# Patient Record
Sex: MALE | Race: WHITE | Employment: PART TIME | URBAN - METROPOLITAN AREA
[De-identification: names, ages, dates, MRNs, and addresses within clinical notes are randomized per-mention and may not be internally consistent; named-entity substitution may affect disease eponyms.]

---

## 2018-09-07 ENCOUNTER — OFFICE VISIT (OUTPATIENT)
Dept: FAMILY MEDICINE | Facility: CLINIC | Age: 48
End: 2018-09-07
Payer: COMMERCIAL

## 2018-09-07 VITALS
WEIGHT: 290 LBS | BODY MASS INDEX: 33.51 KG/M2 | HEART RATE: 85 BPM | SYSTOLIC BLOOD PRESSURE: 115 MMHG | DIASTOLIC BLOOD PRESSURE: 82 MMHG

## 2018-09-07 DIAGNOSIS — R45.851 PASSIVE SUICIDAL IDEATIONS: ICD-10-CM

## 2018-09-07 DIAGNOSIS — F33.1 MODERATE EPISODE OF RECURRENT MAJOR DEPRESSIVE DISORDER (H): Primary | ICD-10-CM

## 2018-09-07 PROCEDURE — 99204 OFFICE O/P NEW MOD 45 MIN: CPT | Performed by: FAMILY MEDICINE

## 2018-09-07 RX ORDER — VENLAFAXINE HYDROCHLORIDE 150 MG/1
300 CAPSULE, EXTENDED RELEASE ORAL DAILY
Qty: 60 CAPSULE | Refills: 0 | Status: SHIPPED | OUTPATIENT
Start: 2018-09-07 | End: 2018-09-18

## 2018-09-07 RX ORDER — MULTIPLE VITAMINS W/ MINERALS TAB 9MG-400MCG
1 TAB ORAL DAILY
COMMUNITY
End: 2023-04-13

## 2018-09-07 NOTE — PROGRESS NOTES
SUBJECTIVE:   Gaurang Rivera is a 47 year old male who presents to clinic today for the following health issues:  Depression Followup    Status since last visit: Stable     See PHQ-9 for current symptoms.  Other associated symptoms: None    Complicating factors:   Significant life event:  No   Current substance abuse:  None  Anxiety or Panic symptoms:  No    PHQ-9  English  PHQ-9   Any Language  Suicide Assessment Five-step Evaluation and Treatment (SAFE-T)    Amount of exercise or physical activity: None    Problems taking medications regularly: No    Medication side effects: none    Diet: regular (no restrictions)      MDD - Last year pt was in [place] and had six months refills. He then got medication refilled from a clinic. He just got on [place] care and needs prescriptions. He is experiencing listlessness and little interest. He has chronic passive SI. He has thoughts that things would be better if he was dead. No active thoughts. He was hospitalized in 2002 for worsening depression. No SI from medication.     Problem list and histories reviewed & adjusted, as indicated.  Additional history: as documented    Reviewed and updated as needed this visit by clinical staff  Tobacco  Allergies  Meds  Med Hx  Surg Hx  Fam Hx  Soc Hx      Reviewed and updated as needed this visit by Provider         ROS:  + increase urination, Constitutional, HEENT, cardiovascular, pulmonary, gi and gu systems are negative, except as otherwise noted.    OBJECTIVE:     /82 (BP Location: Left arm, Patient Position: Sitting, Cuff Size: Adult Large)  Pulse 85  Wt 290 lb (131.5 kg)  BMI 33.51 kg/m2  Body mass index is 33.51 kg/(m^2).  GENERAL: healthy, alert and no distress  EYES: Eyes grossly normal to inspection  HENT: nose and mouth without ulcers or lesions  RESP: non labored   MS: no gross musculoskeletal defects noted  SKIN: no suspicious lesions or rashes  NEURO: Normal strength and tone, mentation intact and speech  normal  PSYCH: mentation appears normal, affect normal    Diagnostic Test Results:  none     ASSESSMENT/PLAN:         1. Moderate episode of recurrent major depressive disorder (H)  PHQ-9 SCORE 9/7/2018   Total Score 7     - venlafaxine (EFFEXOR-XR) 150 MG 24 hr capsule; Take 2 capsules (300 mg) by mouth daily  Dispense: 60 capsule; Refill: 0  - Pt is on Effexor  mg daily. I reviewed both micromedex and up to date. Both sites recommend 225 mg/day. Per up to date - guidelines support doses of up to 375 mg/day based on limited experience.   - I discuss this with pt that I'll do a one month refill and I'll discuss dosing with [facility] pharmacist.   - Follow up in one month for refills and physical.       2. Passive suicidal ideations  - Chronic passive SI, no active thoughts.   - venlafaxine (EFFEXOR-XR) 150 MG 24 hr capsule; Take 2 capsules (300 mg) by mouth daily  Dispense: 60 capsule; Refill: 0      Elvis Guzman MD  [facility]

## 2018-09-07 NOTE — MR AVS SNAPSHOT
"              After Visit Summary   9/7/2018    Gaurang Rivera    MRN: [id number]           Patient Information     Date Of Birth          1970        Visit Information        Provider Department      9/7/2018 9:20 AM Elvis Guzman MD [facility]        Today's Diagnoses     Moderate episode of recurrent major depressive disorder (H)    -  1    Passive suicidal ideations           Follow-ups after your visit        Your next 10 appointments already scheduled     Sep 11, 2018  8:00 AM CDT   [facility]    [address] [place] [id number] [phone]              Who to contact     If you have questions or need follow up information about today's clinic visit or your schedule please contact [facility] directly at [phone].  Normal or non-critical lab and imaging results will be communicated to you by MyChart, letter or phone within 4 business days after the clinic has received the results. If you do not hear from us within 7 days, please contact the clinic through MyChart or phone. If you have a critical or abnormal lab result, we will notify you by phone as soon as possible.  Submit refill requests through [employer] or call your pharmacy and they will forward the refill request to us. Please allow 3 business days for your refill to be completed.          Additional Information About Your Visit        MyChart Information     [employer] lets you send messages to your doctor, view your test results, renew your prescriptions, schedule appointments and more. To sign up, go to [email or web address] . Click on \"Log in\" on the left side of the screen, which will take you to the Welcome page. Then click on \"Sign up Now\" on the right side of the page.     You will be asked to enter the access code listed below, as well as some personal information. Please follow the directions to " create your username and password.     Your access code is: [email or web address]  Expires: 2018 10:11 AM     Your access code will  in 90 days. If you need help or a new code, please call your [facility] or [phone].        Care EveryWhere ID     This is your Care EveryWhere ID. This could be used by other organizations to access your [facility] medical records  [id number]        Your Vitals Were     Pulse BMI (Body Mass Index)                85 33.51 kg/m2           Blood Pressure from Last 3 Encounters:   18 115/82   04/16/10 117/80    Weight from Last 3 Encounters:   18 290 lb (131.5 kg)   02/10/15 270 lb (122.5 kg)              Today, you had the following     No orders found for display         Today's Medication Changes          These changes are accurate as of 18 10:11 AM.  If you have any questions, ask your nurse or doctor.               These medicines have changed or have updated prescriptions.        Dose/Directions    * EFFEXOR PO   This may have changed:  Another medication with the same name was added. Make sure you understand how and when to take each.   Changed by:  Elvis Guzman MD        Take by mouth 3 times daily   Refills:  0       * venlafaxine 150 MG 24 hr capsule   Commonly known as:  EFFEXOR-XR   This may have changed:  You were already taking a medication with the same name, and this prescription was added. Make sure you understand how and when to take each.   Used for:  Moderate episode of recurrent major depressive disorder (H), Passive suicidal ideations   Changed by:  Elvis Guzman MD        Dose:  300 mg   Take 2 capsules (300 mg) by mouth daily   Quantity:  60 capsule   Refills:  0       * Notice:  This list has 2 medication(s) that are the same as other medications prescribed for you. Read the directions carefully, and ask your doctor or other care provider to review them with you.         Where to get your medicines      These medications  were sent to [facility] [address] [address] [facility] AT [address] [address]     Phone:  [phone]     venlafaxine 150 MG 24 hr capsule                Primary Care Provider Fax #    Physician No Ref-Primary [phone]       No address on file        Equal Access to Services     [facility]: Hadii renetta ku hadasho Soomaali, waaxda luqadaha, qaybta kaalmada adeegyada, waxay idiin hayjacksalvador hunterfreddyjerrod urias . So [facility] [phone].    ATENCIÓN: Si habla español, tiene a mckenzie disposición servicios gratuitos de asistencia lingüística. Heraclioshabbir al [phone].    We comply with applicable federal civil rights laws and [place] laws. We do not discriminate on the basis of race, color, national origin, age, disability, sex, sexual orientation, or gender identity.            Thank you!     Thank you for choosing [facility]  for your care. Our goal is always to provide you with excellent care. Hearing back from our patients is one way we can continue to improve our services. Please take a few minutes to complete the written survey that you may receive in the mail after your visit with us. Thank you!             Your Updated Medication List - Protect others around you: Learn how to safely use, store and throw away your medicines at www.disposemymeds.org.          This list is accurate as of 9/7/18 10:11 AM.  Always use your most recent med list.                   Brand Name Dispense Instructions for use Diagnosis    * EFFEXOR PO      Take by mouth 3 times daily        * venlafaxine 150 MG 24 hr capsule    EFFEXOR-XR    60 capsule    Take 2 capsules (300 mg) by mouth daily    Moderate episode of recurrent major depressive disorder (H), Passive suicidal ideations       IBUPROFEN PO           predniSONE 10 MG tablet    DELTASONE    20 tablet    Take pills starting today as follows: 5/5/4/3/2/1.    Lumbago       WELLBUTRIN PO      None Entered        *  Notice:  This list has 2 medication(s) that are the same as other medications prescribed for you. Read the directions carefully, and ask your doctor or other care provider to review them with you.

## 2018-09-08 ASSESSMENT — PATIENT HEALTH QUESTIONNAIRE - PHQ9: SUM OF ALL RESPONSES TO PHQ QUESTIONS 1-9: 7

## 2018-09-11 ENCOUNTER — OFFICE VISIT (OUTPATIENT)
Dept: FAMILY MEDICINE | Facility: CLINIC | Age: 48
End: 2018-09-11
Payer: COMMERCIAL

## 2018-09-11 VITALS
SYSTOLIC BLOOD PRESSURE: 113 MMHG | HEART RATE: 81 BPM | WEIGHT: 286 LBS | DIASTOLIC BLOOD PRESSURE: 78 MMHG | TEMPERATURE: 98.2 F | OXYGEN SATURATION: 95 % | BODY MASS INDEX: 33.09 KG/M2 | HEIGHT: 78 IN

## 2018-09-11 DIAGNOSIS — Z12.5 SCREENING FOR PROSTATE CANCER: ICD-10-CM

## 2018-09-11 DIAGNOSIS — R35.0 URINARY FREQUENCY: ICD-10-CM

## 2018-09-11 DIAGNOSIS — Z00.00 ROUTINE GENERAL MEDICAL EXAMINATION AT A HEALTH CARE FACILITY: ICD-10-CM

## 2018-09-11 DIAGNOSIS — N40.1 BENIGN PROSTATIC HYPERPLASIA WITH URINARY FREQUENCY: ICD-10-CM

## 2018-09-11 DIAGNOSIS — R35.0 BENIGN PROSTATIC HYPERPLASIA WITH URINARY FREQUENCY: ICD-10-CM

## 2018-09-11 DIAGNOSIS — Z13.1 SCREENING FOR DIABETES MELLITUS: ICD-10-CM

## 2018-09-11 DIAGNOSIS — Z23 NEED FOR PROPHYLACTIC VACCINATION AND INOCULATION AGAINST INFLUENZA: ICD-10-CM

## 2018-09-11 DIAGNOSIS — F33.1 MODERATE EPISODE OF RECURRENT MAJOR DEPRESSIVE DISORDER (H): ICD-10-CM

## 2018-09-11 DIAGNOSIS — Z13.220 SCREENING FOR LIPID DISORDERS: ICD-10-CM

## 2018-09-11 DIAGNOSIS — R45.851 PASSIVE SUICIDAL IDEATIONS: ICD-10-CM

## 2018-09-11 LAB
ANION GAP SERPL CALCULATED.3IONS-SCNC: 7 MMOL/L (ref 3–14)
BUN SERPL-MCNC: 15 MG/DL (ref 7–30)
CALCIUM SERPL-MCNC: 8.7 MG/DL (ref 8.5–10.1)
CHLORIDE SERPL-SCNC: 105 MMOL/L (ref 94–109)
CHOLEST SERPL-MCNC: 216 MG/DL
CO2 SERPL-SCNC: 27 MMOL/L (ref 20–32)
CREAT SERPL-MCNC: 1.04 MG/DL (ref 0.66–1.25)
GFR SERPL CREATININE-BSD FRML MDRD: 76 ML/MIN/1.7M2
GLUCOSE SERPL-MCNC: 171 MG/DL (ref 70–99)
HDLC SERPL-MCNC: 25 MG/DL
LDLC SERPL CALC-MCNC: ABNORMAL MG/DL
NONHDLC SERPL-MCNC: 191 MG/DL
POTASSIUM SERPL-SCNC: 4.6 MMOL/L (ref 3.4–5.3)
PSA SERPL-ACNC: 0.46 UG/L (ref 0–4)
SODIUM SERPL-SCNC: 139 MMOL/L (ref 133–144)
TRIGL SERPL-MCNC: 1015 MG/DL

## 2018-09-11 PROCEDURE — 36415 COLL VENOUS BLD VENIPUNCTURE: CPT | Performed by: FAMILY MEDICINE

## 2018-09-11 PROCEDURE — 99396 PREV VISIT EST AGE 40-64: CPT | Mod: 25 | Performed by: FAMILY MEDICINE

## 2018-09-11 PROCEDURE — 90686 IIV4 VACC NO PRSV 0.5 ML IM: CPT | Performed by: FAMILY MEDICINE

## 2018-09-11 PROCEDURE — 80061 LIPID PANEL: CPT | Performed by: FAMILY MEDICINE

## 2018-09-11 PROCEDURE — 80048 BASIC METABOLIC PNL TOTAL CA: CPT | Performed by: FAMILY MEDICINE

## 2018-09-11 PROCEDURE — 90471 IMMUNIZATION ADMIN: CPT | Performed by: FAMILY MEDICINE

## 2018-09-11 PROCEDURE — 99213 OFFICE O/P EST LOW 20 MIN: CPT | Mod: 25 | Performed by: FAMILY MEDICINE

## 2018-09-11 PROCEDURE — G0103 PSA SCREENING: HCPCS | Performed by: FAMILY MEDICINE

## 2018-09-11 RX ORDER — TAMSULOSIN HYDROCHLORIDE 0.4 MG/1
0.4 CAPSULE ORAL DAILY
Qty: 30 CAPSULE | Refills: 1 | Status: SHIPPED | OUTPATIENT
Start: 2018-09-11 | End: 2018-10-24

## 2018-09-11 NOTE — PROGRESS NOTES
SUBJECTIVE:   CC: Gaurang Rivera is an 47 year old male who presents for preventative health visit.     Physical   Annual:     Getting at least 3 servings of Calcium per day:  Yes    Bi-annual eye exam:  NO    Dental care twice a year:  NO    Sleep apnea or symptoms of sleep apnea:  None    Diet:  Regular (no restrictions)    Taking medications regularly:  Yes    Medication side effects:  None    Additional concerns today:  No    Most of his life he has had frequent urination. Over the last five years he has had to urinate more. He wakes up around two times/night to urinate. He drinks a lot of diet coke. No increased thirst.   Few years ago he had a prostate exam which showed boggy prostate.  Today AUASS score 9 - moderate BPH     Today's PHQ-2 Score:   PHQ-2 ( 1999 Pfizer) 9/11/2018   Q1: Little interest or pleasure in doing things 1   Q2: Feeling down, depressed or hopeless 1   PHQ-2 Score 2   Q1: Little interest or pleasure in doing things Several days   Q2: Feeling down, depressed or hopeless Several days   PHQ-2 Score 2       Abuse: Current or Past(Physical, Sexual or Emotional)- No  Do you feel safe in your environment - Yes    Social History   Substance Use Topics     Smoking status: Never Smoker     Smokeless tobacco: Never Used     Alcohol use No     Alcohol Use 9/11/2018   If you drink alcohol do you typically have greater than 3 drinks per day OR greater than 7 drinks per week? Not Applicable     Last PSA: No results found for: PSA    Reviewed orders with patient. Reviewed health maintenance and updated orders accordingly - Yes      Reviewed and updated as needed this visit by clinical staff         Reviewed and updated as needed this visit by Provider            Review of Systems  CONSTITUTIONAL: NEGATIVE for fever, chills, change in weight  INTEGUMENTARY/SKIN: NEGATIVE for worrisome rashes, moles or lesions  EYES: NEGATIVE for vision changes or irritation  ENT: NEGATIVE for ear, mouth and throat  "problems  RESP: NEGATIVE for significant cough or SOB  CV: NEGATIVE for chest pain, palpitations or peripheral edema  GI: NEGATIVE for nausea, abdominal pain, heartburn, or change in bowel habits   male: see above   MUSCULOSKELETAL: NEGATIVE for significant arthralgias or myalgia  NEURO: NEGATIVE for weakness, dizziness or paresthesias  PSYCHIATRIC: + depression     OBJECTIVE:   /78 (Patient Position: Sitting, Cuff Size: Adult Large)  Pulse 81  Temp 98.2  F (36.8  C) (Oral)  Ht 6' 6\" (1.981 m)  Wt 286 lb (129.7 kg)  SpO2 95%  BMI 33.05 kg/m2  Physical Exam  GENERAL: healthy, alert and no distress  EYES: Eyes grossly normal to inspection  HENT: ear canals and TM's normal, nose and mouth without ulcers or lesions  NECK: no adenopathy, no asymmetry, masses, or scars and thyroid normal to palpation  RESP: lungs clear to auscultation - no rales, rhonchi or wheezes  CV: regular rate and rhythm, normal S1 S2  ABDOMEN: soft, nontender, no hepatosplenomegaly, no masses and bowel sounds normal  Prostate - no enlargement   MS: no gross musculoskeletal defects noted, no edema  SKIN: no suspicious lesions or rashes  NEURO: Normal strength and tone, mentation intact and speech normal  PSYCH: mentation appears normal, affect normal/bright    Diagnostic Test Results:  none     ASSESSMENT/PLAN:     1. Routine general medical examination at a health care facility  - see below     2. Urinary frequency  - PSA, screen  - Basic metabolic panel    3. Passive suicidal ideations  - addressed during last weeks visit, no change     4. Moderate episode of recurrent major depressive disorder (H)  - addressed during last weeks visit    5. Benign prostatic hyperplasia with urinary frequency  AUASS score 9 - moderate BPH   - UROLOGY ADULT REFERRAL  - tamsulosin (FLOMAX) 0.4 MG capsule; Take 1 capsule (0.4 mg) by mouth daily  Dispense: 30 capsule; Refill: 1  Flomax 0.4 mg daily. If symptoms are not improving after two weeks, you can " "increase dose to 0.8 mg daily.   If symptoms continue to persist then please follow up with urology clinic.     6. Screening for diabetes mellitus  - glucose     7. Screening for lipid disorders  - Lipid Profile (Chol, Trig, HDL, LDL calc)    8. Screening for prostate cancer  - PSA, screen    9. Need for prophylactic vaccination and inoculation against influenza  - FLU VACCINE, SPLIT VIRUS, IM (QUADRIVALENT) [10375]- >3 YRS  - Vaccine Administration, Initial [82559]        COUNSELING:   Reviewed preventive health counseling, as reflected in patient instructions    BP Readings from Last 1 Encounters:   09/07/18 115/82     Estimated body mass index is 33.51 kg/(m^2) as calculated from the following:    Height as of 2/10/15: 6' 6\" (1.981 m).    Weight as of 9/7/18: 290 lb (131.5 kg).      Weight management plan: Discussed healthy diet and exercise guidelines and patient will follow up in 12 months in clinic to re-evaluate.     reports that he has never smoked. He has never used smokeless tobacco.      Counseling Resources:  ATP IV Guidelines  Pooled Cohorts Equation Calculator  FRAX Risk Assessment  ICSI Preventive Guidelines  Dietary Guidelines for Americans, 2010  USDA's MyPlate  ASA Prophylaxis  Lung CA Screening    Elvis Guzman MD  [facility]  Answers for HPI/ROS submitted by the patient on 9/11/2018   PHQ-2 Score: 2      "

## 2018-09-11 NOTE — PROGRESS NOTES

## 2018-09-11 NOTE — MR AVS SNAPSHOT
After Visit Summary   9/11/2018    Gaurang Rivera    MRN: [id number]           Patient Information     Date Of Birth          1970        Visit Information        Provider Department      9/11/2018 8:00 AM Elvis Guzman MD [facility]        Today's Diagnoses     Screening for diabetes mellitus    -  1    Screening for lipid disorders        Urinary frequency        Screening for prostate cancer        Benign prostatic hyperplasia with urinary frequency          Care Instructions    Flomax 0.4 mg daily. If symptoms are not improving after two weeks, you can increase dose to 0.8 mg daily.   If symptoms continue to persist then please follow up with urology clinic.           Preventive Health Recommendations  Male Ages 40 to 49    Yearly exam:             See your health care provider every year in order to  o   Review health changes.   o   Discuss preventive care.    o   Review your medicines if your doctor has prescribed any.    You should be tested each year for STDs (sexually transmitted diseases) if you re at risk.     Have a cholesterol test every 5 years.     Have a colonoscopy (test for colon cancer) if someone in your family has had colon cancer or polyps before age 50.     After age 45, have a diabetes test (fasting glucose). If you are at risk for diabetes, you should have this test every 3 years.      Talk with your health care provider about whether or not a prostate cancer screening test (PSA) is right for you.    Shots: Get a flu shot each year. Get a tetanus shot every 10 years.     Nutrition:    Eat at least 5 servings of fruits and vegetables daily.     Eat whole-grain bread, whole-wheat pasta and brown rice instead of white grains and rice.     Get adequate Calcium and Vitamin D.     Lifestyle    Exercise for at least 150 minutes a week (30 minutes a day, 5 days a week). This will help you control your weight and prevent disease.     Limit alcohol to one drink  "per day.     No smoking.     Wear sunscreen to prevent skin cancer.     See your dentist every six months for an exam and cleaning.              Follow-ups after your visit        Additional Services     UROLOGY ADULT REFERRAL       Your provider has referred you to: [facility]: [place] [facility] [place] [phone]   [email or web address]    Please be aware that coverage of these services is subject to the terms and limitations of your health insurance plan.  Call member services at your health plan with any benefit or coverage questions.      Please bring the following with you to your appointment:    (1) Any X-Rays, CTs or MRIs which have been performed.  Contact the facility where they were done to arrange for  prior to your scheduled appointment.    (2) List of current medications  (3) This referral request   (4) Any documents/labs given to you for this referral                  Who to contact     If you have questions or need follow up information about today's clinic visit or your schedule please contact [facility] directly at [phone].  Normal or non-critical lab and imaging results will be communicated to you by MyChart, letter or phone within 4 business days after the clinic has received the results. If you do not hear from us within 7 days, please contact the clinic through MyChart or phone. If you have a critical or abnormal lab result, we will notify you by phone as soon as possible.  Submit refill requests through [employer] or call your pharmacy and they will forward the refill request to us. Please allow 3 business days for your refill to be completed.          Additional Information About Your Visit        [employer] Information     [employer] lets you send messages to your doctor, view your test results, renew your prescriptions, schedule appointments and more. To sign up, go to [email or web address] . Click on \"Log in\" on the left side of the " "screen, which will take you to the Welcome page. Then click on \"Sign up Now\" on the right side of the page.     You will be asked to enter the access code listed below, as well as some personal information. Please follow the directions to create your username and password.     Your access code is: [email or web address]  Expires: 2018 10:11 AM     Your access code will  in 90 days. If you need help or a new code, please call your [facility] or [phone].        Care EveryWhere ID     This is your Care EveryWhere ID. This could be used by other organizations to access your [facility] medical records  [id number]        Your Vitals Were     Pulse Temperature Height Pulse Oximetry BMI (Body Mass Index)       81 98.2  F (36.8  C) (Oral) 6' 6\" (1.981 m) 95% 33.05 kg/m2        Blood Pressure from Last 3 Encounters:   18 113/78   18 115/82   04/16/10 117/80    Weight from Last 3 Encounters:   18 286 lb (129.7 kg)   18 290 lb (131.5 kg)   02/10/15 270 lb (122.5 kg)              We Performed the Following     Basic metabolic panel     Lipid Profile (Chol, Trig, HDL, LDL calc)     PSA, screen     UROLOGY ADULT REFERRAL          Today's Medication Changes          These changes are accurate as of 18  8:38 AM.  If you have any questions, ask your nurse or doctor.               Start taking these medicines.        Dose/Directions    tamsulosin 0.4 MG capsule   Commonly known as:  FLOMAX   Used for:  Benign prostatic hyperplasia with urinary frequency   Started by:  Elvis Guzman MD        Dose:  0.4 mg   Take 1 capsule (0.4 mg) by mouth daily   Quantity:  30 capsule   Refills:  1            Where to get your medicines      These medications were sent to [facility] [address] [address] [address]     Phone:  [phone]     tamsulosin 0.4 MG capsule                Primary Care Provider Office Phone # Fax # "    Elvis Guzman [phone]       [address]  [facility] [address]        Equal Access to Services     HOLLI MARTÍNEZ : Hadii aad ku hadmainandrew Serna, waivonneda richiemariha, qamichaelta kashannanda gladys, huy svitlanain hayaan alejandranorman chairez laRayjoaquin alex. So [facility] [phone].    ATENCIÓN: Si habla español, tiene a mckenzie disposición servicios gratuitos de asistencia lingüística. Llame al [phone].    We comply with applicable federal civil rights laws and [place] laws. We do not discriminate on the basis of race, color, national origin, age, disability, sex, sexual orientation, or gender identity.            Thank you!     Thank you for choosing [facility]  for your care. Our goal is always to provide you with excellent care. Hearing back from our patients is one way we can continue to improve our services. Please take a few minutes to complete the written survey that you may receive in the mail after your visit with us. Thank you!             Your Updated Medication List - Protect others around you: Learn how to safely use, store and throw away your medicines at www.disposemymeds.org.          This list is accurate as of 9/11/18  8:38 AM.  Always use your most recent med list.                   Brand Name Dispense Instructions for use Diagnosis    IBUPROFEN PO           Multi-vitamin Tabs tablet      Take 1 tablet by mouth daily        tamsulosin 0.4 MG capsule    FLOMAX    30 capsule    Take 1 capsule (0.4 mg) by mouth daily    Benign prostatic hyperplasia with urinary frequency       venlafaxine 150 MG 24 hr capsule    EFFEXOR-XR    60 capsule    Take 2 capsules (300 mg) by mouth daily    Moderate episode of recurrent major depressive disorder (H), Passive suicidal ideations

## 2018-09-11 NOTE — PATIENT INSTRUCTIONS
Flomax 0.4 mg daily. If symptoms are not improving after two weeks, you can increase dose to 0.8 mg daily.   If symptoms continue to persist then please follow up with urology clinic.           Preventive Health Recommendations  Male Ages 40 to 49    Yearly exam:             See your health care provider every year in order to  o   Review health changes.   o   Discuss preventive care.    o   Review your medicines if your doctor has prescribed any.    You should be tested each year for STDs (sexually transmitted diseases) if you re at risk.     Have a cholesterol test every 5 years.     Have a colonoscopy (test for colon cancer) if someone in your family has had colon cancer or polyps before age 50.     After age 45, have a diabetes test (fasting glucose). If you are at risk for diabetes, you should have this test every 3 years.      Talk with your health care provider about whether or not a prostate cancer screening test (PSA) is right for you.    Shots: Get a flu shot each year. Get a tetanus shot every 10 years.     Nutrition:    Eat at least 5 servings of fruits and vegetables daily.     Eat whole-grain bread, whole-wheat pasta and brown rice instead of white grains and rice.     Get adequate Calcium and Vitamin D.     Lifestyle    Exercise for at least 150 minutes a week (30 minutes a day, 5 days a week). This will help you control your weight and prevent disease.     Limit alcohol to one drink per day.     No smoking.     Wear sunscreen to prevent skin cancer.     See your dentist every six months for an exam and cleaning.

## 2018-09-12 ASSESSMENT — PATIENT HEALTH QUESTIONNAIRE - PHQ9: SUM OF ALL RESPONSES TO PHQ QUESTIONS 1-9: 10

## 2018-09-18 ENCOUNTER — TELEPHONE (OUTPATIENT)
Dept: FAMILY MEDICINE | Facility: CLINIC | Age: 48
End: 2018-09-18

## 2018-09-18 RX ORDER — VENLAFAXINE HYDROCHLORIDE 150 MG/1
300 CAPSULE, EXTENDED RELEASE ORAL DAILY
Qty: 180 CAPSULE | Refills: 0 | Status: SHIPPED | OUTPATIENT
Start: 2018-09-18 | End: 2018-10-24

## 2018-09-18 NOTE — TELEPHONE ENCOUNTER
RN, can you please inform pt of below labs.    1. Elevated fasting blood sugar. Along with his symptoms, concerning for diabetes mellitus.   2. Elevated total cholesterol, triglycerides and LDL.   3. Rest of labs were normal.     Can you please schedule a follow up visit to obtain hgba1c and discuss lipid management.     DM

## 2018-09-18 NOTE — PROGRESS NOTES
Please send the letter to the patient with the following.         Below is a copy of your results. Please call or message me if you have questions or concerns.

## 2018-09-18 NOTE — TELEPHONE ENCOUNTER
Left message on machine to call back.   Ask to speak to an RN, let them know it's a return call.    Leave a number and time that you can be reached.   Jessica Contreras RN

## 2018-09-20 ENCOUNTER — TELEPHONE (OUTPATIENT)
Dept: FAMILY MEDICINE | Facility: CLINIC | Age: 48
End: 2018-09-20

## 2018-09-20 NOTE — TELEPHONE ENCOUNTER
PA Initiation    Medication: Venlafaxine 150mg capsule  Insurance Company: [employer] - Phone [phone] Fax [phone]  Pharmacy Filling the Rx: [facility] [address] [place] [address] AT [facility] [address]  Filling Pharmacy Phone: [phone]  Filling Pharmacy Fax:    Start Date: 9/20/2018    THIS HAS BEEN SUBMITTED BY THE PRIOR-AUTHORIZATION TEAM. ANY QUESTIONS PLEASE CALL [phone]. THANK YOU

## 2018-09-20 NOTE — TELEPHONE ENCOUNTER
Prior Authorization Approval    Authorization Effective Date: 9/19/2018  Authorization Expiration Date: 9/20/2019  Medication: Venlafaxine 150mg capsule approved   Approved Dose/Quantity:   Reference #:     Insurance Company: [employer] - Phone [phone] Fax [phone]  Expected CoPay:       CoPay Card Available:      Foundation Assistance Needed:    Which Pharmacy is filling the prescription (Not needed for infusion/clinic administered): [facility] [address] [place] [address] AT [facility] [address] [facility]  Pharmacy Notified: Yes  Patient Notified: Yes

## 2018-09-20 NOTE — TELEPHONE ENCOUNTER
Prior Authorization Retail Medication Request    Medication/Dose: Venlafaxine 150mg capsule  ICD code (if different than what is on RX):  Previously Tried and Failed:  Rationale:    Insurance Name:unknown  Insurance ID: [id number]    Pharmacy Information (if different than what is on RX)  Name: [facility]   Phone: [phone]    Please include previous medications tried and failed.  Please ask insurance for medications on formulary.

## 2018-09-24 NOTE — TELEPHONE ENCOUNTER
Writer called patient and reviewed message per Dr. Guzman.    Patient verbalized understanding and in agreement with plan.    Appt scheduled on 9/26/18.  Appt date, time and location confirmed with patient.    UMA CarverN, RN

## 2018-09-26 ENCOUNTER — OFFICE VISIT (OUTPATIENT)
Dept: FAMILY MEDICINE | Facility: CLINIC | Age: 48
End: 2018-09-26
Payer: COMMERCIAL

## 2018-09-26 VITALS
TEMPERATURE: 98.3 F | HEART RATE: 91 BPM | SYSTOLIC BLOOD PRESSURE: 120 MMHG | OXYGEN SATURATION: 91 % | DIASTOLIC BLOOD PRESSURE: 81 MMHG | BODY MASS INDEX: 33.51 KG/M2 | WEIGHT: 290 LBS

## 2018-09-26 DIAGNOSIS — E11.9 TYPE 2 DIABETES MELLITUS WITHOUT COMPLICATION, WITHOUT LONG-TERM CURRENT USE OF INSULIN (H): ICD-10-CM

## 2018-09-26 DIAGNOSIS — E78.5 HYPERLIPIDEMIA, UNSPECIFIED HYPERLIPIDEMIA TYPE: ICD-10-CM

## 2018-09-26 DIAGNOSIS — R80.9 MICROALBUMINURIA DUE TO TYPE 2 DIABETES MELLITUS (H): ICD-10-CM

## 2018-09-26 DIAGNOSIS — R73.01 ELEVATED FASTING BLOOD SUGAR: Primary | ICD-10-CM

## 2018-09-26 DIAGNOSIS — M79.661 PAIN OF RIGHT LOWER LEG: ICD-10-CM

## 2018-09-26 DIAGNOSIS — E11.29 MICROALBUMINURIA DUE TO TYPE 2 DIABETES MELLITUS (H): ICD-10-CM

## 2018-09-26 LAB
CREAT UR-MCNC: 27 MG/DL
HBA1C MFR BLD: 6.5 % (ref 0–5.6)
MICROALBUMIN UR-MCNC: 6 MG/L
MICROALBUMIN/CREAT UR: 20.69 MG/G CR (ref 0–17)
TSH SERPL DL<=0.005 MIU/L-ACNC: 1.3 MU/L (ref 0.4–4)

## 2018-09-26 PROCEDURE — 99214 OFFICE O/P EST MOD 30 MIN: CPT | Performed by: FAMILY MEDICINE

## 2018-09-26 PROCEDURE — 82043 UR ALBUMIN QUANTITATIVE: CPT | Performed by: FAMILY MEDICINE

## 2018-09-26 PROCEDURE — 83036 HEMOGLOBIN GLYCOSYLATED A1C: CPT | Performed by: FAMILY MEDICINE

## 2018-09-26 PROCEDURE — 84443 ASSAY THYROID STIM HORMONE: CPT | Performed by: FAMILY MEDICINE

## 2018-09-26 PROCEDURE — 36415 COLL VENOUS BLD VENIPUNCTURE: CPT | Performed by: FAMILY MEDICINE

## 2018-09-26 RX ORDER — SIMVASTATIN 40 MG
40 TABLET ORAL AT BEDTIME
Qty: 90 TABLET | Refills: 3 | Status: SHIPPED | OUTPATIENT
Start: 2018-09-26 | End: 2018-10-24

## 2018-09-26 NOTE — MR AVS SNAPSHOT
After Visit Summary   9/26/2018    Gaurang Rivera    MRN: [id number]           Patient Information     Date Of Birth          1970        Visit Information        Provider Department      9/26/2018 11:00 AM Elvis Guzman MD [facility]        Today's Diagnoses     Elevated fasting blood sugar    -  1    Type 2 diabetes mellitus without complication, without long-term current use of insulin (H)        Hyperlipidemia, unspecified hyperlipidemia type        Pain of right lower leg          Care Instructions    Cholesterol   - Simvastatin 40 mg once at night     Diabetes   Metformin 500 mg with breakfast for 7 days, if tolerating then increase to 500 mg with breakfast and dinner for 7 days, if tolerating then increase to 1000 mg with breakfast and 500 mg with dinner for 7 days, if tolerating then increase to 1000 mg with breakfast and dinner and stay on that dose             Follow-ups after your visit        Additional Services     DIABETES EDUCATOR REFERRAL       DIABETES SELF MANAGEMENT TRAINING (DSMT)      Your provider has referred you to Diabetes Education: [facility]: Diabetes Education - [facility] [phone]   [email or web address]     If an urgent visit is needed or A1C is above 12, Care Team to call the Diabetes  Education Team at [phone] or send an In Basket message to the Diabetes Education Pool (P DIAB ED-PATIENT CARE).    A  will call you to make your appointment. If it has been more than 3 business days since your referral was placed, please call the above phone number to schedule.    Type of training and number of hours: New Diagnosis: Initial group DSMT - 10 hours.      Diabetes Type: Type 2 - Diet Control   Medicare covers: 10 hours of initial DSMT in 12 month period from the time of first visit, plus 2 hours of follow-up DSMT annually, and additional hours as requested for insulin training.          Diabetes Co-Morbidities: dyslipidemia and obesity               A1C Goal:  <8.0       A1C is: Lab Results       Component                Value               Date                       A1C                      6.5                 09/26/2018              MEDICAL NUTRITION THERAPY (MNT) for Diabetes    Medical Nutrition Therapy with a Registered Dietitian can be provided in coordination with Diabetes Self-Management Training to assist in achieving optimal diabetes management.    MNT Type and Hours: Do not initiate MNT at this time.                       Medicare will cover: 3 hours initial MNT in 12 month period after first visit, plus 2 hours of follow-up MNT annually        Diabetes Education Topics: Comprehensive Knowledge Assessment and Instruction    Special Educational Needs Requiring Individual DSMT: None      Please be aware that coverage of these services is subject to the terms and limitations of your health insurance plan.  Call member services at your health plan to determine Diabetes Self-Management Training (Codes  and ) and Medical Nutrition Therapy (Codes 69605 and 19619) benefits and ask which blood glucose monitor brands are covered by your plan.  Please bring the following with you to your appointment:    (1)  List of current medications   (2)  List of Blood Glucose Monitor brands that are covered by your insurance plan  (3)  Blood Glucose Monitor and log book  (4)   Food records for the 3 days prior to your visit    The Certified Diabetes Educator may make diabetes medication adjustments per the CDE Protocol and Collaborative Practice Agreement.            OPHTHALMOLOGY ADULT REFERRAL       Your provider has referred you to: [facility]: Eye [facility] [phone]   [email or web address]    Please be aware that coverage of these services is subject to the terms and limitations of your health insurance plan.  Call member services at your health plan with  any benefit or coverage questions.      Please bring the following with you to your appointment:    (1) Any X-Rays, CTs or MRIs which have been performed.  Contact the facility where they were done to arrange for  prior to your scheduled appointment.    (2) List of current medications  (3) This referral request   (4) Any documents/labs given to you for this referral            ORTHO  REFERRAL       [facility] is referring you to the Orthopedic  Services at [facility].       The  Representative will assist you in the coordination of your Orthopedic and Musculoskeletal Care as prescribed by your physician.    The  Representative will call you within 1 business day to help schedule your appointment, or you may contact the  Representative at:    All areas ~ [phone]     Type of Referral : Non Surgical / Sport Medicine       Timeframe requested: Routine    Coverage of these services is subject to the terms and limitations of your health insurance plan.  Please call member services at your health plan with any benefit or coverage questions.      If X-rays, CT or MRI's have been performed, please contact the facility where they were done to arrange for , prior to your scheduled appointment.  Please bring this referral request to your appointment and present it to your specialist.                  Who to contact     If you have questions or need follow up information about today's clinic visit or your schedule please contact [facility] directly at [phone].  Normal or non-critical lab and imaging results will be communicated to you by MyChart, letter or phone within 4 business days after the clinic has received the results. If you do not hear from us within 7 days, please contact the clinic through MyChart or phone. If you have a critical or abnormal lab result, we will notify you by phone as soon as  "possible.  Submit refill requests through [employer] or call your pharmacy and they will forward the refill request to us. Please allow 3 business days for your refill to be completed.          Additional Information About Your Visit        [employer] Information     [employer] lets you send messages to your doctor, view your test results, renew your prescriptions, schedule appointments and more. To sign up, go to [email or web address] . Click on \"Log in\" on the left side of the screen, which will take you to the Welcome page. Then click on \"Sign up Now\" on the right side of the page.     You will be asked to enter the access code listed below, as well as some personal information. Please follow the directions to create your username and password.     Your access code is: [email or web address]  Expires: 2018 10:11 AM     Your access code will  in 90 days. If you need help or a new code, please call your [facility] clinic or [phone].        Care EveryWhere ID     This is your Care EveryWhere ID. This could be used by other organizations to access your [facility] medical records  [id number]        Your Vitals Were     Pulse Temperature Pulse Oximetry BMI (Body Mass Index)          91 98.3  F (36.8  C) (Oral) 91% 33.51 kg/m2         Blood Pressure from Last 3 Encounters:   18 120/81   18 113/78   18 115/82    Weight from Last 3 Encounters:   18 290 lb (131.5 kg)   18 286 lb (129.7 kg)   18 290 lb (131.5 kg)              We Performed the Following     Albumin Random Urine Quantitative with Creat Ratio     DIABETES EDUCATOR REFERRAL     Hemoglobin A1c     JUST IN CASE     OPHTHALMOLOGY ADULT REFERRAL     ORTHO  REFERRAL     TSH with free T4 reflex          Today's Medication Changes          These changes are accurate as of 18 11:41 AM.  If you have any questions, ask your nurse or doctor.               Start taking these medicines.        Dose/Directions    blood glucose " lancets standard   Commonly known as:  no brand specified   Used for:  Type 2 diabetes mellitus without complication, without long-term current use of insulin (H)        Use to test blood sugar one times daily or as directed.   Quantity:  100 each   Refills:  11       blood glucose monitoring meter device kit   Commonly known as:  no brand specified   Used for:  Type 2 diabetes mellitus without complication, without long-term current use of insulin (H)        Use to test blood sugar one times daily or as directed.   Quantity:  1 kit   Refills:  3       blood glucose monitoring test strip   Commonly known as:  no brand specified   Used for:  Type 2 diabetes mellitus without complication, without long-term current use of insulin (H)        Use to test blood sugars one times daily or as directed   Quantity:  100 strip   Refills:  1       metFORMIN 500 MG tablet   Commonly known as:  GLUCOPHAGE   Used for:  Type 2 diabetes mellitus without complication, without long-term current use of insulin (H)        Dose:  1000 mg   Take 2 tablets (1,000 mg) by mouth 2 times daily (with meals)   Quantity:  120 tablet   Refills:  2       simvastatin 40 MG tablet   Commonly known as:  ZOCOR   Used for:  Hyperlipidemia, unspecified hyperlipidemia type        Dose:  40 mg   Take 1 tablet (40 mg) by mouth At Bedtime   Quantity:  90 tablet   Refills:  3            Where to get your medicines      These medications were sent to [facility] [address] [place] [address] AT [address] [address]     Phone:  [phone]     blood glucose lancets standard    blood glucose monitoring meter device kit    blood glucose monitoring test strip    metFORMIN 500 MG tablet    simvastatin 40 MG tablet                Primary Care Provider Office Phone # Fax #    Deqa Vidya Guzman [phone] [address] [place] [address]        Equal Access to Services     HOLLI  GAAR : Hadii renetta ott angela Serna, waaxda luqadaha, qaybta kashannanda alejandralinhneel, huy hunterfreddyjerrod urias . So [facility] [phone].    ATENCIÓN: Si habla español, tiene a mckenzie disposición servicios gratuitos de asistencia lingüística. Llame al [phone].    We comply with applicable federal civil rights laws and [place] laws. We do not discriminate on the basis of race, color, national origin, age, disability, sex, sexual orientation, or gender identity.            Thank you!     Thank you for choosing [facility]  for your care. Our goal is always to provide you with excellent care. Hearing back from our patients is one way we can continue to improve our services. Please take a few minutes to complete the written survey that you may receive in the mail after your visit with us. Thank you!             Your Updated Medication List - Protect others around you: Learn how to safely use, store and throw away your medicines at www.disposemymeds.org.          This list is accurate as of 9/26/18 11:41 AM.  Always use your most recent med list.                   Brand Name Dispense Instructions for use Diagnosis    blood glucose lancets standard    no brand specified    100 each    Use to test blood sugar one times daily or as directed.    Type 2 diabetes mellitus without complication, without long-term current use of insulin (H)       blood glucose monitoring meter device kit    no brand specified    1 kit    Use to test blood sugar one times daily or as directed.    Type 2 diabetes mellitus without complication, without long-term current use of insulin (H)       blood glucose monitoring test strip    no brand specified    100 strip    Use to test blood sugars one times daily or as directed    Type 2 diabetes mellitus without complication, without long-term current use of insulin (H)       IBUPROFEN PO           metFORMIN 500 MG tablet    GLUCOPHAGE    120 tablet    Take 2 tablets (1,000  mg) by mouth 2 times daily (with meals)    Type 2 diabetes mellitus without complication, without long-term current use of insulin (H)       Multi-vitamin Tabs tablet      Take 1 tablet by mouth daily        simvastatin 40 MG tablet    ZOCOR    90 tablet    Take 1 tablet (40 mg) by mouth At Bedtime    Hyperlipidemia, unspecified hyperlipidemia type       tamsulosin 0.4 MG capsule    FLOMAX    30 capsule    Take 1 capsule (0.4 mg) by mouth daily    Benign prostatic hyperplasia with urinary frequency       venlafaxine 150 MG 24 hr capsule    EFFEXOR-XR    180 capsule    Take 2 capsules (300 mg) by mouth daily    Moderate episode of recurrent major depressive disorder (H), Passive suicidal ideations

## 2018-09-26 NOTE — PATIENT INSTRUCTIONS
Cholesterol   - Simvastatin 40 mg once at night     Diabetes   Metformin 500 mg with breakfast for 7 days, if tolerating then increase to 500 mg with breakfast and dinner for 7 days, if tolerating then increase to 1000 mg with breakfast and 500 mg with dinner for 7 days, if tolerating then increase to 1000 mg with breakfast and dinner and stay on that dose

## 2018-09-26 NOTE — PROGRESS NOTES
SUBJECTIVE:   Gaurang Rivera is a 48 year old male who presents to clinic today for the following health issues:  Diabetes mellitus   HLD  The 10-year ASCVD risk score (Mariposajonel FITZPATRICK Jr, et al., 2013) is: 12.4%    Values used to calculate the score:      Age: 48 years      Sex: Male      Is Non- : No      Diabetic: Yes      Tobacco smoker: No      Systolic Blood Pressure: 120 mmHg      Is BP treated: No      HDL Cholesterol: 25 mg/dL      Total Cholesterol: 216 mg/dL    He has been experiencing right leg pain for around 1.5 years. Previously had negative DVT US.  He denies neuropathy.   Needs eye exam..     Problem list and histories reviewed & adjusted, as indicated.  Additional history: as documented    Labs reviewed in EPIC    Reviewed and updated as needed this visit by clinical staff       Reviewed and updated as needed this visit by Provider         ROS:  Constitutional, HEENT, cardiovascular, pulmonary, gi and gu systems are negative, except as otherwise noted.    OBJECTIVE:     /81 (BP Location: Left arm, Patient Position: Sitting, Cuff Size: Adult Large)  Pulse 91  Temp 98.3  F (36.8  C) (Oral)  Wt 290 lb (131.5 kg)  SpO2 91%  BMI 33.51 kg/m2  Body mass index is 33.51 kg/(m^2).  GENERAL: healthy, alert and no distress  EYES: Eyes grossly normal to inspection  HENT:  nose and mouth without ulcers or lesions  PSYCH: mentation appears normal    Diagnostic Test Results:  Results for orders placed or performed in visit on 09/26/18 (from the past 24 hour(s))   Hemoglobin A1c   Result Value Ref Range    Hemoglobin A1C 6.5 (H) 0 - 5.6 %       ASSESSMENT/PLAN:     1. Elevated fasting blood sugar  - Hemoglobin A1c  - JUST IN CASE    2. Type 2 diabetes mellitus without complication, without long-term current use of insulin (H)  - new diagnosis, we discussed etiology and tx   - advised low carbohydrate diet and increased activity   - Albumin Random Urine Quantitative with Creat Ratio  - TSH  with free T4 reflex  - OPHTHALMOLOGY ADULT REFERRAL  - DIABETES EDUCATOR REFERRAL  - blood glucose monitoring (NO BRAND SPECIFIED) test strip; Use to test blood sugars one times daily or as directed  Dispense: 100 strip; Refill: 1  - blood glucose (NO BRAND SPECIFIED) lancets standard; Use to test blood sugar one times daily or as directed.  Dispense: 100 each; Refill: 11  - blood glucose monitoring (NO BRAND SPECIFIED) meter device kit; Use to test blood sugar one times daily or as directed.  Dispense: 1 kit; Refill: 3  - metFORMIN (GLUCOPHAGE) 500 MG tablet; Take 2 tablets (1,000 mg) by mouth 2 times daily (with meals)  Dispense: 120 tablet; Refill: 2  Metformin 500 mg with breakfast for 7 days, if tolerating then increase to 500 mg with breakfast and dinner for 7 days, if tolerating then increase to 1000 mg with breakfast and 500 mg with dinner for 7 days, if tolerating then increase to 1000 mg with breakfast and dinner and stay on that dose     3. Hyperlipidemia, unspecified hyperlipidemia type  - simvastatin (ZOCOR) 40 MG tablet; Take 1 tablet (40 mg) by mouth At Bedtime  Dispense: 90 tablet; Refill: 3    4. Pain of right lower leg  - ORTHO  REFERRAL    Follow up in one month.     Addendum - labs showed micralbuminuria, started lisinopril 5 mg Qday. Recheck BMP during next visit.     Elvis Guzman MD  [facility] [place]

## 2018-10-01 ENCOUNTER — TELEPHONE (OUTPATIENT)
Dept: FAMILY MEDICINE | Facility: CLINIC | Age: 48
End: 2018-10-01

## 2018-10-01 DIAGNOSIS — E11.9 TYPE 2 DIABETES MELLITUS WITHOUT COMPLICATION, WITHOUT LONG-TERM CURRENT USE OF INSULIN (H): ICD-10-CM

## 2018-10-01 DIAGNOSIS — E11.29 MICROALBUMINURIA DUE TO TYPE 2 DIABETES MELLITUS (H): Primary | ICD-10-CM

## 2018-10-01 DIAGNOSIS — R80.9 MICROALBUMINURIA DUE TO TYPE 2 DIABETES MELLITUS (H): Primary | ICD-10-CM

## 2018-10-01 RX ORDER — LISINOPRIL 5 MG/1
5 TABLET ORAL DAILY
Qty: 90 TABLET | Refills: 0 | Status: SHIPPED | OUTPATIENT
Start: 2018-10-01 | End: 2018-10-24

## 2018-10-01 NOTE — TELEPHONE ENCOUNTER
RN, can you please inform pt of below labs -     Your labs show that you have a protein called albumin in your urine. What this means is that when your kidneys are working normally, they prevent protein from leaking into the urine. I have started you on Lisinopril (blood pressure medication). This medication helps decrease the amount of protein in the urine and can prevent or slow the progression of diabetes-related kidney disease. As I have started you on the medication, I will repeat your kidney functions in one month.     Thyroid function normal.    Thanks,  DM

## 2018-10-02 NOTE — TELEPHONE ENCOUNTER
Dr. Guzman-Please review and sign if agree.    Patient called back and writer reviewed message per Dr. Guzman, along with to which pharmacy Lisinopril was sent.    Patient verbalized understanding and in agreement with plan.    Patient scheduled lab follow up appt on 11/6/18.  Appt date, time and location confirmed with patient.    Labs pended.    Thank you!  UMA CarverN, RN

## 2018-10-03 ENCOUNTER — RADIANT APPOINTMENT (OUTPATIENT)
Dept: GENERAL RADIOLOGY | Facility: CLINIC | Age: 48
End: 2018-10-03
Attending: FAMILY MEDICINE
Payer: COMMERCIAL

## 2018-10-03 ENCOUNTER — OFFICE VISIT (OUTPATIENT)
Dept: ORTHOPEDICS | Facility: CLINIC | Age: 48
End: 2018-10-03
Payer: COMMERCIAL

## 2018-10-03 VITALS
SYSTOLIC BLOOD PRESSURE: 116 MMHG | DIASTOLIC BLOOD PRESSURE: 84 MMHG | BODY MASS INDEX: 33.55 KG/M2 | WEIGHT: 290 LBS | HEIGHT: 78 IN

## 2018-10-03 DIAGNOSIS — M25.561 ARTHRALGIA OF RIGHT LOWER LEG: Primary | ICD-10-CM

## 2018-10-03 DIAGNOSIS — M25.561 ARTHRALGIA OF RIGHT LOWER LEG: ICD-10-CM

## 2018-10-03 PROCEDURE — 99203 OFFICE O/P NEW LOW 30 MIN: CPT | Performed by: FAMILY MEDICINE

## 2018-10-03 PROCEDURE — 73502 X-RAY EXAM HIP UNI 2-3 VIEWS: CPT

## 2018-10-03 ASSESSMENT — ENCOUNTER SYMPTOMS: MYALGIAS: 1

## 2018-10-03 NOTE — LETTER
10/3/2018         RE: Gaurang Rivera  [address]  [facility] [address]        Dear Colleague,    Thank you for referring your patient, Gaurang Rivera, to the  SP[facility]TS [facility]INE. Please see a copy of my visit note below.    [facility]   Clinic Visit s Oct 3, 2018    PCP: Elvis Guzman      Gaurang is a 48 year old male who is seen in consultation at the request of Dr. Guzman for   Chief Complaint   Patient presents with     Right Leg - Pain       Injury: Reports insidious onset without acute precipitating event.     Location of Pain: right leg, deep, nonradiating   Duration of Pain: 1.5 year(s) off and on  Rating of Pain at worst: 7/10  Rating of Pain Currently: 2/10  Pain is better with: lying down   Pain is worse with: standing   Treatment so far consists of: no treatment tried to date  Associated symptoms: no distal numbness or tingling; denies swelling or warmth  Recent imaging completed: No recent imaging completed.  Prior History of related problems: back surgery 1997, L5S1 fusion    Pain always present, never goes away completely.     Social History: is employed as a/an  rowing      Past Medical History:   Diagnosis Date     Depressive disorder 2001     Hypercholesteremia 2012       Patient Active Problem List    Diagnosis Date Noted     Microalbuminuria due to type 2 diabetes mellitus (H) 10/01/2018     Priority: Medium     Diabetes mellitus, type 2 (H) 09/26/2018     Priority: Medium     Hyperlipidemia, unspecified hyperlipidemia type 09/26/2018     Priority: Medium     Moderate episode of recurrent major depressive disorder (H) 09/07/2018     Priority: Medium     Passive suicidal ideations 09/07/2018     Priority: Medium     Low back pain 02/11/2015     Priority: Medium     Diagnosis updated by automated process. Provider to review and confirm.         Family History   Problem Relation Age of Onset     Diabetes Mother      Depression Father       "Alcoholism Sister      Depression Sister      Lupus Sister        Social History     Social History     Marital status: Single     Spouse name: N/A     Number of children: N/A     Years of education: N/A     Social History Main Topics     Smoking status: Never Smoker     Smokeless tobacco: Never Used     Alcohol use No     Drug use: No     Past Surgical History:   Procedure Laterality Date     BACK SURGERY  1997    spinal fusion     CHOLECYSTECTOMY  2012       Review of Systems   Musculoskeletal: Positive for myalgias.         Physical Exam   Musculoskeletal:        Right knee: Medial joint line tenderness noted.     /84  Ht 6' 6\" (1.981 m)  Wt 290 lb (131.5 kg)  BMI 33.51 kg/m2  Constitutional:well-developed, well-nourished, and in no distress.   Cardiovascular: Intact distal pulses.    Neurological: alert. Gait Abnormal:   Antalgic gait  Skin: Skin is warm and dry.   Psychiatric: Mood and affect normal.   Respiratory: unlabored, speaks in full sentences  Lymph: no LAD, no lymphangitis          Right Knee Exam   Swelling: None  Effusion: No    Tenderness   The patient is experiencing tenderness in the medial joint line.    Range of Motion   Extension: Normal  Flexion:     Normal    Tests   McMurrays:  Medial - Negative      Lateral - Negative  Lachman:  Anterior - Negative    Posterior - Negative  Drawer:       Anterior - Negative    Posterior - Negative  Varus:  Negative  Valgus: Negative  Pivot Shift: Negative  Patellar Apprehension: No    Left Hip Exam   Gait: Normal.    Right Hip Exam   Gait: Normal.    Tenderness   None    Range of Motion   Extension:            Normal  Flexion:                 Normal  Internal Rotation:  Normal  External Rotation: Normal  Abduction:            Normal  Adduction:            Normal    Muscle Strength   Abduction:  5/5  Adduction:  5/5  Flexion:      5/5    Tests   Mariela: Negative  Vladimir:  Negative    Back Exam   Sensation: Normal.  Gait: Abnormal.    Tenderness "   None    Range of Motion   Flexion:                    70  Extension:                20  Lateral Bend Left:    Normal  Lateral Bend Right:  Normal  Rotation Right:         Normal  Rotation Left:           Normal  SLR    Right: Negative  Left:    Negative    Muscle Strength   Normal back strength    Tests   Toe Walk: Normal  Heel Walk: Normal    Reflexes   Patellar:  Normal  Achilles:  Normal             Recent Results (from the past 744 hour(s))   XR Pelvis and Hip Right 1 View    Narrative    PELVIS AND HIP RIGHT ONE VIEW   10/3/2018 2:21 PM     HISTORY: Whole leg pain, hip decreased range of motion. Arthralgia of  right lower leg.    COMPARISON: None.       Impression    IMPRESSION: No acute fracture or dislocation. No evidence of arthritis  in the hips. Postoperative change at the lumbosacral junction.    VAIBHAV ALCALA MD       Xr lumbar spine 2/10/15    Exam: 2 views lumbar spine     History: Lumbago     Findings:      AP and lateral views of lumbar spine were obtained. 5 lumbar type  vertebral bodies are assumed for purpose this dictation with  transitional lumbosacral anatomy at L5. Interbody fusion device is  noted at L5-S1.  T12-L1 intervertebral disc space is collimated off  the image on the frontal projection. There is no evidence of acute  osseous abnormality. Sacroiliac joints and visualized portion of the  hips are congruent.     There is mild degenerative changes of the visualized lower thoracic  spine and lumbar spine with most pronounced disk space loss at T11-T12  and T12-L1. Vertebral alignment is maintained.     IMPRESSION  Impression:  1. No acute osseous abnormality.  2. Postoperative change of presumed L5-S1 interbody fusion.  3. Lumbosacral transitional anatomy at the L5.  CAN MONROE    ASSESSMENT/PLAN    ICD-10-CM    1. Arthralgia of right lower leg M25.561 XR Pelvis and Hip Right 1 View     US EDWAR Doppler with Exercise Bilateral     Entire right leg aching pain which is difficult  to precisely identify etiology.  Hip x-rays are normal.  Findings of joint line tenderness on knee exam failed to duplicate his concerning symptoms.  Differential includes radiculopathy or vascular etiology.  Discussed options, patient opts to proceed with vascular investigation as he was told by a physical therapy friend that this is the possible source.  Dopplers with exercise ordered, return after study to review results and determine next steps.      Again, thank you for allowing me to participate in the care of your patient.        Sincerely,        Bishnu Durant MD

## 2018-10-03 NOTE — PROGRESS NOTES
[facility]   Clinic Visit s Oct 3, 2018    PCP: Elvis Guzman      Gaurang is a 48 year old male who is seen in consultation at the request of Dr. Guzman for   Chief Complaint   Patient presents with     Right Leg - Pain       Injury: Reports insidious onset without acute precipitating event.     Location of Pain: right leg, deep, nonradiating   Duration of Pain: 1.5 year(s) off and on  Rating of Pain at worst: 7/10  Rating of Pain Currently: 2/10  Pain is better with: lying down   Pain is worse with: standing   Treatment so far consists of: no treatment tried to date  Associated symptoms: no distal numbness or tingling; denies swelling or warmth  Recent imaging completed: No recent imaging completed.  Prior History of related problems: back surgery 1997, L5S1 fusion    Pain always present, never goes away completely.     Social History: is employed as a/an  rowing      Past Medical History:   Diagnosis Date     Depressive disorder 2001     Hypercholesteremia 2012       Patient Active Problem List    Diagnosis Date Noted     Microalbuminuria due to type 2 diabetes mellitus (H) 10/01/2018     Priority: Medium     Diabetes mellitus, type 2 (H) 09/26/2018     Priority: Medium     Hyperlipidemia, unspecified hyperlipidemia type 09/26/2018     Priority: Medium     Moderate episode of recurrent major depressive disorder (H) 09/07/2018     Priority: Medium     Passive suicidal ideations 09/07/2018     Priority: Medium     Low back pain 02/11/2015     Priority: Medium     Diagnosis updated by automated process. Provider to review and confirm.         Family History   Problem Relation Age of Onset     Diabetes Mother      Depression Father      Alcoholism Sister      Depression Sister      Lupus Sister        Social History     Social History     Marital status: Single     Spouse name: N/A     Number of children: N/A     Years of education: N/A     Social History Main Topics      "Smoking status: Never Smoker     Smokeless tobacco: Never Used     Alcohol use No     Drug use: No     Past Surgical History:   Procedure Laterality Date     BACK SURGERY  1997    spinal fusion     CHOLECYSTECTOMY  2012       Review of Systems   Musculoskeletal: Positive for myalgias.         Physical Exam   Musculoskeletal:        Right knee: Medial joint line tenderness noted.     /84  Ht 6' 6\" (1.981 m)  Wt 290 lb (131.5 kg)  BMI 33.51 kg/m2  Constitutional:well-developed, well-nourished, and in no distress.   Cardiovascular: Intact distal pulses.    Neurological: alert. Gait Abnormal:   Antalgic gait  Skin: Skin is warm and dry.   Psychiatric: Mood and affect normal.   Respiratory: unlabored, speaks in full sentences  Lymph: no LAD, no lymphangitis          Right Knee Exam   Swelling: None  Effusion: No    Tenderness   The patient is experiencing tenderness in the medial joint line.    Range of Motion   Extension: Normal  Flexion:     Normal    Tests   McMurrays:  Medial - Negative      Lateral - Negative  Lachman:  Anterior - Negative    Posterior - Negative  Drawer:       Anterior - Negative    Posterior - Negative  Varus:  Negative  Valgus: Negative  Pivot Shift: Negative  Patellar Apprehension: No    Left Hip Exam   Gait: Normal.    Right Hip Exam   Gait: Normal.    Tenderness   None    Range of Motion   Extension:            Normal  Flexion:                 Normal  Internal Rotation:  Normal  External Rotation: Normal  Abduction:            Normal  Adduction:            Normal    Muscle Strength   Abduction:  5/5  Adduction:  5/5  Flexion:      5/5    Tests   Mariela: Negative  Vladimir:  Negative    Back Exam   Sensation: Normal.  Gait: Abnormal.    Tenderness   None    Range of Motion   Flexion:                    70  Extension:                20  Lateral Bend Left:    Normal  Lateral Bend Right:  Normal  Rotation Right:         Normal  Rotation Left:           Normal  SLR    Right: Negative  Left:    " Negative    Muscle Strength   Normal back strength    Tests   Toe Walk: Normal  Heel Walk: Normal    Reflexes   Patellar:  Normal  Achilles:  Normal             Recent Results (from the past 744 hour(s))   XR Pelvis and Hip Right 1 View    Narrative    PELVIS AND HIP RIGHT ONE VIEW   10/3/2018 2:21 PM     HISTORY: Whole leg pain, hip decreased range of motion. Arthralgia of  right lower leg.    COMPARISON: None.       Impression    IMPRESSION: No acute fracture or dislocation. No evidence of arthritis  in the hips. Postoperative change at the lumbosacral junction.    VAIBHAV ALCALA MD       Xr lumbar spine 2/10/15    Exam: 2 views lumbar spine     History: Lumbago     Findings:      AP and lateral views of lumbar spine were obtained. 5 lumbar type  vertebral bodies are assumed for purpose this dictation with  transitional lumbosacral anatomy at L5. Interbody fusion device is  noted at L5-S1.  T12-L1 intervertebral disc space is collimated off  the image on the frontal projection. There is no evidence of acute  osseous abnormality. Sacroiliac joints and visualized portion of the  hips are congruent.     There is mild degenerative changes of the visualized lower thoracic  spine and lumbar spine with most pronounced disk space loss at T11-T12  and T12-L1. Vertebral alignment is maintained.     IMPRESSION  Impression:  1. No acute osseous abnormality.  2. Postoperative change of presumed L5-S1 interbody fusion.  3. Lumbosacral transitional anatomy at the L5.  CAN FER    ASSESSMENT/PLAN    ICD-10-CM    1. Arthralgia of right lower leg M25.561 XR Pelvis and Hip Right 1 View     US EDWAR Doppler with Exercise Bilateral     Entire right leg aching pain which is difficult to precisely identify etiology.  Hip x-rays are normal.  Findings of joint line tenderness on knee exam failed to duplicate his concerning symptoms.  Differential includes radiculopathy or vascular etiology.  Discussed options, patient opts to  proceed with vascular investigation as he was told by a physical therapy friend that this is the possible source.  Dopplers with exercise ordered, return after study to review results and determine next steps.

## 2018-10-03 NOTE — MR AVS SNAPSHOT
After Visit Summary   10/3/2018    Gaurang Rivera    MRN: [id number]           Patient Information     Date Of Birth          1970        Visit Information        Provider Department      10/3/2018 1:40 PM Bishnu Durant MD  Sports Medicine        Today's Diagnoses     Arthralgia of right lower leg    -  1       Follow-ups after your visit        Your next 10 appointments already scheduled     Oct 24, 2018  7:20 AM CDT   Office Visit with Elvis Guzman MD   [facility] ([facility])    [address] [place] [id number] [phone]           Bring a current list of meds and any records pertaining to this visit. For Physicals, please bring immunization records and any forms needing to be filled out. Please arrive 10 minutes early to complete paperwork.            Nov 06, 2018 11:30 AM CST   LAB with [facility]   [facility] ([facility])    [address] [place] [id number] [phone]           Please do not eat 10-12 hours before your appointment if you are coming in fasting for labs on lipids, cholesterol, or glucose (sugar). This does not apply to pregnant women. Water, hot tea and black coffee (with nothing added) are okay. Do not drink other fluids, diet soda or chew gum.              Who to contact     If you have questions or need follow up information about today's clinic visit or your schedule please contact  SPORTS MEDICINE directly at [phone].  Normal or non-critical lab and imaging results will be communicated to you by MyChart, letter or phone within 4 business days after the clinic has received the results. If you do not hear from us within 7 days, please contact the clinic through [employer] or phone. If you have a critical or abnormal lab result, we will notify you by phone as soon as possible.  Submit refill requests through [employer] or call your pharmacy and they will  "forward the refill request to us. Please allow 3 business days for your refill to be completed.          Additional Information About Your Visit        [employer] Information     [employer] lets you send messages to your doctor, view your test results, renew your prescriptions, schedule appointments and more. To sign up, go to [email or web address] . Click on \"Log in\" on the left side of the screen, which will take you to the Welcome page. Then click on \"Sign up Now\" on the right side of the page.     You will be asked to enter the access code listed below, as well as some personal information. Please follow the directions to create your username and password.     Your access code is: [email or web address]  Expires: 2018 10:11 AM     Your access code will  in 90 days. If you need help or a new code, please call your [facility] clinic or [phone].        Care EveryWhere ID     This is your Care EveryWhere ID. This could be used by other organizations to access your [facility] medical records  [id number]        Your Vitals Were     Height BMI (Body Mass Index)                6' 6\" (1.981 m) 33.51 kg/m2           Blood Pressure from Last 3 Encounters:   10/03/18 116/84   18 120/81   18 113/78    Weight from Last 3 Encounters:   10/03/18 290 lb (131.5 kg)   18 290 lb (131.5 kg)   18 286 lb (129.7 kg)               Primary Care Provider Office Phone # Fax #    Deqa Vidya Guzman [phone] [address]  [facility] [address]        Equal Access to Services     [facility] : Hadii renetta Serna, vickey molina, huy gipson. So [facility] [phone].    ATENCIÓN: Si habla español, tiene a mckenzie disposición servicios gratuitos de asistencia lingüística. Llame al [phone].    We comply with applicable federal civil rights laws and [place] laws. We do not discriminate on the basis of race, color, national " origin, age, disability, sex, sexual orientation, or gender identity.            Thank you!     Thank you for choosing  [facility]TS [facility]ICINE  for your care. Our goal is always to provide you with excellent care. Hearing back from our patients is one way we can continue to improve our services. Please take a few minutes to complete the written survey that you may receive in the mail after your visit with us. Thank you!             Your Updated Medication List - Protect others around you: Learn how to safely use, store and throw away your medicines at www.disposemymeds.org.          This list is accurate as of 10/3/18 11:59 PM.  Always use your most recent med list.                   Brand Name Dispense Instructions for use Diagnosis    blood glucose lancets standard    no brand specified    100 each    Use to test blood sugar one times daily or as directed.    Type 2 diabetes mellitus without complication, without long-term current use of insulin (H)       blood glucose monitoring meter device kit    no brand specified    1 kit    Use to test blood sugar one times daily or as directed.    Type 2 diabetes mellitus without complication, without long-term current use of insulin (H)       blood glucose monitoring test strip    no brand specified    100 strip    Use to test blood sugars one times daily or as directed    Type 2 diabetes mellitus without complication, without long-term current use of insulin (H)       IBUPROFEN PO           lisinopril 5 MG tablet    PRINIVIL/ZESTRIL    90 tablet    Take 1 tablet (5 mg) by mouth daily    Microalbuminuria due to type 2 diabetes mellitus (H)       metFORMIN 500 MG tablet    GLUCOPHAGE    120 tablet    Take 2 tablets (1,000 mg) by mouth 2 times daily (with meals)    Type 2 diabetes mellitus without complication, without long-term current use of insulin (H)       Multi-vitamin Tabs tablet      Take 1 tablet by mouth daily        simvastatin 40 MG tablet    ZOCOR    90 tablet     Take 1 tablet (40 mg) by mouth At Bedtime    Hyperlipidemia, unspecified hyperlipidemia type       tamsulosin 0.4 MG capsule    FLOMAX    30 capsule    Take 1 capsule (0.4 mg) by mouth daily    Benign prostatic hyperplasia with urinary frequency       venlafaxine 150 MG 24 hr capsule    EFFEXOR-XR    180 capsule    Take 2 capsules (300 mg) by mouth daily    Moderate episode of recurrent major depressive disorder (H), Passive suicidal ideations

## 2018-10-16 ENCOUNTER — TELEPHONE (OUTPATIENT)
Dept: LAB | Facility: CLINIC | Age: 48
End: 2018-10-16

## 2018-10-16 DIAGNOSIS — R80.9 MICROALBUMINURIA: Primary | ICD-10-CM

## 2018-10-16 NOTE — TELEPHONE ENCOUNTER
Patient has lab only appt 11/6/18, no orders in chart.  Please place FUTURE orders in Epic if appropriate.    Thanks,   lab

## 2018-10-24 ENCOUNTER — OFFICE VISIT (OUTPATIENT)
Dept: FAMILY MEDICINE | Facility: CLINIC | Age: 48
End: 2018-10-24
Payer: COMMERCIAL

## 2018-10-24 VITALS
DIASTOLIC BLOOD PRESSURE: 76 MMHG | OXYGEN SATURATION: 94 % | WEIGHT: 286 LBS | HEART RATE: 78 BPM | BODY MASS INDEX: 33.05 KG/M2 | SYSTOLIC BLOOD PRESSURE: 108 MMHG | TEMPERATURE: 98.3 F

## 2018-10-24 DIAGNOSIS — R45.851 PASSIVE SUICIDAL IDEATIONS: ICD-10-CM

## 2018-10-24 DIAGNOSIS — Z23 NEED FOR PNEUMOCOCCAL VACCINE: ICD-10-CM

## 2018-10-24 DIAGNOSIS — R80.9 MICROALBUMINURIA DUE TO TYPE 2 DIABETES MELLITUS (H): ICD-10-CM

## 2018-10-24 DIAGNOSIS — E11.29 MICROALBUMINURIA DUE TO TYPE 2 DIABETES MELLITUS (H): ICD-10-CM

## 2018-10-24 DIAGNOSIS — E78.5 HYPERLIPIDEMIA, UNSPECIFIED HYPERLIPIDEMIA TYPE: ICD-10-CM

## 2018-10-24 DIAGNOSIS — R35.0 BENIGN PROSTATIC HYPERPLASIA WITH URINARY FREQUENCY: ICD-10-CM

## 2018-10-24 DIAGNOSIS — N40.1 BENIGN PROSTATIC HYPERPLASIA WITH URINARY FREQUENCY: ICD-10-CM

## 2018-10-24 DIAGNOSIS — F33.1 MODERATE EPISODE OF RECURRENT MAJOR DEPRESSIVE DISORDER (H): ICD-10-CM

## 2018-10-24 PROCEDURE — 90471 IMMUNIZATION ADMIN: CPT | Performed by: FAMILY MEDICINE

## 2018-10-24 PROCEDURE — 99214 OFFICE O/P EST MOD 30 MIN: CPT | Mod: 25 | Performed by: FAMILY MEDICINE

## 2018-10-24 PROCEDURE — 90732 PPSV23 VACC 2 YRS+ SUBQ/IM: CPT | Performed by: FAMILY MEDICINE

## 2018-10-24 RX ORDER — SIMVASTATIN 40 MG
40 TABLET ORAL AT BEDTIME
Qty: 90 TABLET | Refills: 3 | Status: SHIPPED | OUTPATIENT
Start: 2018-10-24 | End: 2019-06-28

## 2018-10-24 RX ORDER — LISINOPRIL 5 MG/1
5 TABLET ORAL DAILY
Qty: 90 TABLET | Refills: 1 | Status: SHIPPED | OUTPATIENT
Start: 2018-10-24 | End: 2019-06-28

## 2018-10-24 RX ORDER — VENLAFAXINE HYDROCHLORIDE 150 MG/1
300 CAPSULE, EXTENDED RELEASE ORAL DAILY
Qty: 180 CAPSULE | Refills: 1 | Status: SHIPPED | OUTPATIENT
Start: 2018-10-24 | End: 2019-03-19

## 2018-10-24 RX ORDER — TAMSULOSIN HYDROCHLORIDE 0.4 MG/1
0.8 CAPSULE ORAL DAILY
Qty: 60 CAPSULE | Refills: 2 | Status: SHIPPED | OUTPATIENT
Start: 2018-10-24 | End: 2019-02-19

## 2018-10-24 NOTE — MR AVS SNAPSHOT
After Visit Summary   10/24/2018    Gaurang Rivera    MRN: [id number]           Patient Information     Date Of Birth          1970        Visit Information        Provider Department      10/24/2018 7:20 AM Elvis Guzman MD [facility]        Today's Diagnoses     Microalbuminuria due to type 2 diabetes mellitus (H)        Moderate episode of recurrent major depressive disorder (H)        Passive suicidal ideations        Hyperlipidemia, unspecified hyperlipidemia type        Benign prostatic hyperplasia with urinary frequency           Follow-ups after your visit        Additional Services     MENTAL HEALTH REFERRAL  - Adult; Psychiatry and Medication Management; Psychiatry; [facility]: [facility] [phone].  Medication management & future refills will be returned to [facility] PCP upon completion of evaluation; We presley...       All scheduling is subject to the client's specific insurance plan & benefits, provider/location availability, and provider clinical specialities.  Please arrive 15 minutes early for your first appointment and bring your completed paperwork.    Please be aware that coverage of these services is subject to the terms and limitations of your health insurance plan.  Call member services at your health plan with any benefit or coverage questions.                      MENTAL HEALTH REFERRAL  - Adult; Psychiatry and Medication Management; Psychiatry; [facility]: [facility] [phone].  Medication management & future refills will be returned to [facility] PCP upon completion of evaluation; Ole sherwood...       Or community psychiatrist   All scheduling is subject to the client's specific insurance plan & benefits, provider/location availability, and provider clinical specialities.  Please arrive 15 minutes early for your first appointment and bring your completed paperwork.    Please be aware that coverage of these services is  "subject to the terms and limitations of your health insurance plan.  Call member services at your health plan with any benefit or coverage questions.                  Follow-up notes from your care team     Return in about 2 months (around 12/24/2018) for Routine Visit.      Your next 10 appointments already scheduled     Nov 06, 2018 11:30 AM CST   LAB with HW LAB   [facility] ([facility])    [address] [place] [id number] [phone]           Please do not eat 10-12 hours before your appointment if you are coming in fasting for labs on lipids, cholesterol, or glucose (sugar). This does not apply to pregnant women. Water, hot tea and black coffee (with nothing added) are okay. Do not drink other fluids, diet soda or chew gum.              Who to contact     If you have questions or need follow up information about today's clinic visit or your schedule please contact [facility] directly at [phone].  Normal or non-critical lab and imaging results will be communicated to you by [employer], letter or phone within 4 business days after the clinic has received the results. If you do not hear from us within 7 days, please contact the clinic through [employer] or phone. If you have a critical or abnormal lab result, we will notify you by phone as soon as possible.  Submit refill requests through [employer] or call your pharmacy and they will forward the refill request to us. Please allow 3 business days for your refill to be completed.          Additional Information About Your Visit        Fanvibe Information     [employer] lets you send messages to your doctor, view your test results, renew your prescriptions, schedule appointments and more. To sign up, go to [email or web address] . Click on \"Log in\" on the left side of the screen, which will take you to the Welcome page. Then click on \"Sign up Now\" on the right side of the page.     You will be asked to " enter the access code listed below, as well as some personal information. Please follow the directions to create your username and password.     Your access code is: [email or web address]  Expires: 2019  7:12 AM     Your access code will  in 90 days. If you need help or a new code, please call your [facility] clinic or [phone].        Care EveryWhere ID     This is your Care EveryWhere ID. This could be used by other organizations to access your [facility] medical records  [id number]        Your Vitals Were     Pulse Temperature Pulse Oximetry BMI (Body Mass Index)          78 98.3  F (36.8  C) (Oral) 94% 33.05 kg/m2         Blood Pressure from Last 3 Encounters:   10/24/18 108/76   10/03/18 116/84   18 120/81    Weight from Last 3 Encounters:   10/24/18 286 lb (129.7 kg)   10/03/18 290 lb (131.5 kg)   18 290 lb (131.5 kg)              We Performed the Following     MENTAL HEALTH REFERRAL  - Adult; Psychiatry and Medication Management; Psychiatry; G: [facility] Psychiatry Service [phone].  Medication management & future refills will be returned to [facility] PCP upon completion of evaluation; We presley...     MENTAL HEALTH REFERRAL  - Adult; Psychiatry and Medication Management; Psychiatry; [facility]: [facility] Psychiatry Service [phone].  Medication management & future refills will be returned to [facility] PCP upon completion of evaluation; We presley...          Today's Medication Changes          These changes are accurate as of 10/24/18  7:45 AM.  If you have any questions, ask your nurse or doctor.               These medicines have changed or have updated prescriptions.        Dose/Directions    tamsulosin 0.4 MG capsule   Commonly known as:  FLOMAX   This may have changed:  how much to take   Used for:  Benign prostatic hyperplasia with urinary frequency   Changed by:  Elvis Guzman MD        Dose:  0.8 mg   Take 2 capsules (0.8 mg) by mouth daily   Quantity:  60 capsule    Refills:  2            Where to get your medicines      These medications were sent to [id number] - [place] [id number] [facility] [address]  [address] [address]     Phone:  [phone]     lisinopril 5 MG tablet    simvastatin 40 MG tablet    tamsulosin 0.4 MG capsule    venlafaxine 150 MG 24 hr capsule                Primary Care Provider Office Phone # Fax #    Deqa Vidya Guzman [phone] [address]  [facility] [address]        Equal Access to Services     [facility]: Hadii aad ku hadasho Soomaali, waaxda luqadaha, qaybta kaalmada adeegyada, huy moran hayjackn pricilla urias . [facility] [phone].    ATENCIÓN: Si habla español, tiene a mckenzie disposición servicios gratuitos de asistencia lingüística. [facility] [phone].    We comply with applicable federal civil rights laws and [place] laws. We do not discriminate on the basis of race, color, national origin, age, disability, sex, sexual orientation, or gender identity.            Thank you!     Thank you for choosing [facility]  for your care. Our goal is always to provide you with excellent care. Hearing back from our patients is one way we can continue to improve our services. Please take a few minutes to complete the written survey that you may receive in the mail after your visit with us. Thank you!             Your Updated Medication List - Protect others around you: Learn how to safely use, store and throw away your medicines at www.disposemymeds.org.          This list is accurate as of 10/24/18  7:45 AM.  Always use your most recent med list.                   Brand Name Dispense Instructions for use Diagnosis    blood glucose lancets standard    no brand specified    100 each    Use to test blood sugar one times daily or as directed.    Type 2 diabetes mellitus without complication, without long-term current use of insulin (H)       blood  glucose monitoring meter device kit    no brand specified    1 kit    Use to test blood sugar one times daily or as directed.    Type 2 diabetes mellitus without complication, without long-term current use of insulin (H)       blood glucose monitoring test strip    no brand specified    100 strip    Use to test blood sugars one times daily or as directed    Type 2 diabetes mellitus without complication, without long-term current use of insulin (H)       IBUPROFEN PO           lisinopril 5 MG tablet    PRINIVIL/ZESTRIL    90 tablet    Take 1 tablet (5 mg) by mouth daily    Microalbuminuria due to type 2 diabetes mellitus (H)       metFORMIN 500 MG tablet    GLUCOPHAGE    120 tablet    Take 2 tablets (1,000 mg) by mouth 2 times daily (with meals)    Type 2 diabetes mellitus without complication, without long-term current use of insulin (H)       Multi-vitamin Tabs tablet      Take 1 tablet by mouth daily        simvastatin 40 MG tablet    ZOCOR    90 tablet    Take 1 tablet (40 mg) by mouth At Bedtime    Hyperlipidemia, unspecified hyperlipidemia type       tamsulosin 0.4 MG capsule    FLOMAX    60 capsule    Take 2 capsules (0.8 mg) by mouth daily    Benign prostatic hyperplasia with urinary frequency       venlafaxine 150 MG 24 hr capsule    EFFEXOR-XR    180 capsule    Take 2 capsules (300 mg) by mouth daily    Moderate episode of recurrent major depressive disorder (H), Passive suicidal ideations

## 2018-10-24 NOTE — PROGRESS NOTES
SUBJECTIVE:   Gaurang Rivera is a 48 year old male who presents to clinic today for the following health issues:      Diabetes Follow-up      Patient is checking blood sugars: not at all    Diabetic concerns: frequent infections     Symptoms of hypoglycemia (low blood sugar): none     Paresthesias (numbness or burning in feet) or sores: No     Date of last diabetic eye exam: no     BP Readings from Last 2 Encounters:   10/03/18 116/84   09/26/18 120/81     Hemoglobin A1C (%)   Date Value   09/26/2018 6.5 (H)     LDL Cholesterol Calculated (mg/dL)   Date Value   09/11/2018     Cannot estimate LDL when triglyceride exceeds 400 mg/dL       Diabetes Management Resources    Amount of exercise or physical activity: 1 day/week for an average of 15-30 minutes    Problems taking medications regularly: No    Medication side effects: none    Diet: regular (no restrictions)    He is still experiencing urinary frequency and urgency.   MDD  - He has previously been on wellbutrin and ritalin. He is still having passive SI. He is interested in seeing a psychologist.     Problem list and histories reviewed & adjusted, as indicated.  Additional history: as documented    Labs reviewed in EPIC    Reviewed and updated as needed this visit by clinical staff       Reviewed and updated as needed this visit by Provider         ROS:  Constitutional, HEENT, cardiovascular, pulmonary, gi and gu systems are negative, except as otherwise noted.    OBJECTIVE:     /76  Pulse 78  Temp 98.3  F (36.8  C) (Oral)  Wt 286 lb (129.7 kg)  SpO2 94%  BMI 33.05 kg/m2  Body mass index is 33.05 kg/(m^2).  GENERAL: healthy, alert and no distress  EYES: Eyes grossly normal to inspection  HENT:nose and mouth without ulcers or lesions  PSYCH: mentation appears normal, affect normal    Diagnostic Test Results:  none     ASSESSMENT/PLAN:     ## Moderate episode of recurrent major depressive disorder (H)  - continued passive SI, no active thoughts  - pt  interested in establishing care with psychiatry and psychology   - venlafaxine (EFFEXOR-XR) 150 MG 24 hr capsule; Take 2 capsules (300 mg) by mouth daily  Dispense: 180 capsule; Refill: 1  - MENTAL HEALTH REFERRAL  - Adult; Psychiatry and Medication Management; Psychiatry; [facility] [phone].  Medication management & future refills will be returned to [facility] PCP upon completion of evaluation; We presley...  - MENTAL HEALTH REFERRAL  - Adult; Psychiatry and Medication Management; Psychiatry; [facility]: [facility] [phone].  Medication management & future refills will be returned to [facility] PCP upon completion of evaluation; We presley...    ## Microalbuminuria due to type 2 diabetes mellitus (H)  - lisinopril (PRINIVIL/ZESTRIL) 5 MG tablet; Take 1 tablet (5 mg) by mouth daily  Dispense: 90 tablet; Refill: 1  - f/u in 2-3 months     ## Passive suicidal ideations  - venlafaxine (EFFEXOR-XR) 150 MG 24 hr capsule; Take 2 capsules (300 mg) by mouth daily  Dispense: 180 capsule; Refill: 1  - MENTAL HEALTH REFERRAL  - Adult; Psychiatry and Medication Management; Psychiatry; [facility] [phone].  Medication management & future refills will be returned to [facility] PCP upon completion of evaluation; We presley...  - MENTAL HEALTH REFERRAL  - Adult; Psychiatry and Medication Management; Psychiatry; [facility]: [facility] [phone].  Medication management & future refills will be returned to [facility] PCP upon completion of evaluation; We presley...    ## Hyperlipidemia, unspecified hyperlipidemia type  - simvastatin (ZOCOR) 40 MG tablet; Take 1 tablet (40 mg) by mouth At Bedtime  Dispense: 90 tablet; Refill: 3    ## Benign prostatic hyperplasia with urinary frequency  - due to continued symptoms, increased flomax from 0.4 to 0.8   - tamsulosin (FLOMAX) 0.4 MG capsule; Take 2 capsules (0.8 mg) by mouth daily  Dispense: 60 capsule;  Refill: 2  - pt will f/u with urology for further evaluation     ## Need for pneumococcal vaccine  - PPSV23, IM/SUBQ (2+ YRS) - [id number]          Elvis Guzman MD  [facility]

## 2018-10-24 NOTE — NURSING NOTE
Screening Questionnaire for Adult Immunization    Are you sick today?   No   Do you have allergies to medications, food, a vaccine component or latex?   No   Have you ever had a serious reaction after receiving a vaccination?   No   Do you have a long-term health problem with heart disease, lung disease, asthma, kidney disease, metabolic disease (e.g. diabetes), anemia, or other blood disorder?   No   Do you have cancer, leukemia, HIV/AIDS, or any other immune system problem?   No   In the past 3 months, have you taken medications that affect  your immune system, such as prednisone, other steroids, or anticancer drugs; drugs for the treatment of rheumatoid arthritis, Crohn s disease, or psoriasis; or have you had radiation treatments?   No   Have you had a seizure, or a brain or other nervous system problem?   No   During the past year, have you received a transfusion of blood or blood     products, or been given immune (gamma) globulin or antiviral drug?   No   For women: Are you pregnant or is there a chance you could become        pregnant during the next month?   No   Have you received any vaccinations in the past 4 weeks?   No     Immunization questionnaire answers were all negative.        Per orders of Dr. Guzman, injection of pneumovax 23 given by Drea Marie. Patient instructed to remain in clinic for 15 minutes afterwards, and to report any adverse reaction to me immediately.       Screening performed by Drea Marie on 10/24/2018 at 8:13 AM.

## 2019-02-19 ENCOUNTER — OFFICE VISIT (OUTPATIENT)
Dept: PSYCHIATRY | Facility: CLINIC | Age: 49
End: 2019-02-19
Payer: COMMERCIAL

## 2019-02-19 VITALS
TEMPERATURE: 98.3 F | DIASTOLIC BLOOD PRESSURE: 88 MMHG | HEIGHT: 78 IN | BODY MASS INDEX: 33.21 KG/M2 | RESPIRATION RATE: 16 BRPM | WEIGHT: 287 LBS | SYSTOLIC BLOOD PRESSURE: 120 MMHG | HEART RATE: 104 BPM | OXYGEN SATURATION: 95 %

## 2019-02-19 DIAGNOSIS — F33.1 MODERATE EPISODE OF RECURRENT MAJOR DEPRESSIVE DISORDER (H): Primary | ICD-10-CM

## 2019-02-19 PROCEDURE — 90792 PSYCH DIAG EVAL W/MED SRVCS: CPT | Performed by: NURSE PRACTITIONER

## 2019-02-19 RX ORDER — METHYLPHENIDATE HYDROCHLORIDE 5 MG/1
5 TABLET ORAL 2 TIMES DAILY
Qty: 60 TABLET | Refills: 0 | Status: SHIPPED | OUTPATIENT
Start: 2019-02-19 | End: 2019-03-19 | Stop reason: DRUGHIGH

## 2019-02-19 ASSESSMENT — ANXIETY QUESTIONNAIRES
1. FEELING NERVOUS, ANXIOUS, OR ON EDGE: SEVERAL DAYS
5. BEING SO RESTLESS THAT IT IS HARD TO SIT STILL: NOT AT ALL
2. NOT BEING ABLE TO STOP OR CONTROL WORRYING: MORE THAN HALF THE DAYS
6. BECOMING EASILY ANNOYED OR IRRITABLE: NOT AT ALL
3. WORRYING TOO MUCH ABOUT DIFFERENT THINGS: SEVERAL DAYS
7. FEELING AFRAID AS IF SOMETHING AWFUL MIGHT HAPPEN: SEVERAL DAYS
GAD7 TOTAL SCORE: 6
IF YOU CHECKED OFF ANY PROBLEMS ON THIS QUESTIONNAIRE, HOW DIFFICULT HAVE THESE PROBLEMS MADE IT FOR YOU TO DO YOUR WORK, TAKE CARE OF THINGS AT HOME, OR GET ALONG WITH OTHER PEOPLE: SOMEWHAT DIFFICULT

## 2019-02-19 ASSESSMENT — MIFFLIN-ST. JEOR: SCORE: 2300.07

## 2019-02-19 ASSESSMENT — PATIENT HEALTH QUESTIONNAIRE - PHQ9
SUM OF ALL RESPONSES TO PHQ QUESTIONS 1-9: 18
5. POOR APPETITE OR OVEREATING: SEVERAL DAYS

## 2019-02-19 NOTE — PROGRESS NOTES
"                                                         Outpatient Psychiatric Evaluation- Standard  Adult    Name:  Gaurang Rivera  : 1970    Source of Referral:  Primary Care Provider: Elvis Guzman MD   Last visit: 10/24/2018  Current Psychotherapist: none   Last visit: interested     Identifying Data:  Patient is a 48 year old, single  Choose not to answer Choose not to answer male  who presents for initial visit with me.  Patient is currently employed part time. Patient attended the session alone. Consent to communicate signed for no-one. Consent for treatment signed and included in electronic medical record. Discussed limits of confidentiality today. My Practice Policy was reviewed and signed.     Patient prefers to be called: \"Gaurang\"  Do you know why your primary doctor referred you to see Fermin Rebollarmatty? Depression for years. Getting worse. Effexor been taking forever so hard to know if it still works.       Chief Complaint:    Patient reports: \"Depression getting worse. Have been taking Effexor for so long it's hard to know if it still works.\"      HPI:    States he's had depression most of life. Had inpatient hospitalization at [facility] in  for depression/SI; \"darkest I've ever been\"; states he had \"terrible experience\" in general unit. Subsequently engaged with day treatment program. In 15 years afterwards saw a local community therapist; states it was helpful, despite depression being recurrent; this therapist has since retired. He moved to [place] in  for a job; was [place] town so didn't think he had therapy options; states he was \"venkatesh doing OK\" but in winter 2017 had a major depressive episode; had to take 1 month leave from job; however did find a counselor in his town and saw 5 times; helped him \"get out of ligia rut\". He moved back to [place] in ; didn't have health insurance for a while, but now has insurance. He's had no therapist since returning to [place].     Patient " "started Effexor and Wellbutrin in 2000. Has taken both consistently over the years; tapering Wellbutrin to 300 mg daily and Effexor to 300 mg daily; states it seemed to help at first with mood, but this therapeuitic effect he last felt \"ages ago\". He stopped Wellbutrin in 2015 when lapsed on prescriptions; didn't notice a big change. He stats he tolerates Effexor OK; can have withdrawal if missing a day's dosage (\"wild dreams\"). Lastly, recalls having been prescribed Ritalin 10-15 years ago as an antidepressant; thinks it helped.    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 States he has no other occupations; \"I'm pretty good at doing nothing\"; states family can help with finances and his lives modestly. Single, no relationship, no close friends; \"extreme introvert\"; has a young dog and cat. States \"highlight of my day\" is eating at fast food restaurant daily and reading newspaper. Will otherwise \"spend a lot of time in bed\" watching TV. He is close with his parents and one of his sisters; states \"they worry about me a lot\". States \"I think about killing myself every day, though it's a fleeting thought\"; has fanaticized about a \"tidy suicide\" via car exhaust; has never been close to attempting; states he knows he would not act on thoughts.    States he sleeps a lot, though not quality sleep. Doesn't feel that anxiety is elevated. Tends to overeat or eat junk food when depressed; which is counter-odds with is ambition to get his diabetes under control with weight management.    States an interest in attending either a day program or IOP; \"I've certainly got the time!\". Lives in [facility]; has a car.        Psychiatric Review of Symptoms:     PHQ-9 scores:   PHQ-9 SCORE 9/7/2018 9/11/2018 2/19/2019   PHQ-9 Total Score 7 10 18        SOL-7 scores:    SOL-7 SCORE 2/19/2019   Total Score 6       Psychiatric " History:   Hospitalization at [facility] in 1999; subsequent day treatment program      Substance Use History:  No EtOH or substance use      Past Medical History:  Past Medical History:   Diagnosis Date     Depressive disorder 2001     Hypercholesteremia 2012      Surgery:   Past Surgical History:   Procedure Laterality Date     BACK SURGERY  1997    spinal fusion     CHOLECYSTECTOMY  2012     Allergies:   No Known Allergies  Primary Care Provider: Elvis Guzman MD      Current Medications:    Current Outpatient Medications:      blood glucose (NO BRAND SPECIFIED) lancets standard, Use to test blood sugar one times daily or as directed., Disp: 100 each, Rfl: 11     blood glucose monitoring (NO BRAND SPECIFIED) meter device kit, Use to test blood sugar one times daily or as directed. (Patient not taking: Reported on 10/24/2018), Disp: 1 kit, Rfl: 3     blood glucose monitoring (NO BRAND SPECIFIED) test strip, Use to test blood sugars one times daily or as directed (Patient not taking: Reported on 10/24/2018), Disp: 100 strip, Rfl: 1     IBUPROFEN PO, , Disp: , Rfl:      lisinopril (PRINIVIL/ZESTRIL) 5 MG tablet, Take 1 tablet (5 mg) by mouth daily, Disp: 90 tablet, Rfl: 1     metFORMIN (GLUCOPHAGE) 500 MG tablet, Take 2 tablets (1,000 mg) by mouth 2 times daily (with meals), Disp: 120 tablet, Rfl: 2     multivitamin, therapeutic with minerals (MULTI-VITAMIN) TABS tablet, Take 1 tablet by mouth daily, Disp: , Rfl:      simvastatin (ZOCOR) 40 MG tablet, Take 1 tablet (40 mg) by mouth At Bedtime, Disp: 90 tablet, Rfl: 3     tamsulosin (FLOMAX) 0.4 MG capsule, Take 2 capsules (0.8 mg) by mouth daily, Disp: 60 capsule, Rfl: 2     venlafaxine (EFFEXOR-XR) 150 MG 24 hr capsule, Take 2 capsules (300 mg) by mouth daily, Disp: 180 capsule, Rfl: 1    The [place] Prescription Monitoring Program has been reviewed and there are no concerns about diversionary activity for controlled substances at this time.     Vital  "Signs:  Vitals: /88 (BP Location: Right arm, Patient Position: Chair, Cuff Size: Adult Large)   Pulse 104   Temp 98.3  F (36.8  C) (Oral)   Resp 16   Ht 1.981 m (6' 6\")   Wt 130.2 kg (287 lb)   SpO2 95%   BMI 33.17 kg/m      Labs:  Most recent labs reviewed and no new labs.       Review of Systems:  10 systems (general, cardiovascular, respiratory, eyes, ENT, endocrine, GI, , M/S, neurological) were reviewed. Most pertinent finding(s) is/are: diabetes, back pain. The remaining systems are all unremarkable.    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 No amnesia.  Fund of Knowledge: appropriate  Insight:  good  Judgment:  intact  Impulse Control:  intact    Suicide Risk Assessment:  Today Gaurang Rivera reports routine passive SI, fleeting in nature.  In addition, there are notable risk factors for self-harm, including age, single status, " comorbid medical condition of diabetes, suicidal ideation and purposelessness/no reason for living. However, risk is mitigated by commitment to family, sobriety, absence of past attempts, ability to volunteer a safety plan, history of seeking help when needed, future oriented, no access to firearms or weapons, identifies reasons to live including pets and denies suicidal intent or plan. Therefore, based on all available evidence including the factors cited above, Gaurang Rivera does not appear to be at imminent risk for self-harm, does not meet criteria for a 72-hr hold, and therefore remains appropriate for ongoing outpatient level of care.  A thorough assessment of risk factors related to suicide and self-harm have been reviewed and are noted above. The patient convincingly denies acute suicidality on several occasions. Local community safety resources reviewed and printed for patient to use if needed. There was no deceit detected, and the patient presented in a manner that was believable.     DSM5  Diagnosis:  296.32 (F33.1) Major Depressive Disorder, Recurrent Episode, Moderate _    Medical Comorbidities Include:   Patient Active Problem List    Diagnosis Date Noted     Microalbuminuria due to type 2 diabetes mellitus (H) 10/01/2018     Priority: Medium     Diabetes mellitus, type 2 (H) 09/26/2018     Priority: Medium     Hyperlipidemia, unspecified hyperlipidemia type 09/26/2018     Priority: Medium     Moderate episode of recurrent major depressive disorder (H) 09/07/2018     Priority: Medium     Passive suicidal ideations 09/07/2018     Priority: Medium     Low back pain 02/11/2015     Priority: Medium     Diagnosis updated by automated process. Provider to review and confirm.         A 12-item WHODAS 2.0 assessment was completed by the patient today and recorded in [employer].  No flowsheet data found.    The Patient Activation Measure (LEONEL) score was completed and recorded in [employer]. This assesses patient knowledge,  skill, and confidence for self-management. No flowsheet data found.             Impression:  Gaurang Rivera is a 48 year old male with a history of long-stand depression. Fortunately, he's rather psychologically minded and motivated for treatment. He has been taking Effexor for so long at this point, that a switch to a untrialed antidepressant could precipitate further decompensation. I propose instead, that we try to treat his depression and appetite with Ritalin for the time being, for a more prompt and predictable effect. We discuss the merits of engaging with day treatment or an IOP and he is quite agreeable to pursue -- I have placed a referral to [facility].     Medication side effects and alternatives reviewed. Health promotion activities recommended and reviewed today. All questions addressed. Education and counseling completed regarding risks and benefits of medications and psychotherapy options. Collaborative Care Psychiatry Service model reviewed today. Recommend therapy for additional support.     Treatment Plan:    Continue Effexor  mg daily    Start Ritalin 5 mg BID    Referred to [facility] day treatment/IOP    Continue all other medical directions with per primary care provider.     Continue all other medications as reviewed per electronic medical record today.     Safety plan reviewed. To the Emergency Department as needed or call after hours crisis line at [phone] or [phone]. [place] Crisis Text Line: Text [place] to 436718  or  Suicide LifeLine Chat: suicidepreventionlifeline.org/chat/    Schedule an appointment with me in 4 weeks or sooner as needed.  Call [facility] at [phone] to schedule.    Follow up with primary care provider as planned or for acute medical concerns.    Call the psychiatric nurse line with medication questions or concerns at [phone].    [employer]ide[employer] may be used to communicate with your provider, but this is not intended to be used for  emergencies.      Community Resources:    National Suicide Prevention Lifeline: 574.790.9607 (TTY: 309.142.2210). Call anytime for help.  (www.suicidepreventionlifeline.org)  [place] on Mental Illness (www.dianne.org): 237.337.2589 or 586-686-5030.   Mental Health Association (www.mentalhealth.org): 393.117.5002 or 409-005-9478.  [place] Crisis Text Line: Text [place] to 174848  Suicide LifeLine Chat: suicideFontself.org/chat    Administrative Billing:   Time spent with patient was 60 minutes and greater than 50% of time or 40 minutes was spent in counseling and coordination of care regarding above diagnoses and treatment plan.    Patient Status:  Patient will continue to be seen for ongoing consultation and stabilization.    Signed:   Fermin Bishop CNP  Psychiatry

## 2019-02-20 ASSESSMENT — ANXIETY QUESTIONNAIRES: GAD7 TOTAL SCORE: 6

## 2019-02-22 ENCOUNTER — HOSPITAL ENCOUNTER (OUTPATIENT)
Dept: BEHAVIORAL HEALTH | Facility: CLINIC | Age: 49
Discharge: HOME | End: 2019-02-22
Attending: PSYCHIATRY & NEUROLOGY | Admitting: PSYCHIATRY & NEUROLOGY
Payer: COMMERCIAL

## 2019-02-22 PROCEDURE — 90791 PSYCH DIAGNOSTIC EVALUATION: CPT

## 2019-02-22 ASSESSMENT — PAIN SCALES - GENERAL: PAINLEVEL: SEVERE PAIN (6)

## 2019-02-22 NOTE — PROGRESS NOTES
" Standard Diagnostic Assessment     CLIENT'S NAME: Gaurang Rivera  MRN:   [id number]  :   1970 AGE:48 year old SEX: male  ACCT. NUMBER: [id number]  DATE OF SERVICE: 19 Start Time:  0900 End Time:  1030    Home Phone [phone]   Work Phone Not on file.   Mobile [phone]     Preferred Phone:   May we leave a program related message? yes    Yes, the patient has been informed that any other mental health professional providing mental health services to me will need access to this Diagnostic Assessment in order to develop a treatment plan and receive payment.     Identifying Information:  Gauarng Rivera is a 48 year old, Choose not to answer, single male. Gaurang attended the DA  alone.     Reason for Referral: Gaurang was referred to Day Treatment (DT)  by Dr. Martinez. Gaurang reports the reason for referral at this time is depression.    Gaurang verbalizes the following treatment/discharge goals: \"how to expand my social engagement given that I am an introvert, find a way to increase physical activity, find a way to increase activities/productivity in my life     \".    Current Stressors/Losses/Disappointments: unemployment, physical pain, finances, not wanting to worry parents, loneliness/social isolation    Per Client, Review of Symptoms:  Mood (Depression/Anxiety/Lulu/Anger): depressed mood, feeling of hopelessness, feeling of worthlessness, fatigue, low interest in usual activities, thinking it would be better to not be alive, overeating, poor sleep  Psychosis: none reported    Trauma: none reported  Other:     Mental Health History:  Gaurang reports first onset of mental health symptoms around time of back injury in early .  Gaurang was first diagnosed , after back surgery made things even owrse.   Gaurang received the following mental health services in the past: counseling, day treatment and medication(s) from physician / PCP.   Psychiatric Hospitalizations: one, , after back surgery made " things worse. Just for one night.  Gaurang denies a history of civil commitment.      Onset/Duration/Pattern of Symptoms noted above:     Gaurang reports the following understanding of his diagnosis: depression.      Personal Safety:    [place]-Suicide Severity Rating Scale   Suicide Ideation   1.) Have you ever wished you were dead or that you could go to sleep and not wake up?     Lifetime: Yes, (if yes, please discribe) :  Past Month:  Yes, (if yes, please discribe) :    2.) Have you actually had any thoughts of killing yourself?   Lifetime:  No Past Month:  No   3.) Have you been thinking about how you might do this?     Lifetime:  No Past Month:  No   4.) Have you had these thoughts and had some intention of acting on them?     Lifetime:  No Past Month:  No   5.) Have you started to work out the details of how to kill yourself?   Lifetime:  No Past Month:  No   6.) Do you intend to carry out this plan?      Lifetime:  No Past Month:  No   Intensity of Ideation   Intensity of ideation (1 being least severe, 5 being most severe):     Lifetime:  3, description of Ideation: think that it would be easier to not be alive anymore. I don't want to die, I just don't want to live. At least not like this.                                                                                                 Past Month:  3, description of Ideation: 3, description of Ideation: think that it would be easier to not be alive anymore. I don't want to die, I just don't want to live. At least not like this.      How often do you have these thoughts? Daily or almost daily    When you have the thoughts how long do they last?  Fleeting - few seconds or minutes   Can you stop thinking about killing yourself or wanting to die if you want to?  Can control thoughts with some difficulty   Are there things - anyone or anything (i.e. family, Yazidism, pain of death) that stopped you from wanting to die or acting on thoughts of suicide?  Protective  factors definately stopped you from attempting suicide      What sort of reasons did you have for thinking about wanting to die or killing yourself (ie end pain, stop how you were feeling, get attention or reaction, revenge)?  Completely to end or stop the pain (youcouldn't go on living with the pain or how you were feeling)   Suicidal Behavior   (Suicide Attempt) - Have you made a suicide attempt?     Lifetime:  No Past Month: No   Have you engaged in self-harm (non-suicidal self-injury)?  No   (Interrupted Attempt) - Has there been a time when you started to do something to end your life but someone or something stopped you before you actually did anything?  No   (Aborted or Self-Interrupted Attempt) - Has there been a time when you started to do something to try to end your life but you stopped yourself before you actually did anything?  No   (Preparatory Acts of Behavior) - Have you taken any steps towards making suicide attempt or preparing to kill yourself (such as collecting pills, getting a gun, giving valuables away or writing a suicide note)? No   Actual Lethality/Medical Damage:   0. No physical damage or very minor physical damage (e.g., surface scratches).   1. Minor physical damage (e.g., lethargic speech; first-degree burns; mild bleeding; sprains).  2. Moderate physical damage; medical attention needed (e.g., conscious but sleepy, somewhat responsive; second-degree burns; bleeding of major vessel).  3. Moderately severe physical damage; medical hospitalization and likely intensive care required (e.g., comatose with reflexes intact; third-degree burns less than 20% of body; extensive blood loss but can recover; major fractures).  4. Sever physical damage; medical hospitalization with intensive care required (e.g., comatose without reflexes; third-degree burs over 20% of body; extensive blood loss with unstable vital sign; major damage to a vital area).  5. Death    No attempts       2008  The Research  [facility]  Used with permission by Chanda Root, PhD.               Guide to C-SSRS Risk Ratings   NO IDEATION:  with no active thoughts IDEATION: with a wish to die. IDEATION: with active thoughts. Risk Ratings   If Yes No No 0 - Very Low Risk   If NA Yes No 1 - Low Risk   If NA Yes Yes 2 - Low/moderate risk   IDEATION: associated thoughts of methods without intent or plan INTENT: Intent to follow through on suicide PLAN: Plan to follow through on suicide Risk Ratings cont...   If Yes No No 3 - Moderate Risk   If Yes Yes No 4 - High Risk   If Yes Yes Yes 5 - High Risk   The patient's ADDITIONAL RISK FACTORS and lack of PROTECTIVE FACTORS may increase their overall suicide risk ratings.      Additional Risk Factors:    Significant history of having untreated or poorly treated mental health symptoms     Significant history of untreated or poorly treated chronic pain issues     Tendency to be socially isolated and/or cut off from the support of others   Protective Factors: Sense of responsibility to family and Positive social support       Risk Status   Risk Rating: low risk, though this would increase signficiantly if anything happened to his pets or his parents-   Staff:  EM[facility] to Tx team.    Additional information to support suicide risk rating:  OR There was no additional information to provide at this time.  Please see the above suicide risk rating information.       Additional Safety Questions:    Do you have a gun, weapons or other means (including medications) to harm yourself available to you? No   Do you take chances with your safety?   no   Have you ever thought about killing someone else? No   Have you ever heard voices telling you to harm yourself or others? No       Supports:   From whom do you receive support and how often? (family/friends/agency) Parents, sister     Do your support people want/need education/resources? yes        Is there anything in your life (current or  history) that is satisfying to you (include leisure interests/hobbies)?   yes pets, connection with parents, routine of going to restaurant and reading newspaper daily      Hope/Belief System:  Do you believe things can get better? Not sure. I'm almost 49, I probably will never  or have children, I don't know that if things stay the way they are that my life has much worth       Personal Safety Summary:          Gaurang denies current fears or concerns for personal safety.    Completed safety coping plan? yes        Substance Use History:   Did drink alcohol when in college, says he drank about as much as the other guys he knew. Says he stopped drinking except for 1-2 beers a year in the last ten years. No drug use ever.     Substance Use Disorder Treatment: Gaurang is currently receiving the following services: No indications of CD issues.       CAGE-AID:  Have you ever felt you ought to cut down on your drinking or drug use?   No    Have people annoyed you by criticizing your drinking or drug use?   No    Have you ever felt bad or guilty about your drinking or drug use?   No    Have you ever had a drink or used drugs first thing in the morning to steady your nerves or to get rid of a hangover?  No    Do you feel these issues have been adequately addressed? YesChemical Dependency Assessment Recommended?  No        Gaurang has a negative Cage-Aid score.       Legal History:    Gaurang reports that he has not been involved with the legal system.   ________________________________________________________________________    Life Situation (Employment/School/Finances/Basic Needs):  Gaurang  is currently living alone in a apartment.   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 Likes it, at least likes getting out routinely and interact with other people.   Gaurang reports finances  are obtained through Employment and help from parents  Gaurang does identify his finances as a current stressor.  Gaurang denies a history of gambling and denies a history of gambling treatment.     Gaurang reports his highest level of education is graduate school M.F.A. Gaurang did not identify any learning problems   Gaurang describes academic performance as: very good   Gaurang describes school social experience as: good. Had good group of friends until he injured his back rowing.     Gaurang denies concerns regarding his current ability to meet basic needs.     Social/Family History:  Gaurang  reports he grew up in [place], [place].   Gaurang was the first born of three children.   Gaurang reports his biological parents are     Gaurang describes his childhood as really great. Had supportive parents, enjoyed his family, had friends.  Gaurang describes his current relationships with his family of origin as close to one of his sisters and to his parents     Gaurang identifies his relationship status as: single.    Gaurang identifies his sexual orientation as: opposite sex   Gaurang denies sexual health concerns.     Gaurang reports having no children.     Gaurang describes the quantity/quality of his social relationships as no friends        Significant Losses / Trauma / Abuse / Neglect Issues / Developmental Incidents:  Gaurang denies significant loss/trauma/abuse/neglect issues/developmental incidents   Gaurang reports major medical problems hurt back in ealry 20's, stil have chronic pain every day.   Gaurang has not addressed the above concerns in previous therapy/treatment     Gaurang denies personal  experience.     Oriental orthodox Preference/Spiritual Beliefs/Cultural Considerations:     A. Ethnic Self-Identification:  Gaurang self-identifies his race/ethnicities as:  and his preferred language to be English.   Gaurang reports he does not need the assistance of an . Gaurang  reports he does not need other support or modifications  involved in therapy.      B. Do you experience cultural bias (the practice of interpreting judging behavior by standards inherent to one's own culture) by other people as a stressor? If yes, describe how this relates to overall mental health symptoms.  No    C. Are there any cultural influences that may need to be considered for your treatment?  (This includes historical, geographical and familial factors that affect assessment and intervention processes). No, Denies any cultural influences or concerns that need to be considered for treatment    Strengths/Vulnerabilities:   Gaurang identifies his personal strengths as: educated, insightful, intelligent, motivated and open to learning .   Things that may interfere with the clients success in treatment include: few friends.   Other identified areas of vulnerability include: Suicidal Ideation  Depressive symptoms  Physical/medical.     Medical History / Physical Health Screen:     Primary Care Physician: Gaurang has a [facility] Provider, who is named Elvis Guzman..   Last Physical Exam: within the past year. Client was encouraged to follow up with PCP if symptoms were to develop.    Mental Health Medication Management Provider / Psychiatrist: Gaurang reports not having a psychiatrist. Just started seeing a psychiatric certified nurse paractitioner      Current medications including prescription, non-prescription, herbals, dietary aids and vitamins:  Per client report:   Outpatient Medications Marked as Taking for the 2/22/19 encounter (Hospital Encounter) with Miguelina Lewis Sig     blood glucose (NO BRAND SPECIFIED) lancets standard Use to test blood sugar one times daily or as directed.     blood glucose monitoring (NO BRAND SPECIFIED) meter device kit Use to test blood sugar one times daily or as directed.     blood glucose monitoring (NO BRAND SPECIFIED) test strip Use to test blood sugars one times daily or as directed     IBUPROFEN PO       lisinopril (PRINIVIL/ZESTRIL) 5 MG tablet Take 1 tablet (5 mg) by mouth daily     metFORMIN (GLUCOPHAGE) 500 MG tablet Take 2 tablets (1,000 mg) by mouth 2 times daily (with meals)     methylphenidate (RITALIN) 5 MG tablet Take 1 tablet (5 mg) by mouth 2 times daily     multivitamin, therapeutic with minerals (MULTI-VITAMIN) TABS tablet Take 1 tablet by mouth daily     simvastatin (ZOCOR) 40 MG tablet Take 1 tablet (40 mg) by mouth At Bedtime     venlafaxine (EFFEXOR-XR) 150 MG 24 hr capsule Take 2 capsules (300 mg) by mouth daily       Gaurang reports current medications are: not sure.   Gaurang describes taking his medications as: Independent.  Gaurang reports taking prescribed medications as prescribed.     Gaurang provides the following current assessment of pain:  ;  ;  .     Gaurang provides the following information regarding past significant medical conditions/diagnoses:      Medical:  Past Medical History:   Diagnosis Date     Depressive disorder 2001     Hypercholesteremia 2012       Surgical:  Past Surgical History:   Procedure Laterality Date     BACK SURGERY  1997    spinal fusion     CHOLECYSTECTOMY  2012     Allergy:   Gaurang reports No Known Allergies     Family History of Medical, Mental Health and/or Substance Use problems:  Per client report:   Family History   Problem Relation Age of Onset     Diabetes Mother      Depression Father      Alcoholism Sister      Depression Sister      Lupus Sister        Gaurang reports the following current medical concerns: chronic back pain, diabetes.      General Health:   Have you had any exposure to any communicable disease in the past 2-3 weeks? no     Are you aware of safe sex practices? yes     Is there a possibility of pregnancy?  no       Nutrition:    Are you on a special diet? If yes, please explain:  no   Do you have any concerns regarding your nutritional status? If yes, please explain:  yes poor diet generally   Have you had any appetite changes in the last 3  months?  No     Have you had any weight loss or weight gain in the last 3 months?  Stable     Do you have a history of an eating disorder? no   Do you have a history of being in an eating disorder program? no   Do you have any dental concerns? no   NOTE: BMI to be calculated following program admission.    Fall Risk:   Have you had any falls in the past 3 months? yes about 3 weeks ago, slipped and fell on ice     Do you currently use any assistive devices for mobility?   no     NOTE: If client reports 3 or more falls in the past 3 months, the client will not be accepted into the program until further assessment is completed by the program nurse. Check if a nurse is available to assess at time of DA.    NOTE: If client reports 2 falls in the past 3 months and/or the client currently uses assistive devices for mobility, the  will send an in-basket to the program nurse to meet with the client within the first week of programming.    Head Injury/Trauma:   Do you have a history of head injury / trauma? no     Do you have any cognitive impairment? no       Per completion of the Medical History / Physical Health Screen, is there a recommendation to see / follow up with a primary care physician/clinic or dentist?    No.      Clinical Findings     Mental Status Assessment/Clinical Observation:  Appearance:   awake, alert  Eye Contact:   good  Psychomotor Behavior: Normal  no evidence of tardive dyskinesia, dystonia, or tics  Attitude:   Cooperative    Oriented to:   All    Speech   Rate / Production: Normal    Volume:  Normal   Mood:    Depressed  Sad     Affect:    Constricted      Thought Content:  Clear  no evidence of suicidal ideation or homicidal ideation  Thought Form:  logical, linear and goal oriented no loose associations  Insight:    good    Judgment:     intact  Attention Span/Concentration: intact  Recent and Remote Memory:  intact      Psychiatric Diagnosis:    296.33 (F33.2) Major Depressive Disorder,  Recurrent Episode, Severe _    Provisional Diagnostic Hypothesis (Explain R/O, other Provisional Diagnosis, and why alternative Diagnosis that were considered were ruled out):       Medical Concerns that may Impact Treatment:     Per client report: diabetes, chronic back pain    Psychosocial and Contextual Factors (V-Codes):  V62.29 Other problem related to employment chronically underemployed, V60.2 Low income limited income, relies on help from parents and V62.9 Unspecified problem related to social environment loneliness, socially isolated    WHODAS 2.0 SCORE: 37/95 %        Client and family participation in assessment:   Gaurang was alone during this assessment.   This assessment does include collateral information.      Summary & Recommendations  Provide a brief summary of how diagnostic criteria is met (symptoms, duration & functional impairment), cause, prognosis, and likely consequences of symptoms. Include overview of pertinent client strengths, cultural influences, life situations, relationships, health concerns and how diagnosis interacts/impacts with client's life. Recommendations include: client preferences, prioritization of needed mental health, ancillary or other services and any referrals to services required by statute or rule.     Gaurang is a 48-year-old, , single, college educated man who has been severely impacted by depression. He was referred for a higher level of care after an initial assessment with a psychiatric practitioner, due to passive suicidal ideation and poor daily functioning. 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 He had severe pain from this, had to stop rowing, and lost his social [place] who were all other rowers. Since then, he has had pain that has been significantly limiting in his life activities and has been socially isolated much of the time. He had back surgery in 1997 and this actually made his pain worse  "and it was in the aftermath of that he had his first and only mental health hospitalization. The physical pain and the depression have been comorbid throughout his adult life. He has had jobs coaching rowing, and when he is doing this, he says he feels somewhat better as he is at least getting out of the house on a routine basis and interacting with other people. He did this at the [facility] for a time, as well as at [facility] in [place] and for a competitive rowing club here in [place]. The experience at [facility] was rather short-lived and he did struggle with his mental health issues to a degree that was impairing of his performance there. The coaching role is typically part-time and seasonal. He does not  between November and April and this is when his depression gets worse. He says he would like to get some kind of job now, but cannot find the energy to take the steps to do so. In [place], he at least has his parents and a sister that he can spend time with, as he has no connections otherwise. He says he is very introverted and has a difficult time making connections, especially when feeling so low all the time. He presents his passive suicidal ideation as arising from seeing his life as not having much to it, and he says that he believes he will never be  and never have children as he is getting too old now and he cannot see the use of living as he has been without some change. While he denies any plan or intention of acting on such thoughts, he does say in passing that he is not sure what he would do if anything ever happened to his parents or his pets as these are the only things that he really considers meaningful currently.The emotional pain and loneliness of his experience is quite apparent in the interview today. He would like to participate in the [facility] to \"learn how to expand my social engagement given that I am an introvert, find a way to increase my physical " "activity, and find a way to increase activities/productivity (like with a job) in my life\". These are all reasonable goals and show some insight into his keen awareness of what his limitations are in his life. To move toward these goals, he will likely benefit from working on changing some of his self-limiting beliefs about himself, becoming more self-compassionate, working on managing anxiety that stops him form doing the things he wants to do, practicing routine goal setting, increasing the structure and healthy lifestyle balance in his days, and taking steps toward doing more socially. He was also encouraged to continue working with his medication provider on finding an effective treatment regimen for his depression. He could very much benefit from assistance in finding a good individual therapist as well.       Prognosis is Fair. Without the recommended intervention, the client is likely to experience the following consequences of their symptoms: increased risk of suicide, potential for hospitalization.    Referrals to services required by statute or rule:   Report to child/adult protection services was NA.   Referral to another professional/service is not indicated at this time..    Program Recommendation: Day Treatment (DT) .      Assessment Completed by: Miguelina Lewis  "

## 2019-02-26 ENCOUNTER — HOSPITAL ENCOUNTER (OUTPATIENT)
Dept: BEHAVIORAL HEALTH | Facility: CLINIC | Age: 49
End: 2019-02-26
Attending: PSYCHIATRY & NEUROLOGY
Payer: COMMERCIAL

## 2019-02-26 PROBLEM — F33.2 MDD (MAJOR DEPRESSIVE DISORDER), RECURRENT EPISODE, SEVERE (H): Status: ACTIVE | Noted: 2019-02-26

## 2019-02-26 PROCEDURE — H2012 BEHAV HLTH DAY TREAT, PER HR: HCPCS

## 2019-02-26 ASSESSMENT — ANXIETY QUESTIONNAIRES
7. FEELING AFRAID AS IF SOMETHING AWFUL MIGHT HAPPEN: NOT AT ALL
1. FEELING NERVOUS, ANXIOUS, OR ON EDGE: NOT AT ALL
3. WORRYING TOO MUCH ABOUT DIFFERENT THINGS: SEVERAL DAYS
5. BEING SO RESTLESS THAT IT IS HARD TO SIT STILL: NOT AT ALL
GAD7 TOTAL SCORE: 3
IF YOU CHECKED OFF ANY PROBLEMS ON THIS QUESTIONNAIRE, HOW DIFFICULT HAVE THESE PROBLEMS MADE IT FOR YOU TO DO YOUR WORK, TAKE CARE OF THINGS AT HOME, OR GET ALONG WITH OTHER PEOPLE: SOMEWHAT DIFFICULT
6. BECOMING EASILY ANNOYED OR IRRITABLE: NOT AT ALL
2. NOT BEING ABLE TO STOP OR CONTROL WORRYING: SEVERAL DAYS

## 2019-02-26 ASSESSMENT — PATIENT HEALTH QUESTIONNAIRE - PHQ9
5. POOR APPETITE OR OVEREATING: SEVERAL DAYS
SUM OF ALL RESPONSES TO PHQ QUESTIONS 1-9: 15

## 2019-02-26 NOTE — PROGRESS NOTES
"  Adult Mental Health Outpatient Group Therapy Progress Note       Treatment Goals:     Gaurang verbalizes the following treatment/discharge goals: \"how to expand my social engagement given that I am an introvert, find a way to increase physical activity, find a way to increase activities/productivity in my life         Area of Treatment:   Symptom Management    Therapeutic Interventions/Treatment Strategies:  Support, Feedback, Structured Activity, Problem Solving, Clarification and Education    Response to Treatment Strategies:  Accepted Feedback, Gave Feedback, Listened, Focused on Goals, Attentive, Accepted Support and Alert    Name of Group:  Psychotherapy  Session time: 1440-3228  5 attending     Description and Outcome:  Client is welcomed and oriented on this his first day in the program. He reports a struggle with depression since a back injury 20 years ago. Client has chronic pain. He isolates and lacks a support system. He is hopeful that the program will help to provide structure and allow him to make changes in his life. Client participated in group discussion focused on \"Who I am as a person. What are my passions and strengths\". Gaurang describes himself as a rowing , reader, an introvert, someone who enjoys the outdoors. No safety concerns.  Client demonstrates an understanding of session content by fully participating, sharing and giving feedback.    Is this a Weekly Review of the Progress on the Treatment Plan?  No    "

## 2019-02-27 ASSESSMENT — ANXIETY QUESTIONNAIRES: GAD7 TOTAL SCORE: 3

## 2019-02-27 NOTE — PROGRESS NOTES
"Adult Mental Health Outpatient Group Therapy Progress Note     Client Initial Individualized Goals for Treatment: \"how to expand my social engagement given that I am an introvert, find a way to increase physical activity, find a way to increase activities/productivity in my life.\"    See Initial Treatment suggestions for the client during the time between Diagnostic Assessment and completion of the Master Individualized Treatment Plan.    Treatment Goals:  Follow Safety Plan   Ask for more information, support and/or assistance as needed.  Follow up with providers/community supports as needed: nurse practitioner, therapist  Report increases or changes in symptoms to staff.  Report any personal safety concerns to staff.   Take medications as prescribed.  Report medication changes and/or side effects to staff.  Attend and participate in groups as scheduled or notify staff if unable to do so.  Report any use of substances to staff as this may impact your symptoms and/or personal safety.  Notify staff if you have any other issues that need to be addressed. This may include any current abuse / neglect / exploitation or other vulnerability.  Follow recommendations of your treatment team and discuss concerns if not in agreement.                 Area of Treatment Focus:  Symptom Management    Therapeutic Interventions/Treatment Strategies:  Support, Feedback, Safety Assessments, Structured Activity and Education    Response to Treatment Strategies:  Accepted Feedback, Listened, Attentive, Accepted Support and Alert     Name of Group: Life Skills(11:00-12:00)    Number of Group Participants: 5    Description and Outcome:  Client presented with calm,even mood during first session in this group therapy program.Good focus and concentration during a discussion related to ABCs of stress management which includes altering,avoiding and accepting. Client reorted that his mental health symptoms started with a series of age related " physical health problems one of which related to dental problems.  Client would benefit from additional opportunities to practice and implement content from this session  in every day life,relationships and mental health recovery.    Is this a Weekly Review of the Progress on the Treatment Plan?  Yes.      Are Treatment Plan Goals being addressed?  Yes, continue treatment goals      Are Treatment Plan Strategies to Address Goals Effective?  Yes, continue treatment strategies      Are there any current contracts in place?  No

## 2019-02-28 ENCOUNTER — HOSPITAL ENCOUNTER (OUTPATIENT)
Dept: BEHAVIORAL HEALTH | Facility: CLINIC | Age: 49
End: 2019-02-28
Attending: PSYCHIATRY & NEUROLOGY
Payer: COMMERCIAL

## 2019-02-28 PROCEDURE — H2012 BEHAV HLTH DAY TREAT, PER HR: HCPCS

## 2019-02-28 NOTE — PROGRESS NOTES
"  Adult Mental Health Outpatient Group Therapy Progress Note       Treatment Goals:     Gaurang verbalizes the following treatment/discharge goals: \"how to expand my social engagement given that I am an introvert, find a way to increase physical activity, find a way to increase activities/productivity in my life         Area of Treatment Focus:  Symptom Management, Community Resources/Discharge Planning and Develop Socialization / Interpersonal Relationship Skills    Therapeutic Interventions/Treatment Strategies:  Support, Feedback, Safety Assessments, Structured Activity, Problem Solving, Clarification and Education    Response to Treatment Strategies:  Accepted Feedback, Gave Feedback, Listened, Focused on Goals, Attentive, Accepted Support and Alert    Name of Group:  Psychotherapy  Session time: [id number]  5 attending     Description and Outcome:  Client reports anxiety about returning to program today, but feels good about following through. He states \"Coming here is a big step and I need structure\". Client has a lot of time on his hands and often isolates. He feels good about having a dog, because this gets him out of the house daily. He said that has no plans for the weekend, but later identifies going to the dog park, visiting his parents and watching sports. Group members offered ideas of other activities to do. No safety concerns.  Client demonstrates an understanding of session content by fully participating, sharing and giving feedback.    Is this a Weekly Review of the Progress on the Treatment Plan?  Yes.      Are Treatment Plan Goals being addressed?  Yes, continue treatment goals      Are Treatment Plan Strategies to Address Goals Effective?  Yes, continue treatment strategies      Are there any current contracts in place?  No    "

## 2019-03-05 ENCOUNTER — HOSPITAL ENCOUNTER (OUTPATIENT)
Dept: BEHAVIORAL HEALTH | Facility: CLINIC | Age: 49
End: 2019-03-05
Attending: PSYCHIATRY & NEUROLOGY
Payer: COMMERCIAL

## 2019-03-05 PROCEDURE — H2012 BEHAV HLTH DAY TREAT, PER HR: HCPCS

## 2019-03-06 NOTE — PROGRESS NOTES
"Adult Mental Health Outpatient Group Therapy Progress Note     Client Initial Individualized Goals for Treatment: \"how to expand my social engagement given that I am an introvert, find a way to increase physical activity, find a way to increase activities/productivity in my life.\"    See Initial Treatment suggestions for the client during the time between Diagnostic Assessment and completion of the Master Individualized Treatment Plan.    Treatment Goals:  Follow Safety Plan   Ask for more information, support and/or assistance as needed.  Follow up with providers/community supports as needed: nurse practitioner, therapist  Report increases or changes in symptoms to staff.  Report any personal safety concerns to staff.   Take medications as prescribed.  Report medication changes and/or side effects to staff.  Attend and participate in groups as scheduled or notify staff if unable to do so.  Report any use of substances to staff as this may impact your symptoms and/or personal safety.  Notify staff if you have any other issues that need to be addressed. This may include any current abuse / neglect / exploitation or other vulnerability.  Follow recommendations of your treatment team and discuss concerns if not in agreement.                 Area of Treatment Focus:  Symptom Management    Therapeutic Interventions/Treatment Strategies:  Support, Feedback, Safety Assessments, Structured Activity and Education    Response to Treatment Strategies:  Accepted Feedback, Listened, Attentive, Accepted Support and Alert     Name of Group: Life Skills(11:00-12:00)    Number of Group Participants: 6    Description and Outcome:  Client presented with calm,even mood during first session in this group therapy program.Good focus and concentration during a discussion related to self esteem/self compassion strategies.  Client would benefit from additional opportunities to practice and implement content from this session  in every day " life,relationships and mental health recovery.    Is this a Weekly Review of the Progress on the Treatment Plan?  Yes.      Are Treatment Plan Goals being addressed?  Yes, continue treatment goals      Are Treatment Plan Strategies to Address Goals Effective?  Yes, continue treatment strategies      Are there any current contracts in place?  No

## 2019-03-11 ENCOUNTER — HOSPITAL ENCOUNTER (OUTPATIENT)
Dept: BEHAVIORAL HEALTH | Facility: CLINIC | Age: 49
End: 2019-03-11
Attending: PSYCHIATRY & NEUROLOGY
Payer: COMMERCIAL

## 2019-03-11 PROCEDURE — H2012 BEHAV HLTH DAY TREAT, PER HR: HCPCS

## 2019-03-12 ENCOUNTER — HOSPITAL ENCOUNTER (OUTPATIENT)
Dept: BEHAVIORAL HEALTH | Facility: CLINIC | Age: 49
End: 2019-03-12
Attending: PSYCHIATRY & NEUROLOGY
Payer: COMMERCIAL

## 2019-03-12 PROCEDURE — H2012 BEHAV HLTH DAY TREAT, PER HR: HCPCS

## 2019-03-12 NOTE — PROGRESS NOTES
"Adult Mental Health Outpatient Group Therapy Progress Note     Client Initial Individualized Goals for Treatment: \"how to expand my social engagement given that I am an introvert, find a way to increase physical activity, find a way to increase activities/productivity in my life.\"    See Initial Treatment suggestions for the client during the time between Diagnostic Assessment and completion of the Master Individualized Treatment Plan.    Treatment Goals:  1.Client will notify staff when needing assistance to develop or implement a coping plan to manage suicidal or self injurious urges.  Client will use coping plan for safety, as needed.  2. When in life skills group Gaurang will practice opposite to emotion to help improve motivation to take better care of himself  providing an update of progress weekly  3. When in Group Therapy, Gaurang will report mood, symptoms, and coping skills to manage symptoms and mood.   4. Gaurang will establish care with an individual therapist.                 Area of Treatment Focus:  Symptom Management    Therapeutic Interventions/Treatment Strategies:  Support, Feedback, Safety Assessments, Structured Activity and Education    Response to Treatment Strategies:  Accepted Feedback, Listened, Attentive, Accepted Support and Alert     Name of Group: Life Skills(11:00-12:00)    Number of Group Participants: 7    Description and Outcome:  Client presented with calm,even mood with good focus and concentration. Psychosocial skills addressed included stress avoidance strategies,leisure education,self esteem and practice of communication strategies. Minimal social interaction with peers.Goal #1 (no personal safety concerns reported or observed). Gaol #2 Good progress.  Client would benefit from additional opportunities to practice and implement content from this session  in every day life,relationships and mental health recovery.    Is this a Weekly Review of the Progress on the Treatment " Plan?  Yes.      Are Treatment Plan Goals being addressed?  Yes, continue treatment goals      Are Treatment Plan Strategies to Address Goals Effective?  Yes, continue treatment strategies      Are there any current contracts in place?  No

## 2019-03-13 NOTE — PROGRESS NOTES
"Adult Mental Health Outpatient Group Therapy Progress Note     Client Initial Individualized Goals for Treatment: \"how to expand my social engagement given that I am an introvert, find a way to increase physical activity, find a way to increase activities/productivity in my life.\"    See Initial Treatment suggestions for the client during the time between Diagnostic Assessment and completion of the Master Individualized Treatment Plan.    Treatment Goals:  1.Client will notify staff when needing assistance to develop or implement a coping plan to manage suicidal or self injurious urges.  Client will use coping plan for safety, as needed.  2. When in life skills group Gaurang will practice opposite to emotion to help improve motivation to take better care of himself  providing an update of progress weekly  3. When in Group Therapy, Gaurang will report mood, symptoms, and coping skills to manage symptoms and mood.   4. Gaurang will establish care with an individual therapist.                 Area of Treatment Focus:  Symptom Management    Therapeutic Interventions/Treatment Strategies:  Support, Feedback, Safety Assessments, Structured Activity and Education    Response to Treatment Strategies:  Accepted Feedback, Listened, Attentive, Accepted Support and Alert     Name of Group: Life Skills(11:00-12:00)    Number of Group Participants: 5    Description and Outcome:  Client presented with calm,even mood with good focus and concentration. Psychosocial skills addressed included a discussion related to self image/self compassion.Goal #1 (no personal safety concerns reported or observed).  Client would benefit from additional opportunities to practice and implement content from this session  in every day life,relationships and mental health recovery.    Is this a Weekly Review of the Progress on the Treatment Plan?  Yes.      Are Treatment Plan Goals being addressed?  Yes, continue treatment goals      Are Treatment Plan " Strategies to Address Goals Effective?  Yes, continue treatment strategies      Are there any current contracts in place?  No

## 2019-03-18 ENCOUNTER — HOSPITAL ENCOUNTER (OUTPATIENT)
Dept: BEHAVIORAL HEALTH | Facility: CLINIC | Age: 49
End: 2019-03-18
Attending: PSYCHIATRY & NEUROLOGY
Payer: COMMERCIAL

## 2019-03-18 PROCEDURE — H2012 BEHAV HLTH DAY TREAT, PER HR: HCPCS

## 2019-03-19 ENCOUNTER — HOSPITAL ENCOUNTER (OUTPATIENT)
Dept: BEHAVIORAL HEALTH | Facility: CLINIC | Age: 49
End: 2019-03-19
Attending: PSYCHIATRY & NEUROLOGY
Payer: COMMERCIAL

## 2019-03-19 ENCOUNTER — OFFICE VISIT (OUTPATIENT)
Dept: PSYCHIATRY | Facility: CLINIC | Age: 49
End: 2019-03-19
Payer: COMMERCIAL

## 2019-03-19 VITALS
HEIGHT: 78 IN | WEIGHT: 287 LBS | SYSTOLIC BLOOD PRESSURE: 118 MMHG | TEMPERATURE: 97.6 F | OXYGEN SATURATION: 94 % | HEART RATE: 91 BPM | RESPIRATION RATE: 16 BRPM | DIASTOLIC BLOOD PRESSURE: 84 MMHG | BODY MASS INDEX: 33.21 KG/M2

## 2019-03-19 DIAGNOSIS — F33.1 MODERATE EPISODE OF RECURRENT MAJOR DEPRESSIVE DISORDER (H): ICD-10-CM

## 2019-03-19 DIAGNOSIS — F33.2 SEVERE EPISODE OF RECURRENT MAJOR DEPRESSIVE DISORDER, WITHOUT PSYCHOTIC FEATURES (H): Primary | ICD-10-CM

## 2019-03-19 DIAGNOSIS — R45.851 PASSIVE SUICIDAL IDEATIONS: ICD-10-CM

## 2019-03-19 PROCEDURE — 99214 OFFICE O/P EST MOD 30 MIN: CPT | Performed by: NURSE PRACTITIONER

## 2019-03-19 RX ORDER — METHYLPHENIDATE HYDROCHLORIDE 10 MG/1
10 TABLET ORAL 2 TIMES DAILY
Qty: 60 TABLET | Refills: 0 | Status: SHIPPED | OUTPATIENT
Start: 2019-03-19 | End: 2019-04-17

## 2019-03-19 RX ORDER — VENLAFAXINE HYDROCHLORIDE 150 MG/1
300 CAPSULE, EXTENDED RELEASE ORAL DAILY
Qty: 180 CAPSULE | Refills: 0 | Status: SHIPPED | OUTPATIENT
Start: 2019-03-19 | End: 2019-06-24

## 2019-03-19 ASSESSMENT — ANXIETY QUESTIONNAIRES
GAD7 TOTAL SCORE: 4
4. TROUBLE RELAXING: NOT AT ALL
1. FEELING NERVOUS, ANXIOUS, OR ON EDGE: NOT AT ALL
2. NOT BEING ABLE TO STOP OR CONTROL WORRYING: SEVERAL DAYS
7. FEELING AFRAID AS IF SOMETHING AWFUL MIGHT HAPPEN: SEVERAL DAYS
7. FEELING AFRAID AS IF SOMETHING AWFUL MIGHT HAPPEN: SEVERAL DAYS
5. BEING SO RESTLESS THAT IT IS HARD TO SIT STILL: NOT AT ALL
GAD7 TOTAL SCORE: 4
3. WORRYING TOO MUCH ABOUT DIFFERENT THINGS: SEVERAL DAYS
6. BECOMING EASILY ANNOYED OR IRRITABLE: SEVERAL DAYS
GAD7 TOTAL SCORE: 4

## 2019-03-19 ASSESSMENT — PATIENT HEALTH QUESTIONNAIRE - PHQ9
SUM OF ALL RESPONSES TO PHQ QUESTIONS 1-9: 18
10. IF YOU CHECKED OFF ANY PROBLEMS, HOW DIFFICULT HAVE THESE PROBLEMS MADE IT FOR YOU TO DO YOUR WORK, TAKE CARE OF THINGS AT HOME, OR GET ALONG WITH OTHER PEOPLE: VERY DIFFICULT
SUM OF ALL RESPONSES TO PHQ QUESTIONS 1-9: 18

## 2019-03-19 ASSESSMENT — MIFFLIN-ST. JEOR: SCORE: 2300.07

## 2019-03-19 NOTE — PROGRESS NOTES
"    Outpatient Psychiatric Progress Note    Name: Gaurang Rivera   : 1970                    Primary Care Provider: Elvis Guzman MD   Therapist: Group through [facility] - last visit 3/19/2019    PHQ-9 scores:  PHQ-9 SCORE 2019 2019 3/19/2019   PHQ-9 Total Score MyChart - - 18 (Moderately severe depression)   PHQ-9 Total Score 18 15 18       SOL-7 scores:  SOL-7 SCORE 2019 2019 3/19/2019   Total Score - - 4 (minimal anxiety)   Total Score 6 3 4     Answers for HPI/ROS submitted by the patient on 3/19/2019   If you checked off any problems, how difficult have these problems made it for you to do your work, take care of things at home, or get along with other people?: Very difficult  PHQ9 TOTAL SCORE: 18  SOL 7 TOTAL SCORE: 4        Patient Identification:  Patient is a 48 year old year old, single  Choose not to answer Choose not to answer male  who presents for return visit with me.  Patient is currently employed part time. Patient attended the session alone. Patient prefers to be called: \"Gaurang\".    Interim History:  I last saw Gaurang Rivera for outpatient psychiatry Consultation on 19.     During that appointment, we reviewed his psychiatric history. We agreed to augment his long-standing Effexor with Ritalin (to treat depression), and to have him engage with [facility]'s day treatment program.    Current medications include:   Current Outpatient Medications   Medication Sig     blood glucose (NO BRAND SPECIFIED) lancets standard Use to test blood sugar one times daily or as directed.     blood glucose monitoring (NO BRAND SPECIFIED) meter device kit Use to test blood sugar one times daily or as directed.     blood glucose monitoring (NO BRAND SPECIFIED) test strip Use to test blood sugars one times daily or as directed     IBUPROFEN PO      lisinopril (PRINIVIL/ZESTRIL) 5 MG tablet Take 1 tablet (5 mg) by mouth daily     metFORMIN (GLUCOPHAGE) 500 MG tablet Take 2 tablets (1,000 mg) " "by mouth 2 times daily (with meals)     methylphenidate (RITALIN) 5 MG tablet Take 1 tablet (5 mg) by mouth 2 times daily     multivitamin, therapeutic with minerals (MULTI-VITAMIN) TABS tablet Take 1 tablet by mouth daily     simvastatin (ZOCOR) 40 MG tablet Take 1 tablet (40 mg) by mouth At Bedtime     venlafaxine (EFFEXOR-XR) 150 MG 24 hr capsule Take 2 capsules (300 mg) by mouth daily     No current facility-administered medications for this visit.        The [place] Prescription Monitoring Program has been reviewed and there are no concerns about diversionary activity for controlled substances at this time.      I was able to review most recent Primary Care Provider, specialty provider, and therapy visit notes that I have access to.     Today, patient reports his mood is largely status quo. Started the day treatment program at [facility]; doesn't feel it is helping; \"feels perfunctory\"; though thinks having the structure has been useful. Will last 8-12 weeks. Thinks he needs individual therapist; would like referral.    Patient started Ritalin 1 month ago; 5 mg BID; states he thinks it's helping mood a little bit; no side effects. Had one day he did laundry and felt like a \"huge accomplishment\" and helped mood. States his sleep has been off, but agrees it's entirely behavioral; has habit of staying up late with Netflix. Made purposeful effort not to watch TV late and read book instead, and lo and behold he slept much better. His overeating habits haven't quite shifted with Ritalin yet.    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        Past Medical History:   Diagnosis Date     Depressive disorder 2001     Diabetes (H)      Hypercholesteremia 2012      has a past medical history of Depressive disorder (2001), Diabetes (H), and Hypercholesteremia (2012).    Social history updates:  See above    Substance use updates:  No EtOH or substance use  Tobacco use: No      Vital Signs:   BP " "118/84 (BP Location: Left arm, Patient Position: Chair, Cuff Size: Adult Large)   Pulse 91   Temp 97.6  F (36.4  C) (Oral)   Resp 16   Ht 1.981 m (6' 6\")   Wt 130.2 kg (287 lb)   SpO2 94%   BMI 33.17 kg/m      Labs:  Most recent laboratory results reviewed and no new labs.    Review of Systems:  10 systems (general, cardiovascular, respiratory, eyes, ENT, endocrine, GI, , M/S, neurological) were reviewed. Most pertinent finding(s) is/are: diabetes, back pain. The remaining systems are all unremarkable.    Mental Status Examination:     Appearance:  awake, alert and adequately groomed  Attitude:  cooperative   Eye Contact:  adequate  Gait and Station: Normal  Psychomotor Behavior:  intact station, gait and muscle tone  Oriented to:  time, person, and place  Attention Span and Concentration:  Normal  Speech:  clear, coherent  Mood:  anxious and depressed  Affect:  appropriate and in normal range  Associations:  no loose associations  Thought Process:  logical, linear and goal oriented  Thought Content:  Appropriate to Interview  Recent and Remote Memory:  intact Not formally assessed. No amnesia.  Fund of Knowledge: appropriate  Insight:  good  Judgment:  intact  Impulse Control:  intact     Suicide Risk Assessment:  Today Gaurang Rivera reports routine passive SI, fleeting in nature.  In addition, there are notable risk factors for self-harm, including age, single status, comorbid medical condition of diabetes, suicidal ideation and purposelessness/no reason for living. However, risk is mitigated by commitment to family, sobriety, absence of past attempts, ability to volunteer a safety plan, history of seeking help when needed, future oriented, no access to firearms or weapons, identifies reasons to live including pets and denies suicidal intent or plan. Therefore, based on all available evidence including the factors cited above, Gaurang Rivera does not appear to be at imminent risk for self-harm, does not meet " criteria for a 72-hr hold, and therefore remains appropriate for ongoing outpatient level of care.  A thorough assessment of risk factors related to suicide and self-harm have been reviewed and are noted above. The patient convincingly denies acute suicidality on several occasions. Local community safety resources reviewed and printed for patient to use if needed. There was no deceit detected, and the patient presented in a manner that was believable.      DSM5  Diagnosis:  296.32 (F33.1) Major Depressive Disorder, Recurrent Episode, Moderate _  Medical comorbidities include:   Patient Active Problem List    Diagnosis Date Noted     MDD (major depressive disorder), recurrent episode, severe (H) 02/26/2019     Priority: Medium     Microalbuminuria due to type 2 diabetes mellitus (H) 10/01/2018     Priority: Medium     Diabetes mellitus, type 2 (H) 09/26/2018     Priority: Medium     Hyperlipidemia, unspecified hyperlipidemia type 09/26/2018     Priority: Medium     Moderate episode of recurrent major depressive disorder (H) 09/07/2018     Priority: Medium     Passive suicidal ideations 09/07/2018     Priority: Medium     Low back pain 02/11/2015     Priority: Medium     Diagnosis updated by automated process. Provider to review and confirm.           Assessment:  Gaurang Rivera reports no significant improvement with recent introduction of Ritalin, but he appears to be tolerating current dosage well. We will more fully vet Ritalin at 10 mg BID. Again, I believe if the patient could behaviorally activate himself more often, his mood will improve. We'll additionally monitor to see if Ritalin can help him control his eating impulses, towards goal of him managing diabetes better. He has been taking Effexor for so long at this point, that a switch to a untrialed antidepressant could precipitate further decompensation, so we'll defer on this presently. I am encouraged he's been participating with the day treatment program,  and agree that individual therapy would be a good addition -- I have placed a referral within [facility].    Medication side effects and alternatives were reviewed. Health promotion activities recommended and reviewed today. All questions addressed. Education and counseling completed regarding risks and benefits of medications and psychotherapy options.    Treatment Plan:    Continue Effexor  mg daily    Increase Ritalin to 10 mg BID    Referred to [facility] for individual therapy. Continue day treatment program.    Continue all other medical directions with per primary care provider.     Continue all other medications as reviewed per electronic medical record today.     Safety plan reviewed. To the Emergency Department as needed or call after hours crisis line at [phone] or [phone]. [place] Crisis Text Line: Text [place] to 828158  or  Suicide LifeLine Chat: suicidepreventionMobileSuitesline.org/chat/    Schedule an appointment with me in 4 weeks or sooner as needed.  Call [facility] at [phone] to schedule.    Follow up with primary care provider as planned or for acute medical concerns.    Call the psychiatric nurse line with medication questions or concerns at [phone].    [employer] may be used to communicate with your provider, but this is not intended to be used for emergencies.    Administrative Billing:   Time spent with patient was 30 minutes and greater than 50% of time or 20 minutes was spent in counseling and coordination of care regarding above diagnoses and treatment plan.    Patient Status:  Patient will continue to be seen for ongoing consultation and stabilization.    Signed:   Fermin Bishop [facility]   Psychiatry

## 2019-03-19 NOTE — PROGRESS NOTES
"Adult Mental Health Outpatient Group Therapy Progress Note     Client Initial Individualized Goals for Treatment: \"how to expand my social engagement given that I am an introvert, find a way to increase physical activity, find a way to increase activities/productivity in my life.\"    See Initial Treatment suggestions for the client during the time between Diagnostic Assessment and completion of the Master Individualized Treatment Plan.    Treatment Goals:  1.Client will notify staff when needing assistance to develop or implement a coping plan to manage suicidal or self injurious urges.  Client will use coping plan for safety, as needed.  2. When in life skills group Gaurang will practice opposite to emotion to help improve motivation to take better care of himself  providing an update of progress weekly  3. When in Group Therapy, Gaurang will report mood, symptoms, and coping skills to manage symptoms and mood.   4. Gaurang will establish care with an individual therapist.                 Area of Treatment Focus:  Symptom Management    Therapeutic Interventions/Treatment Strategies:  Support, Feedback, Safety Assessments, Structured Activity and Education    Response to Treatment Strategies:  Accepted Feedback, Listened, Attentive, Accepted Support and Alert     Name of Group: Life Skills(11:00-12:00)    Number of Group Participants: 7    Description and Outcome:  Client presented with calm,even mood with good focus and concentration. Psychosocial skills addressed included a discussion related to self image/self compassion. No social interaction with peers observed.Goal #1 (no personal safety concerns reported or observed).  Client would benefit from additional opportunities to practice and implement content from this session  in every day life,relationships and mental health recovery.    Is this a Weekly Review of the Progress on the Treatment Plan?  Yes.      Are Treatment Plan Goals being addressed?  Yes, continue " treatment goals      Are Treatment Plan Strategies to Address Goals Effective?  Yes, continue treatment strategies      Are there any current contracts in place?  No

## 2019-03-20 ASSESSMENT — ANXIETY QUESTIONNAIRES: GAD7 TOTAL SCORE: 4

## 2019-03-20 ASSESSMENT — PATIENT HEALTH QUESTIONNAIRE - PHQ9: SUM OF ALL RESPONSES TO PHQ QUESTIONS 1-9: 18

## 2019-03-26 ENCOUNTER — HOSPITAL ENCOUNTER (OUTPATIENT)
Dept: BEHAVIORAL HEALTH | Facility: CLINIC | Age: 49
End: 2019-03-26
Attending: PSYCHIATRY & NEUROLOGY
Payer: COMMERCIAL

## 2019-03-26 PROCEDURE — H2012 BEHAV HLTH DAY TREAT, PER HR: HCPCS

## 2019-03-26 NOTE — PROGRESS NOTES
"Adult Mental Health Outpatient Group Therapy Progress Note     Client Initial Individualized Goals for Treatment: \"how to expand my social engagement given that I am an introvert, find a way to increase physical activity, find a way to increase activities/productivity in my life.\"     See Initial Treatment suggestions for the client during the time between Diagnostic Assessment and completion of the Master Individualized Treatment Plan.     Treatment Goals:  1.Client will notify staff when needing assistance to develop or implement a coping plan to manage suicidal or self injurious urges.  Client will use coping plan for safety, as needed.  2. When in life skills group Gaurang will practice opposite to emotion to help improve motivation to take better care of himself  providing an update of progress weekly  3. When in Group Therapy, Gaurang will report mood, symptoms, and coping skills to manage symptoms and mood.   4. Gaurang will establish care with an individual therapist.     Area of Treatment Focus:  Symptom Management, Personal Safety and Community Resources/Discharge Planning    Therapeutic Interventions/Treatment Strategies:  Support, Feedback, Safety Assessments, Education and Cognitive Behavioral Therapy    Response to Treatment Strategies:  Accepted Feedback, Gave Feedback, Listened and Focused on Goals     Name of Group: Group Psychotherapy I and Group Psychotherapy II Date/Time: 3/26/19 / 0900 to 0950 and 1100 to 1150    Number of Group Participants: 6     Description and Outcome:  Group Psychotherapy I:  Writer introduced myself to Gaurang due top change in therapists.  Gaurang reported having a hard time over the past few days.  He stated that he sleep was poor which made it difficult to get up in the morning.  He stated that he woke up at 3:30 am and did not go back to sleep last night.  He identified stressors as the news reporting on the suicides of the survivors of the mass shootings.  He stated that this " new increased his suicidal ideation.  He stated that he the suicidal thoughts are daily but that he will not act on them due to his parents.  He stated that he was tired but was able to take his dog to the dog park yesterday.  Writer reviewed people he feels able to talk to (friend and maybe his sister), reviewed use of crisis phone and text lines, and reviewed use of emergency rooms.    Group Therapy II:  Writer facilitated discussion on anxiety symptoms.  Discussed physical symptoms, behavioral patterns, and common cognitive patterns.  Reviewed both acute and ongoing anxiety symptoms.  Discussed ways to manage the physical responses, behavioral responses, and cognitive responses.      Client demonstrated understanding of session content by identifying coping strategies for managing suicidal thoughts including engaging in positive distractions with parents, talking with his friend and sister, and spending time with his dog.      Client verbalized understanding of session content by identifying coping plan for safety.  Client also listened to how peers manage news about completed suicides and how they cope with any increased suicidal ideation.      Is this a Weekly Review of the Progress on the Treatment Plan?  No

## 2019-03-27 NOTE — PROGRESS NOTES
"Adult Mental Health Outpatient Group Therapy Progress Note     Client Initial Individualized Goals for Treatment: \"how to expand my social engagement given that I am an introvert, find a way to increase physical activity, find a way to increase activities/productivity in my life.\"    See Initial Treatment suggestions for the client during the time between Diagnostic Assessment and completion of the Master Individualized Treatment Plan.    Treatment Goals:  1.Client will notify staff when needing assistance to develop or implement a coping plan to manage suicidal or self injurious urges.  Client will use coping plan for safety, as needed.  2. When in life skills group Gaurang will practice opposite to emotion to help improve motivation to take better care of himself  providing an update of progress weekly  3. When in Group Therapy, Gaurang will report mood, symptoms, and coping skills to manage symptoms and mood.   4. Gaurang will establish care with an individual therapist.                 Area of Treatment Focus:  Symptom Management    Therapeutic Interventions/Treatment Strategies:  Support, Feedback, Safety Assessments, Structured Activity and Education    Response to Treatment Strategies:  Accepted Feedback, Listened, Attentive, Accepted Support and Alert     Name of Group: Life Skills(10:00-11:00)    Number of Group Participants: 6    Description and Outcome:  Client presented with calm,even mood with good focus and concentration. Psychosocial skills addressed included stress avoidance strategies,leisure education,self esteem and practice of communication strategies. Social during group conversation. He talked about his experiences of living in [place].Goal #1 (no personal safety concerns reported or observed).  Client would benefit from additional opportunities to practice and implement content from this session  in every day life,relationships and mental health recovery.    Is this a Weekly Review of the " Progress on the Treatment Plan?  Yes.      Are Treatment Plan Goals being addressed?  Yes, continue treatment goals      Are Treatment Plan Strategies to Address Goals Effective?  Yes, continue treatment strategies      Are there any current contracts in place?  No

## 2019-03-28 ENCOUNTER — HOSPITAL ENCOUNTER (OUTPATIENT)
Dept: BEHAVIORAL HEALTH | Facility: CLINIC | Age: 49
End: 2019-03-28
Attending: PSYCHIATRY & NEUROLOGY
Payer: COMMERCIAL

## 2019-03-28 PROCEDURE — H2012 BEHAV HLTH DAY TREAT, PER HR: HCPCS

## 2019-03-28 NOTE — PROGRESS NOTES
"Adult Mental Health Outpatient Group Therapy Progress Note     Client Initial Individualized Goals for Treatment: \"how to expand my social engagement given that I am an introvert, find a way to increase physical activity, find a way to increase activities/productivity in my life.\"     See Initial Treatment suggestions for the client during the time between Diagnostic Assessment and completion of the Master Individualized Treatment Plan.     Treatment Goals:  1.Client will notify staff when needing assistance to develop or implement a coping plan to manage suicidal or self injurious urges.  Client will use coping plan for safety, as needed.  2. When in life skills group Gaurang will practice opposite to emotion to help improve motivation to take better care of himself  providing an update of progress weekly  3. When in Group Therapy, Gaurang will report mood, symptoms, and coping skills to manage symptoms and mood.   4. Gaurang will establish care with an individual therapist.     Area of Treatment Focus:  Symptom Management, Personal Safety and Community Resources/Discharge Planning    Therapeutic Interventions/Treatment Strategies:  Support, Feedback, Safety Assessments, Education and Cognitive Behavioral Therapy    Response to Treatment Strategies:  Accepted Feedback, Gave Feedback, Listened and Focused on Goals     Name of Group: Group Psychotherapy I and Group Psychotherapy II Date/Time: 3/26/19 / 0900 to 0950 and 1000 to 1050    Number of Group Participants: 7     Description and Outcome:  Group Psychotherapy I: Gaurang reported that he got \"no sleep\" last night.  He stated that his preferred bedtime is between midnight and 1 am.   He stated that he knew that watching TV then listening to music was \"a mistake\", but he hoped to sleep from 4 am to 8 am.  He stated that he gave up on sleeping around 5:30 am.  He stated that he will stay awake today during the day as he is looking forward to seeing his childhood friend for " "lunch.  He plans to disclose more of his recent mental health symptoms.  Writer asked client to identify how she can be helpful to him so that he can directly ask for support.  He stated that he would like her to initiate contact with him if she has not heard from him in a while.  He reported that he had a low level of suicidal thoughts and stated that they were\" in the background\".      Group Psychotherapy II:  Client participated in an educational discussion on sleep hygiene skills.  Client shared sleep problems and behaviors that contribute to those problems.  Writer provided information on sleep skills to promote more effective sleep.      Client verbalized understanding of session content by identifying needs for social support and how to effectively ask for that support.  He also identified behaviors that interfere with sleep.  He identified initial goal of moving his bedtime to between midnight and 1 am and stopping the TV about an hour before bed.      Is this a Weekly Review of the Progress on the Treatment Plan?  Yes.      Are Treatment Plan Goals being addressed?  Yes, continue treatment goals      Are Treatment Plan Strategies to Address Goals Effective?  Yes, continue treatment strategies      Are there any current contracts in place?  No      "

## 2019-04-01 ENCOUNTER — HOSPITAL ENCOUNTER (OUTPATIENT)
Dept: BEHAVIORAL HEALTH | Facility: CLINIC | Age: 49
End: 2019-04-01
Attending: PSYCHIATRY & NEUROLOGY
Payer: COMMERCIAL

## 2019-04-01 PROCEDURE — H2012 BEHAV HLTH DAY TREAT, PER HR: HCPCS

## 2019-04-02 ENCOUNTER — HOSPITAL ENCOUNTER (OUTPATIENT)
Dept: BEHAVIORAL HEALTH | Facility: CLINIC | Age: 49
End: 2019-04-02
Attending: PSYCHIATRY & NEUROLOGY
Payer: COMMERCIAL

## 2019-04-02 PROCEDURE — H2012 BEHAV HLTH DAY TREAT, PER HR: HCPCS

## 2019-04-02 NOTE — PROGRESS NOTES
"Adult Mental Health Outpatient Group Therapy Progress Note     Client Initial Individualized Goals for Treatment: \"how to expand my social engagement given that I am an introvert, find a way to increase physical activity, find a way to increase activities/productivity in my life.\"     See Initial Treatment suggestions for the client during the time between Diagnostic Assessment and completion of the Master Individualized Treatment Plan.     Treatment Goals:  1.Client will notify staff when needing assistance to develop or implement a coping plan to manage suicidal or self injurious urges.  Client will use coping plan for safety, as needed.  2. When in life skills group Gaurang will practice opposite to emotion to help improve motivation to take better care of himself  providing an update of progress weekly  3. When in Group Therapy, Gaurang will report mood, symptoms, and coping skills to manage symptoms and mood.   4. Gaurang will establish care with an individual therapist.     Area of Treatment Focus:  Symptom Management, Personal Safety and Community Resources/Discharge Planning    Therapeutic Interventions/Treatment Strategies:  Support, Feedback, Safety Assessments, Education and Cognitive Behavioral Therapy    Response to Treatment Strategies:  Accepted Feedback, Gave Feedback, Listened and Focused on Goals     Name of Group: Group Psychotherapy I and Group Psychotherapy II Date/Time: 4/2/19 / 0900 to 0950 and 1100 to 1150    Number of Group Participants: 7    Description and Outcome:  Group Psychotherapy I:  Gaurang reported having a good day on Monday.  He stated that he had lunch with his parents, rested but did not sleep, then took his dog to the dog park.  He stated that he went to sleep earlier than usual but was able to sleep all night.  He stated that he was trying to listen to upbeat music to assist with his mood.  Client denied suicidal ideation, intent and plan.     Group Therapy II:  Client participated " in an educational activity about coping skills.   Writer reviewed need to expanding coping skills when current skills are ineffective or insufficient.   Clients completed an exercise on identifying coping skills then shared their results with the group.  Peers gave feedback about the skills identified to each other.    Client demonstrated understanding of session content by identifying coping strategies for managing sleep disturbance.    Is this a Weekly Review of the Progress on the Treatment Plan?  No

## 2019-04-02 NOTE — PROGRESS NOTES
"Adult Mental Health Outpatient Group Therapy Progress Note     Client Initial Individualized Goals for Treatment: \"how to expand my social engagement given that I am an introvert, find a way to increase physical activity, find a way to increase activities/productivity in my life.\"     See Initial Treatment suggestions for the client during the time between Diagnostic Assessment and completion of the Master Individualized Treatment Plan.     Treatment Goals:  1.Client will notify staff when needing assistance to develop or implement a coping plan to manage suicidal or self injurious urges.  Client will use coping plan for safety, as needed.  2. When in life skills group Gaurang will practice opposite to emotion to help improve motivation to take better care of himself  providing an update of progress weekly  3. When in Group Therapy, Gaurang will report mood, symptoms, and coping skills to manage symptoms and mood.   4. Gaurang will establish care with an individual therapist.     Area of Treatment Focus:  Symptom Management, Personal Safety and Community Resources/Discharge Planning    Therapeutic Interventions/Treatment Strategies:  Support, Feedback, Safety Assessments, Education and Cognitive Behavioral Therapy    Response to Treatment Strategies:  Accepted Feedback, Gave Feedback, Listened and Focused on Goals     Name of Group: Group Psychotherapy I and Group Psychotherapy II Date/Time: 4/1/19 / 0900 to 0950 and 1100 to 1150    Number of Group Participants: 6     Description and Outcome:  Group Psychotherapy I:  Gaurang reported having a positive interaction with his friend last Thursday over lunch.  Reportedly they plan to resume regular get togethers.  He stated that he was able to follow through with goal of going to the dog park and having dinner with his parents.  He stated that he also watched the basketball tournament game with his family.  He stated that he slept most of the day Friday, stayed awaked watching a " "TV services Friday night, slept Saturday during the day, then was awake all Saturday night.  HE stated that he reported depressed mood, sense of \"only existing\", and not being productive.  Writer reviewed steps to assist with being able to sleep tonight.    Group Psychotherapy II:  Group completed second part of a session on sleep.  Client completed a worksheet then participated in a discussion on sleep issues.  Group looked in depth at behaviors that interfere with sleep.   Group identified skills to manage thoughts and emotions that interfere with falling asleep or staying asleep.  Client identified routines, thoughts, and behaviors that promote sleep.        Client demonstrated understanding of session content by identifying coping strategies for managing sleep disturbance.    Is this a Weekly Review of the Progress on the Treatment Plan?  No  "

## 2019-04-03 NOTE — PROGRESS NOTES
"Adult Mental Health Outpatient Group Therapy Progress Note     Client Initial Individualized Goals for Treatment: \"how to expand my social engagement given that I am an introvert, find a way to increase physical activity, find a way to increase activities/productivity in my life.\"    See Initial Treatment suggestions for the client during the time between Diagnostic Assessment and completion of the Master Individualized Treatment Plan.    Treatment Goals:  1.Client will notify staff when needing assistance to develop or implement a coping plan to manage suicidal or self injurious urges.  Client will use coping plan for safety, as needed.  2. When in life skills group Gaurang will practice opposite to emotion to help improve motivation to take better care of himself  providing an update of progress weekly  3. When in Group Therapy, Gaurang will report mood, symptoms, and coping skills to manage symptoms and mood.   4. Gaurang will establish care with an individual therapist.                 Area of Treatment Focus:  Symptom Management    Therapeutic Interventions/Treatment Strategies:  Support, Feedback, Safety Assessments, Structured Activity and Education    Response to Treatment Strategies:  Accepted Feedback, Listened, Attentive, Accepted Support and Alert     Name of Group: Life Skills(10:00-11:00)    Number of Group Participants: 7    Description and Outcome:  Client presented with calm,even mood with good focus and concentration. Psychosocial skills addressed included stress avoidance strategies,leisure education,self esteem and practice of communication strategies. Client reports that he he is attempting to readjust his work schedule in preparation for return to work in a few weeks.Goal #1 (no personal safety concerns reported or observed).  Client would benefit from additional opportunities to practice and implement content from this session  in every day life,relationships and mental health recovery.    Is this a " Weekly Review of the Progress on the Treatment Plan?  Yes.      Are Treatment Plan Goals being addressed?  Yes, continue treatment goals      Are Treatment Plan Strategies to Address Goals Effective?  Yes, continue treatment strategies      Are there any current contracts in place?  No

## 2019-04-04 ENCOUNTER — HOSPITAL ENCOUNTER (OUTPATIENT)
Dept: BEHAVIORAL HEALTH | Facility: CLINIC | Age: 49
End: 2019-04-04
Attending: PSYCHIATRY & NEUROLOGY
Payer: COMMERCIAL

## 2019-04-04 PROCEDURE — H2012 BEHAV HLTH DAY TREAT, PER HR: HCPCS

## 2019-04-04 NOTE — PROGRESS NOTES
"Adult Mental Health Outpatient Group Therapy Progress Note     Client Initial Individualized Goals for Treatment: \"how to expand my social engagement given that I am an introvert, find a way to increase physical activity, find a way to increase activities/productivity in my life.\"     See Initial Treatment suggestions for the client during the time between Diagnostic Assessment and completion of the Master Individualized Treatment Plan.     Treatment Goals:  1.Client will notify staff when needing assistance to develop or implement a coping plan to manage suicidal or self injurious urges.  Client will use coping plan for safety, as needed.  2. When in life skills group Gaurang will practice opposite to emotion to help improve motivation to take better care of himself  providing an update of progress weekly  3. When in Group Therapy, Gaurang will report mood, symptoms, and coping skills to manage symptoms and mood.   4. Gaurang will establish care with an individual therapist.     Area of Treatment Focus:  Symptom Management, Personal Safety and Community Resources/Discharge Planning    Therapeutic Interventions/Treatment Strategies:  Support, Feedback, Safety Assessments, Education and Cognitive Behavioral Therapy    Response to Treatment Strategies:  Accepted Feedback, Gave Feedback, Listened and Focused on Goals     Name of Group: Group Psychotherapy I and Group Psychotherapy II Date/Time: 4/4/19 / 0900 to 0950 and 1000 to 1050    Number of Group Participants: 6    Description and Outcome:  Group Psychotherapy I: Gaurang reported that he had contact with his sister and went to the [employer] park on Tuesday.  He stated that he tried to go to sleep, got up after about one hour, watched \"some Netflix\" then went back to sleep.  He stated that he felt tired in the morning and went back to sleep after awakening at 8 am.  HE stated that he was lying around all day and did not follow through with goal of attending a pre-season work " meeting.  He identified a high level of negative self-talk about not following through with the work meeting and his sleep goals.  He stated that he had difficulty interrupting the negative self-talk and had difficulty being kind to himself.Client denied suicidal ideation, intent and plan.     Group Psychotherapy II:  Client participated in a discussion of ways to challenge negative self-talk.  The group focused on negative self-talk about  not being productive  when on medical leave or when not able to complete tasks due to high symptoms.  The group brain stormed skills to use to challenge these thoughts.  Writer introduced the concept of practicing self-compassion.      Client verbalized understanding of session content by identifying current symptoms and stressors.  He actively engaged in discussion about feeling unworthy and having negative self-talk when unproductive.    Is this a Weekly Review of the Progress on the Treatment Plan?  Yes.      Are Treatment Plan Goals being addressed?  Yes, continue treatment goals      Are Treatment Plan Strategies to Address Goals Effective?  Yes, continue treatment strategies      Are there any current contracts in place?  No

## 2019-04-09 ENCOUNTER — TELEPHONE (OUTPATIENT)
Dept: BEHAVIORAL HEALTH | Facility: CLINIC | Age: 49
End: 2019-04-09

## 2019-04-15 ENCOUNTER — TELEPHONE (OUTPATIENT)
Dept: BEHAVIORAL HEALTH | Facility: CLINIC | Age: 49
End: 2019-04-15

## 2019-04-17 ENCOUNTER — OFFICE VISIT (OUTPATIENT)
Dept: PSYCHIATRY | Facility: CLINIC | Age: 49
End: 2019-04-17
Payer: COMMERCIAL

## 2019-04-17 VITALS
DIASTOLIC BLOOD PRESSURE: 78 MMHG | BODY MASS INDEX: 33.98 KG/M2 | SYSTOLIC BLOOD PRESSURE: 130 MMHG | HEART RATE: 84 BPM | OXYGEN SATURATION: 95 % | RESPIRATION RATE: 14 BRPM | WEIGHT: 294 LBS

## 2019-04-17 DIAGNOSIS — F33.2 SEVERE EPISODE OF RECURRENT MAJOR DEPRESSIVE DISORDER, WITHOUT PSYCHOTIC FEATURES (H): ICD-10-CM

## 2019-04-17 DIAGNOSIS — F33.1 MODERATE EPISODE OF RECURRENT MAJOR DEPRESSIVE DISORDER (H): Primary | ICD-10-CM

## 2019-04-17 PROCEDURE — 99214 OFFICE O/P EST MOD 30 MIN: CPT | Performed by: NURSE PRACTITIONER

## 2019-04-17 RX ORDER — METHYLPHENIDATE HYDROCHLORIDE 10 MG/1
10 TABLET ORAL 2 TIMES DAILY
Qty: 60 TABLET | Refills: 0 | Status: SHIPPED | OUTPATIENT
Start: 2019-04-17 | End: 2019-10-09

## 2019-04-17 ASSESSMENT — PATIENT HEALTH QUESTIONNAIRE - PHQ9: SUM OF ALL RESPONSES TO PHQ QUESTIONS 1-9: 18

## 2019-04-17 NOTE — PROGRESS NOTES
"    Outpatient Psychiatric Progress Note    Name: Gaurang Rivera   : 1970                    Primary Care Provider: Elvis Guzman MD  Therapist: Soon - last visit 2019    PHQ-9 scores:  PHQ-9 SCORE 2019 2019 3/19/2019   PHQ-9 Total Score MyChart - - 18 (Moderately severe depression)   PHQ-9 Total Score 18 15 18       SOL-7 scores:  SOL-7 SCORE 2019 2019 3/19/2019   Total Score - - 4 (minimal anxiety)   Total Score 6 3 4         Patient Identification:  Patient is a 48 year old year old, single  Choose not to answer Choose not to answer male  who presents for return visit with me.  Patient is currently employed part time. Patient attended the session alone. Patient prefers to be called: \"Gaurang\".    Interim History:  I last saw Gaurang Rivera for outpatient psychiatry Return Visit on 3/19/19.     During that appointment, we agreed to increase recently initiated Ritalin to 10 mg BID. He was to continue Effexor at 300 mg daily. He was to continue [facility] day treatment program, and he was referred within [facility] to individual therapy. He was to continue to work on sleep hygiene.     Current medications include:   Current Outpatient Medications   Medication Sig     blood glucose (NO BRAND SPECIFIED) lancets standard Use to test blood sugar one times daily or as directed.     blood glucose monitoring (NO BRAND SPECIFIED) meter device kit Use to test blood sugar one times daily or as directed.     blood glucose monitoring (NO BRAND SPECIFIED) test strip Use to test blood sugars one times daily or as directed     IBUPROFEN PO      lisinopril (PRINIVIL/ZESTRIL) 5 MG tablet Take 1 tablet (5 mg) by mouth daily     metFORMIN (GLUCOPHAGE) 500 MG tablet Take 2 tablets (1,000 mg) by mouth 2 times daily (with meals)     methylphenidate (RITALIN) 10 MG tablet Take 1 tablet (10 mg) by mouth 2 times daily     multivitamin, therapeutic with minerals (MULTI-VITAMIN) TABS tablet Take 1 tablet by mouth daily     " "simvastatin (ZOCOR) 40 MG tablet Take 1 tablet (40 mg) by mouth At Bedtime     venlafaxine (EFFEXOR-XR) 150 MG 24 hr capsule Take 2 capsules (300 mg) by mouth daily     No current facility-administered medications for this visit.        The [place] Prescription Monitoring Program has been reviewed and there are no concerns about diversionary activity for controlled substances at this time.      I was able to review most recent Primary Care Provider, specialty provider, and therapy visit notes that I have access to.     Today, patient reports since last visit he was doing pretty well for a couple of weeks before sleep cycle got reversed again. Prior to this was taking Ritalin 10 mg in AM, and 10 mg in early afternoon. No side effects. Found this quite helpful; \"firing on all cylinders.\" Was more behaviorally activated; awake from 8 am to 12 am, taking dog for walk in evenings.    Then got into Netflix again at night, and ended up on familiar reversed sleep cycle. Mood and functioning suffered, with return of passive SI. He was not using Ritalin during this time of reversed sleep cycle. He continued to attend [facility] day program in mornings, however. He has remained on Effexor  mg daily.    States \"I know what I need to do\" to get sleep back on diurnal schedule. Will need to stay awake today, and go to bed at reasonable hour. He'll be starting individual therapy with Santos Lovell on 4/23/19 and is looking forward to this; feels it will be helpful. He'll continue day program into early 05/2019. 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        Past Medical History:   Diagnosis Date     Depressive disorder 2001     Diabetes (H)      Hypercholesteremia 2012      has a past medical history of Depressive disorder (2001), Diabetes (H), and Hypercholesteremia (2012).    Social history updates:  See above.    Substance use updates:  No EtOH or substance use  Tobacco use: No      Vital Signs:   /78 (BP " Location: Right arm, Patient Position: Chair, Cuff Size: Adult Large)   Pulse 84   Resp 14   Wt 133.4 kg (294 lb)   SpO2 95%   BMI 33.98 kg/m      Labs:  Most recent laboratory results reviewed and no new labs.    Review of Systems:  10 systems (general, cardiovascular, respiratory, eyes, ENT, endocrine, GI, , M/S, neurological) were reviewed. Most pertinent finding(s) is/are: shoulder pain, diabetes, back pain. The remaining systems are all unremarkable.    Mental Status Examination:     Appearance:  awake, alert and adequately groomed  Attitude:  cooperative   Eye Contact:  adequate  Gait and Station: Normal  Psychomotor Behavior:  intact station, gait and muscle tone  Oriented to:  time, person, and place  Attention Span and Concentration:  Normal  Speech:  clear, coherent  Mood:  depressed  Affect:  appropriate and in normal range  Associations:  no loose associations  Thought Process:  logical, linear and goal oriented  Thought Content:  Appropriate to Interview  Recent and Remote Memory:  intact Not formally assessed. No amnesia.  Fund of Knowledge: appropriate  Insight:  good  Judgment:  intact  Impulse Control:  intact     Suicide Risk Assessment:  Today Gaurang Rivera reports routine passive SI, fleeting in nature.  In addition, there are notable risk factors for self-harm, including age, single status, comorbid medical condition of diabetes, suicidal ideation and purposelessness/no reason for living. However, risk is mitigated by commitment to family, sobriety, absence of past attempts, ability to volunteer a safety plan, history of seeking help when needed, future oriented, no access to firearms or weapons, identifies reasons to live including pets and denies suicidal intent or plan. Therefore, based on all available evidence including the factors cited above, Gaurang Rivera does not appear to be at imminent risk for self-harm, does not meet criteria for a 72-hr hold, and therefore remains appropriate  for ongoing outpatient level of care.  A thorough assessment of risk factors related to suicide and self-harm have been reviewed and are noted above. The patient convincingly denies acute suicidality on several occasions. Local community safety resources reviewed and printed for patient to use if needed. There was no deceit detected, and the patient presented in a manner that was believable.      DSM5  Diagnosis:  296.32 (F33.1) Major Depressive Disorder, Recurrent Episode, Moderate _      Medical comorbidities include:   Patient Active Problem List    Diagnosis Date Noted     MDD (major depressive disorder), recurrent episode, severe (H) 02/26/2019     Priority: Medium     Microalbuminuria due to type 2 diabetes mellitus (H) 10/01/2018     Priority: Medium     Diabetes mellitus, type 2 (H) 09/26/2018     Priority: Medium     Hyperlipidemia, unspecified hyperlipidemia type 09/26/2018     Priority: Medium     Moderate episode of recurrent major depressive disorder (H) 09/07/2018     Priority: Medium     Passive suicidal ideations 09/07/2018     Priority: Medium     Low back pain 02/11/2015     Priority: Medium     Diagnosis updated by automated process. Provider to review and confirm.           Assessment:  Gaurang Rivera reports a promising response to increased dosage of Ritalin. We do not suspect it caused his sleep cycle to reverse, as this is a familiar pattern that pre-dates Ritalin and would appear exclusively behaviorally-based. I provided much encouragement for him to correct this, which he feels capable to do absent any medication. Once a diurnal schedule is established, he can reinitiate Ritalin 10 mg daily alongside long-standing Effexor  mg.    He is encouraged to continue day treatment program to its conclusion, as this does provide him helpful structure and accountability. Further, he'll be starting individual psychotherapy at [facility], and we are both quite hopeful about this.    Medication side  effects and alternatives were reviewed. Health promotion activities recommended and reviewed today. All questions addressed. Education and counseling completed regarding risks and benefits of medications and psychotherapy options.    Treatment Plan:    Continue Effexor  mg daily    Re-start Ritalin to 10 mg BID once sleep wake cycle is corrected    Continue day treatment program.    Start individual psychotherapy    Continue all other medical directions with per primary care provider.     Continue all other medications as reviewed per electronic medical record today.     Safety plan reviewed. To the Emergency Department as needed or call after hours crisis line at [phone] or [phone]. [place] Crisis Text Line: Text [place] to 615827  or  Suicide LifeLine Chat: suicidepreventionClearPoint Metricsline.org/chat/    Schedule an appointment with me in 4 weeks or sooner as needed.  Call [facility] at [phone] to schedule.    Follow up with primary care provider as planned or for acute medical concerns.    Call the psychiatric nurse line with medication questions or concerns at [phone].    [employer] may be used to communicate with your provider, but this is not intended to be used for emergencies.        Administrative Billing:   Time spent with patient was 30 minutes and greater than 50% of time or 20 minutes was spent in counseling and coordination of care regarding above diagnoses and treatment plan.    Patient Status:  Patient will continue to be seen for ongoing consultation and stabilization.    Signed:   Fermin Bishop [facility]   Psychiatry

## 2019-04-18 ENCOUNTER — HOSPITAL ENCOUNTER (OUTPATIENT)
Dept: BEHAVIORAL HEALTH | Facility: CLINIC | Age: 49
End: 2019-04-18
Attending: PSYCHIATRY & NEUROLOGY
Payer: COMMERCIAL

## 2019-04-18 PROCEDURE — H2012 BEHAV HLTH DAY TREAT, PER HR: HCPCS

## 2019-04-18 NOTE — PROGRESS NOTES
"  Adult Mental Health Outpatient Group Therapy Progress Note     Client Initial Individualized Goals for Treatment: \"how to expand my social engagement given that I am an introvert, find a way to increase physical activity, find a way to increase activities/productivity in my life.\"     See Initial Treatment suggestions for the client during the time between Diagnostic Assessment and completion of the Master Individualized Treatment Plan.     Treatment Goals:  1.Client will notify staff when needing assistance to develop or implement a coping plan to manage suicidal or self injurious urges.  Client will use coping plan for safety, as needed.  2. When in life skills group Gaurang will practice opposite to emotion to help improve motivation to take better care of himself  providing an update of progress weekly  3. When in Group Therapy, Gaurang will report mood, symptoms, and coping skills to manage symptoms and mood.   4. Gaurang will establish care with an individual therapist.        Area of Treatment Focus:  Symptom Management    Therapeutic Interventions/Treatment Strategies:  Support, Feedback, Structured Activity, Clarification and Education    Response to Treatment Strategies:  Accepted Feedback, Gave Feedback, Listened, Focused on Goals, Attentive, Accepted Support and Alert    Name of Group:  Mental health management [id number], 8 participants     Description and Outcome:  Information was presented regarding relationship between body, mind and emotion.  Discussion centered around the importance of \"noticing\" the body in being present and in the body's ability to \"inform\".  The group was then given a structure in which to begin to notice breathe in a nonjudgemental way and then expanded the movement into space.  Group themes and discussion developed into breathe in transitions, the breathe and self care and the experience of being in one's space.  Client demonstrated understanding of session by engaging in " experiential and sharing observations with the group.    Is this a Weekly Review of the Progress on the Treatment Plan?  No

## 2019-04-18 NOTE — PROGRESS NOTES
"Adult Mental Health Outpatient Group Therapy Progress Note     Client Initial Individualized Goals for Treatment: \"how to expand my social engagement given that I am an introvert, find a way to increase physical activity, find a way to increase activities/productivity in my life.\"     See Initial Treatment suggestions for the client during the time between Diagnostic Assessment and completion of the Master Individualized Treatment Plan.     Treatment Goals:  1.Client will notify staff when needing assistance to develop or implement a coping plan to manage suicidal or self injurious urges.  Client will use coping plan for safety, as needed.  2. When in life skills group Gaurang will practice opposite to emotion to help improve motivation to take better care of himself  providing an update of progress weekly  3. When in Group Therapy, Gaurang will report mood, symptoms, and coping skills to manage symptoms and mood.   4. Gaurang will establish care with an individual therapist.     Area of Treatment Focus:  Symptom Management, Personal Safety and Community Resources/Discharge Planning    Therapeutic Interventions/Treatment Strategies:  Support, Feedback, Safety Assessments, Education and Cognitive Behavioral Therapy    Response to Treatment Strategies:  Accepted Feedback, Gave Feedback, Listened and Focused on Goals     Name of Group: Group Psychotherapy I and Group Psychotherapy II Date/Time: 4/18/19 / 0900 to 0950 and 1000 to 1050    Number of Group Participants: 8     Description and Outcome:  Group Psychotherapy I: Gaurang reported that he was sick last week.  He stated prior to being ill that he forgot his antidepressant for one night and then had a withdrawal side effect.  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  He stated that he saw his psychiatric nurse and will start with a therapist " form [place] next week.  He stated that he had intermittent suicidal thoughts but he was practicing thought stopping.  He denied plan or intent to act on the thoughts.    Group Therapy II: Client participated in a discussion about mental health relapse prevention.  Group discussed skills to practice.   Group also discussed ways to maintain hope during high symptom times.       Client verbalized understanding of session content by sharing current symptoms and stressors.    Is this a Weekly Review of the Progress on the Treatment Plan?  Yes.      Are Treatment Plan Goals being addressed?  Yes, continue treatment goals      Are Treatment Plan Strategies to Address Goals Effective?  Yes, continue treatment strategies      Are there any current contracts in place?  No

## 2019-04-22 ENCOUNTER — HOSPITAL ENCOUNTER (OUTPATIENT)
Dept: BEHAVIORAL HEALTH | Facility: CLINIC | Age: 49
End: 2019-04-22
Attending: PSYCHIATRY & NEUROLOGY
Payer: COMMERCIAL

## 2019-04-22 PROCEDURE — H2012 BEHAV HLTH DAY TREAT, PER HR: HCPCS

## 2019-04-22 NOTE — PROGRESS NOTES
"Adult Mental Health Outpatient Group Therapy Progress Note     Client Initial Individualized Goals for Treatment: \"how to expand my social engagement given that I am an introvert, find a way to increase physical activity, find a way to increase activities/productivity in my life.\"     See Initial Treatment suggestions for the client during the time between Diagnostic Assessment and completion of the Master Individualized Treatment Plan.     Treatment Goals:  1.Client will notify staff when needing assistance to develop or implement a coping plan to manage suicidal or self injurious urges.  Client will use coping plan for safety, as needed.  2. When in life skills group Gaurang will practice opposite to emotion to help improve motivation to take better care of himself  providing an update of progress weekly  3. When in Group Therapy, Gaurang will report mood, symptoms, and coping skills to manage symptoms and mood.   4. Gaurang will establish care with an individual therapist.     Area of Treatment Focus:  Symptom Management, Personal Safety and Community Resources/Discharge Planning    Therapeutic Interventions/Treatment Strategies:  Support, Feedback, Safety Assessments, Education and Cognitive Behavioral Therapy    Response to Treatment Strategies:  Accepted Feedback, Gave Feedback, Listened and Focused on Goals     Name of Group: Group Psychotherapy I and Group Psychotherapy II Date/Time: 4/22/19 / 0900 to 0950 and 1100 to 1150    Number of Group Participants: 5    Description and Outcome:  Group Psychotherapy I: Gaurang reported that he hurt his arm last Thursday and the pain intensified during the day.  He stated that he had a high level of intense suicidal ideation with out specific plan or intent .  He stated that he thoughts about calling his sister to ask her to bring him some beer as the pain was too bad to walk to the liquor store near his home, but he decided to not make the request.  He stated that he had a " high level of anger about his chronic pain in his low back, the pain from this winter from a fall, and the new pain.  He stated that the suicidal thoughts decreased in intensity over the weekend and were minimal today.  He stated that he spent Easter with his family and enjoyed watching three [employer] baseball games over the weekend.  He is looking forward to starting individual therapy at [facility] with Santos this Friday.   He denied plan or intent to act on the thoughts.    Group Therapy II:  Client participated in discussion on skills to manage avoidance behaviors associated with anxiety.  Client learned about skills to decrease perfectionism and procrastination.  Writer discussed ways to increase motivation.      Client verbalized understanding of session content by sharing current symptoms and stressors.    Is this a Weekly Review of the Progress on the Treatment Plan?  No

## 2019-04-23 ENCOUNTER — OFFICE VISIT (OUTPATIENT)
Dept: BEHAVIORAL HEALTH | Facility: CLINIC | Age: 49
End: 2019-04-23
Payer: COMMERCIAL

## 2019-04-23 ENCOUNTER — HOSPITAL ENCOUNTER (OUTPATIENT)
Dept: BEHAVIORAL HEALTH | Facility: CLINIC | Age: 49
End: 2019-04-23
Attending: PSYCHIATRY & NEUROLOGY
Payer: COMMERCIAL

## 2019-04-23 DIAGNOSIS — F33.2 MDD (MAJOR DEPRESSIVE DISORDER), RECURRENT EPISODE, SEVERE (H): Primary | ICD-10-CM

## 2019-04-23 PROCEDURE — 90791 PSYCH DIAGNOSTIC EVALUATION: CPT | Performed by: SOCIAL WORKER

## 2019-04-23 PROCEDURE — H2012 BEHAV HLTH DAY TREAT, PER HR: HCPCS

## 2019-04-23 NOTE — PROGRESS NOTES
"Adult Mental Health Outpatient Group Therapy Progress Note     Client Initial Individualized Goals for Treatment: \"how to expand my social engagement given that I am an introvert, find a way to increase physical activity, find a way to increase activities/productivity in my life.\"     See Initial Treatment suggestions for the client during the time between Diagnostic Assessment and completion of the Master Individualized Treatment Plan.     Treatment Goals:  1.Client will notify staff when needing assistance to develop or implement a coping plan to manage suicidal or self injurious urges.  Client will use coping plan for safety, as needed.  2. When in life skills group Gaurang will practice opposite to emotion to help improve motivation to take better care of himself  providing an update of progress weekly  3. When in Group Therapy, Gaurang will report mood, symptoms, and coping skills to manage symptoms and mood.   4. Gaurang will establish care with an individual therapist.     Area of Treatment Focus:  Symptom Management, Personal Safety and Community Resources/Discharge Planning    Therapeutic Interventions/Treatment Strategies:  Support, Feedback, Safety Assessments, Education and Cognitive Behavioral Therapy    Response to Treatment Strategies:  Accepted Feedback, Gave Feedback, Listened and Focused on Goals     Name of Group: Group Psychotherapy I and Group Psychotherapy II Date/Time: 4/23/19 / 0900 to 0950 and 1100 to 1150    Number of Group Participants: 5 then 6 for second hour    Description and Outcome:  Group Psychotherapy I: Gaurang reported that he had a good day yesterday.  He stated that he rested but did not nap in the afternoon.  He stated that he actively thought about going to the gym to use the steam room, but then decided not to attend due to the available time.  He stated that he attending his  rowing, then avoiding purchasing fast food for dinner.  He stated that he was able to complete " two loads of laundry and watch a [employer] game.  He stated that he is looking forward to the intake interview with the new therapist today and looking forward to seeing his primary care provider tomorrow.    Group Therapy II: Client participated in a discussion introducing cognitive therapy concepts.  Writer reviewed examples of how thoughts about activating events influence emotions and behaviors.  Client shared examples from their experiences.  Client completed a worksheet on challenging automatic negative thoughts.     Client verbalized understanding of session content by sharing current symptoms and stressors.    Is this a Weekly Review of the Progress on the Treatment Plan?  No

## 2019-04-23 NOTE — PROGRESS NOTES
"                                                                                                                                                                        Adult Intake Structured Interview  Standard Diagnostic Assessment      CLIENT'S NAME: Gaurang Rivera  MRN:   [id number]  :   1970  ACCT. NUMBER: [id number]  DATE OF SERVICE: 19  VIDEO VISIT: No    Identifying Information:  Client is a 48 year old, , single male. Client was referred for counseling by self. Client is currently employed part time. Client attended the session alone.       Client's Statement of Presenting Concern:  Client reports the reason for seeking therapy at this time as \"depression.\"  Client stated that his symptoms have resulted in the following functional impairments: relationship(s)      History of Presenting Concern:  Dealing with depression most of his adult life, does better some times and worse others.  Sounds like thinks got worse this winter, had been struggling so went in to see psych who suggested day treatment at [facility].  Client has been doing this for almost two months, not sure how much it is helping.  Has done individual therapy in the past and this has been helpful.  Has done therapy at the [facility], [id number].       Taken from Psychiatric evaluation from 19 :    States he's had depression most of life. Had inpatient hospitalization at [facility] in  for depression/SI; \"darkest I've ever been\"; states he had \"terrible experience\" in general unit. Subsequently engaged with day treatment program. In 15 years afterwards saw a local community therapist; states it was helpful, despite depression being recurrent; this therapist has since retired. He moved to [place] in  for a job; was a [place] town so didn't think he had therapy options; states he was \"venkatesh doing OK\" but in winter 2017 had a major depressive episode; had to take 1 month leave from job; however did find a counselor in " "his town and saw 5 times; helped him \"get out of ligia rut\". He moved back to [place] in 2018; didn't have health insurance for a while, but now has insurance. He's had no therapist since returning to [place].      Patient started Effexor and Wellbutrin in 2000. Has taken both consistently over the years; tapering Wellbutrin to 300 mg daily and Effexor to 300 mg daily; states it seemed to help at first with mood, but this therapeuitic effect he last felt \"ages ago\". He stopped Wellbutrin in 2015 when lapsed on prescriptions; didn't notice a big change. He stats he tolerates Effexor OK; can have withdrawal if missing a day's dosage (\"wild dreams\"). Lastly, recalls having been prescribed Ritalin 10-15 years ago as an antidepressant; thinks it helped.     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 States he has no other occupations; \"I'm pretty good at doing nothing\"; states family can help with finances and his lives modestly. Single, no relationship, no close friends; \"extreme introvert\"; has a young dog and cat. States \"highlight of my day\" is eating at fast food restaurant daily and reading newspaper. Will otherwise \"spend a lot of time in bed\" watching TV. He is close with his parents and one of his sisters; states \"they worry about me a lot\". States \"I think about killing myself every day, though it's a fleeting thought\"; has fanaticized about a \"tidy suicide\" via car exhaust; has never been close to attempting; states he knows he would not act on thoughts.     States he sleeps a lot, though not quality sleep. Doesn't feel that anxiety is elevated. Tends to overeat or eat junk food when depressed; which is counter-odds with is ambition to get his diabetes under control with weight management.     States an interest in attending either a day program or IOP; \"I've certainly got the time!\". Lives in [facility]; has a " "car.      Social History:  Client reported he grew up in [place], [place]. They were the first born of 3 children.  Client reported that his childhood was \"great, had a happy childhood.\"  Graduated HS and went to college, studied international relations at [employer].  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  Works for [employer], likes his work there for the most part.          Never .  No kids.  No current partner.    Lives alone with dog and a cat.    Client identified some stable and meaningful social connections.  Client reported that he has not been involved with the legal system.  Client's highest education level was college graduate. Client did not identify any learning problems. There are no ethnic, cultural or Druze factors that may be relevant for therapy. Client identified his preferred language to be English. Client reported he does not need the assistance of an  or other support involved in therapy. Modifications will not be used to assist communication in therapy. Client did not serve in the .     Client reports family history includes Alcoholism in his sister; Depression in his father and sister; Diabetes in his mother; Lupus in his sister.    Mental Health History:  Client reported the following biological family members or relatives with mental health issues: depression on fathers side of the family; sister with depression.  Client previously received the following mental health diagnosis: Depression.  Client has received the following mental health services in the past: counseling, day treatment and psychiatry.  Hospitalizations: spent the night in the hospital for depression in 1999 at [facility]..  Client is currently receiving the following services: day treatment and psychiatry.      Chemical Health History:  Client reported the following biological family members or relatives with chemical health " issues: pat uncle with alcohol concerns, one sister in recovery. Client has not received chemical dependency treatment in the past. Client is not currently receiving any chemical dependency treatment. Client reports no problems as a result of their drinking / drug use.      Client Reports:  Client reports using alcohol 4 times per year and has 1 beers at a time. Client first started drinking at age unknown.  Client denies using tobacco.  Client denies using marijuana.  Client reports using caffeine 3 times per day and drinks 1 at a time. Client started using caffeine at age unknown.  Client denies using street drugs.  Client denies the non-medical use of prescription or over the counter drugs.    CAGE: None of the patient's responses to the CAGE screening were positive / Negative CAGE score   Based on the negative Cage-Aid score and clinical interview there  are not indications of drug or alcohol abuse.    Discussed the general effects of drugs and alcohol on health and well-being. Therapist gave client printed information about the effects of chemical use on his health and well being.      Significant Losses / Trauma / Abuse / Neglect Issues:  There are indications or report of significant loss, trauma, abuse or neglect issues related to: back injury in 1991 and had to give up rowing.    Issues of possible neglect are not present.      Medical Issues:  Client has had a physical exam to rule out medical causes for current symptoms. Date of last physical exam was within the past year. Client was encouraged to follow up with PCP if symptoms were to develop. The client has a Home Primary Care Provider, who is named Elvis Guzman. The client has a psychiatrist whose name and location are: in the EMR. Client reports the following current medical concerns: per EMR. The client reports the presence of chronic or episodic pain in the form of back pain. The pain level is severe and has a frequency of daily . There  are not significant nutritional concerns.     Client reports current meds as:   Current Outpatient Medications   Medication Sig     blood glucose (NO BRAND SPECIFIED) lancets standard Use to test blood sugar one times daily or as directed.     blood glucose monitoring (NO BRAND SPECIFIED) meter device kit Use to test blood sugar one times daily or as directed.     blood glucose monitoring (NO BRAND SPECIFIED) test strip Use to test blood sugars one times daily or as directed     IBUPROFEN PO      lisinopril (PRINIVIL/ZESTRIL) 5 MG tablet Take 1 tablet (5 mg) by mouth daily     metFORMIN (GLUCOPHAGE) 500 MG tablet Take 2 tablets (1,000 mg) by mouth 2 times daily (with meals)     methylphenidate (RITALIN) 10 MG tablet Take 1 tablet (10 mg) by mouth 2 times daily     multivitamin, therapeutic with minerals (MULTI-VITAMIN) TABS tablet Take 1 tablet by mouth daily     simvastatin (ZOCOR) 40 MG tablet Take 1 tablet (40 mg) by mouth At Bedtime     venlafaxine (EFFEXOR-XR) 150 MG 24 hr capsule Take 2 capsules (300 mg) by mouth daily     No current facility-administered medications for this visit.        Client Allergies:  No Known Allergies  no allergies to medications    Medical History:  Past Medical History:   Diagnosis Date     Depressive disorder 2001     Diabetes (H)      Hypercholesteremia 2012         Medication Adherence:  Client reports taking prescribed medications as prescribed.    Client was provided recommendation to follow-up with prescribing physician.    Mental Status Assessment:  Appearance:   Appropriate   Eye Contact:   Good   Psychomotor Behavior: Retarded (Slowed)   Attitude:   Cooperative   Orientation:   All  Speech   Rate / Production: Monotone    Volume:  Normal   Mood:    Depressed   Affect:    Appropriate   Thought Content:  Clear   Thought Form:  Coherent  Logical   Insight:    Good       Review of Symptoms:  Depression: Guilt Energy Concentration Appetite Psychomotor slowing or agitation  Hopeless Helpless Worthless Ruminations  Lulu:  No symptoms  Psychosis: No symptoms  Anxiety: No symptoms  Panic:  No symptoms  Post Traumatic Stress Disorder: No symptoms  Obsessive Compulsive Disorder: No symptoms  Eating Disorder: No symptoms  Oppositional Defiant Disorder: No symptoms  ADD / ADHD: No symptoms  Conduct Disorder: No symptoms      Safety Assessment:    History of Safety Concerns:   Client reported a history of suicidal ideation.  Onset: after the injury and frequency: off an on.  Client identified the following triggers to suicidal ideation: stress  Client denied a history of suicide attempts.    Client denied a history of homicidal ideation.    Client denied a history of self-injurious ideation and behaviors.    Client denied a history of personal safety concerns.    Client denied a history of assaultive behaviors.        Current Safety Concerns:  Client denies current suicidal ideation.    Client denies current homicidal ideation and behaviors.  Client denies current self-injurious ideation and behaviors.    Client denies current concerns for personal safety.    Client reports the following protective factors: positive relationships positive family connections, forward/future oriented thinking, dedication to family/friends, safe and stable environment, regular sleep, effectively controls impulses, abstinence from substances, adherence with prescribed medication, structured day, committment to well-being, sense of meaning, positive social skills and sense of personal control or determination    Client reports there are no firearms in the house.     Plan for Safety and Risk Management:  A safety and risk management plan has not been developed at this time, however client was given the after-hours number / 911 should there be a change in any of these risk factors.    Client's Strengths and Limitations:  Client identified the following strengths or resources that will help him succeed in counseling:  family support. Client identified the following supports: family. Things that may interfere with the client's success in counseling include: few friends.      Diagnostic Criteria:  CRITERIA (A-C) REPRESENT A MAJOR DEPRESSIVE EPISODE - SELECT THESE CRITERIA  A) Recurrent episode(s) - symptoms have been present during the same 2-week period and represent a change from previous functioning 5 or more symptoms (required for diagnosis)   - Depressed mood. Note: In children and adolescents, can be irritable mood.     - Diminished interest or pleasure in all, or almost all, activities.    - Significant weight gainincrease in appetite.    - Decreased sleep.    - Psychomotor activity agitation.    - Fatigue or loss of energy.    - Feelings of worthlessness or inappropriate guilt.    - Diminished ability to think or concentrate, or indecisiveness.    - Recurrent thoughts of death (not just fear of dying), recurrent suicidal ideation without a specific plan, or a suicide attempt or a specific plan for committing suicide.   B) The symptoms cause clinically significant distress or impairment in social, occupational, or other important areas of functioning  C) The episode is not attributable to the physiological effects of a substance or to another medical condition  D) The occurence of major depressive episode is not better explained by other thought / psychotic disorders  E) There has never been a manic episode or hypomanic episode      Functional Status:  Client's symptoms have caused and are causing reduced functional status in the following areas: Social / Relational - -      DSM5 Diagnoses: (Sustained by DSM5 Criteria Listed Above)  Diagnoses: 296.33 (F33.2) Major Depressive Disorder, Recurrent Episode, Severe _  Psychosocial & Contextual Factors: chronic pain, limited social support  WHODAS 2.0 (12 item)            This questionnaire asks about difficulties due to health conditions. Health conditions  include  disease or  illnesses, other health problems that may be short or long lasting,  injuries, mental health or emotional problems, and problems with alcohol or drugs.                     Think back over the past 30 days and answer these questions, thinking about how much  difficulty you had doing the following activities. For each question, please [place] only  one response.    S1 Standing for long periods such as 30 minutes? Moderate =   3   S2 Taking care of household responsibilities? Mild =           2   S3 Learning a new task, for example, learning how to get to a new place? None =         1   S4 How much of a problem do you have joining community activities (for example, festivals, [place] or other activities) in the same way as anyone else can? Severe =       4   S5 How much have you been emotionally affected by your health problems? Severe =       4     In the past 30 days, how much difficulty did you have in:   S6 Concentrating on doing something for ten minutes? None =         1   S7 Walking a long distance such as a kilometer (or equivalent)? Mild =           2   S8 Washing your whole body? Mild =           2   S9 Getting dressed? None =         1   S10 Dealing with people you do not know? None =         1   S11 Maintaining a friendship? None =         1   S12 Your day to day work? Moderate =   3     H1 Overall, in the past 30 days, how many days were these difficulties present? Record number of days 20   H2 In the past 30 days, for how many days were you totally unable to carry out your usual activities or work because of any health condition? Record number of days  3   H3 In the past 30 days, not counting the days that you were totally unable, for how many days did you cut back or reduce your usual activities or work because of any health condition? Record number of days 10     Attendance Agreement:  Client has signed Attendance Agreement:Yes      Collaboration:  The client is receiving treatment / structured support  from the following professional(s) / service and treatment. Collaboration will be initiated with: psychiatry.      Preliminary Treatment Plan:  The client reports no currently identified Christianity, ethnic or cultural issues relevant to therapy.     services are not indicated.    Modifications to assist communication are not indicated.    The concerns identified by the client will be addressed in therapy.    Initial Treatment will focus on: Depressed Mood - -.    As a preliminary treatment goal, client will experience a reduction in depressed mood.    The focus of initial interventions will be to teach CBT skills.    Referral to another professional/service is not indicated at this time..    A Release of Information is not needed at this time.    Report to child / adult protection services was NA.    Client will have access to their [facility]' medical record.    Emory Hernandez, UMMSW  April 23, 2019

## 2019-04-23 NOTE — PROGRESS NOTES
SUBJECTIVE:   Gaurang Rivera is a 48 year old male who presents to clinic today for the following   health issues:      Musculoskeletal problem/pain      Duration: 2 months    Description  Location: Right upper arm in the deltoid area     Intensity:  moderate -pain scale at a 5    Accompanying signs and symptoms: Limited ROM and pain is constant, but not going away.    History  Previous similar problem: no   Previous evaluation:  none    Precipitating or alleviating factors:  Trauma or overuse: YES- Fall in the parking lot at home  Aggravating factors include: overuse    Therapies tried and outcome: rest/inactivity, ice and Ibuprofen for pain.       Slipped in February in parking lot on ice.  He fell on right side and arm.  Felt like the entire muscle of the shoulder got pulled over the bone, then went back into place.  Shoulder pain was unbelievable, didn't think he could make it 20ft back into his apt.  He crawled back into apt.    Today has ongoing arm pain.  continues to have pain to right upper/lateral arm.  Pain is constant, worse with a throawing motion, external ROM is very painful.  Unable to perform overhead activity.  No right arm paresthesias or weakness.      Following fall also developed right chest wall pain which has resolves.    Did not hit head, no LOC, no neck pain.      Pilonidal cyst 6-7 years ago, required drainage.  2 years ago had a reoccurrence and it was drained.  1mo ago developed another one, at night it bleed and drained on its own, this happened again a few days ago.  He can feel the cyst, it is no longer painful.      Lateral aspect of right great toe is red and painful.    Patient Active Problem List   Diagnosis     Low back pain     Moderate episode of recurrent major depressive disorder (H)     Passive suicidal ideations     Diabetes mellitus, type 2 (H)     Hyperlipidemia, unspecified hyperlipidemia type     Microalbuminuria due to type 2 diabetes mellitus (H)     MDD (major  depressive disorder), recurrent episode, severe (H)     Past Surgical History:   Procedure Laterality Date     BACK SURGERY  1997    spinal fusion     CHOLECYSTECTOMY  2012       Social History     Tobacco Use     Smoking status: Never Smoker     Smokeless tobacco: Never Used   Substance Use Topics     Alcohol use: No     Comment: None     Family History   Problem Relation Age of Onset     Diabetes Mother      Depression Father      Alcoholism Sister      Depression Sister      Lupus Sister          Current Outpatient Medications   Medication Sig Dispense Refill     blood glucose (NO BRAND SPECIFIED) lancets standard Use to test blood sugar one times daily or as directed. 100 each 11     blood glucose monitoring (NO BRAND SPECIFIED) meter device kit Use to test blood sugar one times daily or as directed. 1 kit 3     blood glucose monitoring (NO BRAND SPECIFIED) test strip Use to test blood sugars one times daily or as directed 100 strip 1     cephALEXin (KEFLEX) 500 MG capsule Take 1 capsule (500 mg) by mouth 4 times daily 40 capsule 0     IBUPROFEN PO        lisinopril (PRINIVIL/ZESTRIL) 5 MG tablet Take 1 tablet (5 mg) by mouth daily 90 tablet 1     metFORMIN (GLUCOPHAGE) 500 MG tablet Take 2 tablets (1,000 mg) by mouth 2 times daily (with meals) 120 tablet 2     methylphenidate (RITALIN) 10 MG tablet Take 1 tablet (10 mg) by mouth 2 times daily 60 tablet 0     multivitamin, therapeutic with minerals (MULTI-VITAMIN) TABS tablet Take 1 tablet by mouth daily       simvastatin (ZOCOR) 40 MG tablet Take 1 tablet (40 mg) by mouth At Bedtime 90 tablet 3     venlafaxine (EFFEXOR-XR) 150 MG 24 hr capsule Take 2 capsules (300 mg) by mouth daily 180 capsule 0       ROS:  MSK, Neuro, Integ as above, otherwise negative       OBJECTIVE:                                                    /89 (BP Location: Right arm, Patient Position: Sitting, Cuff Size: Adult Large)   Pulse 82   Temp 97.8  F (36.6  C) (Oral)   Resp 16    Wt 132.9 kg (293 lb)   SpO2 95%   BMI 33.86 kg/m     GENERAL APPEARANCE: healthy, alert and no distress  EYES: Eyes grossly normal to inspection and conjunctivae and sclerae normal  RESP: respirations nonlabored   ORTHO:   Inspection:  Shoulders appear symmetrical, no asymmetry, discoloration, swelling, bruising, or obvious deformity.  Biceps appear symmetrical bilaterally   Palpation:  No pain with palpation of the SC joint, clavicle, AC joint, or proximal bicep tendon.  No pain with palpation humerus.    Active ROM: right abduction and forward flexion limited to 90 degrees.  External ROM also limited.  Strength:   Right external rotation: 5/5, R internal rotation: 5/5  Left push off: 5/5, Left external rotation: 5/5, L internal rotation, 5/5  Empty Can's test: 5/5 bilaterally without pain.  GI:  Rectal exam performed with visible sinus tract to right superior fan cleft, purulent discharge present at sinus opening.  No palpable cyst, there is a small area of induration just lateral to sinus tract, no erythema  SKIN: right great toe with lateral aspect of nail grown into nail fold.  Lateral milagro fold is slightly swollen and erythematous, tender to touch, no drainage  PSYCH: mentation appears normal and affect normal/bright        ASSESSMENT/PLAN:                                                    (M79.621) Pain of right upper arm  (primary encounter diagnosis)  Comment: focus of pain is to lateral/proximal humerus, limited ROM of shoulder.  No asymmetry of biceps to suggest biceps tendon rupture.  Consider rotator cuff tear given limited shoulder ROM but no real weakness on exam.    Plan: XR Shoulder Right 2 Views, XR Humerus Right G/E        2 Views, ORTHO  REFERRAL        Will start with xrays to r/o fracture given hx of trauma, unfortunately our xray technician is out today and he will come back tomorrow to have these done.  Anticipate ortho follow up pending xray results.    (L05.91) Pilonidal  cyst  Comment: hx of recurrent infections with recent spontaneous drainage  Plan: GENERAL SURG ADULT REFERRAL        He is interested in excision, recommend follow up with general surgery    (L60.0) Ingrown nail  (L03.031) Paronychia of toe, right  Comment:   Plan: cephALEXin (KEFLEX) 500 MG capsule,         PODIATRY/FOOT & ANKLE SURGERY REFERRAL        Discussed warm soaks, if worsening pain/redness or not improving start keflex course.  See podiatry for partial nail avulsion        See Patient Instructions    Smitha Perez, [facility]    Patient Instructions   1.  Come back tomorrow for xray of arm and shoulder, I will call with results  2.  Anticipate ortho follow up for arm  3.  For ingrown nail try warm soaks twice a day and try inserting dental floss between nail and skin.  If no improvement in redness/pain or worsening start keflex antibiotic 4 times a day for 2 weeks.  See podiatry for partial nail removal  4.  See surgery for removal of pilonidal cyst, try warm soaks and apply gentle pressure to continue to encourage drainage

## 2019-04-24 ENCOUNTER — OFFICE VISIT (OUTPATIENT)
Dept: FAMILY MEDICINE | Facility: CLINIC | Age: 49
End: 2019-04-24
Payer: COMMERCIAL

## 2019-04-24 VITALS
DIASTOLIC BLOOD PRESSURE: 89 MMHG | HEART RATE: 82 BPM | SYSTOLIC BLOOD PRESSURE: 116 MMHG | WEIGHT: 293 LBS | TEMPERATURE: 97.8 F | BODY MASS INDEX: 33.86 KG/M2 | OXYGEN SATURATION: 95 % | RESPIRATION RATE: 16 BRPM

## 2019-04-24 DIAGNOSIS — L03.031 PARONYCHIA OF TOE, RIGHT: ICD-10-CM

## 2019-04-24 DIAGNOSIS — L60.0 INGROWN NAIL: ICD-10-CM

## 2019-04-24 DIAGNOSIS — M79.621 PAIN OF RIGHT UPPER ARM: Primary | ICD-10-CM

## 2019-04-24 DIAGNOSIS — S42.151A CLOSED FRACTURE OF GLENOID CAVITY AND NECK OF RIGHT SCAPULA, INITIAL ENCOUNTER: ICD-10-CM

## 2019-04-24 DIAGNOSIS — L05.91 PILONIDAL CYST: ICD-10-CM

## 2019-04-24 DIAGNOSIS — S42.141A CLOSED FRACTURE OF GLENOID CAVITY AND NECK OF RIGHT SCAPULA, INITIAL ENCOUNTER: ICD-10-CM

## 2019-04-24 PROCEDURE — 99214 OFFICE O/P EST MOD 30 MIN: CPT | Performed by: NURSE PRACTITIONER

## 2019-04-24 RX ORDER — CEPHALEXIN 500 MG/1
500 CAPSULE ORAL 4 TIMES DAILY
Qty: 40 CAPSULE | Refills: 0 | Status: SHIPPED | OUTPATIENT
Start: 2019-04-24 | End: 2019-06-28

## 2019-04-24 NOTE — LETTER
My Depression Action Plan  Name: Gaurang Rivera   Date of Birth 1970  Date: 4/24/2019    My doctor: Elvis Guzman   My clinic: [address] [address] [phone]          GREEN    ZONE   Good Control    What it looks like:     Things are going generally well. You have normal up s and down s. You may even feel depressed from time to time, but bad moods usually last less than a day.   What you need to do:  1. Continue to care for yourself (see self care plan)  2. Check your depression survival kit and update it as needed  3. Follow your physician s recommendations including any medication.  4. Do not stop taking medication unless you consult with your physician first.           YELLOW         ZONE Getting Worse    What it looks like:     Depression is starting to interfere with your life.     It may be hard to get out of bed; you may be starting to isolate yourself from others.    Symptoms of depression are starting to last most all day and this has happened for several days.     You may have suicidal thoughts but they are not constant.   What you need to do:     1. Call your care team, your response to treatment will improve if you keep your care team informed of your progress. Yellow periods are signs an adjustment may need to be made.     2. Continue your self-care, even if you have to fake it!    3. Talk to someone in your support network    4. Open up your depression survival kit           RED    ZONE Medical Alert - Get Help    What it looks like:     Depression is seriously interfering with your life.     You may experience these or other symptoms: You can t get out of bed most days, can t work or engage in other necessary activities, you have trouble taking care of basic hygiene, or basic responsibilities, thoughts of suicide or death that will not go away, self-injurious behavior.     What you need to do:  1. Call your care team  and request a same-day appointment. If they are not available (weekends or after hours) call your local crisis line, emergency room or 911.            Depression Self Care Plan / Survival Kit    Self-Care for Depression  Here s the deal. Your body and mind are really not as separate as most people think.  What you do and think affects how you feel and how you feel influences what you do and think. This means if you do things that people who feel good do, it will help you feel better.  Sometimes this is all it takes.  There is also a place for medication and therapy depending on how severe your depression is, so be sure to consult with your medical provider and/ or Behavioral Health Consultant if your symptoms are worsening or not improving.     In order to better manage my stress, I will:    Exercise  Get some form of exercise, every day. This will help reduce pain and release endorphins, the  feel good  chemicals in your brain. This is almost as good as taking antidepressants!  This is not the same as joining a gym and then never going! (they count on that by the way ) It can be as simple as just going for a walk or doing some gardening, anything that will get you moving.      Hygiene   Maintain good hygiene (Get out of bed in the morning, Make your bed, Brush your teeth, Take a shower, and Get dressed like you were going to work, even if you are unemployed).  If your clothes don't fit try to get ones that do.    Diet  I will strive to eat foods that are good for me, drink plenty of water, and avoid excessive sugar, caffeine, alcohol, and other mood-altering substances.  Some foods that are helpful in depression are: complex carbohydrates, B vitamins, flaxseed, fish or fish oil, fresh fruits and vegetables.    Psychotherapy  I agree to participate in Individual Therapy (if recommended).    Medication  If prescribed medications, I agree to take them.  Missing doses can result in serious side effects.  I understand  that drinking alcohol, or other illicit drug use, may cause potential side effects.  I will not stop my medication abruptly without first discussing it with my provider.    Staying Connected With Others  I will stay in touch with my friends, family members, and my primary care provider/team.    Use your imagination  Be creative.  We all have a creative side; it doesn t matter if it s oil painting, sand castles, or mud pies! This will also kick up the endorphins.    Witness Beauty  (AKA stop and smell the roses) Take a look outside, even in mid-winter. Notice colors, textures. Watch the squirrels and birds.     Service to others  Be of service to others.  There is always someone else in need.  By helping others we can  get out of ourselves  and remember the really important things.  This also provides opportunities for practicing all the other parts of the program.    Humor  Laugh and be silly!  Adjust your TV habits for less news and crime-drama and more comedy.    Control your stress  Try breathing deep, massage therapy, biofeedback, and meditation. Find time to relax each day.     My support system    Clinic Contact:  Phone number:    Contact 1:  Phone number:    Contact 2:  Phone number:    [facility]ental orthodox/:  Phone number:    Therapist:  Phone number:    Local crisis center:    Phone number:    Other community support:  Phone number:

## 2019-04-24 NOTE — PROGRESS NOTES
"Adult Mental Health Outpatient Group Therapy Progress Note     Client Initial Individualized Goals for Treatment: \"how to expand my social engagement given that I am an introvert, find a way to increase physical activity, find a way to increase activities/productivity in my life.\"    See Initial Treatment suggestions for the client during the time between Diagnostic Assessment and completion of the Master Individualized Treatment Plan.    Treatment Goals:  1.Client will notify staff when needing assistance to develop or implement a coping plan to manage suicidal or self injurious urges.  Client will use coping plan for safety, as needed.  2. When in life skills group Gaurang will practice opposite to emotion to help improve motivation to take better care of himself  providing an update of progress weekly  3. When in Group Therapy, Gaurang will report mood, symptoms, and coping skills to manage symptoms and mood.   4. Gaurang will establish care with an individual therapist.                 Area of Treatment Focus:  Symptom Management    Therapeutic Interventions/Treatment Strategies:  Support, Feedback, Safety Assessments, Structured Activity and Education    Response to Treatment Strategies:  Accepted Feedback, Listened, Attentive, Accepted Support and Alert     Name of Group: Life Skills(10:00-11:00)    Number of Group Participants: 6    Description and Outcome:  Client presented as calm and alert with good focus and concentration. Psychosocial skills addressed included stress avoidance strategies,leisure education,self esteem and practice of communication strategies. Goal #1 (no personal safety concerns reported or observed).  Client would benefit from additional opportunities to practice and implement content from this session  in every day life,relationships and mental health recovery.    Is this a Weekly Review of the Progress on the Treatment Plan?  Yes.      Are Treatment Plan Goals being addressed?  Yes, continue " treatment goals      Are Treatment Plan Strategies to Address Goals Effective?  Yes, continue treatment strategies      Are there any current contracts in place?  No

## 2019-04-25 ENCOUNTER — HOSPITAL ENCOUNTER (OUTPATIENT)
Dept: BEHAVIORAL HEALTH | Facility: CLINIC | Age: 49
End: 2019-04-25
Attending: PSYCHIATRY & NEUROLOGY
Payer: COMMERCIAL

## 2019-04-25 ENCOUNTER — ANCILLARY PROCEDURE (OUTPATIENT)
Dept: GENERAL RADIOLOGY | Facility: CLINIC | Age: 49
End: 2019-04-25
Attending: NURSE PRACTITIONER
Payer: COMMERCIAL

## 2019-04-25 ENCOUNTER — TELEPHONE (OUTPATIENT)
Dept: BEHAVIORAL HEALTH | Facility: CLINIC | Age: 49
End: 2019-04-25

## 2019-04-25 ENCOUNTER — TELEPHONE (OUTPATIENT)
Dept: FAMILY MEDICINE | Facility: CLINIC | Age: 49
End: 2019-04-25

## 2019-04-25 PROCEDURE — 73060 X-RAY EXAM OF HUMERUS: CPT | Mod: RT | Performed by: FAMILY MEDICINE

## 2019-04-25 PROCEDURE — 73030 X-RAY EXAM OF SHOULDER: CPT | Mod: RT | Performed by: FAMILY MEDICINE

## 2019-04-25 PROCEDURE — H2012 BEHAV HLTH DAY TREAT, PER HR: HCPCS

## 2019-04-25 NOTE — PROGRESS NOTES
"Adult Mental Health Outpatient Group Therapy Progress Note     Client Initial Individualized Goals for Treatment: \"how to expand my social engagement given that I am an introvert, find a way to increase physical activity, find a way to increase activities/productivity in my life.\"     See Initial Treatment suggestions for the client during the time between Diagnostic Assessment and completion of the Master Individualized Treatment Plan.     Treatment Goals:  1.Client will notify staff when needing assistance to develop or implement a coping plan to manage suicidal or self injurious urges.  Client will use coping plan for safety, as needed.  2. When in life skills group Gaurang will practice opposite to emotion to help improve motivation to take better care of himself  providing an update of progress weekly  3. When in Group Therapy, Gaurang will report mood, symptoms, and coping skills to manage symptoms and mood.   4. Gaurang will establish care with an individual therapist.     Area of Treatment Focus:  Symptom Management, Personal Safety and Community Resources/Discharge Planning    Therapeutic Interventions/Treatment Strategies:  Support, Feedback, Safety Assessments, Education and Cognitive Behavioral Therapy    Response to Treatment Strategies:  Accepted Feedback, Gave Feedback, Listened and Focused on Goals     Name of Group: Group Psychotherapy I and Group Psychotherapy II Date/Time: 4/25/19 / 0900 to 0950 and 1100 to 1150    Number of Group Participants: 6    Description and Outcome:  Group Psychotherapy I: Gaurang reported that he attended the first session of individual therapy with Santos at [facility].  He stated that he is looking forward to having weekly appointments.  He stated that he also saw his primary care provider who evaluated his arm/shoulder for the fall related injury from this winter.  He will complete an x-ray then be referred to an orthopedics provider.  He stated that the pain from " the movement session at this program was almost gone now.  Client denied difficulty with suicidal ideation today.  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  He stated that there was one session he contemplated skipping but he followed through and felt better afterwards.      Group Therapy II: Writer provided information on cognitive distortions and skills to practice to challenge the negative thoughts.  The group discussed particular cognitive distortions that were bothersome in their personal lives and shared the skills they were practicing.     Client verbalized understanding of session content by sharing current symptoms and stressors.    Is this a Weekly Review of the Progress on the Treatment Plan?  Yes.      Are Treatment Plan Goals being addressed?  Yes, continue treatment goals      Are Treatment Plan Strategies to Address Goals Effective?  Yes, continue treatment strategies      Are there any current contracts in place?  No

## 2019-04-25 NOTE — TELEPHONE ENCOUNTER
Placed call to Gaurang to check in with him related to his shoulder bothering him.  Call went straight to voicemail.  Did not leave message.  Will check in with him on his next treatment day.

## 2019-04-25 NOTE — TELEPHONE ENCOUNTER
Called patient to review xray results suggestive of fracture, referral for ortho surgery ordered, he is in agreement.    Smitha Perez, CNP

## 2019-04-25 NOTE — PROGRESS NOTES
"  Adult Mental Health Outpatient Group Therapy Progress Note     Client Initial Individualized Goals for Treatment: \"how to expand my social engagement given that I am an introvert, find a way to increase physical activity, find a way to increase activities/productivity in my life.\"     See Initial Treatment suggestions for the client during the time between Diagnostic Assessment and completion of the Master Individualized Treatment Plan.     Treatment Goals:  1.Client will notify staff when needing assistance to develop or implement a coping plan to manage suicidal or self injurious urges.  Client will use coping plan for safety, as needed.  2. When in life skills group Gaurang will practice opposite to emotion to help improve motivation to take better care of himself  providing an update of progress weekly  3. When in Group Therapy, Gaurang will report mood, symptoms, and coping skills to manage symptoms and mood.   4. Gaurang will establish care with an individual therapist.        Area of Treatment Focus:  Symptom Management and Develop Socialization / Interpersonal Relationship Skills    Therapeutic Interventions/Treatment Strategies:  Support, Feedback, Structured Activity, Clarification and Education    Response to Treatment Strategies:  Accepted Feedback, Gave Feedback, Listened, Focused on Goals, Attentive, Accepted Support and Alert    Name of Group:  Mental health management [id number], 6 participants     Description and Outcome:  The group was given a structured journaling worksheet with the focus on feeling identification, expression and containment.  Information on the purpose and benefits of structured journaling was discussed.  Group members were offered the opportunity to share part, all, or none of their journaling and were encouraged to comment on process.Gaurang chose to work with the feeling of contentment.  He verbalized understanding of topic and shared that it was helpful.  Although he is struggling " to find contentment he was able to identify moments which include the river and his pets.    Is this a Weekly Review of the Progress on the Treatment Plan?  No

## 2019-04-26 ENCOUNTER — TELEPHONE (OUTPATIENT)
Dept: ORTHOPEDICS | Facility: CLINIC | Age: 49
End: 2019-04-26

## 2019-04-26 DIAGNOSIS — S42.151A CLOSED FRACTURE OF GLENOID CAVITY AND NECK OF RIGHT SCAPULA, INITIAL ENCOUNTER: Primary | ICD-10-CM

## 2019-04-26 DIAGNOSIS — S42.141A CLOSED FRACTURE OF GLENOID CAVITY AND NECK OF RIGHT SCAPULA, INITIAL ENCOUNTER: Primary | ICD-10-CM

## 2019-04-26 NOTE — TELEPHONE ENCOUNTER
Right shoulder x-rays were reviewed by Dr Burdick.  Recommendation is a CT scan to evaluate the fracture before the consultation.  Patient is agreeable with this.  He will call to schedule the CT at the [facility].  Appointment scheduled with Dr Burdick 05/03/19 at the [facility] in [place].

## 2019-04-29 ENCOUNTER — HOSPITAL ENCOUNTER (OUTPATIENT)
Dept: BEHAVIORAL HEALTH | Facility: CLINIC | Age: 49
End: 2019-04-29
Attending: PSYCHIATRY & NEUROLOGY
Payer: COMMERCIAL

## 2019-04-29 ENCOUNTER — ANCILLARY PROCEDURE (OUTPATIENT)
Dept: CT IMAGING | Facility: CLINIC | Age: 49
End: 2019-04-29
Attending: ORTHOPAEDIC SURGERY
Payer: COMMERCIAL

## 2019-04-29 DIAGNOSIS — S42.151A CLOSED FRACTURE OF GLENOID CAVITY AND NECK OF RIGHT SCAPULA, INITIAL ENCOUNTER: ICD-10-CM

## 2019-04-29 DIAGNOSIS — S42.141A CLOSED FRACTURE OF GLENOID CAVITY AND NECK OF RIGHT SCAPULA, INITIAL ENCOUNTER: ICD-10-CM

## 2019-04-29 LAB — RADIOLOGIST FLAGS: NORMAL

## 2019-04-29 PROCEDURE — H2012 BEHAV HLTH DAY TREAT, PER HR: HCPCS

## 2019-04-30 ENCOUNTER — HOSPITAL ENCOUNTER (OUTPATIENT)
Dept: BEHAVIORAL HEALTH | Facility: CLINIC | Age: 49
End: 2019-04-30
Attending: PSYCHIATRY & NEUROLOGY
Payer: COMMERCIAL

## 2019-04-30 PROCEDURE — H2012 BEHAV HLTH DAY TREAT, PER HR: HCPCS

## 2019-04-30 NOTE — PROGRESS NOTES
"Adult Mental Health Outpatient Group Therapy Progress Note     Client Initial Individualized Goals for Treatment: \"how to expand my social engagement given that I am an introvert, find a way to increase physical activity, find a way to increase activities/productivity in my life.\"     See Initial Treatment suggestions for the client during the time between Diagnostic Assessment and completion of the Master Individualized Treatment Plan.     Treatment Goals:  1.Client will notify staff when needing assistance to develop or implement a coping plan to manage suicidal or self injurious urges.  Client will use coping plan for safety, as needed.  2. When in life skills group Gaurang will practice opposite to emotion to help improve motivation to take better care of himself  providing an update of progress weekly  3. When in Group Therapy, Gaurang will report mood, symptoms, and coping skills to manage symptoms and mood.   4. Gaurang will establish care with an individual therapist.     Area of Treatment Focus:  Symptom Management, Personal Safety and Community Resources/Discharge Planning    Therapeutic Interventions/Treatment Strategies:  Support, Feedback, Safety Assessments, Education and Cognitive Behavioral Therapy    Response to Treatment Strategies:  Accepted Feedback, Gave Feedback, Listened and Focused on Goals     Name of Group: Group Psychotherapy I and Group Psychotherapy II Date/Time: 4/30/19 / 0900 to 0950 and 1100 to 1150    Number of Group Participants: 7    Description and Outcome:  Group Psychotherapy I: Gaurang reported that he had an impromptu lunch with his parents yesterday.  As it went into early afternoon, he decided to not attend the afternoon shift of his job.  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  He stated that he noticed that he felt better the last two mornings after eating breakfast.  Writer reviewed the benefit of maintaining a regular " daytime routine.      Group Psychotherapy II: Client participated in a discussion about ways to avoid going back to bed during the day.  Client identified impact of not keeping a regular routine on level of depressive symptoms.  Peers gave feedback on strategies they use to maintain behavioral activation during daytime hours.    Client verbalized understanding of session content by sharing current symptoms and stressors.    Is this a Weekly Review of the Progress on the Treatment Plan?  No

## 2019-04-30 NOTE — PROGRESS NOTES
Past Medical History:   Diagnosis Date     Depressive disorder 2001     Diabetes (H)      Hypercholesteremia 2012       Current Outpatient Medications:      blood glucose (NO BRAND SPECIFIED) lancets standard, Use to test blood sugar one times daily or as directed., Disp: 100 each, Rfl: 11     blood glucose monitoring (NO BRAND SPECIFIED) meter device kit, Use to test blood sugar one times daily or as directed., Disp: 1 kit, Rfl: 3     blood glucose monitoring (NO BRAND SPECIFIED) test strip, Use to test blood sugars one times daily or as directed, Disp: 100 strip, Rfl: 1     cephALEXin (KEFLEX) 500 MG capsule, Take 1 capsule (500 mg) by mouth 4 times daily, Disp: 40 capsule, Rfl: 0     IBUPROFEN PO, , Disp: , Rfl:      lisinopril (PRINIVIL/ZESTRIL) 5 MG tablet, Take 1 tablet (5 mg) by mouth daily, Disp: 90 tablet, Rfl: 1     metFORMIN (GLUCOPHAGE) 500 MG tablet, Take 2 tablets (1,000 mg) by mouth 2 times daily (with meals), Disp: 120 tablet, Rfl: 2     methylphenidate (RITALIN) 10 MG tablet, Take 1 tablet (10 mg) by mouth 2 times daily, Disp: 60 tablet, Rfl: 0     multivitamin, therapeutic with minerals (MULTI-VITAMIN) TABS tablet, Take 1 tablet by mouth daily, Disp: , Rfl:      simvastatin (ZOCOR) 40 MG tablet, Take 1 tablet (40 mg) by mouth At Bedtime, Disp: 90 tablet, Rfl: 3     venlafaxine (EFFEXOR-XR) 150 MG 24 hr capsule, Take 2 capsules (300 mg) by mouth daily, Disp: 180 capsule, Rfl: 0  Psychiatry staffing: case discussed  Diagnosis:    MDD, seasonal employed as rowing , improved

## 2019-04-30 NOTE — PROGRESS NOTES
"Adult Mental Health Outpatient Group Therapy Progress Note     Client Initial Individualized Goals for Treatment: \"how to expand my social engagement given that I am an introvert, find a way to increase physical activity, find a way to increase activities/productivity in my life.\"     See Initial Treatment suggestions for the client during the time between Diagnostic Assessment and completion of the Master Individualized Treatment Plan.     Treatment Goals:  1.Client will notify staff when needing assistance to develop or implement a coping plan to manage suicidal or self injurious urges.  Client will use coping plan for safety, as needed.  2. When in life skills group Gaurang will practice opposite to emotion to help improve motivation to take better care of himself  providing an update of progress weekly  3. When in Group Therapy, Gaurang will report mood, symptoms, and coping skills to manage symptoms and mood.   4. Gaurang will establish care with an individual therapist.     Area of Treatment Focus:  Symptom Management, Personal Safety and Community Resources/Discharge Planning    Therapeutic Interventions/Treatment Strategies:  Support, Feedback, Safety Assessments, Education and Cognitive Behavioral Therapy    Response to Treatment Strategies:  Accepted Feedback, Gave Feedback, Listened and Focused on Goals     Name of Group: Group Psychotherapy I and Group Psychotherapy II Date/Time: 4/29/19 / 0900 to 0950 and 1100 to 1150    Number of Group Participants: 5    Description and Outcome:  Group Psychotherapy I: Gaurang reported that he went to the x-ray appointment as scheduled and found out the his shoulder was fractured.  He stated he completed a CT appointment this morning and would have a orthopedics consultation on Thursday.  He stated that he felt badly about himself after deciding to not attend his Mom's community choir concert.  He stated that he did not attend due to not wanting to attend the reception " afterwards.  Peers suggested coping strategies for anxiety and for introverted style for managing social situations.  He stated that he did take a dog walk and did watch sports.      Group Psychotherapy II: Client participated in a discussion on boundaries.  Writer provided education on what are personal boundaries, common traits of rigid, porous, and healthy boundaries, and types of boundaries people set.  Writer reviewed tips for setting healthy boundaries.      Client verbalized understanding of session content by sharing current symptoms and stressors.    Is this a Weekly Review of the Progress on the Treatment Plan?  No

## 2019-05-01 ENCOUNTER — OFFICE VISIT (OUTPATIENT)
Dept: BEHAVIORAL HEALTH | Facility: CLINIC | Age: 49
End: 2019-05-01
Payer: COMMERCIAL

## 2019-05-01 DIAGNOSIS — F33.2 MDD (MAJOR DEPRESSIVE DISORDER), RECURRENT EPISODE, SEVERE (H): Primary | ICD-10-CM

## 2019-05-01 PROCEDURE — 90834 PSYTX W PT 45 MINUTES: CPT | Performed by: SOCIAL WORKER

## 2019-05-01 NOTE — PROGRESS NOTES
"Adult Mental Health Outpatient Group Therapy Progress Note     Client Initial Individualized Goals for Treatment: \"how to expand my social engagement given that I am an introvert, find a way to increase physical activity, find a way to increase activities/productivity in my life.\"    See Initial Treatment suggestions for the client during the time between Diagnostic Assessment and completion of the Master Individualized Treatment Plan.    Treatment Goals:  1.Client will notify staff when needing assistance to develop or implement a coping plan to manage suicidal or self injurious urges.  Client will use coping plan for safety, as needed.  2. When in life skills group Gaurang will practice opposite to emotion to help improve motivation to take better care of himself  providing an update of progress weekly  3. When in Group Therapy, Gaurang will report mood, symptoms, and coping skills to manage symptoms and mood.   4. Gaurang will establish care with an individual therapist.                 Area of Treatment Focus:  Symptom Management    Therapeutic Interventions/Treatment Strategies:  Support, Feedback, Safety Assessments, Structured Activity and Education    Response to Treatment Strategies:  Accepted Feedback, Listened, Attentive, Accepted Support and Alert     Name of Group: Life Skills(10:00-11:00)    Number of Group Participants: 7    Description and Outcome:  Client presented as calm and alert with good focus and concentration. Psychosocial skills addressed included stress avoidance strategies,leisure education,self esteem and practice of communication strategies.No social interaction with other group members. Goal #1 (no personal safety concerns reported or observed).  Client would benefit from additional opportunities to practice and implement content from this session  in every day life,relationships and mental health recovery.    Is this a Weekly Review of the Progress on the Treatment Plan?  Yes.      Are " Treatment Plan Goals being addressed?  Yes, continue treatment goals      Are Treatment Plan Strategies to Address Goals Effective?  Yes, continue treatment strategies      Are there any current contracts in place?  No

## 2019-05-01 NOTE — PROGRESS NOTES
"                                             Progress Note    Client Name: Gaurang Rivera  Date: 5/1/2019          Service Type: Individual  Video Visit: No     Session Start Time: 830  Session End Time: 915     Session Length: 38-52    Session #: 2    Attendees: Client attended alone     Treatment Plan Last Reviewed: 5/1/2019   PHQ-9 / SOL-7 :     DATA  Interactive Complexity: No  Crisis: No       Progress Since Last Session (Related to Symptoms / Goals / Homework):   Symptoms: No change -    Homework: Partially completed      Episode of Care Goals: Satisfactory progress - PREPARATION (Decided to change - considering how); Intervened by negotiating a change plan and determining options / strategies for behavior change, identifying triggers, exploring social supports, and working towards setting a date to begin behavior change     Current / Ongoing Stressors and Concerns:     Client notes that things have been \"ok, mental health-wise things have been ok.\"  Went to see his doctor last week and found out that he broke his shoulder in February.  Has a consult set up with an orthopedic doctor.  Next week is his last week in day treatment.           Treatment Objective(s) Addressed in This Session:   Goal-Depression: Client will decrease depressed mood    I will know I've met my goal when I am less depressed.      Objective #A (Client Action)    Client will use identified behavioral and cognitive skills to challenge negative self talk 90% of the time.      Objective #B  Client will complete at least 10 minutes of ACE activities (e.g.Achievement, Closeness, and Enjoyment) or other Behavioral Activation goals per day.        Objective #C  Client will exercise 30 minutes 36 times in the next 12 weeks.       Intervention:   CBT: - Discussed efforts client has made to resolve the situation/symptoms in the past.  Explored various response styles including active, passive, proactive, reactive, and inactive.  Discussed benefits " of a proactive response style.  Reviewed strategies to cultivate a more proactive response style and identified barriers to this including fear, indifference, hopelessness, and low motivation.          ASSESSMENT: Current Emotional / Mental Status (status of significant symptoms):   Risk status (Self / Other harm or suicidal ideation)   Client denies current fears or concerns for personal safety.   Client denies current or recent suicidal ideation or behaviors.   Client denies current or recent homicidal ideation or behaviors.   Client denies current or recent self injurious behavior or ideation.   Client denies other safety concerns.   Client Client reports there has been no change in risk factors since their last session.     Client Client reports there has been no change in protective factors since their last session.     A safety and risk management plan has not been developed at this time, however client was given the after-hours number / 911 should there be a change in any of these risk factors.     Appearance:   Appropriate    Eye Contact:   Good    Psychomotor Behavior: Normal    Attitude:   Cooperative    Orientation:   All   Speech    Rate / Production: Normal     Volume:  Normal    Mood:    Depressed    Affect:    Appropriate    Thought Content:  Clear    Thought Form:  Coherent  Logical    Insight:    Good      Medication Review:   No changes to current psychiatric medication(s)     Medication Compliance:   Yes     Changes in Health Issues:   None reported     Chemical Use Review:   Substance Use: Chemical use reviewed, no active concerns identified      Tobacco Use: No current tobacco use.      Diagnosis:  1. MDD (major depressive disorder), recurrent episode, severe (H)        Collateral Reports Completed:   Not Applicable    PLAN: (Client Tasks / Therapist Tasks / Other)  1.  Client will log activities of Achievement, Closeness, and Enjoyment (ACEs) using ACE rating scale and bring to next session.            2.  Meet in two weeks        Emory Hernandez, LICSW                                                         ______________________________________________________________________    Treatment Plan    Client's Name: Gaurang Rivera  YOB: 1970    Date: 5/1/2019                                                                          DSM5 Diagnoses: (Sustained by DSM5 Criteria Listed Above)  Diagnoses: 296.33 (F33.2) Major Depressive Disorder, Recurrent Episode, Severe _  Psychosocial & Contextual Factors: chronic pain, limited social support  WHODAS 2.0 (12 item)            This questionnaire asks about difficulties due to health conditions. Health conditions  include  disease or illnesses, other health problems that may be short or long lasting,  injuries, mental health or emotional problems, and problems with alcohol or drugs.                     Think back over the past 30 days and answer these questions, thinking about how much  difficulty you had doing the following activities. For each question, please [place] only  one response.    S1 Standing for long periods such as 30 minutes? Moderate =   3   S2 Taking care of household responsibilities? Mild =           2   S3 Learning a new task, for example, learning how to get to a new place? None =         1   S4 How much of a problem do you have joining community activities (for example, festivals, Judaism or other activities) in the same way as anyone else can? Severe =       4   S5 How much have you been emotionally affected by your health problems? Severe =       4     In the past 30 days, how much difficulty did you have in:   S6 Concentrating on doing something for ten minutes? None =         1   S7 Walking a long distance such as a kilometer (or equivalent)? Mild =           2   S8 Washing your whole body? Mild =           2   S9 Getting dressed? None =         1   S10 Dealing with people you do not know? None =         1   S11 Maintaining  a friendship? None =         1   S12 Your day to day work? Moderate =   3     H1 Overall, in the past 30 days, how many days were these difficulties present? Record number of days 20   H2 In the past 30 days, for how many days were you totally unable to carry out your usual activities or work because of any health condition? Record number of days  3   H3 In the past 30 days, not counting the days that you were totally unable, for how many days did you cut back or reduce your usual activities or work because of any health condition? Record number of days 10         Referral / Collaboration:  Referral to another professional/service is not indicated at this time..    Anticipated number of session or this episode of care: 18-24      MeasurableTreatment Goal(s) related to diagnosis / functional impairment(s)  Goal-Depression: Client will decrease depressed mood    I will know I've met my goal when I am less depressed.      Objective #A (Client Action)    Status: New - Date: 5/1/2019    Client will use identified behavioral and cognitive skills to challenge negative self talk 90% of the time.    Intervention(s)  Therapist will provide psychoeducation, behavioral activation, and cognitive restructuring.      Objective #B  Client will complete at least 10 minutes of ACE activities (e.g.Achievement, Closeness, and Enjoyment) or other Behavioral Activation goals per day.    Status: New - Date: 5/1/2019    Intervention(s)  Therapist will provide psychoeducation, behavioral activation, and cognitive restructuring.      Objective #C  Client will exercise 30 minutes 36 times in the next 12 weeks.  Status: New - Date: 5/1/2019    Intervention(s)  Therapist will provide psychoeducation, behavioral activation, and cognitive restructuring.                Client has reviewed and agreed to the above plan.      Emory Hernandez, [facility] CenterSW  May 1, 2019

## 2019-05-02 ENCOUNTER — HOSPITAL ENCOUNTER (OUTPATIENT)
Dept: BEHAVIORAL HEALTH | Facility: CLINIC | Age: 49
End: 2019-05-02
Attending: PSYCHIATRY & NEUROLOGY
Payer: COMMERCIAL

## 2019-05-02 PROCEDURE — H2012 BEHAV HLTH DAY TREAT, PER HR: HCPCS

## 2019-05-02 NOTE — PROGRESS NOTES
Writer spoke with Gaurang related to pain in his right shoulder.  He reports he is still experiencing pain.  He had a CT scan and has an appointment with a Orthopedic surgeon tomorrow.   Reports he will let me know the results of his appointment.

## 2019-05-02 NOTE — PROGRESS NOTES
"Adult Mental Health Outpatient Group Therapy Progress Note     Client Initial Individualized Goals for Treatment: \"how to expand my social engagement given that I am an introvert, find a way to increase physical activity, find a way to increase activities/productivity in my life.\"     See Initial Treatment suggestions for the client during the time between Diagnostic Assessment and completion of the Master Individualized Treatment Plan.     Treatment Goals:  1.Client will notify staff when needing assistance to develop or implement a coping plan to manage suicidal or self injurious urges.  Client will use coping plan for safety, as needed.  2. When in life skills group Gaurang will practice opposite to emotion to help improve motivation to take better care of himself  providing an update of progress weekly  3. When in Group Therapy, Gaurang will report mood, symptoms, and coping skills to manage symptoms and mood.   4. Gaurang will establish care with an individual therapist.     Area of Treatment Focus:  Symptom Management, Personal Safety     Therapeutic Interventions/Treatment Strategies:  Support, Feedback, Safety Assessments, Education and Cognitive Behavioral Therapy    Response to Treatment Strategies:  Accepted Feedback, Gave Feedback, Listened and Focused on Goals     Name of Group: Group Psychotherapy 5/2/2019 [id number]    Number of Group Participants: 6    Description and Outcome:    Gaurang endorsed absence of suicidal ideation and self-injurious behavior urges. He shared that he did not want to come to group today due to not sleeping very much last night and feeling fatigued. He shared that he decided to come in to group and feels that this was a skillful choice. Writer and group members reinforced this decision. Gaurang shared that he is feeling some anxiety related to potentially needing shoulder surgery but is focusing on using mindfulness skills to stay in the moment and not worry about something that has not " yet happened. Writer and group members offered validation and reinforcement for use of skills. Gaurang participated adequately and willingly in group.He offered feedback to peers and was receptive to feedback.       Is this a Weekly Review of the Progress on the Treatment Plan?  No     Janneth Hamlin, [facility], [facility]  5/2/2019

## 2019-05-02 NOTE — PROGRESS NOTES
"  Adult Mental Health Outpatient Group Therapy Progress Note     Client Initial Individualized Goals for Treatment: \"how to expand my social engagement given that I am an introvert, find a way to increase physical activity, find a way to increase activities/productivity in my life.\"     See Initial Treatment suggestions for the client during the time between Diagnostic Assessment and completion of the Master Individualized Treatment Plan.     Treatment Goals:  1.Client will notify staff when needing assistance to develop or implement a coping plan to manage suicidal or self injurious urges.  Client will use coping plan for safety, as needed.  2. When in life skills group Gaurang will practice opposite to emotion to help improve motivation to take better care of himself  providing an update of progress weekly  3. When in Group Therapy, Gaurang will report mood, symptoms, and coping skills to manage symptoms and mood.   4. Gaurang will establish care with an individual therapist.      Area of Treatment Focus:  Symptom Management, Personal Safety and Develop Socialization / Interpersonal Relationship Skills    Therapeutic Interventions/Treatment Strategies:  Support, Feedback, Structured Activity and Clarification    Response to Treatment Strategies:  Accepted Feedback, Gave Feedback, Listened, Focused on Goals, Attentive, Accepted Support and Alert    Name of Group:  Mental health management [id number], 6 participants     Description and Outcome:  Group members were guided through a body scan focusing on use of breath to notice sensation, feeling and thought.  Imagery was introduced encouraging clients to consider change in seasons, coming of spring and where they are in their own growth process.  Clients were prompted to draw (or write), using nature as a prompt where they feel they are in their growth process.  Client demonstrated understanding of session by engaging in experiential.  Gaurang draw himself as a tree which had " "been struck by lightening and had a broken branch.  Discussed the reality that no \"tree\" is perfect and has been broken in some way.  Ironically Gaurang shared with the group that when pine cones are lit, they open and seeds are released.  Gaurang michael a pine tree with no cones.  He accepted encouragement to consider what his pine cones (hope) are.  The group suggested he view them as his hope.    Is this a Weekly Review of the Progress on the Treatment Plan?  No        "

## 2019-05-03 ENCOUNTER — OFFICE VISIT (OUTPATIENT)
Dept: ORTHOPEDICS | Facility: CLINIC | Age: 49
End: 2019-05-03
Payer: COMMERCIAL

## 2019-05-03 VITALS
WEIGHT: 300.4 LBS | DIASTOLIC BLOOD PRESSURE: 82 MMHG | HEIGHT: 78 IN | BODY MASS INDEX: 34.76 KG/M2 | SYSTOLIC BLOOD PRESSURE: 114 MMHG

## 2019-05-03 DIAGNOSIS — S42.141A CLOSED FRACTURE OF GLENOID CAVITY AND NECK OF RIGHT SCAPULA, INITIAL ENCOUNTER: Primary | ICD-10-CM

## 2019-05-03 DIAGNOSIS — S42.151A CLOSED FRACTURE OF GLENOID CAVITY AND NECK OF RIGHT SCAPULA, INITIAL ENCOUNTER: Primary | ICD-10-CM

## 2019-05-03 PROCEDURE — 99203 OFFICE O/P NEW LOW 30 MIN: CPT | Performed by: ORTHOPAEDIC SURGERY

## 2019-05-03 ASSESSMENT — MIFFLIN-ST. JEOR: SCORE: 2360.86

## 2019-05-03 NOTE — PROGRESS NOTES
"CHIEF CONCERN:   Right shoulder pain     HISTORY OF PRESENT ILLNESS:  Mr. Rivera is a 48 year old RHD male who presents after a fall 2 /28/19.  Patient reports he fell in the parking lot on a bent elbow jarring the arm upwards into the shoulder.  Patient noted he had extreme pain in the arm initially but he thought it was a bad sprain and would get better. Now the problem is that it hurts and says \"I can't move my arm much.\" He has had no formal treatment.  Patient reports superior and lateral shoulder pain that radiates into the the lateral upper arm. Patient notes minimal pain at rest, with extending the arm and with general use he reports an aching pain. The pain will slowly dissipate.  Patient reports increased pain with showering, and reaching above chest height in all directions. He notes difficulties dressing.  Limited range of motion in all planes. Patient denies weakness of the shoulder, but is limited due to pain.  Patient denies catching, grinding, and no numbness and tingling.  He is able to sleep OK. Current pain level: 3/10, highest pain level: 9/10 with use of the arm.    Treatments tried: OTC ibuprofen 400mg TID, icing as needed.    Last documented A1C of 6.5 on 9/26/18.      Past Medical History:   Diagnosis Date     Depressive disorder 2001     Diabetes (H)      Hypercholesteremia 2012       Past Surgical History:   Procedure Laterality Date     BACK SURGERY  1997    spinal fusion     CHOLECYSTECTOMY  2012       Current Outpatient Medications   Medication Sig Dispense Refill     blood glucose (NO BRAND SPECIFIED) lancets standard Use to test blood sugar one times daily or as directed. 100 each 11     blood glucose monitoring (NO BRAND SPECIFIED) meter device kit Use to test blood sugar one times daily or as directed. 1 kit 3     blood glucose monitoring (NO BRAND SPECIFIED) test strip Use to test blood sugars one times daily or as directed 100 strip 1     cephALEXin (KEFLEX) 500 MG capsule Take 1 " capsule (500 mg) by mouth 4 times daily 40 capsule 0     IBUPROFEN PO        lisinopril (PRINIVIL/ZESTRIL) 5 MG tablet Take 1 tablet (5 mg) by mouth daily 90 tablet 1     metFORMIN (GLUCOPHAGE) 500 MG tablet Take 2 tablets (1,000 mg) by mouth 2 times daily (with meals) 120 tablet 2     methylphenidate (RITALIN) 10 MG tablet Take 1 tablet (10 mg) by mouth 2 times daily 60 tablet 0     multivitamin, therapeutic with minerals (MULTI-VITAMIN) TABS tablet Take 1 tablet by mouth daily       simvastatin (ZOCOR) 40 MG tablet Take 1 tablet (40 mg) by mouth At Bedtime 90 tablet 3     venlafaxine (EFFEXOR-XR) 150 MG 24 hr capsule Take 2 capsules (300 mg) by mouth daily 180 capsule 0        No Known Allergies    SOCIAL HISTORY:    Social History     Socioeconomic History     Marital status: Single     Spouse name: Not on file     Number of children: Not on file     Years of education: Not on file     Highest education level: Not on file   Occupational History     Not on file   Social Needs     Financial resource strain: Not on file     Food insecurity:     Worry: Not on file     Inability: Not on file     Transportation needs:     Medical: Not on file     Non-medical: Not on file   Tobacco Use     Smoking status: Never Smoker     Smokeless tobacco: Never Used   Substance and Sexual Activity     Alcohol use: No     Comment: None     Drug use: No     Sexual activity: Not Currently     Partners: Female   Lifestyle     Physical activity:     Days per week: Not on file     Minutes per session: Not on file     Stress: Not on file   Relationships     Social connections:     Talks on phone: Not on file     Gets together: Not on file     Attends Congregation service: Not on file     Active member of club or organization: Not on file     Attends meetings of clubs or organizations: Not on file     Relationship status: Not on file     Intimate partner violence:     Fear of current or ex partner: Not on file     Emotionally abused: Not on file      Physically abused: Not on file     Forced sexual activity: Not on file   Other Topics Concern     Parent/sibling w/ CABG, MI or angioplasty before 65F 55M? Not Asked   Social History Narrative     Not on file       FAMILY HISTORY: Reviewed in EMR      REVIEW OF SYSTEMS: Positive for that noted in past medical history and history of present illness and otherwise reviewed in EMR     PHYSICAL EXAM:    Adult male in no acute distress. Articulates and communicates with normal affect.  Respirations even and unlabored  Focused upper extremity exam: Skin intact. No erythema. Sensation intact all dermatomes into the hand to light touch. EPL, FPL, and Intrinsics intact. Right shoulder active motion is FE to 90, ER at side to 15, and IR to buttock. Left shoulder active motion is FE to 180, ER to 80, and IR to T12.  On the right limited ability to do Neer and George. No pain on palpation over the AC joint. Mild pain on palpation over the long head of the biceps. FE and ER strength 4+/5 and limited by pain    IMAGING:  Right shoulder initial xrays demonstrate loss of normal cortical contour of the inferior glenoid rim, suggestive of bony bankart/glenoid fracture     Right shoulder CT demonstrates:  Impression:  1.  Fracture of the anterior/inferior glenoid with bony union. The fracture results in deformity of the bony glenoid as noted above.  2.  Bony Hill-Sachs lesion.  3.  Incidental small bony lesion in the right third rib. Follow up is recommended in 3-6 months with CT to assess stability.       ASSESSMENT:    1. Right shoulder trauma with subsequent bony bankart, now united  2. Right shoulder stiffness  3. Right rib incidental bony lesion    PLAN:  I discussed the imaging and exam with the patient and I outlined that his bony Bankart fragment, although displaced, has healed.  The malunion is such that I would not recommend aggressive surgical intervention at this time.  He has not yet had any trial of any formal  treatment.  I would initiate physical therapy to address his significant shoulder stiffness at present.  If his motion does not improve or he has lasting symptoms we can consider surgical options.  He is in agreement with the physical therapy at this time and a referral has been placed to [facility] at the [facility].  He will follow-up with me in 6 weeks at the [facility].    Dulce Maria Burdick MD

## 2019-05-06 ENCOUNTER — HOSPITAL ENCOUNTER (OUTPATIENT)
Dept: BEHAVIORAL HEALTH | Facility: CLINIC | Age: 49
End: 2019-05-06
Attending: PSYCHIATRY & NEUROLOGY
Payer: COMMERCIAL

## 2019-05-06 PROCEDURE — H2012 BEHAV HLTH DAY TREAT, PER HR: HCPCS

## 2019-05-08 NOTE — PROGRESS NOTES
"Adult Mental Health Outpatient Group Therapy Progress Note     Client Initial Individualized Goals for Treatment: \"how to expand my social engagement given that I am an introvert, find a way to increase physical activity, find a way to increase activities/productivity in my life.\"     See Initial Treatment suggestions for the client during the time between Diagnostic Assessment and completion of the Master Individualized Treatment Plan.     Treatment Goals:  1.Client will notify staff when needing assistance to develop or implement a coping plan to manage suicidal or self injurious urges.  Client will use coping plan for safety, as needed.  2. When in life skills group Gaurang will practice opposite to emotion to help improve motivation to take better care of himself  providing an update of progress weekly  3. When in Group Therapy, Gaurang will report mood, symptoms, and coping skills to manage symptoms and mood.   4. Gaurang will establish care with an individual therapist.     Area of Treatment Focus:  Symptom Management, Personal Safety and Community Resources/Discharge Planning    Therapeutic Interventions/Treatment Strategies:  Support, Feedback, Safety Assessments, Education and Cognitive Behavioral Therapy    Response to Treatment Strategies:  Accepted Feedback, Gave Feedback, Listened and Focused on Goals     Name of Group: Group Psychotherapy I and Group Psychotherapy II Date/Time: 5/619 / 0900 to 0950 and 1100 to 1150    Number of Group Participants: 8    Description and Outcome:  Group Psychotherapy I: Gaurang shared an update about the proposed treatment plan for the shoulder from the orthopedic provider.  He stated that he was feeling positively about the plan to try physical therapy first before surgery.  He stated that he was surprised to find out about weight gain that he has had when he was weighed at the clinic.  He stated that this was motivating him to start increasing his activity level.  He followed " through on personal goal of attending the [employer].  Client had set this goal a while ago but had been having difficulty following through.  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  Client denied suicidal ideation, intent and plan. Gaurang reported calm mood today.  He reported sleeping at nighttime.      Group Psychotherapy II: Client participated in a discussion of self-care skills to use during periods of high symptoms.  Group brainstormed skills to try including relaxation, distraction, positive self-talk, and asking for support using assertive communication skills.     Client verbalized understanding of session content by sharing current symptoms and stressors.    Is this a Weekly Review of the Progress on the Treatment Plan?  No

## 2019-05-09 ENCOUNTER — OFFICE VISIT (OUTPATIENT)
Dept: PODIATRY | Facility: CLINIC | Age: 49
End: 2019-05-09
Payer: COMMERCIAL

## 2019-05-09 VITALS
DIASTOLIC BLOOD PRESSURE: 68 MMHG | HEIGHT: 78 IN | WEIGHT: 300.4 LBS | SYSTOLIC BLOOD PRESSURE: 118 MMHG | BODY MASS INDEX: 34.76 KG/M2

## 2019-05-09 DIAGNOSIS — L08.9 TOE INFECTION: ICD-10-CM

## 2019-05-09 DIAGNOSIS — L60.0 INGROWN TOENAIL: ICD-10-CM

## 2019-05-09 DIAGNOSIS — M79.674 TOE PAIN, RIGHT: Primary | ICD-10-CM

## 2019-05-09 PROCEDURE — 11730 AVULSION NAIL PLATE SIMPLE 1: CPT | Mod: T5 | Performed by: PODIATRIST

## 2019-05-09 PROCEDURE — 99243 OFF/OP CNSLTJ NEW/EST LOW 30: CPT | Mod: 25 | Performed by: PODIATRIST

## 2019-05-09 ASSESSMENT — MIFFLIN-ST. JEOR: SCORE: 2360.86

## 2019-05-09 NOTE — PATIENT INSTRUCTIONS
Thank you for choosing [facility]!    DR. KAPOOR'S CLINIC LOCATIONS     MONDAY - [place] WEDNESDAY - [place]   [address] [address] [place] [address]   [phone] / [phone] / [phone]       THURSDAY - [place] SCHEDULE SURGERY: [phone]   [address] APPOINTMENTS: [phone]   [place] [address] BILLING QUESTIONS: [phone] / [phone]       [facility]  1. Keep bandage on until that evening or the day after your procedure. If the bandage falls off, start the soaking process.    2. Some bleeding is normal. If bleeding seems excessive to you, place ice on top of your foot for 15-20 minutes and elevate your foot above heart level.    3. Over the counter pain medication, elevating your foot and ice application is all you will need for pain control.    4. If the bandage feels too tight and your toe is throbbing it is ok to remove the bandage and start soaking.     5. For two weeks, soak your foot twice a day in mild skin friendly soap (dish or hand soap) and warm water for 15 minutes. It is ok to soak your foot for a few minutes to loosen the dressing applied in the clinic. After soaking, blot dry and apply a regular band aid.    6. It is normal to experience some discomfort and redness around the nail for several days following the procedure. Drainage will likely appear a red- yellow. This is normal. If your toe is still draining a red-yellow fluid after 2 weeks continue to soak foot.    7. Initial discomfort might last for 2-3 days. You may resume with regular activity as soon as you are comfortable, as long as you keep the wound clean and dry and follow the soaking instruction. It is recommended that you do not enter public swimming pools/hot tubs while your toe is draining.    8. If you are experiencing worsening pain and redness or notice pus after 2-3  days please contact the clinic. If it is after hours call the clinic number and follow the voice prompter. This will direct you to an on call doctor.                      BODY WEIGHT AND YOUR FEET  The following information is included in the after visit summary for all patients. Body weight can be a sensitive issue to discuss in clinic, but we think the following information is very important. Although we focus on the feet and ankles, we do support the overall health of our patients.     Many things can cause foot and ankle problems. Foot structure, activity level, foot mechanics and injuries are common causes of pain. One very important issue that often goes unmentioned, is body weight. Extra weight can cause increased stress on muscles, ligaments, bones and tendons. Sometimes just a few extra pounds is all it takes to put one over her/his threshold. Without reducing that stress, it can be difficult to alleviate pain. As Foot & Ankle specialists, our job is addressing the lower extremity problem and possible causes. Regarding extra body weight, we encourage patients to discuss diet and weight management plans with their primary care doctors. It is this team approach that gives you the best opportunity for pain relief and getting you back on your feet.      [facility] has a Comprehensive Weight Management Program. This program includes counseling, education, non-surgical and surgical approaches to weight loss. If you are interested in learning more either talk to you primary care provider or call [phone].

## 2019-05-09 NOTE — PROGRESS NOTES
"  ASSESSMENT/PLAN:    Encounter Diagnoses   Name Primary?     Toe pain, right Yes     Ingrown toenail, medial edge, right hallux      Toe infection    The potential causes and nature of an ingrown toenail were discussed with the patient.  We reviewed the natural history/prognosis of the condition and potential risks if no treatment is provided.      Treatment options discussed included conservative management (oral antibiotics when infected, soaking of foot, adequate width shoes)  as well as surgical management (partial or total nail removal).  The pros and cons of both forms of treatment were reviewed.      After thorough discussion and answering all questions, the patient elected to a partial avulsion . I discussed the option of permanent removal of the nail edge, but best done when there is not co-existing infection.     Nail Avulsion Procedure  (non permanent removal)    The procedure was discussed with the patient, including risk of infection, abnormal nail regrowth, and possible need for a future nail procedure.  Post procedure home cares were explained. These cares are important for preventing infection and aiding in timely healing.   Verbal and written consent was obtained.   The site was marked and the \"Time Out\" called.     The base of the right hallux  was injected with 2 cc of  2% Lidocaine plain.  The toe was then prepped with betadine solution.  A tourniquet was applied around the base of the toe for hemostasis.   Next the toe was checked for adequate anesthesia.  With the Pt comfortable, the medial nail was freed from the nail bed and marginal soft tissue attachments with a blunt instrument.  ( A nail splitter was then used to make a longitudinal cut 2mm from the medial nail fold.  It was completed, atraumatically, under the eponychium with a [place] blade. ) Next, it was firmly grasped with a hemostat and removed in total.      Silvadene ointment was applied to the nail bed, followed by a compressive " dressing.  The tourniquet was removed.  The distal toe turned immediately pink.  The foot was kept elevated for several minutes.  The patient tolerated the procedure well.      Patient is instructed to watch for, and call if,  increasing redness, drainage, and pain after 2-3 days.  Post procedure instructions provided - handout given.        Body mass index is 34.71 kg/m .    Weight management plan: Patient was referred to their PCP to discuss a diet and exercise plan.      Terry Amos DPM, FACFAS, MS    [facility]le [facility]      ____________________________________________________________________    HPI:       I was asked by Smitha Perez NP, to evaluate this patient, in consultation, regarding an ingrown  toenail.     Chief Complaint: ingrown toenail  Onset of problem: 2 months  Pain/ discomfort is described as:  burning  Rating:  3/10   Frequency:  daily    The pain is made worse with pressure on the affected area  Previous treatment: warm water soaking; oral antibiotic  *  Patient Active Problem List   Diagnosis     Low back pain     Moderate episode of recurrent major depressive disorder (H)     Passive suicidal ideations     Diabetes mellitus, type 2 (H)     Hyperlipidemia, unspecified hyperlipidemia type     Microalbuminuria due to type 2 diabetes mellitus (H)     MDD (major depressive disorder), recurrent episode, severe (H)   *  *  Past Surgical History:   Procedure Laterality Date     BACK SURGERY  1997    spinal fusion     CHOLECYSTECTOMY  2012   *  *  Current Outpatient Medications   Medication Sig Dispense Refill     blood glucose (NO BRAND SPECIFIED) lancets standard Use to test blood sugar one times daily or as directed. 100 each 11     blood glucose monitoring (NO BRAND SPECIFIED) meter device kit Use to test blood sugar one times daily or as directed. 1 kit 3     blood glucose monitoring (NO BRAND SPECIFIED) test strip Use to test blood sugars one times daily or as  directed 100 strip 1     cephALEXin (KEFLEX) 500 MG capsule Take 1 capsule (500 mg) by mouth 4 times daily 40 capsule 0     IBUPROFEN PO        lisinopril (PRINIVIL/ZESTRIL) 5 MG tablet Take 1 tablet (5 mg) by mouth daily 90 tablet 1     metFORMIN (GLUCOPHAGE) 500 MG tablet Take 2 tablets (1,000 mg) by mouth 2 times daily (with meals) 120 tablet 2     methylphenidate (RITALIN) 10 MG tablet Take 1 tablet (10 mg) by mouth 2 times daily 60 tablet 0     multivitamin, therapeutic with minerals (MULTI-VITAMIN) TABS tablet Take 1 tablet by mouth daily       simvastatin (ZOCOR) 40 MG tablet Take 1 tablet (40 mg) by mouth At Bedtime 90 tablet 3     venlafaxine (EFFEXOR-XR) 150 MG 24 hr capsule Take 2 capsules (300 mg) by mouth daily 180 capsule 0       ROS:     A 10-point review of systems was performed and is positive for that noted above in the HPI and as seen below.  All other areas are negative.     Numbness in feet?  no   Calf pain with walking? no  Recent foot/ankle injury? no  Weight change over past 12 months? 10# gain  Self perception as overweight? yes  Recent flu-like symptoms? no  Joint pain other than feet ? Right knee    Social History: Employment:   - rowing;  Exercise/Physical activity:  Walking twice a week;  Tobacco use:  no  Social History     Socioeconomic History     Marital status: Single     Spouse name: Not on file     Number of children: Not on file     Years of education: Not on file     Highest education level: Not on file   Occupational History     Not on file   Social Needs     Financial resource strain: Not on file     Food insecurity:     Worry: Not on file     Inability: Not on file     Transportation needs:     Medical: Not on file     Non-medical: Not on file   Tobacco Use     Smoking status: Never Smoker     Smokeless tobacco: Never Used   Substance and Sexual Activity     Alcohol use: No     Comment: None     Drug use: No     Sexual activity: Not Currently     Partners: Female  "  Lifestyle     Physical activity:     Days per week: Not on file     Minutes per session: Not on file     Stress: Not on file   Relationships     Social connections:     Talks on phone: Not on file     Gets together: Not on file     Attends Yazidism service: Not on file     Active member of club or organization: Not on file     Attends meetings of clubs or organizations: Not on file     Relationship status: Not on file     Intimate partner violence:     Fear of current or ex partner: Not on file     Emotionally abused: Not on file     Physically abused: Not on file     Forced sexual activity: Not on file   Other Topics Concern     Parent/sibling w/ CABG, MI or angioplasty before 65F 55M? Not Asked   Social History Narrative     Not on file       Family history:  Family History   Problem Relation Age of Onset     Diabetes Mother      Depression Father      Alcoholism Sister      Depression Sister      Lupus Sister        Rheumatoid arthritis:  no  Foot Problems: no  Diabetes: parent      EXAM:    Vitals: /68   Ht 1.981 m (6' 6\")   Wt 136.3 kg (300 lb 6.4 oz)   BMI 34.71 kg/m    BMI: Body mass index is 34.71 kg/m .  Height: 6' 6\"    Constitutional/ general:  Pt is in no apparent distress, appears well-nourished.  Cooperative with history and physical exam.     Vascular:  Pedal pulses are palpable bilaterally for both the DP and PT arteries.  CFT < 3 sec.  No edema.  Pedal hair growth noted.     Neuro:  Alert and oriented x 3. Coordinated gait.  Light touch sensation is intact to the L4, L5, S1 distributions. No obvious deficits.  No evidence of neurological-based weakness, spasticity, or contracture in the lower extremities.     Derm: localized tenderness, erythema, edema, pain along the medial skin fold, right hallux nail unit    Musculoskeletal:    Lower extremity muscle strength is normal.  Patient is ambulatory without an assistive device or brace .  No gross deformities.          Terry Amos, DPELÍAS, " MS [facility] Department of Podiatry/Foot & Ankle Surgery

## 2019-05-09 NOTE — LETTER
"    5/9/2019         RE: Gaurang Rivera  [address]  [facility] [address]        Dear Colleague,    Thank you for referring your patient, Gaurang Rivera, to the [facility]. Please see a copy of my visit note below.      ASSESSMENT/PLAN:    Encounter Diagnoses   Name Primary?     Toe pain, right Yes     Ingrown toenail, medial edge, right hallux      Toe infection    The potential causes and nature of an ingrown toenail were discussed with the patient.  We reviewed the natural history/prognosis of the condition and potential risks if no treatment is provided.      Treatment options discussed included conservative management (oral antibiotics when infected, soaking of foot, adequate width shoes)  as well as surgical management (partial or total nail removal).  The pros and cons of both forms of treatment were reviewed.      After thorough discussion and answering all questions, the patient elected to a partial avulsion . I discussed the option of permanent removal of the nail edge, but best done when there is not co-existing infection.     Nail Avulsion Procedure  (non permanent removal)    The procedure was discussed with the patient, including risk of infection, abnormal nail regrowth, and possible need for a future nail procedure.  Post procedure home cares were explained. These cares are important for preventing infection and aiding in timely healing.   Verbal and written consent was obtained.   The site was marked and the \"Time Out\" called.     The base of the right hallux  was injected with 2 cc of  2% Lidocaine plain.  The toe was then prepped with betadine solution.  A tourniquet was applied around the base of the toe for hemostasis.   Next the toe was checked for adequate anesthesia.  With the Pt comfortable, the medial nail was freed from the nail bed and marginal soft tissue attachments with a blunt instrument.  ( A nail splitter was then used to make a longitudinal cut 2mm " from the medial nail fold.  It was completed, atraumatically, under the eponychium with a [place] blade. ) Next, it was firmly grasped with a hemostat and removed in total.      Silvadene ointment was applied to the nail bed, followed by a compressive dressing.  The tourniquet was removed.  The distal toe turned immediately pink.  The foot was kept elevated for several minutes.  The patient tolerated the procedure well.      Patient is instructed to watch for, and call if,  increasing redness, drainage, and pain after 2-3 days.  Post procedure instructions provided - handout given.        Body mass index is 34.71 kg/m .    Weight management plan: Patient was referred to their PCP to discuss a diet and exercise plan.      Terry Amos DPM, FACFAS, MS    [facility] of Podiatry/Foot & Ankle Surgery      ____________________________________________________________________    HPI:       I was asked by Smitha Perez NP, to evaluate this patient, in consultation, regarding an ingrown  toenail.     Chief Complaint: ingrown toenail  Onset of problem: 2 months  Pain/ discomfort is described as:  burning  Rating:  3/10   Frequency:  daily    The pain is made worse with pressure on the affected area  Previous treatment: warm water soaking; oral antibiotic  *  Patient Active Problem List   Diagnosis     Low back pain     Moderate episode of recurrent major depressive disorder (H)     Passive suicidal ideations     Diabetes mellitus, type 2 (H)     Hyperlipidemia, unspecified hyperlipidemia type     Microalbuminuria due to type 2 diabetes mellitus (H)     MDD (major depressive disorder), recurrent episode, severe (H)   *  *  Past Surgical History:   Procedure Laterality Date     BACK SURGERY  1997    spinal fusion     CHOLECYSTECTOMY  2012   *  *  Current Outpatient Medications   Medication Sig Dispense Refill     blood glucose (NO BRAND SPECIFIED) lancets standard Use to test blood sugar one times daily or as directed.  100 each 11     blood glucose monitoring (NO BRAND SPECIFIED) meter device kit Use to test blood sugar one times daily or as directed. 1 kit 3     blood glucose monitoring (NO BRAND SPECIFIED) test strip Use to test blood sugars one times daily or as directed 100 strip 1     cephALEXin (KEFLEX) 500 MG capsule Take 1 capsule (500 mg) by mouth 4 times daily 40 capsule 0     IBUPROFEN PO        lisinopril (PRINIVIL/ZESTRIL) 5 MG tablet Take 1 tablet (5 mg) by mouth daily 90 tablet 1     metFORMIN (GLUCOPHAGE) 500 MG tablet Take 2 tablets (1,000 mg) by mouth 2 times daily (with meals) 120 tablet 2     methylphenidate (RITALIN) 10 MG tablet Take 1 tablet (10 mg) by mouth 2 times daily 60 tablet 0     multivitamin, therapeutic with minerals (MULTI-VITAMIN) TABS tablet Take 1 tablet by mouth daily       simvastatin (ZOCOR) 40 MG tablet Take 1 tablet (40 mg) by mouth At Bedtime 90 tablet 3     venlafaxine (EFFEXOR-XR) 150 MG 24 hr capsule Take 2 capsules (300 mg) by mouth daily 180 capsule 0       ROS:     A 10-point review of systems was performed and is positive for that noted above in the HPI and as seen below.  All other areas are negative.     Numbness in feet?  no   Calf pain with walking? no  Recent foot/ankle injury? no  Weight change over past 12 months? 10# gain  Self perception as overweight? yes  Recent flu-like symptoms? no  Joint pain other than feet ? Right knee    Social History: Employment:   - rowing;  Exercise/Physical activity:  Walking twice a week;  Tobacco use:  no  Social History     Socioeconomic History     Marital status: Single     Spouse name: Not on file     Number of children: Not on file     Years of education: Not on file     Highest education level: Not on file   Occupational History     Not on file   Social Needs     Financial resource strain: Not on file     Food insecurity:     Worry: Not on file     Inability: Not on file     Transportation needs:     Medical: Not on file      "Non-medical: Not on file   Tobacco Use     Smoking status: Never Smoker     Smokeless tobacco: Never Used   Substance and Sexual Activity     Alcohol use: No     Comment: None     Drug use: No     Sexual activity: Not Currently     Partners: Female   Lifestyle     Physical activity:     Days per week: Not on file     Minutes per session: Not on file     Stress: Not on file   Relationships     Social connections:     Talks on phone: Not on file     Gets together: Not on file     Attends Yazidi service: Not on file     Active member of club or organization: Not on file     Attends meetings of clubs or organizations: Not on file     Relationship status: Not on file     Intimate partner violence:     Fear of current or ex partner: Not on file     Emotionally abused: Not on file     Physically abused: Not on file     Forced sexual activity: Not on file   Other Topics Concern     Parent/sibling w/ CABG, MI or angioplasty before 65F 55M? Not Asked   Social History Narrative     Not on file       Family history:  Family History   Problem Relation Age of Onset     Diabetes Mother      Depression Father      Alcoholism Sister      Depression Sister      Lupus Sister        Rheumatoid arthritis:  no  Foot Problems: no  Diabetes: parent      EXAM:    Vitals: /68   Ht 1.981 m (6' 6\")   Wt 136.3 kg (300 lb 6.4 oz)   BMI 34.71 kg/m     BMI: Body mass index is 34.71 kg/m .  Height: 6' 6\"    Constitutional/ general:  Pt is in no apparent distress, appears well-nourished.  Cooperative with history and physical exam.     Vascular:  Pedal pulses are palpable bilaterally for both the DP and PT arteries.  CFT < 3 sec.  No edema.  Pedal hair growth noted.     Neuro:  Alert and oriented x 3. Coordinated gait.  Light touch sensation is intact to the L4, L5, S1 distributions. No obvious deficits.  No evidence of neurological-based weakness, spasticity, or contracture in the lower extremities.     Derm: localized tenderness, " erythema, edema, pain along the medial skin fold, right hallux nail unit    Musculoskeletal:    Lower extremity muscle strength is normal.  Patient is ambulatory without an assistive device or brace .  No gross deformities.          Terry Amos DPM, FACFAS, MS    [facility] of Podiatry/Foot & Ankle Surgery                Again, thank you for allowing me to participate in the care of your patient.        Sincerely,        Terry Amos DPM

## 2019-05-13 ENCOUNTER — HOSPITAL ENCOUNTER (OUTPATIENT)
Dept: BEHAVIORAL HEALTH | Facility: CLINIC | Age: 49
End: 2019-05-13
Attending: PSYCHIATRY & NEUROLOGY
Payer: COMMERCIAL

## 2019-05-13 ENCOUNTER — THERAPY VISIT (OUTPATIENT)
Dept: PHYSICAL THERAPY | Facility: CLINIC | Age: 49
End: 2019-05-13
Payer: COMMERCIAL

## 2019-05-13 DIAGNOSIS — S42.141A CLOSED FRACTURE OF GLENOID CAVITY AND NECK OF RIGHT SCAPULA, INITIAL ENCOUNTER: ICD-10-CM

## 2019-05-13 DIAGNOSIS — S42.151A CLOSED FRACTURE OF GLENOID CAVITY AND NECK OF RIGHT SCAPULA, INITIAL ENCOUNTER: ICD-10-CM

## 2019-05-13 PROCEDURE — 97161 PT EVAL LOW COMPLEX 20 MIN: CPT | Mod: GP | Performed by: PHYSICAL THERAPIST

## 2019-05-13 PROCEDURE — H2012 BEHAV HLTH DAY TREAT, PER HR: HCPCS

## 2019-05-13 PROCEDURE — 97110 THERAPEUTIC EXERCISES: CPT | Mod: GP | Performed by: PHYSICAL THERAPIST

## 2019-05-13 NOTE — PROGRESS NOTES
"Adult Mental Health Outpatient Group Therapy Progress Note     Client Initial Individualized Goals for Treatment: \"how to expand my social engagement given that I am an introvert, find a way to increase physical activity, find a way to increase activities/productivity in my life.\"     See Initial Treatment suggestions for the client during the time between Diagnostic Assessment and completion of the Master Individualized Treatment Plan.     Treatment Goals:  1.Client will notify staff when needing assistance to develop or implement a coping plan to manage suicidal or self injurious urges.  Client will use coping plan for safety, as needed.  2. When in life skills group Gaurang will practice opposite to emotion to help improve motivation to take better care of himself  providing an update of progress weekly  3. When in Group Therapy, Gaurang will report mood, symptoms, and coping skills to manage symptoms and mood.   4. Gaurang will establish care with an individual therapist.     Area of Treatment Focus:  Symptom Management, Personal Safety and Community Resources/Discharge Planning    Therapeutic Interventions/Treatment Strategies:  Support, Feedback, Safety Assessments, Education and Cognitive Behavioral Therapy    Response to Treatment Strategies:  Accepted Feedback, Gave Feedback, Listened and Focused on Goals     Name of Group: Group Psychotherapy I and Group Psychotherapy II Date/Time: 5/1319 / 0900 to 0950 and 1100 to 1150    Number of Group Participants: 8    Description and Outcome:  Group Psychotherapy I: Gaurang reported feeling sick at the end of last week.  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  He stated that he usually does not like to attend group events due to being an introvert, but as dogs were welcome he took his dog which gave him a second focus.  He stated that he had dinner with his parents on Saturday and attended a Mother's Day event on Sunday.  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  He stated that he had negative self-talk but was able to challenge it by saying \"Anyone could have an injury\" instead of \"I'm defective.\"  Client denied suicidal ideation, intent and plan. Gaurang reported calm mood today.  He reported sleeping at nighttime.  Client had asked last week to extend discharge from 5/9 to 5/16/19 due to absences.    Group Psychotherapy II: Client participated in a discussion on identifying and clarifying values.  Client identified their top ten values. They identified three ways to increase the presence of one of the values in their lives and chose actions to support follow through on that goal.    Client verbalized understanding of session content by sharing current symptoms and stressors.    Is this a Weekly Review of the Progress on the Treatment Plan?  No  "

## 2019-05-13 NOTE — PROGRESS NOTES
[facility] Initial Evaluation  Subjective:  [facility]                  [facility] - [facility]  Physical Therapy Initial Examination/Evaluation  May 13, 2019    Gaurang Rivera is a 48 year old  male referred to physical therapy by Dr. Burdick for treatment of bony bankart with Precautions/Restrictions/MD instructions none    KEY PT FINDINGS:  1.  GH stiffness  2.  Excellent strength      Subjective:  Referring MD visit date: 5/3/19  DOI/onset: March 2019  Mechanism of injury: Fell onto elbow  DOS none  Previous treatment: none  Imaging: xray  Chief Complaint:   Pain and stiffness when using arm.   Pain: best 3 /10, worst 9/10 lateral shoulder, upper trap Described as: deep ache Alleviated by: heat, salonpas patch Frequency: constant Progression of symptoms since initial onset: staying the same Time of day when pain is worse: not related  Sleeping: not affected    Occupation: Rowing   Job duties:  driving  Current HEP/exercise regimen: walking  Patient's goals are none    Pertinent PMH: depression, diabetes, htn  General Health Reported by Patient: poor  Return to MD:  6 weeks       Objective:  System                   Shoulder Evaluation:  ROM:  AROM:    Flexion:  Left:  150    Right:  90    Abduction:  Left: 150   Right:  80    Internal Rotation:  Left:  T8    Right:  To side  External Rotation:  Left:  60    Right:  25                      Strength:    Flexion: Left:5/5    Pain: -    Right: 5/5      Pain:  -    Abduction:  Left: 5/5   Pain:-    Right: 5/5      Pain:-    Internal Rotation:  Left:5/5      Pain:-    Right: 5/5      Pain:-  External Rotation:   Left:5/5      Pain:-   Right:5/5      Pain:-        Elbow Flexion:  Left:5/5      Pain:-    Right:5/5      Pain:-  Elbow Extension:  Left:5/5      Pain:-    Right:5/5      Pain:-  Stability Testing:  not assessed      Special Tests:  not assessed      Palpation:  normal      Mobility Tests:       Glenohumeral posterior right:  Hypomobile  Glenohumeral inferior right:  Hypomobile                                             General     ROS    Assessment/Plan:    Patient is a 48 year old male with right side knee complaints.    Patient has the following significant findings with corresponding treatment plan.                Diagnosis 1:  Shoulder pain, stiffness  Pain -  hot/cold therapy, US, electric stimulation, manual therapy, splint/taping/bracing/orthotics, self management, education and home program  Decreased ROM/flexibility - manual therapy and therapeutic exercise  Decreased joint mobility - manual therapy and therapeutic exercise  Decreased strength - therapeutic exercise and therapeutic activities  Impaired balance - neuro re-education and therapeutic activities  Decreased proprioception - neuro re-education and therapeutic activities  Inflammation - cold therapy  Edema - vasopneumatics  Impaired gait - gait training  Impaired muscle performance - neuro re-education  Decreased function - therapeutic activities    Therapy Evaluation Codes:   1) History comprised of:   Personal factors that impact the plan of care:      None.    Comorbidity factors that impact the plan of care are:      None.     Medications impacting care: None.  2) Examination of Body Systems comprised of:   Body structures and functions that impact the plan of care:      Knee.   Activity limitations that impact the plan of care are:      Squatting/kneeling, Stairs, Standing, Walking and Sleeping.  3) Clinical presentation characteristics are:   Stable/Uncomplicated.  4) Decision-Making    Low complexity using standardized patient assessment instrument and/or measureable assessment of functional outcome.  Cumulative Therapy Evaluation is: Low complexity.    Previous and current functional limitations:  (See Goal Flow Sheet for this information)    Short term and Long term goals: (See Goal Flow Sheet for this information)      Communication ability:  Patient appears to be able to clearly communicate and understand verbal and written communication and follow directions correctly.  Treatment Explanation - The following has been discussed with the patient:   RX ordered/plan of care  Anticipated outcomes  Possible risks and side effects  This patient would benefit from PT intervention to resume normal activities.   Rehab potential is good.    Frequency:  1 X week, once daily  Duration:  for 8 weeks  Discharge Plan:  Achieve all LTG.  Independent in home treatment program.  Reach maximal therapeutic benefit.    Please refer to the daily flowsheet for treatment today, total treatment time and time spent performing 1:1 timed codes.

## 2019-05-14 ENCOUNTER — HOSPITAL ENCOUNTER (OUTPATIENT)
Dept: BEHAVIORAL HEALTH | Facility: CLINIC | Age: 49
End: 2019-05-14
Attending: PSYCHIATRY & NEUROLOGY
Payer: COMMERCIAL

## 2019-05-14 PROCEDURE — H2012 BEHAV HLTH DAY TREAT, PER HR: HCPCS

## 2019-05-15 ENCOUNTER — OFFICE VISIT (OUTPATIENT)
Dept: BEHAVIORAL HEALTH | Facility: CLINIC | Age: 49
End: 2019-05-15
Payer: COMMERCIAL

## 2019-05-15 DIAGNOSIS — F33.2 MDD (MAJOR DEPRESSIVE DISORDER), RECURRENT EPISODE, SEVERE (H): Primary | ICD-10-CM

## 2019-05-15 PROCEDURE — 90834 PSYTX W PT 45 MINUTES: CPT | Performed by: SOCIAL WORKER

## 2019-05-15 NOTE — PROGRESS NOTES
"Adult Mental Health Outpatient Group Therapy Progress Note     Client Initial Individualized Goals for Treatment: \"how to expand my social engagement given that I am an introvert, find a way to increase physical activity, find a way to increase activities/productivity in my life.\"     See Initial Treatment suggestions for the client during the time between Diagnostic Assessment and completion of the Master Individualized Treatment Plan.     Treatment Goals:  1.Client will notify staff when needing assistance to develop or implement a coping plan to manage suicidal or self injurious urges.  Client will use coping plan for safety, as needed.  2. When in life skills group Gaurang will practice opposite to emotion to help improve motivation to take better care of himself  providing an update of progress weekly  3. When in Group Therapy, Gaurang will report mood, symptoms, and coping skills to manage symptoms and mood.   4. Gaurang will establish care with an individual therapist.     Area of Treatment Focus:  Symptom Management, Personal Safety and Community Resources/Discharge Planning    Therapeutic Interventions/Treatment Strategies:  Support, Feedback, Safety Assessments, Education and Cognitive Behavioral Therapy    Response to Treatment Strategies:  Accepted Feedback, Gave Feedback, Listened and Focused on Goals     Name of Group: Group Psychotherapy I and Group Psychotherapy II Date/Time: 5/1419 / 0900 to 0950 and 1100 to 1150    Number of Group Participants: 8    Description and Outcome:  Group Psychotherapy I: Gaurang reported that abril did not sleep last night due to watching TV too late.  He stated that he also had heartburn which further impaired falling asleep.  He shared a plan to take a nap this afternoon then go to his work shift.  He stated that when he has the sleep disturbance he has an \"existential crisis\" about the meaning of his life.  Peers validated that kind of thinking when exhausted during nighttime and " shared strategies they use to combat those thoughts.  Writer reviewed thought stopping skills. Client denied suicidal ideation, intent and plan.     Group Psychotherapy II:  Client participated in a discussion on identifying personal strengths and how they relate to the management of mental health symptoms.  Group members completed an exercise on assessment and recognition of strengths with applications to changing negative thoughts and core beliefs.     Client verbalized understanding of session content by sharing current symptoms and stressors.    Is this a Weekly Review of the Progress on the Treatment Plan?  No

## 2019-05-15 NOTE — PROGRESS NOTES
"Adult Mental Health Outpatient Group Therapy Progress Note     Client Initial Individualized Goals for Treatment: \"how to expand my social engagement given that I am an introvert, find a way to increase physical activity, find a way to increase activities/productivity in my life.\"    See Initial Treatment suggestions for the client during the time between Diagnostic Assessment and completion of the Master Individualized Treatment Plan.    Treatment Goals:  1.Client will notify staff when needing assistance to develop or implement a coping plan to manage suicidal or self injurious urges.  Client will use coping plan for safety, as needed.  2. When in life skills group Gaurang will practice opposite to emotion to help improve motivation to take better care of himself  providing an update of progress weekly  3. When in Group Therapy, Gaurang will report mood, symptoms, and coping skills to manage symptoms and mood.   4. Gaurang will establish care with an individual therapist.                 Area of Treatment Focus:  Symptom Management    Therapeutic Interventions/Treatment Strategies:  Support, Feedback, Safety Assessments, Structured Activity and Education    Response to Treatment Strategies:  Accepted Feedback, Listened, Attentive, Accepted Support and Alert     Name of Group: Life Skills(10:00-11:00)    Number of Group Participants: 8    Description and Outcome:  Client presented as calm and alert with good focus and concentration. Psychosocial skills addressed included a discussion related to leisure education and mental health. Goal #1 (no personal safety concerns reported or observed).   Client would benefit from additional opportunities to practice and implement content from this session  in every day life,relationships and mental health recovery.    Is this a Weekly Review of the Progress on the Treatment Plan?  Yes.      Are Treatment Plan Goals being addressed?  Yes, continue treatment goals      Are Treatment " Plan Strategies to Address Goals Effective?  Yes, continue treatment strategies      Are there any current contracts in place?  No

## 2019-05-15 NOTE — PROGRESS NOTES
"                                             Progress Note    Client Name: Gaurang Rivera  Date: 5/15/2019          Service Type: Individual  Video Visit: No     Session Start Time: 1230  Session End Time: 115     Session Length: 38-52    Session #: 3    Attendees: Client attended alone     Treatment Plan Last Reviewed: 5/1/2019   PHQ-9 / SOL-7 :     DATA  Interactive Complexity: No  Crisis: No       Progress Since Last Session (Related to Symptoms / Goals / Homework):   Symptoms: No change -    Homework: Partially completed      Episode of Care Goals: Satisfactory progress - PREPARATION (Decided to change - considering how); Intervened by negotiating a change plan and determining options / strategies for behavior change, identifying triggers, exploring social supports, and working towards setting a date to begin behavior change     Current / Ongoing Stressors and Concerns:     Client notes that things have been \"ok.\"  Saw surgeon about his shoulder and she said that surgery would be a challenging option.  So the idea at this point is to try pt for now.  Client would like to avoid surgery if possible.  Started pt yesterday.  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           Treatment Objective(s) Addressed in This Session:   Goal-Depression: Client will decrease depressed mood    I will know I've met my goal when I am less depressed.      Objective #A (Client Action)    Client will use identified behavioral and cognitive skills to challenge negative self talk 90% of the time.      Objective #B  Client will complete at least 10 minutes of ACE activities (e.g.Achievement, Closeness, and Enjoyment) or other Behavioral Activation goals per day.        Objective #C  Client will exercise 30 minutes 36 times in the next 12 weeks.       Intervention:   CBT: - Discussed efforts " client has made to resolve the situation/symptoms in the past.  Explored various response styles including active, passive, proactive, reactive, and inactive.  Discussed benefits of a proactive response style.  Reviewed strategies to cultivate a more proactive response style and identified barriers to this including fear, indifference, hopelessness, and low motivation.          ASSESSMENT: Current Emotional / Mental Status (status of significant symptoms):   Risk status (Self / Other harm or suicidal ideation)   Client denies current fears or concerns for personal safety.   Client denies current or recent suicidal ideation or behaviors.   Client denies current or recent homicidal ideation or behaviors.   Client denies current or recent self injurious behavior or ideation.   Client denies other safety concerns.   Client Client reports there has been no change in risk factors since their last session.     Client Client reports there has been no change in protective factors since their last session.     A safety and risk management plan has not been developed at this time, however client was given the after-hours number / 911 should there be a change in any of these risk factors.     Appearance:   Appropriate    Eye Contact:   Good    Psychomotor Behavior: Normal    Attitude:   Cooperative    Orientation:   All   Speech    Rate / Production: Normal     Volume:  Normal    Mood:    Depressed    Affect:    Appropriate    Thought Content:  Clear    Thought Form:  Coherent  Logical    Insight:    Good      Medication Review:   No changes to current psychiatric medication(s)     Medication Compliance:   Yes     Changes in Health Issues:   None reported     Chemical Use Review:   Substance Use: Chemical use reviewed, no active concerns identified      Tobacco Use: No current tobacco use.      Diagnosis:  1. MDD (major depressive disorder), recurrent episode, severe (H)        Collateral Reports Completed:   Not  Applicable    PLAN: (Client Tasks / Therapist Tasks / Other)  1.  Client will log activities of Achievement, Closeness, and Enjoyment (ACEs) using ACE rating scale and bring to next session.           2.  Meet in two weeks        QUIQUE Mcadams                                                         ______________________________________________________________________    Treatment Plan    Client's Name: Gaurang Rivera  YOB: 1970    Date: 5/1/2019                                                                          DSM5 Diagnoses: (Sustained by DSM5 Criteria Listed Above)  Diagnoses: 296.33 (F33.2) Major Depressive Disorder, Recurrent Episode, Severe _  Psychosocial & Contextual Factors: chronic pain, limited social support  WHODAS 2.0 (12 item)            This questionnaire asks about difficulties due to health conditions. Health conditions  include  disease or illnesses, other health problems that may be short or long lasting,  injuries, mental health or emotional problems, and problems with alcohol or drugs.                     Think back over the past 30 days and answer these questions, thinking about how much  difficulty you had doing the following activities. For each question, please [place] only  one response.    S1 Standing for long periods such as 30 minutes? Moderate =   3   S2 Taking care of household responsibilities? Mild =           2   S3 Learning a new task, for example, learning how to get to a new place? None =         1   S4 How much of a problem do you have joining community activities (for example, festivals, [employer] or other activities) in the same way as anyone else can? Severe =       4   S5 How much have you been emotionally affected by your health problems? Severe =       4     In the past 30 days, how much difficulty did you have in:   S6 Concentrating on doing something for ten minutes? None =         1   S7 Walking a long distance such as a kilometer (or  equivalent)? Mild =           2   S8 Washing your whole body? Mild =           2   S9 Getting dressed? None =         1   S10 Dealing with people you do not know? None =         1   S11 Maintaining a friendship? None =         1   S12 Your day to day work? Moderate =   3     H1 Overall, in the past 30 days, how many days were these difficulties present? Record number of days 20   H2 In the past 30 days, for how many days were you totally unable to carry out your usual activities or work because of any health condition? Record number of days  3   H3 In the past 30 days, not counting the days that you were totally unable, for how many days did you cut back or reduce your usual activities or work because of any health condition? Record number of days 10         Referral / Collaboration:  Referral to another professional/service is not indicated at this time..    Anticipated number of session or this episode of care: 18-24      MeasurableTreatment Goal(s) related to diagnosis / functional impairment(s)  Goal-Depression: Client will decrease depressed mood    I will know I've met my goal when I am less depressed.      Objective #A (Client Action)    Status: New - Date: 5/1/2019    Client will use identified behavioral and cognitive skills to challenge negative self talk 90% of the time.    Intervention(s)  Therapist will provide psychoeducation, behavioral activation, and cognitive restructuring.      Objective #B  Client will complete at least 10 minutes of ACE activities (e.g.Achievement, Closeness, and Enjoyment) or other Behavioral Activation goals per day.    Status: New - Date: 5/1/2019    Intervention(s)  Therapist will provide psychoeducation, behavioral activation, and cognitive restructuring.      Objective #C  Client will exercise 30 minutes 36 times in the next 12 weeks.  Status: New - Date: 5/1/2019    Intervention(s)  Therapist will provide psychoeducation, behavioral activation, and cognitive  restructuring.                Client has reviewed and agreed to the above plan.      Emory Hernandez, Northern Maine Medical CenterSW  May 1, 2019

## 2019-05-16 ENCOUNTER — HOSPITAL ENCOUNTER (OUTPATIENT)
Dept: BEHAVIORAL HEALTH | Facility: CLINIC | Age: 49
End: 2019-05-16
Attending: PSYCHIATRY & NEUROLOGY
Payer: COMMERCIAL

## 2019-05-16 PROCEDURE — H2012 BEHAV HLTH DAY TREAT, PER HR: HCPCS

## 2019-05-16 NOTE — PROGRESS NOTES
"Adult Mental Health Outpatient Group Therapy Progress Note         Client Initial Individualized Goals for Treatment: \"how to expand my social engagement given that I am an introvert, find a way to increase physical activity, find a way to increase activities/productivity in my life.\"     See Initial Treatment suggestions for the client during the time between Diagnostic Assessment and completion of the Master Individualized Treatment Plan.     Treatment Goals:  1.Client will notify staff when needing assistance to develop or implement a coping plan to manage suicidal or self injurious urges.  Client will use coping plan for safety, as needed.  2. When in life skills group Gaurang will practice opposite to emotion to help improve motivation to take better care of himself  providing an update of progress weekly  3. When in Group Therapy, Gaurang will report mood, symptoms, and coping skills to manage symptoms and mood.   4. Gaurang will establish care with an individual therapist.      Area of Treatment Focus:  Symptom Management     Therapeutic Interventions/Treatment Strategies:  Support, Feedback, Safety Assessments, Structured Activity and Education     Response to Treatment Strategies:  Accepted Feedback, Listened, Attentive, Accepted Support and Alert     Name of Group: mental health management   Number of Group Participants: 6     Description and Outcome:  Client presented as calm and alert with good focus and concentration.  The group participated in a structured activity that involved reading a worksheet writing and discussing answers to questions about self-compassion. The group participated in a mindfulness exercise of deep breathing and listening to different sounds.       Client would benefit from additional opportunities to practice and implement content from this session  in every day life,relationships and mental health recovery.     Is this a Weekly Review of the Progress on the Treatment Plan?  Yes.  "      Are Treatment Plan Goals being addressed?  Yes, continue treatment goals        Are Treatment Plan Strategies to Address Goals Effective?  Yes, continue treatment strategies        Are there any current contracts in place?  No

## 2019-05-16 NOTE — PROGRESS NOTES
"Adult Mental Health Outpatient Group Therapy Progress Note     Client Initial Individualized Goals for Treatment: \"how to expand my social engagement given that I am an introvert, find a way to increase physical activity, find a way to increase activities/productivity in my life.\"     See Initial Treatment suggestions for the client during the time between Diagnostic Assessment and completion of the Master Individualized Treatment Plan.     Treatment Goals:  1.Client will notify staff when needing assistance to develop or implement a coping plan to manage suicidal or self injurious urges.  Client will use coping plan for safety, as needed.  2. When in life skills group Gaurang will practice opposite to emotion to help improve motivation to take better care of himself  providing an update of progress weekly  3. When in Group Therapy, Gaurang will report mood, symptoms, and coping skills to manage symptoms and mood.   4. Gaurang will establish care with an individual therapist.     Area of Treatment Focus:  Symptom Management, Personal Safety and Community Resources/Discharge Planning    Therapeutic Interventions/Treatment Strategies:  Support, Feedback, Safety Assessments, Education and Cognitive Behavioral Therapy    Response to Treatment Strategies:  Accepted Feedback, Gave Feedback, Listened and Focused on Goals     Name of Group: Group Psychotherapy I and Group Psychotherapy II Date/Time: 5/1619 / 0900 to 0950 and 1100 to 1150    Number of Group Participants: 6    Description and Outcome:  Group Psychotherapy I: Gaurang reported increased negative self-talk and decreased level of hope over the past two days.  He stted that he does not care about himself and shared current health concerns over weight gain and nutritional choices.  He stated that that his commitment to his parents keeps him from acting on any passive suicidal thoughts that he experiences.  Client reviewed plan to continue working on the negative " self-judgements in individual therapy.  Client denied suicidal ideation, intent and plan. Client shared discharge plan with the group.  Client was receptive to feedback from peers on their strengths and progress in the program.    Group Psychotherapy II:  Client participated in a discussion to learn components of self-compassion.  Client identified ways to practice self-compassion in daily life.  Client identified barriers to self-compassion practice.      Client verbalized understanding of session content by sharing current symptoms and stressors.    Is this a Weekly Review of the Progress on the Treatment Plan?  Yes.      Are Treatment Plan Goals being addressed?  Client discharged      Are Treatment Plan Strategies to Address Goals Effective?  Client discharged      Are there any current contracts in place?  No

## 2019-05-21 ENCOUNTER — OFFICE VISIT (OUTPATIENT)
Dept: BEHAVIORAL HEALTH | Facility: CLINIC | Age: 49
End: 2019-05-21
Payer: COMMERCIAL

## 2019-05-21 DIAGNOSIS — F33.2 MDD (MAJOR DEPRESSIVE DISORDER), RECURRENT EPISODE, SEVERE (H): Primary | ICD-10-CM

## 2019-05-21 PROCEDURE — 90834 PSYTX W PT 45 MINUTES: CPT | Performed by: SOCIAL WORKER

## 2019-05-21 NOTE — PROGRESS NOTES
"                                             Progress Note    Client Name: Gaurang Rivera  Date: 5/21/2019          Service Type: Individual  Video Visit: No     Session Start Time: 430  Session End Time: 515     Session Length: 38-52    Session #: 4    Attendees: Client attended alone     Treatment Plan Last Reviewed: 5/1/2019   PHQ-9 / SOL-7 :     DATA  Interactive Complexity: No  Crisis: No       Progress Since Last Session (Related to Symptoms / Goals / Homework):   Symptoms: No change -    Homework: Partially completed      Episode of Care Goals: Satisfactory progress - PREPARATION (Decided to change - considering how); Intervened by negotiating a change plan and determining options / strategies for behavior change, identifying triggers, exploring social supports, and working towards setting a date to begin behavior change     Current / Ongoing Stressors and Concerns:     Client notes that things have been \"shoulder has still been causing me difficulty.\"  Finished day treatment last week and is feeling good about this.  Some fatigue lately, feels like he is sleeping ok but is still tired during the day.  Some lethargy and lack of motivation to do much.  Made plan for client to attend [facility] group this Friday.               Treatment Objective(s) Addressed in This Session:   Goal-Depression: Client will decrease depressed mood    I will know I've met my goal when I am less depressed.      Objective #A (Client Action)    Client will use identified behavioral and cognitive skills to challenge negative self talk 90% of the time.      Objective #B  Client will complete at least 10 minutes of ACE activities (e.g.Achievement, Closeness, and Enjoyment) or other Behavioral Activation goals per day.        Objective #C  Client will exercise 30 minutes 36 times in the next 12 weeks.       Intervention:   CBT: - Discussed efforts client has made to resolve the situation/symptoms in the past.  Explored various response styles " including active, passive, proactive, reactive, and inactive.  Discussed benefits of a proactive response style.  Reviewed strategies to cultivate a more proactive response style and identified barriers to this including fear, indifference, hopelessness, and low motivation.          ASSESSMENT: Current Emotional / Mental Status (status of significant symptoms):   Risk status (Self / Other harm or suicidal ideation)   Client denies current fears or concerns for personal safety.   Client denies current or recent suicidal ideation or behaviors.   Client denies current or recent homicidal ideation or behaviors.   Client denies current or recent self injurious behavior or ideation.   Client denies other safety concerns.   Client Client reports there has been no change in risk factors since their last session.     Client Client reports there has been no change in protective factors since their last session.     A safety and risk management plan has not been developed at this time, however client was given the after-hours number / 911 should there be a change in any of these risk factors.     Appearance:   Appropriate    Eye Contact:   Good    Psychomotor Behavior: Normal    Attitude:   Cooperative    Orientation:   All   Speech    Rate / Production: Normal     Volume:  Normal    Mood:    Depressed    Affect:    Appropriate    Thought Content:  Clear    Thought Form:  Coherent  Logical    Insight:    Good      Medication Review:   No changes to current psychiatric medication(s)     Medication Compliance:   Yes     Changes in Health Issues:   None reported     Chemical Use Review:   Substance Use: Chemical use reviewed, no active concerns identified      Tobacco Use: No current tobacco use.      Diagnosis:  1. MDD (major depressive disorder), recurrent episode, severe (H)        Collateral Reports Completed:   Not Applicable    PLAN: (Client Tasks / Therapist Tasks / Other)  1.  Client will log activities of Achievement,  Closeness, and Enjoyment (ACEs) using ACE rating scale and bring to next session.           2.  client to attend [facility] group this Friday.          3.  Meet in two weeks        QUIQUE Mcadams                                                         ______________________________________________________________________    Treatment Plan    Client's Name: Gaurang Rivera  YOB: 1970    Date: 5/1/2019                                                                          DSM5 Diagnoses: (Sustained by DSM5 Criteria Listed Above)  Diagnoses: 296.33 (F33.2) Major Depressive Disorder, Recurrent Episode, Severe _  Psychosocial & Contextual Factors: chronic pain, limited social support  WHODAS 2.0 (12 item)            This questionnaire asks about difficulties due to health conditions. Health conditions  include  disease or illnesses, other health problems that may be short or long lasting,  injuries, mental health or emotional problems, and problems with alcohol or drugs.                     Think back over the past 30 days and answer these questions, thinking about how much  difficulty you had doing the following activities. For each question, please [place] only  one response.    S1 Standing for long periods such as 30 minutes? Moderate =   3   S2 Taking care of household responsibilities? Mild =           2   S3 Learning a new task, for example, learning how to get to a new place? None =         1   S4 How much of a problem do you have joining community activities (for example, festivals, Islam or other activities) in the same way as anyone else can? Severe =       4   S5 How much have you been emotionally affected by your health problems? Severe =       4     In the past 30 days, how much difficulty did you have in:   S6 Concentrating on doing something for ten minutes? None =         1   S7 Walking a long distance such as a kilometer (or equivalent)? Mild =           2   S8 Washing your whole  body? Mild =           2   S9 Getting dressed? None =         1   S10 Dealing with people you do not know? None =         1   S11 Maintaining a friendship? None =         1   S12 Your day to day work? Moderate =   3     H1 Overall, in the past 30 days, how many days were these difficulties present? Record number of days 20   H2 In the past 30 days, for how many days were you totally unable to carry out your usual activities or work because of any health condition? Record number of days  3   H3 In the past 30 days, not counting the days that you were totally unable, for how many days did you cut back or reduce your usual activities or work because of any health condition? Record number of days 10         Referral / Collaboration:  Referral to another professional/service is not indicated at this time..    Anticipated number of session or this episode of care: 18-24      MeasurableTreatment Goal(s) related to diagnosis / functional impairment(s)  Goal-Depression: Client will decrease depressed mood    I will know I've met my goal when I am less depressed.      Objective #A (Client Action)    Status: New - Date: 5/1/2019    Client will use identified behavioral and cognitive skills to challenge negative self talk 90% of the time.    Intervention(s)  Therapist will provide psychoeducation, behavioral activation, and cognitive restructuring.      Objective #B  Client will complete at least 10 minutes of ACE activities (e.g.Achievement, Closeness, and Enjoyment) or other Behavioral Activation goals per day.    Status: New - Date: 5/1/2019    Intervention(s)  Therapist will provide psychoeducation, behavioral activation, and cognitive restructuring.      Objective #C  Client will exercise 30 minutes 36 times in the next 12 weeks.  Status: New - Date: 5/1/2019    Intervention(s)  Therapist will provide psychoeducation, behavioral activation, and cognitive restructuring.                Client has reviewed and agreed to the  above plan.      Emory Hernandez, LICSW  May 1, 2019

## 2019-05-22 ENCOUNTER — THERAPY VISIT (OUTPATIENT)
Dept: PHYSICAL THERAPY | Facility: CLINIC | Age: 49
End: 2019-05-22
Payer: COMMERCIAL

## 2019-05-22 DIAGNOSIS — S42.141A CLOSED FRACTURE OF GLENOID CAVITY AND NECK OF RIGHT SCAPULA, INITIAL ENCOUNTER: ICD-10-CM

## 2019-05-22 DIAGNOSIS — S42.151A CLOSED FRACTURE OF GLENOID CAVITY AND NECK OF RIGHT SCAPULA, INITIAL ENCOUNTER: ICD-10-CM

## 2019-05-22 PROCEDURE — 97140 MANUAL THERAPY 1/> REGIONS: CPT | Mod: GP | Performed by: PHYSICAL THERAPY ASSISTANT

## 2019-05-22 PROCEDURE — 97110 THERAPEUTIC EXERCISES: CPT | Mod: GP | Performed by: PHYSICAL THERAPY ASSISTANT

## 2019-05-31 ENCOUNTER — THERAPY VISIT (OUTPATIENT)
Dept: PHYSICAL THERAPY | Facility: CLINIC | Age: 49
End: 2019-05-31
Payer: COMMERCIAL

## 2019-05-31 DIAGNOSIS — S42.141A CLOSED FRACTURE OF GLENOID CAVITY AND NECK OF RIGHT SCAPULA, INITIAL ENCOUNTER: ICD-10-CM

## 2019-05-31 DIAGNOSIS — S42.151A CLOSED FRACTURE OF GLENOID CAVITY AND NECK OF RIGHT SCAPULA, INITIAL ENCOUNTER: ICD-10-CM

## 2019-05-31 PROCEDURE — 97110 THERAPEUTIC EXERCISES: CPT | Mod: GP | Performed by: PHYSICAL THERAPIST

## 2019-05-31 PROCEDURE — 97140 MANUAL THERAPY 1/> REGIONS: CPT | Mod: GP | Performed by: PHYSICAL THERAPIST

## 2019-06-07 ENCOUNTER — OFFICE VISIT (OUTPATIENT)
Dept: BEHAVIORAL HEALTH | Facility: CLINIC | Age: 49
End: 2019-06-07
Payer: COMMERCIAL

## 2019-06-07 DIAGNOSIS — F33.2 MDD (MAJOR DEPRESSIVE DISORDER), RECURRENT EPISODE, SEVERE (H): Primary | ICD-10-CM

## 2019-06-07 PROCEDURE — 90834 PSYTX W PT 45 MINUTES: CPT | Performed by: SOCIAL WORKER

## 2019-06-07 NOTE — PROGRESS NOTES
"                                             Progress Note    Client Name: Gaurang Rivera  Date: 6/7/2019          Service Type: Individual  Video Visit: No     Session Start Time: 330  Session End Time: 415     Session Length: 38-52    Session #: 5    Attendees: Client attended alone     Treatment Plan Last Reviewed: 5/1/2019   PHQ-9 / SOL-7 :     DATA  Interactive Complexity: No  Crisis: No       Progress Since Last Session (Related to Symptoms / Goals / Homework):   Symptoms: No change -    Homework: Partially completed      Episode of Care Goals: Satisfactory progress - PREPARATION (Decided to change - considering how); Intervened by negotiating a change plan and determining options / strategies for behavior change, identifying triggers, exploring social supports, and working towards setting a date to begin behavior change     Current / Ongoing Stressors and Concerns:     Client notes that things have been \"up and down.\"  Has had some depression and one day was especially down, could not seem to \"get up and get out of it.\"  Did feel better the next day, and looked up a [facility] group, was hoping to go that day but there was not a meeting available.  Found an [facility] group the next week and went and thinks that it went well.  He had gone to OA while he was living in [place] and did find this to be helpful for his eating and also for his depression.               Treatment Objective(s) Addressed in This Session:   Goal-Depression: Client will decrease depressed mood    I will know I've met my goal when I am less depressed.      Objective #A (Client Action)    Client will use identified behavioral and cognitive skills to challenge negative self talk 90% of the time.      Objective #B  Client will complete at least 10 minutes of ACE activities (e.g.Achievement, Closeness, and Enjoyment) or other Behavioral Activation goals per day.        Objective #C  Client will exercise 30 minutes 36 times in the " next 12 weeks.       Intervention:   CBT: - Client will log activities of Achievement, Closeness, and Enjoyment (ACEs) using ACE rating scale and bring to next session.                ASSESSMENT: Current Emotional / Mental Status (status of significant symptoms):   Risk status (Self / Other harm or suicidal ideation)   Client denies current fears or concerns for personal safety.   Client denies current or recent suicidal ideation or behaviors.   Client denies current or recent homicidal ideation or behaviors.   Client denies current or recent self injurious behavior or ideation.   Client denies other safety concerns.   Client Client reports there has been no change in risk factors since their last session.     Client Client reports there has been no change in protective factors since their last session.     A safety and risk management plan has not been developed at this time, however client was given the after-hours number / 911 should there be a change in any of these risk factors.     Appearance:   Appropriate    Eye Contact:   Good    Psychomotor Behavior: Normal    Attitude:   Cooperative    Orientation:   All   Speech    Rate / Production: Normal     Volume:  Normal    Mood:    Depressed    Affect:    Appropriate    Thought Content:  Clear    Thought Form:  Coherent  Logical    Insight:    Good      Medication Review:   No changes to current psychiatric medication(s)     Medication Compliance:   Yes     Changes in Health Issues:   None reported     Chemical Use Review:   Substance Use: Chemical use reviewed, no active concerns identified      Tobacco Use: No current tobacco use.      Diagnosis:  No diagnosis found.    Collateral Reports Completed:   Not Applicable    PLAN: (Client Tasks / Therapist Tasks / Other)  1.  Client will log activities of Achievement, Closeness, and Enjoyment (ACEs) using ACE rating scale and bring to next session.           2.  client to attend [facility]EE[facility]NE and [employer] groups  at least once by next session        3.  Meet in two weeks        Emory Hernandez, LICSW                                                         ______________________________________________________________________    Treatment Plan    Client's Name: Gaurang Rivera  YOB: 1970    Date: 5/1/2019                                                                          DSM5 Diagnoses: (Sustained by DSM5 Criteria Listed Above)  Diagnoses: 296.33 (F33.2) Major Depressive Disorder, Recurrent Episode, Severe _  Psychosocial & Contextual Factors: chronic pain, limited social support  WHODAS 2.0 (12 item)            This questionnaire asks about difficulties due to health conditions. Health conditions  include  disease or illnesses, other health problems that may be short or long lasting,  injuries, mental health or emotional problems, and problems with alcohol or drugs.                     Think back over the past 30 days and answer these questions, thinking about how much  difficulty you had doing the following activities. For each question, please [place] only  one response.    S1 Standing for long periods such as 30 minutes? Moderate =   3   S2 Taking care of household responsibilities? Mild =           2   S3 Learning a new task, for example, learning how to get to a new place? None =         1   S4 How much of a problem do you have joining community activities (for example, festivals, Confucianism or other activities) in the same way as anyone else can? Severe =       4   S5 How much have you been emotionally affected by your health problems? Severe =       4     In the past 30 days, how much difficulty did you have in:   S6 Concentrating on doing something for ten minutes? None =         1   S7 Walking a long distance such as a kilometer (or equivalent)? Mild =           2   S8 Washing your whole body? Mild =           2   S9 Getting dressed? None =         1   S10 Dealing with people you do not know? None  =         1   S11 Maintaining a friendship? None =         1   S12 Your day to day work? Moderate =   3     H1 Overall, in the past 30 days, how many days were these difficulties present? Record number of days 20   H2 In the past 30 days, for how many days were you totally unable to carry out your usual activities or work because of any health condition? Record number of days  3   H3 In the past 30 days, not counting the days that you were totally unable, for how many days did you cut back or reduce your usual activities or work because of any health condition? Record number of days 10         Referral / Collaboration:  Referral to another professional/service is not indicated at this time..    Anticipated number of session or this episode of care: 18-24      MeasurableTreatment Goal(s) related to diagnosis / functional impairment(s)  Goal-Depression: Client will decrease depressed mood    I will know I've met my goal when I am less depressed.      Objective #A (Client Action)    Status: New - Date: 5/1/2019    Client will use identified behavioral and cognitive skills to challenge negative self talk 90% of the time.    Intervention(s)  Therapist will provide psychoeducation, behavioral activation, and cognitive restructuring.      Objective #B  Client will complete at least 10 minutes of ACE activities (e.g.Achievement, Closeness, and Enjoyment) or other Behavioral Activation goals per day.    Status: New - Date: 5/1/2019    Intervention(s)  Therapist will provide psychoeducation, behavioral activation, and cognitive restructuring.      Objective #C  Client will exercise 30 minutes 36 times in the next 12 weeks.  Status: New - Date: 5/1/2019    Intervention(s)  Therapist will provide psychoeducation, behavioral activation, and cognitive restructuring.                Client has reviewed and agreed to the above plan.      Emory Hernandez, [facility]  May 1, 2019

## 2019-06-12 ENCOUNTER — THERAPY VISIT (OUTPATIENT)
Dept: PHYSICAL THERAPY | Facility: CLINIC | Age: 49
End: 2019-06-12
Payer: COMMERCIAL

## 2019-06-12 DIAGNOSIS — S42.151A CLOSED FRACTURE OF GLENOID CAVITY AND NECK OF RIGHT SCAPULA, INITIAL ENCOUNTER: ICD-10-CM

## 2019-06-12 DIAGNOSIS — S42.141A CLOSED FRACTURE OF GLENOID CAVITY AND NECK OF RIGHT SCAPULA, INITIAL ENCOUNTER: ICD-10-CM

## 2019-06-12 PROCEDURE — 97140 MANUAL THERAPY 1/> REGIONS: CPT | Mod: GP | Performed by: PHYSICAL THERAPY ASSISTANT

## 2019-06-12 PROCEDURE — 97110 THERAPEUTIC EXERCISES: CPT | Mod: GP | Performed by: PHYSICAL THERAPY ASSISTANT

## 2019-06-19 ENCOUNTER — THERAPY VISIT (OUTPATIENT)
Dept: PHYSICAL THERAPY | Facility: CLINIC | Age: 49
End: 2019-06-19
Payer: COMMERCIAL

## 2019-06-19 DIAGNOSIS — S42.151A CLOSED FRACTURE OF GLENOID CAVITY AND NECK OF RIGHT SCAPULA, INITIAL ENCOUNTER: ICD-10-CM

## 2019-06-19 DIAGNOSIS — S42.141A CLOSED FRACTURE OF GLENOID CAVITY AND NECK OF RIGHT SCAPULA, INITIAL ENCOUNTER: ICD-10-CM

## 2019-06-19 PROCEDURE — 97110 THERAPEUTIC EXERCISES: CPT | Mod: GP | Performed by: PHYSICAL THERAPY ASSISTANT

## 2019-06-19 PROCEDURE — 97140 MANUAL THERAPY 1/> REGIONS: CPT | Mod: GP | Performed by: PHYSICAL THERAPY ASSISTANT

## 2019-06-24 ENCOUNTER — THERAPY VISIT (OUTPATIENT)
Dept: PHYSICAL THERAPY | Facility: CLINIC | Age: 49
End: 2019-06-24
Payer: COMMERCIAL

## 2019-06-24 DIAGNOSIS — S42.141A CLOSED FRACTURE OF GLENOID CAVITY AND NECK OF RIGHT SCAPULA, INITIAL ENCOUNTER: ICD-10-CM

## 2019-06-24 DIAGNOSIS — S42.151A CLOSED FRACTURE OF GLENOID CAVITY AND NECK OF RIGHT SCAPULA, INITIAL ENCOUNTER: ICD-10-CM

## 2019-06-24 PROCEDURE — 97110 THERAPEUTIC EXERCISES: CPT | Mod: GP | Performed by: PHYSICAL THERAPIST

## 2019-06-24 PROCEDURE — 97140 MANUAL THERAPY 1/> REGIONS: CPT | Mod: GP | Performed by: PHYSICAL THERAPIST

## 2019-06-24 NOTE — PROGRESS NOTES
PROGRESS  REPORT    Progress reporting period is from 5/13/19 to 6/24/19.       SUBJECTIVE  Subjective: Patient states that he had a set back last week after lifting a motor.  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 Overall feels that his shoulder function has remained the same.    Changes in function:  Yes (See Goal flowsheet attached for changes in current functional level)  Adverse reaction to treatment or activity: None    OBJECTIVE  Changes noted in objective findings:  Yes  Objective: AROM R shoulder: , , ER in neutral 35, IR/EXT L2. PROM R shoulder: ,  , ER in neutral 35.     ASSESSMENT/PLAN  Updated problem list and treatment plan: Diagnosis 1:  Glenoid fracture    Pain -  hot/cold therapy, US, electric stimulation, manual therapy, splint/taping/bracing/orthotics, self management, education and home program  Decreased ROM/flexibility - manual therapy and therapeutic exercise  Decreased joint mobility - manual therapy and therapeutic exercise  Decreased strength - therapeutic exercise and therapeutic activities  Decreased proprioception - neuro re-education and therapeutic activities  Impaired muscle performance - neuro re-education  Decreased function - therapeutic activities  STG/LTGs have been met or progress has been made towards goals:  Yes (See Goal flow sheet completed today.)  Assessment of Progress: The patient's progress has slowed.  Self Management Plans:  Patient has been instructed in a home treatment program.  I have re-evaluated this patient and find that the nature, scope, duration and intensity of the therapy is appropriate for the medical condition of the patient.  Peter continues to require the following intervention to meet STG and LTG's:  PT    Recommendations:  This patient would benefit from continued therapy.     Frequency:  1 X week, once daily  Duration:  for 6 weeks    Patient would benefit from further evaluation due to slow  progress.        Please refer to the daily flowsheet for treatment today, total treatment time and time spent performing 1:1 timed codes.

## 2019-06-26 ENCOUNTER — OFFICE VISIT (OUTPATIENT)
Dept: ORTHOPEDICS | Facility: CLINIC | Age: 49
End: 2019-06-26
Payer: COMMERCIAL

## 2019-06-26 ENCOUNTER — TELEPHONE (OUTPATIENT)
Dept: ORTHOPEDICS | Facility: CLINIC | Age: 49
End: 2019-06-26

## 2019-06-26 DIAGNOSIS — S42.151S CLOSED FRACTURE OF GLENOID CAVITY AND NECK OF RIGHT SCAPULA, SEQUELA: Primary | ICD-10-CM

## 2019-06-26 DIAGNOSIS — S42.141S CLOSED FRACTURE OF GLENOID CAVITY AND NECK OF RIGHT SCAPULA, SEQUELA: Primary | ICD-10-CM

## 2019-06-26 NOTE — TELEPHONE ENCOUNTER
Attempted to reach out to patient to discuss scheduling surgery with Dr. Burdick. Left best call back number of [phone]

## 2019-06-26 NOTE — NURSING NOTE
Reason For Visit:   Chief Complaint   Patient presents with     RECHECK     Follow up right shoulder injury.  Bankart injury.  Last seen at [facility]       PCP: Elvis Guzman  Ref: Self    ?  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  Currently working? Yes.  Work status?  Part-time.  Date of injury: 2/2019  Type of injury: fall.  Date of surgery: NA  Type of surgery: NA.  Smoker: No  Request smoking cessation information: No    Right hand dominant    SANE score  Affected shoulder: Right  Right shoulder SANE: 25  Left shoulder SANE: 100    There were no vitals taken for this visit.      Pain Assessment  Patient Currently in Pain: Yes  0-10 Pain Scale: 1  Primary Pain Location: Shoulder(Right)  Pain Descriptors: Aching, Other (comment)(Deep)  Aggravating Factors: Movement    Edith Sam ATC

## 2019-06-26 NOTE — LETTER
6/26/2019       RE: Gaurang Rivera  [address]  [facility] [id number]     Dear Colleague,    Thank you for referring your patient, Gaurang Rivera, to the [facility]. Please see a copy of my visit note below.    CHIEF CONCERN: Right shoulder pain    HISTORY OF PRESENT ILLNESS: Gaurang Rivera  is a 48-year-old right-hand-dominant male who presents for a 6-week follow-up visit.  Since we last saw him, he has been participating in physical therapy.  He feels that physical therapy has not changed his range of motion or functionality significantly.  He also feels that physical therapy increases the pain in his right shoulder.  He feels that the pain limits his ability to perform daily functions as well as  competitive rowing.  He denies catching, numbness, or tingling.     PHYSICAL EXAM:    Gen: Adult male in no acute distress. Articulates and communicates with normal affect.  Pulm: Respirations even and unlabored  CV: Distal radius 2+ bilaterally  MSK: Focused right upper extremity exam:   Skin intact. No erythema. Sensation intact all dermatomes into the hand to light touch. EPL, FPL, and [employer]ins[employer] intact. Right shoulder active motion is FE to 100 and, ER at side to 35, and IR to buttock/back pocket. Left shoulder active motion is FE to 170, ER to 90, and IR to T8. No pain on palpation over the AC joint.  No pain on palpation over the long head of the biceps.     IMAGING:  No new imaging at this visit.      ASSESSMENT:  1.  Malunion of right shoulder bony bankart  2.  Right shoulder stiffness    PLAN:  Reviewed the patient's previous CT scan with him.  We discussed that since he has had not had symptom relief with physical therapy in the interval  we discussed arthroscopic capsular release and gentle manipulation of his right shoulder to help improve his shoulder stiffness.  We discussed the postoperative recovery timeframe as well as  the role of physical therapy starting as soon as one day following surgery.  All of his questions were answered.  With shared decision-making, he is in agreement with this plan and wishes to proceed with surgery.  We will obtain a preoperative MRI to evaluate the soft tissue of the shoulder prior to surgery.    The patient was seen and discussed with Dr. Burdick.    Vitor Castro MD  Orthopaedic Surgery PGY-1    Again, thank you for allowing me to participate in the care of your patient.      Sincerely,    Dulce Maria Burdick MD

## 2019-06-26 NOTE — NURSING NOTE
Teaching Flowsheet   Relevant Diagnosis: Right shoulder arthrofibrosis  Teaching Topic:Pre-op for right shoulder diagnostic arthroscopy, capsular release, manipulation under anesthesia and possible open biceps tenodesis - surgery coordinator will call to schedule.  Patient will need PT 3 times a week x 2 weeks post-op.  Further PT sessions will be determined by the therapist     Person(s) involved in teaching:   Patient     Motivation Level:  Asks Questions: Yes  Cooperative: Yes  Receptive (willing/able to accept information): Yes  Any cultural factors/Lutheran beliefs that may influence understanding or compliance? No     Patient demonstrates understanding of the following:  Reason for the appointment, diagnosis and treatment plan: Yes  Knowledge of proper use of medications and conditions for which they are ordered (with special attention to potential side effects or drug interactions): Yes  Which situations necessitate calling provider and whom to contact: Yes     Teaching Concerns Addressed:   Pre-op by [facility] at [facility]  Lives alone - parents will provide transportation and assistance with cares after surgery  Aware of post-op expectations and importance of therapy     Proper use and care of sling prn (medical equip, care aids, etc.): Yes  Nutritional needs and diet plan: Yes  Pain management techniques: Yes  Wound Care: Yes  How and/when to access community resources: Yes     Instructional Materials Used/Given: Pre-op packet, surgical soap     Time spent with patient: 12.

## 2019-06-26 NOTE — PROGRESS NOTES
CHIEF CONCERN: Right shoulder pain    HISTORY OF PRESENT ILLNESS: Gaurang Rivera  is a 48-year-old right-hand-dominant male who presents for a 6-week follow-up visit.  Since we last saw him, he has been participating in physical therapy.  He feels that physical therapy has not changed his range of motion or functionality significantly.  He also feels that physical therapy increases the pain in his right shoulder.  He feels that the pain limits his ability to perform daily functions as well as  competitive rowing.  He denies catching, numbness, or tingling.     PHYSICAL EXAM:    Gen: Adult male in no acute distress. Articulates and communicates with normal affect.  Pulm: Respirations even and unlabored  CV: Distal radius 2+ bilaterally  MSK: Focused right upper extremity exam:   Skin intact. No erythema. Sensation intact all dermatomes into the hand to light touch. EPL, FPL, and Intrinsics intact. Right shoulder active motion is FE to 100 and, ER at side to 35, and IR to buttock/back pocket. Left shoulder active motion is FE to 170, ER to 90, and IR to T8. No pain on palpation over the AC joint.  No pain on palpation over the long head of the biceps.     IMAGING:  No new imaging at this visit.      ASSESSMENT:  1.  Malunion of right shoulder bony bankart  2.  Right shoulder stiffness    PLAN:  Reviewed the patient's previous CT scan with him.  We discussed that since he has had not had symptom relief with physical therapy in the interval  we discussed arthroscopic capsular release and gentle manipulation of his right shoulder to help improve his shoulder stiffness.  We discussed the postoperative recovery timeframe as well as the role of physical therapy starting as soon as one day following surgery.  All of his questions were answered.  With shared decision-making, he is in agreement with this plan and wishes to proceed with surgery.  We will obtain a preoperative MRI to evaluate the soft tissue of the shoulder  prior to surgery.    The patient was seen and discussed with Dr. Burdick.    Vitor Castro MD  Orthopaedic Surgery PGY-1    I have personally examined this patient and have reviewed the clinical presentation and progress note with the resident.  I agree with the treatment plan as outlined.  The plan was formulated with the resident on the day of the resident's note.

## 2019-06-28 ENCOUNTER — OFFICE VISIT (OUTPATIENT)
Dept: FAMILY MEDICINE | Facility: CLINIC | Age: 49
End: 2019-06-28
Payer: COMMERCIAL

## 2019-06-28 ENCOUNTER — HOSPITAL ENCOUNTER (OUTPATIENT)
Facility: AMBULATORY SURGERY CENTER | Age: 49
End: 2019-06-28
Attending: ORTHOPAEDIC SURGERY
Payer: COMMERCIAL

## 2019-06-28 VITALS
RESPIRATION RATE: 16 BRPM | BODY MASS INDEX: 33.74 KG/M2 | DIASTOLIC BLOOD PRESSURE: 85 MMHG | SYSTOLIC BLOOD PRESSURE: 121 MMHG | HEART RATE: 83 BPM | OXYGEN SATURATION: 92 % | TEMPERATURE: 98.6 F | WEIGHT: 292 LBS

## 2019-06-28 DIAGNOSIS — R80.9 MICROALBUMINURIA DUE TO TYPE 2 DIABETES MELLITUS (H): ICD-10-CM

## 2019-06-28 DIAGNOSIS — F33.1 MODERATE EPISODE OF RECURRENT MAJOR DEPRESSIVE DISORDER (H): ICD-10-CM

## 2019-06-28 DIAGNOSIS — Z11.4 ENCOUNTER FOR SCREENING FOR HIV: ICD-10-CM

## 2019-06-28 DIAGNOSIS — L60.0 INGROWN LEFT BIG TOENAIL: ICD-10-CM

## 2019-06-28 DIAGNOSIS — E78.5 HYPERLIPIDEMIA, UNSPECIFIED HYPERLIPIDEMIA TYPE: ICD-10-CM

## 2019-06-28 DIAGNOSIS — E11.29 TYPE 2 DIABETES MELLITUS WITH MICROALBUMINURIA, WITHOUT LONG-TERM CURRENT USE OF INSULIN (H): Primary | ICD-10-CM

## 2019-06-28 DIAGNOSIS — R80.9 TYPE 2 DIABETES MELLITUS WITH MICROALBUMINURIA, WITHOUT LONG-TERM CURRENT USE OF INSULIN (H): Primary | ICD-10-CM

## 2019-06-28 DIAGNOSIS — E11.29 MICROALBUMINURIA DUE TO TYPE 2 DIABETES MELLITUS (H): ICD-10-CM

## 2019-06-28 DIAGNOSIS — E11.9 TYPE 2 DIABETES MELLITUS WITHOUT COMPLICATION, WITHOUT LONG-TERM CURRENT USE OF INSULIN (H): ICD-10-CM

## 2019-06-28 DIAGNOSIS — N40.0 BENIGN PROSTATIC HYPERPLASIA, UNSPECIFIED WHETHER LOWER URINARY TRACT SYMPTOMS PRESENT: ICD-10-CM

## 2019-06-28 LAB
CHOLEST SERPL-MCNC: 204 MG/DL
CREAT UR-MCNC: 250 MG/DL
HBA1C MFR BLD: 7.1 % (ref 0–5.6)
HDLC SERPL-MCNC: 29 MG/DL
HIV 1+2 AB+HIV1 P24 AG SERPL QL IA: NONREACTIVE
LDLC SERPL CALC-MCNC: ABNORMAL MG/DL
MICROALBUMIN UR-MCNC: 50 MG/L
MICROALBUMIN/CREAT UR: 20.2 MG/G CR (ref 0–17)
NONHDLC SERPL-MCNC: 175 MG/DL
TRIGL SERPL-MCNC: 530 MG/DL

## 2019-06-28 PROCEDURE — 87389 HIV-1 AG W/HIV-1&-2 AB AG IA: CPT | Performed by: FAMILY MEDICINE

## 2019-06-28 PROCEDURE — 99214 OFFICE O/P EST MOD 30 MIN: CPT | Performed by: FAMILY MEDICINE

## 2019-06-28 PROCEDURE — 83036 HEMOGLOBIN GLYCOSYLATED A1C: CPT | Performed by: FAMILY MEDICINE

## 2019-06-28 PROCEDURE — 80061 LIPID PANEL: CPT | Performed by: FAMILY MEDICINE

## 2019-06-28 PROCEDURE — 36415 COLL VENOUS BLD VENIPUNCTURE: CPT | Performed by: FAMILY MEDICINE

## 2019-06-28 PROCEDURE — 82043 UR ALBUMIN QUANTITATIVE: CPT | Performed by: FAMILY MEDICINE

## 2019-06-28 RX ORDER — LISINOPRIL 5 MG/1
5 TABLET ORAL DAILY
Qty: 90 TABLET | Refills: 1 | Status: SHIPPED | OUTPATIENT
Start: 2019-06-28 | End: 2019-11-10

## 2019-06-28 RX ORDER — SIMVASTATIN 40 MG
40 TABLET ORAL AT BEDTIME
Qty: 90 TABLET | Refills: 1 | Status: SHIPPED | OUTPATIENT
Start: 2019-06-28 | End: 2019-11-10

## 2019-06-28 ASSESSMENT — ANXIETY QUESTIONNAIRES
IF YOU CHECKED OFF ANY PROBLEMS ON THIS QUESTIONNAIRE, HOW DIFFICULT HAVE THESE PROBLEMS MADE IT FOR YOU TO DO YOUR WORK, TAKE CARE OF THINGS AT HOME, OR GET ALONG WITH OTHER PEOPLE: SOMEWHAT DIFFICULT
7. FEELING AFRAID AS IF SOMETHING AWFUL MIGHT HAPPEN: NOT AT ALL
2. NOT BEING ABLE TO STOP OR CONTROL WORRYING: NOT AT ALL
5. BEING SO RESTLESS THAT IT IS HARD TO SIT STILL: NOT AT ALL
6. BECOMING EASILY ANNOYED OR IRRITABLE: NOT AT ALL
3. WORRYING TOO MUCH ABOUT DIFFERENT THINGS: SEVERAL DAYS
GAD7 TOTAL SCORE: 1
1. FEELING NERVOUS, ANXIOUS, OR ON EDGE: NOT AT ALL

## 2019-06-28 ASSESSMENT — PATIENT HEALTH QUESTIONNAIRE - PHQ9
5. POOR APPETITE OR OVEREATING: NOT AT ALL
SUM OF ALL RESPONSES TO PHQ QUESTIONS 1-9: 9

## 2019-06-28 NOTE — PROGRESS NOTES
Subjective     Gaurang Rivera is a 48 year old male who presents to clinic today for the following health issues:    HPI   Depression Followup    How are you doing with your depression since your last visit? No change    Are you having other symptoms that might be associated with depression? No    Have you had a significant life event?  No     Are you feeling anxious or having panic attacks?   No    Do you have any concerns with your use of alcohol or other drugs? No    Social History     Tobacco Use     Smoking status: Never Smoker     Smokeless tobacco: Never Used   Substance Use Topics     Alcohol use: No     Comment: None     Drug use: No     PHQ 2/26/2019 3/19/2019 4/17/2019   PHQ-9 Total Score 15 18 18   Q9: Thoughts of better off dead/self-harm past 2 weeks Several days More than half the days More than half the days   F/U: Thoughts of suicide or self-harm - Yes -   F/U: Safety concerns - No -     SOL-7 SCORE 2/19/2019 2/26/2019 3/19/2019   Total Score - - 4 (minimal anxiety)   Total Score 6 3 4     He did day treatment which helped with depression.   He feels that worsening depression has improved and now back to baseline.   He recently fractured shoulder and has been doing PT. He had a follow up with orthopedic surgeon and will have surgery. He will call to schedule surgery.   He needs refill for simvastatin and metformin. He hasn't been taking medication for a couple of months. No side effects for medication.   He stopped taking Flomax and will schedule with urology.   Diabetes - No polyuria, polydipsia, neuropathy, abdominal pain or vision changes.   HLD - Not monitoring cholesterol.   He had a recent ingrown toenail and was seen by Podiatry. He now has left toe which has been bothering him.     Suicide Assessment Five-step Evaluation and Treatment (SAFE-T)    Amount of exercise or physical activity: 2-3 days/week for an average of 15-30 minutes    Problems taking medications regularly: No    Medication  side effects: none    Diet: regular (no restrictions)    Reviewed and updated as needed this visit by Provider         Review of Systems   ROS COMP: 10 point ROS negative except for above       Objective    /85   Pulse 83   Temp 98.6  F (37  C) (Oral)   Resp 16   Wt 132.5 kg (292 lb)   SpO2 92%   BMI 33.74 kg/m    Physical Exam   GENERAL: healthy, alert and no distress  EYES: Eyes grossly normal to inspection  HENT: nose and mouth without ulcers or lesions  MS/SKIN: left great toe (medial) with mild erythema, no edema or pus drainage    Diagnostic Test Results:  Labs reviewed in Epic        Assessment & Plan   ## Type 2 diabetes mellitus with microalbuminuria, without long-term current use of insulin (H)  - non compliant for a few months  - currently asymptomatic  - obtaining baseline labs and restarting metformin  - up to date with eye exam   - HEMOGLOBIN A1C  - Albumin Random Urine Quantitative with Creat Ratio  - Basic metabolic panel; Future  - metFORMIN (GLUCOPHAGE) 500 MG tablet; Take 2 tablets (1,000 mg) by mouth 2 times daily (with meals)  Dispense: 120 tablet; Refill: 2    ## Hyperlipidemia, unspecified hyperlipidemia type  - noncompliance, restarting medication   - Lipid Profile (Chol, Trig, HDL, LDL calc)  - simvastatin (ZOCOR) 40 MG tablet; Take 1 tablet (40 mg) by mouth At Bedtime  Dispense: 90 tablet; Refill: 1    ## Encounter for screening for HIV  - HIV Screening    ## Moderate episode of recurrent major depressive disorder (H)  - followed by psychiatry, passive SI which is stable, no active thoughts    ## Microalbuminuria due to type 2 diabetes mellitus (H)  - restarting medication, discussed s/e   - lisinopril (PRINIVIL/ZESTRIL) 5 MG tablet; Take 1 tablet (5 mg) by mouth daily  Dispense: 90 tablet; Refill: 1    ## Left toe ingrown toenail   - advised below   For now soak your feet twice per week and then lift the nailbed with detnal floss. Please message me if you have increasing toe  pain, redness, swelling, pus drainage, fever or chills. I'll call in an antibiotic prescription and have you follow up with the podiatrist.     ## BPH  - pt will schedule visit with urology       Return in about 4 weeks (around 7/26/2019) for pre-op .    Elvis Guzman MD  [facility] [place] [facility] [place]

## 2019-06-28 NOTE — TELEPHONE ENCOUNTER
Patient is scheduled for surgery with Dr. Burdick    Spoke or left message with: Patient    Date of Surgery: 7/23/19    Location: [facility]    Post ops: 2 weeks & 6 weeks with MD, scheduled    Pre-op with surgeon (if applicable): Complete    H&P: Patient will schedule with PCP    Additional imaging/appointments: PT scheduled M-W-F for first 2 weeks    Surgery packet: Received in clinic    Additional comments: N/A

## 2019-06-28 NOTE — PATIENT INSTRUCTIONS
Please restart below medications -     Metformin for diabetes mellitus. Metformin 500 mg with breakfast for 7 days, if tolerating then increase to 500 mg with breakfast and dinner for 7 days, if tolerating then increase to 1000 mg with breakfast and 500 mg with dinner for 7 days, if tolerating then increase to 1000 mg with breakfast and dinner and stay on that dose.     Simvastatin 40 mg daily, to help lower your cholesterol.     Lisinopril 5 mg daily, helps protect your kidneys.     For now soak your feet twice per week and then lift the nailbed with detnal floss. Please message me if you have increasing toe pain, redness, swelling, pus drainage, fever or chills. I'll call in an antibiotic prescription and have you follow up with the podiatrist.

## 2019-06-29 ASSESSMENT — ANXIETY QUESTIONNAIRES: GAD7 TOTAL SCORE: 1

## 2019-07-05 ENCOUNTER — APPOINTMENT (OUTPATIENT)
Dept: ULTRASOUND IMAGING | Facility: CLINIC | Age: 49
End: 2019-07-05
Payer: COMMERCIAL

## 2019-07-05 ENCOUNTER — HOSPITAL ENCOUNTER (EMERGENCY)
Facility: CLINIC | Age: 49
Discharge: HOME | End: 2019-07-05
Admitting: FAMILY MEDICINE
Payer: COMMERCIAL

## 2019-07-05 ENCOUNTER — OFFICE VISIT (OUTPATIENT)
Dept: URGENT CARE | Facility: URGENT CARE | Age: 49
End: 2019-07-05
Payer: COMMERCIAL

## 2019-07-05 ENCOUNTER — NURSE TRIAGE (OUTPATIENT)
Dept: NURSING | Facility: CLINIC | Age: 49
End: 2019-07-05

## 2019-07-05 VITALS
DIASTOLIC BLOOD PRESSURE: 84 MMHG | HEART RATE: 69 BPM | SYSTOLIC BLOOD PRESSURE: 126 MMHG | OXYGEN SATURATION: 96 % | RESPIRATION RATE: 16 BRPM | WEIGHT: 294 LBS | BODY MASS INDEX: 33.98 KG/M2 | TEMPERATURE: 98.3 F

## 2019-07-05 VITALS
BODY MASS INDEX: 33.98 KG/M2 | DIASTOLIC BLOOD PRESSURE: 78 MMHG | OXYGEN SATURATION: 95 % | TEMPERATURE: 98.5 F | HEART RATE: 93 BPM | WEIGHT: 294 LBS | SYSTOLIC BLOOD PRESSURE: 118 MMHG

## 2019-07-05 DIAGNOSIS — I82.4Z1 ACUTE DEEP VEIN THROMBOSIS (DVT) OF DISTAL END OF RIGHT LOWER EXTREMITY (H): ICD-10-CM

## 2019-07-05 DIAGNOSIS — R60.0 EDEMA OF RIGHT FOOT: Primary | ICD-10-CM

## 2019-07-05 LAB
ANION GAP SERPL CALCULATED.3IONS-SCNC: 7 MMOL/L (ref 3–14)
BASOPHILS # BLD AUTO: 0 10E9/L (ref 0–0.2)
BASOPHILS NFR BLD AUTO: 0.3 %
BUN SERPL-MCNC: 11 MG/DL (ref 7–30)
CALCIUM SERPL-MCNC: 8.7 MG/DL (ref 8.5–10.1)
CHLORIDE SERPL-SCNC: 106 MMOL/L (ref 94–109)
CO2 SERPL-SCNC: 27 MMOL/L (ref 20–32)
CREAT SERPL-MCNC: 0.98 MG/DL (ref 0.66–1.25)
DIFFERENTIAL METHOD BLD: NORMAL
EOSINOPHIL # BLD AUTO: 0.2 10E9/L (ref 0–0.7)
EOSINOPHIL NFR BLD AUTO: 2.8 %
ERYTHROCYTE [DISTWIDTH] IN BLOOD BY AUTOMATED COUNT: 12.2 % (ref 10–15)
GFR SERPL CREATININE-BSD FRML MDRD: 89 ML/MIN/{1.73_M2}
GLUCOSE SERPL-MCNC: 195 MG/DL (ref 70–99)
HCT VFR BLD AUTO: 44.8 % (ref 40–53)
HGB BLD-MCNC: 15.8 G/DL (ref 13.3–17.7)
IMM GRANULOCYTES # BLD: 0 10E9/L (ref 0–0.4)
IMM GRANULOCYTES NFR BLD: 0 %
LYMPHOCYTES # BLD AUTO: 2.7 10E9/L (ref 0.8–5.3)
LYMPHOCYTES NFR BLD AUTO: 38.3 %
MCH RBC QN AUTO: 30.4 PG (ref 26.5–33)
MCHC RBC AUTO-ENTMCNC: 35.3 G/DL (ref 31.5–36.5)
MCV RBC AUTO: 86 FL (ref 78–100)
MONOCYTES # BLD AUTO: 0.7 10E9/L (ref 0–1.3)
MONOCYTES NFR BLD AUTO: 10.4 %
NEUTROPHILS # BLD AUTO: 3.4 10E9/L (ref 1.6–8.3)
NEUTROPHILS NFR BLD AUTO: 48.2 %
NRBC # BLD AUTO: 0 10*3/UL
NRBC BLD AUTO-RTO: 0 /100
PLATELET # BLD AUTO: 184 10E9/L (ref 150–450)
POTASSIUM SERPL-SCNC: 3.6 MMOL/L (ref 3.4–5.3)
RBC # BLD AUTO: 5.2 10E12/L (ref 4.4–5.9)
SODIUM SERPL-SCNC: 140 MMOL/L (ref 133–144)
WBC # BLD AUTO: 7.1 10E9/L (ref 4–11)

## 2019-07-05 PROCEDURE — 99284 EMERGENCY DEPT VISIT MOD MDM: CPT | Mod: GC | Performed by: FAMILY MEDICINE

## 2019-07-05 PROCEDURE — 25000128 H RX IP 250 OP 636: Performed by: STUDENT IN AN ORGANIZED HEALTH CARE EDUCATION/TRAINING PROGRAM

## 2019-07-05 PROCEDURE — 96372 THER/PROPH/DIAG INJ SC/IM: CPT | Performed by: FAMILY MEDICINE

## 2019-07-05 PROCEDURE — 85025 COMPLETE CBC W/AUTO DIFF WBC: CPT | Performed by: STUDENT IN AN ORGANIZED HEALTH CARE EDUCATION/TRAINING PROGRAM

## 2019-07-05 PROCEDURE — 80048 BASIC METABOLIC PNL TOTAL CA: CPT | Performed by: STUDENT IN AN ORGANIZED HEALTH CARE EDUCATION/TRAINING PROGRAM

## 2019-07-05 PROCEDURE — 99284 EMERGENCY DEPT VISIT MOD MDM: CPT | Mod: 25 | Performed by: FAMILY MEDICINE

## 2019-07-05 PROCEDURE — 93971 EXTREMITY STUDY: CPT | Mod: RT

## 2019-07-05 RX ORDER — OXYCODONE AND ACETAMINOPHEN 5; 325 MG/1; MG/1
1-2 TABLET ORAL EVERY 4 HOURS PRN
Qty: 12 TABLET | Refills: 0 | Status: SHIPPED | OUTPATIENT
Start: 2019-07-05 | End: 2019-10-08

## 2019-07-05 RX ORDER — WARFARIN SODIUM 5 MG/1
5 TABLET ORAL DAILY
Qty: 4 TABLET | Refills: 0 | Status: SHIPPED | OUTPATIENT
Start: 2019-07-05 | End: 2019-07-12

## 2019-07-05 RX ADMIN — ENOXAPARIN SODIUM 135 MG: 150 INJECTION SUBCUTANEOUS at 13:24

## 2019-07-05 ASSESSMENT — ENCOUNTER SYMPTOMS
SHORTNESS OF BREATH: 0
COUGH: 0
FEVER: 0
CHILLS: 0
DIARRHEA: 0
NEUROLOGICAL NEGATIVE: 1
CHEST TIGHTNESS: 0
ABDOMINAL PAIN: 0
NAUSEA: 0
VOMITING: 0

## 2019-07-05 NOTE — TELEPHONE ENCOUNTER
Patient states he was seen at  earlier this date and needs to have an ultrasound of right foot; asking where to go.  Per note of office visit, advised patient to go to ED; patient will go to [facility].

## 2019-07-05 NOTE — DISCHARGE INSTRUCTIONS
For your foot pain, recommend elevating your the foot, using warm packs and protecting from trauma. You can use Tylenol + Percocet every 4-6 hours as needed.     You will be starting Lovenox injections and Coumadin at home. Follow up with your PCP tomorrow or Monday for INR check and further guidance for blood thinners. Stop taking ibuprofen while on Coumadin.    Return to the ER if you develop chest pain, cough, coughing up blood, shortness of breath or worsening pain in your leg.

## 2019-07-05 NOTE — ED AVS SNAPSHOT
[facility], [place], Emergency Department  [address]  [place] [address]  Phone:  [phone]  Fax:  [phone]                                    Gaurang Rivera   MRN: [id number]    Department:  [facility], Emergency Department   Date of Visit:  7/5/2019           After Visit Summary Signature Page    I have received my discharge instructions, and my questions have been answered. I have discussed any challenges I see with this plan with the nurse or doctor.    ..........................................................................................................................................  Patient/Patient Representative Signature      ..........................................................................................................................................  Patient Representative Print Name and Relationship to Patient    ..................................................               ................................................  Date                                   Time    ..........................................................................................................................................  Reviewed by Signature/Title    ...................................................              ..............................................  Date                                               Time          22EPIC Rev 08/18

## 2019-07-05 NOTE — PROGRESS NOTES
THIS IS A TRIAGE ENCOUNTER.   Gaurang Rivera is a 48 year old male presenting with a chief complaint of atraumatic swelling at the severe pain (when walking only), swelling at the entire right foot and right ankle (the entire ankle).  Patient is concerned about possible thrombophlebitis because his father had this condition in the past.    .  Onset of symptoms was two days ago.  No recent injury.  No cuts/scratches.  No history of blood clots in the legs.  No chest pain/shortness of breath.  No new medications.  No recent alcohol consumption.     Course of illness is worsening.      Treatment measures tried include Elevation without any relief.      Patient will go to the emergency room today for further evaluation.  Possible need for Ultrasound of the right foot.       Teddy Moser MD

## 2019-07-05 NOTE — ED PROVIDER NOTES
History     Chief Complaint   Patient presents with     Foot Pain     Sent in by clinic to check for blood clot     HPI  Gaurang Rivera is a 48 year old male with history of DM2 and HLD who presents with swelling of the right foot and ankle x2 days. He states he was sitting when the pain started on Wednesday evening, 2 days ago. The pain continued to worsen, prompting him to present to Urgent Care today. Due to concern for DVT, he was sent to the ER for further evaluation.     No redness or fever. No chest pain, cough or SOB. No personal history of DVT, father had DVT (unsure if provoked or not). No prolonged travel or bedrest. No known active cancer.     I have reviewed the Medications, Allergies, Past Medical and Surgical History, and Social History in the Epic system.  Meds are effexor, ritalin, lisinopril and metformin  Review of Systems   Constitutional: Negative for chills and fever.   HENT: Negative.  Negative for congestion.    Respiratory: Negative for cough, chest tightness and shortness of breath.    Cardiovascular: Negative for chest pain.   Gastrointestinal: Negative for abdominal pain, diarrhea, nausea and vomiting.   Genitourinary: Negative.  Negative for dysuria.   Musculoskeletal:        See hpi   Skin: Negative for wound.   Allergic/Immunologic: Negative for immunocompromised state.   Neurological: Negative.    Hematological: Negative.    All other systems reviewed and are negative.      Physical Exam   BP: 118/89  Pulse: 86  Temp: 97.5  F (36.4  C)  Resp: 16  Weight: 133.4 kg (294 lb)  SpO2: 96 %      Physical Exam   Constitutional: He is oriented to person, place, and time. He appears well-developed and well-nourished. No distress.   HENT:   Head: Normocephalic.   Neck: Normal range of motion. Neck supple.   Cardiovascular: Normal rate, regular rhythm, normal heart sounds and intact distal pulses.   No murmur heard.  Pulmonary/Chest: Effort normal and breath sounds normal. No respiratory  distress.   Abdominal: Soft. He exhibits no mass.   Musculoskeletal: He exhibits edema and tenderness.   right ankle and foot swelling, tender to palpation on the dorsum of his right foot, warm to touch compared to the left   Neurological: He is alert and oriented to person, place, and time.   Skin: He is not diaphoretic.   There are several areas of callus with potential skin openings on the dorsum of the foot   Psychiatric: He has a normal mood and affect.   Nursing note and vitals reviewed.      ED Course     Results for orders placed or performed during the hospital encounter of 07/05/19 (from the past 24 hour(s))   CBC with platelets differential   Result Value Ref Range    WBC 7.1 4.0 - 11.0 10e9/L    RBC Count 5.20 4.4 - 5.9 10e12/L    Hemoglobin 15.8 13.3 - 17.7 g/dL    Hematocrit 44.8 40.0 - 53.0 %    MCV 86 78 - 100 fl    MCH 30.4 26.5 - 33.0 pg    MCHC 35.3 31.5 - 36.5 g/dL    RDW 12.2 10.0 - 15.0 %    Platelet Count 184 150 - 450 10e9/L    Diff Method Automated Method     % Neutrophils 48.2 %    % Lymphocytes 38.3 %    % Monocytes 10.4 %    % Eosinophils 2.8 %    % Basophils 0.3 %    % Immature Granulocytes 0.0 %    Nucleated RBCs 0 0 /100    Absolute Neutrophil 3.4 1.6 - 8.3 10e9/L    Absolute Lymphocytes 2.7 0.8 - 5.3 10e9/L    Absolute Monocytes 0.7 0.0 - 1.3 10e9/L    Absolute Eosinophils 0.2 0.0 - 0.7 10e9/L    Absolute Basophils 0.0 0.0 - 0.2 10e9/L    Abs Immature Granulocytes 0.0 0 - 0.4 10e9/L    Absolute Nucleated RBC 0.0    Basic metabolic panel   Result Value Ref Range    Sodium 140 133 - 144 mmol/L    Potassium 3.6 3.4 - 5.3 mmol/L    Chloride 106 94 - 109 mmol/L    Carbon Dioxide 27 20 - 32 mmol/L    Anion Gap 7 3 - 14 mmol/L    Glucose 195 (H) 70 - 99 mg/dL    Urea Nitrogen 11 7 - 30 mg/dL    Creatinine 0.98 0.66 - 1.25 mg/dL    GFR Estimate 89 >60 mL/min/[1.73_m2]    GFR Estimate If Black >90 >60 mL/min/[1.73_m2]    Calcium 8.7 8.5 - 10.1 mg/dL   US Lower Extremity Venous Duplex Right     Narrative    RIGHT LOWER EXTREMITY VENOUS DOPPLER ULTRASOUND  7/5/2019 12:41 PM    HISTORY: Right lower extremity pain and swelling. The concern is for  deep venous thrombosis.    COMPARISON: None.    FINDINGS:  Color flow and Doppler spectral waveform analysis was  performed of the common femoral, femoral, popliteal, posterior tibial,  and greater saphenous veins of the right lower extremity. There is  occlusive thrombus within the posterior tibial veins from the mid calf  to the ankle. The remaining deep veins of the right lower extremity  are widely patent.      Impression    IMPRESSION: Occlusive thrombus of the posterior tibial veins of the  mid to distal right calf.    FRITZ ALFARO MD         Assessments & Plan (with Medical Decision Making)   49 yo M with history of diabetes- non insulin who presents with swelling of the right lower extremity. He is afebrile and hemodynamically stable. His exam is concerning for DVT vs cellulitis. Venous Duplex of the right LE was positive for an occlusive thrombus of the posterior tibial veins of the right calf. He is hemodynamically stable. He does not have a leukocytosis making infection less likely.     He demonstrated ability to give himself Lovenox SubQ in the ER (first dose). He will be discharged with 10 days of Lovenox injections as a bridge for Coumadin. Discussed following closely with his PCP for further management of his anticoagulation therapy. He will need an INR check in the next 1-3 days. Instructed him that he should avoid ibuprofen and avoid head injury while on Coumadin. Return precautions were reviewed including chest pain, cough, shortness of breath or worsening leg pain. He will be discharged with a few doses of Percocet for management of his leg pain.     I have reviewed the nursing notes. I have reviewed the findings, diagnosis, plan and need for follow up with the patient.       Medication List      Started    enoxaparin 120 MG/0.8ML  syringe  Commonly known as:  LOVENOX  1 mg/kg, Subcutaneous, EVERY 12 HOURS     oxyCODONE-acetaminophen 5-325 MG tablet  Commonly known as:  PERCOCET  1-2 tablets, Oral, EVERY 4 HOURS PRN     warfarin 5 MG tablet  Commonly known as:  COUMADIN  5 mg, Oral, DAILY        Discontinued    IBUPROFEN PO            Final diagnoses:   Acute deep vein thrombosis (DVT) of distal end of right lower extremity (H)   ED staff note- I saw the pt independently of Resident Doctor Yovani. I agree with her physical exam and workup. The pt has a dvt below the knee. He has no symptoms concerning for PE. He is alert and intelligent and is able to do home lovenox therapy.  Dr Pina and I discussed the treatment and disposition.  Mandi Pina MD  7/5/2019   [facility], [place], EMERGENCY DEPARTMENT     Jigar Santana MD  07/17/19 1942

## 2019-07-08 ENCOUNTER — OFFICE VISIT (OUTPATIENT)
Dept: FAMILY MEDICINE | Facility: CLINIC | Age: 49
End: 2019-07-08
Payer: COMMERCIAL

## 2019-07-08 VITALS
BODY MASS INDEX: 33.54 KG/M2 | SYSTOLIC BLOOD PRESSURE: 104 MMHG | WEIGHT: 290.25 LBS | TEMPERATURE: 98.4 F | OXYGEN SATURATION: 93 % | HEART RATE: 86 BPM | RESPIRATION RATE: 14 BRPM | DIASTOLIC BLOOD PRESSURE: 77 MMHG

## 2019-07-08 DIAGNOSIS — I82.4Z1 ACUTE DEEP VEIN THROMBOSIS (DVT) OF DISTAL END OF RIGHT LOWER EXTREMITY (H): Primary | ICD-10-CM

## 2019-07-08 LAB — INR PPP: 1.1 (ref 0.86–1.14)

## 2019-07-08 PROCEDURE — 36415 COLL VENOUS BLD VENIPUNCTURE: CPT | Performed by: FAMILY MEDICINE

## 2019-07-08 PROCEDURE — 99214 OFFICE O/P EST MOD 30 MIN: CPT | Performed by: FAMILY MEDICINE

## 2019-07-08 PROCEDURE — 85610 PROTHROMBIN TIME: CPT | Performed by: FAMILY MEDICINE

## 2019-07-08 NOTE — PROGRESS NOTES
Subjective     Gaurang Rivera is a 48 year old male who presents to clinic today for the following health issues:    HPI   ED/UC Followup:    Facility:  [facility] Emergency Department  Date of visit: 07/05/2019  Reason for visit: Foot Pain - Blood Clot  Current Status: he is doing better his foot is still a little bit sore, some swelling but less then before      Was started on coumadin. Has 4 coumadin pills left.     Doing well. Injection twice per day and taking meds. Feeling better. Mild ache and swelling.       Social History     Occupational History     Not on file   Tobacco Use     Smoking status: Never Smoker     Smokeless tobacco: Never Used   Substance and Sexual Activity     Alcohol use: No     Comment: None     Drug use: No     Sexual activity: Not Currently     Partners: Female     No Known Allergies  Patient Active Problem List   Diagnosis     Low back pain     Moderate episode of recurrent major depressive disorder (H)     Passive suicidal ideations     Diabetes mellitus, type 2 (H)     Hyperlipidemia, unspecified hyperlipidemia type     Microalbuminuria due to type 2 diabetes mellitus (H)     MDD (major depressive disorder), recurrent episode, severe (H)     Closed fracture of glenoid cavity and neck of right scapula, initial encounter     Reviewed medications, social history and  past medical and surgical history.    Review of system: for general, respiratory, CVS, GI and psychiatry negative except for noted above.     EXAM:  /77 (BP Location: Left arm, Patient Position: Sitting, Cuff Size: Adult Large)   Pulse 86   Temp 98.4  F (36.9  C) (Oral)   Resp 14   Wt 131.7 kg (290 lb 4 oz)   SpO2 93%   BMI 33.54 kg/m    Constitutional: healthy, alert and no distress   Psychiatric: mentation appears normal and affect normal/bright  Cardiovascular: RRR. No murmurs,  Respiratory: negative, Lungs clear. No crackles or wheezing. No tachypnea.   NEURO: Gait normal.  JOINT/EXTREMITIES: extremities  normal- no gross deformities noted, gait normal and normal muscle tone    ASSESSMENT / PLAN:  (I82.4Z1) Acute deep vein thrombosis (DVT) of distal end of right lower extremity (H)  (primary encounter diagnosis)  Comment: Patient had unprovoked DVT.  First episode.  Was seen in the emergency room.  Reviewed ultrasound.  In the emergency room I see that he was started on Coumadin and Lovenox.  I do not see discussion for recommendation of NOAC.  We discussed either he can continue Coumadin for 3 months and come in here early for his INR check or we can try to run any of the NOAC for through his insurance and see if it is covered.  We discussed about risk benefits complications of each.  Discussed with the NOAC he does not have to have a routine INR check and he will be using the fixed dose.   -He would like to try NOAC.  His INR is still subtherapeutic and he can just switch it to apixaban starter pack.  -He will call me tomorrow regardless :  if insurance does not cover we will continue his Coumadin and we need to send a new prescription.  He needs to continue Lovenox until his INR is about 2 if he continues Coumadin.  We also needs to Coumadin clinic referral.  -In 3 months he should be reevaluated and at that time we should decide if he should continue anticoagulant more than 3 months or not.  May need repeat ultrasound at that time.  Plan: apixaban ANTICOAGULANT (ELIQUIS STARTER PACK) 5        MG tablet, INR             I spent > 25 minutes and more than 1/2 of the time was in counselling and coordination of care regarding above mentioned issues, and I spent  around 20 minutes face to face.         Return in about 3 months (around 10/8/2019).      The above note was dictated using voice recognition. Although reviewed after completion, some word and grammatical error may remain .

## 2019-07-09 NOTE — RESULT ENCOUNTER NOTE
Your INR is still low. You need to continue injection if you continue using warfarin.   I am waiting for your phone call to find out if your insurance covered your elaquis prescription.   Please call me regardless - so that I can send prescription of warfarin if needed.    Jacob Quintana MD  [facility]

## 2019-07-10 ENCOUNTER — TELEPHONE (OUTPATIENT)
Dept: FAMILY MEDICINE | Facility: CLINIC | Age: 49
End: 2019-07-10

## 2019-07-12 ENCOUNTER — TELEPHONE (OUTPATIENT)
Dept: FAMILY MEDICINE | Facility: CLINIC | Age: 49
End: 2019-07-12

## 2019-07-12 DIAGNOSIS — I82.4Z1 ACUTE DEEP VEIN THROMBOSIS (DVT) OF DISTAL END OF RIGHT LOWER EXTREMITY (H): Primary | ICD-10-CM

## 2019-07-12 RX ORDER — WARFARIN SODIUM 5 MG/1
5 TABLET ORAL DAILY
Qty: 7 TABLET | Refills: 0 | Status: SHIPPED | OUTPATIENT
Start: 2019-07-12 | End: 2019-07-16

## 2019-07-12 NOTE — TELEPHONE ENCOUNTER
Patient states he has not had the Eliquis PA go through yet.  Was told to call if he still needed Warfarin    Patient had been taking 1 tab daily of the 5 mg Warfarin  Needs refill until Eliquis is approved.    Pharmacy loaded.  Jessica Contreras RN

## 2019-07-12 NOTE — TELEPHONE ENCOUNTER
Please clarify with the patient the PA has been started by pharmacy or not.   If not, please do PA for Eliquis.  Meanwhile he should continue Coumadin.  He should come in early next week by Monday or Tuesday and he should have his INR checked.  He should see a provider in the clinic for further dose adjustment.  If he is not going to switch to Eliquis I am going to refer him to our INR clinic.

## 2019-07-12 NOTE — TELEPHONE ENCOUNTER
I talked to pt.  He felt the [facility] on [address] started the PA.  I will also send this to the PA team.    He will continue on the coumadin until he can get switched to the Eliquis.  Made appt for 7/16/19 with Dr. Quintana.  CHEL Goode

## 2019-07-15 ENCOUNTER — TELEPHONE (OUTPATIENT)
Dept: ORTHOPEDICS | Facility: CLINIC | Age: 49
End: 2019-07-15

## 2019-07-15 NOTE — TELEPHONE ENCOUNTER
[facility] Call Center    Phone Message    May a detailed message be left on voicemail: yes    Reason for Call: Other: Pt is currently on blood thinners for a clot in his leg. He wants to know if he can still have the surgery that is scheduled for 7/23/19. Please call him to advise as soon as possible     Action Taken: Message routed to:  [facility] Ortho

## 2019-07-15 NOTE — TELEPHONE ENCOUNTER
[facility] Call Center    Phone Message    May a detailed message be left on voicemail: yes    Reason for Call: Other: Pt returning missed call from Neli regarding message below.      Action Taken: Message routed to:  [facility] Orthopedics

## 2019-07-16 ENCOUNTER — OFFICE VISIT (OUTPATIENT)
Dept: FAMILY MEDICINE | Facility: CLINIC | Age: 49
End: 2019-07-16
Payer: COMMERCIAL

## 2019-07-16 VITALS
TEMPERATURE: 98.4 F | RESPIRATION RATE: 12 BRPM | HEIGHT: 78 IN | DIASTOLIC BLOOD PRESSURE: 80 MMHG | OXYGEN SATURATION: 95 % | WEIGHT: 288.5 LBS | HEART RATE: 82 BPM | BODY MASS INDEX: 33.38 KG/M2 | SYSTOLIC BLOOD PRESSURE: 111 MMHG

## 2019-07-16 DIAGNOSIS — I82.4Z1 ACUTE DEEP VEIN THROMBOSIS (DVT) OF DISTAL END OF RIGHT LOWER EXTREMITY (H): Primary | ICD-10-CM

## 2019-07-16 DIAGNOSIS — R45.851 PASSIVE SUICIDAL IDEATIONS: ICD-10-CM

## 2019-07-16 DIAGNOSIS — F33.1 MODERATE EPISODE OF RECURRENT MAJOR DEPRESSIVE DISORDER (H): ICD-10-CM

## 2019-07-16 LAB — INR PPP: 1.3 (ref 0.86–1.14)

## 2019-07-16 PROCEDURE — 99214 OFFICE O/P EST MOD 30 MIN: CPT | Performed by: FAMILY MEDICINE

## 2019-07-16 PROCEDURE — 85610 PROTHROMBIN TIME: CPT | Performed by: FAMILY MEDICINE

## 2019-07-16 PROCEDURE — 36415 COLL VENOUS BLD VENIPUNCTURE: CPT | Performed by: FAMILY MEDICINE

## 2019-07-16 RX ORDER — WARFARIN SODIUM 5 MG/1
TABLET ORAL
Qty: 30 TABLET | Refills: 0 | Status: SHIPPED | OUTPATIENT
Start: 2019-07-16 | End: 2019-08-07

## 2019-07-16 ASSESSMENT — ANXIETY QUESTIONNAIRES
3. WORRYING TOO MUCH ABOUT DIFFERENT THINGS: SEVERAL DAYS
GAD7 TOTAL SCORE: 1
5. BEING SO RESTLESS THAT IT IS HARD TO SIT STILL: NOT AT ALL
GAD7 TOTAL SCORE: 1
7. FEELING AFRAID AS IF SOMETHING AWFUL MIGHT HAPPEN: NOT AT ALL
4. TROUBLE RELAXING: NOT AT ALL
1. FEELING NERVOUS, ANXIOUS, OR ON EDGE: NOT AT ALL
6. BECOMING EASILY ANNOYED OR IRRITABLE: NOT AT ALL
7. FEELING AFRAID AS IF SOMETHING AWFUL MIGHT HAPPEN: NOT AT ALL
2. NOT BEING ABLE TO STOP OR CONTROL WORRYING: NOT AT ALL
GAD7 TOTAL SCORE: 1

## 2019-07-16 ASSESSMENT — MIFFLIN-ST. JEOR: SCORE: 2306.88

## 2019-07-16 ASSESSMENT — PATIENT HEALTH QUESTIONNAIRE - PHQ9
10. IF YOU CHECKED OFF ANY PROBLEMS, HOW DIFFICULT HAVE THESE PROBLEMS MADE IT FOR YOU TO DO YOUR WORK, TAKE CARE OF THINGS AT HOME, OR GET ALONG WITH OTHER PEOPLE: SOMEWHAT DIFFICULT
SUM OF ALL RESPONSES TO PHQ QUESTIONS 1-9: 13
SUM OF ALL RESPONSES TO PHQ QUESTIONS 1-9: 13

## 2019-07-16 NOTE — TELEPHONE ENCOUNTER
Patient was seen by his provider today.  He was informed he will need to be on Coumadin for 30 days.    Patient will be contacted concerning shoulder surgery after Dr Burdick is given update.

## 2019-07-16 NOTE — PATIENT INSTRUCTIONS
Schedule an appointment with INR nurse for Monday  If you can not see her on Monday - see any provider on Monday.  Use up your injections.  Take 5mg of coumadin daily except Tuesday and Friday - 7.5mg.

## 2019-07-16 NOTE — PROGRESS NOTES
"Subjective     Gaurnag Rivera is a 48 year old male who presents to clinic today for the following health issues:    HPI     Right foot and right ankle. Swelling, redness, and mild pain. Eliquis was denied by insurance, would like to discuss.     Last night was last dose of coumadin. Taking 5mg per day.  lovenox injection last night. 6 left.    Depression - not a new thing. Passing thoughts and passive thoughts of suicidal ideation. No plan or intention.   Seeing psychiatrist. Day treatment in the past.   On ritalin and effexor.        Answers for HPI/ROS submitted by the patient on 7/16/2019   If you checked off any problems, how difficult have these problems made it for you to do your work, take care of things at home, or get along with other people?: Somewhat difficult  PHQ9 TOTAL SCORE: 13  SOL 7 TOTAL SCORE: 1      Social History     Occupational History     Not on file   Tobacco Use     Smoking status: Never Smoker     Smokeless tobacco: Never Used   Substance and Sexual Activity     Alcohol use: No     Comment: None     Drug use: No     Sexual activity: Not Currently     Partners: Female     No Known Allergies  Patient Active Problem List   Diagnosis     Low back pain     Moderate episode of recurrent major depressive disorder (H)     Passive suicidal ideations     Diabetes mellitus, type 2 (H)     Hyperlipidemia, unspecified hyperlipidemia type     Microalbuminuria due to type 2 diabetes mellitus (H)     MDD (major depressive disorder), recurrent episode, severe (H)     Closed fracture of glenoid cavity and neck of right scapula, initial encounter     Reviewed medications, social history and  past medical and surgical history.    Review of system: for general, respiratory, CVS, GI and psychiatry negative except for noted above.     EXAM:  /80 (BP Location: Left arm, Patient Position: Sitting, Cuff Size: Adult Large)   Pulse 82   Temp 98.4  F (36.9  C) (Tympanic)   Resp 12   Ht 1.981 m (6' 6\")   Wt " 130.9 kg (288 lb 8 oz)   SpO2 95%   BMI 33.34 kg/m    Constitutional: healthy, alert and no distress   Psychiatric: mentation appears normal and affect normal/bright  Cardiovascular: RRR. No murmurs,  Respiratory: negative, Lungs clear. No crackles or wheezing. No tachypnea.      ASSESSMENT / PLAN:  (I82.4Z1) Acute deep vein thrombosis (DVT) of distal end of right lower extremity (H)  (primary encounter diagnosis)  Comment: His INR is still subtherapeutic.  We are doing PA for Eliquis but so far no response.  Assuming he continues to use Coumadin we will do INR clinic referral.  He is currently on 5 mg and total of 35 mg/week dose.  We will increase it to 45mg/week.  It would be adjustable over 20% but due to his body habitus and from compliance standpoint I think it may be reasonable change.  He will take 7.5 mg 2 days/week and rest of the days he will take 5 mg.  Recheck INR early next week.  If unable to see the INR nurse he should see primary physician.  Plan: INR, warfarin (COUMADIN) 5 MG tablet, INR         CLINIC REFERRAL             (R43.505) Passive suicidal ideations  Comment:    Plan: Chronic depression.  Been to psychiatry and also currently on medication.  Not planning to act on any thoughts.    (F33.1) Moderate episode of recurrent major depressive disorder (H)  Comment:    Plan: Continue to monitor.  Been to day treatment in the past.  Continue to see PCP and consider follow-up with psychiatry too.           The above note was dictated using voice recognition. Although reviewed after completion, some word and grammatical error may remain .      Patient Instructions   Schedule an appointment with INR nurse for Monday  If you can not see her on Monday - see any provider on Monday.  Use up your injections.  Take 5mg of coumadin daily except Tuesday and Friday - 7.5mg.

## 2019-07-17 ENCOUNTER — TELEPHONE (OUTPATIENT)
Dept: ORTHOPEDICS | Facility: CLINIC | Age: 49
End: 2019-07-17

## 2019-07-17 ENCOUNTER — TELEPHONE (OUTPATIENT)
Dept: FAMILY MEDICINE | Facility: CLINIC | Age: 49
End: 2019-07-17

## 2019-07-17 ASSESSMENT — ENCOUNTER SYMPTOMS
WOUND: 0
HEMATOLOGIC/LYMPHATIC NEGATIVE: 1
DYSURIA: 0

## 2019-07-17 ASSESSMENT — ANXIETY QUESTIONNAIRES: GAD7 TOTAL SCORE: 1

## 2019-07-17 ASSESSMENT — PATIENT HEALTH QUESTIONNAIRE - PHQ9: SUM OF ALL RESPONSES TO PHQ QUESTIONS 1-9: 13

## 2019-07-17 NOTE — TELEPHONE ENCOUNTER
Dr Burdick recommends shoulder surgery scheduled 07/23/19 be cancelled.  Patient verbalized understanding.  He plans to see his primary provider next week.  He will discuss when it is safe to reschedule the procedure.

## 2019-07-17 NOTE — TELEPHONE ENCOUNTER
Please see my previous response and another phone message.  All accepted alternatives are injectable medications and that is not the goal.  We need alternative of Coumadin which should be  NOAC agents.

## 2019-07-17 NOTE — TELEPHONE ENCOUNTER
Reason for Call:  Other call back    Detailed comments: Yesenia from [employer] states that apixaban ANTICOAGULANT (ELIQUIS STARTER PACK) 5 MG tablet  Was denied and the case number is [id number]    Phone Number Patient can be reached at: Other phone number:  [phone]    Best Time: asap    Can we leave a detailed message on this number? YES    Call taken on 7/17/2019 at 11:28 AM by Miguelina Hoover

## 2019-07-17 NOTE — TELEPHONE ENCOUNTER
Dr. Quintana,  Please see phone message below.     Per chart review on 07/10/2019 PA was started -- denied on 07/17/2019    Denial Rationale: Patient has to first try/fail: Arixtra, Enoxaparin, Fragmin    Pharmacy cued    Please advise    Thank You!  Dhara Kirkland, RN  Triage Nurse

## 2019-07-19 NOTE — PROGRESS NOTES
Subjective     Gaurang Rivera is a 48 year old male who presents to clinic today for the following health issues:    INR F/U    HPI   DVT of right lower extremity.   He is currently taking warfarin 7.5mg on Tuesday and Friday, 5 mg rest of the week.   Today is last day of lovenox. He has bruising at the injection site.  Otherwise no hematuria, gum bleeding or rectal bleeding.   Psych - passive SI, no active thoughts     Reviewed and updated as needed this visit by Provider         Review of Systems   ROS COMP: Constitutional, HEENT, cardiovascular, pulmonary, gi and gu systems are negative, except as otherwise noted.      Objective    There were no vitals taken for this visit.  There is no height or weight on file to calculate BMI.   /77   Pulse 83   Temp 98.6  F (37  C) (Oral)   Resp 23   Wt 131.5 kg (290 lb)   SpO2 98%   BMI 33.51 kg/m    Physical Exam   GENERAL: healthy, alert and no distress  EYES: Eyes grossly normal to inspection  HENT:nose and mouth without ulcers or lesions  PSYCH: mentation appears normal    Diagnostic Test Results:  Labs reviewed in Epic  Results for orders placed or performed in visit on 07/22/19 (from the past 24 hour(s))   INR   Result Value Ref Range    INR 1.80 (H) 0.86 - 1.14           Assessment & Plan     1. Acute deep vein thrombosis (DVT) of proximal vein of right lower extremity (H)  - INR  - INR not therapeutic   - advised below   You can increase to 7.5 mg today and resume maintenance until rechecked with INR nurse.     2. Severe episode of recurrent major depressive disorder, without psychotic features (H)  - passive SI, no active thoughts   - followed by psychiatry     3. Passive suicidal ideations  - see above       Elvis Guzman MD  Formerly named [facility]

## 2019-07-22 ENCOUNTER — TELEPHONE (OUTPATIENT)
Dept: FAMILY MEDICINE | Facility: CLINIC | Age: 49
End: 2019-07-22

## 2019-07-22 ENCOUNTER — OFFICE VISIT (OUTPATIENT)
Dept: FAMILY MEDICINE | Facility: CLINIC | Age: 49
End: 2019-07-22
Payer: COMMERCIAL

## 2019-07-22 VITALS
SYSTOLIC BLOOD PRESSURE: 108 MMHG | WEIGHT: 290 LBS | BODY MASS INDEX: 33.51 KG/M2 | DIASTOLIC BLOOD PRESSURE: 77 MMHG | RESPIRATION RATE: 23 BRPM | OXYGEN SATURATION: 98 % | TEMPERATURE: 98.6 F | HEART RATE: 83 BPM

## 2019-07-22 DIAGNOSIS — I82.4Y1 ACUTE DEEP VEIN THROMBOSIS (DVT) OF PROXIMAL VEIN OF RIGHT LOWER EXTREMITY (H): Primary | ICD-10-CM

## 2019-07-22 DIAGNOSIS — I82.4Z9 ACUTE DEEP VEIN THROMBOSIS (DVT) OF DISTAL VEIN OF LOWER EXTREMITY, UNSPECIFIED LATERALITY (H): Primary | ICD-10-CM

## 2019-07-22 DIAGNOSIS — F33.2 SEVERE EPISODE OF RECURRENT MAJOR DEPRESSIVE DISORDER, WITHOUT PSYCHOTIC FEATURES (H): ICD-10-CM

## 2019-07-22 DIAGNOSIS — R45.851 PASSIVE SUICIDAL IDEATIONS: ICD-10-CM

## 2019-07-22 LAB — INR PPP: 1.8 (ref 0.86–1.14)

## 2019-07-22 PROCEDURE — 85610 PROTHROMBIN TIME: CPT | Performed by: FAMILY MEDICINE

## 2019-07-22 PROCEDURE — 99214 OFFICE O/P EST MOD 30 MIN: CPT | Performed by: FAMILY MEDICINE

## 2019-07-22 PROCEDURE — 36415 COLL VENOUS BLD VENIPUNCTURE: CPT | Performed by: FAMILY MEDICINE

## 2019-07-22 ASSESSMENT — PATIENT HEALTH QUESTIONNAIRE - PHQ9
SUM OF ALL RESPONSES TO PHQ QUESTIONS 1-9: 12
SUM OF ALL RESPONSES TO PHQ QUESTIONS 1-9: 12
10. IF YOU CHECKED OFF ANY PROBLEMS, HOW DIFFICULT HAVE THESE PROBLEMS MADE IT FOR YOU TO DO YOUR WORK, TAKE CARE OF THINGS AT HOME, OR GET ALONG WITH OTHER PEOPLE: SOMEWHAT DIFFICULT

## 2019-07-22 NOTE — TELEPHONE ENCOUNTER
Patient scheduled for Anticoagulation clinic apt. At request of Dr. Guzman. Referral placed on 7/16.    Indication for Anticoagulation: acute dvt  If nonstandard INR is desired, indicate goal range and explanation: 2-3  Expected Duration of Therapy: 3 Months    Patient scheduled on Weds. 7/24 [phone].    Thanks! Archana Varner RN

## 2019-07-23 ASSESSMENT — PATIENT HEALTH QUESTIONNAIRE - PHQ9: SUM OF ALL RESPONSES TO PHQ QUESTIONS 1-9: 12

## 2019-07-24 ENCOUNTER — ANTICOAGULATION THERAPY VISIT (OUTPATIENT)
Dept: NURSING | Facility: CLINIC | Age: 49
End: 2019-07-24
Payer: COMMERCIAL

## 2019-07-24 DIAGNOSIS — I82.409 DEEP VEIN THROMBOSIS (DVT) (H): ICD-10-CM

## 2019-07-24 LAB — INR POINT OF CARE: 1.8 (ref 0.86–1.14)

## 2019-07-24 PROCEDURE — 99207 ZZC NO CHARGE NURSE ONLY: CPT

## 2019-07-24 PROCEDURE — 36416 COLLJ CAPILLARY BLOOD SPEC: CPT

## 2019-07-24 PROCEDURE — 85610 PROTHROMBIN TIME: CPT | Mod: QW

## 2019-07-24 NOTE — PROGRESS NOTES
ANTICOAGULATION INITIAL CLINIC VISIT    Patient Name:  Gaurang Rivera  Date:  2019  Referred by: Dr. Guzman  Contact Type:  Face to Face    SUBJECTIVE:  Coumadin education was completed today.  Topics covered include:  -Introduction to coumadin  -Proper Administration  -INR Testing  -Sign/Symptoms of Bleeding  -Signs/Symptoms of Clot Formation or Stroke  -Dietary Intake of Vitamin K  -Drug Interactions  -Anticoagulation Identification (bracelet, necklace or wallet card)  -Future Surgery  -Effects of Alcohol, Tobacco, and Exercise on Coumadin    Coumadin Education Booklet and Coumadin Identification Wallet Card were given to the patient.    Patient Findings     Positives:   Upcoming invasive procedure (Shoulder surgery on hold d/t new DVT dx. Dr. Burdick and PCP recommend being on Warfarin for at least 30 days. )    Comments:   Patient newly on Warfarin therapy for occlusive thrombus of the posterior tibial veins of the right calf dx on . Duration of therapy is 3 months at this time. Discussed hematology consult given patient's family history, father had 3 DVTs and is on Warfarin lifetime. Lovenox stopped  by PCP.            OBJECTIVE    INR Protime   Date Value Ref Range Status   2019 1.8 (A) 0.86 - 1.14 Final       ASSESSMENT / PLAN  INR assessment SUB    Recheck INR In: 2 DAYS    INR Location Clinic      Anticoagulation Summary  As of 2019    INR goal:   2.0-3.0   TTR:   --   INR used for dosin.8! (2019)   Warfarin maintenance plan:   No maintenance plan   Full warfarin instructions:   : 10 mg; : 10 mg; Otherwise No maintenance plan   Next INR check:   2019   Target end date:   10/5/2019    Indications    Deep vein thrombosis (DVT) (H) [I82.409]             Anticoagulation Episode Summary     INR check location:       Preferred lab:       Send INR reminders to:   ARMOND CORNELIUS    Comments:   : Occlusive thrombus of the posterior tibial veins of the right calf.  Lovenox stopped 7/22 not bridged fully. Shoulder surgery w/ Dr. Burdick (postponed Aug/needs 30 days AC). Poor diet/no greens or exercise. Works for the [employer].      Anticoagulation Care Providers     Provider Role Specialty Phone number    Elvis Guzman MD [facility] [phone]            See the Encounter Report to view Anticoagulation Flowsheet and Dosing Calendar (Go to Encounters tab in chart review, and find the Anticoagulation Therapy Visit)    Given continued sub-therapeutic INR levels and slow start-up, writer advised 10 mg daily with a recheck on Friday. New weekly dose will be 52.5 mg. AC team will f/u with PCP regarding restarting Lovenox bridging today as protocol is to continue Lovenox until two INR >2.0 for most DVT start-ups. Order for compression stocking to be obtained and discuss upcoming flight on 8/19 to [place], [place]. Patient is willing to opt out of trip, but needs to find coverage for his position.     Patient made aware if signs of clotting (pain, tenderness, swelling, or color change in any extremity) AND/OR bleeding occur (nosebleeds, bleeding gums, bruising, or blood in stool or urine) to notify provider & seek medical attention. If severe symptoms develop, such as major bleeding, chest pain, shortness of breath, fall, trauma or s/s of stroke, patient to call 911 immediately.     Dosage adjustment made based on physician directed care plan.    Cyn Womack RN

## 2019-07-24 NOTE — TELEPHONE ENCOUNTER
Patient seen in [facility] for initial appt today. INR remains sub-therapeutic at 1.8. Last Lovenox injection was taken on 7/22, INR was also 1.8. Typical [facility] protocol is to continue Lovenox until two INR >2.0 for most DVT start-ups. Routing encounter to PCP. Do you recommend restarting Lovenox today or continue with high dosing until recheck on Friday 7/26? Writer advised 10 mg daily until next INR.     Please sign cued order for compression stocking as well. Lastly, patient has a short flight planned for 8/19 to [place], [place]. Flying within the first 3 months is not recommended, but up to the discretion of provider? Patient is willing to opt out of trip, but needs to find coverage for his position sooner than later.     Please advised. Thanks! Cyn Womack, UMAN, RN

## 2019-07-25 ENCOUNTER — TELEPHONE (OUTPATIENT)
Dept: FAMILY MEDICINE | Facility: CLINIC | Age: 49
End: 2019-07-25

## 2019-07-25 NOTE — TELEPHONE ENCOUNTER
Faxed DME order for compression stockings to  home medical equipment  Fax # [phone]  Phone [phone]

## 2019-07-26 ENCOUNTER — ANTICOAGULATION THERAPY VISIT (OUTPATIENT)
Dept: NURSING | Facility: CLINIC | Age: 49
End: 2019-07-26
Payer: COMMERCIAL

## 2019-07-26 DIAGNOSIS — I82.4Z9 ACUTE DEEP VEIN THROMBOSIS (DVT) OF DISTAL VEIN OF LOWER EXTREMITY, UNSPECIFIED LATERALITY (H): ICD-10-CM

## 2019-07-26 LAB — INR POINT OF CARE: 2.5 (ref 0.86–1.14)

## 2019-07-26 PROCEDURE — 99207 ZZC NO CHARGE NURSE ONLY: CPT

## 2019-07-26 PROCEDURE — 85610 PROTHROMBIN TIME: CPT | Mod: QW

## 2019-07-26 PROCEDURE — 36416 COLLJ CAPILLARY BLOOD SPEC: CPT

## 2019-07-26 NOTE — PROGRESS NOTES
ANTICOAGULATION FOLLOW-UP CLINIC VISIT    Patient Name:  Gaurang Rivera  Date:  2019  Contact Type:  Face to Face    SUBJECTIVE:  Patient Findings     Comments:   The patient was assessed for diet, medication, and activity level changes, missed or extra doses, bruising or bleeding, with no problem findings. Patient did check MTV tablet, which has 60 mcg of vitamin K. Although this is a higher amt than some, it's a good idea for patient since he eats very little greens. Patient will be sure to verify all new [employer] bottles contain the same amt while on AC therapy.          OBJECTIVE    INR Protime   Date Value Ref Range Status   2019 2.5 (A) 0.86 - 1.14 Final       ASSESSMENT / PLAN  INR assessment THER    Recheck INR In: 3 DAYS    INR Location Clinic      Anticoagulation Summary  As of 2019    INR goal:   2.0-3.0   TTR:   --   INR used for dosin.5 (2019)   Warfarin maintenance plan:   No maintenance plan   Full warfarin instructions:   : 7.5 mg; : 7.5 mg; : 7.5 mg; Otherwise No maintenance plan   Next INR check:   2019   Target end date:   10/5/2019    Indications    Deep vein thrombosis (DVT) (H) [I82.409]             Anticoagulation Episode Summary     INR check location:       Preferred lab:       Send INR reminders to:   ARMOND CORNELIUS    Comments:   : Occlusive thrombus of the posterior tibial veins- R calf. Lovenox stopped  not bridged fully. Shoulder surgery w/ Dr. Burdick (postponed Aug/needs 30 days AC). Poor diet/no greens or exercise. Works for the [employer]. MTV 60 mcg.      Anticoagulation Care Providers     Provider Role Specialty Phone number    Elvis Guzman MD [facility] [phone]            See the Encounter Report to view Anticoagulation Flowsheet and Dosing Calendar (Go to Encounters tab in chart review, and find the Anticoagulation Therapy Visit)    Writer advised patient to drop back to 7.5 mg daily until  recheck on Monday given large jump in INR level (1.8 --> 2.5). New weekly total will be 57.5 mg at that time.     Patient made aware if signs of clotting (pain, tenderness, swelling, or color change in any extremity) AND/OR bleeding occur (nosebleeds, bleeding gums, bruising, or blood in stool or urine) to notify provider & seek medical attention. If severe symptoms develop, such as major bleeding, chest pain, shortness of breath, fall, trauma or s/s of stroke, patient to call 911 immediately.     Dosage adjustment made based on physician directed care plan.    Cyn Womack RN

## 2019-07-26 NOTE — TELEPHONE ENCOUNTER
Recommendations below were not received until today and were not routed to ACC pool for yesterday's RN to review. Good news, patient's INR is 2.5 today and Lovenox is no longer needed. See ACC episode for further details. Cyn Womack, BSN, RN

## 2019-07-29 ENCOUNTER — ANTICOAGULATION THERAPY VISIT (OUTPATIENT)
Dept: NURSING | Facility: CLINIC | Age: 49
End: 2019-07-29
Payer: COMMERCIAL

## 2019-07-29 DIAGNOSIS — I82.4Z9 ACUTE DEEP VEIN THROMBOSIS (DVT) OF DISTAL VEIN OF LOWER EXTREMITY, UNSPECIFIED LATERALITY (H): ICD-10-CM

## 2019-07-29 LAB — INR POINT OF CARE: 2.5 (ref 0.86–1.14)

## 2019-07-29 PROCEDURE — 85610 PROTHROMBIN TIME: CPT | Mod: QW

## 2019-07-29 PROCEDURE — 99207 ZZC NO CHARGE NURSE ONLY: CPT

## 2019-07-29 PROCEDURE — 36416 COLLJ CAPILLARY BLOOD SPEC: CPT

## 2019-07-29 NOTE — PROGRESS NOTES
ANTICOAGULATION FOLLOW-UP CLINIC VISIT    Patient Name:  Gaurang Rivera  Date:  2019  Contact Type:  Face to Face    SUBJECTIVE:  Patient Findings     Comments:   The patient was assessed for diet, medication, and activity level changes, missed or extra doses, bruising or bleeding, with no problem findings.           OBJECTIVE    INR Protime   Date Value Ref Range Status   2019 2.5 (A) 0.86 - 1.14 Final       ASSESSMENT / PLAN  INR assessment THER    Recheck INR In: 3 DAYS    INR Location Clinic      Anticoagulation Summary  As of 2019    INR goal:   2.0-3.0   TTR:   --   INR used for dosin.5 (2019)   Warfarin maintenance plan:   10 mg (5 mg x 2) every Mon, Fri; 7.5 mg (5 mg x 1.5) all other days   Full warfarin instructions:   10 mg every Mon, Fri; 7.5 mg all other days   Weekly warfarin total:   57.5 mg   Plan last modified:   Cyn Womack RN (2019)   Next INR check:   2019   Target end date:   10/5/2019    Indications    Deep vein thrombosis (DVT) (H) [I82.409]             Anticoagulation Episode Summary     INR check location:       Preferred lab:       Send INR reminders to:   ARMOND CORNELIUS    Comments:   : Occlusive thrombus of the posterior tibial veins- R calf. Lovenox stopped  not bridged fully. Shoulder surgery w/ Dr. Burdick (postponed Aug/needs 30 days AC). Poor diet/no greens or exercise. Works for [employer]. MTV 60 mcg.      Anticoagulation Care Providers     Provider Role Specialty Phone number    Elvis Guzman MD [facility] [phone]            See the Encounter Report to view Anticoagulation Flowsheet and Dosing Calendar (Go to Encounters tab in chart review, and find the Anticoagulation Therapy Visit)    Continue 57.5 mg weekly dose and recheck on Thursday. If stable, patient can transition to weekly checks.    Patient made aware if signs of clotting (pain, tenderness, swelling, or color change in any  extremity) AND/OR bleeding occur (nosebleeds, bleeding gums, bruising, or blood in stool or urine) to notify provider & seek medical attention. If severe symptoms develop, such as major bleeding, chest pain, shortness of breath, fall, trauma or s/s of stroke, patient to call 911 immediately.     Dosage adjustment made based on physician directed care plan.    Cyn Womack RN

## 2019-08-01 ENCOUNTER — TELEPHONE (OUTPATIENT)
Dept: ORTHOPEDICS | Facility: CLINIC | Age: 49
End: 2019-08-01

## 2019-08-01 ENCOUNTER — ANTICOAGULATION THERAPY VISIT (OUTPATIENT)
Dept: NURSING | Facility: CLINIC | Age: 49
End: 2019-08-01
Payer: COMMERCIAL

## 2019-08-01 DIAGNOSIS — I82.4Z9 ACUTE DEEP VEIN THROMBOSIS (DVT) OF DISTAL VEIN OF LOWER EXTREMITY, UNSPECIFIED LATERALITY (H): ICD-10-CM

## 2019-08-01 LAB — INR POINT OF CARE: 3.2 (ref 0.86–1.14)

## 2019-08-01 PROCEDURE — 85610 PROTHROMBIN TIME: CPT | Mod: QW

## 2019-08-01 PROCEDURE — 99207 ZZC NO CHARGE NURSE ONLY: CPT

## 2019-08-01 PROCEDURE — 36416 COLLJ CAPILLARY BLOOD SPEC: CPT

## 2019-08-01 RX ORDER — WARFARIN SODIUM 7.5 MG/1
TABLET ORAL
Qty: 80 TABLET | Refills: 0 | Status: SHIPPED | OUTPATIENT
Start: 2019-08-01 | End: 2020-02-03

## 2019-08-01 NOTE — TELEPHONE ENCOUNTER
Update: pt cannot find a replacement for upcoming work trip to [place], [place] (8/14-8/18). Flight is 1 1/2 hours. Pt's INR has been >2.0 since 7/26. ACC will continue to keep INR level at higher end of range as much as possible.     Do you approve upcoming travel? Any further recommendations besides the following: stay hydrated, limit greens the night prior to flights, limit alcohol, pump legs, ambulate every 1 hour during flight, and wear compression stockings? Thanks! Cyn Womack, UMAN, RN

## 2019-08-01 NOTE — PROGRESS NOTES
ANTICOAGULATION FOLLOW-UP CLINIC VISIT    Patient Name:  Gaurang Rivera  Date:  8/1/2019  Contact Type:  Face to Face    SUBJECTIVE:  Patient Findings     Comments:   The patient was assessed for diet, medication, and activity level changes, missed or extra doses, bruising or bleeding, with no problem findings. Of note, pt has no replacement for upcoming [place], [place] trip. ACC will notify PCP and discuss travel precautions as flight approaches.            OBJECTIVE    INR Protime   Date Value Ref Range Status   08/01/2019 3.2 (A) 0.86 - 1.14 Final       ASSESSMENT / PLAN  INR assessment SUPRA    Recheck INR In: 6 DAYS    INR Location Clinic      Anticoagulation Summary  As of 8/1/2019    INR goal:   2.0-3.0   TTR:   --   INR used for dosing:   3.2! (8/1/2019)   Warfarin maintenance plan:   10 mg (5 mg x 2) every Mon; 7.5 mg (5 mg x 1.5) all other days   Full warfarin instructions:   10 mg every Mon; 7.5 mg all other days   Weekly warfarin total:   55 mg   Plan last modified:   Cyn Womack RN (8/1/2019)   Next INR check:   8/7/2019   Target end date:   10/5/2019    Indications    Deep vein thrombosis (DVT) (H) [I82.409]             Anticoagulation Episode Summary     INR check location:       Preferred lab:       Send INR reminders to:   ARMOND CORNELIUS    Comments:   7/5: Occlusive thrombus of the posterior tibial veins- R calf. Lovenox stopped 7/22 not bridged fully. Shoulder surgery w/ Dr. Burdick (postponed Aug/needs 30 days AC). Poor diet/no greens or exercise. Works for [employer]. MTV 60 mcg.      Anticoagulation Care Providers     Provider Role Specialty Phone number    Elvis Guzman MD [facility] [phone]            See the Encounter Report to view Anticoagulation Flowsheet and Dosing Calendar (Go to Encounters tab in chart review, and find the Anticoagulation Therapy Visit)    Per protocol, patient advised to decrease weekly warfarin dose by 2.5 mg to  account for supra-therapeutic INR level. Patient instructed to increase green intake today and tomorrow as well. Recheck within 1 week to ensure INR stability.     Patient made aware if signs of clotting (pain, tenderness, swelling, or color change in any extremity) AND/OR bleeding occur (nosebleeds, bleeding gums, bruising, or blood in stool or urine) to notify provider & seek medical attention. If severe symptoms develop, such as major bleeding, chest pain, shortness of breath, fall, trauma or s/s of stroke, patient to call 911 immediately.     Dosage adjustment made based on physician directed care plan.    Cyn Womack RN

## 2019-08-01 NOTE — TELEPHONE ENCOUNTER
Patient is requesting a surgery date.  Dr Burdick requests patient's provider monitoring his anticoagulation be contacted concerning when the patient can safely be off Coumadin for surgery.  In-basket message was sent 07/31/19.  Message was left on patient's voicemail concerning reason for delay in scheduling surgery.

## 2019-08-05 PROBLEM — S42.151A CLOSED FRACTURE OF GLENOID CAVITY AND NECK OF RIGHT SCAPULA, INITIAL ENCOUNTER: Status: RESOLVED | Noted: 2019-05-13 | Resolved: 2019-08-05

## 2019-08-05 PROBLEM — S42.141A CLOSED FRACTURE OF GLENOID CAVITY AND NECK OF RIGHT SCAPULA, INITIAL ENCOUNTER: Status: RESOLVED | Noted: 2019-05-13 | Resolved: 2019-08-05

## 2019-08-07 ENCOUNTER — TELEPHONE (OUTPATIENT)
Dept: ORTHOPEDICS | Facility: CLINIC | Age: 49
End: 2019-08-07

## 2019-08-07 ENCOUNTER — ANTICOAGULATION THERAPY VISIT (OUTPATIENT)
Dept: NURSING | Facility: CLINIC | Age: 49
End: 2019-08-07
Payer: COMMERCIAL

## 2019-08-07 DIAGNOSIS — I82.4Z9 ACUTE DEEP VEIN THROMBOSIS (DVT) OF DISTAL VEIN OF LOWER EXTREMITY, UNSPECIFIED LATERALITY (H): Primary | ICD-10-CM

## 2019-08-07 DIAGNOSIS — I82.4Z1 ACUTE DEEP VEIN THROMBOSIS (DVT) OF DISTAL END OF RIGHT LOWER EXTREMITY (H): ICD-10-CM

## 2019-08-07 DIAGNOSIS — I82.4Z9 ACUTE DEEP VEIN THROMBOSIS (DVT) OF DISTAL VEIN OF LOWER EXTREMITY, UNSPECIFIED LATERALITY (H): ICD-10-CM

## 2019-08-07 LAB — INR POINT OF CARE: 2.5 (ref 0.86–1.14)

## 2019-08-07 PROCEDURE — 36416 COLLJ CAPILLARY BLOOD SPEC: CPT

## 2019-08-07 PROCEDURE — 85610 PROTHROMBIN TIME: CPT | Mod: QW

## 2019-08-07 PROCEDURE — 99207 ZZC NO CHARGE NURSE ONLY: CPT

## 2019-08-07 RX ORDER — WARFARIN SODIUM 5 MG/1
TABLET ORAL
Qty: 30 TABLET | Refills: 1 | Status: SHIPPED | OUTPATIENT
Start: 2019-08-07 | End: 2019-11-10

## 2019-08-07 NOTE — Clinical Note
Please review 7/22 TE that was routed back. Are you okay with patient flying next week? It's not ideal, but INR has been in range for two weeks now. Please advise. Thanks!

## 2019-08-07 NOTE — PROGRESS NOTES
ANTICOAGULATION FOLLOW-UP CLINIC VISIT    Patient Name:  Gaurang Rivera  Date:  2019  Contact Type:  Face to Face    SUBJECTIVE:  Patient Findings     Positives:   Other complaints (Pt reports confusion regarding shoulder surgery. Pt gets the impression that Dr. Burdick will no longer perform surgery until after AC therapy (3 mo). This contradicts prior conversations (7/15 TE), therefore, pt requested ACC clarify this. LVM w/ Ortho.)    Comments:   The patient was assessed for diet, medication, and activity level changes, missed or extra doses, bruising or bleeding, with no problem findings.          OBJECTIVE    INR Protime   Date Value Ref Range Status   2019 2.5 (A) 0.86 - 1.14 Final       ASSESSMENT / PLAN  INR assessment THER    Recheck INR In: 6 DAYS    INR Location Clinic      Anticoagulation Summary  As of 2019    INR goal:   2.0-3.0   TTR:   100.0 % (4 d)   INR used for dosin.5 (2019)   Warfarin maintenance plan:   10 mg (5 mg x 2) every Mon; 7.5 mg (5 mg x 1.5) all other days   Full warfarin instructions:   10 mg every Mon; 7.5 mg all other days   Weekly warfarin total:   55 mg   Plan last modified:   Cyn Womack RN (2019)   Next INR check:   2019   Target end date:   10/5/2019    Indications    Deep vein thrombosis (DVT) (H) [I82.409]             Anticoagulation Episode Summary     INR check location:       Preferred lab:       Send INR reminders to:   ARMOND CORNELIUS    Comments:   : Occlusive thrombus of the posterior tibial veins- R calf. Lovenox stopped  not bridged fully. Shoulder surgery w/ Dr. Burdick (postponed Aug/needs 30 days AC). Poor diet/no greens or exercise. Works for [employer]. [employer] 60 mcg.      Anticoagulation Care Providers     Provider Role Specialty Phone number    Elvis Guzman MD [facility] [phone]            See the Encounter Report to view Anticoagulation Flowsheet and Dosing Calendar (Go to  Encounters tab in chart review, and find the Anticoagulation Therapy Visit)    Patient made aware if signs of clotting (pain, tenderness, swelling, or color change in any extremity) AND/OR bleeding occur (nosebleeds, bleeding gums, bruising, or blood in stool or urine) to notify provider & seek medical attention. If severe symptoms develop, such as major bleeding, chest pain, shortness of breath, fall, trauma or s/s of stroke, patient to call 911 immediately.     Dosage adjustment made based on physician directed care plan.    Cyn Womack RN

## 2019-08-13 ENCOUNTER — ANTICOAGULATION THERAPY VISIT (OUTPATIENT)
Dept: NURSING | Facility: CLINIC | Age: 49
End: 2019-08-13
Payer: COMMERCIAL

## 2019-08-13 DIAGNOSIS — I82.4Z9 ACUTE DEEP VEIN THROMBOSIS (DVT) OF DISTAL VEIN OF LOWER EXTREMITY, UNSPECIFIED LATERALITY (H): ICD-10-CM

## 2019-08-13 LAB — INR POINT OF CARE: 2.8 (ref 0.86–1.14)

## 2019-08-13 PROCEDURE — 85610 PROTHROMBIN TIME: CPT | Mod: QW

## 2019-08-13 PROCEDURE — 36416 COLLJ CAPILLARY BLOOD SPEC: CPT

## 2019-08-13 PROCEDURE — 99207 ZZC NO CHARGE NURSE ONLY: CPT

## 2019-08-13 NOTE — PROGRESS NOTES
ANTICOAGULATION FOLLOW-UP CLINIC VISIT    Patient Name:  Gaurang Rivera  Date:  2019  Contact Type:  Face to Face    SUBJECTIVE:  Patient Findings     Positives:   Missed doses (Pt missed 2.5 mg of yesterday's dose, accidently took 7.5 mg instead of 10 mg. ), Other complaints (Pt is nervous about being on his feet so much during this weekend tournament. Affected leg gets quite sore with prolonged standing or walking. Rec bringing extra strength Tylenol & avoid Aleve/Advil as much as possible & wear compression stockings.)    Comments:   The patient was assessed for diet, medication, and activity level changes, extra doses, bruising or bleeding, with no problem findings.    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 Discussed travel precautions for upcoming flight: stay hydrated, limit greens the night prior to flights, limit alcohol, pump legs and ambulate every 1-2 hours. Smitha Perez signed a letter approving patient to walk around on the short flight (available for p/u at  Clinic).             OBJECTIVE    INR Protime   Date Value Ref Range Status   2019 2.8 (A) 0.86 - 1.14 Final       ASSESSMENT / PLAN  INR assessment THER    Recheck INR In: 8 DAYS    INR Location Clinic      Anticoagulation Summary  As of 2019    INR goal:   2.0-3.0   TTR:   100.0 % (1.4 wk)   INR used for dosin.8 (2019)   Warfarin maintenance plan:   10 mg (5 mg x 2) every Mon; 7.5 mg (5 mg x 1.5) all other days   Full warfarin instructions:   : 10 mg; Otherwise 10 mg every Mon; 7.5 mg all other days   Weekly warfarin total:   55 mg   Plan last modified:   Cyn Womack RN (2019)   Next INR check:   2019   Target end date:   10/5/2019    Indications    Deep vein thrombosis (DVT) (H) [I82.409]             Anticoagulation Episode Summary     INR check location:       Preferred lab:       Send INR reminders to:   ARMOND CORNELIUS    Comments:   : Occlusive thrombus of the  posterior tibial veins- R calf. Lovenox stopped 7/22 not bridged fully. Shoulder surgery w/ Dr. Burdick (postponed Aug/needs 30 days AC). Poor diet/no greens or exercise. Works for the [employer]. MTV 60 mcg.      Anticoagulation Care Providers     Provider Role Specialty Phone number    Elvis Guzman MD [facility] [phone]            See the Encounter Report to view Anticoagulation Flowsheet and Dosing Calendar (Go to Encounters tab in chart review, and find the Anticoagulation Therapy Visit)    Take yesterday's dose (10 mg) today. Then resume maintenance dose pattern.     Patient made aware if signs of clotting (pain, tenderness, swelling, or color change in any extremity) AND/OR bleeding occur (nosebleeds, bleeding gums, bruising, or blood in stool or urine) to notify provider & seek medical attention. If severe symptoms develop, such as major bleeding, chest pain, shortness of breath, fall, trauma or s/s of stroke, patient to call 911 immediately.     Dosage adjustment made based on physician directed care plan.    Cyn Womack RN

## 2019-08-21 ENCOUNTER — ANTICOAGULATION THERAPY VISIT (OUTPATIENT)
Dept: NURSING | Facility: CLINIC | Age: 49
End: 2019-08-21
Payer: COMMERCIAL

## 2019-08-21 DIAGNOSIS — I82.4Z9 ACUTE DEEP VEIN THROMBOSIS (DVT) OF DISTAL VEIN OF LOWER EXTREMITY, UNSPECIFIED LATERALITY (H): ICD-10-CM

## 2019-08-21 LAB — INR POINT OF CARE: 2.8 (ref 0.86–1.14)

## 2019-08-21 PROCEDURE — 36416 COLLJ CAPILLARY BLOOD SPEC: CPT

## 2019-08-21 PROCEDURE — 85610 PROTHROMBIN TIME: CPT | Mod: QW

## 2019-08-21 NOTE — PROGRESS NOTES
ANTICOAGULATION FOLLOW-UP CLINIC VISIT    Patient Name:  Gaurang Rivera  Date:  2019  Contact Type:  Face to Face    SUBJECTIVE:  Patient Findings     Positives:   Signs/symptoms of bleeding (Pt noted a scant amt of blood at a recurring abscess on his tailbone. 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 ), Change in medications (Tylenol prn managed pt's leg sx.)             OBJECTIVE    INR Protime   Date Value Ref Range Status   2019 2.8 (A) 0.86 - 1.14 Final       ASSESSMENT / PLAN  INR assessment THER    Recheck INR In: 2 WEEKS    INR Location Clinic      Anticoagulation Summary  As of 2019    INR goal:   2.0-3.0   TTR:   100.0 % (2.6 wk)   INR used for dosin.8 (2019)   Warfarin maintenance plan:   10 mg (5 mg x 2) every Mon; 7.5 mg (5 mg x 1.5) all other days   Full warfarin instructions:   10 mg every Mon; 7.5 mg all other days   Weekly warfarin total:   55 mg   No change documented:   Cyn Womack RN   Plan last modified:   Cyn Womack RN (2019)   Next INR check:   2019   Target end date:   10/5/2019    Indications    Deep vein thrombosis (DVT) (H) [I82.409]             Anticoagulation Episode Summary     INR check location:       Preferred lab:       Send INR reminders to:   ARMOND CORNELIUS    Comments:   : Occlusive thrombus of the posterior tibial veins- R calf. Lovenox stopped  not bridged fully. Shoulder surgery w/ Dr. Burdick (postponed Aug/needs 30 days AC). Poor diet/no greens or exercise. Works for the [employer]. MTV 60 mcg.      Anticoagulation Care Providers     Provider Role Specialty Phone number    Elvis Guzman MD [facility] [phone]            See the Encounter Report to view Anticoagulation Flowsheet and Dosing Calendar (Go to Encounters  tab in chart review, and find the Anticoagulation Therapy Visit)    Patient made aware if signs of clotting (pain, tenderness, swelling, or color change in any extremity) AND/OR bleeding occur (nosebleeds, bleeding gums, bruising, or blood in stool or urine) to notify provider & seek medical attention. If severe symptoms develop, such as major bleeding, chest pain, shortness of breath, fall, trauma or s/s of stroke, patient to call 911 immediately.     Dosage adjustment made based on physician directed care plan.    Cyn Womack RN

## 2019-09-04 ENCOUNTER — ANTICOAGULATION THERAPY VISIT (OUTPATIENT)
Dept: NURSING | Facility: CLINIC | Age: 49
End: 2019-09-04
Payer: COMMERCIAL

## 2019-09-04 DIAGNOSIS — I82.4Z9 ACUTE DEEP VEIN THROMBOSIS (DVT) OF DISTAL VEIN OF LOWER EXTREMITY, UNSPECIFIED LATERALITY (H): ICD-10-CM

## 2019-09-04 LAB — INR POINT OF CARE: 3.5 (ref 0.86–1.14)

## 2019-09-04 PROCEDURE — 85610 PROTHROMBIN TIME: CPT | Mod: QW

## 2019-09-04 PROCEDURE — 36416 COLLJ CAPILLARY BLOOD SPEC: CPT

## 2019-09-04 NOTE — PROGRESS NOTES
ANTICOAGULATION FOLLOW-UP CLINIC VISIT    Patient Name:  Gaurang Rivera  Date:  9/4/2019  Contact Type:  Face to Face    SUBJECTIVE:  Patient Findings     Positives:   Change in health (Pt slept funny at parent's cabin this past weekend, strained his neck (still sore). No OTC meds taken, rest has helped. )    Comments:   The patient was assessed for diet, medication, and activity level changes, missed or extra doses, bruising or bleeding, with no problem findings          OBJECTIVE    INR Protime   Date Value Ref Range Status   09/04/2019 3.5 (A) 0.86 - 1.14 Final       ASSESSMENT / PLAN  INR assessment SUPRA    Recheck INR In: 1 WEEK    INR Location Clinic      Anticoagulation Summary  As of 9/4/2019    INR goal:   2.0-3.0   TTR:   68.8 % (1.1 mo)   INR used for dosing:   3.5! (9/4/2019)   Warfarin maintenance plan:   10 mg (5 mg x 2) every Mon; 7.5 mg (5 mg x 1.5) all other days   Full warfarin instructions:   9/4: 5 mg; Otherwise 10 mg every Mon; 7.5 mg all other days   Weekly warfarin total:   55 mg   Plan last modified:   Cyn Womack RN (8/1/2019)   Next INR check:   9/11/2019   Target end date:   10/5/2019    Indications    Deep vein thrombosis (DVT) (H) [I82.409]             Anticoagulation Episode Summary     INR check location:       Preferred lab:       Send INR reminders to:   ARMOND CORNELIUS    Comments:   7/5: Occlusive thrombus of the posterior tibial veins- R calf. Lovenox stopped 7/22 not bridged fully. Shoulder surgery w/ Dr. Burdick (postponed Aug/needs 30 days AC). Poor diet/no greens or exercise. Works for [employer]. MTV 60 mcg.      Anticoagulation Care Providers     Provider Role Specialty Phone number    Elvis Guzman MD [facility] [phone]            See the Encounter Report to view Anticoagulation Flowsheet and Dosing Calendar (Go to Encounters tab in chart review, and find the Anticoagulation Therapy Visit)    Per protocol, patient advised to  decrease today's warfarin dose by 2.5 mg to account for supra-therapeutic INR level. Patient instructed to increase green intake today and tomorrow as well. Recheck within 1-2 weeks to ensure INR stability.     Patient made aware if signs of clotting (pain, tenderness, swelling, or color change in any extremity) AND/OR bleeding occur (nosebleeds, bleeding gums, bruising, or blood in stool or urine) to notify provider & seek medical attention. If severe symptoms develop, such as major bleeding, chest pain, shortness of breath, fall, trauma or s/s of stroke, patient to call 911 immediately.     Dosage adjustment made based on physician directed care plan.    Cyn Womack RN

## 2019-09-11 ENCOUNTER — ANTICOAGULATION THERAPY VISIT (OUTPATIENT)
Dept: NURSING | Facility: CLINIC | Age: 49
End: 2019-09-11
Payer: COMMERCIAL

## 2019-09-11 DIAGNOSIS — I82.4Z9 ACUTE DEEP VEIN THROMBOSIS (DVT) OF DISTAL VEIN OF LOWER EXTREMITY, UNSPECIFIED LATERALITY (H): ICD-10-CM

## 2019-09-11 LAB — INR POINT OF CARE: 3.6 (ref 0.86–1.14)

## 2019-09-11 PROCEDURE — 36416 COLLJ CAPILLARY BLOOD SPEC: CPT

## 2019-09-11 PROCEDURE — 85610 PROTHROMBIN TIME: CPT | Mod: QW

## 2019-09-11 NOTE — PROGRESS NOTES
ANTICOAGULATION FOLLOW-UP CLINIC VISIT    Patient Name:  Gaurang Rivera  Date:  9/11/2019  Contact Type:  Face to Face    SUBJECTIVE:  Patient Findings     Positives:   Signs/symptoms of bleeding (Bleeding at tailbone wound (comes and goes). Pt still has not scheduled surgery consult, reminded pt to do so this week. )    Comments:   The patient was assessed for diet, medication, and activity level changes, missed or extra doses, bruising or bleeding, with no problem findings.           OBJECTIVE    INR Protime   Date Value Ref Range Status   09/11/2019 3.6 (A) 0.86 - 1.14 Final       ASSESSMENT / PLAN  INR assessment SUPRA    Recheck INR In: 5 DAYS    INR Location Clinic      Anticoagulation Summary  As of 9/11/2019    INR goal:   2.0-3.0   TTR:   56.4 % (1.3 mo)   INR used for dosing:   3.6! (9/11/2019)   Warfarin maintenance plan:   10 mg (5 mg x 2) every Mon; 7.5 mg (5 mg x 1.5) all other days   Full warfarin instructions:   9/11: 2.5 mg; Otherwise 10 mg every Mon; 7.5 mg all other days   Weekly warfarin total:   55 mg   Plan last modified:   Cyn Womack RN (8/1/2019)   Next INR check:   9/16/2019   Target end date:   10/5/2019    Indications    Deep vein thrombosis (DVT) (H) [I82.409]             Anticoagulation Episode Summary     INR check location:       Preferred lab:       Send INR reminders to:   ARMOND CORNELIUS    Comments:   7/5: Occlusive thrombus of the posterior tibial veins- R calf. Lovenox stopped 7/22 not bridged fully. Shoulder surgery w/ Dr. Burdick (postponed Aug/needs 30 days AC). Poor diet/no greens or exercise. Works for the [employer]. MTV 60 mcg.      Anticoagulation Care Providers     Provider Role Specialty Phone number    Elvis Guzman MD [facility] [phone]            See the Encounter Report to view Anticoagulation Flowsheet and Dosing Calendar (Go to Encounters tab in chart review, and find the Anticoagulation Therapy Visit)    Per protocol,  patient advised to decrease today's warfarin dose by 5 mg to account for supra-therapeutic INR level. Patient instructed to increase green intake today and tomorrow as well. Recheck in 5 days to ensure INR stability.     Patient made aware if signs of clotting (pain, tenderness, swelling, or color change in any extremity) AND/OR bleeding occur (nosebleeds, bleeding gums, bruising, or blood in stool or urine) to notify provider & seek medical attention. If severe symptoms develop, such as major bleeding, chest pain, shortness of breath, fall, trauma or s/s of stroke, patient to call 911 immediately.     Dosage adjustment made based on physician directed care plan.    Cyn Womack RN

## 2019-09-16 ENCOUNTER — ANTICOAGULATION THERAPY VISIT (OUTPATIENT)
Dept: NURSING | Facility: CLINIC | Age: 49
End: 2019-09-16
Payer: COMMERCIAL

## 2019-09-16 DIAGNOSIS — I82.4Z9 ACUTE DEEP VEIN THROMBOSIS (DVT) OF DISTAL VEIN OF LOWER EXTREMITY, UNSPECIFIED LATERALITY (H): ICD-10-CM

## 2019-09-16 LAB — INR POINT OF CARE: 2.7 (ref 0.86–1.14)

## 2019-09-16 PROCEDURE — 85610 PROTHROMBIN TIME: CPT | Mod: QW

## 2019-09-16 PROCEDURE — 36416 COLLJ CAPILLARY BLOOD SPEC: CPT

## 2019-09-16 NOTE — PROGRESS NOTES
ANTICOAGULATION FOLLOW-UP CLINIC VISIT    Patient Name:  Gaurang Rivera  Date:  2019  Contact Type:  Face to Face    SUBJECTIVE:  Patient Findings     Comments:   The patient was assessed for diet, medication, and activity level changes, missed or extra doses, bruising or bleeding, with no problem findings.           OBJECTIVE    INR Protime   Date Value Ref Range Status   2019 2.7 (A) 0.86 - 1.14 Final       ASSESSMENT / PLAN  INR assessment THER    Recheck INR In: 9 DAYS    INR Location Clinic      Anticoagulation Summary  As of 2019    INR goal:   2.0-3.0   TTR:   53.8 % (1.5 mo)   INR used for dosin.7 (2019)   Warfarin maintenance plan:   5 mg (5 mg x 1) every Wed; 7.5 mg (5 mg x 1.5) all other days   Full warfarin instructions:   5 mg every Wed; 7.5 mg all other days   Weekly warfarin total:   50 mg   Plan last modified:   Cyn Womack RN (2019)   Next INR check:   2019   Target end date:   10/5/2019    Indications    Deep vein thrombosis (DVT) (H) [I82.409]             Anticoagulation Episode Summary     INR check location:       Preferred lab:       Send INR reminders to:   ARMOND CORNELIUS    Comments:   : Occlusive thrombus of the posterior tibial veins- R calf. Lovenox stopped  not bridged fully. Shoulder surgery w/ Dr. Burdick (postponed Aug/needs 30 days AC). Poor diet/no greens or exercise. Works for [employer]. MTV 60 mcg.      Anticoagulation Care Providers     Provider Role Specialty Phone number    Elvis Guzman MD [facility] [phone]            See the Encounter Report to view Anticoagulation Flowsheet and Dosing Calendar (Go to Encounters tab in chart review, and find the Anticoagulation Therapy Visit)    Patient advised to trial 50 mg weekly given recent elevated INR levels. Recheck within 10 days.    Patient made aware if signs of clotting (pain, tenderness, swelling, or color change in any extremity) AND/OR  bleeding occur (nosebleeds, bleeding gums, bruising, or blood in stool or urine) to notify provider & seek medical attention. If severe symptoms develop, such as major bleeding, chest pain, shortness of breath, fall, trauma or s/s of stroke, patient to call 911 immediately.     Dosage adjustment made based on physician directed care plan.    Cyn Womack RN

## 2019-09-25 ENCOUNTER — ANTICOAGULATION THERAPY VISIT (OUTPATIENT)
Dept: NURSING | Facility: CLINIC | Age: 49
End: 2019-09-25
Payer: COMMERCIAL

## 2019-09-25 ENCOUNTER — TELEPHONE (OUTPATIENT)
Dept: PSYCHIATRY | Facility: CLINIC | Age: 49
End: 2019-09-25

## 2019-09-25 DIAGNOSIS — I82.4Z9 ACUTE DEEP VEIN THROMBOSIS (DVT) OF DISTAL VEIN OF LOWER EXTREMITY, UNSPECIFIED LATERALITY (H): ICD-10-CM

## 2019-09-25 LAB — INR POINT OF CARE: 3.2 (ref 0.86–1.14)

## 2019-09-25 PROCEDURE — 36416 COLLJ CAPILLARY BLOOD SPEC: CPT

## 2019-09-25 PROCEDURE — 85610 PROTHROMBIN TIME: CPT | Mod: QW

## 2019-09-25 NOTE — TELEPHONE ENCOUNTER
Central Prior Authorization Team   Phone: [phone]      PA Initiation    Medication: venlafaxine (EFFEXOR-XR) 150 MG 24 hr capsule - INITIATED  Insurance Company: [employer] - Phone [phone] Fax [phone]  Pharmacy Filling the Rx: [id number] - [place], [place] - [address]  Filling Pharmacy Phone: [phone]  Filling Pharmacy Fax: [phone]  Start Date: 9/25/2019

## 2019-09-25 NOTE — TELEPHONE ENCOUNTER
Prior Authorization Retail Medication Request    Medication/Dose: venlafaxine (EFFEXOR-XR) 150 MG 24 hr capsule  ICD code (if different than what is on RX):    Moderate episode of recurrent major depressive disorder (H) [F33.1]       Passive suicidal ideations [R45.851]       Previously Tried and Failed:    Rationale:      Insurance Name:  [facility]  Insurance ID:  [id number]      Pharmacy Information (if different than what is on RX)  Name:  [id number]  Phone:  [phone]      Marci Vasques RN  09/25/19  10:44 AM

## 2019-09-25 NOTE — PROGRESS NOTES
ANTICOAGULATION FOLLOW-UP CLINIC VISIT    Patient Name:  Gaurang Rivera  Date:  9/25/2019  Contact Type:  Face to Face    SUBJECTIVE:  Patient Findings     Comments:   Hematology consult scheduled for 10/8. AC therapy duration to be discussed and likely f/u US. The patient was assessed for diet, medication, and activity level changes, missed or extra doses, bruising or bleeding, with no problem findings.          OBJECTIVE    INR Protime   Date Value Ref Range Status   09/25/2019 3.2 (A) 0.86 - 1.14 Final       ASSESSMENT / PLAN  INR assessment SUPRA    Recheck INR In: 8 DAYS    INR Location Clinic      Anticoagulation Summary  As of 9/25/2019    INR goal:   2.0-3.0   TTR:   54.8 % (1.8 mo)   INR used for dosing:   3.2! (9/25/2019)   Warfarin maintenance plan:   5 mg (5 mg x 1) every Wed, Sat; 7.5 mg (5 mg x 1.5) all other days   Full warfarin instructions:   5 mg every Wed, Sat; 7.5 mg all other days   Weekly warfarin total:   47.5 mg   Plan last modified:   Cyn Womack RN (9/25/2019)   Next INR check:   10/3/2019   Target end date:   10/5/2019    Indications    Deep vein thrombosis (DVT) (H) [I82.409]             Anticoagulation Episode Summary     INR check location:       Preferred lab:       Send INR reminders to:   ARMOND CORNELIUS    Comments:   7/5: Occlusive thrombus- posterior tibial veins- R calf. Lovenox stopped 7/22 not bridged fully. wearing 15-20 C.stockings. Shoulder surgery (Dr. Burdick postponed). Neli Lozano RN #[phone]. Poor diet/no greens or exercise.  MTV 60 mcg.       Anticoagulation Care Providers     Provider Role Specialty Phone number    Elvis Guzman MD [facility] [phone]            See the Encounter Report to view Anticoagulation Flowsheet and Dosing Calendar (Go to Encounters tab in chart review, and find the Anticoagulation Therapy Visit)    In case patient requires long-term AC therapy, writer advised patient to return next week to ensure  INR level drops on new weekly dose of 47.5 mg.     Patient made aware if signs of clotting (pain, tenderness, swelling, or color change in any extremity) AND/OR bleeding occur (nosebleeds, bleeding gums, bruising, or blood in stool or urine) to notify provider & seek medical attention. If severe symptoms develop, such as major bleeding, chest pain, shortness of breath, fall, trauma or s/s of stroke, patient to call 911 immediately.     Dosage adjustment made based on physician directed care plan.    Cyn Womack RN

## 2019-09-26 NOTE — TELEPHONE ENCOUNTER
Central Prior Authorization Team   Phone: [phone]      Prior Authorization Approval    Authorization Effective Date: 9/11/2019  Authorization Expiration Date: 9/25/2020  Medication: venlafaxine (EFFEXOR-XR) 150 MG 24 hr capsule - APPROVED  Approved Dose/Quantity: 60 FOR 30  Reference #:     Insurance Company: [employer] - Phone [phone] Fax [phone]  Expected CoPay:       CoPay Card Available:      Foundation Assistance Needed:    Which Pharmacy is filling the prescription (Not needed for infusion/clinic administered): [id number] - [place] [id number]  Pharmacy Notified: Yes  Patient Notified: Yes (**Instructed pharmacy to notify patient when script is ready to /ship.**)    [email or web address] representative gave a verbal approval. Will document approval when fax is received.    Pharmacy must run prescription 60 for 30 every fill; if they run a lesser quantity they will have to call insurance to get it adjusted.

## 2019-10-02 ENCOUNTER — HEALTH MAINTENANCE LETTER (OUTPATIENT)
Age: 49
End: 2019-10-02

## 2019-10-03 ENCOUNTER — ANTICOAGULATION THERAPY VISIT (OUTPATIENT)
Dept: NURSING | Facility: CLINIC | Age: 49
End: 2019-10-03
Payer: COMMERCIAL

## 2019-10-03 DIAGNOSIS — I82.4Z9 ACUTE DEEP VEIN THROMBOSIS (DVT) OF DISTAL VEIN OF LOWER EXTREMITY, UNSPECIFIED LATERALITY (H): ICD-10-CM

## 2019-10-03 LAB — INR POINT OF CARE: 2.5 (ref 0.86–1.14)

## 2019-10-03 PROCEDURE — 36416 COLLJ CAPILLARY BLOOD SPEC: CPT

## 2019-10-03 PROCEDURE — 85610 PROTHROMBIN TIME: CPT | Mod: QW

## 2019-10-03 NOTE — PROGRESS NOTES
ANTICOAGULATION FOLLOW-UP CLINIC VISIT    Patient Name:  Gaurang Rivera  Date:  10/3/2019  Contact Type:  Face to Face    SUBJECTIVE:  Patient Findings     Comments:   Patient has appt.with Hematology on 10/8.         Clinical Outcomes     Comments:   Patient has appt.with Hematology on 10/8.            OBJECTIVE    INR Protime   Date Value Ref Range Status   10/03/2019 2.5 (A) 0.86 - 1.14 Final       ASSESSMENT / PLAN  INR assessment THER    Recheck INR In: 2 WEEKS    INR Location Clinic      Anticoagulation Summary  As of 10/3/2019    INR goal:   2.0-3.0   TTR:   57.0 % (2 mo)   INR used for dosin.5 (10/3/2019)   Warfarin maintenance plan:   5 mg (5 mg x 1) every Wed, Sat; 7.5 mg (5 mg x 1.5) all other days   Full warfarin instructions:   5 mg every Wed, Sat; 7.5 mg all other days   Weekly warfarin total:   47.5 mg   Plan last modified:   Cyn Womack RN (2019)   Next INR check:   10/17/2019   Target end date:   10/5/2019    Indications    Deep vein thrombosis (DVT) (H) [I82.409]             Anticoagulation Episode Summary     INR check location:       Preferred lab:       Send INR reminders to:   ARMOND CORNELIUS    Comments:   : Occlusive thrombus- posterior tibial veins- R calf. Lovenox stopped  not bridged fully. wearing 15-20 C.stockings. Shoulder surgery (Dr. Burdick postponed). Neli Lozano RN #[phone]. Poor diet/no greens or exercise.  MTV 60 mcg.       Anticoagulation Care Providers     Provider Role Specialty Phone number    Elvis Guzman MD [facility] [phone]            See the Encounter Report to view Anticoagulation Flowsheet and Dosing Calendar (Go to Encounters tab in chart review, and find the Anticoagulation Therapy Visit)    Patient aware if signs of clotting (pain, tenderness, swelling, color change in leg or arm, SOB) and bleeding occur (blood in stool, urine, large bruising, bleeding gums, nosebleeds) to have INR check sooner. If sx  severe report to ER or concerned for stroke call 911. If general questions or concerns arise, call clinic.         Archana Varner RN

## 2019-10-08 ENCOUNTER — HOSPITAL ENCOUNTER (OUTPATIENT)
Dept: ULTRASOUND IMAGING | Facility: CLINIC | Age: 49
Discharge: HOME | End: 2019-10-08
Attending: INTERNAL MEDICINE | Admitting: INTERNAL MEDICINE
Payer: COMMERCIAL

## 2019-10-08 ENCOUNTER — OFFICE VISIT (OUTPATIENT)
Dept: HEMATOLOGY | Facility: CLINIC | Age: 49
End: 2019-10-08
Attending: INTERNAL MEDICINE
Payer: COMMERCIAL

## 2019-10-08 VITALS
OXYGEN SATURATION: 95 % | TEMPERATURE: 97.8 F | HEART RATE: 78 BPM | SYSTOLIC BLOOD PRESSURE: 132 MMHG | HEIGHT: 78 IN | DIASTOLIC BLOOD PRESSURE: 86 MMHG | WEIGHT: 299 LBS | BODY MASS INDEX: 34.59 KG/M2 | RESPIRATION RATE: 16 BRPM

## 2019-10-08 DIAGNOSIS — I82.541 CHRONIC DEEP VEIN THROMBOSIS (DVT) OF TIBIAL VEIN OF RIGHT LOWER EXTREMITY (H): ICD-10-CM

## 2019-10-08 DIAGNOSIS — I82.541 CHRONIC DEEP VEIN THROMBOSIS (DVT) OF TIBIAL VEIN OF RIGHT LOWER EXTREMITY (H): Primary | ICD-10-CM

## 2019-10-08 DIAGNOSIS — Z82.49 FAMILY HISTORY OF BLOOD CLOTS: ICD-10-CM

## 2019-10-08 PROCEDURE — 93971 EXTREMITY STUDY: CPT | Mod: RT

## 2019-10-08 PROCEDURE — G0463 HOSPITAL OUTPT CLINIC VISIT: HCPCS | Mod: 25

## 2019-10-08 PROCEDURE — 99204 OFFICE O/P NEW MOD 45 MIN: CPT | Performed by: INTERNAL MEDICINE

## 2019-10-08 ASSESSMENT — PAIN SCALES - GENERAL: PAINLEVEL: NO PAIN (0)

## 2019-10-08 ASSESSMENT — MIFFLIN-ST. JEOR: SCORE: 2349.51

## 2019-10-08 NOTE — PROGRESS NOTES
[facility]  [address] [place] [id number]  Phone: [phone], Fax: [phone]    Outpatient Visit Note:    Patient: Gaurang Rivera  MRN: [id number]  : 1970  KRISTI: Oct 8, 2019    Reason for Consultation:  Gaurang Rivera is a referred by  Dr Bishop  for evaluation and treatment of DVT .    History of Present Illness:  Gaurang Rivera is a 49 year old woke up with painful swollen leg that was hard to walk on.   DVT risk factors = No road trip , plane trip, surgery, trauma, no immobility.   Was diagnosed with DVT - was started on lovenox and warfarin - no bleeding issues as detailed below.   Swelling has mostly resolved, redness has resolved and so has the pain.   Has not had colonoscopy yet .   Pt denies SOB , cough, chest pain , fevers , chills, nausea , vomiting , abdominal pain, change in bowel / bladder habits,   No c/o sore throat , mucositis,Lowe extremity swelling ,  bleeding gums, tingling or numbness, easy bruising , muscular weakness, fatigue , weight loss. Pt has not noticed any new swelling/ lymph glands .         Past Medical History:  Past Medical History:   Diagnosis Date     Depressive disorder      Diabetes (H)      Hypercholesteremia        Past Surgical History:  Past Surgical History:   Procedure Laterality Date     BACK SURGERY      spinal fusion     CHOLECYSTECTOMY         Medications:  Current Outpatient Medications   Medication Sig     lisinopril (PRINIVIL/ZESTRIL) 5 MG tablet Take 1 tablet (5 mg) by mouth daily     metFORMIN (GLUCOPHAGE) 500 MG tablet Take 2 tablets (1,000 mg) by mouth 2 times daily (with meals)     methylphenidate (RITALIN) 10 MG tablet Take 1 tablet (10 mg) by mouth 2 times daily     multivitamin, therapeutic with minerals (MULTI-VITAMIN) TABS tablet Take 1 tablet by mouth daily     order for DME Equipment being ordered: Compression stockings 20-30 or 30-40 mmHg. To be wore for the first 1-2 years following  DVT.     simvastatin (ZOCOR) 40 MG tablet Take 1 tablet (40 mg) by mouth At Bedtime     venlafaxine (EFFEXOR-XR) 150 MG 24 hr capsule Take 2 capsules (300 mg) by mouth daily     warfarin (COUMADIN) 5 MG tablet Current dose (8/7/19): 10 mg Mondays + 7.5 mg all other days of the week or as directed by ACC team.     warfarin (COUMADIN) 7.5 MG tablet Current dose (78/1/19): 10 mg Mondays + 7.5 mg all other days OR as directed by ACC team.     blood glucose (NO BRAND SPECIFIED) lancets standard Use to test blood sugar one times daily or as directed. (Patient not taking: Reported on 7/16/2019)     blood glucose monitoring (NO BRAND SPECIFIED) meter device kit Use to test blood sugar one times daily or as directed. (Patient not taking: Reported on 7/16/2019)     blood glucose monitoring (NO BRAND SPECIFIED) test strip Use to test blood sugars one times daily or as directed (Patient not taking: Reported on 7/16/2019)     oxyCODONE-acetaminophen (PERCOCET) 5-325 MG tablet Take 1-2 tablets by mouth every 4 hours as needed for pain     No current facility-administered medications for this visit.        Allergies:  No Known Allergies    ROS:  Denies any bleeding issues. No gum bleeding, No nose bleed. Denies any hematuria or blood in stools. Denies any ecchymosis. Denies any lower extremities swelling or pain. Denies any fever, no chest pain.     Social History:  Denies any tobacco use. No significant alcohol use. Denies any illicit drug use. 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    Family History:  Family History   Problem Relation Age of Onset     Diabetes Mother      Depression Father      Thrombosis Father         HE HAS HAD 3 CLOTS - PER PATIENT THEY WERE UNPROVOKED ON COUMADIN      Alcoholism Sister      Depression Sister      Lupus Sister          Objectives:  Pleasant 49 year old male in no acute distress.  Vitals: B/P: 132/86, T: 97.8, P: 78, R: 16, Wt: 299 lbs 0  oz Body mass index is 34.55 kg/m .   Exam:   Gen: Appears well, no distress  HEENT: No scleral icterus or hemorrahge, no wet purpura, no lymphadenopathy  Ext: Trace  Edema on the left side   MSK: moving all extremities appropriately,   Psych ': appropriate affect   Neuro- seems intact    Labs:  Results for ISAURA DARNELL (MRN [id number]) as of 10/8/2019 10:07   Ref. Range 7/5/2019 12:01   Sodium Latest Ref Range: 133 - 144 mmol/L 140   Potassium Latest Ref Range: 3.4 - 5.3 mmol/L 3.6   Chloride Latest Ref Range: 94 - 109 mmol/L 106   Carbon Dioxide Latest Ref Range: 20 - 32 mmol/L 27   Urea Nitrogen Latest Ref Range: 7 - 30 mg/dL 11   Creatinine Latest Ref Range: 0.66 - 1.25 mg/dL 0.98   GFR Estimate Latest Ref Range: >60 mL/min/1.73_m2 89   GFR Estimate If Black Latest Ref Range: >60 mL/min/1.73_m2 >90   Calcium Latest Ref Range: 8.5 - 10.1 mg/dL 8.7   Anion Gap Latest Ref Range: 3 - 14 mmol/L 7   Glucose Latest Ref Range: 70 - 99 mg/dL 195 (H)   WBC Latest Ref Range: 4.0 - 11.0 10e9/L 7.1   Hemoglobin Latest Ref Range: 13.3 - 17.7 g/dL 15.8   Hematocrit Latest Ref Range: 40.0 - 53.0 % 44.8   Platelet Count Latest Ref Range: 150 - 450 10e9/L 184   RBC Count Latest Ref Range: 4.4 - 5.9 10e12/L 5.20   MCV Latest Ref Range: 78 - 100 fl 86   MCH Latest Ref Range: 26.5 - 33.0 pg 30.4   MCHC Latest Ref Range: 31.5 - 36.5 g/dL 35.3   RDW Latest Ref Range: 10.0 - 15.0 % 12.2   Diff Method Unknown Automated Method   % Neutrophils Latest Units: % 48.2   % Lymphocytes Latest Units: % 38.3   % Monocytes Latest Units: % 10.4   % Eosinophils Latest Units: % 2.8   % Basophils Latest Units: % 0.3   % Immature Granulocytes Latest Units: % 0.0   Nucleated RBCs Latest Ref Range: 0 /100 0   Absolute Neutrophil Latest Ref Range: 1.6 - 8.3 10e9/L 3.4   Absolute Lymphocytes Latest Ref Range: 0.8 - 5.3 10e9/L 2.7   Absolute Monocytes Latest Ref Range: 0.0 - 1.3 10e9/L 0.7   Absolute Eosinophils Latest Ref Range: 0.0 - 0.7 10e9/L 0.2    Absolute Basophils Latest Ref Range: 0.0 - 0.2 10e9/L 0.0   Abs Immature Granulocytes Latest Ref Range: 0 - 0.4 10e9/L 0.0   Absolute Nucleated RBC Unknown 0.0      INR has been mostly in therapeutic range       Imaging:  RIGHT LOWER EXTREMITY VENOUS DOPPLER ULTRASOUND  7/5/2019 12:41 PM     HISTORY: Right lower extremity pain and swelling. The concern is for  deep venous thrombosis.     COMPARISON: None.     FINDINGS:  Color flow and Doppler spectral waveform analysis was  performed of the common femoral, femoral, popliteal, posterior tibial,  and greater saphenous veins of the right lower extremity. There is  occlusive thrombus within the posterior tibial veins from the mid calf  to the ankle. The remaining deep veins of the right lower extremity  are widely patent.                                                                      IMPRESSION: Occlusive thrombus of the posterior tibial veins of the  mid to distal right calf.    Assessment and Plan   Unprovoked distal DVT   Need for shoulder surgery - periop planning     I had a detailed conversation with the patient regarding the unprovoked nature of his DVT which is distant and location.  We discussed that for typical distal DVTs to be recommend 3 to 6 months of anticoagulation stop but given his family history and unprovoked nature of DVT I would like to pursue some further evaluation before making a determination about duration of therapy.   Having said that I do not see his anticoagulation as an as a barrier for pursuing his shoulder surgery.  We would get an ultrasound of his lower extremity to assess the status of the clot and some thrombophilia evaluation along with d-dimer testing to assess the status of his coagulation cascade activation and based on that we can make recommendations regarding perioperative planning.  Given the fact he is 135 kg of weight to ask are not necessarily an option for him and he has done really well with Coumadin with most  INR is in range and no bleeding complications hence he would continue with the current choice of therapy.   We also discussed that if we end up stopping his anticoagulation based on his laboratory testing and his wishes then I would absolutely recommend pursuing aggressive prophylaxis surrounding times of high risk and we provided him with information card regarding the same.   As of right now I have kept his follow-up as PRN but based on findings we may have him come back in clinic.  Orders Placed This Encounter   Procedures     US Lower Extremity Venous Duplex RT     Antithrombin III     Beta 2 Glycoprotein 1 Antibody IgG     Beta 2 Glycoprotein 1 Antibody IgM     Cardiolipin Joelle IgG and IgM     Factor 2 and 5 mutation analysis     Lupus Anticoagulant Panel     Protein C chromogenic     Protein S Antigen Free     D dimer quantitative       I spent 35 minutes in the care of this patient >50% of which was spent in coordinating and counseling.      Alyssa Chang   of Medicine   Hematology and medical Oncology   [facility]

## 2019-10-08 NOTE — PATIENT INSTRUCTIONS
Labs orders have been placed.  Please have these drawn when you see your primary care later this week     Stay on anticoagulation keeping INR in the 2.0 to 3.0 range.  Please call monitoring physician or Coumadin Clinic for any medication changes, illness, diet changes, bruising.  While on Coumadin, sources of Vit K (green leafy vegetables) are important for a healthy diet, but should be kept stable.      Wear compression stockings during waking hours to prevent or minimize post-thrombotic syndrome (PTS). You may need to get new stockings every 3-6 months with regular wear.    WE have scheduled you for an ultrasound to look at the status of your leg veins.  This will serve as new baseline.  This is scheduled for 11:30 at the Radiology department at [facility].    Call the [facility]  at [phone]   -If surgeries or procedures are planned.    -If off anticoagulation, please call during high risk times (long-distance travel, broken      bones or trauma, immobilization, surgery, pregnancy, or taking estrogen).   -Any new symptoms of DVT (deep vein thrombosis) or PE (pulmonary embolism)    -pain     -swelling     -redness    -warmth    -shortness of breath    -chest pain    -coughing up blood    After we have your lab and imaging results, we will call to discuss by phone.  We may also choose to have you come back for an in clinic discussion.    Your nurse clinician is Tammy Garnica RN at phone number  [phone].  If she is unavailable and you have immediate concerns, please call [phone] and ask for a nurse.     Tammy Garnica RN - Nurse Clinician - [facility] - [phone]    Interested in learning more about blood clots? The [facility] will be hosting educational events and support groups throughout the year. This is your opportunity to get connected to the newest education and important resources,  and meet others that have experienced blood clots.  If you would like to hear more, please text or call [phone] or email [email or web address] to get added to our mailing list.      [facility]  [address] [place] [id number]

## 2019-10-08 NOTE — PROGRESS NOTES
US today for patient shows complete resolution of clot in right lower extremity - no evidence of DVT.  Dr. Chang reviewed. Called Gaurang Rivera at 2:26 PM and left voicemail telling these results provided my call back number should he have questions.  I reiterated the plan that we will call him once his blood work is done to discuss those results and our ongoing recommendations.  Tammy Garnica, RN - Nurse Clinician - [facility] - [phone]

## 2019-10-08 NOTE — NURSING NOTE
Gaurang Rivera is here for a new consult visit and is  being seen for history of unprovoked distal calf clot in July, 2019. Currently on warfarin and followed by Rolan Womack RN for INR management.     I  provided him with a contact card containing our information.     Educated patient about the signs, symptoms of and risk factors for venous thrombosis (VTE) and provided an educational  book nancy, which reviews these facts. And includes the following web links  for further information:  www.stoptheclot.org, www.clotconnect.org.     Together we reviewed the follow up plan. The AVS instructions were then printed and given to the patient.     I  thanked him for coming and encouraged him to call with any concerns or questions.    Tammy Garnica RN - Nurse Clinician - [facility] - [phone]

## 2019-10-08 NOTE — RESULT ENCOUNTER NOTE
Please call the patient to update about negative US. I look forward to reviewing the lab results and formulating a plan for him.   Alyssa Chang   of Medicine   Hematology and medical Oncology   [facility]'

## 2019-10-09 PROBLEM — R03.0 ELEVATED BLOOD PRESSURE READING WITHOUT DIAGNOSIS OF HYPERTENSION: Status: RESOLVED | Noted: 2018-05-13 | Resolved: 2019-10-09

## 2019-10-09 PROBLEM — R03.0 ELEVATED BLOOD PRESSURE READING WITHOUT DIAGNOSIS OF HYPERTENSION: Status: ACTIVE | Noted: 2018-05-13

## 2019-10-09 PROBLEM — L05.91 PILONIDAL CYST: Status: RESOLVED | Noted: 2018-05-13 | Resolved: 2019-10-09

## 2019-10-09 PROBLEM — F33.1 MODERATE EPISODE OF RECURRENT MAJOR DEPRESSIVE DISORDER (H): Status: RESOLVED | Noted: 2018-09-07 | Resolved: 2019-10-09

## 2019-10-09 PROBLEM — L05.91 PILONIDAL CYST: Status: ACTIVE | Noted: 2018-05-13

## 2019-10-10 ENCOUNTER — OFFICE VISIT (OUTPATIENT)
Dept: FAMILY MEDICINE | Facility: CLINIC | Age: 49
End: 2019-10-10
Payer: COMMERCIAL

## 2019-10-10 ENCOUNTER — ANCILLARY PROCEDURE (OUTPATIENT)
Dept: GENERAL RADIOLOGY | Facility: CLINIC | Age: 49
End: 2019-10-10
Attending: FAMILY MEDICINE
Payer: COMMERCIAL

## 2019-10-10 ENCOUNTER — TELEPHONE (OUTPATIENT)
Dept: SURGERY | Facility: CLINIC | Age: 49
End: 2019-10-10

## 2019-10-10 ENCOUNTER — TELEPHONE (OUTPATIENT)
Dept: FAMILY MEDICINE | Facility: CLINIC | Age: 49
End: 2019-10-10

## 2019-10-10 VITALS
RESPIRATION RATE: 15 BRPM | WEIGHT: 299 LBS | SYSTOLIC BLOOD PRESSURE: 106 MMHG | TEMPERATURE: 96.9 F | HEART RATE: 76 BPM | DIASTOLIC BLOOD PRESSURE: 84 MMHG | OXYGEN SATURATION: 95 % | BODY MASS INDEX: 34.55 KG/M2

## 2019-10-10 DIAGNOSIS — R80.9 MICROALBUMINURIA DUE TO TYPE 2 DIABETES MELLITUS (H): ICD-10-CM

## 2019-10-10 DIAGNOSIS — L05.91 PILONIDAL CYST: ICD-10-CM

## 2019-10-10 DIAGNOSIS — E78.5 HYPERLIPIDEMIA, UNSPECIFIED HYPERLIPIDEMIA TYPE: ICD-10-CM

## 2019-10-10 DIAGNOSIS — Z23 NEED FOR DIPHTHERIA-TETANUS-PERTUSSIS (TDAP) VACCINE: ICD-10-CM

## 2019-10-10 DIAGNOSIS — F33.2 SEVERE EPISODE OF RECURRENT MAJOR DEPRESSIVE DISORDER, WITHOUT PSYCHOTIC FEATURES (H): ICD-10-CM

## 2019-10-10 DIAGNOSIS — E11.9 TYPE 2 DIABETES MELLITUS WITHOUT COMPLICATION, WITHOUT LONG-TERM CURRENT USE OF INSULIN (H): Primary | ICD-10-CM

## 2019-10-10 DIAGNOSIS — R45.851 PASSIVE SUICIDAL IDEATIONS: ICD-10-CM

## 2019-10-10 DIAGNOSIS — Z82.49 FAMILY HISTORY OF BLOOD CLOTS: ICD-10-CM

## 2019-10-10 DIAGNOSIS — Z23 NEED FOR PROPHYLACTIC VACCINATION AND INOCULATION AGAINST INFLUENZA: ICD-10-CM

## 2019-10-10 DIAGNOSIS — S43.431S BANKART LESION OF RIGHT SHOULDER, SEQUELA: ICD-10-CM

## 2019-10-10 DIAGNOSIS — I82.541 CHRONIC DEEP VEIN THROMBOSIS (DVT) OF TIBIAL VEIN OF RIGHT LOWER EXTREMITY (H): ICD-10-CM

## 2019-10-10 DIAGNOSIS — E11.29 MICROALBUMINURIA DUE TO TYPE 2 DIABETES MELLITUS (H): ICD-10-CM

## 2019-10-10 DIAGNOSIS — Z12.11 SPECIAL SCREENING FOR MALIGNANT NEOPLASMS, COLON: ICD-10-CM

## 2019-10-10 DIAGNOSIS — M25.561 RIGHT KNEE PAIN, UNSPECIFIED CHRONICITY: ICD-10-CM

## 2019-10-10 LAB
ALBUMIN SERPL-MCNC: 4.3 G/DL (ref 3.4–5)
ALP SERPL-CCNC: 64 U/L (ref 40–150)
ALT SERPL W P-5'-P-CCNC: 58 U/L (ref 0–70)
ANION GAP SERPL CALCULATED.3IONS-SCNC: 7 MMOL/L (ref 3–14)
AST SERPL W P-5'-P-CCNC: 36 U/L (ref 0–45)
BASOPHILS # BLD AUTO: 0 10E9/L (ref 0–0.2)
BASOPHILS NFR BLD AUTO: 0.6 %
BILIRUB SERPL-MCNC: 0.6 MG/DL (ref 0.2–1.3)
BUN SERPL-MCNC: 13 MG/DL (ref 7–30)
CALCIUM SERPL-MCNC: 8.9 MG/DL (ref 8.5–10.1)
CARDIOLIPIN ANTIBODY IGG: <1.6 GPL-U/ML (ref 0–19.9)
CARDIOLIPIN ANTIBODY IGM: 1.5 MPL-U/ML (ref 0–19.9)
CHLORIDE SERPL-SCNC: 105 MMOL/L (ref 94–109)
CHOLEST SERPL-MCNC: 205 MG/DL
CO2 SERPL-SCNC: 24 MMOL/L (ref 20–32)
CREAT SERPL-MCNC: 0.84 MG/DL (ref 0.66–1.25)
D DIMER PPP FEU-MCNC: 0.3 UG/ML FEU (ref 0–0.5)
DIFFERENTIAL METHOD BLD: NORMAL
EOSINOPHIL # BLD AUTO: 0.1 10E9/L (ref 0–0.7)
EOSINOPHIL NFR BLD AUTO: 2.6 %
ERYTHROCYTE [DISTWIDTH] IN BLOOD BY AUTOMATED COUNT: 13.1 % (ref 10–15)
GFR SERPL CREATININE-BSD FRML MDRD: >90 ML/MIN/{1.73_M2}
GLUCOSE SERPL-MCNC: 179 MG/DL (ref 70–99)
HBA1C MFR BLD: 7.2 % (ref 0–5.6)
HCT VFR BLD AUTO: 47.1 % (ref 40–53)
HDLC SERPL-MCNC: 36 MG/DL
HGB BLD-MCNC: 16.3 G/DL (ref 13.3–17.7)
LDLC SERPL CALC-MCNC: ABNORMAL MG/DL
LDLC SERPL DIRECT ASSAY-MCNC: 109 MG/DL
LYMPHOCYTES # BLD AUTO: 2.4 10E9/L (ref 0.8–5.3)
LYMPHOCYTES NFR BLD AUTO: 43.5 %
MCH RBC QN AUTO: 30.1 PG (ref 26.5–33)
MCHC RBC AUTO-ENTMCNC: 34.6 G/DL (ref 31.5–36.5)
MCV RBC AUTO: 87 FL (ref 78–100)
MONOCYTES # BLD AUTO: 0.4 10E9/L (ref 0–1.3)
MONOCYTES NFR BLD AUTO: 7.9 %
NEUTROPHILS # BLD AUTO: 2.5 10E9/L (ref 1.6–8.3)
NEUTROPHILS NFR BLD AUTO: 45.4 %
NONHDLC SERPL-MCNC: 169 MG/DL
PLATELET # BLD AUTO: 206 10E9/L (ref 150–450)
POTASSIUM SERPL-SCNC: 3.9 MMOL/L (ref 3.4–5.3)
PROT SERPL-MCNC: 7.4 G/DL (ref 6.8–8.8)
RBC # BLD AUTO: 5.42 10E12/L (ref 4.4–5.9)
SODIUM SERPL-SCNC: 136 MMOL/L (ref 133–144)
TRIGL SERPL-MCNC: 401 MG/DL
TSH SERPL DL<=0.005 MIU/L-ACNC: 0.88 MU/L (ref 0.4–4)
WBC # BLD AUTO: 5.5 10E9/L (ref 4–11)

## 2019-10-10 PROCEDURE — 86146 BETA-2 GLYCOPROTEIN ANTIBODY: CPT | Performed by: FAMILY MEDICINE

## 2019-10-10 PROCEDURE — 90472 IMMUNIZATION ADMIN EACH ADD: CPT | Performed by: FAMILY MEDICINE

## 2019-10-10 PROCEDURE — 85379 FIBRIN DEGRADATION QUANT: CPT | Performed by: FAMILY MEDICINE

## 2019-10-10 PROCEDURE — 90715 TDAP VACCINE 7 YRS/> IM: CPT | Performed by: FAMILY MEDICINE

## 2019-10-10 PROCEDURE — 85525 HEPARIN NEUTRALIZATION: CPT | Performed by: FAMILY MEDICINE

## 2019-10-10 PROCEDURE — 85306 CLOT INHIBIT PROT S FREE: CPT | Performed by: FAMILY MEDICINE

## 2019-10-10 PROCEDURE — 85300 ANTITHROMBIN III ACTIVITY: CPT | Performed by: FAMILY MEDICINE

## 2019-10-10 PROCEDURE — 85732 THROMBOPLASTIN TIME PARTIAL: CPT | Performed by: FAMILY MEDICINE

## 2019-10-10 PROCEDURE — 90471 IMMUNIZATION ADMIN: CPT | Performed by: FAMILY MEDICINE

## 2019-10-10 PROCEDURE — 86147 CARDIOLIPIN ANTIBODY EA IG: CPT | Performed by: FAMILY MEDICINE

## 2019-10-10 PROCEDURE — 99214 OFFICE O/P EST MOD 30 MIN: CPT | Mod: 25 | Performed by: FAMILY MEDICINE

## 2019-10-10 PROCEDURE — 84443 ASSAY THYROID STIM HORMONE: CPT | Performed by: FAMILY MEDICINE

## 2019-10-10 PROCEDURE — 00000167 ZZHCL STATISTIC INR NC: Performed by: FAMILY MEDICINE

## 2019-10-10 PROCEDURE — 83721 ASSAY OF BLOOD LIPOPROTEIN: CPT | Mod: 59 | Performed by: FAMILY MEDICINE

## 2019-10-10 PROCEDURE — 85025 COMPLETE CBC W/AUTO DIFF WBC: CPT | Performed by: FAMILY MEDICINE

## 2019-10-10 PROCEDURE — 81241 F5 GENE: CPT | Performed by: INTERNAL MEDICINE

## 2019-10-10 PROCEDURE — 36415 COLL VENOUS BLD VENIPUNCTURE: CPT | Performed by: FAMILY MEDICINE

## 2019-10-10 PROCEDURE — 00000401 ZZHCL STATISTIC THROMBIN TIME NC: Performed by: FAMILY MEDICINE

## 2019-10-10 PROCEDURE — 80053 COMPREHEN METABOLIC PANEL: CPT | Performed by: FAMILY MEDICINE

## 2019-10-10 PROCEDURE — 85303 CLOT INHIBIT PROT C ACTIVITY: CPT | Performed by: FAMILY MEDICINE

## 2019-10-10 PROCEDURE — 81240 F2 GENE: CPT | Performed by: INTERNAL MEDICINE

## 2019-10-10 PROCEDURE — 80061 LIPID PANEL: CPT | Performed by: FAMILY MEDICINE

## 2019-10-10 PROCEDURE — 73562 X-RAY EXAM OF KNEE 3: CPT | Mod: RT

## 2019-10-10 PROCEDURE — 99207 C FOOT EXAM  NO CHARGE: CPT | Performed by: FAMILY MEDICINE

## 2019-10-10 PROCEDURE — 85613 RUSSELL VIPER VENOM DILUTED: CPT | Performed by: FAMILY MEDICINE

## 2019-10-10 PROCEDURE — 85730 THROMBOPLASTIN TIME PARTIAL: CPT | Performed by: FAMILY MEDICINE

## 2019-10-10 PROCEDURE — 90682 RIV4 VACC RECOMBINANT DNA IM: CPT | Performed by: FAMILY MEDICINE

## 2019-10-10 PROCEDURE — 85597 PHOSPHOLIPID PLTLT NEUTRALIZ: CPT | Performed by: FAMILY MEDICINE

## 2019-10-10 PROCEDURE — 83036 HEMOGLOBIN GLYCOSYLATED A1C: CPT | Performed by: FAMILY MEDICINE

## 2019-10-10 ASSESSMENT — PATIENT HEALTH QUESTIONNAIRE - PHQ9
SUM OF ALL RESPONSES TO PHQ QUESTIONS 1-9: 14
SUM OF ALL RESPONSES TO PHQ QUESTIONS 1-9: 14
10. IF YOU CHECKED OFF ANY PROBLEMS, HOW DIFFICULT HAVE THESE PROBLEMS MADE IT FOR YOU TO DO YOUR WORK, TAKE CARE OF THINGS AT HOME, OR GET ALONG WITH OTHER PEOPLE: SOMEWHAT DIFFICULT

## 2019-10-10 ASSESSMENT — ANXIETY QUESTIONNAIRES
GAD7 TOTAL SCORE: 1
1. FEELING NERVOUS, ANXIOUS, OR ON EDGE: NOT AT ALL
GAD7 TOTAL SCORE: 1
4. TROUBLE RELAXING: NOT AT ALL
2. NOT BEING ABLE TO STOP OR CONTROL WORRYING: NOT AT ALL
7. FEELING AFRAID AS IF SOMETHING AWFUL MIGHT HAPPEN: NOT AT ALL
7. FEELING AFRAID AS IF SOMETHING AWFUL MIGHT HAPPEN: NOT AT ALL
GAD7 TOTAL SCORE: 1
3. WORRYING TOO MUCH ABOUT DIFFERENT THINGS: SEVERAL DAYS
6. BECOMING EASILY ANNOYED OR IRRITABLE: NOT AT ALL
5. BEING SO RESTLESS THAT IT IS HARD TO SIT STILL: NOT AT ALL

## 2019-10-10 NOTE — NURSING NOTE
Prior to immunization administration, verified patients identity using patient s name and date of birth. Please see Immunization Activity for additional information.     Screening Questionnaire for Adult Immunization    Are you sick today?   No   Do you have allergies to medications, food, a vaccine component or latex?   No   Have you ever had a serious reaction after receiving a vaccination?   No   Do you have a long-term health problem with heart disease, lung disease, asthma, kidney disease, metabolic disease (e.g. diabetes), anemia, or other blood disorder?   Yes- Diabetes   Do you have cancer, leukemia, HIV/AIDS, or any other immune system problem?   No   In the past 3 months, have you taken medications that affect  your immune system, such as prednisone, other steroids, or anticancer drugs; drugs for the treatment of rheumatoid arthritis, Crohn s disease, or psoriasis; or have you had radiation treatments?   No   Have you had a seizure, or a brain or other nervous system problem?   No   During the past year, have you received a transfusion of blood or blood     products, or been given immune (gamma) globulin or antiviral drug?   No   For women: Are you pregnant or is there a chance you could become        pregnant during the next month?   No   Have you received any vaccinations in the past 4 weeks?   No     Immunization questionnaire was positive for at least one answer.  Notified Dr. Holly Khan.        Per orders of Dr. Holly Khan, injection of tdap and flu given by Alec Martin MA. Patient instructed to remain in clinic for 15 minutes afterwards, and to report any adverse reaction to me immediately.       Screening performed by Alec Martin MA on 10/10/2019 at 9:43 AM.

## 2019-10-10 NOTE — RESULT ENCOUNTER NOTE
Ada Mr. Rivera,  Your results came back and are within acceptable limits. -Liver and gallbladder tests (ALT,AST, Alk phos,bilirubin) are normal.  -Kidney function (GFR) is normal.  -Sodium is normal.  -Potassium is normal.  -Calcium is normal.  -Glucose is elevated due to your diabetes.. If you have any further concerns please do not hesitate to contact us by message, phone or making an appointment.  Have a good day   Dr Bill THORNTON

## 2019-10-10 NOTE — TELEPHONE ENCOUNTER
Dr. Khan-Please review and advise.    Patient's insurance does not cover diclofenac 1% gel.    Thank you!  UMA CarverN, RN

## 2019-10-10 NOTE — PATIENT INSTRUCTIONS
Diabetes eye check due.  Foot check daily.  And done today.  Meds refilled.  Check sugars daily.  Labs due today as well as for the hematologist.  Flu shot recommended yearly.  Tdap due.  Colonoscopy due.  Determine with hematology what to do about warfarin regarding colonoscopy.  X-ray knee and referred to orthopedics for knee pain.  Can use diclofenac gel 4 times a day to help with knee pain until seen by them.  Continue care with therapist.  Referral to psychiatry made.  Go to the ER if mental health gets worse.  Consider shingles vaccine.  INR due October 17.  See the surgeon for your pilonidal cyst.  See Dr. Burdick for the shoulder problem.  Continue routine care with PCP Dr Salas and due for physical with her in 3 months.

## 2019-10-10 NOTE — TELEPHONE ENCOUNTER
[facility] Call Center    Phone Message    May a detailed message be left on voicemail: yes    Reason for Call: Other: Patient is being referred by Holly Khan with [facility] to be seen for a pilonidal cyst. Patient stated he has had the cyst for 2 years now but it is now painful and has discomfort. Patient has had a cyst in the past about 10 years ago which was drained but cyst has come back. Please call patient to schedule.     Action Taken: Message routed to:  [facility] Rectal [facility]

## 2019-10-10 NOTE — PROGRESS NOTES
Subjective     Gaurang Rivera is a 49 year old male who presents to clinic today for the following health issues:    HPI   Diabetes Follow-up    How often are you checking your blood sugar? Not at all    What time of day are you checking your blood sugars (select all that apply)?  Not checking     Have you had any blood sugars above 200?  No    Have you had any blood sugars below 70?  No    What symptoms do you notice when your blood sugar is low?  None    What concerns do you have today about your diabetes? None     Do you have any of these symptoms? (Select all that apply)  No numbness or tingling in feet.  No redness, sores or blisters on feet.  No complaints of excessive thirst.  No reports of blurry vision.  No significant changes to weight.     Have you had a diabetic eye exam in the last 12 months? No    BP Readings from Last 2 Encounters:   10/10/19 106/84   10/08/19 132/86     Hemoglobin A1C (%)   Date Value   10/10/2019 7.2 (H)   06/28/2019 7.1 (H)     LDL Cholesterol Calculated (mg/dL)   Date Value   06/28/2019     Cannot estimate LDL when triglyceride exceeds 400 mg/dL   09/11/2018     Cannot estimate LDL when triglyceride exceeds 400 mg/dL   Diabetes Management Resources    How many servings of fruits and vegetables do you eat daily?  0-1    On average, how many sweetened beverages do you drink each day (soda, juice, sweet tea, etc)?   0    How many days per week do you miss taking your medication? 0    2. Lab work future orders and colonoscopy     Injectable Influenza Immunization Documentation  1.  Is the person to be vaccinated sick today?   No  2. Does the person to be vaccinated have an allergy to a component   of the vaccine?   No  Egg Allergy Algorithm Link  3. Has the person to be vaccinated ever had a serious reaction   to influenza vaccine in the past?   No  4. Has the person to be vaccinated ever had Guillain-Barré syndrome?   No  Form completed by Alec Martin MA    [employer] , BMI on 34, DM on  metformin, HTN , microalbuminuria on Lisinopril, HLD, Elevated TG on Lisinopril, hx of MDD, passive suicidal ideations, was in day treatment, sees a therapist , under care of psyche on high dose Effexor and previously given Ritalin last in April 2019, hx of ED, pilonidal cyst, DVT dx 7/5/19 on warfarin , under care of hematology, on warfarin managed by [facility] clinic, prior back fusion surgery, prior thumb surgery, under care of PCP Dr. Guzman.  Seen by PCP 7/22/2019 for follow-up of right leg DVT diagnosed on 5 July.  Had occlusive thrombosis of right posterior tibial veins.  It was unprovoked.  Was put on warfarin.  Continues with passive suicidal ideation was being managed by psych.  No longer on Lovenox which was discontinued 729 no longer on Ritalin which she last used in April.  Ultrasound on 10/8/2019 showed no DVT.  Did see the hematologist yesterday for history of new unprovoked DVT and labs were ordered which she will get today to determine duration of anticoagulation.  Advised he could go ahead with his shoulder surgery which was on hold given the DVT.  He fell and had a glenoid fracture which healed with malunion and now restricts his movement though he does not have pain on that right side.  [place]  shows given methylphenidate 5 mg #60 in February and then #60 of the 10 mg in April and then Percocet 5 3/25/2012 in July.    Here today as he got a call to get his foot exam done.  New to me.  Diabetes.  On metformin.  Tolerating well.  Due for an eye exam.  Not checking his sugars at all.  Does not like poking himself.  Due for foot exam.  Reports no burning tingling or sores on his legs.  Microalbuminuria secondary to diabetes on lisinopril.  Hyperlipidemia on a statin  Major depressive disorder passive suicidal ideations continues with high scores and chronic passive suicidal thoughts.  Denies any active thoughts or plan to harm self.  Contracts to safety.  2-day treatment.  Has a therapist but not  seen in a while and will reconnect with them.  Needs a new psychiatrist and agreeable to referral today.  Is on high-dose Effexor 300 mg daily.  No longer on Ritalin.  Chronic DVT diagnosed in July seen by hematology , to do coag labs today to determine duration of Rx, recent recheck ultrasound yesterday showed resolution of the DVT.  His INR check with [facility] on 1017.  Remains on warfarin.  Due for flu shot and will get today.  Due for Tdap and agreeable to that today.    Due for colon cancer screening is interested in a colonoscopy will put the referral in but he will wait to hear back from his hematologist about what to do with his warfarin.  Right knee arthritis worse: painful all the time. no swelling, no redness or warmth, no known injury, no locking, walking funny because painful, worried will throw it more out of wack, regular walking is painful, no strenuous exercise.  A rowing .  Pilonidal cyst x 2 drained in the past, now has a nodule, never goes away, bleeds a tiny bit now and then, not as bad as its been.  Needs a referral to a general surgeon.  Right shoulder glenoid fracture fall with malunion, & decreased motion and function, no pain, was to get surgical Rx, delayed due to blood clot hematology recently told him there was no contraindication to get surgical correction done.    SOL-7   [employer], 2002; Used with Permission) 10/10/2019   1. Feeling nervous, anxious, or on edge Not at all   2. Not being able to stop or control worrying Not at all   3. Worrying too much about different things Several days   4. Trouble relaxing Not at all   5. Being so restless that it is hard to sit still Not at all   6. Becoming easily annoyed or irritable Not at all   7. Feeling afraid, as if something awful might happen Not at all   SOL 7 TOTAL SCORE 1 (minimal anxiety)   1. Feeling nervous, anxious, or on edge 0   2. Not being able to stop or control worrying 0   3. Worrying too much about different things 1   4.  Trouble relaxing 0   5. Being so restless that it is hard to sit still 0   6. Becoming easily annoyed or irritable 0   7. Feeling afraid, as if something awful might happen 0   SOL-7 Total Score 1   If you checked any problems, how difficult have they made it for you to do your work, take care of things at home, or get along with other people?    PHQ-9 ([employer]) 10/10/2019   1.  Little interest or pleasure in doing things 2   2.  Feeling down, depressed, or hopeless 2   3.  Trouble falling or staying asleep, or sleeping too much 2   4.  Feeling tired or having little energy 2   5.  Poor appetite or overeating 3   6.  Feeling bad about yourself 2   7.  Trouble concentrating 0   8.  Moving slowly or restless 0   9.  Suicidal or self-harm thoughts 1   PHQ-9 Total Score 14   Difficulty at work, home, or with people    In the past two weeks have you had thoughts of suicide or self harm? No   Do you have concerns about your personal safety or the safety of others? No   In the past 2 weeks have you thought about a plan or had intention to harm yourself?    In the past 2 weeks have you acted on these thoughts in any way?    1.  Little interest or pleasure in doing things More than half the days   2.  Feeling down, depressed, or hopeless More than half the days   3.  Trouble falling or staying asleep, or sleeping too much More than half the days   4.  Feeling tired or having little energy More than half the days   5.  Poor appetite or overeating Nearly every day   6.  Feeling bad about yourself More than half the days   7.  Trouble concentrating Not at all   8.  Moving slowly or restless Not at all   9.  Suicidal or self-harm thoughts Several days   PHQ-9 via [employer] TOTAL SCORE-----> 14 (Moderate depression)   Difficulty at work, home, or with people Somewhat difficult   F/U: Thoughts of suicide or self harm? No   F/U: Plan or intention for self harm?    F/U: Acted on thoughts?    F/U: Safety concerns for self or others? No      Patient Active Problem List   Diagnosis     Passive suicidal ideations     Diabetes mellitus, type 2 (H)     Hyperlipidemia, unspecified hyperlipidemia type     Microalbuminuria due to type 2 diabetes mellitus (H)     MDD (major depressive disorder), recurrent episode, severe (H)     Deep vein thrombosis (DVT) (H)     Erectile dysfunction     Past Surgical History:   Procedure Laterality Date     BACK SURGERY  1997    spinal fusion     CHOLECYSTECTOMY  2012     THUMB SURGERY   1995       Social History     Tobacco Use     Smoking status: Never Smoker     Smokeless tobacco: Never Used   Substance Use Topics     Alcohol use: No     Comment: None     Family History   Problem Relation Age of Onset     Diabetes Mother      Depression Father      Thrombosis Father         HE HAS HAD 3 CLOTS - PER PATIENT THEY WERE UNPROVOKED ON COUMADIN      Alcoholism Sister      Depression Sister      Lupus Sister          Current Outpatient Medications   Medication Sig Dispense Refill     diclofenac (VOLTAREN) 1 % topical gel Place 2 g onto the skin 4 times daily for 14 days To right knee 100 g 0     lisinopril (PRINIVIL/ZESTRIL) 5 MG tablet Take 1 tablet (5 mg) by mouth daily 90 tablet 1     metFORMIN (GLUCOPHAGE) 500 MG tablet Take 2 tablets (1,000 mg) by mouth 2 times daily (with meals) 120 tablet 2     multivitamin, therapeutic with minerals (MULTI-VITAMIN) TABS tablet Take 1 tablet by mouth daily       order for DME Equipment being ordered: Compression stockings 20-30 or 30-40 mmHg. To be wore for the first 1-2 years following DVT. 1 Package 1     simvastatin (ZOCOR) 40 MG tablet Take 1 tablet (40 mg) by mouth At Bedtime 90 tablet 1     venlafaxine (EFFEXOR-XR) 150 MG 24 hr capsule Take 2 capsules (300 mg) by mouth daily 60 capsule 0     warfarin (COUMADIN) 5 MG tablet Current dose (8/7/19): 10 mg Mondays + 7.5 mg all other days of the week or as directed by ACC team. 30 tablet 1     warfarin (COUMADIN) 7.5 MG tablet Current  dose (78/1/19): 10 mg Mondays + 7.5 mg all other days OR as directed by ACC team. 80 tablet 0     blood glucose (NO BRAND SPECIFIED) lancets standard Use to test blood sugar one times daily or as directed. (Patient not taking: Reported on 7/16/2019) 100 each 11     blood glucose monitoring (NO BRAND SPECIFIED) meter device kit Use to test blood sugar one times daily or as directed. (Patient not taking: Reported on 7/16/2019) 1 kit 3     blood glucose monitoring (NO BRAND SPECIFIED) test strip Use to test blood sugars one times daily or as directed (Patient not taking: Reported on 7/16/2019) 100 strip 1     No Known Allergies  Recent Labs   Lab Test 10/10/19  0959 07/05/19  1201 06/28/19  0748 09/26/18  1117 09/26/18  1055 09/11/18  0858   A1C 7.2*  --  7.1*  --  6.5*  --    LDL  --   --  Cannot estimate LDL when triglyceride exceeds 400 mg/dL  --   --  Cannot estimate LDL when triglyceride exceeds 400 mg/dL   HDL  --   --  29*  --   --  25*   TRIG  --   --  530*  --   --  1,015*   CR  --  0.98  --   --   --  1.04   GFRESTIMATED  --  89  --   --   --  76   GFRESTBLACK  --  >90  --   --   --  >90   POTASSIUM  --  3.6  --   --   --  4.6   TSH  --   --   --  1.30  --   --       BP Readings from Last 3 Encounters:   10/10/19 106/84   10/08/19 132/86   07/22/19 108/77    Wt Readings from Last 3 Encounters:   10/10/19 135.6 kg (299 lb)   10/08/19 135.6 kg (299 lb)   07/22/19 131.5 kg (290 lb)         Reviewed and updated as needed this visit by Provider  Tobacco  Allergies  Meds  Med Hx  Surg Hx  Fam Hx  Soc Hx        Review of Systems   ROS COMP: Constitutional, HEENT, cardiovascular, pulmonary, GI, , musculoskeletal, neuro, skin, endocrine and psych systems are negative, except as otherwise noted.      Objective    /84 (BP Location: Left arm, Patient Position: Chair, Cuff Size: Adult Large)   Pulse 76   Temp 96.9  F (36.1  C) (Tympanic)   Resp 15   Wt 135.6 kg (299 lb)   SpO2 95%   BMI 34.55 kg/m     Body mass index is 34.55 kg/m .  Physical Exam   GENERAL: healthy, alert, no distress and obese  EYES: Eyes grossly normal to inspection, PERRL and conjunctivae and sclerae normal  HENT: ear canals and TM's normal, nose and mouth without ulcers or lesions  NECK: no adenopathy, no asymmetry, masses, or scars and thyroid normal to palpation  RESP: lungs clear to auscultation - no rales, rhonchi or wheezes  CV: regular rate and rhythm, normal S1 S2, no S3 or S4, no murmur, click or rub, no peripheral edema and peripheral pulses strong  ABDOMEN: soft, nontender, no hepatosplenomegaly, no masses and bowel sounds normal  MS: no gross musculoskeletal defects noted, no edema  SKIN: no suspicious lesions or rashes, subcutaneous nodule with minimal bruising that looks old right upper gluteal area near gluteal cleft.  Not draining nontender currently  NEURO: Normal strength and tone, mentation intact and speech normal  PSYCH: mentation appears normal, affect normal/bright  Diabetic foot exam: normal DP and PT pulses, no trophic changes or ulcerative lesions, normal sensory exam, normal monofilament exam, dry cracking heels and nail exam normal nails without lesions    Diagnostic Test Results:  Labs reviewed in Epic  Results for orders placed or performed in visit on 10/10/19 (from the past 24 hour(s))   Hemoglobin A1c   Result Value Ref Range    Hemoglobin A1C 7.2 (H) 0 - 5.6 %   CBC with platelets differential   Result Value Ref Range    WBC 5.5 4.0 - 11.0 10e9/L    RBC Count 5.42 4.4 - 5.9 10e12/L    Hemoglobin 16.3 13.3 - 17.7 g/dL    Hematocrit 47.1 40.0 - 53.0 %    MCV 87 78 - 100 fl    MCH 30.1 26.5 - 33.0 pg    MCHC 34.6 31.5 - 36.5 g/dL    RDW 13.1 10.0 - 15.0 %    Platelet Count 206 150 - 450 10e9/L    % Neutrophils 45.4 %    % Lymphocytes 43.5 %    % Monocytes 7.9 %    % Eosinophils 2.6 %    % Basophils 0.6 %    Absolute Neutrophil 2.5 1.6 - 8.3 10e9/L    Absolute Lymphocytes 2.4 0.8 - 5.3 10e9/L    Absolute  Monocytes 0.4 0.0 - 1.3 10e9/L    Absolute Eosinophils 0.1 0.0 - 0.7 10e9/L    Absolute Basophils 0.0 0.0 - 0.2 10e9/L    Diff Method Automated Method            Assessment & Plan     1. Type 2 diabetes mellitus without complication, without long-term current use of insulin (H)  Diabetes well controlled recent hemoglobin A1c in June but will recheck today.  Diabetes eye check due. Foot check daily.  And done today.  It was normal. Meds refilled.  Encouraged to check sugars daily. Labs due today as well as for the hematologist.  These were released and they will be addressed by the hematologist. Flu shot recommended yearly & Tdap due these were given today. Colonoscopy due.  Referral placed. To determine with hematology what to do about warfarin regarding colonoscopy. X-ray knee for knee pain and referred to orthopedics for knee pain. Can use diclofenac gel 4 times a day to help with knee pain until seen by them. Continue care with therapist. Referral to psychiatry made. Go to the ER if mental health gets worse. Consider shingles vaccine. INR due October 17. See the surgeon for his pilonidal cyst. See Dr. Burdick for the shoulder problem. Continue routine care with PCP Dr Guzman and due for physical with her in 3 months.  - Hemoglobin A1c  - C FOOT EXAM  NO CHARGE  - Comprehensive metabolic panel  - Lipid panel reflex to direct LDL Fasting  - TSH with free T4 reflex  - VACCINE ADMINISTRATION, INITIAL  - OPHTHALMOLOGY ADULT REFERRAL  - metFORMIN (GLUCOPHAGE) 500 MG tablet; Take 2 tablets (1,000 mg) by mouth 2 times daily (with meals)  Dispense: 120 tablet; Refill: 2  - FLU VAC, QUADRIVALENT (RIV4) RECOMBINANT DNA, IM    2. Microalbuminuria due to type 2 diabetes mellitus (H)  New with lisinopril use.  - Comprehensive metabolic panel    3. Hyperlipidemia, unspecified hyperlipidemia type  Continue to work on diet exercise and weight loss.  Continue simvastatin 40 mg bedtime.  - Lipid panel reflex to direct LDL  Fasting  - TSH with free T4 reflex    4. Severe episode of recurrent major depressive disorder, without psychotic features (H)  On Effexor 300 mg daily managed by psychiatry but needs a new one referral made.  Chronic passive suicidal thoughts contracts to safety & currently is safe.  - TSH with free T4 reflex  - MENTAL HEALTH REFERRAL  - Adult; Psychiatry and Medication Management; Psychiatry; Other: [facility] [phone]; We will contact you to schedule the appointment or please call with any questions    5. Passive suicidal ideations  Chronic passive suicidal thoughts.  Has done day treatment.  Has a therapist but needs to reconnect with them.  Needs a new psychiatrist.  Referral made.  Currently no active plan.  No prior attempt.  Contracts to safety.  Wise to go to the ER if worse.  - TSH with free T4 reflex  - MENTAL HEALTH REFERRAL  - Adult; Psychiatry and Medication Management; Psychiatry; Other: [facility] [phone]; We will contact you to schedule the appointment or please call with any questions    6. Chronic deep vein thrombosis (DVT) of tibial vein of right lower extremity (H)  On warfarin 10 mg Mondays and 7.5 mg all other days or as directed by [facility] team.  Labs released for the hematologist to address.  On warfarin.  Recent ultrasound showed clearance of the DVT.  Labs may determine duration of treatment.  Next INR due 1017 with [facility].  Continue routine care with PCP Dr Ramirez.  Warfarin will have to be adjusted for any procedure done like for surgery of shoulder or pilonidal cyst right knee a colonoscopy.  - CBC with platelets differential  - D dimer quantitative  - Protein S Antigen Free  - Protein C chromogenic  - Lupus Anticoagulant Panel  - Factor 2 and 5 mutation analysis  - Cardiolipin Joelle IgG and IgM  - Beta 2 Glycoprotein 1 Antibody IgM  - Beta 2 Glycoprotein 1 Antibody IgG  - Antithrombin III    7. Family history of blood clots  Labs released  "for the hematologist to address.  - D dimer quantitative  - Protein S Antigen Free  - Protein C chromogenic  - Lupus Anticoagulant Panel  - Factor 2 and 5 mutation analysis  - Cardiolipin Joelle IgG and IgM  - Beta 2 Glycoprotein 1 Antibody IgM  - Beta 2 Glycoprotein 1 Antibody IgG  - Antithrombin III    8. Right knee pain, unspecified chronicity  Chronic worsening.  Examination is unremarkable.  Rest, ice, elevate, compress.  X-ray today to see what is going on.  Diclofenac gel 2 g 4 times a day for 14 days to right knee until can see PT and Ortho for further evaluation and management.  - CBC with platelets differential  - [facility] PT, HAND, AND CHIROPRACTIC REFERRAL; Future  - ORTHO  REFERRAL  - XR Knee Right 3 Views; Future  - diclofenac (VOLTAREN) 1 % topical gel; Place 2 g onto the skin 4 times daily for 14 days To right knee  Dispense: 100 g; Refill: 0    9. Pilonidal cyst  Has had it drained twice before continues with a nodule needs to have it surgically removed will refer to the general surgeon.  Currently not actively infected.  - CBC with platelets differential  - GENERAL SURG ADULT REFERRAL    10. Bankart lesion of right shoulder, sequela is post fall and glenoid fracture and malunion.  No pain just decreased range of motion and function.  Follow-up Dr. Burdick orthopedic surgeon for right shoulder.    11. Special screening for malignant neoplasms, colon  - GASTROENTEROLOGY ADULT REF PROCEDURE ONLY    12. Need for prophylactic vaccination and inoculation against influenza  - VACCINE ADMINISTRATION, INITIAL  - FLU VAC, QUADRIVALENT (RIV4) RECOMBINANT DNA, IM    13. Need for diphtheria-tetanus-pertussis (Tdap) vaccine  - TDAP VACCINE  - VACCINE ADMINISTRATION, EACH ADDITIONAL     BMI:   Estimated body mass index is 34.55 kg/m  as calculated from the following:    Height as of 10/8/19: 1.981 m (6' 6\").    Weight as of this encounter: 135.6 kg (299 lb).   Weight management plan: Discussed healthy diet and " exercise guidelines    Work on weight loss  Regular exercise  See Patient Instructions    Return in about 3 months (around 1/10/2020) for Physical Exam with PCP dr little.    Holly Khan MD  [facility]

## 2019-10-10 NOTE — TELEPHONE ENCOUNTER
Pharmacist on break. Writer was given option to LVM. Writer LVM asking if generic ketorolac gel is covered. Awaiting call back.     Writer called patient 's insurance [facility]--was on hold for 10 minutes with no answer.     LVM requesting patient call his insurance for alternative to medication.     Thanks! Archana Varner RN

## 2019-10-10 NOTE — RESULT ENCOUNTER NOTE
Ada Mr. Rivera,  Some of your results came back and are within acceptable limits. -Normal red blood cell (hgb) levels, normal white blood cell count and normal platelet levels.  -A1C (test of diabetes control the last 2-3 months) is at your goal. Please recheck your A1C test in 3 months. . If you have any further concerns please do not hesitate to contact us by message, phone or making an appointment.  Have a good day   Dr Bill THORNTON

## 2019-10-10 NOTE — RESULT ENCOUNTER NOTE
Ada Mr. Rivera,  Your results came back and show arthritic changes. See ortho and Pt as discussed. If you have any further concerns please do not hesitate to contact us by message, phone or making an appointment.  Have a good day   Dr Bill THORNTON

## 2019-10-11 ENCOUNTER — TELEPHONE (OUTPATIENT)
Dept: FAMILY MEDICINE | Facility: CLINIC | Age: 49
End: 2019-10-11

## 2019-10-11 DIAGNOSIS — M25.561 RIGHT KNEE PAIN, UNSPECIFIED CHRONICITY: Primary | ICD-10-CM

## 2019-10-11 LAB
AT III ACT/NOR PPP CHRO: 105 % (ref 85–135)
B2 GLYCOPROT1 IGG SERPL IA-ACNC: 0.7 U/ML
B2 GLYCOPROT1 IGM SERPL IA-ACNC: <2.9 U/ML
PROT C ACT/NOR PPP CHRO: 52 % (ref 70–170)
PROT S FREE AG ACT/NOR PPP IA: 57 % (ref 70–148)

## 2019-10-11 ASSESSMENT — ANXIETY QUESTIONNAIRES: GAD7 TOTAL SCORE: 1

## 2019-10-11 ASSESSMENT — PATIENT HEALTH QUESTIONNAIRE - PHQ9: SUM OF ALL RESPONSES TO PHQ QUESTIONS 1-9: 14

## 2019-10-11 NOTE — LETTER
[address] [address] [phone]          October 17, 2019    Gaurang Rivera                                                                                                                     [address]   [place] [address]            Dear Gaurang,    We tried contacting you but were unable to reach you.  Dr. Khan was notified the prior authorization was denied for diclofenac (VOLTAREN) 1 % topical gel.  She recommends trying an alternative medication called Ketorolac Tromethamine 2 % GEL, which you may apply twice per day for two weeks.  She also recommends seeing an Orthopedic specialist for further recommendations if your pain continues beyond the two weeks of using Ketorolac Tromethamine 2 % GEL.        Sincerely,         Your [facility] Care Team

## 2019-10-11 NOTE — TELEPHONE ENCOUNTER
Left message for patient to call back and set up appt in Colon and Rectal Surgery for pilonidal cyst.    Ari Donnelly LPN

## 2019-10-11 NOTE — TELEPHONE ENCOUNTER
Prior Authorization Retail Medication Request  diclofenac (VOLTAREN) 1 % topical gel  Medication/Dose:   ICD code (if different than what is on RX):  unknown  Previously Tried and Failed:  unknown  Rationale:  unknown    Insurance Name:  unknown  Insurance ID:  [id number]      Pharmacy Information (if different than what is on RX)  Name:  [facility]  Phone:  [phone]

## 2019-10-14 LAB — COPATH REPORT: NORMAL

## 2019-10-15 LAB — LA PPP-IMP: NEGATIVE

## 2019-10-15 NOTE — TELEPHONE ENCOUNTER
Central Prior Authorization Team  Phone: [phone]    PA Initiation    Medication: diclofenac (VOLTAREN) 1 % topical gel  Insurance Company: [employer] - Phone [phone] Fax [phone]  Pharmacy Filling the Rx: [id number] [place], [place] - [address]  Filling Pharmacy Phone: [phone]  Filling Pharmacy Fax:    Start Date: 10/15/2019

## 2019-10-15 NOTE — TELEPHONE ENCOUNTER
PRIOR AUTHORIZATION DENIED    Medication: diclofenac (VOLTAREN) 1 % topical gel- DENIED     Denial Date: 10/15/2019    Denial Rational: Patient must have a history of trial & failure to at least 2 formulary alternatives or have a contraindication or intolerance to the formulary alternatives:  Diclofenac sodium (oral), Indomethacin, ketoprofen, ketorolac-Tromethamine, meloxicam, nabumetone, naproxen        Appeal Information: If provider would like to appeal please provide a letter of medical necessity.

## 2019-10-15 NOTE — TELEPHONE ENCOUNTER
PA was denied. Please order alternative med with complete SIG or begin appeal process.     If you would like to appeal:   Create letter of medical necessity or    Compile supporting clinical documentation in EPIC Telephone encounter (TE).    Route TE to: [facility].

## 2019-10-15 NOTE — RESULT ENCOUNTER NOTE
Please call the patient with the results of the lab work- he is heterozygous for Factor V leiden but rest of the testing is negative (mildly low Prot C and S likely due to warfarin affect). He can go ahead and plan for surgery. He will need long term anticoagulation and if he would like to return to clinic to have further discussion regarding that and formulating a luzma-op plan - please schedule an appointment with him.   Thanks   Alyssa Chang   of Medicine   Hematology and medical Oncology   [facility]

## 2019-10-16 ENCOUNTER — TELEPHONE (OUTPATIENT)
Dept: ORTHOPEDICS | Facility: CLINIC | Age: 49
End: 2019-10-16

## 2019-10-16 ENCOUNTER — TELEPHONE (OUTPATIENT)
Dept: HEMATOLOGY | Facility: CLINIC | Age: 49
End: 2019-10-16

## 2019-10-16 NOTE — TELEPHONE ENCOUNTER
Diclofenac gel not covered PA denied  Have him try ketorolac gel twice a day 2 weeks & see ortho for further recommendations if not better or not covered as planned

## 2019-10-16 NOTE — TELEPHONE ENCOUNTER
[facility] Call Center    Phone Message    May a detailed message be left on voicemail: yes    Reason for Call: Other: Gaurang states he has been cleared by his hematologist to proceed with his surgery now. He would like to know if he will need to be first seen by Dr. Burdick or if the surgery can just be scheduled? Please reach out to discuss.     Action Taken: Message routed to:  [facility] ([facility]): Ortho

## 2019-10-16 NOTE — TELEPHONE ENCOUNTER
Gaurang Rivera has a history of unprovoked distal DVT on 7/5/19.  He has been on warfarin and recnet repeat US shows resolution of clot.  Called patient today to review lab results, at the request of Dr. Chang -  he is heterozygous for Factor V leiden but rest of the testing is negative (mildly low Prot C and S likely due to warfarin affect). Patient told that he can go ahead and plan for surgery on his right shoulder.    I did review that Dr. Chang thinks he will need long term anticoagulation.  He will plan to return to clinic for a full discussion after he has his shoulder surgery.  He will call us when this is scheduled so that we can come up with a plan for his anticoagulation.      Tammy Garnica, RN - Nurse Clinician - [facility] - [phone]

## 2019-10-16 NOTE — TELEPHONE ENCOUNTER
Called patient, left voicemail to return call to clinic    Thank You!  Dhara Kirkland, RN  Triage Nurse

## 2019-10-17 ENCOUNTER — DOCUMENTATION ONLY (OUTPATIENT)
Dept: CARE COORDINATION | Facility: CLINIC | Age: 49
End: 2019-10-17

## 2019-10-17 ENCOUNTER — ANTICOAGULATION THERAPY VISIT (OUTPATIENT)
Dept: NURSING | Facility: CLINIC | Age: 49
End: 2019-10-17
Payer: COMMERCIAL

## 2019-10-17 DIAGNOSIS — I82.409 DEEP VEIN THROMBOSIS (DVT) (H): ICD-10-CM

## 2019-10-17 LAB — INR POINT OF CARE: 2.3 (ref 0.86–1.14)

## 2019-10-17 PROCEDURE — 36416 COLLJ CAPILLARY BLOOD SPEC: CPT

## 2019-10-17 PROCEDURE — 85610 PROTHROMBIN TIME: CPT | Mod: QW

## 2019-10-17 NOTE — PROGRESS NOTES
ANTICOAGULATION FOLLOW-UP CLINIC VISIT    Patient Name:  Gaurang Rivera  Date:  10/17/2019  Contact Type:  Face to Face    SUBJECTIVE:  Patient Findings     Comments:   The patient was assessed for diet, medication, and activity level changes, missed or extra doses, bruising or bleeding, with no problem findings.        Clinical Outcomes     Comments:   The patient was assessed for diet, medication, and activity level changes, missed or extra doses, bruising or bleeding, with no problem findings.           OBJECTIVE    INR Protime   Date Value Ref Range Status   10/17/2019 2.3 (A) 0.86 - 1.14 Final       ASSESSMENT / PLAN  INR assessment THER    Recheck INR In: 3 WEEKS    INR Location Clinic      Anticoagulation Summary  As of 10/17/2019    INR goal:   2.0-3.0   TTR:   65.0 % (2.5 mo)   INR used for dosin.3 (10/17/2019)   Warfarin maintenance plan:   5 mg (5 mg x 1) every Wed, Sat; 7.5 mg (5 mg x 1.5) all other days   Full warfarin instructions:   5 mg every Wed, Sat; 7.5 mg all other days   Weekly warfarin total:   47.5 mg   Plan last modified:   Cyn Womack RN (2019)   Next INR check:   2019   Target end date:   10/5/2019    Indications    Deep vein thrombosis (DVT) (H) [I82.409]             Anticoagulation Episode Summary     INR check location:       Preferred lab:       Send INR reminders to:   ARMOND CORNELIUS    Comments:   : Occlusive thrombus- posterior tibial veins- R calf. Lovenox stopped  not bridged fully. wearing 15-20 C.stockings. Shoulder surgery (Dr. Burdick postponed). Neli Lozano RN #[phone]. Poor diet/no greens or exercise.  MTV 60 mcg.       Anticoagulation Care Providers     Provider Role Specialty Phone number    Elvis Guzman MD [facility] [phone]            See the Encounter Report to view Anticoagulation Flowsheet and Dosing Calendar (Go to Encounters tab in chart review, and find the Anticoagulation Therapy Visit)    Pt to  continue on current maintenance dosing and re-check INR in 3 weeks.    Vaishnavi Emery RN

## 2019-10-18 ENCOUNTER — PREP FOR PROCEDURE (OUTPATIENT)
Dept: ORTHOPEDICS | Facility: CLINIC | Age: 49
End: 2019-10-18

## 2019-10-18 DIAGNOSIS — S42.151S: ICD-10-CM

## 2019-10-18 DIAGNOSIS — S42.141S: Primary | ICD-10-CM

## 2019-10-18 NOTE — TELEPHONE ENCOUNTER
Surgery coordinator was informed the procedure can be scheduled.  Dr Burdick will be consulted concerning a return visit before the surgery.  Message was left on patient's voicemail with the above information.

## 2019-10-18 NOTE — TELEPHONE ENCOUNTER
Attempted to reach out to patient to discuss scheduling a clinic appointment and surgery. Left detailed message for patient that currently the first available for surgery would be 12/10/19. Informed patient that at this time I am holding surgery time for him. Left best call back number of [phone]

## 2019-10-21 ENCOUNTER — MYC REFILL (OUTPATIENT)
Dept: FAMILY MEDICINE | Facility: CLINIC | Age: 49
End: 2019-10-21

## 2019-10-21 DIAGNOSIS — R45.851 PASSIVE SUICIDAL IDEATIONS: ICD-10-CM

## 2019-10-21 DIAGNOSIS — F33.1 MODERATE EPISODE OF RECURRENT MAJOR DEPRESSIVE DISORDER (H): ICD-10-CM

## 2019-10-21 NOTE — TELEPHONE ENCOUNTER
Patient has not been seen by Fermin Bishop CNP since 4/17/19. Fermin has since left [place]. Patient was referred back to his PCP and medication refills will be redirected there.

## 2019-10-22 RX ORDER — VENLAFAXINE HYDROCHLORIDE 150 MG/1
300 CAPSULE, EXTENDED RELEASE ORAL DAILY
Qty: 60 CAPSULE | Refills: 0 | Status: SHIPPED | OUTPATIENT
Start: 2019-10-22 | End: 2019-11-25

## 2019-10-22 NOTE — TELEPHONE ENCOUNTER
DIAGNOSES: Right knee pain/Dr Khan/XR/[facility]/ortho con   DATE RECEIVED: 10/22   NOTES STATUS DETAILS   OFFICE NOTE from referring provider N/A    OFFICE NOTE from other specialist Internal    DISCHARGE SUMMARY from hospital n/a    DISCHARGE REPORT from the ER N/A    OPERATIVE REPORT Internal    MEDICATION LIST Internal    MRI N/A    CT SCAN Internal    XRAYS (IMAGES & REPORTS) Internal

## 2019-10-23 ENCOUNTER — PRE VISIT (OUTPATIENT)
Dept: ORTHOPEDICS | Facility: CLINIC | Age: 49
End: 2019-10-23

## 2019-10-23 ENCOUNTER — OFFICE VISIT (OUTPATIENT)
Dept: ORTHOPEDICS | Facility: CLINIC | Age: 49
End: 2019-10-23
Payer: COMMERCIAL

## 2019-10-23 VITALS — WEIGHT: 299 LBS | BODY MASS INDEX: 34.59 KG/M2 | HEIGHT: 78 IN

## 2019-10-23 DIAGNOSIS — M17.11 PRIMARY OSTEOARTHRITIS OF RIGHT KNEE: Primary | ICD-10-CM

## 2019-10-23 RX ORDER — LIDOCAINE HYDROCHLORIDE 10 MG/ML
4 INJECTION, SOLUTION EPIDURAL; INFILTRATION; INTRACAUDAL; PERINEURAL
Status: DISCONTINUED | OUTPATIENT
Start: 2019-10-23 | End: 2020-02-12

## 2019-10-23 RX ORDER — TRIAMCINOLONE ACETONIDE 40 MG/ML
40 INJECTION, SUSPENSION INTRA-ARTICULAR; INTRAMUSCULAR
Status: DISCONTINUED | OUTPATIENT
Start: 2019-10-23 | End: 2020-02-12

## 2019-10-23 RX ADMIN — TRIAMCINOLONE ACETONIDE 40 MG: 40 INJECTION, SUSPENSION INTRA-ARTICULAR; INTRAMUSCULAR at 16:31

## 2019-10-23 RX ADMIN — LIDOCAINE HYDROCHLORIDE 4 ML: 10 INJECTION, SOLUTION EPIDURAL; INFILTRATION; INTRACAUDAL; PERINEURAL at 16:31

## 2019-10-23 ASSESSMENT — PAIN SCALES - GENERAL: PAINLEVEL: MILD PAIN (3)

## 2019-10-23 ASSESSMENT — MIFFLIN-ST. JEOR: SCORE: 2349.51

## 2019-10-23 NOTE — PROGRESS NOTES
Subjective:   Gaurang Rivera is a 49 year old male who presents with right anterior knee pain. Denies any swelling or mechanical symptoms. No prior hx of knee injuries.   Walking a lot less.  Noticeable with loading.  Off balance and trying to avoid weight onto right knee  A1C 7.2, on Coumadin  1 year ago, mildly annoying, now really a lot worse, worse under the patella, new pain at [facility].  Walking is painful  Catching sometimes, most of the time no.  Ok with stairs.  Played sports into college but no knee trouble.  Squatting isn't super painful.  Walking only activity.  Working coaching rowing.    Background:   Date of injury: None  Duration of symptoms: 1 year  Mechanism of Injury: Chronic; Unknown   Intensity: 8/10 at the worst; Mild pain at rest  Aggravating factors: Walking  Relieving Factors: Nothing. Tried NSAIDs.  Prior Evaluation: None    PAST MEDICAL, SOCIAL, SURGICAL AND FAMILY HISTORY: He  has a past medical history of Depressive disorder (2001), Diabetes (H), DVT (deep venous thrombosis) (H) (07/05/2019), Elevated blood pressure reading without diagnosis of hypertension (5/13/2018), Hypercholesteremia (2012), and Pilonidal cyst (5/13/2018).  He  has a past surgical history that includes back surgery (1997); Cholecystectomy (2012); and THUMB SURGERY  (1995).  His family history includes Alcoholism in his sister; Depression in his father and sister; Diabetes in his mother; Lupus in his sister; Thrombosis in his father.  He reports that he has never smoked. He has never used smokeless tobacco. He reports that he does not drink alcohol or use drugs.    ALLERGIES: He has No Known Allergies.    CURRENT MEDICATIONS: He has a current medication list which includes the following prescription(s): ketorolac tromethamine, lisinopril, metformin, multivitamin w/minerals, order for dme, simvastatin, venlafaxine, warfarin anticoagulant, warfarin anticoagulant, blood glucose, blood glucose monitoring, and blood glucose.  "    REVIEW OF SYSTEMS: 10 point review of systems is negative except as noted above.     Exam:   Ht 1.981 m (6' 6\")   Wt 135.6 kg (299 lb)   BMI 34.55 kg/m             CONSTITUTIONAL: healthy, alert, no distress, cooperative and over weight  HEAD: Normocephalic. No masses, lesions, tenderness or abnormalities  SKIN: no suspicious lesions or rashes  GAIT: normal  NEUROLOGIC: Non-focal, Normal muscle tone and strength, reflexes normal, sensation grossly normal.  PSYCHIATRIC: affect normal/bright and mentation appears normal.    MUSCULOSKELETAL: right knee OA      Inspection:       AP/lateral alignment normal  Tender: medial joint line      Non-tender: patellar facets, MCL, LCL, lateral joint line,  IT band, posterior knee   Active Range of Motion: all normal  Strength: quad  5-/5, Hamstrings  5-/5  Special tests: normal Valgus stress test, normal Varus, negative Lachman's test, negative Rekha's, no apprehension with lateral stress of the patella.         Assessment/Plan:   Pt is a 50 yo white male with PMhx of DM Type II, hyperlipidemia, DVT presenting with right knee OA  1. Right knee OA-  CSI  Pt informed CSI may elevate BG, check BG more frequently for 1 week  Ice, PT  Possible osteophyte vs loose body on x-ray, if not improving consider MRI knee    RTC 6-8 weeks    X-RAY INTERPRETATION:   X-Ray of the Right Knee: 3-view, Hua, lateral, sunrise   ordered and interpreted in the office today was positive for IMPRESSION: No fracture or osseous lesion is demonstrated. There is  moderate medial compartment joint space loss. The remaining joint  spaces are well preserved. There is an approximately 1 cm ossification  projected over the anterior aspect of the midline of the knee joint. I  suspect this represents an osteophyte, although a loose body cannot be  excluded. No other abnormality is seen.     Large Joint Injection/Arthocentesis: R knee joint  Date/Time: 10/23/2019 4:31 PM  Performed by: Ajith" Shellie Zuniga MD  Authorized by: Shellie Canseco MD     Indications:  Osteoarthritis  Needle Size:  22 G  Guidance: landmark guided    Approach:  Anterolateral  Location:  Knee      Medications:  4 mL lidocaine (PF) 1 %; 40 mg triamcinolone 40 MG/ML  Outcome:  Tolerated well, no immediate complications  Procedure discussed: discussed risks, benefits, and alternatives    Consent Given by:  Patient  Timeout: timeout called immediately prior to procedure    Prep: patient was prepped and draped in usual sterile fashion     Scribed by Lorin Jones MS, ATC for Dr. Canseco on 10/23/2019 at 4:31 PM, based on the provider s statements to me.      I agree with the following injection documentation.    ROSALIE Canseco MD

## 2019-10-23 NOTE — TELEPHONE ENCOUNTER
RECORDS RECEIVED FROM: Internal    DATE RECEIVED: 10/24/19    NOTES STATUS DETAILS   OFFICE NOTE from referring provider  Internal Internal referral from Dr. Khan    OFFICE NOTE from other specialist   [facility] recs in    Urgent care notes 5/2018    DISCHARGE SUMMARY from hospital  N/A    DISCHARGE REPORT from the ER N/A    OPERATIVE REPORT  N/A    MEDICATION LIST N/A    LABS     PFC TESTING N/A    ANAL PAP N/A    BIOPSIES/PATHOLOGY RELATED TO DIAGNOSIS N/A    DIAGNOSTIC PROCEDURES     COLONOSCOPY N/A    UPPER ENDOSCOPY (EGD) N/A    FLEX SIGMOIDOSCOPY  N/A    ERCP N/A    IMAGING (DISC & REPORT)      CT  N/A    MRI N/A    XRAY N/A    ULTRASOUND (ENDOANAL/ENDORECTAL) N/A

## 2019-10-23 NOTE — NURSING NOTE
[address] [address]  Dept: [phone]  ______________________________________________________________________________    Patient: Gaurang Rivera   : 1970   MRN: [id number]   2019    INVASIVE PROCEDURE SAFETY CHECKLIST    Date: 10/23/2019   Procedure: Right knee kenalog injection  Patient Name: Gaurang Rivera  MRN: [id number]  YOB: 1970    Action: Complete sections as appropriate. Any discrepancy results in a HARD COPY until resolved.     PRE PROCEDURE:  Patient ID verified with 2 identifiers (name and  or MRN): Yes  Procedure and site verified with patient/designee (when able): Yes  Accurate consent documentation in medical record: Yes  H&P (or appropriate assessment) documented in medical record: Yes  H&P must be up to 20 days prior to procedure and updates within 24 hours of procedure as applicable: NA  Relevant diagnostic and radiology test results appropriately labeled and displayed as applicable: Yes  Procedure site(s) marked with provider initials: NA    TIMEOUT:  Time-Out performed immediately prior to starting procedure, including verbal and active participation of all team members addressing the following:Yes  * Correct patient identify  * Confirmed that the correct side and site are marked  * An accurate procedure consent form  * Agreement on the procedure to be done  * Correct patient position  * Relevant images and results are properly labeled and appropriately displayed  * The need to administer antibiotics or fluids for irrigation purposes during the procedure as applicable   * Safety precautions based on patient history or medication use    DURING PROCEDURE: Verification of correct person, site, and procedures any time the responsibility for care of the patient is transferred to another member of the care team.       Prior to injection, verified patient identity using patient's name and date of  birth.  Due to injection administration, patient instructed to remain in clinic for 15 minutes  afterwards, and to report any adverse reaction to me immediately.    Joint injection was performed.      Drug Amount Wasted:  Yes: 1 mg/ml lidocaine  Vial/Syringe: Single dose vial  Expiration Date:  01/23      Lorin Jones, [facility]  October 23, 2019

## 2019-10-23 NOTE — LETTER
10/23/2019      RE: Gaurang Rivera  [address]  [facility] [address]        Subjective:   Gaurang Rivera is a 49 year old male who presents with right anterior knee pain. Denies any swelling or mechanical symptoms. No prior hx of knee injuries.   Walking a lot less.  Noticeable with loading.  Off balance and trying to avoid weight onto right knee  A1C 7.2, on Coumadin  1 year ago, mildly annoying, now really a lot worse, worse under the patella, new pain at [facility].  Walking is painful  Catching sometimes, most of the time no.  Ok with stairs.  Played sports into college but no knee trouble.  Squatting isn't super painful.  Walking only activity.  Working coaching rowing.    Background:   Date of injury: None  Duration of symptoms: 1 year  Mechanism of Injury: Chronic; Unknown   Intensity: 8/10 at the worst; Mild pain at rest  Aggravating factors: Walking  Relieving Factors: Nothing. Tried NSAIDs.  Prior Evaluation: None    PAST MEDICAL, SOCIAL, SURGICAL AND FAMILY HISTORY: He  has a past medical history of Depressive disorder (2001), Diabetes (H), DVT (deep venous thrombosis) (H) (07/05/2019), Elevated blood pressure reading without diagnosis of hypertension (5/13/2018), Hypercholesteremia (2012), and Pilonidal cyst (5/13/2018).  He  has a past surgical history that includes back surgery (1997); Cholecystectomy (2012); and THUMB SURGERY  (1995).  His family history includes Alcoholism in his sister; Depression in his father and sister; Diabetes in his mother; Lupus in his sister; Thrombosis in his father.  He reports that he has never smoked. He has never used smokeless tobacco. He reports that he does not drink alcohol or use drugs.    ALLERGIES: He has No Known Allergies.    CURRENT MEDICATIONS: He has a current medication list which includes the following prescription(s): ketorolac tromethamine, lisinopril, metformin, multivitamin w/minerals, order for dme, simvastatin, venlafaxine, warfarin  "anticoagulant, warfarin anticoagulant, blood glucose, blood glucose monitoring, and blood glucose.     REVIEW OF SYSTEMS: 10 point review of systems is negative except as noted above.     Exam:   Ht 1.981 m (6' 6\")   Wt 135.6 kg (299 lb)   BMI 34.55 kg/m              CONSTITUTIONAL: healthy, alert, no distress, cooperative and over weight  HEAD: Normocephalic. No masses, lesions, tenderness or abnormalities  SKIN: no suspicious lesions or rashes  GAIT: normal  NEUROLOGIC: Non-focal, Normal muscle tone and strength, reflexes normal, sensation grossly normal.  PSYCHIATRIC: affect normal/bright and mentation appears normal.    MUSCULOSKELETAL: right knee OA      Inspection:       AP/lateral alignment normal  Tender: medial joint line      Non-tender: patellar facets, MCL, LCL, lateral joint line,  IT band, posterior knee   Active Range of Motion: all normal  Strength: quad  5-/5, Hamstrings  5-/5  Special tests: normal Valgus stress test, normal Varus, negative Lachman's test, negative Rekha's, no apprehension with lateral stress of the patella.         Assessment/Plan:   Pt is a 48 yo white male with PMhx of DM Type II, hyperlipidemia, DVT presenting with right knee OA  1. Right knee OA-  CSI  Pt informed CSI may elevate BG, check BG more frequently for 1 week  Ice, PT  Possible osteophyte vs loose body on x-ray, if not improving consider MRI knee    RTC 6-8 weeks    X-RAY INTERPRETATION:   X-Ray of the Right Knee: 3-view, Hua, lateral, sunrise   ordered and interpreted in the office today was positive for IMPRESSION: No fracture or osseous lesion is demonstrated. There is  moderate medial compartment joint space loss. The remaining joint  spaces are well preserved. There is an approximately 1 cm ossification  projected over the anterior aspect of the midline of the knee joint. I  suspect this represents an osteophyte, although a loose body cannot be  excluded. No other abnormality is seen.     Large Joint " Injection/Arthocentesis: R knee joint  Date/Time: 10/23/2019 4:31 PM  Performed by: Shellie Canseco MD  Authorized by: Shellie Canseco MD     Indications:  Osteoarthritis  Needle Size:  22 G  Guidance: landmark guided    Approach:  Anterolateral  Location:  Knee      Medications:  4 mL lidocaine (PF) 1 %; 40 mg triamcinolone 40 MG/ML  Outcome:  Tolerated well, no immediate complications  Procedure discussed: discussed risks, benefits, and alternatives    Consent Given by:  Patient  Timeout: timeout called immediately prior to procedure    Prep: patient was prepped and draped in usual sterile fashion     Scribed by Lorin Jones MS, ATC for Dr. Canseco on 10/23/2019 at 4:31 PM, based on the provider s statements to me.      I agree with the following injection documentation.    MD Shellie Virk MD

## 2019-10-24 ENCOUNTER — HOSPITAL ENCOUNTER (OUTPATIENT)
Facility: AMBULATORY SURGERY CENTER | Age: 49
End: 2019-10-24
Attending: COLON & RECTAL SURGERY
Payer: COMMERCIAL

## 2019-10-24 ENCOUNTER — PRE VISIT (OUTPATIENT)
Dept: SURGERY | Facility: CLINIC | Age: 49
End: 2019-10-24

## 2019-10-24 ENCOUNTER — OFFICE VISIT (OUTPATIENT)
Dept: SURGERY | Facility: CLINIC | Age: 49
End: 2019-10-24
Payer: COMMERCIAL

## 2019-10-24 ENCOUNTER — TELEPHONE (OUTPATIENT)
Dept: SURGERY | Facility: CLINIC | Age: 49
End: 2019-10-24

## 2019-10-24 VITALS
DIASTOLIC BLOOD PRESSURE: 92 MMHG | WEIGHT: 300.9 LBS | BODY MASS INDEX: 34.82 KG/M2 | OXYGEN SATURATION: 93 % | HEART RATE: 118 BPM | SYSTOLIC BLOOD PRESSURE: 136 MMHG | HEIGHT: 78 IN

## 2019-10-24 DIAGNOSIS — L05.91 PILONIDAL CYST: Primary | ICD-10-CM

## 2019-10-24 DIAGNOSIS — L05.91 PILONIDAL CYST: ICD-10-CM

## 2019-10-24 ASSESSMENT — ENCOUNTER SYMPTOMS
INSOMNIA: 0
PANIC: 0
DEPRESSION: 1
NERVOUS/ANXIOUS: 0
DECREASED CONCENTRATION: 0

## 2019-10-24 ASSESSMENT — PAIN SCALES - GENERAL: PAINLEVEL: MILD PAIN (2)

## 2019-10-24 ASSESSMENT — MIFFLIN-ST. JEOR: SCORE: 2358.12

## 2019-10-24 NOTE — TELEPHONE ENCOUNTER
Patient is scheduled for surgery with Dr. Ahumada    Spoke with Gaurang    Date of Surgery: 1/10/2020    Location: [facility]    Informed patient they will need an adult  YES    H&P: Scheduled with PCP Dr. Rodriguez at [facility] on 1/8/2020    Surgery packet: will mail and send via [employer]     Additional comments: patient wanted to wait until January for his surgery due to work conflicts.

## 2019-10-24 NOTE — LETTER
"10/24/2019       RE: Gaurang Rivera  [address]  [facility] [address]     Dear Colleague,    Thank you for referring your patient, Gaurang Rivera, to the [facility] COLON AND RECTAL SURGERY at [facility]. Please see a copy of my visit note below.    Colon and Rectal Surgery Consult Clinic Note    Date: 10/24/2019     Referring provider:  Holly Khan MD  [address] [place] [address]     RE: Gaurang Rivera  : 1970  KRISTI: 10/24/2019    Gaurang Rivera is a very pleasant 49 year old male with DM II and DVT on warfarin with a recent diagnosis of pilonidal cyst.  Given these findings they were subsequently sent to the Colon and Rectal Surgery Clinic for an opinion on this and a new patient consultation.     Gaurang reports having a pilonidal abscess about 10 years ago for the first time.  He had an I&D at that time.  He did well but with some intermittent pain and bleeding and again 2 years ago had worsening symptoms and had another I&D at the site.  This occurred again 1 year ago with a third I&D.  He now has a small amount of bright red blood about every other day.  Some tenderness but no significant pain unless it flares up.  When it is larger and flares up he has difficulty sitting.  He denies any fevers or chills associated with this.  He is not a smoker.  He is diabetic and reports that this is under good control.  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    Physical Examination: Exam was chaperoned by Anna Pratt, EMT  BP (!) 136/92 (BP Location: Left arm, Patient Position: Sitting, Cuff Size: Adult Large)   Pulse 118   Ht 6' 6\"   Wt 300 lb 14.4 oz   SpO2 93%   BMI 34.77 kg/m            General: Alert, oriented, in no acute distress, sitting comfortably  HEENT: Mucous membranes moist  Perianal external examination:  One visible pit in the fan " cleft with a tract of induration to a well-healed but scabbed and scarred area to the right of the  cleft.  No drainage, induration, or fluctuance at this site.    Digital rectal examination: Was deferred    Anoscopy: Was deferred    Assessment/Plan: 49 year old male with recurrent pilonidal abscess.  He does have a pilonidal pit in the fan cleft with recurrent abscess to the right of the fan cleft.  We discussed surgical intervention for this since he has had recurrent symptoms.  Discussed leg open pilonidal cystectomy versus a flap closure.  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  He also reports that he would be okay with diligent wound care at home if a lay open procedure is more appropriate.  His diabetes is well controlled.  He would like to try to set surgery up in January.  We discussed the risks of bleeding, infection, difficulty with wound healing, and chance of recurrence.  He states understanding of these risks and wishes to proceed with surgical intervention.  Asked him to notify the clinic in the meantime if he has any worsening symptoms or with any questions or concerns. Patient's questions were answered to his stated satisfaction and he is in agreement with this plan.    Medical history:  Past Medical History:   Diagnosis Date     Depressive disorder      Diabetes (H)      DVT (deep venous thrombosis) (H) 2019     Elevated blood pressure reading without diagnosis of hypertension 2018     Hypercholesteremia      Pilonidal cyst 2018       Surgical history:  Past Surgical History:   Procedure Laterality Date     BACK SURGERY      spinal fusion     CHOLECYSTECTOMY       THUMB SURGERY          Problem list:    Patient Active Problem List    Diagnosis Date Noted     Closed displaced fracture of glenoid cavity of right scapula, sequela 10/21/2019      Priority: Medium     Added automatically from request for surgery [id number]       Closed displaced fracture of neck of right scapula, sequela 10/21/2019     Priority: Medium     Added automatically from request for surgery [id number]       Deep vein thrombosis (DVT) (H) 07/24/2019     Priority: Medium     MDD (major depressive disorder), recurrent episode, severe (H) 02/26/2019     Priority: Medium     Microalbuminuria due to type 2 diabetes mellitus (H) 10/01/2018     Priority: Medium     Diabetes mellitus, type 2 (H) 09/26/2018     Priority: Medium     Hyperlipidemia, unspecified hyperlipidemia type 09/26/2018     Priority: Medium     Passive suicidal ideations 09/07/2018     Priority: Medium     Erectile dysfunction 02/08/2010     Priority: Medium     Overview:   Normal testosterone, prolactin and TSH--12/10/09.         Medications:  Current Outpatient Medications   Medication Sig Dispense Refill     lisinopril (PRINIVIL/ZESTRIL) 5 MG tablet Take 1 tablet (5 mg) by mouth daily 90 tablet 1     metFORMIN (GLUCOPHAGE) 500 MG tablet Take 2 tablets (1,000 mg) by mouth 2 times daily (with meals) 120 tablet 2     multivitamin, therapeutic with minerals (MULTI-VITAMIN) TABS tablet Take 1 tablet by mouth daily       order for DME Equipment being ordered: Compression stockings 20-30 or 30-40 mmHg. To be wore for the first 1-2 years following DVT. 1 Package 1     simvastatin (ZOCOR) 40 MG tablet Take 1 tablet (40 mg) by mouth At Bedtime 90 tablet 1     venlafaxine (EFFEXOR-XR) 150 MG 24 hr capsule Take 2 capsules (300 mg) by mouth daily 60 capsule 0     warfarin (COUMADIN) 5 MG tablet Current dose (8/7/19): 10 mg Mondays + 7.5 mg all other days of the week or as directed by ACC team. 30 tablet 1     warfarin (COUMADIN) 7.5 MG tablet Current dose (78/1/19): 10 mg Mondays + 7.5 mg all other days OR as directed by ACC team. 80 tablet 0     blood glucose (NO BRAND SPECIFIED) lancets standard Use to test blood sugar one times daily  "or as directed. (Patient not taking: Reported on 7/16/2019) 100 each 11     blood glucose monitoring (NO BRAND SPECIFIED) meter device kit Use to test blood sugar one times daily or as directed. (Patient not taking: Reported on 7/16/2019) 1 kit 3     blood glucose monitoring (NO BRAND SPECIFIED) test strip Use to test blood sugars one times daily or as directed (Patient not taking: Reported on 7/16/2019) 100 strip 1     Ketorolac Tromethamine 2 % GEL Externally apply 1 applicator topically 2 times daily for 14 days (Patient not taking: Reported on 10/24/2019) 120 g 0       Allergies:  No Known Allergies    Family history:  Family History   Problem Relation Age of Onset     Diabetes Mother      Depression Father      Thrombosis Father         HE HAS HAD 3 CLOTS - PER PATIENT THEY WERE UNPROVOKED ON COUMADIN      Alcoholism Sister      Depression Sister      Lupus Sister        Social history:  Social History     Tobacco Use     Smoking status: Never Smoker     Smokeless tobacco: Never Used   Substance Use Topics     Alcohol use: No     Comment: None    Marital status: single.    Nursing Notes:   Anna Pratt EMT  10/24/2019  7:35 AM  Signed  Chief Complaint   Patient presents with     Consult     Pilonidal cyst.       Vitals:    10/24/19 0732   BP: (!) 136/92   BP Location: Left arm   Patient Position: Sitting   Cuff Size: Adult Large   Pulse: 118   SpO2: 93%   Weight: 300 lb 14.4 oz   Height: 6' 6\"     Body mass index is 34.77 kg/m .    MIHIR Lind    Total face to face time was 30 minutes, >50% counseling.    SAMEER Zazueta, NP-C  Colon and Rectal Surgery   [facility]    This note was created using speech recognition software and may contain unintended word substitutions.    "

## 2019-10-24 NOTE — PROGRESS NOTES
"Colon and Rectal Surgery Consult Clinic Note    Date: 10/24/2019     Referring provider:  Holly Khan MD  [address] [address]     RE: Gaurang Rivera  : 1970  KRISTI: 10/24/2019    Gaurang Rivera is a very pleasant 49 year old male with DM II and DVT on warfarin with a recent diagnosis of pilonidal cyst.  Given these findings they were subsequently sent to the Colon and Rectal Surgery Clinic for an opinion on this and a new patient consultation.     Gaurang reports having a pilonidal abscess about 10 years ago for the first time.  He had an I&D at that time.  He did well but with some intermittent pain and bleeding and again 2 years ago had worsening symptoms and had another I&D at the site.  This occurred again 1 year ago with a third I&D.  He now has a small amount of bright red blood about every other day.  Some tenderness but no significant pain unless it flares up.  When it is larger and flares up he has difficulty sitting.  He denies any fevers or chills associated with this.  He is not a smoker.  He is diabetic and reports that this is under good control.  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    Physical Examination: Exam was chaperoned by Anna Pratt, EMT  BP (!) 136/92 (BP Location: Left arm, Patient Position: Sitting, Cuff Size: Adult Large)   Pulse 118   Ht 6' 6\"   Wt 300 lb 14.4 oz   SpO2 93%   BMI 34.77 kg/m           General: Alert, oriented, in no acute distress, sitting comfortably  HEENT: Mucous membranes moist  Perianal external examination:  One visible pit in the  cleft with a tract of induration to a well-healed but scabbed and scarred area to the right of the  cleft.  No drainage, induration, or fluctuance at this site.    Digital rectal examination: Was deferred    Anoscopy: Was deferred    Assessment/Plan: 49 year old male with recurrent pilonidal " abscess.  He does have a pilonidal pit in the fan cleft with recurrent abscess to the right of the fan cleft.  We discussed surgical intervention for this since he has had recurrent symptoms.  Discussed leg open pilonidal cystectomy versus a flap closure.  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  He also reports that he would be okay with diligent wound care at home if a lay open procedure is more appropriate.  His diabetes is well controlled.  He would like to try to set surgery up in January.  We discussed the risks of bleeding, infection, difficulty with wound healing, and chance of recurrence.  He states understanding of these risks and wishes to proceed with surgical intervention.  Asked him to notify the clinic in the meantime if he has any worsening symptoms or with any questions or concerns. Patient's questions were answered to his stated satisfaction and he is in agreement with this plan.    Medical history:  Past Medical History:   Diagnosis Date     Depressive disorder      Diabetes (H)      DVT (deep venous thrombosis) (H) 2019     Elevated blood pressure reading without diagnosis of hypertension 2018     Hypercholesteremia 2012     Pilonidal cyst 2018       Surgical history:  Past Surgical History:   Procedure Laterality Date     BACK SURGERY      spinal fusion     CHOLECYSTECTOMY       THUMB SURGERY          Problem list:    Patient Active Problem List    Diagnosis Date Noted     Closed displaced fracture of glenoid cavity of right scapula, sequela 10/21/2019     Priority: Medium     Added automatically from request for surgery [id number]       Closed displaced fracture of neck of right scapula, sequela 10/21/2019     Priority: Medium     Added automatically from request for surgery [id number]       Deep vein thrombosis (DVT) (H) 2019     Priority: Medium     MDD  (major depressive disorder), recurrent episode, severe (H) 02/26/2019     Priority: Medium     Microalbuminuria due to type 2 diabetes mellitus (H) 10/01/2018     Priority: Medium     Diabetes mellitus, type 2 (H) 09/26/2018     Priority: Medium     Hyperlipidemia, unspecified hyperlipidemia type 09/26/2018     Priority: Medium     Passive suicidal ideations 09/07/2018     Priority: Medium     Erectile dysfunction 02/08/2010     Priority: Medium     Overview:   Normal testosterone, prolactin and TSH--12/10/09.         Medications:  Current Outpatient Medications   Medication Sig Dispense Refill     lisinopril (PRINIVIL/ZESTRIL) 5 MG tablet Take 1 tablet (5 mg) by mouth daily 90 tablet 1     metFORMIN (GLUCOPHAGE) 500 MG tablet Take 2 tablets (1,000 mg) by mouth 2 times daily (with meals) 120 tablet 2     multivitamin, therapeutic with minerals (MULTI-VITAMIN) TABS tablet Take 1 tablet by mouth daily       order for DME Equipment being ordered: Compression stockings 20-30 or 30-40 mmHg. To be wore for the first 1-2 years following DVT. 1 Package 1     simvastatin (ZOCOR) 40 MG tablet Take 1 tablet (40 mg) by mouth At Bedtime 90 tablet 1     venlafaxine (EFFEXOR-XR) 150 MG 24 hr capsule Take 2 capsules (300 mg) by mouth daily 60 capsule 0     warfarin (COUMADIN) 5 MG tablet Current dose (8/7/19): 10 mg Mondays + 7.5 mg all other days of the week or as directed by ACC team. 30 tablet 1     warfarin (COUMADIN) 7.5 MG tablet Current dose (78/1/19): 10 mg Mondays + 7.5 mg all other days OR as directed by ACC team. 80 tablet 0     blood glucose (NO BRAND SPECIFIED) lancets standard Use to test blood sugar one times daily or as directed. (Patient not taking: Reported on 7/16/2019) 100 each 11     blood glucose monitoring (NO BRAND SPECIFIED) meter device kit Use to test blood sugar one times daily or as directed. (Patient not taking: Reported on 7/16/2019) 1 kit 3     blood glucose monitoring (NO BRAND SPECIFIED) test strip  "Use to test blood sugars one times daily or as directed (Patient not taking: Reported on 7/16/2019) 100 strip 1     Ketorolac Tromethamine 2 % GEL Externally apply 1 applicator topically 2 times daily for 14 days (Patient not taking: Reported on 10/24/2019) 120 g 0       Allergies:  No Known Allergies    Family history:  Family History   Problem Relation Age of Onset     Diabetes Mother      Depression Father      Thrombosis Father         HE HAS HAD 3 CLOTS - PER PATIENT THEY WERE UNPROVOKED ON COUMADIN      Alcoholism Sister      Depression Sister      Lupus Sister        Social history:  Social History     Tobacco Use     Smoking status: Never Smoker     Smokeless tobacco: Never Used   Substance Use Topics     Alcohol use: No     Comment: None    Marital status: single.    Nursing Notes:   Anna Pratt EMT  10/24/2019  7:35 AM  Signed  Chief Complaint   Patient presents with     Consult     Pilonidal cyst.       Vitals:    10/24/19 0732   BP: (!) 136/92   BP Location: Left arm   Patient Position: Sitting   Cuff Size: Adult Large   Pulse: 118   SpO2: 93%   Weight: 300 lb 14.4 oz   Height: 6' 6\"       Body mass index is 34.77 kg/m .      MIHIR Lind                         Total face to face time was 30 minutes, >50% counseling.    SAMEER Zazueta, NP-C  Colon and Rectal Surgery   [facility]    This note was created using speech recognition software and may contain unintended word substitutions.    "

## 2019-10-24 NOTE — NURSING NOTE
"Chief Complaint   Patient presents with     Consult     Pilonidal cyst.       Vitals:    10/24/19 0732   BP: (!) 136/92   BP Location: Left arm   Patient Position: Sitting   Cuff Size: Adult Large   Pulse: 118   SpO2: 93%   Weight: 300 lb 14.4 oz   Height: 6' 6\"       Body mass index is 34.77 kg/m .      Anna Pratt, EMT                      "

## 2019-10-25 ENCOUNTER — THERAPY VISIT (OUTPATIENT)
Dept: PHYSICAL THERAPY | Facility: CLINIC | Age: 49
End: 2019-10-25
Attending: FAMILY MEDICINE
Payer: COMMERCIAL

## 2019-10-25 DIAGNOSIS — M25.561 CHRONIC PAIN OF RIGHT KNEE: ICD-10-CM

## 2019-10-25 DIAGNOSIS — G89.29 CHRONIC PAIN OF RIGHT KNEE: ICD-10-CM

## 2019-10-25 DIAGNOSIS — M17.11 PRIMARY OSTEOARTHRITIS OF RIGHT KNEE: ICD-10-CM

## 2019-10-25 PROCEDURE — 97110 THERAPEUTIC EXERCISES: CPT | Mod: GP | Performed by: PHYSICAL THERAPIST

## 2019-10-25 PROCEDURE — 97161 PT EVAL LOW COMPLEX 20 MIN: CPT | Mod: GP | Performed by: PHYSICAL THERAPIST

## 2019-10-25 NOTE — PROGRESS NOTES
[facility] Initial Evaluation    Subjective:  Gaurang Rivera is a 49 year old male with a right knee condition. Location of symptoms: anterior, medial. Associated symptoms: limping.  Symptoms are exacerbated by: walking, stairs, standing, kneeling, squatting, sitting, rising from chair. Symptoms are relieved by: ice.      Answers for HPI/ROS submitted by the patient on 10/24/2019   History Reported by Patient  Reason for Visit:: right knee pain/arthritis  When problem began:: 5/1/2019  Where?: for unknown reasons  How problem occurred:: unknown - progressively worsening pain over time  Number scale: 3/10  Pain quality: aching  Frequency: intermittent  Pain is: worse during the day  Progression since onset: rapidly worsening  Special tests: x-ray  General health as reported by patient: poor  Please check all that apply to your current or past medical history: depression, diabetes, mental illness, overweight  Surgeries: orthopedic surgery  Other Surgery Detail: gall bladder removal  Medications you are currently taking: anti-depressants, other  Other Meds Detail: diabetes medication; blood thinners  Occupation:: rowing , also work part-time in retail  What are your primary job tasks: driving, prolonged standing  Work restrictions: working in normal job without restrictions    Objective  Gait:  Decreased right terminal knee EXT    Knee AROM (* = pain) Right Left    WNL   EXT 10 degrees lacking WNL   Hyperextension       Knee Strength (* = pain) Right Left   FL 5/5 5/5   EXT 5/5 5/5   Quad Contraction (Good/Fair/Poor) fair good   Hip ABD NT/5 NT/5     Palpation Tenderness:  No palpation tenderness    Assessment/Plan:    Patient is a 49 year old male with right side knee complaints.    Patient has the following significant findings with corresponding treatment plan.                Diagnosis 1:  Right knee pain  Pain -  manual therapy, self management, education, directional preference  exercise, home program  Decreased ROM/flexibility - manual therapy and therapeutic exercise  Decreased joint mobility - manual therapy and therapeutic exercise  Decreased strength - therapeutic exercise and therapeutic activities  Decreased proprioception - neuro re-education and therapeutic activities  Impaired gait - gait training  Impaired muscle performance - neuro re-education  Decreased function - therapeutic activities    Previous and current functional limitations:  (See Goal Flow Sheet for this information)    Short term and Long term goals: (See Goal Flow Sheet for this information)     Communication ability:  Patient appears to be able to clearly communicate and understand verbal and written communication and follow directions correctly.  Treatment Explanation - The following has been discussed with the patient:   RX ordered/plan of care  Anticipated outcomes  Possible risks and side effects  This patient would benefit from PT intervention to resume normal activities.   Rehab potential is good.    Frequency:  1 X week, once daily  Duration:  for 4 weeks tapering to 2 X a month over 1 month  Discharge Plan:  Achieve all LTG.  Independent in home treatment program.  Reach maximal therapeutic benefit.    Please refer to the daily flowsheet for treatment today, total treatment time and time spent performing 1:1 timed codes.

## 2019-10-28 DIAGNOSIS — E11.9 TYPE 2 DIABETES MELLITUS WITHOUT COMPLICATION, WITHOUT LONG-TERM CURRENT USE OF INSULIN (H): ICD-10-CM

## 2019-10-28 DIAGNOSIS — R35.0 BENIGN PROSTATIC HYPERPLASIA WITH URINARY FREQUENCY: ICD-10-CM

## 2019-10-28 DIAGNOSIS — N40.1 BENIGN PROSTATIC HYPERPLASIA WITH URINARY FREQUENCY: ICD-10-CM

## 2019-10-28 RX ORDER — TAMSULOSIN HYDROCHLORIDE 0.4 MG/1
CAPSULE ORAL
Qty: 60 CAPSULE | Refills: 0 | Status: SHIPPED | OUTPATIENT
Start: 2019-10-28 | End: 2020-01-08

## 2019-10-28 NOTE — TELEPHONE ENCOUNTER
"Requested Prescriptions   Pending Prescriptions Disp Refills     blood glucose monitoring (ONE TOUCH DELICA) lancets [Pharmacy Med Name: ONE TOUCH DELICA 30G LANCETS 100S] 100 each 0     Sig: USE TO TEST BLOOD SUGAR ONCE DAILY OR AS DIRECTED       Diabetic Supplies Protocol Failed - 10/28/2019  4:16 AM        Failed - Medication is active on med list        Passed - Patient is 18 years of age or older        Passed - Recent (6 mo) or future (30 days) visit within the authorizing provider's specialty     Patient had office visit in the last 6 months or has a visit in the next 30 days with authorizing provider.  See \"Patient Info\" tab in inbasket, or \"Choose Columns\" in Meds & Orders section of the refill encounter.            tamsulosin (FLOMAX) 0.4 MG capsule [Pharmacy Med Name: TAMSULOSIN 0.4MG CAPSULES] 60 capsule 0     Sig: TAKE 2 CAPSULES(0.8 MG) BY MOUTH DAILY         Last Written Prescription Date:  10/24/18  Last Fill Quantity: 60,   # refills: 2  Last Office Visit: 10/10/19  Future Office visit:    Next 5 appointments (look out 90 days)    Jan 08, 2020 11:20 AM CST  PHYSICAL with Elvis Guzman MD  [facility] [address] [place] [id number] [phone]           Routing refill request to provider for review/approval because:  Drug not active on patient's medication list    This Order Has Been Discontinued     Order Status Reason By On   Discontinued Medication Reconciliation Clean Up Fermin Bishop, CNP 2/19/19 1004           Alpha Blockers Failed - 10/28/2019  4:16 AM        Failed - Blood pressure under 140/90 in past 12 months     BP Readings from Last 3 Encounters:   10/24/19 (!) 136/92   10/10/19 106/84   10/08/19 132/86                 Failed - Medication is active on med list        Passed - Recent (12 mo) or future (30 days) visit within the authorizing provider's specialty     Patient has had an office visit with the authorizing " "provider or a provider within the authorizing providers department within the previous 12 mos or has a future within next 30 days. See \"Patient Info\" tab in inbasket, or \"Choose Columns\" in Meds & Orders section of the refill encounter.              Passed - Patient does not have Tadalafil, Vardenafil, or Sildenafil on their medication list        Passed - Patient is 18 years of age or older          "

## 2019-10-30 ENCOUNTER — OFFICE VISIT (OUTPATIENT)
Dept: ORTHOPEDICS | Facility: CLINIC | Age: 49
End: 2019-10-30
Payer: COMMERCIAL

## 2019-10-30 ENCOUNTER — HEALTH MAINTENANCE LETTER (OUTPATIENT)
Age: 49
End: 2019-10-30

## 2019-10-30 DIAGNOSIS — S42.141S: Primary | ICD-10-CM

## 2019-10-30 NOTE — LETTER
10/30/2019       RE: Gaurang Rivera  [address]  [facility] [address]     Dear Colleague,    Thank you for referring your patient, Gaurang Rivera, to the [facility] ORTHOP[facility]IC at [facility]. Please see a copy of my visit note below.    CHIEF CONCERN: Right shoulder stiffness    HISTORY OF PRESENT ILLNESS: Mr. Rivera is a 49-year-old gentleman who I am seeing today for his right shoulder.  He was previously scheduled for right shoulder capsular release possible biceps tenodesis secondary to stiffness after his bony Bankart fracture healed.  His previously scheduled surgery was canceled however as he developed a spontaneous DVT.  He has since been on Coumadin and under the care of hematology.  He has a family history of clotting disorder.  His clot was found to have resolved after his anticoagulation treatment.  He would like to proceed with surgery and is tentatively held a date in December.  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 Articulates and communicates with normal affect.  Respirations even and unlabored  Focused upper extremity exam: Skin is intact about the right arm.  His right shoulder active range of motion is parenthesis with his scapula stabilized) forward elevation to 95 and external rotation at the side to 30.  He is neurovascularly intact without deficits into the hand.    IMAGING:  None new    ASSESSMENT:  1. Right shoulder trauma with subsequent bony bankart, now united  2. Right shoulder stiffness  3. Right rib incidental bony lesion    PLAN:  I reviewed with the patient the previous imaging and current exam findings.  I outlined that I would still prefer to have a preoperative MRI and he will have that procedure completed.  He also is going to have a repeat CT to image the incidental rib bony finding on his previous CT with  3 imaging recommended by radiology.  With regards to his surgical plan we discussed a plan for arthroscopic capsular release and manipulation under anesthesia.  Depending on the findings of his MRI we may indicate him for open biceps tenodesis.  We discussed how this would affect his postoperative restrictions.  He thinks that due to his desire to do the seasonal work in December he is going to put off surgery until January.  We will work with him in that regard.    Dulce Maria Burdick MD

## 2019-10-30 NOTE — NURSING NOTE
Reason For Visit:   Chief Complaint   Patient presents with     RECHECK     Right shoulder pain.  Discuss surgery.  Holding 12/10/19       PCP: Elvis Guzman  Ref: Self     ?  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  Currently working? Yes.  Work status?  Part-time.  Date of injury: 2/2019  Type of injury: fall.  Date of surgery: NA  Type of surgery: NA.  Smoker: No  Request smoking cessation information: No     Right hand dominant    SANE score  Affected shoulder: Right  Right shoulder SANE: 30  Left shoulder SANE: 100    There were no vitals taken for this visit.      Pain Assessment  Patient Currently in Pain: Denies(Only with movement )    Edith Sam ATC

## 2019-10-30 NOTE — PROGRESS NOTES
CHIEF CONCERN: Right shoulder stiffness    HISTORY OF PRESENT ILLNESS: Mr. Rivera is a 49-year-old gentleman who I am seeing today for his right shoulder.  He was previously scheduled for right shoulder capsular release possible biceps tenodesis secondary to stiffness after his bony Bankart fracture healed.  His previously scheduled surgery was canceled however as he developed a spontaneous DVT.  He has since been on Coumadin and under the care of hematology.  He has a family history of clotting disorder.  His clot was found to have resolved after his anticoagulation treatment.  He would like to proceed with surgery and is tentatively held a date in December.  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 Articulates and communicates with normal affect.  Respirations even and unlabored  Focused upper extremity exam: Skin is intact about the right arm.  His right shoulder active range of motion is parenthesis with his scapula stabilized) forward elevation to 95 and external rotation at the side to 30.  He is neurovascularly intact without deficits into the hand.    IMAGING:  None new    ASSESSMENT:  1. Right shoulder trauma with subsequent bony bankart, now united  2. Right shoulder stiffness  3. Right rib incidental bony lesion    PLAN:  I reviewed with the patient the previous imaging and current exam findings.  I outlined that I would still prefer to have a preoperative MRI and he will have that procedure completed.  He also is going to have a repeat CT to image the incidental rib bony finding on his previous CT with 3 imaging recommended by radiology.  With regards to his surgical plan we discussed a plan for arthroscopic capsular release and manipulation under anesthesia.  Depending on the findings of his MRI we may indicate him for open biceps tenodesis.  We discussed how this would affect his  postoperative restrictions.  He thinks that due to his desire to do the seasonal work in December he is going to put off surgery until January.  We will work with him in that regard.    Dulce Maria Burdick MD    Answers for HPI/ROS submitted by the patient on 10/24/2019   General Symptoms: No  Skin Symptoms: No  HENT Symptoms: No  EYE SYMPTOMS: No  HEART SYMPTOMS: No  LUNG SYMPTOMS: No  INTESTINAL SYMPTOMS: No  URINARY SYMPTOMS: No  REPRODUCTIVE SYMPTOMS: No  SKELETAL SYMPTOMS: No  BLOOD SYMPTOMS: No  NERVOUS SYSTEM SYMPTOMS: No  MENTAL HEALTH SYMPTOMS: Yes  Nervous or Anxious: No  Depression: Yes  Trouble sleeping: No  Trouble thinking or concentrating: No  Mood changes: No  Panic attacks: No

## 2019-11-05 ENCOUNTER — ANCILLARY PROCEDURE (OUTPATIENT)
Dept: MRI IMAGING | Facility: CLINIC | Age: 49
End: 2019-11-05
Attending: ORTHOPAEDIC SURGERY
Payer: COMMERCIAL

## 2019-11-05 ENCOUNTER — ANCILLARY PROCEDURE (OUTPATIENT)
Dept: CT IMAGING | Facility: CLINIC | Age: 49
End: 2019-11-05
Attending: ORTHOPAEDIC SURGERY
Payer: COMMERCIAL

## 2019-11-05 DIAGNOSIS — S42.141S: ICD-10-CM

## 2019-11-07 ENCOUNTER — ANTICOAGULATION THERAPY VISIT (OUTPATIENT)
Dept: NURSING | Facility: CLINIC | Age: 49
End: 2019-11-07
Payer: COMMERCIAL

## 2019-11-07 DIAGNOSIS — I82.409 DEEP VEIN THROMBOSIS (DVT) (H): ICD-10-CM

## 2019-11-07 LAB — INR POINT OF CARE: 1.7 (ref 0.86–1.14)

## 2019-11-07 PROCEDURE — 99207 ZZC NO CHARGE NURSE ONLY: CPT

## 2019-11-07 PROCEDURE — 36416 COLLJ CAPILLARY BLOOD SPEC: CPT

## 2019-11-07 PROCEDURE — 85610 PROTHROMBIN TIME: CPT | Mod: QW

## 2019-11-07 NOTE — PROGRESS NOTES
ANTICOAGULATION FOLLOW-UP CLINIC VISIT    Patient Name:  Gaurang Rivera  Date:  2019  Contact Type:  Face to Face    SUBJECTIVE:  Patient Findings     Comments:   No problem findings. Patient endorses eating greens on Monday. Patient doesn't normally eat greens.         Clinical Outcomes     Comments:   No problem findings. Patient endorses eating greens on Monday. Patient doesn't normally eat greens.            OBJECTIVE    INR Protime   Date Value Ref Range Status   2019 1.7 (A) 0.86 - 1.14 Final       ASSESSMENT / PLAN  INR assessment SUB    Recheck INR In: 2 WEEKS    INR Location Clinic      Anticoagulation Summary  As of 2019    INR goal:   2.0-3.0   TTR:   61.8 % (3.2 mo)   INR used for dosin.7! (2019)   Warfarin maintenance plan:   5 mg (5 mg x 1) every Wed, Sat; 7.5 mg (5 mg x 1.5) all other days   Full warfarin instructions:   : 10 mg; : 10 mg; Otherwise 5 mg every Wed, Sat; 7.5 mg all other days   Weekly warfarin total:   47.5 mg   Plan last modified:   Cyn Womack RN (2019)   Next INR check:   2019   Target end date:   10/5/2019    Indications    Deep vein thrombosis (DVT) (H) [I82.409]             Anticoagulation Episode Summary     INR check location:       Preferred lab:       Send INR reminders to:   ARMOND CORNELIUS    Comments:   : Occlusive thrombus- posterior tibial veins- R calf. Lovenox stopped  not bridged fully. wearing 15-20 C.stockings. Shoulder surgery (Dr. Burdick postponed). Neli Lozano RN #[phone]. Poor diet/no greens or exercise.  MTV 60 mcg.       Anticoagulation Care Providers     Provider Role Specialty Phone number    Elvis Guzman MD [facility] [phone]            See the Encounter Report to view Anticoagulation Flowsheet and Dosing Calendar (Go to Encounters tab in chart review, and find the Anticoagulation Therapy Visit)    Patient to increase dost to 10 mg today and tomorrow. Patient  will resume maintenance on Saturday. This increase should raise INR to about 2.5 mg.    Patient aware if signs of clotting (pain, tenderness, swelling, color change in leg or arm, SOB) and bleeding occur (blood in stool, urine, large bruising, bleeding gums, nosebleeds) to have INR check sooner. If sx severe report to ER or concerned for stroke call 911. If general questions or concerns arise, call clinic.         Archana Varner RN

## 2019-11-10 ENCOUNTER — MYC REFILL (OUTPATIENT)
Dept: FAMILY MEDICINE | Facility: CLINIC | Age: 49
End: 2019-11-10

## 2019-11-10 DIAGNOSIS — I82.4Z1 ACUTE DEEP VEIN THROMBOSIS (DVT) OF DISTAL END OF RIGHT LOWER EXTREMITY (H): ICD-10-CM

## 2019-11-10 DIAGNOSIS — E78.5 HYPERLIPIDEMIA, UNSPECIFIED HYPERLIPIDEMIA TYPE: ICD-10-CM

## 2019-11-10 DIAGNOSIS — R80.9 MICROALBUMINURIA DUE TO TYPE 2 DIABETES MELLITUS (H): ICD-10-CM

## 2019-11-10 DIAGNOSIS — E11.29 MICROALBUMINURIA DUE TO TYPE 2 DIABETES MELLITUS (H): ICD-10-CM

## 2019-11-10 DIAGNOSIS — E11.9 TYPE 2 DIABETES MELLITUS WITHOUT COMPLICATION, WITHOUT LONG-TERM CURRENT USE OF INSULIN (H): ICD-10-CM

## 2019-11-11 NOTE — TELEPHONE ENCOUNTER
"Requested Prescriptions   Pending Prescriptions Disp Refills     lisinopril (PRINIVIL/ZESTRIL) 5 MG tablet 90 tablet 1     Sig: Take 1 tablet (5 mg) by mouth daily  Last Written Prescription Date:  6/28/2019  Last Fill Quantity: 90 tablet,  # refills: 1   Last Office Visit: 10/10/2019   Future Office Visit:    Next 5 appointments (look out 90 days)    Jan 08, 2020 11:20 AM CST  PHYSICAL with Elvis Guzman MD  [facility] [address] [place] [id number] [phone]              ACE Inhibitors (Including Combos) Protocol Failed - 11/11/2019  6:29 AM        Failed - Blood pressure under 140/90 in past 12 months     BP Readings from Last 3 Encounters:   10/24/19 (!) 136/92   10/10/19 106/84   10/08/19 132/86           Passed - Recent (12 mo) or future (30 days) visit within the authorizing provider's specialty     Patient has had an office visit with the authorizing provider or a provider within the authorizing providers department within the previous 12 mos or has a future within next 30 days. See \"Patient Info\" tab in inbasket, or \"Choose Columns\" in Meds & Orders section of the refill encounter.            Passed - Medication is active on med list        Passed - Patient is age 18 or older        Passed - Normal serum creatinine on file in past 12 months     Recent Labs   Lab Test 10/10/19  0959   CR 0.84           Passed - Normal serum potassium on file in past 12 months     Recent Labs   Lab Test 10/10/19  0959   POTASSIUM 3.9        _________________________________________________________________       simvastatin (ZOCOR) 40 MG tablet 90 tablet 1     Sig: Take 1 tablet (40 mg) by mouth At Bedtime  Last Written Prescription Date:  6/28/2019  Last Fill Quantity: 90 tablet,  # refills: 1   Last Office Visit: 10/10/2019   Future Office Visit:    Next 5 appointments (look out 90 days)    Jan 08, 2020 11:20 AM CST  PHYSICAL with Elvis Guzman, " "MD  [facility] ([facility]) [address] [address] [phone]                Statins Protocol Passed - 11/11/2019  6:29 AM        Passed - LDL on file in past 12 months     Recent Labs   Lab Test 10/10/19  0959   LDL Cannot estimate LDL when triglyceride exceeds 400 mg/dL  109*           Passed - No abnormal creatine kinase in past 12 months     No lab results found.           Passed - Recent (12 mo) or future (30 days) visit within the authorizing provider's specialty     Patient has had an office visit with the authorizing provider or a provider within the authorizing providers department within the previous 12 mos or has a future within next 30 days. See \"Patient Info\" tab in inbasket, or \"Choose Columns\" in Meds & Orders section of the refill encounter.            Passed - Medication is active on med list        Passed - Patient is age 18 or older     _________________________________________________________________       warfarin ANTICOAGULANT (COUMADIN) 5 MG tablet 30 tablet 1     Sig: Current dose (8/7/19): 10 mg Mondays + 7.5 mg all other days of the week or as directed by ACC team.  Last Written Prescription Date:  8/1/2019  Last Fill Quantity: 80 tablet,  # refills: 0   Last Office Visit: 10/10/2019   Future Office Visit:    Next 5 appointments (look out 90 days)    Jan 08, 2020 11:20 AM CST  PHYSICAL with Elvis Guzman MD  [facility] ([facility]) [address] [address] [phone]              Vitamin K Antagonists Failed - 11/11/2019  6:29 AM        Failed - INR is within goal in the past 6 weeks     Confirm INR is within goal in the past 6 weeks.     Recent Labs   Lab Test 11/07/19   INR 1.7*           Failed - Medication is active on med list        Passed - Recent (12 mo) or future (30 days) visit within the authorizing provider's specialty     Patient has had an office visit with " "the authorizing provider or a provider within the authorizing providers department within the previous 12 mos or has a future within next 30 days. See \"Patient Info\" tab in inbasket, or \"Choose Columns\" in Meds & Orders section of the refill encounter.            Passed - Patient is 18 years of age or older          "

## 2019-11-11 NOTE — TELEPHONE ENCOUNTER
Requested Prescriptions   Pending Prescriptions Disp Refills     metFORMIN (GLUCOPHAGE) 500 MG tablet 120 tablet 2     Sig: Take 2 tablets (1,000 mg) by mouth 2 times daily (with meals)  Last Written Prescription Date:  10/10/2019  Last Fill Quantity: 120 tablet,  # refills: 2   Last Office Visit: 10/10/2019   Future Office Visit:    Next 5 appointments (look out 90 days)    Jan 08, 2020 11:20 AM CST  PHYSICAL with Elvis Guzman MD  [facility] ([facility]) [address] [place] [address] [phone]              Biguanide Agents Failed - 11/11/2019  6:29 AM        Failed - Blood pressure less than 140/90 in past 6 months     BP Readings from Last 3 Encounters:   10/24/19 (!) 136/92   10/10/19 106/84   10/08/19 132/86           Passed - Patient has documented LDL within the past 12 mos.     Recent Labs   Lab Test 10/10/19  0959   LDL Cannot estimate LDL when triglyceride exceeds 400 mg/dL  109*           Passed - Patient has had a Microalbumin in the past 15 mos.     Recent Labs   Lab Test 06/28/19  0748   MICROL 50   UMALCR 20.20*           Passed - Patient is age 10 or older        Passed - Patient has documented A1c within the specified period of time.     If HgbA1C is 8 or greater, it needs to be on file within the past 3 months.  If less than 8, must be on file within the past 6 months.     Recent Labs   Lab Test 10/10/19  0959   A1C 7.2*           Passed - Patient's CR is NOT>1.4 OR Patient's EGFR is NOT<45 within past 12 mos.     Recent Labs   Lab Test 10/10/19  0959   GFRESTIMATED >90   GFRESTBLACK >90     Recent Labs   Lab Test 10/10/19  0959   CR 0.84           Passed - Patient does NOT have a diagnosis of CHF.        Passed - Medication is active on med list        Passed - Recent (6 mo) or future (30 days) visit within the authorizing provider's specialty     Patient had office visit in the last 6 months or has a visit in the next 30 days with  "authorizing provider or within the authorizing provider's specialty.  See \"Patient Info\" tab in inbasket, or \"Choose Columns\" in Meds & Orders section of the refill encounter.              "

## 2019-11-13 RX ORDER — WARFARIN SODIUM 5 MG/1
TABLET ORAL
Qty: 30 TABLET | Refills: 1 | Status: SHIPPED | OUTPATIENT
Start: 2019-11-13 | End: 2020-01-03

## 2019-11-13 RX ORDER — SIMVASTATIN 40 MG
40 TABLET ORAL AT BEDTIME
Qty: 90 TABLET | Refills: 0 | Status: SHIPPED | OUTPATIENT
Start: 2019-11-13 | End: 2020-01-08

## 2019-11-13 RX ORDER — LISINOPRIL 5 MG/1
5 TABLET ORAL DAILY
Qty: 90 TABLET | Refills: 0 | Status: SHIPPED | OUTPATIENT
Start: 2019-11-13 | End: 2020-01-08

## 2019-11-13 NOTE — TELEPHONE ENCOUNTER
Prescription approved per [facility] Refill Protocol.  LOV: 10/10/2019  Labs: up to date    Thank You!  Dhara Kirkland, CHEL  Triage Nurse

## 2019-11-21 ENCOUNTER — MYC REFILL (OUTPATIENT)
Dept: FAMILY MEDICINE | Facility: CLINIC | Age: 49
End: 2019-11-21

## 2019-11-21 DIAGNOSIS — E11.9 TYPE 2 DIABETES MELLITUS WITHOUT COMPLICATION, WITHOUT LONG-TERM CURRENT USE OF INSULIN (H): ICD-10-CM

## 2019-11-21 NOTE — TELEPHONE ENCOUNTER
Writer notes Rx sent 10/10/2019 #120/2 refills     iPierianhart message sent to patient requesting he contact pharmacy directly for refills of medications    Medication removed. Closing encounter, no further actin required at this time.     Thank You!  Dhara Kirkland RN  Triage Nurse  [phone]

## 2019-11-22 DIAGNOSIS — E11.9 TYPE 2 DIABETES MELLITUS WITHOUT COMPLICATION, WITHOUT LONG-TERM CURRENT USE OF INSULIN (H): ICD-10-CM

## 2019-11-22 NOTE — TELEPHONE ENCOUNTER
Requested Prescriptions   Pending Prescriptions Disp Refills     metFORMIN (GLUCOPHAGE) 500 MG tablet [Pharmacy Med Name: METFORMIN 500MG TABLETS]  Last Written Prescription Date:  10/10/2019  Last Fill Quantity: 120 tabs,  # refills: 2   Last office visit: 10/10/2019 with prescribing provider:  Bill Hoskins Office Visit:   Next 5 appointments (look out 90 days)    Jan 08, 2020 11:20 AM CST  PHYSICAL with Elvis Guzman MD  [facility] ([facility]) [address] [place] [id number] [phone]        120 tablet 0     Sig: TAKE 2 TABLETS(1000 MG) BY MOUTH TWICE DAILY WITH MEALS       Biguanide Agents Failed - 11/22/2019  4:19 AM        Failed - Blood pressure less than 140/90 in past 6 months     BP Readings from Last 3 Encounters:   10/24/19 (!) 136/92   10/10/19 106/84   10/08/19 132/86                 Passed - Patient has documented LDL within the past 12 mos.     Recent Labs   Lab Test 10/10/19  0959   LDL Cannot estimate LDL when triglyceride exceeds 400 mg/dL  109*             Passed - Patient has had a Microalbumin in the past 15 mos.     Recent Labs   Lab Test 06/28/19  0748   MICROL 50   UMALCR 20.20*             Passed - Patient is age 10 or older        Passed - Patient has documented A1c within the specified period of time.     If HgbA1C is 8 or greater, it needs to be on file within the past 3 months.  If less than 8, must be on file within the past 6 months.     Recent Labs   Lab Test 10/10/19  0959   A1C 7.2*             Passed - Patient's CR is NOT>1.4 OR Patient's EGFR is NOT<45 within past 12 mos.     Recent Labs   Lab Test 10/10/19  0959   GFRESTIMATED >90   GFRESTBLACK >90       Recent Labs   Lab Test 10/10/19  0959   CR 0.84             Passed - Patient does NOT have a diagnosis of CHF.        Passed - Medication is active on med list        Passed - Recent (6 mo) or future (30 days) visit within the authorizing provider's specialty     Patient  "had office visit in the last 6 months or has a visit in the next 30 days with authorizing provider or within the authorizing provider's specialty.  See \"Patient Info\" tab in inbasket, or \"Choose Columns\" in Meds & Orders section of the refill encounter.             "

## 2019-11-23 DIAGNOSIS — F33.1 MODERATE EPISODE OF RECURRENT MAJOR DEPRESSIVE DISORDER (H): ICD-10-CM

## 2019-11-23 DIAGNOSIS — R45.851 PASSIVE SUICIDAL IDEATIONS: ICD-10-CM

## 2019-11-25 ENCOUNTER — MYC REFILL (OUTPATIENT)
Dept: FAMILY MEDICINE | Facility: CLINIC | Age: 49
End: 2019-11-25

## 2019-11-25 DIAGNOSIS — F33.1 MODERATE EPISODE OF RECURRENT MAJOR DEPRESSIVE DISORDER (H): ICD-10-CM

## 2019-11-25 DIAGNOSIS — R45.851 PASSIVE SUICIDAL IDEATIONS: ICD-10-CM

## 2019-11-25 RX ORDER — VENLAFAXINE HYDROCHLORIDE 150 MG/1
300 CAPSULE, EXTENDED RELEASE ORAL DAILY
Qty: 60 CAPSULE | Refills: 0 | Status: SHIPPED | OUTPATIENT
Start: 2019-11-25 | End: 2019-12-30

## 2019-11-25 NOTE — TELEPHONE ENCOUNTER
"Requested Prescriptions   Pending Prescriptions Disp Refills     venlafaxine (EFFEXOR-XR) 150 MG 24 hr capsule [Pharmacy Med Name: VENLAFAXINE ER 150MG CAPSULES] 60 capsule 0     Sig: TAKE 2 CAPSULES(300 MG) BY MOUTH DAILY         Last Written Prescription Date:  10/22/19  Last Fill Quantity: 60,   # refills: 0  Last Office Visit: 10/10/19  Future Office visit:    Next 5 appointments (look out 90 days)    Jan 08, 2020 11:20 AM CST  PHYSICAL with Elvis Guzman MD  [facility]) [address] [place] [address] [phone]           Routing refill request to provider for review/approval because:  BP not at goal - do you want update on psychiatry follow up?      Serotonin-Norepinephrine Reuptake Inhibitors  Failed - 11/23/2019  4:21 AM        Failed - Blood pressure under 140/90 in past 12 months     BP Readings from Last 3 Encounters:   10/24/19 (!) 136/92   10/10/19 106/84   10/08/19 132/86                 Failed - PHQ-9 score of less than 5 in past 6 months     Please review last PHQ-9 score.           Passed - Medication is active on med list        Passed - Patient is age 18 or older        Passed - Normal serum creatinine on file in past 12 months     Recent Labs   Lab Test 10/10/19  0959   CR 0.84             Passed - Recent (6 mo) or future (30 days) visit within the authorizing provider's specialty     Patient had office visit in the last 6 months or has a visit in the next 30 days with authorizing provider or within the authorizing provider's specialty.  See \"Patient Info\" tab in inbasket, or \"Choose Columns\" in Meds & Orders section of the refill encounter.                4. Severe episode of recurrent major depressive disorder, without psychotic features (H)  On Effexor 300 mg daily managed by psychiatry but needs a new one referral made.  Chronic passive suicidal thoughts contracts to safety & currently is safe.  - TSH with free T4 reflex  - " MENTAL HEALTH REFERRAL  - Adult; Psychiatry and Medication Management; Psychiatry; Other: [facility] [phone]; We will contact you to schedule the appointment or please call with any questions     5. Passive suicidal ideations  Chronic passive suicidal thoughts.  Has done day treatment.  Has a therapist but needs to reconnect with them.  Needs a new psychiatrist.  Referral made.  Currently no active plan.  No prior attempt.  Contracts to safety.  Wise to go to the ER if worse.  - TSH with free T4 reflex  - MENTAL HEALTH REFERRAL  - Adult; Psychiatry and Medication Management; Psychiatry; Other: [facility] [phone]; We will contact you to schedule the appointment or please call with any questions

## 2019-11-25 NOTE — TELEPHONE ENCOUNTER
Signed Prescriptions:                        Disp   Refills    metFORMIN (GLUCOPHAGE) 500 MG tablet       0.1 ta*0        Sig: TAKE 2 TABLETS(1000 MG) BY MOUTH TWICE DAILY WITH           MEALS  Authorizing Provider: RANDA COLEMAN  Ordering User: DANIEL WILCOX

## 2019-11-26 RX ORDER — VENLAFAXINE HYDROCHLORIDE 150 MG/1
CAPSULE, EXTENDED RELEASE ORAL
Qty: 60 CAPSULE | Refills: 0 | Status: SHIPPED | OUTPATIENT
Start: 2019-11-26 | End: 2019-12-23

## 2019-11-26 NOTE — TELEPHONE ENCOUNTER
"Requested Prescriptions   Pending Prescriptions Disp Refills     venlafaxine (EFFEXOR-XR) 150 MG 24 hr capsule 60 capsule 0     Sig: Take 2 capsules (300 mg) by mouth daily         Last Written Prescription Date:  10/22/19  Last Fill Quantity: 60,   # refills: 0  Last Office Visit: 10/10/19  Future Office visit:    Next 5 appointments (look out 90 days)    Jan 08, 2020 11:20 AM CST  PHYSICAL with Elvis Guzman MD  [facility] ([facility]) [address] [place] [address] [phone]           Routing refill request to provider for review/approval because:  How long are you bridging patient to psychiatry - patient reports he is out of medication - patient says he is out so I did sign refill but we would like an update on plan      Serotonin-Norepinephrine Reuptake Inhibitors  Failed - 11/25/2019  8:00 AM        Failed - Blood pressure under 140/90 in past 12 months     BP Readings from Last 3 Encounters:   10/24/19 (!) 136/92   10/10/19 106/84   10/08/19 132/86                 Failed - PHQ-9 score of less than 5 in past 6 months     Please review last PHQ-9 score.     PHQ-9 SCORE 7/16/2019 7/22/2019 10/10/2019   PHQ-9 Total Score MyChart 13 (Moderate depression) 12 (Moderate depression) 14 (Moderate depression)   PHQ-9 Total Score 13 12 14               Passed - Medication is active on med list        Passed - Patient is age 18 or older        Passed - Normal serum creatinine on file in past 12 months     Recent Labs   Lab Test 10/10/19  0959   CR 0.84             Passed - Recent (6 mo) or future (30 days) visit within the authorizing provider's specialty     Patient had office visit in the last 6 months or has a visit in the next 30 days with authorizing provider or within the authorizing provider's specialty.  See \"Patient Info\" tab in inbasket, or \"Choose Columns\" in Meds & Orders section of the refill encounter.                Office visit 10/10 - Paul Severe " episode of recurrent major depressive disorder, without psychotic features (H)  On Effexor 300 mg daily managed by psychiatry but needs a new one referral made.  Chronic passive suicidal thoughts contracts to safety & currently is safe.  - TSH with free T4 reflex  - MENTAL HEALTH REFERRAL  - Adult; Psychiatry and Medication Management; Psychiatry; Other: [facility] [phone]; We will contact you to schedule the appointment or please call with any questions

## 2019-12-09 PROBLEM — G89.29 CHRONIC PAIN OF RIGHT KNEE: Status: RESOLVED | Noted: 2019-10-25 | Resolved: 2019-12-09

## 2019-12-09 PROBLEM — M25.561 CHRONIC PAIN OF RIGHT KNEE: Status: RESOLVED | Noted: 2019-10-25 | Resolved: 2019-12-09

## 2019-12-23 ENCOUNTER — OFFICE VISIT (OUTPATIENT)
Dept: URGENT CARE | Facility: URGENT CARE | Age: 49
End: 2019-12-23
Payer: COMMERCIAL

## 2019-12-23 VITALS
HEIGHT: 78 IN | DIASTOLIC BLOOD PRESSURE: 70 MMHG | HEART RATE: 80 BPM | WEIGHT: 295 LBS | BODY MASS INDEX: 34.13 KG/M2 | TEMPERATURE: 98.3 F | RESPIRATION RATE: 12 BRPM | SYSTOLIC BLOOD PRESSURE: 100 MMHG

## 2019-12-23 DIAGNOSIS — S90.852A FOREIGN BODY IN LEFT FOOT, INITIAL ENCOUNTER: Primary | ICD-10-CM

## 2019-12-23 DIAGNOSIS — R45.851 PASSIVE SUICIDAL IDEATIONS: ICD-10-CM

## 2019-12-23 DIAGNOSIS — F33.1 MODERATE EPISODE OF RECURRENT MAJOR DEPRESSIVE DISORDER (H): ICD-10-CM

## 2019-12-23 PROCEDURE — 99213 OFFICE O/P EST LOW 20 MIN: CPT | Performed by: FAMILY MEDICINE

## 2019-12-23 RX ORDER — VENLAFAXINE HYDROCHLORIDE 150 MG/1
CAPSULE, EXTENDED RELEASE ORAL
Qty: 60 CAPSULE | Refills: 0 | Status: SHIPPED | OUTPATIENT
Start: 2019-12-23 | End: 2020-01-08

## 2019-12-23 ASSESSMENT — MIFFLIN-ST. JEOR: SCORE: 2331.36

## 2019-12-23 NOTE — TELEPHONE ENCOUNTER
"Requested Prescriptions   Pending Prescriptions Disp Refills     venlafaxine (EFFEXOR-XR) 150 MG 24 hr capsule [Pharmacy Med Name: VENLAFAXINE ER 150MG CAPSULES] 60 capsule 0     Sig: TAKE 2 CAPSULES(300 MG) BY MOUTH DAILY  Last Written Prescription Date:  11/26/2019  Last Fill Quantity: 60 capsule,  # refills: 0   Last Office Visit: 10/10/2019   Future Office Visit:    Next 5 appointments (look out 90 days)    Jan 08, 2020 11:20 AM CST  PHYSICAL with Elvis Guzman MD  [facility] ([facility]) [address] [place] [address] [phone]              Serotonin-Norepinephrine Reuptake Inhibitors  Failed - 12/23/2019  4:17 AM        Failed - Blood pressure under 140/90 in past 12 months     BP Readings from Last 3 Encounters:   10/24/19 (!) 136/92   10/10/19 106/84   10/08/19 132/86           Failed - PHQ-9 score of less than 5 in past 6 months     Please review last PHQ-9 score.   PHQ-9 SCORE 7/16/2019 7/22/2019 10/10/2019   PHQ-9 Total Score MyChart 13 (Moderate depression) 12 (Moderate depression) 14 (Moderate depression)   PHQ-9 Total Score 13 12 14     SOL-7 SCORE 6/28/2019 7/16/2019 10/10/2019   Total Score - 1 (minimal anxiety) 1 (minimal anxiety)   Total Score 1 1 1           Passed - Medication is active on med list        Passed - Patient is age 18 or older        Passed - Normal serum creatinine on file in past 12 months     Recent Labs   Lab Test 10/10/19  0959   CR 0.84             Passed - Recent (6 mo) or future (30 days) visit within the authorizing provider's specialty     Patient had office visit in the last 6 months or has a visit in the next 30 days with authorizing provider or within the authorizing provider's specialty.  See \"Patient Info\" tab in inbasket, or \"Choose Columns\" in Meds & Orders section of the refill encounter.              "

## 2019-12-24 NOTE — PROGRESS NOTES
Subjective: About a week ago felt like he might of stepped on something went into his left foot and it has been hurting ever since.  It bled a little.  He also needs a refill of the venlafaxine that he has been taking so that he can get into see his doctor in January.    Objective: There is a small spot on the heel with some dark blood under it.  I cleansed it and shaved off the dead skin until I felt something gritty.  I then teased that gritty stuff out until it seemed gone.  There is no sign of infection.  It left a small hole.    Assessment and plan: Foreign body left heel superficial but had grown over with skin.  It should be out now and he should get back to normal.  I refilled his prescription for a month.

## 2019-12-27 NOTE — TELEPHONE ENCOUNTER
Routing refill request to provider for review/approval because:  Are you wanting management by psych?  Do you want an update on an appointment?      4. Severe episode of recurrent major depressive disorder, without psychotic features (H)  On Effexor 300 mg daily managed by psychiatry but needs a new one referral made.  Chronic passive suicidal thoughts contracts to safety & currently is safe.  - TSH with free T4 reflex  - MENTAL HEALTH REFERRAL  - Adult; Psychiatry and Medication Management; Psychiatry; Other: [facility] [phone]; We will contact you to schedule the appointment or please call with any questions

## 2019-12-30 RX ORDER — VENLAFAXINE HYDROCHLORIDE 150 MG/1
CAPSULE, EXTENDED RELEASE ORAL
Qty: 60 CAPSULE | Refills: 0 | Status: SHIPPED | OUTPATIENT
Start: 2019-12-30 | End: 2020-05-20

## 2019-12-31 RX ORDER — ACETAMINOPHEN 325 MG/1
975 TABLET ORAL ONCE
Status: CANCELLED | OUTPATIENT
Start: 2019-12-31 | End: 2019-12-31

## 2019-12-31 RX ORDER — CEFOTETAN DISODIUM 2 G/20ML
2 INJECTION, POWDER, FOR SOLUTION INTRAMUSCULAR; INTRAVENOUS
Status: CANCELLED | OUTPATIENT
Start: 2019-12-31

## 2019-12-31 RX ORDER — CEFOTETAN DISODIUM 2 G/20ML
2 INJECTION, POWDER, FOR SOLUTION INTRAMUSCULAR; INTRAVENOUS SEE ADMIN INSTRUCTIONS
Status: CANCELLED | OUTPATIENT
Start: 2019-12-31

## 2020-01-03 ENCOUNTER — MYC REFILL (OUTPATIENT)
Dept: FAMILY MEDICINE | Facility: CLINIC | Age: 50
End: 2020-01-03

## 2020-01-03 ENCOUNTER — TELEPHONE (OUTPATIENT)
Dept: ORTHOPEDICS | Facility: CLINIC | Age: 50
End: 2020-01-03

## 2020-01-03 ENCOUNTER — PREP FOR PROCEDURE (OUTPATIENT)
Dept: ORTHOPEDICS | Facility: CLINIC | Age: 50
End: 2020-01-03

## 2020-01-03 DIAGNOSIS — S42.151S: ICD-10-CM

## 2020-01-03 DIAGNOSIS — S42.141S: Primary | ICD-10-CM

## 2020-01-03 DIAGNOSIS — I82.4Z1 ACUTE DEEP VEIN THROMBOSIS (DVT) OF DISTAL END OF RIGHT LOWER EXTREMITY (H): ICD-10-CM

## 2020-01-03 NOTE — TELEPHONE ENCOUNTER
Patient is scheduled for surgery with Dr. Burdick      Spoke or left message with: Louie with [facility]    Date of Surgery:3/10/20    Location: [facility]    Informed patient they will need an adult  Yes    H&P: Patient to schedule with PCP    Additional imaging/appointments: N/A    Surgery packet: Given in clinic    Additional comments: Patient will await pre op phone call 1-2 days prior to surgery for arrival time

## 2020-01-07 RX ORDER — WARFARIN SODIUM 5 MG/1
TABLET ORAL
Qty: 45 TABLET | Refills: 0 | Status: SHIPPED | OUTPATIENT
Start: 2020-01-07 | End: 2020-02-21

## 2020-01-07 NOTE — TELEPHONE ENCOUNTER
One month refill approved. Writer lvm instructing patient to call [facility] back and schedule overdue INR level. Per chart review, patient has an appt on 1/8 with PCP, writer added and INR check to that appt for now. Per hematology ov on 10/8/19 and result note dated 10/16/19, patient has been advised to remain on  Warfarin lifetime. Hold and bridging plan to be sorted out with Hematology for upcoming shoulder surgery as well. Cyn Womack, UMAN, RN

## 2020-01-08 ENCOUNTER — OFFICE VISIT (OUTPATIENT)
Dept: FAMILY MEDICINE | Facility: CLINIC | Age: 50
End: 2020-01-08
Payer: COMMERCIAL

## 2020-01-08 VITALS
RESPIRATION RATE: 16 BRPM | TEMPERATURE: 97 F | SYSTOLIC BLOOD PRESSURE: 112 MMHG | OXYGEN SATURATION: 95 % | HEIGHT: 78 IN | HEART RATE: 88 BPM | DIASTOLIC BLOOD PRESSURE: 88 MMHG | WEIGHT: 292 LBS | BODY MASS INDEX: 33.78 KG/M2

## 2020-01-08 DIAGNOSIS — F33.1 MODERATE EPISODE OF RECURRENT MAJOR DEPRESSIVE DISORDER (H): ICD-10-CM

## 2020-01-08 DIAGNOSIS — I82.541 CHRONIC DEEP VEIN THROMBOSIS (DVT) OF TIBIAL VEIN OF RIGHT LOWER EXTREMITY (H): ICD-10-CM

## 2020-01-08 DIAGNOSIS — R80.9 MICROALBUMINURIA DUE TO TYPE 2 DIABETES MELLITUS (H): ICD-10-CM

## 2020-01-08 DIAGNOSIS — R45.851 PASSIVE SUICIDAL IDEATIONS: ICD-10-CM

## 2020-01-08 DIAGNOSIS — E78.5 HYPERLIPIDEMIA, UNSPECIFIED HYPERLIPIDEMIA TYPE: ICD-10-CM

## 2020-01-08 DIAGNOSIS — Z12.11 COLON CANCER SCREENING: ICD-10-CM

## 2020-01-08 DIAGNOSIS — E11.9 TYPE 2 DIABETES MELLITUS WITHOUT COMPLICATION, WITHOUT LONG-TERM CURRENT USE OF INSULIN (H): ICD-10-CM

## 2020-01-08 DIAGNOSIS — Z00.00 ROUTINE GENERAL MEDICAL EXAMINATION AT A HEALTH CARE FACILITY: Primary | ICD-10-CM

## 2020-01-08 DIAGNOSIS — E11.29 MICROALBUMINURIA DUE TO TYPE 2 DIABETES MELLITUS (H): ICD-10-CM

## 2020-01-08 DIAGNOSIS — N40.1 BENIGN PROSTATIC HYPERPLASIA WITH URINARY FREQUENCY: ICD-10-CM

## 2020-01-08 DIAGNOSIS — R35.0 BENIGN PROSTATIC HYPERPLASIA WITH URINARY FREQUENCY: ICD-10-CM

## 2020-01-08 PROCEDURE — 99396 PREV VISIT EST AGE 40-64: CPT | Performed by: FAMILY MEDICINE

## 2020-01-08 PROCEDURE — 99214 OFFICE O/P EST MOD 30 MIN: CPT | Mod: 25 | Performed by: FAMILY MEDICINE

## 2020-01-08 PROCEDURE — 96127 BRIEF EMOTIONAL/BEHAV ASSMT: CPT | Mod: 59 | Performed by: FAMILY MEDICINE

## 2020-01-08 RX ORDER — LISINOPRIL 5 MG/1
5 TABLET ORAL DAILY
Qty: 90 TABLET | Refills: 1 | Status: SHIPPED | OUTPATIENT
Start: 2020-01-08 | End: 2020-09-18

## 2020-01-08 RX ORDER — VENLAFAXINE HYDROCHLORIDE 150 MG/1
CAPSULE, EXTENDED RELEASE ORAL
Qty: 180 CAPSULE | Refills: 1 | Status: SHIPPED | OUTPATIENT
Start: 2020-01-08 | End: 2020-01-08

## 2020-01-08 RX ORDER — SIMVASTATIN 40 MG
40 TABLET ORAL AT BEDTIME
Qty: 90 TABLET | Refills: 1 | Status: SHIPPED | OUTPATIENT
Start: 2020-01-08 | End: 2020-07-19

## 2020-01-08 ASSESSMENT — ENCOUNTER SYMPTOMS
NERVOUS/ANXIOUS: 0
SHORTNESS OF BREATH: 0
NAUSEA: 0
DIZZINESS: 0
CONSTIPATION: 0
CHILLS: 0
DYSURIA: 0
PALPITATIONS: 0
MYALGIAS: 0
EYE PAIN: 0
WEAKNESS: 0
ABDOMINAL PAIN: 0
HEMATOCHEZIA: 0
HEARTBURN: 0
ARTHRALGIAS: 0
COUGH: 0
JOINT SWELLING: 0
FEVER: 0
PARESTHESIAS: 0
SORE THROAT: 0
HEADACHES: 0
DIARRHEA: 0
FREQUENCY: 1
HEMATURIA: 0

## 2020-01-08 ASSESSMENT — PATIENT HEALTH QUESTIONNAIRE - PHQ9
SUM OF ALL RESPONSES TO PHQ QUESTIONS 1-9: 10
SUM OF ALL RESPONSES TO PHQ QUESTIONS 1-9: 10
10. IF YOU CHECKED OFF ANY PROBLEMS, HOW DIFFICULT HAVE THESE PROBLEMS MADE IT FOR YOU TO DO YOUR WORK, TAKE CARE OF THINGS AT HOME, OR GET ALONG WITH OTHER PEOPLE: SOMEWHAT DIFFICULT

## 2020-01-08 ASSESSMENT — ANXIETY QUESTIONNAIRES
6. BECOMING EASILY ANNOYED OR IRRITABLE: NOT AT ALL
2. NOT BEING ABLE TO STOP OR CONTROL WORRYING: NOT AT ALL
5. BEING SO RESTLESS THAT IT IS HARD TO SIT STILL: NOT AT ALL
GAD7 TOTAL SCORE: 1
7. FEELING AFRAID AS IF SOMETHING AWFUL MIGHT HAPPEN: NOT AT ALL
1. FEELING NERVOUS, ANXIOUS, OR ON EDGE: NOT AT ALL
7. FEELING AFRAID AS IF SOMETHING AWFUL MIGHT HAPPEN: NOT AT ALL
3. WORRYING TOO MUCH ABOUT DIFFERENT THINGS: SEVERAL DAYS
GAD7 TOTAL SCORE: 1
4. TROUBLE RELAXING: NOT AT ALL
GAD7 TOTAL SCORE: 1

## 2020-01-08 ASSESSMENT — MIFFLIN-ST. JEOR: SCORE: 2329.66

## 2020-01-08 NOTE — PATIENT INSTRUCTIONS
Colonoscopy   [facility] [phone]  [place] [phone]       Preventive Health Recommendations  Male Ages 50 - 64    Yearly exam:             See your health care provider every year in order to  o   Review health changes.   o   Discuss preventive care.    o   Review your medicines if your doctor has prescribed any.     Have a cholesterol test every 5 years, or more frequently if you are at risk for high cholesterol/heart disease.     Have a diabetes test (fasting glucose) every three years. If you are at risk for diabetes, you should have this test more often.     Have a colonoscopy at age 50, or have a yearly FIT test (stool test). These exams will check for colon cancer.      Talk with your health care provider about whether or not a prostate cancer screening test (PSA) is right for you.    You should be tested each year for STDs (sexually transmitted diseases), if you re at risk.     Shots: Get a flu shot each year. Get a tetanus shot every 10 years.     Nutrition:    Eat at least 5 servings of fruits and vegetables daily.     Eat whole-grain bread, whole-wheat pasta and brown rice instead of white grains and rice.     Get adequate Calcium and Vitamin D.     Lifestyle    Exercise for at least 150 minutes a week (30 minutes a day, 5 days a week). This will help you control your weight and prevent disease.     Limit alcohol to one drink per day.     No smoking.     Wear sunscreen to prevent skin cancer.     See your dentist every six months for an exam and cleaning.     See your eye doctor every 1 to 2 years.    Preventive Health Recommendations  Male Ages 50 - 64    Yearly exam:             See your health care provider every year in order to  o   Review health changes.   o   Discuss preventive care.    o   Review your medicines if your doctor has prescribed any.     Have a cholesterol test every 5 years, or more frequently if you are at risk for high cholesterol/heart disease.     Have a diabetes  test (fasting glucose) every three years. If you are at risk for diabetes, you should have this test more often.     Have a colonoscopy at age 50, or have a yearly FIT test (stool test). These exams will check for colon cancer.      Talk with your health care provider about whether or not a prostate cancer screening test (PSA) is right for you.    You should be tested each year for STDs (sexually transmitted diseases), if you re at risk.     Shots: Get a flu shot each year. Get a tetanus shot every 10 years.     Nutrition:    Eat at least 5 servings of fruits and vegetables daily.     Eat whole-grain bread, whole-wheat pasta and brown rice instead of white grains and rice.     Get adequate Calcium and Vitamin D.     Lifestyle    Exercise for at least 150 minutes a week (30 minutes a day, 5 days a week). This will help you control your weight and prevent disease.     Limit alcohol to one drink per day.     No smoking.     Wear sunscreen to prevent skin cancer.     See your dentist every six months for an exam and cleaning.     See your eye doctor every 1 to 2 years.    Preventive Health Recommendations  Male Ages 50 - 64    Yearly exam:             See your health care provider every year in order to  o   Review health changes.   o   Discuss preventive care.    o   Review your medicines if your doctor has prescribed any.     Have a cholesterol test every 5 years, or more frequently if you are at risk for high cholesterol/heart disease.     Have a diabetes test (fasting glucose) every three years. If you are at risk for diabetes, you should have this test more often.     Have a colonoscopy at age 50, or have a yearly FIT test (stool test). These exams will check for colon cancer.      Talk with your health care provider about whether or not a prostate cancer screening test (PSA) is right for you.    You should be tested each year for STDs (sexually transmitted diseases), if you re at risk.     Shots: Get a flu shot  each year. Get a tetanus shot every 10 years.     Nutrition:    Eat at least 5 servings of fruits and vegetables daily.     Eat whole-grain bread, whole-wheat pasta and brown rice instead of white grains and rice.     Get adequate Calcium and Vitamin D.     Lifestyle    Exercise for at least 150 minutes a week (30 minutes a day, 5 days a week). This will help you control your weight and prevent disease.     Limit alcohol to one drink per day.     No smoking.     Wear sunscreen to prevent skin cancer.     See your dentist every six months for an exam and cleaning.     See your eye doctor every 1 to 2 years.

## 2020-01-08 NOTE — PROGRESS NOTES
SUBJECTIVE:   CC: G[facility]ang Rivera is an 49 year old male who presents for preventative health visit.     Healthy Habits:     Getting at least 3 servings of Calcium per day:  Yes    Bi-annual eye exam:  NO    Dental care twice a year:  NO    Sleep apnea or symptoms of sleep apnea:  None    Diet:  Regular (no restrictions)    Frequency of exercise:  None    Taking medications regularly:  Yes    Barriers to taking medications:  None    Medication side effects:  None    PHQ-2 Total Score: 4    Additional concerns today:  No    MDD - He states that psychiatrist office are further locations and he didn't schedule appointment. He is wondering if I can take over prescriptions. He stopped taking Ritalin. He feels that symptoms are stable. Chronic passive suicidal ideations. Psychologist was also far away and he stopped going.     BPH - Stopped Flomax and symptoms didn't help.     He is getting pilonidal cyst removed on 1/10/2020. Per hematologist, he stopped taking Warfarin 5 days before surgery. He stopped taking mediation on Monday.     Today's PHQ-2 Score:   PHQ-2 ( 1999 Pf[employer]) 1/8/2020   Q1: Little interest or pleasure in doing things 2   Q2: Feeling down, depressed or hopeless 2   PHQ-2 Score 4   Q1: Little interest or pleasure in doing things More than half the days   Q2: Feeling down, depressed or hopeless More than half the days   PHQ-2 Score 4       Abuse: Current or Past(Physical, Sexual or Emotional)- No  Do you feel safe in your environment? Yes      Social History     Tobacco Use     Smoking status: Never Smoker     Smokeless tobacco: Never Used   Substance Use Topics     Alcohol use: No     Comment: None     If you drink alcohol do you typically have >3 drinks per day or >7 drinks per week? No    Alcohol Use 1/8/2020   Prescreen: >3 drinks/day or >7 drinks/week? Not Applicable   No flowsheet data found.    Last PSA:   PSA   Date Value Ref Range Status   09/11/2018 0.46 0 - 4 ug/L Final     Comment:     Assay  "Method:  Chemiluminescence using Siemens Vista analyzer       Reviewed orders with patient. Reviewed health maintenance and updated orders accordingly - Yes  Labs reviewed in EPIC    Reviewed and updated as needed this visit by clinical staff         Reviewed and updated as needed this visit by Provider            Review of Systems   Constitutional: Negative for chills and fever.   HENT: Negative for congestion, ear pain, hearing loss and sore throat.    Eyes: Negative for pain and visual disturbance.   Respiratory: Negative for cough and shortness of breath.    Cardiovascular: Negative for chest pain, palpitations and peripheral edema.   Gastrointestinal: Negative for abdominal pain, constipation, diarrhea, heartburn, hematochezia and nausea.   Genitourinary: Positive for frequency. Negative for discharge, dysuria, genital sores, hematuria, impotence and urgency.   Musculoskeletal: Negative for arthralgias, joint swelling and myalgias.   Skin: Negative for rash.   Neurological: Negative for dizziness, weakness, headaches and paresthesias.   Psychiatric/Behavioral: Negative for mood changes. The patient is not nervous/anxious.        OBJECTIVE:   /88 (BP Location: Right arm, Patient Position: Sitting, Cuff Size: Adult Regular)   Pulse 88   Temp 97  F (36.1  C) (Oral)   Resp 16   Ht 2 m (6' 6.75\")   Wt 132.5 kg (292 lb)   SpO2 95%   BMI 33.10 kg/m    Physical Exam  GENERAL: healthy, alert and no distress  EYES: Eyes grossly normal to inspection, PERRL and conjunctivae and sclerae normal  HENT: ear canals and TM's normal, nose and mouth without ulcers or lesions  NECK: no adenopathy, no asymmetry, masses, or scars and thyroid normal to palpation  RESP: lungs clear to auscultation - no rales, rhonchi or wheezes  CV: regular rate and rhythm, normal S1 S2,  ABDOMEN: soft, nontender, no hepatosplenomegaly, no masses and bowel sounds normal  MS: no gross musculoskeletal defects noted, no edema  SKIN: no " suspicious lesions or rashes  NEURO: Normal strength and tone, mentation intact and speech normal  PSYCH: mentation appears normal, affect normal    Diagnostic Test Results:  Labs reviewed in Epic    ASSESSMENT/PLAN:     1. Routine general medical examination at a health care facility    2. Moderate episode of recurrent major depressive disorder (H)  - passive SI, no active thoughts  - Symptoms stable,we discussed that I'll continue to prescribe medication as psychiatrist locations are further out. Pt used to see psychiatrist at  prior to provider leaving. We discussed if symptoms worsening then I'll refer to psychiatry.   - MENTAL HEALTH REFERRAL  - Adult; Outpatient Treatment; Individual/Couples/Family/Group Therapy/Health Psychology; [facility]: [facility] [phone]; We will contact you to schedule the appointment or please call with any questions    3. Microalbuminuria due to type 2 diabetes mellitus (H)  - lisinopril (PRINIVIL/ZESTRIL) 5 MG tablet; Take 1 tablet (5 mg) by mouth daily  Dispense: 90 tablet; Refill: 1    4. Chronic deep vein thrombosis (DVT) of tibial vein of right lower extremity (H)  - He is getting pilonidal cyst removed on 1/10/2020. Per hematologist, he stopped taking Warfarin 5 days before surgery. He stopped taking mediation on Monday.   - INR team informed     5. Passive suicidal ideations  - no active thoughts     6. Benign prostatic hyperplasia with urinary frequency  - Flomax not improving symptoms   - UROLOGY ADULT REFERRAL    7. Hyperlipidemia, unspecified hyperlipidemia type  - simvastatin (ZOCOR) 40 MG tablet; Take 1 tablet (40 mg) by mouth At Bedtime  Dispense: 90 tablet; Refill: 1    8. Type 2 diabetes mellitus without complication, without long-term current use of insulin (H)  - metFORMIN (GLUCOPHAGE) 1000 MG tablet; Take 1 tablet (1,000 mg) by mouth 2 times daily (with meals)  Dispense: 180 tablet; Refill: 1  - OPHTHALMOLOGY ADULT REFERRAL    9. Colon cancer  "screening  - GASTROENTEROLOGY ADULT REF PROCEDURE ONLY    COUNSELING:   Reviewed preventive health counseling, as reflected in patient instructions    Estimated body mass index is 34.09 kg/m  as calculated from the following:    Height as of 12/23/19: 1.981 m (6' 6\").    Weight as of 12/23/19: 133.8 kg (295 lb).     Weight management plan: Discussed healthy diet and exercise guidelines     reports that he has never smoked. He has never used smokeless tobacco.      Counseling Resources:  ATP IV Guidelines  Pooled Cohorts Equation Calculator  FRAX Risk Assessment  ICSI Preventive Guidelines  Dietary Guidelines for Americans, 2010  USDA's MyPlate  ASA Prophylaxis  Lung CA Screening    Deqa Vidya Guzman MD  [facility]  Answers for HPI/ROS submitted by the patient on 1/8/2020   Annual Exam:  If you checked off any problems, how difficult have these problems made it for you to do your work, take care of things at home, or get along with other people?: Somewhat difficult  PHQ9 TOTAL SCORE: 10  SOL 7 TOTAL SCORE: 1    "

## 2020-01-09 ENCOUNTER — ANESTHESIA EVENT (OUTPATIENT)
Dept: SURGERY | Facility: AMBULATORY SURGERY CENTER | Age: 50
End: 2020-01-09

## 2020-01-09 RX ORDER — SODIUM CHLORIDE, SODIUM LACTATE, POTASSIUM CHLORIDE, CALCIUM CHLORIDE 600; 310; 30; 20 MG/100ML; MG/100ML; MG/100ML; MG/100ML
INJECTION, SOLUTION INTRAVENOUS CONTINUOUS
Status: CANCELLED | OUTPATIENT
Start: 2020-01-09

## 2020-01-09 RX ORDER — GABAPENTIN 300 MG/1
300 CAPSULE ORAL ONCE
Status: CANCELLED | OUTPATIENT
Start: 2020-01-09 | End: 2020-01-09

## 2020-01-09 RX ORDER — NALOXONE HYDROCHLORIDE 0.4 MG/ML
.1-.4 INJECTION, SOLUTION INTRAMUSCULAR; INTRAVENOUS; SUBCUTANEOUS
Status: CANCELLED | OUTPATIENT
Start: 2020-01-09 | End: 2020-01-10

## 2020-01-09 RX ORDER — FENTANYL CITRATE 50 UG/ML
25-50 INJECTION, SOLUTION INTRAMUSCULAR; INTRAVENOUS
Status: CANCELLED | OUTPATIENT
Start: 2020-01-09

## 2020-01-09 RX ORDER — ONDANSETRON 4 MG/1
4 TABLET, ORALLY DISINTEGRATING ORAL EVERY 30 MIN PRN
Status: CANCELLED | OUTPATIENT
Start: 2020-01-09

## 2020-01-09 RX ORDER — LIDOCAINE 40 MG/G
CREAM TOPICAL
Status: CANCELLED | OUTPATIENT
Start: 2020-01-09

## 2020-01-09 RX ORDER — OXYCODONE HYDROCHLORIDE 5 MG/1
5 TABLET ORAL EVERY 4 HOURS PRN
Status: CANCELLED | OUTPATIENT
Start: 2020-01-09

## 2020-01-09 RX ORDER — ACETAMINOPHEN 325 MG/1
975 TABLET ORAL ONCE
Status: CANCELLED | OUTPATIENT
Start: 2020-01-09 | End: 2020-01-09

## 2020-01-09 RX ORDER — ONDANSETRON 2 MG/ML
4 INJECTION INTRAMUSCULAR; INTRAVENOUS EVERY 30 MIN PRN
Status: CANCELLED | OUTPATIENT
Start: 2020-01-09

## 2020-01-09 ASSESSMENT — PATIENT HEALTH QUESTIONNAIRE - PHQ9: SUM OF ALL RESPONSES TO PHQ QUESTIONS 1-9: 10

## 2020-01-09 ASSESSMENT — ANXIETY QUESTIONNAIRES: GAD7 TOTAL SCORE: 1

## 2020-01-10 ENCOUNTER — ANESTHESIA (OUTPATIENT)
Dept: SURGERY | Facility: AMBULATORY SURGERY CENTER | Age: 50
End: 2020-01-10

## 2020-01-10 ENCOUNTER — TELEPHONE (OUTPATIENT)
Dept: SURGERY | Facility: CLINIC | Age: 50
End: 2020-01-10

## 2020-01-10 NOTE — TELEPHONE ENCOUNTER
1/10/2020:  Received voicemail message from patient requesting that his surgery today be cancelled since he was up all night not feeling well. Per Epic patient's surgery had already been cancelled.    I called patient to see if he'd like to reschedule, but there was no answer, and the voicemail box was full, so I was unable to leave a voicemail message.

## 2020-01-24 DIAGNOSIS — E11.9 TYPE 2 DIABETES MELLITUS WITHOUT COMPLICATION, WITHOUT LONG-TERM CURRENT USE OF INSULIN (H): ICD-10-CM

## 2020-01-24 NOTE — TELEPHONE ENCOUNTER
"Requested Prescriptions   Pending Prescriptions Disp Refills     metFORMIN (GLUCOPHAGE) 500 MG tablet [Pharmacy Med Name: METFORMIN 500MG TABLETS]  This medication may be not due for a refill  Last Written Prescription Date:  1/8/2020  Last Fill Quantity: 180 tabs,  # refills: 1   Last office visit: 1/8/2020 with prescribing provider:  Megan   Future Office Visit:   120 tablet 2     Sig: TAKE 2 TABLETS BY MOUTH TWICE DAILY WITH MEALS       Biguanide Agents Passed - 1/24/2020  4:19 AM        Passed - Blood pressure less than 140/90 in past 6 months     BP Readings from Last 3 Encounters:   01/08/20 112/88   12/23/19 100/70   10/24/19 (!) 136/92                 Passed - Patient has documented LDL within the past 12 mos.     Recent Labs   Lab Test 10/10/19  0959   LDL Cannot estimate LDL when triglyceride exceeds 400 mg/dL  109*             Passed - Patient has had a Microalbumin in the past 15 mos.     Recent Labs   Lab Test 06/28/19  0748   MICROL 50   UMALCR 20.20*             Passed - Patient is age 10 or older        Passed - Patient has documented A1c within the specified period of time.     If HgbA1C is 8 or greater, it needs to be on file within the past 3 months.  If less than 8, must be on file within the past 6 months.     Recent Labs   Lab Test 10/10/19  0959   A1C 7.2*             Passed - Patient's CR is NOT>1.4 OR Patient's EGFR is NOT<45 within past 12 mos.     Recent Labs   Lab Test 10/10/19  0959   GFRESTIMATED >90   GFRESTBLACK >90       Recent Labs   Lab Test 10/10/19  0959   CR 0.84             Passed - Patient does NOT have a diagnosis of CHF.        Passed - Medication is active on med list        Passed - Recent (6 mo) or future (30 days) visit within the authorizing provider's specialty     Patient had office visit in the last 6 months or has a visit in the next 30 days with authorizing provider or within the authorizing provider's specialty.  See \"Patient Info\" tab in inbasket, or \"Choose " "Columns\" in Meds & Orders section of the refill encounter.             "

## 2020-02-03 ENCOUNTER — PREP FOR PROCEDURE (OUTPATIENT)
Dept: SURGERY | Facility: CLINIC | Age: 50
End: 2020-02-03

## 2020-02-03 ENCOUNTER — ANTICOAGULATION THERAPY VISIT (OUTPATIENT)
Dept: NURSING | Facility: CLINIC | Age: 50
End: 2020-02-03
Payer: COMMERCIAL

## 2020-02-03 DIAGNOSIS — I82.409 DEEP VEIN THROMBOSIS (DVT) (H): ICD-10-CM

## 2020-02-03 DIAGNOSIS — L05.91 PILONIDAL CYST: Primary | ICD-10-CM

## 2020-02-03 DIAGNOSIS — I82.4Z9 ACUTE DEEP VEIN THROMBOSIS (DVT) OF DISTAL VEIN OF LOWER EXTREMITY, UNSPECIFIED LATERALITY (H): ICD-10-CM

## 2020-02-03 LAB — INR POINT OF CARE: 1.8 (ref 0.86–1.14)

## 2020-02-03 PROCEDURE — 36416 COLLJ CAPILLARY BLOOD SPEC: CPT

## 2020-02-03 PROCEDURE — 99207 ZZC NO CHARGE NURSE ONLY: CPT

## 2020-02-03 PROCEDURE — 85610 PROTHROMBIN TIME: CPT | Mod: QW

## 2020-02-03 RX ORDER — WARFARIN SODIUM 7.5 MG/1
TABLET ORAL
Qty: 65 TABLET | Refills: 1 | Status: SHIPPED | OUTPATIENT
Start: 2020-02-03 | End: 2020-02-21

## 2020-02-03 NOTE — PROGRESS NOTES
ANTICOAGULATION FOLLOW-UP CLINIC VISIT    Patient Name:  Gaurnag Rivera  Date:  2/3/2020  Contact Type:  Face to Face    SUBJECTIVE:  Patient Findings     Positives:   Upcoming invasive procedure (3/10: Right shoulder diagnostic arthroscopy, capsular release, manipulation under anesthesia and possible open biceps tenodesis. Surgery plan to be discussed with PCP. ), Missed doses, Other complaints (EXCISION, PILONIDAL CYST; Possible [place] procedure was scheduled for . 5 day warfarin hold was advised. However, the night before procedure, pt became violently ill and had to cancel. Procedure not yet r/s as it is not urgent. )    Comments:   Of note, somewhere along the way patient resume old dosing plan of 10 mg Sat + 7.5 mg ROW. Patient knows he's been following this maintenance dose since early January. Discussed the importance of INR visits and compliance, patient verbalized understanding.           OBJECTIVE    INR Protime   Date Value Ref Range Status   2020 1.8 (A) 0.86 - 1.14 Final       ASSESSMENT / PLAN  INR assessment SUB    Recheck INR In: 2 WEEKS    INR Location Clinic      Anticoagulation Summary  As of 2/3/2020    INR goal:   2.0-3.0   TTR:   32.2 % (6.1 mo)   INR used for dosin.8! (2/3/2020)   Warfarin maintenance plan:   10 mg (5 mg x 2) every Mon, Fri; 7.5 mg (5 mg x 1.5) all other days   Full warfarin instructions:   10 mg every Mon, Fri; 7.5 mg all other days   Weekly warfarin total:   57.5 mg   Plan last modified:   Cyn Womack RN (2/3/2020)   Next INR check:   2020   Target end date:   10/5/2019    Indications    Deep vein thrombosis (DVT) (H) [I82.409]             Anticoagulation Episode Summary     INR check location:       Preferred lab:       Send INR reminders to:   ARMOND CORNELIUS    Comments:   : Occlusive thrombus- posterior tibial veins- R calf. Lovenox stopped  not bridged fully. wearing 15-20 C.stockings. Shoulder surgery (Dr. Burdick postponed). Neli  Umang Lozano RN #[phone]. Poor diet/no greens or exercise.  MTV 60 mcg.       Anticoagulation Care Providers     Provider Role Specialty Phone number    Elvis Guzman MD [facility] [phone]            See the Encounter Report to view Anticoagulation Flowsheet and Dosing Calendar (Go to Encounters tab in chart review, and find the Anticoagulation Therapy Visit)    Per protocol, patient advised to increase today's warfarin dose by 2.5 mg to account for sub-therapeutic INR level. Patient instructed to hold green intake today and tomorrow as well. Recheck within 2 weeks to ensure stability.     Patient made aware if signs of clotting (pain, tenderness, swelling, or color change in any extremity) AND/OR bleeding occur (nosebleeds, bleeding gums, bruising, or blood in stool or urine) to notify provider & seek medical attention. If severe symptoms develop, such as major bleeding, chest pain, shortness of breath, fall, trauma or s/s of stroke, patient to call 911 immediately.     Dosage adjustment made based on physician directed care plan.    Cyn Womack RN

## 2020-02-03 NOTE — TELEPHONE ENCOUNTER
Patient is scheduled for surgery with Dr. Brandyn Palma with [facility]    Date of Surgery: 2/20/2020    Location: [facility]    Informed patient they will need an adult  Yes    H&P: Scheduled with PAC on 2/18/2020 at 11:30 am    Post-op appointment scheduled with Allegra Delvalle NP, on 2/26/2020 at 11:45 am    Surgery packet: will mail and send via [employer]     Additional comments: per Dr. Ahumada, [employer] surgeries need a post-op 1 week after, so that is why it was scheduled this way.

## 2020-02-04 NOTE — TELEPHONE ENCOUNTER
FUTURE VISIT INFORMATION      SURGERY INFORMATION:    Date: 3/10/20    Location:  OR    Surgeon:  Dulce Maria Burdick MD    Anesthesia Type:  Choice    Procedure: Right shoulder diagnostic arthroscopy, capsular release, manipulation under anesthesia and possible open biceps tenodesis    Consult: OV 10/30- [facility]    RECORDS REQUESTED FROM:       Primary Care Provider: Elvis Guzman MD - [place]    Most recent EKG+ Tracin12- [facility]- requested tracing

## 2020-02-05 NOTE — TELEPHONE ENCOUNTER
FUTURE VISIT INFORMATION        SURGERY INFORMATION:    Date: 3/10/20    Location:  OR    Surgeon:  Dulce Maria Burdick MD    Anesthesia Type:  Choice    Procedure: Right shoulder diagnostic arthroscopy, capsular release, manipulation under anesthesia and possible open biceps tenodesis    Consult: OV 10/30- [facility]     RECORDS REQUESTED FROM:         Primary Care Provider: Elvis Guzman MD - [place]     Most recent EKG+ Tracin12- [facility]- requested tracing

## 2020-02-10 ENCOUNTER — TELEPHONE (OUTPATIENT)
Dept: FAMILY MEDICINE | Facility: CLINIC | Age: 50
End: 2020-02-10

## 2020-02-10 NOTE — TELEPHONE ENCOUNTER
Prior Authorization Retail Medication Request    Medication/Dose: warfarin ANTICOAGULANT (COUMADIN) 7.5 MG tablet  ICD code (if different than what is on RX):  Previously Tried and Failed:  Rationale:    Insurance Name: unknown  Insurance ID: [id number]    Pharmacy Information (if different than what is on RX)  Name: [facility]  Phone: [phone]    Please include previous medications tried and failed.  Please ask insurance for medications on formulary.

## 2020-02-12 ENCOUNTER — OFFICE VISIT (OUTPATIENT)
Dept: SURGERY | Facility: CLINIC | Age: 50
End: 2020-02-12
Payer: COMMERCIAL

## 2020-02-12 ENCOUNTER — ANESTHESIA EVENT (OUTPATIENT)
Dept: SURGERY | Facility: AMBULATORY SURGERY CENTER | Age: 50
End: 2020-02-12

## 2020-02-12 ENCOUNTER — PRE VISIT (OUTPATIENT)
Dept: SURGERY | Facility: CLINIC | Age: 50
End: 2020-02-12

## 2020-02-12 VITALS
SYSTOLIC BLOOD PRESSURE: 126 MMHG | TEMPERATURE: 97.4 F | HEART RATE: 85 BPM | WEIGHT: 299.7 LBS | BODY MASS INDEX: 34.68 KG/M2 | RESPIRATION RATE: 16 BRPM | OXYGEN SATURATION: 94 % | DIASTOLIC BLOOD PRESSURE: 80 MMHG | HEIGHT: 78 IN

## 2020-02-12 DIAGNOSIS — Z01.818 PREOP EXAMINATION: Primary | ICD-10-CM

## 2020-02-12 ASSESSMENT — LIFESTYLE VARIABLES: TOBACCO_USE: 0

## 2020-02-12 ASSESSMENT — ENCOUNTER SYMPTOMS: SEIZURES: 0

## 2020-02-12 ASSESSMENT — MIFFLIN-ST. JEOR: SCORE: 2384.43

## 2020-02-12 NOTE — H&P
Pre-Operative H & P     CC:  Preoperative exam to assess for increased cardiopulmonary risk while undergoing surgery and anesthesia.    Date of Encounter: 2/12/2020  Primary Care Physician:  Elvis Guzman  Reason for Visit: Recurrent Pilonidal cyst    HPI  Gaurang Rivera is a 48 y/o male who presents for pre-operative H&P in preparation for EXCISION, PILONIDAL CYST, POSSIBLE AILEEN with Artemio Ahumada MD on 2/20/20 at [facility] for treatment of a Pilonidal cyst.    Mr. Rivera reports first having a pilonidal abscess about 10 years ago, at which time he had an I&D.  He did well but with some intermittent pain and bleeding and again 2 years ago had worsening symptoms and had another I&D at the site.  This occurred again 1 year ago with a third I&D.  He now has a small amount of bright red blood about every other day.  Some tenderness but no significant pain unless it flares up.  When it is larger and flares up he has difficulty sitting.  He denies any fevers or chills associated with this. He is not a smoker.  He is diabetic and reports that this is under good control. He has been counseled on the above procedure and wished to proceed.    PMH is also significant for HLD, depression, DM2, hx DVT in 7/2019, s/p L5-S1 fusion. He is anticoagulated with Coumadin. He is heterozygous for Factor V leiden  and is followed by Alyssa Chang MD in hematology.    History was obtained from patient & chart review.     Past Medical History  Past Medical History:   Diagnosis Date     Depressive disorder 2001     Diabetes (H)      DVT (deep venous thrombosis) (H) 07/05/2019     Elevated blood pressure reading without diagnosis of hypertension 5/13/2018     Hypercholesteremia 2012     Pilonidal cyst 5/13/2018       Past Surgical History  Past Surgical History:   Procedure Laterality Date     APPENDECTOMY       AS DRAIN PILONIDAL CYST SIMPLE       BACK SURGERY  1997    spinal fusion     CHOLECYSTECTOMY  2012      THUMB SURGERY   1995       Hx of Blood transfusions/reactions: no     Hx of abnormal bleeding or anti-platelet use: on warfarin    Menstrual history: No LMP for male patient.: N/A    Steroid use in the last year: no    Personal or FH with difficulty with Anesthesia:  no    Prior to Admission Medications  Current Outpatient Medications   Medication Sig Dispense Refill     lisinopril (PRINIVIL/ZESTRIL) 5 MG tablet Take 1 tablet (5 mg) by mouth daily 90 tablet 1     metFORMIN (GLUCOPHAGE) 1000 MG tablet Take 1 tablet (1,000 mg) by mouth 2 times daily (with meals) 180 tablet 1     multivitamin, therapeutic with minerals (MULTI-VITAMIN) TABS tablet Take 1 tablet by mouth daily       order for DME Equipment being ordered: Compression stockings 20-30 or 30-40 mmHg. To be wore for the first 1-2 years following DVT. 1 Package 1     simvastatin (ZOCOR) 40 MG tablet Take 1 tablet (40 mg) by mouth At Bedtime 90 tablet 1     venlafaxine (EFFEXOR-XR) 150 MG 24 hr capsule TAKE 2 CAPSULES(300 MG) BY MOUTH DAILY (Patient taking differently: Take 300 mg by mouth At Bedtime DO NOT CRUSH.) 60 capsule 0     warfarin ANTICOAGULANT (COUMADIN) 5 MG tablet Current dose (8/7/19): 10 mg Mondays + 7.5 mg all other days of the week or as directed by ACC team. (Patient taking differently: Current dose (February 12, 2020 ): 10 mg Mondays + Fridays 7.5 mg all other days of the week or as directed by ACC team. Takes in the evening.) 45 tablet 0     warfarin ANTICOAGULANT (COUMADIN) 7.5 MG tablet Current dose (2/3/2020): 10 mg Mondays & Fridays + 7.5 mg all other days OR as directed by ACC team. (Patient taking differently: Current dose (2/12/2020): 10 mg Mondays & Fridays + 7.5 mg all other days OR as directed by ACC team. Takes in the evening.) 65 tablet 1       Allergies  No Known Allergies    Social History  Social History     Socioeconomic History     Marital status: Single     Spouse name: Not on file     Number of children: 0     Years of  education: Not on file     Highest education level: Not on file   Occupational History     Occupation:       Comment: ROWING    Social Needs     Financial resource strain: Not on file     Food insecurity:     Worry: Not on file     Inability: Not on file     Transportation needs:     Medical: Not on file     Non-medical: Not on file   Tobacco Use     Smoking status: Never Smoker     Smokeless tobacco: Never Used   Substance and Sexual Activity     Alcohol use: No     Comment: None     Drug use: No     Sexual activity: Not Currently     Partners: Female   Lifestyle     Physical activity:     Days per week: Not on file     Minutes per session: Not on file     Stress: Not on file   Relationships     Social connections:     Talks on phone: Not on file     Gets together: Not on file     Attends Protestant service: Not on file     Active member of club or organization: Not on file     Attends meetings of clubs or organizations: Not on file     Relationship status: Not on file     Intimate partner violence:     Fear of current or ex partner: Not on file     Emotionally abused: Not on file     Physically abused: Not on file     Forced sexual activity: Not on file   Other Topics Concern     Parent/sibling w/ CABG, MI or angioplasty before 65F 55M? Not Asked   Social History Narrative     Not on file       Family History  Family History   Problem Relation Age of Onset     Diabetes Mother      Depression Father      Thrombosis Father         HE HAS HAD 3 CLOTS - PER PATIENT THEY WERE UNPROVOKED ON COUMADIN      Alcoholism Sister      Depression Sister      Lupus Sister      Anesthesia Reaction No family hx of      Cardiovascular No family hx of        ROS/MED HX  The complete review of systems is negative other than noted in the HPI or here.  Patient denies recent illness, fever and respiratory infection during past month.  Pt denies steroid use during past year.    ENT/Pulmonary:  - neg pulmonary ROS    (-) tobacco use,  "asthma and sleep apnea   Neurologic:  - neg neurologic ROS    (-) seizures, CVA and Neuropathy   Cardiovascular:     (+) Dyslipidemia, ----. : . . . :. . No previous cardiac testing       METS/Exercise Tolerance: Comment: Activity limited due to low back pain & knee. Walks dog daily, ascends stairs easily w/o SOB or CP. 3 - Able to walk 1-2 blocks without stopping   Hematologic: Comments: Heterozygous Factor V    (+) History of blood clots pt is anticoagulated, -     (-) History of Transfusion   Musculoskeletal: Comment: S/P L5-S1 fusion    Chronic knee pain    Chronic right shoulder pain        GI/Hepatic: Comment: Takes PPIs prn    (+) GERD      (-) liver disease   Renal/Genitourinary:  - ROS Renal section negative       Endo:     (+) type II DM Last HgA1c: 7.2 date: 10/10/19 Not using insulin Obesity, .   (-) thyroid disease   Psychiatric:     (+) psychiatric history depression      Infectious Disease:  - neg infectious disease ROS       Malignancy:      - no malignancy   Other:    (+) H/O Chronic Pain,               PHYSICAL EXAM:   Mental Status/Neuro: A/A/O; Age Appropriate   Airway: Facies: Feasible  Mallampati: III  Mouth/Opening: Full  TM distance: > 6 cm  Neck ROM: Full   Respiratory: Auscultation: CTAB     Resp. Rate: Normal     Resp. Effort: Normal      CV: Rhythm: Regular  Rate: Age appropriate  Heart: Normal Sounds  Edema: None   Comments:      Dental: Normal Dentition              Preop Vitals    BP Readings from Last 3 Encounters:   02/12/20 126/80   01/08/20 112/88   12/23/19 100/70    Pulse Readings from Last 3 Encounters:   02/12/20 85   01/08/20 88   12/23/19 80      Resp Readings from Last 3 Encounters:   02/12/20 16   01/08/20 16   12/23/19 12    SpO2 Readings from Last 3 Encounters:   02/12/20 94%   01/08/20 95%   10/24/19 93%      Temp Readings from Last 1 Encounters:   02/12/20 97.4  F (36.3  C) (Oral)    Ht Readings from Last 1 Encounters:   02/12/20 2.032 m (6' 8\")      Wt Readings from " "Last 1 Encounters:   02/12/20 135.9 kg (299 lb 11.2 oz)    Estimated body mass index is 32.92 kg/m  as calculated from the following:    Height as of this encounter: 2.032 m (6' 8\").    Weight as of this encounter: 135.9 kg (299 lb 11.2 oz).       Temp: 97.4  F (36.3  C) Temp src: Oral BP: 126/80 Pulse: 85   Resp: 16 SpO2: 94 %         299 lbs 11.2 oz  6' 8\"   Body mass index is 32.92 kg/m .    Physical Exam  Constitutional: Awake, alert, cooperative, no apparent distress, and appears stated age.  Eyes: Pupils equal, round and reactive to light, extra ocular muscles intact, sclera clear, conjunctiva normal.  HENT: Normocephalic, oral pharynx with moist mucus membranes, good dentition. No goiter appreciated. No removable dental hardware.  Respiratory: Clear to auscultation bilaterally, no crackles or wheezing. No SOB when supine.  Cardiovascular: Regular rate and rhythm, normal S1 and S2, and no murmur noted.  Carotids +2, no bruits. No edema. Palpable pulses to radial, DP and PT arteries.   GI: Normal bowel sounds, soft, non-distended, non-tender, no masses palpated, no hepatomegaly.    Lymph/Hematologic: No cervical lymphadenopathy and no supraclavicular lymphadenopathy.  Genitourinary:  deferred  Skin: Warm and dry.  No rashes.   Musculoskeletal: Full ROM of neck. There is no redness, warmth, or swelling of the joints. Gross motor strength is normal.    Neurologic: Awake, alert, oriented to name, place and time. Cranial nerves II-XII are grossly intact. Gait is normal. Ambulates from chair to exam table, seats self, lies supine and sits back up w/o assistance.  Neuropsychiatric: Calm, cooperative. Normal affect. Pleasant. Answers questions appropriately, follows commands w/o difficulty.    PRIOR LABS/DIAGNOSTIC STUDIES:  All labs and imaging personally reviewed    LABS:  CBC:   Lab Results   Component Value Date    WBC 5.5 10/10/2019    WBC 7.1 07/05/2019    HGB 16.3 10/10/2019    HGB 15.8 07/05/2019    HCT 47.1 " 10/10/2019    HCT 44.8 07/05/2019     10/10/2019     07/05/2019     BMP:   Lab Results   Component Value Date     10/10/2019     07/05/2019    POTASSIUM 3.9 10/10/2019    POTASSIUM 3.6 07/05/2019    CHLORIDE 105 10/10/2019    CHLORIDE 106 07/05/2019    CO2 24 10/10/2019    CO2 27 07/05/2019    BUN 13 10/10/2019    BUN 11 07/05/2019    CR 0.84 10/10/2019    CR 0.98 07/05/2019     (H) 10/10/2019     (H) 07/05/2019     COAGS:   Lab Results   Component Value Date    INR 1.8 (A) 02/03/2020     POC: No results found for: BGM, HCG, HCGS  OTHER:   Lab Results   Component Value Date    A1C 7.2 (H) 10/10/2019    SAVI 8.9 10/10/2019    ALBUMIN 4.3 10/10/2019    PROTTOTAL 7.4 10/10/2019    ALT 58 10/10/2019    AST 36 10/10/2019    ALKPHOS 64 10/10/2019    BILITOTAL 0.6 10/10/2019    TSH 0.88 10/10/2019        Labs today: not indicated      ASSESSMENT and PLAN  Gaurang Rivera is a 49 year old male scheduled to undergo EXCISION, PILONIDAL CYST, POSSIBLE AILEEN with Artemio Ahumada MD on 2/20/20 at [facility] for treatment of a Pilonidal cyst.    Pt has had prior anesthetic. Type: General and MAC    No history of anesthetic complications    He has the following specific operative considerations:   # SOHAIL 3/8 = intermediate risk  # VTE risk: 3% (personal & FH DVT)  # Risk of PONV score = 2.  If > 2, anti-emetic intervention recommended.  # Anesthesia considerations:  Refer to PAC assessment in anesthesia records    CARDIAC: METS 3,  Activity limited due to low back pain & knee. Walks dog daily, ascends stairs easily w/o SOB or CP.     # RCRI : No serious cardiac risks.  0.4% risk of major adverse cardiac event.       PULMONARY:     # Never smoked    # No asthma or inhaler use    GI: occasional GERD, takes PPIs prn     ENDO: BMI 33    # DM2, A1c on 10/10/19 was 7.2. On Metformin.     HEME:   # +DVT in 7/2019. On coumadin. Awaiting feedback from hematology regarding  "lovenox bridge or not. Pt instructed to hold coumadin beginning 5d prior unless he is instructed otherwise.  # Heterozygous Factor V Leiden in pt, father, grandfather    ORTHO: Full ROM of neck, no TMJ    # s/p L5-S1 fusion, chronic LBP.    # Chronic right shoulder pain.    # Recommend careful positioning throughout the perioperative period to prevent injury or exacerbation of pain.      Patient is optimized and is acceptable candidate for the proposed procedure. No further diagnostic evaluation is needed.    Arrival time, NPO, shower and medication instructions provided by nursing staff today.  Preparing For Your Surgery handout given.    ADDENDUM 2/14/20: Per telephone encounter from [facility], \"Patient is fine to hold warfarin 5 days prior to surgeries. If the surgery goes well and patient will be up and moving afterwards he will not need to bridge.  If there is an immobility component patient should be bridged using once a day 40 mg subcutaneous dosing of Lovenox until INR is w/in therapeutic range x2.    For questions, [facility] at [phone]\"    Patient is optimized and is acceptable candidate for the proposed procedure. No further diagnostic evaluation is needed.    Jackie Parker PA-C  Preoperative Assessment Center  [facility]  Phone: [phone]  Fax: [phone]  "

## 2020-02-12 NOTE — PATIENT INSTRUCTIONS
Preparing for Your Surgery      Name:  Gaurang Rivera   MRN:  [id number]   :  1970   Today's Date:  2020     Arriving for surgery:  Surgery date:  20  Arrival time:  6:30 am  Please come to:     [facility]  [address] [place] [id number]     Parking is available in front of the Clinics and Surgery Center building from 5:30AM to 8:00PM.  -  Proceed to the 5th floor to check into the Ambulatory Surgery Center.              >> There will be patient concierges on the 1st and 5th floor, for assistance or an escort, if you would like.              >> Please call [phone] with any questions.    What can I eat or drink?  -  You may have solid food or milk products until 8 hours prior to your surgery (6:30 am).  -  You may have water, apple juice or 7up/Sprite until 2 hours prior to your surgery (midnight).    Which medicines can I take?  Stop Aspirin, vitamins and supplements one week prior to surgery.  Hold Ibuprofen for 24 hours and/or Naproxen for 48 hours prior to surgery.   Hold Coumadin (Warfarin) 5 days prior to surgery, unless otherwise indicated by Hematology.    -  Do NOT take these medications in the morning, the day of surgery:  Lisinopril (Prinivil/Zestril)  Metformin (Glucophage)    How do I prepare myself?  -  Take two showers: one the night before surgery; and one the morning of surgery.         Use Scrubcare or Hibiclens to wash from neck down, leave soap on your skin for up to one minute.  Do not get soap in your eyes or ears.  You may use your own shampoo and conditioner; no other hair products.   -  Do NOT use lotion, powder, deodorant, or antiperspirant the day of your surgery.  -  Do NOT wear any jewelry.  -  Do not bring your own medications to the hospital.  -  Bring your ID and insurance card.    -If you are scheduled to go home the Same Day as surgery you must have a responsible adult as a  and to stay with you overnight the  first 24 hours after surgery.     Questions or Concerns:    -If you are scheduled at the Ambulatory Surgery Center and have questions or concerns regarding the day of surgery please call [phone].    -If you have health changes between today and your surgery please call your surgeon. For questions after surgery please call your surgeons office.     AFTER YOUR SURGERY  Breathing exercises   Breathing exercises help you recover faster. Take deep breaths and let the air out slowly. This will:     Help you wake up after surgery.    Help prevent complications like pneumonia.  Preventing complications will help you go home sooner.   We may give you a breathing device (incentive spirometer) to encourage you to breathe deeply.   Nausea and vomiting   You may feel sick to your stomach after surgery; if so, let your nurse know.    Pain control:  After surgery, you may have pain. Our goal is to help you manage your pain. Pain medicine will help you feel comfortable enough to do activities that will help you heal.  These activities may include breathing exercises, walking and physical therapy.   To help your health care team treat your pain we will ask: 1) If you have pain  2) where it is located 3) describe your pain in your words  Methods of pain control include medications given by mouth, vein or by nerve block for some surgeries.  Sequential Compression Device (SCD):  You may need to wear SCD S (also called pneumo boots)on your legs or feet. These are wraps connected to a machine that pumps in air and releases it. The repeated pumping helps prevent blood clots from forming.

## 2020-02-12 NOTE — PROGRESS NOTES
Preoperative Assessment Center medication history for February 12, 2020 is complete.    See Epic admission navigator for prior to admission medications.   Operating room staff will still need to confirm medications and last dose information on day of surgery.     Medication history interview sources:  patient, payor info.     Changes made to PTA medication list (reason)  Added: none  Deleted: BG supplies (not testing), ketorolac gel (not taking), lidocaine/kenalog -completed.   Changed: warfarin dose.     Additional medication history information (including reliability of information, actions taken by pharmacist):  -- No recent (within 30 days) course of antibiotics  -- No recent (within 30 days) course of steroids  -- No recent (within 30 days) chronic daily medications stopped   -- Patient declines being on any other prescription or over-the-counter medications    Prior to Admission medications    Medication Sig Last Dose Taking? Auth Provider   lisinopril (PRINIVIL/ZESTRIL) 5 MG tablet Take 1 tablet (5 mg) by mouth daily Taking Yes Elvis Guzman MD   metFORMIN (GLUCOPHAGE) 1000 MG tablet Take 1 tablet (1,000 mg) by mouth 2 times daily (with meals) Taking Yes Elvis Guzman MD   multivitamin, therapeutic with minerals (MULTI-VITAMIN) TABS tablet Take 1 tablet by mouth daily Taking Yes Reported, Patient   simvastatin (ZOCOR) 40 MG tablet Take 1 tablet (40 mg) by mouth At Bedtime Taking Yes Elvis Guzman MD   venlafaxine (EFFEXOR-XR) 150 MG 24 hr capsule TAKE 2 CAPSULES(300 MG) BY MOUTH DAILY  Patient taking differently: Take 300 mg by mouth At Bedtime DO NOT CRUSH. Taking Yes Elvis Guzman MD   warfarin ANTICOAGULANT (COUMADIN) 5 MG tablet Current dose (8/7/19): 10 mg Mondays + 7.5 mg all other days of the week or as directed by ACC team.  Patient taking differently: Current dose (February 12, 2020 ): 10 mg Mondays + Fridays 7.5 mg all other days of the week or as directed by ACC team. Takes in the  evening. Taking Yes Elvis Guzman MD   warfarin ANTICOAGULANT (COUMADIN) 7.5 MG tablet Current dose (2/3/2020): 10 mg Mondays & Fridays + 7.5 mg all other days OR as directed by ACC team.  Patient taking differently: Current dose (2/12/2020): 10 mg Mondays & Fridays + 7.5 mg all other days OR as directed by ACC team. Takes in the evening. Taking Yes Elvis Guzman MD   order for DME Equipment being ordered: Compression stockings 20-30 or 30-40 mmHg. To be wore for the first 1-2 years following DVT.   Elvis Guzman MD          Medication history completed by: Arturo Walsh [facility]

## 2020-02-12 NOTE — ANESTHESIA PREPROCEDURE EVALUATION
Anesthesia Pre-Procedure Evaluation    Patient: Gaurang Rivera   MRN:     [id number] Gender:   male   Age:    49 year old :      1970        Preoperative Diagnosis: Pilonidal cyst [L05.91]   Procedure(s):  EXCISION, PILONIDAL CYST, POSSIBLE AILEEN     Past Medical History:   Diagnosis Date     Depressive disorder      Diabetes (H)      DVT (deep venous thrombosis) (H) 2019     Elevated blood pressure reading without diagnosis of hypertension 2018     Hypercholesteremia      Pilonidal cyst 2018      Past Surgical History:   Procedure Laterality Date     APPENDECTOMY       AS DRAIN PILONIDAL CYST SIMPLE       BACK SURGERY      spinal fusion     CHOLECYSTECTOMY       THUMB SURGERY             Anesthesia Evaluation     . Pt has had prior anesthetic. Type: General and MAC    No history of anesthetic complications          ROS/MED HX    ENT/Pulmonary:  - neg pulmonary ROS    (-) tobacco use, asthma and sleep apnea   Neurologic:  - neg neurologic ROS    (-) seizures, CVA and Neuropathy   Cardiovascular:     (+) Dyslipidemia, ----. : . . . :. . No previous cardiac testing       METS/Exercise Tolerance: Comment: Activity limited due to low back pain & knee. Walks dog daily, ascends stairs easily w/o SOB or CP. 3 - Able to walk 1-2 blocks without stopping   Hematologic: Comments: Heterozygous Factor V    (+) History of blood clots pt is anticoagulated, -     (-) History of Transfusion   Musculoskeletal: Comment: S/P L5-S1 fusion    Chronic knee pain    Chronic right shoulder pain        GI/Hepatic: Comment: Takes PPIs prn    (+) GERD      (-) liver disease   Renal/Genitourinary:  - ROS Renal section negative       Endo:     (+) type II DM Last HgA1c: 7.2 date: 10/10/19 Not using insulin Obesity, .   (-) thyroid disease   Psychiatric:     (+) psychiatric history depression      Infectious Disease:  - neg infectious disease ROS       Malignancy:      - no malignancy   Other:    (+) H/O  "Chronic Pain,                       PHYSICAL EXAM:   Mental Status/Neuro: A/A/O; Age Appropriate   Airway: Facies: Feasible  Mallampati: III  Mouth/Opening: Full  TM distance: > 6 cm  Neck ROM: Full   Respiratory: Auscultation: CTAB     Resp. Rate: Normal     Resp. Effort: Normal      CV: Rhythm: Regular  Rate: Age appropriate  Heart: Normal Sounds  Edema: None   Comments:      Dental: Normal Dentition                LABS:  CBC:   Lab Results   Component Value Date    WBC 5.5 10/10/2019    WBC 7.1 07/05/2019    HGB 16.3 10/10/2019    HGB 15.8 07/05/2019    HCT 47.1 10/10/2019    HCT 44.8 07/05/2019     10/10/2019     07/05/2019     BMP:   Lab Results   Component Value Date     10/10/2019     07/05/2019    POTASSIUM 3.9 10/10/2019    POTASSIUM 3.6 07/05/2019    CHLORIDE 105 10/10/2019    CHLORIDE 106 07/05/2019    CO2 24 10/10/2019    CO2 27 07/05/2019    BUN 13 10/10/2019    BUN 11 07/05/2019    CR 0.84 10/10/2019    CR 0.98 07/05/2019     (H) 10/10/2019     (H) 07/05/2019     COAGS:   Lab Results   Component Value Date    INR 1.8 (A) 02/03/2020     POC: No results found for: BGM, HCG, HCGS  OTHER:   Lab Results   Component Value Date    A1C 7.2 (H) 10/10/2019    SAVI 8.9 10/10/2019    ALBUMIN 4.3 10/10/2019    PROTTOTAL 7.4 10/10/2019    ALT 58 10/10/2019    AST 36 10/10/2019    ALKPHOS 64 10/10/2019    BILITOTAL 0.6 10/10/2019    TSH 0.88 10/10/2019        Preop Vitals    BP Readings from Last 3 Encounters:   02/12/20 126/80   01/08/20 112/88   12/23/19 100/70    Pulse Readings from Last 3 Encounters:   02/12/20 85   01/08/20 88   12/23/19 80      Resp Readings from Last 3 Encounters:   02/12/20 16   01/08/20 16   12/23/19 12    SpO2 Readings from Last 3 Encounters:   02/12/20 94%   01/08/20 95%   10/24/19 93%      Temp Readings from Last 1 Encounters:   02/12/20 97.4  F (36.3  C) (Oral)    Ht Readings from Last 1 Encounters:   02/12/20 2.032 m (6' 8\")      Wt Readings from " "Last 1 Encounters:   02/12/20 135.9 kg (299 lb 11.2 oz)    Estimated body mass index is 32.92 kg/m  as calculated from the following:    Height as of this encounter: 2.032 m (6' 8\").    Weight as of this encounter: 135.9 kg (299 lb 11.2 oz).     LDA:        Assessment:   ASA SCORE: 2    H&P: History and physical reviewed and following examination; no interval change.   Smoking Status:  Non-Smoker/Unknown   NPO Status: NPO Appropriate     Plan:   Anes. Type:  General   Pre-Medication: None   Induction:  IV (Standard)   Airway: ETT; Oral   Access/Monitoring: PIV   Maintenance: TIVA     Postop Plan:   Postop Pain: Opioids  Postop Sedation/Airway: Not planned  Disposition: Outpatient     PONV Management:   Adult Risk Factors:, Non-Smoker, Postop Opioids   Prevention: Ondansetron, Dexamethasone, No Volatiles     CONSENT: Direct conversation   Plan and risks discussed with: Patient                   PAC Discussion and Assessment    ASA Classification: 2  Case is suitable for: ASC  Anesthetic techniques and relevant risks discussed:   Invasive monitoring and risk discussed: No  Types:   Possibility and Risk of blood transfusion discussed: No  NPO instructions given:   Additional anesthetic preparation and risks discussed:   Needs early admission to pre-op area:   Other:     PAC Resident/NP Anesthesia Assessment:  Gaurang Rivera is a 49 year old male scheduled to undergo EXCISION, PILONIDAL CYST, POSSIBLE AILEEN with Artemio Ahumada MD on 2/20/20 at [facility] for treatment of a Pilonidal cyst.    Pt has had prior anesthetic. Type: General and MAC    No history of anesthetic complications    He has the following specific operative considerations:   # SOHAIL 3/8 = intermediate risk  # VTE risk: 3% (personal & FH DVT)  # Risk of PONV score = 2.  If > 2, anti-emetic intervention recommended.  # Anesthesia considerations:  Refer to PAC assessment in anesthesia records    CARDIAC: METS 3,  Activity limited " due to low back pain & knee. Walks dog daily, ascends stairs easily w/o SOB or CP.     # RCRI : No serious cardiac risks.  0.4% risk of major adverse cardiac event.       PULMONARY:     # Never smoked    # No asthma or inhaler use    GI: occasional GERD, takes PPIs prn     ENDO: BMI 33    # DM2, A1c on 10/10/19 was 7.2. On Metformin.     HEME:   # +DVT in 7/2019. On coumadin. Awaiting feedback from hematology regarding lovenox bridge or not. Pt instructed to hold coumadin beginning 5d prior unless he is instructed otherwise.  # Heterozygous Factor V Leiden in pt, father, grandfather    ORTHO: Full ROM of neck, no TMJ    # s/p L5-S1 fusion, chronic LBP.    # Chronic right shoulder pain.    # Recommend careful positioning throughout the perioperative period to prevent injury or exacerbation of pain.      Patient is optimized and is acceptable candidate for the proposed procedure. No further diagnostic evaluation is needed.      Reviewed and Signed by PAC Mid-Level Provider/Resident  Mid-Level Provider/Resident: Jackie Parker PA-C  Date: 2/12/20  Time: 1651    Attending Anesthesiologist Anesthesia Assessment:        Anesthesiologist:   Date:   Time:   Pass/Fail:   Disposition:     PAC Pharmacist Assessment:        Pharmacist:   Date:   Time:    Jackie Parker PA-C

## 2020-02-12 NOTE — PHARMACY - PREOPERATIVE ASSESSMENT CENTER
Anticoagulation Note - Preoperative Assessment Center (PAC) Pharmacist     Patient seen and interviewed during time of PAC Clinic appointment February 12, 2020.  The purpose of this note is to document the perioperative anticoagulation plan outlined by the providers caring for Gaurang Rivera.     Current Regimen  Anticoagulation Regimen as of February 12, 2020: warafrin 10 mg Mon/Fri and 7.5 mg on all other days of the week. Takes in the evening.   Indication: unprovoked DVT 7/5/19  Managed by:  [facility] A/C clinic and Dr. Guzman.  Patient sees Dr. Chang at bleeding and clotting d/o clinic.  INR Goal: 2-3  Note; Patient had previously been prepared for this procedure (was to be 1/9/19) and was instructed to and held his warfarin x5 days w/o bridge, but had become violently ill the night before procedure and was delayed until later this month.     Perioperative plan  Gaurang Rivera is scheduled for EXCISION, PILONIDAL CYST, POSSIBLE AILEEN on 2/20/20 with Dr. Ahumada.  Preoperative plan previously was to hold x5days.  Message out to Dr. Chang (hematologist) to help coordinate A/C plan preoperatively.     Resumption of anticoagulation after procedure will be based on surgery team assessment of bleeding risks and complications.  This plan may require re-assessment and modification by his primary team in the perioperative setting depending on patients clinical situation.        Arturo Walsh RPH  February 12, 2020  3:18 PM      Addendum 1 (February 14, 2020):  Hematology team OK with no pre op bridging, may require enoxaparin prophylactic bridging postoperatively if not up and moving well afterwards.  please see note from Nathalia Rome from today for details.

## 2020-02-13 NOTE — TELEPHONE ENCOUNTER
Central Prior Authorization Team   Phone: [phone]      PA NOT NEEDED    Medication: warfarin ANTICOAGULANT (COUMADIN) 7.5 MG tablet-PA NOT NEEDED  Insurance Company:    Pharmacy Filling the Rx: [id number] - [place] [address] [facility] AT [address]  Filling Pharmacy Phone: [phone]  Filling Pharmacy Fax:    Start Date: 2/13/2020    Called pharmacy to verify if medication requires a PA since most insurances cover it.  Per pharmacy PA is not needed, patient picked up both strengths on 2/10/2020.

## 2020-02-14 ENCOUNTER — TELEPHONE (OUTPATIENT)
Dept: HEMATOLOGY | Facility: CLINIC | Age: 50
End: 2020-02-14

## 2020-02-14 NOTE — TELEPHONE ENCOUNTER
Anticoagulation plan for upcoming procedures    RN had the chance to speak with Dr. Chang regarding anticoagulation plan for Gaurang's upcoming procedures.     Patient is fine to hold warfarin 5 days prior to surgeries. If the surgery goes well and patient will be up and moving afterwards he will not need to bridge.     If there is an immobility component patient should be bridged using once a day 40 mg subcutaneous dosing of Lovenox until INR is w/in therapeutic range x2.     For questions, please call [facility] nurse Sloan at [phone]      Nathalia Rome, MSN, RN, PHN - Nurse Clinician - [facility] - [phone]

## 2020-02-17 ENCOUNTER — TELEPHONE (OUTPATIENT)
Dept: SURGERY | Facility: CLINIC | Age: 50
End: 2020-02-17

## 2020-02-17 ENCOUNTER — ANTICOAGULATION THERAPY VISIT (OUTPATIENT)
Dept: NURSING | Facility: CLINIC | Age: 50
End: 2020-02-17
Payer: COMMERCIAL

## 2020-02-17 DIAGNOSIS — I82.409 DEEP VEIN THROMBOSIS (DVT) (H): ICD-10-CM

## 2020-02-17 LAB — INR POINT OF CARE: 3.5 (ref 0.86–1.14)

## 2020-02-17 PROCEDURE — 99207 ZZC NO CHARGE NURSE ONLY: CPT

## 2020-02-17 PROCEDURE — 36416 COLLJ CAPILLARY BLOOD SPEC: CPT

## 2020-02-17 PROCEDURE — 85610 PROTHROMBIN TIME: CPT | Mod: QW

## 2020-02-17 NOTE — TELEPHONE ENCOUNTER
Received call regarding patient's INR and up-coming Jamal surgery scheduled for 2/20/20.  The coumadin clinic inquired whether patient was ok to proceed with surgery. Per Dr. Ahumada, the patient's options are to reschedule the procedure with coumadin held for 5 days prior, or to have INR drawn the morning of 2/20/20 to see if the level is acceptable for surgery. The level Dr. Ahumada quoted is 1.5 or below.  I called both the coumadin clinic and the patient to discuss this.  I left this detailed message with the patient. Provided direct colon & rectal clinic phone number [phone] for questions or concerns.

## 2020-02-17 NOTE — TELEPHONE ENCOUNTER
[facility] Call Center    Phone Message    May a detailed message be left on voicemail: yes     Reason for Call: Other:  Cyn from the [facility]madin [facility] called as patient did not stop taking coumadin until today, 2/17/20, and he has a pilonidal cyst excision procedure scheduled with Dr. Ahumada on Wednesday 2/19/20.  Patient's INR is 3.5 right now.  Cyn is wondering if procedure can be rescheduled to 2/21/20 or if it will be okay for procedure on 2/19/20?  Please follow up with Cyn at [phone].  Thank you!      Action Taken: Message routed to:  [facility] ([facility]): [facility]    Travel Screening: Not Applicable

## 2020-02-18 ENCOUNTER — TELEPHONE (OUTPATIENT)
Dept: SURGERY | Facility: CLINIC | Age: 50
End: 2020-02-18

## 2020-02-18 ENCOUNTER — PRE VISIT (OUTPATIENT)
Dept: SURGERY | Facility: CLINIC | Age: 50
End: 2020-02-18

## 2020-02-18 ENCOUNTER — PATIENT OUTREACH (OUTPATIENT)
Dept: SURGERY | Facility: CLINIC | Age: 50
End: 2020-02-18

## 2020-02-18 NOTE — TELEPHONE ENCOUNTER
Patient is scheduled for surgery with Dr. Ahumada    Spoke with Gaurang    Date of Surgery: 2/21/2020    Location: ASC    Informed patient they will need an adult  Yes    H&P: Already completed with PAC    Additional imaging/appointments: Post-op with Allegra Delvalle NP, on 2/26/2020 at 11:45 am    Surgery packet: already mailed and sent to patient via [facility]    Additional comments: surgery moved from original date 2/20/2020 to new date 2/21/2020 due to medication issue

## 2020-02-18 NOTE — PROGRESS NOTES
Coordinated with surgery scheduler and Dr. Ahumada to add patient to Friday, February 21 surgery schedule.  This respects the 5 day protocol with patient's coumadin/INR.  Surgery scheduler will contact patient to discuss.  I left VM with [place] coumadin clinic to inform.

## 2020-02-20 ENCOUNTER — ANTICOAGULATION THERAPY VISIT (OUTPATIENT)
Dept: NURSING | Facility: CLINIC | Age: 50
End: 2020-02-20
Payer: COMMERCIAL

## 2020-02-20 DIAGNOSIS — I82.409 DEEP VEIN THROMBOSIS (DVT) (H): ICD-10-CM

## 2020-02-20 LAB — INR POINT OF CARE: 1.3 (ref 0.86–1.14)

## 2020-02-20 PROCEDURE — 36416 COLLJ CAPILLARY BLOOD SPEC: CPT

## 2020-02-20 PROCEDURE — 99207 ZZC NO CHARGE NURSE ONLY: CPT

## 2020-02-20 PROCEDURE — 85610 PROTHROMBIN TIME: CPT | Mod: QW

## 2020-02-20 NOTE — PROGRESS NOTES
ANTICOAGULATION FOLLOW-UP CLINIC VISIT    Patient Name:  Gaurang Rivera  Date:  2/20/2020  Contact Type:  Face to Face    SUBJECTIVE:  Patient Findings     Positives:   Upcoming invasive procedure (EXCISION, PILONIDAL CYST, POSSIBLE AILEEN on for tomorrow. LVM for [place]-rectal team with today's INR level.), Missed doses (Intentional 4 day hold.)             OBJECTIVE    INR Protime   Date Value Ref Range Status   02/17/2020 3.5 (A) 0.86 - 1.14 Final       ASSESSMENT / PLAN  INR assessment SUB    Recheck INR In: 1 WEEK    INR Location Clinic      Anticoagulation Summary  As of 2/20/2020    INR goal:   2.0-3.0   TTR:   34.1 % (6.6 mo)   INR used for dosing:      Warfarin maintenance plan:   10 mg (5 mg x 2) every Mon, Fri; 7.5 mg (5 mg x 1.5) all other days   Full warfarin instructions:   2/20: Hold; Otherwise 10 mg every Mon, Fri; 7.5 mg all other days   Weekly warfarin total:   57.5 mg   Plan last modified:   Cyn Womack RN (2/3/2020)   Next INR check:   2/27/2020   Target end date:   10/5/2019    Indications    Deep vein thrombosis (DVT) (H) [I82.409]             Anticoagulation Episode Summary     INR check location:       Preferred lab:       Send INR reminders to:   ARMOND CORNELIUS    Comments:   7/5: Occlusive thrombus- posterior tibial veins- R calf. Lovenox stopped 7/22 not bridged fully. wearing 15-20 C.stockings. Shoulder surgery (Dr. Burdick postponed). Neli Lozano RN #[phone]. Poor diet/no greens or exercise.  MTV 60 mcg.       Anticoagulation Care Providers     Provider Role Specialty Phone number    Elvis Guzman MD Responsible Family Practice [phone]            See the Encounter Report to view Anticoagulation Flowsheet and Dosing Calendar (Go to Encounters tab in chart review, and find the Anticoagulation Therapy Visit)    Writer advised patient to resume maintenance dose after procedure unless advised otherwise by surgeon.     Patient made aware if signs of clotting (pain,  tenderness, swelling, or color change in any extremity) AND/OR bleeding occur (nosebleeds, bleeding gums, bruising, or blood in stool or urine) to notify provider & seek medical attention. If severe symptoms develop, such as major bleeding, chest pain, shortness of breath, fall, trauma or s/s of stroke, patient to call 911 immediately.     Dosage adjustment made based on physician directed care plan.    Cyn Womack RN

## 2020-02-20 NOTE — TELEPHONE ENCOUNTER
INR 1.3 today. Writer lvm for [place]-rectal nursing team with this update. Cyn Womack, UMAN, RN

## 2020-02-21 ENCOUNTER — HOSPITAL ENCOUNTER (OUTPATIENT)
Facility: AMBULATORY SURGERY CENTER | Age: 50
End: 2020-02-21
Attending: COLON & RECTAL SURGERY
Payer: COMMERCIAL

## 2020-02-21 ENCOUNTER — ANESTHESIA (OUTPATIENT)
Dept: SURGERY | Facility: AMBULATORY SURGERY CENTER | Age: 50
End: 2020-02-21

## 2020-02-21 VITALS
HEIGHT: 78 IN | HEART RATE: 80 BPM | OXYGEN SATURATION: 95 % | WEIGHT: 299 LBS | RESPIRATION RATE: 18 BRPM | SYSTOLIC BLOOD PRESSURE: 110 MMHG | BODY MASS INDEX: 34.59 KG/M2 | TEMPERATURE: 98 F | DIASTOLIC BLOOD PRESSURE: 78 MMHG

## 2020-02-21 DIAGNOSIS — L05.91 PILONIDAL CYST: ICD-10-CM

## 2020-02-21 LAB
GLUCOSE BLDC GLUCOMTR-MCNC: 105 MG/DL (ref 70–99)
GLUCOSE BLDC GLUCOMTR-MCNC: 136 MG/DL (ref 70–99)

## 2020-02-21 RX ORDER — DEXAMETHASONE SODIUM PHOSPHATE 4 MG/ML
INJECTION, SOLUTION INTRA-ARTICULAR; INTRALESIONAL; INTRAMUSCULAR; INTRAVENOUS; SOFT TISSUE PRN
Status: DISCONTINUED | OUTPATIENT
Start: 2020-02-21 | End: 2020-02-21

## 2020-02-21 RX ORDER — NALOXONE HYDROCHLORIDE 0.4 MG/ML
.1-.4 INJECTION, SOLUTION INTRAMUSCULAR; INTRAVENOUS; SUBCUTANEOUS
Status: DISCONTINUED | OUTPATIENT
Start: 2020-02-21 | End: 2020-02-22 | Stop reason: HOSPADM

## 2020-02-21 RX ORDER — POLYETHYLENE GLYCOL 3350 17 G/17G
1 POWDER, FOR SOLUTION ORAL DAILY PRN
Qty: 500 G | Refills: 0 | Status: SHIPPED | OUTPATIENT
Start: 2020-02-21 | End: 2020-11-25

## 2020-02-21 RX ORDER — ACETAMINOPHEN 325 MG/1
975 TABLET ORAL ONCE
Status: DISCONTINUED | OUTPATIENT
Start: 2020-02-21 | End: 2020-02-21 | Stop reason: HOSPADM

## 2020-02-21 RX ORDER — PROPOFOL 10 MG/ML
INJECTION, EMULSION INTRAVENOUS PRN
Status: DISCONTINUED | OUTPATIENT
Start: 2020-02-21 | End: 2020-02-21

## 2020-02-21 RX ORDER — IBUPROFEN 800 MG/1
800 TABLET, FILM COATED ORAL 3 TIMES DAILY
Qty: 42 TABLET | Refills: 0 | Status: ON HOLD | OUTPATIENT
Start: 2020-02-21 | End: 2020-06-02

## 2020-02-21 RX ORDER — FENTANYL CITRATE 50 UG/ML
INJECTION, SOLUTION INTRAMUSCULAR; INTRAVENOUS PRN
Status: DISCONTINUED | OUTPATIENT
Start: 2020-02-21 | End: 2020-02-21

## 2020-02-21 RX ORDER — SODIUM CHLORIDE, SODIUM LACTATE, POTASSIUM CHLORIDE, CALCIUM CHLORIDE 600; 310; 30; 20 MG/100ML; MG/100ML; MG/100ML; MG/100ML
INJECTION, SOLUTION INTRAVENOUS CONTINUOUS PRN
Status: DISCONTINUED | OUTPATIENT
Start: 2020-02-21 | End: 2020-02-21

## 2020-02-21 RX ORDER — ONDANSETRON 4 MG/1
4 TABLET, ORALLY DISINTEGRATING ORAL
Status: DISCONTINUED | OUTPATIENT
Start: 2020-02-21 | End: 2020-02-22 | Stop reason: HOSPADM

## 2020-02-21 RX ORDER — SODIUM CHLORIDE, SODIUM LACTATE, POTASSIUM CHLORIDE, CALCIUM CHLORIDE 600; 310; 30; 20 MG/100ML; MG/100ML; MG/100ML; MG/100ML
INJECTION, SOLUTION INTRAVENOUS CONTINUOUS
Status: DISCONTINUED | OUTPATIENT
Start: 2020-02-21 | End: 2020-02-22 | Stop reason: HOSPADM

## 2020-02-21 RX ORDER — ACETAMINOPHEN 325 MG/1
650 TABLET ORAL 4 TIMES DAILY
Qty: 100 TABLET | Refills: 0 | Status: ON HOLD | OUTPATIENT
Start: 2020-02-21 | End: 2020-06-02

## 2020-02-21 RX ORDER — OXYCODONE HYDROCHLORIDE 5 MG/1
5-10 TABLET ORAL EVERY 4 HOURS PRN
Status: DISCONTINUED | OUTPATIENT
Start: 2020-02-21 | End: 2020-02-22 | Stop reason: HOSPADM

## 2020-02-21 RX ORDER — LIDOCAINE HYDROCHLORIDE 20 MG/ML
INJECTION, SOLUTION INFILTRATION; PERINEURAL PRN
Status: DISCONTINUED | OUTPATIENT
Start: 2020-02-21 | End: 2020-02-21

## 2020-02-21 RX ORDER — ONDANSETRON 4 MG/1
4 TABLET, ORALLY DISINTEGRATING ORAL EVERY 30 MIN PRN
Status: DISCONTINUED | OUTPATIENT
Start: 2020-02-21 | End: 2020-02-22 | Stop reason: HOSPADM

## 2020-02-21 RX ORDER — FENTANYL CITRATE 50 UG/ML
25-50 INJECTION, SOLUTION INTRAMUSCULAR; INTRAVENOUS
Status: DISCONTINUED | OUTPATIENT
Start: 2020-02-21 | End: 2020-02-22 | Stop reason: HOSPADM

## 2020-02-21 RX ORDER — ACETAMINOPHEN 325 MG/1
975 TABLET ORAL ONCE
Status: COMPLETED | OUTPATIENT
Start: 2020-02-21 | End: 2020-02-21

## 2020-02-21 RX ORDER — CEFOTETAN DISODIUM 2 G/20ML
2 INJECTION, POWDER, FOR SOLUTION INTRAMUSCULAR; INTRAVENOUS SEE ADMIN INSTRUCTIONS
Status: DISCONTINUED | OUTPATIENT
Start: 2020-02-21 | End: 2020-02-21 | Stop reason: HOSPADM

## 2020-02-21 RX ORDER — TRAMADOL HYDROCHLORIDE 50 MG/1
50 TABLET ORAL EVERY 6 HOURS PRN
Qty: 15 TABLET | Refills: 0 | Status: SHIPPED | OUTPATIENT
Start: 2020-02-21 | End: 2020-03-03

## 2020-02-21 RX ORDER — BACITRACIN 500 [USP'U]/G
OINTMENT OPHTHALMIC PRN
Status: DISCONTINUED | OUTPATIENT
Start: 2020-02-21 | End: 2020-02-21 | Stop reason: HOSPADM

## 2020-02-21 RX ORDER — MEPERIDINE HYDROCHLORIDE 25 MG/ML
12.5 INJECTION INTRAMUSCULAR; INTRAVENOUS; SUBCUTANEOUS
Status: DISCONTINUED | OUTPATIENT
Start: 2020-02-21 | End: 2020-02-22 | Stop reason: HOSPADM

## 2020-02-21 RX ORDER — HYDROMORPHONE HYDROCHLORIDE 1 MG/ML
.3-.5 INJECTION, SOLUTION INTRAMUSCULAR; INTRAVENOUS; SUBCUTANEOUS EVERY 10 MIN PRN
Status: DISCONTINUED | OUTPATIENT
Start: 2020-02-21 | End: 2020-02-22 | Stop reason: HOSPADM

## 2020-02-21 RX ORDER — GABAPENTIN 300 MG/1
300 CAPSULE ORAL ONCE
Status: COMPLETED | OUTPATIENT
Start: 2020-02-21 | End: 2020-02-21

## 2020-02-21 RX ORDER — ALBUTEROL SULFATE 0.83 MG/ML
2.5 SOLUTION RESPIRATORY (INHALATION) EVERY 4 HOURS PRN
Status: DISCONTINUED | OUTPATIENT
Start: 2020-02-21 | End: 2020-02-22 | Stop reason: HOSPADM

## 2020-02-21 RX ORDER — PROPOFOL 10 MG/ML
INJECTION, EMULSION INTRAVENOUS CONTINUOUS PRN
Status: DISCONTINUED | OUTPATIENT
Start: 2020-02-21 | End: 2020-02-21

## 2020-02-21 RX ORDER — CEFOTETAN DISODIUM 2 G/20ML
2 INJECTION, POWDER, FOR SOLUTION INTRAMUSCULAR; INTRAVENOUS
Status: COMPLETED | OUTPATIENT
Start: 2020-02-21 | End: 2020-02-21

## 2020-02-21 RX ORDER — ONDANSETRON 2 MG/ML
4 INJECTION INTRAMUSCULAR; INTRAVENOUS EVERY 30 MIN PRN
Status: DISCONTINUED | OUTPATIENT
Start: 2020-02-21 | End: 2020-02-22 | Stop reason: HOSPADM

## 2020-02-21 RX ORDER — KETOROLAC TROMETHAMINE 30 MG/ML
30 INJECTION, SOLUTION INTRAMUSCULAR; INTRAVENOUS EVERY 6 HOURS PRN
Status: DISCONTINUED | OUTPATIENT
Start: 2020-02-21 | End: 2020-02-22 | Stop reason: HOSPADM

## 2020-02-21 RX ORDER — ONDANSETRON 2 MG/ML
INJECTION INTRAMUSCULAR; INTRAVENOUS PRN
Status: DISCONTINUED | OUTPATIENT
Start: 2020-02-21 | End: 2020-02-21

## 2020-02-21 RX ORDER — POLYETHYLENE GLYCOL 3350 17 G/17G
1 POWDER, FOR SOLUTION ORAL DAILY PRN
Qty: 500 G | Refills: 0 | Status: SHIPPED | OUTPATIENT
Start: 2020-02-21 | End: 2020-02-21

## 2020-02-21 RX ORDER — DEXAMETHASONE SODIUM PHOSPHATE 4 MG/ML
4 INJECTION, SOLUTION INTRA-ARTICULAR; INTRALESIONAL; INTRAMUSCULAR; INTRAVENOUS; SOFT TISSUE EVERY 10 MIN PRN
Status: DISCONTINUED | OUTPATIENT
Start: 2020-02-21 | End: 2020-02-22 | Stop reason: HOSPADM

## 2020-02-21 RX ADMIN — Medication 50 MG: at 11:01

## 2020-02-21 RX ADMIN — FENTANYL CITRATE 50 MCG: 50 INJECTION, SOLUTION INTRAMUSCULAR; INTRAVENOUS at 13:09

## 2020-02-21 RX ADMIN — ACETAMINOPHEN 975 MG: 325 TABLET ORAL at 10:20

## 2020-02-21 RX ADMIN — PROPOFOL 200 MG: 10 INJECTION, EMULSION INTRAVENOUS at 11:01

## 2020-02-21 RX ADMIN — GABAPENTIN 300 MG: 300 CAPSULE ORAL at 10:20

## 2020-02-21 RX ADMIN — ONDANSETRON 4 MG: 2 INJECTION INTRAMUSCULAR; INTRAVENOUS at 11:01

## 2020-02-21 RX ADMIN — OXYCODONE HYDROCHLORIDE 5 MG: 5 TABLET ORAL at 13:08

## 2020-02-21 RX ADMIN — LIDOCAINE HYDROCHLORIDE 100 MG: 20 INJECTION, SOLUTION INFILTRATION; PERINEURAL at 11:01

## 2020-02-21 RX ADMIN — PROPOFOL 50 MCG/KG/MIN: 10 INJECTION, EMULSION INTRAVENOUS at 12:30

## 2020-02-21 RX ADMIN — CEFOTETAN DISODIUM 2 G: 2 INJECTION, POWDER, FOR SOLUTION INTRAMUSCULAR; INTRAVENOUS at 11:06

## 2020-02-21 RX ADMIN — FENTANYL CITRATE 50 MCG: 50 INJECTION, SOLUTION INTRAMUSCULAR; INTRAVENOUS at 13:20

## 2020-02-21 RX ADMIN — SODIUM CHLORIDE, SODIUM LACTATE, POTASSIUM CHLORIDE, CALCIUM CHLORIDE: 600; 310; 30; 20 INJECTION, SOLUTION INTRAVENOUS at 10:20

## 2020-02-21 RX ADMIN — DEXAMETHASONE SODIUM PHOSPHATE 4 MG: 4 INJECTION, SOLUTION INTRA-ARTICULAR; INTRALESIONAL; INTRAMUSCULAR; INTRAVENOUS; SOFT TISSUE at 11:01

## 2020-02-21 RX ADMIN — FENTANYL CITRATE 100 MCG: 50 INJECTION, SOLUTION INTRAMUSCULAR; INTRAVENOUS at 11:10

## 2020-02-21 RX ADMIN — PROPOFOL 150 MCG/KG/MIN: 10 INJECTION, EMULSION INTRAVENOUS at 11:01

## 2020-02-21 ASSESSMENT — MIFFLIN-ST. JEOR: SCORE: 2357.45

## 2020-02-21 NOTE — PROGRESS NOTES
ANTICOAGULATION FOLLOW-UP CLINIC VISIT    Patient Name:  Gaurang Rivera  Date:  2/17/2020  Contact Type:  Face to Face    SUBJECTIVE:  Patient Findings     Positives:   Upcoming invasive procedure (Procedure coordination: EXCISION, PILONIDAL CYST, POSSIBLE AILEEN on 2/20. LVM for Colorectal d/t elevated INR level & requested INR threshold pre-surgery. Dr. Ahumada approved proceeding if INR <1.5. Writer advised r/s to 2/21 and team accommodated.), Missed doses (Pt forgot to start 5 day Warfarin hold on Saturday.)    Comments:   Patient denies any s/s of bleeding.           OBJECTIVE    INR Protime   Date Value Ref Range Status   02/20/2020 1.3 (A) 0.86 - 1.14 Final       ASSESSMENT / PLAN  INR assessment SUPRA    Recheck INR In: 3 DAYS    INR Location Clinic      Anticoagulation Summary  As of 2/17/2020    INR goal:   2.0-3.0   TTR:   34.1 % (6.6 mo)   INR used for dosing:   3.5! (2/17/2020)   Warfarin maintenance plan:   10 mg (5 mg x 2) every Mon, Fri; 7.5 mg (5 mg x 1.5) all other days   Full warfarin instructions:   2/17: Hold; 2/18: Hold; 2/19: Hold; 2/20: Hold; 2/21: Hold; Otherwise 10 mg every Mon, Fri; 7.5 mg all other days   Weekly warfarin total:   57.5 mg   Plan last modified:   Cyn Womack RN (2/3/2020)   Next INR check:   2/20/2020   Target end date:   10/5/2019    Indications    Deep vein thrombosis (DVT) (H) [I82.409]             Anticoagulation Episode Summary     INR check location:       Preferred lab:       Send INR reminders to:   ARMOND CORNELIUS    Comments:   7/5: Occlusive thrombus- posterior tibial veins- R calf. Lovenox stopped 7/22 not bridged fully. wearing 15-20 C.stockings. Shoulder surgery (Dr. Burdick postponed). Neli Lozano RN #[phone]. Poor diet/no greens or exercise.  MTV 60 mcg.       Anticoagulation Care Providers     Provider Role Specialty Phone number    Elvis Guzman MD [facility] [phone]            See the Encounter Report to view  Anticoagulation Flowsheet and Dosing Calendar (Go to Encounters tab in chart review, and find the Anticoagulation Therapy Visit)    Writer advised patient to increase greens this week. Start holding Warfarin and recheck INR Thursday to proceed with surgery. IF INR level still elevated, give vitamin K. Patient verbalized understanding.      Patient made aware if signs of clotting (pain, tenderness, swelling, or color change in any extremity) AND/OR bleeding occur (nosebleeds, bleeding gums, bruising, or blood in stool or urine) to notify provider & seek medical attention. If severe symptoms develop, such as major bleeding, chest pain, shortness of breath, fall, trauma or s/s of stroke, patient to call 911 immediately.     Dosage adjustment made based on physician directed care plan.    Cyn Womack RN

## 2020-02-21 NOTE — DISCHARGE INSTRUCTIONS
"[facility]  Home Care Following Anesthesia  For 24 hours after surgery:  1. Get plenty of rest.  A responsible adult must stay with you for at least 24 hours after you leave the surgery center.  2. Do not drive or use heavy equipment.  If you have weakness or tingling, don't drive or use heavy equipment until this feeling goes away.   3. Do not drink alcohol.   4. Avoid strenuous or risky activities.  Ask for help when climbing stairs.  5. You may feel lightheaded.  IF so, sit for a few minutes before standing.  Have someone help you get up.   6. If you have nausea (feel sick to your stomach): Drink only clear liquids such as apple juice, ginger ale, broth or 7-Up.  Rest may also help.  Be sure to drink enough fluids.  Move to a regular diet as you feel able.   7. You may have a slight fever.  Call the doctor if your fever is over 100 F (37.7 C) (taken under the tongue) or lasts longer than 24 hours.  8. You may have a dry mouth, a sore throat, muscle aches or trouble sleeping. These should go away after 24 hours.  9. Do not make important or legal decisions.        Today you received a Marcaine or bupivacaine block to numb the nerves near your surgery site.  This is a block using local anesthetic or \"numbing\" medication injected around the nerves to anesthetize or \"numb\" the area supplied by those nerves.  This block is injected into the muscle layer near your surgical site.  The medication may numb the location where you had surgery for 6-18 hours, but may last up to 24 hours.  If your surgical site is an arm or leg you should be careful with your affected limb, since it is possible to injure your limb without being aware of it due to the numbing.  Until full feeling returns, you should guard against bumping or hitting your limb, and avoid extreme hot or cold temperatures on the skin.  As the block wears off, the feeling will return as a tingling or prickly sensation near your " surgical site.  You will experience more discomfort from your incision as the feeling returns.  You may want to take a pain pill (a narcotic or Tylenol if this was prescribed by your surgeon) when you start to experience mild pain before the pain beccomes more severe.  If your pain medications do not control your pain you should notifiy your surgeon.    Tips for taking pain medications  To get the best pain relief possible, remember these points:    Take pain medications as directed, before pain becomes severe.    Pain medication can upset your stomach: taking it with food may help.    Constipation is a common side effect of pain medication. Drink plenty of  fluids.    Eat foods high in fiber. Take a stool softener if recommended by your doctor or pharmacist.    Do not drink alcohol, drive or operate machinery while taking pain medications.    Ask about other ways to control pain, such as with heat, ice or relaxation.    Tylenol/Acetaminophen Consumption:   975mg given at 10:20am, please wait until 4pm for another dose, then follow bottle instructions.    To help encourage the safe use of acetaminophen, the makers of TYLENOL  have lowered the maximum daily dose for single-ingredient Extra Strength TYLENOL  (acetaminophen) products sold in the [place] from 8 pills per day (4,000 mg) to 6 pills per day (3,000 mg). The dosing interval has also changed from 2 pills every 4-6 hours to 2 pills every 6 hours.    If you feel your pain relief is insufficient, you may take Tylenol/Acetaminophen in addition to your narcotic pain medication.     Be careful not to exceed 3,000 mg of Tylenol/Acetaminophen in a 24 hour period from all sources.    If you are taking extra strength Tylenol/acetaminophen (500 mg), the maximum dose is 6 tablets in 24 hours.    If you are taking regular strength acetaminophen (325 mg), the maximum dose is 9 tablets in 24 hours.    Call a doctor for any of the followin. Signs of infection (fever,  growing tenderness at the surgery site, a large amount of drainage or bleeding, severe pain, foul-smelling drainage, redness, swelling).  2. It has been over 8 to 10 hours since surgery and you are still not able to urinate (pass water).  3. Headache for over 24 hours.    Your doctor is:  Dr. Artemio Ahumada, Colon Rectal: [phone]                  After hours dial [phone] and ask for the resident on call for:  Colon Rectal  For emergency care, call the:  [facility]:  [phone] (TTY for hearing impaired: [phone])        Caring for Your Montana-Reyes Drain    You have been discharged with a [place]-Reyes drainage tube. This tube drains fluid from your incision, helping prevent swelling and reducing the risk for infection. The tube is held in place by a few stitches. The drain will be removed when your doctor determines you no longer need it and when the amount of drainage decreases. The color and amount of fluid varies. Right after surgery, the fluid may be bright red and may become clearer over time.   Dressing:    Keep the skin around the tube dry.    If you have a dressing, change it every day.   o Wash your hands.  o Remove the old bandage. Do not use scissors-you may accidentally cut the tube.  o Check for any redness, swelling, drainage, or broken stitches. (Call your doctor with any of these findings).  o Wash your hands again.  o Wet a cotton swab (Q-tip) and clean around the incision and the tube site. Use normal saline solution (salt and water) or soap and water. Start at the tube site and move outward in a circular motion.   o Pat dry.  o Put a new bandage on the incision and tube site. Make the bandage large enough to cover the whole incision area.   o Tape the bandage in place.  o Throw out old materials and wash your hands.   Home Care:    Tape the tube to the skin below the bandage. Make sure to keep some slack in the tube to keep it from pulling out.     DO NOT  sleep on the same side as the tube.    Secure the tube and bulb inside your clothing with a safety pin. This helps keep the tube from being pulled out.     Keep the bulb compressed at all times, except when you empty it.    Empty your drain at least twice a day. Empty it more often if the drain is full.   o Lift the opening of the drain.  o Drain the fluid into a measuring cup.  o Record the amount of fluid each time you empty. Share the information with your doctor at your follow-up visit.   o Squeeze the bulb with your hands until you hear air coming out of the bulb.  o Close the opening.     Tape plastic wrap over the bandage and tube site when you shower.      Stripping  the tube helps keep blood clots from blocking the tube.                         ONLY DO THIS IF YOUR DOCTOR INSTRUCTED YOU TO DO SO!  o Hold the tubing where it leaves the skin with one hand. This keeps it from pulling on the skin.  o Pinch the tubing with the thumb and first finger of your other hand.   o Slowly and firmly pull your thumb and first finger down the tube (squeezing the tube between your fingers). Keep squeezing the tube as you run your fingers towards the bulb. If the pulling hurts or feels like it is coming out of the skin, STOP. Begin again more gently.  o Let go of the tubing with both hands. If the tube is still blocked, repeat these steps three or four times. Make sure that the bulb is compressed so it creates suction.  When to call your doctor:    New or increased pain around the tube    Redness, warmth, or swelling around the incision or tube    Drainage that is foul smelling    Vomiting    Fever over 101 F degrees    Fluid leaking around the tube    Incision seems not to be healing    Stitches become loose    The tube falls out    Drainage that changes from light pick to dark red    A sudden increase or decrease in the amount of drainage (over 30 ml).  Your drainage record:  Date Time Bulb 1: Amount of drainage (ml or cc)  Bulb 2: Amount of drainage (ml or cc) Notes

## 2020-02-21 NOTE — ANESTHESIA POSTPROCEDURE EVALUATION
Anesthesia POST Procedure Evaluation    Patient: Gaurang Rivera   MRN:     [id number] Gender:   male   Age:    49 year old :      1970        Preoperative Diagnosis: Pilonidal cyst [L05.91]   Procedure(s):  EXCISION, PILONIDAL CYST, AILEEN II Procedure   Postop Comments: No value filed.     Anesthesia Type: No value filed.       Disposition: Outpatient   Postop Pain Control: Uneventful            Sign Out: Well controlled pain   PONV: No   Neuro/Psych: Uneventful            Sign Out: Acceptable/Baseline neuro status   Airway/Respiratory: Uneventful            Sign Out: Acceptable/Baseline resp. status   CV/Hemodynamics: Uneventful            Sign Out: Acceptable CV status   Other NRE: NONE   DID A NON-ROUTINE EVENT OCCUR? No         Last Anesthesia Record Vitals:  CRNA VITALS  2020 1223 - 2020 1323      2020             Resp Rate (observed):  (!) 2    Resp Rate (set):  10          Last PACU Vitals:  Vitals Value Taken Time   /78 2020  1:15 PM   Temp     Pulse 76 2020  1:15 PM   Resp 8 2020  1:23 PM   SpO2 92 % 2020  1:22 PM   Temp src     NIBP     Pulse     SpO2     Resp     Temp     Ht Rate     Temp 2     Vitals shown include unvalidated device data.      Electronically Signed By: Flaco Arrington MD, MD, 2020, 2:11 PM

## 2020-02-21 NOTE — ANESTHESIA CARE TRANSFER NOTE
Patient: Gaurang Rivera    Procedure(s):  EXCISION, PILONIDAL CYST, AILEEN II Procedure    Diagnosis: Pilonidal cyst [L05.91]  Diagnosis Additional Information: No value filed.    Anesthesia Type:   No value filed.     Note:  Airway :Face Mask  Patient transferred to:PACU  Comments: Uneventful transport   Report to RN Jarrett Diaz  Exchanging well; color natl  Pt responds appropriately to command  Pt comfortable  IV patent  Lips/teeth/dentition as preop status  Questions answered  /84  HR 77  TAT 98  RR 16  Sat 98%  Handoff Report: Identifed the Patient, Identified the Reponsible Provider, Reviewed the pertinent medical history, Discussed the surgical course, Reviewed Intra-OP anesthesia mangement and issues during anesthesia, Set expectations for post-procedure period and Allowed opportunity for questions and acknowledgement of understanding      Vitals: (Last set prior to Anesthesia Care Transfer)    CRNA VITALS  2/21/2020 1223 - 2/21/2020 1253      2/21/2020             Resp Rate (observed):  (!) 1    Resp Rate (set):  10                Electronically Signed By: SAMEER ROSS CRNA  February 21, 2020  12:53 PM

## 2020-02-21 NOTE — BRIEF OP NOTE
[facility]    Brief Operative Note    Pre-operative diagnosis: Pilonidal cyst [L05.91]  Post-operative diagnosis Same as pre-operative diagnosis    Procedure: Procedure(s):  EXCISION, PILONIDAL CYST, AILEEN II Procedure  Surgeon: Surgeon(s) and Role:     * Artemio Ahumada MD - Primary  Anesthesia: General   Estimated blood loss: 10mL.  Drains: 15Fr Kali.  Specimens: * No specimens in log *  Findings:   Deep fan celft with right-sided cyst and one midline pit but extensive scarring.  Complications: None.  Implants: * No implants in log *

## 2020-02-22 ENCOUNTER — TELEPHONE (OUTPATIENT)
Dept: SURGERY | Facility: CLINIC | Age: 50
End: 2020-02-22

## 2020-02-22 NOTE — TELEPHONE ENCOUNTER
Called by patient regarding drain question.    Had excision of pilonidal cyst yesterday, drain was placed at that time.   Since surgery it has drained about 70cc of dark bloody fluid.  This morning he has noticed that it doesn't appear to be draining anymore.  Fluid in tubing is dark red.      Recommended stripping the drain multiple times this morning and frequent drain stripping throughout the day.  If drain continues to not function once he is up moving around he will call back.    Vera Rice  General Surgery PGY3

## 2020-02-24 NOTE — OP NOTE
"Procedure Date: 2020      PREOPERATIVE DIAGNOSES:   1. Recurrent pilonidal cyst.   2. Diabetes mellitus type 2.   3. Dyslipidemia.   4. Major depression.   5. History of deep vein thrombosis (on therapeutic anticoagulation).      POSTOPERATIVE DIAGNOSES:   1. Recurrent pilonidal cyst.   2. Diabetes mellitus type 2.   3. Dyslipidemia.   4. Major depression.   5. History of deep vein thrombosis (on therapeutic anticoagulation).      ANESTHESIA:  General endotracheal anesthesia plus local anesthetic.      PROCEDURES PERFORMED:   1. Excision of pilonidal cyst.   2. Jamal II procedure.      SURGEON:  Artemio Ahumada MD   ASSISTANT:  Edith Liu MS3      DESCRIPTION OF PROCEDURE:  After obtaining informed consent, the patient was brought to the operating room, placed in the supine position.  General endotracheal anesthesia was gently induced without difficulty.  The patient received appropriate preoperative antibiotic prophylaxis as well as mechanical DVT prophylaxis.  Bilateral lower extremity pneumatic compression devices were applied and all pressure points were cushioned.  The patient was then turned to the prone position.  The sacrococcygeal area was prepped and draped in the standard sterile fashion.  A \"timeout\" was performed.  The patient had a very deep fan cleft with a 2 x 2 cm right-sided cyst at the cephalad portion of the cleft associated with scarring of the midline and 1 large midline pit.  An elliptical incision was placed directed towards the right of the midline with the lower portion curved in order to close it off the midline.  The entire skin including the unhealthy subcutaneous tissue as well as the midline scarring and the cysts were excised completely with monopolar electrocautery.  Flaps were developed towards the patient's left side extensively.  This was performed beyond the lines of safety.  The right side of the incision also was placed outside of the line of safety.  After, " we corroborated that there was adequate length of the flap without tension.  Hemostasis was achieved.  There was excellent viability of the subcutaneous flaps.  The wound was irrigated with dilute peroxide.  A 15-Belizean Kali drain was placed at the wound and sutured in place with a 2-0 nylon suture.  The adipose tissue was sutured towards the patient's right side with multiple 2-0 Vicryl sutures.  The left-sided flap was then tacked down to the subcutaneous tissue with multiple 2-0 Vicryl sutures.  After this, multiple 3-0 Vicryl sutures were used to close the adipose tissue of the flap.  The deep dermis was then subsequently closed with multiple inverted 3-0 Vicryl sutures.  The tape tacking down the buttocks was removed before placing all of the sutures.  The skin incision was reapproximated with multiple 2-0 nylon vertical mattress sutures.  Bacitracin was applied as well as a sterile dressing.  Instrument, sponge and needle counts were all correct as reported to me.  The patient tolerated the procedure well.      COMPLICATIONS:  None immediately.      ESTIMATED BLOOD LOSS:  10 mL.      REPLACEMENT:  400 mL of crystalloid.      DRAINS/TUBES:  15-Belizean Kali drain.      FINDINGS:  Deep  cleft with right-sided cyst and 1 large midline pit with extensive midline scarring.      DISPOSITION:  PACU.            DOUGLAS BLISS MD             D: 2020   T: 2020   MT: jess      Name:     ISAURA DARNELL   MRN:      [id number]        Account:        [id number]   :      1970           Procedure Date: 2020      Document: [id number]       cc: RANDA Jacob MSN, NP-C       [facility] Billing

## 2020-02-26 ENCOUNTER — OFFICE VISIT (OUTPATIENT)
Dept: SURGERY | Facility: CLINIC | Age: 50
End: 2020-02-26
Payer: COMMERCIAL

## 2020-02-26 VITALS
OXYGEN SATURATION: 96 % | HEART RATE: 110 BPM | HEIGHT: 78 IN | DIASTOLIC BLOOD PRESSURE: 86 MMHG | WEIGHT: 301.8 LBS | BODY MASS INDEX: 34.92 KG/M2 | SYSTOLIC BLOOD PRESSURE: 115 MMHG

## 2020-02-26 DIAGNOSIS — Z09 FOLLOW-UP EXAMINATION FOLLOWING SURGERY: Primary | ICD-10-CM

## 2020-02-26 DIAGNOSIS — L05.91 PILONIDAL CYST: ICD-10-CM

## 2020-02-26 ASSESSMENT — MIFFLIN-ST. JEOR: SCORE: 2374.12

## 2020-02-26 ASSESSMENT — PAIN SCALES - GENERAL: PAINLEVEL: MILD PAIN (3)

## 2020-02-26 NOTE — NURSING NOTE
"Chief Complaint   Patient presents with     Surgical Followup     Date of surgery 2/20/2020.       Vitals:    02/26/20 1150   BP: 115/86   BP Location: Left arm   Patient Position: Sitting   Cuff Size: Adult Large   Pulse: 110   SpO2: 96%   Weight: 301 lb 12.8 oz   Height: 6' 6.75\"       Body mass index is 34.22 kg/m .      Anna Pratt, EMT                      "

## 2020-02-26 NOTE — LETTER
"2020       RE: Gaurang Rivera  [address]  [facility] [address]     Dear Colleague,    Thank you for referring your patient, Gaurang Rivera, to the [facility] at [facility]. Please see a copy of my visit note below.    Colon and Rectal Surgery Postoperative Clinic Note    RE: Gaurang Rivera  : 1970  KRISTI: 2020    Gaurang Rivera is a very pleasant 49 year old male with recurrent pilonidal cyst who is now status post excision of pilonidal cyst and Jamal II procedure on 2020 with Dr. Ahumada.    Interval history: Gaurang has been doing well.  He has been doing some sitting but tries to lean forward to stay off of the wound.  He is taking Tylenol and ibuprofen during the day for pain and only needing oxycodone occasionally at night.  His ROBERTA is putting about 40 cc out per day.  No fevers or chills.    Physical Examination: Exam was chaperoned by Anna Pratt, EMT  /86 (BP Location: Left arm, Patient Position: Sitting, Cuff Size: Adult Large)   Pulse 110   Ht 6' 6.75\"   Wt 301 lb 12.8 oz   SpO2 96%   BMI 34.22 kg/m     General: Alert, oriented, in no acute distress, sitting comfortably  HEENT: Mucous membranes moist    Perianal external examination:  Incision well approximated with sutures in place.  No erythema and minimal edema present.  ROBERTA drain present with serosanguineous drainage in the tubing.  Drain dressing and wound dressing changed.    Assessment/Plan:  49 year old male status post excision of pilonidal cyst and Jamal II procedure on 2020 with Dr. Ahumada.  He is recovering well.  I would like him to avoid sitting still.  It would like to leave ROBERTA drain in until he is having less than 10 cc in 24 hours.  He can restart his Coumadin this Friday per Dr. Ahumada.  Continue with Tylenol and ibuprofen as needed for pain and oxycodone as needed in addition.  Changed both drain and wound dressing " daily.  He was scheduled for a shoulder surgery in 2 weeks.  I advised him to wait at least 2 months before having that surgery due to surgery positioning and need for sitting restrictions with his flap.  I will see him back in 2 weeks to reassess for drain removal and remove every other suture.  Encouraged him to contact the clinic in the meantime with any questions or concerns. Patient's questions were answered to his stated satisfaction and he is in agreement with this plan.      Medical history:  Past Medical History:   Diagnosis Date     Depressive disorder 2001     Diabetes (H)      DVT (deep venous thrombosis) (H) 07/05/2019     Elevated blood pressure reading without diagnosis of hypertension 5/13/2018     Hypercholesteremia 2012     Pilonidal cyst 5/13/2018       Surgical history:  Past Surgical History:   Procedure Laterality Date     APPENDECTOMY       AS DRAIN PILONIDAL CYST SIMPLE       BACK SURGERY  1997    spinal fusion     CHOLECYSTECTOMY  2012     CYSTECTOMY PILONIDAL N/A 2/21/2020    Procedure: EXCISION, PILONIDAL CYST, AILEEN II Procedure;  Surgeon: Artemio Ahumada MD;  Location: [facility] OR     THUMB SURGERY   1995       Problem list:  Patient Active Problem List    Diagnosis Date Noted     Pilonidal cyst 10/24/2019     Priority: Medium     Added automatically from request for surgery [id number]       Closed displaced fracture of glenoid cavity of right scapula, sequela 10/21/2019     Priority: Medium     Added automatically from request for surgery [id number]       Closed displaced fracture of neck of right scapula, sequela 10/21/2019     Priority: Medium     Added automatically from request for surgery [id number]       Deep vein thrombosis (DVT) (H) 07/24/2019     Priority: Medium     MDD (major depressive disorder), recurrent episode, severe (H) 02/26/2019     Priority: Medium     Microalbuminuria due to type 2 diabetes mellitus (H) 10/01/2018     Priority: Medium     Diabetes mellitus, type 2 (H)  09/26/2018     Priority: Medium     Hyperlipidemia, unspecified hyperlipidemia type 09/26/2018     Priority: Medium     Passive suicidal ideations 09/07/2018     Priority: Medium     Erectile dysfunction 02/08/2010     Priority: Medium     Overview:   Normal testosterone, prolactin and TSH--12/10/09.         Medications:  Current Outpatient Medications   Medication Sig Dispense Refill     acetaminophen 325 MG PO tablet Take 2 tablets (650 mg) by mouth 4 times daily for 14 days Alternate with ibuprofen (ADVIL/MOTRIN), IF ordered. 100 tablet 0     amoxicillin-clavulanate 875-125 MG PO tablet Take 1 tablet by mouth 2 times daily 14 tablet 0     ibuprofen 800 MG PO tablet Take 1 tablet (800 mg) by mouth 3 times daily for 14 days Alternate with acetaminophen (TYLENOL), IF ordered. 42 tablet 0     lisinopril (PRINIVIL/ZESTRIL) 5 MG tablet Take 1 tablet (5 mg) by mouth daily 90 tablet 1     metFORMIN (GLUCOPHAGE) 1000 MG tablet Take 1 tablet (1,000 mg) by mouth 2 times daily (with meals) 180 tablet 1     polyethylene glycol PO powder Take 17 g (1 capful) by mouth daily as needed for constipation (If no bowel movement in 24 hours. Mix in 8 oz of water.  May take twice daily.) 500 g 0     simvastatin (ZOCOR) 40 MG tablet Take 1 tablet (40 mg) by mouth At Bedtime 90 tablet 1     traMADol 50 MG PO tablet Take 1 tablet (50 mg) by mouth every 6 hours as needed for breakthrough pain or severe pain 15 tablet 0     venlafaxine (EFFEXOR-XR) 150 MG 24 hr capsule TAKE 2 CAPSULES(300 MG) BY MOUTH DAILY (Patient taking differently: Take 300 mg by mouth At Bedtime DO NOT CRUSH.) 60 capsule 0     multivitamin, therapeutic with minerals (MULTI-VITAMIN) TABS tablet Take 1 tablet by mouth daily       order for DME Equipment being ordered: Compression stockings 20-30 or 30-40 mmHg. To be wore for the first 1-2 years following DVT. 1 Package 1       Allergies:  No Known Allergies    Family history:  Family History   Problem Relation Age of  "Onset     Diabetes Mother      Depression Father      Thrombosis Father         HE HAS HAD 3 CLOTS - PER PATIENT THEY WERE UNPROVOKED ON COUMADIN      Alcoholism Sister      Depression Sister      Lupus Sister      Anesthesia Reaction No family hx of      Cardiovascular No family hx of        Social history:  Social History     Tobacco Use     Smoking status: Never Smoker     Smokeless tobacco: Never Used   Substance Use Topics     Alcohol use: No     Comment: None     Marital status: single.    Nursing Notes:   Anna Pratt EMT  2/26/2020 11:55 AM  Signed  Chief Complaint   Patient presents with     Surgical Followup     Date of surgery 2/20/2020.       Vitals:    02/26/20 1150   BP: 115/86   BP Location: Left arm   Patient Position: Sitting   Cuff Size: Adult Large   Pulse: 110   SpO2: 96%   Weight: 301 lb 12.8 oz   Height: 6' 6.75\"       Body mass index is 34.22 kg/m .      MIHIR Lind                       Total face to face time was 15 minutes, >50% counseling.  This is a postop visit.    SAMEER Zazueta, NP-C  Colon and Rectal Surgery  [facility]    This note was created using speech recognition software and may contain unintended word substitutions.      "

## 2020-02-26 NOTE — PROGRESS NOTES
"Colon and Rectal Surgery Postoperative Clinic Note    RE: Gaurang Rivera  : 1970  KRISTI: 2020    Gaurang Rivera is a very pleasant 49 year old male with recurrent pilonidal cyst who is now status post excision of pilonidal cyst and Sparland II procedure on 2020 with Dr. Ahumada.    Interval history: Gaurang has been doing well.  He has been doing some sitting but tries to lean forward to stay off of the wound.  He is taking Tylenol and ibuprofen during the day for pain and only needing oxycodone occasionally at night.  His ROBERTA is putting about 40 cc out per day.  No fevers or chills.    Physical Examination: Exam was chaperoned by Anna Pratt, EMT  /86 (BP Location: Left arm, Patient Position: Sitting, Cuff Size: Adult Large)   Pulse 110   Ht 6' 6.75\"   Wt 301 lb 12.8 oz   SpO2 96%   BMI 34.22 kg/m    General: Alert, oriented, in no acute distress, sitting comfortably  HEENT: Mucous membranes moist    Perianal external examination:  Incision well approximated with sutures in place.  No erythema and minimal edema present.  ROBERTA drain present with serosanguineous drainage in the tubing.  Drain dressing and wound dressing changed.    Assessment/Plan:  49 year old male status post excision of pilonidal cyst and Sparland II procedure on 2020 with Dr. Ahumada.  He is recovering well.  I would like him to avoid sitting still.  It would like to leave ROBERTA drain in until he is having less than 10 cc in 24 hours.  He can restart his Coumadin this Friday per Dr. Ahumada.  Continue with Tylenol and ibuprofen as needed for pain and oxycodone as needed in addition.  Changed both drain and wound dressing daily.  He was scheduled for a shoulder surgery in 2 weeks.  I advised him to wait at least 2 months before having that surgery due to surgery positioning and need for sitting restrictions with his flap.  I will see him back in 2 weeks to reassess for drain removal and remove every other suture.  " Encouraged him to contact the clinic in the meantime with any questions or concerns. Patient's questions were answered to his stated satisfaction and he is in agreement with this plan.      Medical history:  Past Medical History:   Diagnosis Date     Depressive disorder 2001     Diabetes (H)      DVT (deep venous thrombosis) (H) 07/05/2019     Elevated blood pressure reading without diagnosis of hypertension 5/13/2018     Hypercholesteremia 2012     Pilonidal cyst 5/13/2018       Surgical history:  Past Surgical History:   Procedure Laterality Date     APPENDECTOMY       AS DRAIN PILONIDAL CYST SIMPLE       BACK SURGERY  1997    spinal fusion     CHOLECYSTECTOMY  2012     CYSTECTOMY PILONIDAL N/A 2/21/2020    Procedure: EXCISION, PILONIDAL CYST, AILEEN II Procedure;  Surgeon: Artemio Ahumada MD;  Location: [facility]     THUMB SURGERY   1995       Problem list:  Patient Active Problem List    Diagnosis Date Noted     Pilonidal cyst 10/24/2019     Priority: Medium     Added automatically from request for surgery [id number]       Closed displaced fracture of glenoid cavity of right scapula, sequela 10/21/2019     Priority: Medium     Added automatically from request for surgery [id number]       Closed displaced fracture of neck of right scapula, sequela 10/21/2019     Priority: Medium     Added automatically from request for surgery [id number]       Deep vein thrombosis (DVT) (H) 07/24/2019     Priority: Medium     MDD (major depressive disorder), recurrent episode, severe (H) 02/26/2019     Priority: Medium     Microalbuminuria due to type 2 diabetes mellitus (H) 10/01/2018     Priority: Medium     Diabetes mellitus, type 2 (H) 09/26/2018     Priority: Medium     Hyperlipidemia, unspecified hyperlipidemia type 09/26/2018     Priority: Medium     Passive suicidal ideations 09/07/2018     Priority: Medium     Erectile dysfunction 02/08/2010     Priority: Medium     Overview:   Normal testosterone, prolactin and  TSH--12/10/09.         Medications:  Current Outpatient Medications   Medication Sig Dispense Refill     acetaminophen 325 MG PO tablet Take 2 tablets (650 mg) by mouth 4 times daily for 14 days Alternate with ibuprofen (ADVIL/MOTRIN), IF ordered. 100 tablet 0     amoxicillin-clavulanate 875-125 MG PO tablet Take 1 tablet by mouth 2 times daily 14 tablet 0     ibuprofen 800 MG PO tablet Take 1 tablet (800 mg) by mouth 3 times daily for 14 days Alternate with acetaminophen (TYLENOL), IF ordered. 42 tablet 0     lisinopril (PRINIVIL/ZESTRIL) 5 MG tablet Take 1 tablet (5 mg) by mouth daily 90 tablet 1     metFORMIN (GLUCOPHAGE) 1000 MG tablet Take 1 tablet (1,000 mg) by mouth 2 times daily (with meals) 180 tablet 1     polyethylene glycol PO powder Take 17 g (1 capful) by mouth daily as needed for constipation (If no bowel movement in 24 hours. Mix in 8 oz of water.  May take twice daily.) 500 g 0     simvastatin (ZOCOR) 40 MG tablet Take 1 tablet (40 mg) by mouth At Bedtime 90 tablet 1     traMADol 50 MG PO tablet Take 1 tablet (50 mg) by mouth every 6 hours as needed for breakthrough pain or severe pain 15 tablet 0     venlafaxine (EFFEXOR-XR) 150 MG 24 hr capsule TAKE 2 CAPSULES(300 MG) BY MOUTH DAILY (Patient taking differently: Take 300 mg by mouth At Bedtime DO NOT CRUSH.) 60 capsule 0     multivitamin, therapeutic with minerals (MULTI-VITAMIN) TABS tablet Take 1 tablet by mouth daily       order for DME Equipment being ordered: Compression stockings 20-30 or 30-40 mmHg. To be wore for the first 1-2 years following DVT. 1 Package 1       Allergies:  No Known Allergies    Family history:  Family History   Problem Relation Age of Onset     Diabetes Mother      Depression Father      Thrombosis Father         HE HAS HAD 3 CLOTS - PER PATIENT THEY WERE UNPROVOKED ON COUMADIN      Alcoholism Sister      Depression Sister      Lupus Sister      Anesthesia Reaction No family hx of      Cardiovascular No family hx of   "      Social history:  Social History     Tobacco Use     Smoking status: Never Smoker     Smokeless tobacco: Never Used   Substance Use Topics     Alcohol use: No     Comment: None     Marital status: single.    Nursing Notes:   Anna Pratt EMT  2/26/2020 11:55 AM  Signed  Chief Complaint   Patient presents with     Surgical Followup     Date of surgery 2/20/2020.       Vitals:    02/26/20 1150   BP: 115/86   BP Location: Left arm   Patient Position: Sitting   Cuff Size: Adult Large   Pulse: 110   SpO2: 96%   Weight: 301 lb 12.8 oz   Height: 6' 6.75\"       Body mass index is 34.22 kg/m .      MIHIR Lind                       Total face to face time was 15 minutes, >50% counseling.  This is a postop visit.    SAMEER Zazueta, NP-C  Colon and Rectal Surgery  [facility]    This note was created using speech recognition software and may contain unintended word substitutions.      "

## 2020-03-03 ENCOUNTER — OFFICE VISIT (OUTPATIENT)
Dept: FAMILY MEDICINE | Facility: CLINIC | Age: 50
End: 2020-03-03
Payer: COMMERCIAL

## 2020-03-03 VITALS
HEART RATE: 92 BPM | RESPIRATION RATE: 20 BRPM | SYSTOLIC BLOOD PRESSURE: 128 MMHG | DIASTOLIC BLOOD PRESSURE: 82 MMHG | OXYGEN SATURATION: 98 % | TEMPERATURE: 98.1 F | WEIGHT: 295 LBS | BODY MASS INDEX: 33.44 KG/M2

## 2020-03-03 DIAGNOSIS — L03.317 CELLULITIS OF BUTTOCK: ICD-10-CM

## 2020-03-03 DIAGNOSIS — L76.82 INCISIONAL PAIN: ICD-10-CM

## 2020-03-03 DIAGNOSIS — Z79.01 ANTICOAGULATED: ICD-10-CM

## 2020-03-03 DIAGNOSIS — T81.30XA WOUND DEHISCENCE: Primary | ICD-10-CM

## 2020-03-03 LAB
CRP SERPL-MCNC: 5.5 MG/L (ref 0–8)
ERYTHROCYTE [DISTWIDTH] IN BLOOD BY AUTOMATED COUNT: 12.9 % (ref 10–15)
ERYTHROCYTE [SEDIMENTATION RATE] IN BLOOD BY WESTERGREN METHOD: 6 MM/H (ref 0–20)
GRAM STN SPEC: ABNORMAL
GRAM STN SPEC: ABNORMAL
HCT VFR BLD AUTO: 48.9 % (ref 40–53)
HGB BLD-MCNC: 16.9 G/DL (ref 13.3–17.7)
INR PPP: 1.06 (ref 0.86–1.14)
Lab: ABNORMAL
MCH RBC QN AUTO: 29.8 PG (ref 26.5–33)
MCHC RBC AUTO-ENTMCNC: 34.6 G/DL (ref 31.5–36.5)
MCV RBC AUTO: 86 FL (ref 78–100)
PLATELET # BLD AUTO: 248 10E9/L (ref 150–450)
RBC # BLD AUTO: 5.68 10E12/L (ref 4.4–5.9)
SPECIMEN SOURCE: ABNORMAL
WBC # BLD AUTO: 6.1 10E9/L (ref 4–11)

## 2020-03-03 PROCEDURE — 87186 SC STD MICRODIL/AGAR DIL: CPT | Performed by: NURSE PRACTITIONER

## 2020-03-03 PROCEDURE — 85610 PROTHROMBIN TIME: CPT | Performed by: NURSE PRACTITIONER

## 2020-03-03 PROCEDURE — 85027 COMPLETE CBC AUTOMATED: CPT | Performed by: NURSE PRACTITIONER

## 2020-03-03 PROCEDURE — 36415 COLL VENOUS BLD VENIPUNCTURE: CPT | Performed by: NURSE PRACTITIONER

## 2020-03-03 PROCEDURE — 99214 OFFICE O/P EST MOD 30 MIN: CPT | Performed by: NURSE PRACTITIONER

## 2020-03-03 PROCEDURE — 85652 RBC SED RATE AUTOMATED: CPT | Performed by: NURSE PRACTITIONER

## 2020-03-03 PROCEDURE — 87077 CULTURE AEROBIC IDENTIFY: CPT | Performed by: NURSE PRACTITIONER

## 2020-03-03 PROCEDURE — 87205 SMEAR GRAM STAIN: CPT | Performed by: NURSE PRACTITIONER

## 2020-03-03 PROCEDURE — 87070 CULTURE OTHR SPECIMN AEROBIC: CPT | Performed by: NURSE PRACTITIONER

## 2020-03-03 PROCEDURE — 86140 C-REACTIVE PROTEIN: CPT | Performed by: NURSE PRACTITIONER

## 2020-03-03 NOTE — RESULT ENCOUNTER NOTE
Gaurang good news normal white count and inflammatory marker which makes more serious infection less likely.    Smitha Perez, CNP

## 2020-03-03 NOTE — TELEPHONE ENCOUNTER
Patient sent a mychart to reschedule surgery with Dr. Burdick. Surgery rescheduled to 4/28/2020    Patient is scheduled for surgery with Dr. Burdick      Spoke or left message with: Gaurang    Date of Surgery: 4/28/2020    [facility]: [facility]    Post-op: 2 week & 6 week scheduled    H&P: Patient to schedule with PCP at [facility]    Additional imaging/appointments: N/A    Surgery packet: Given in clinic     Additional comments: N/A

## 2020-03-03 NOTE — PROGRESS NOTES
Subjective     Gaurang Rivera is a 49 year old male who presents to clinic today for the following health issues:    HPI   Surgery Follow-up      Duration: 2/21/20 Saturday felt pain in incision site, that night saw a a lot more bleeding than had prior    Stopped anti-coagulation due to bleeding    Bleeding also now coming from site of drainage tube     Interval hx:  Underwent excision of pilonidal cyst by colorectal surgery 2/21.  Had follow up 2/26, noted to be doing well.  Advised leave ROBERTA in until < 10cc/24h.  Restarted coumadin 2/28, no INR since.  Advised follow up in 2 weeks for suture and drain removal if indicated, this is scheduled for next week.      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  No swelling.  Sat night bottom half of dressing soaked with blood, usually was just a little spot. No pus like drainage.  No fever, chills, or body aches.  Pain has improved some since Saturday.  He stopped coumadin Saturday night, had only taken 2 doses.      ROBERTA output continues to slowly drop.  30-45ml out the first day, now 15ml twice a day.       He notes he was instructed not to sit at all but has sit several times, worried he may have disrupted the wound.    Patient Active Problem List   Diagnosis     Passive suicidal ideations     Diabetes mellitus, type 2 (H)     Hyperlipidemia, unspecified hyperlipidemia type     Microalbuminuria due to type 2 diabetes mellitus (H)     MDD (major depressive disorder), recurrent episode, severe (H)     Deep vein thrombosis (DVT) (H)     Erectile dysfunction     Closed displaced fracture of glenoid cavity of right scapula, sequela     Closed displaced fracture of neck of right scapula, sequela     Pilonidal cyst     Past Surgical History:   Procedure Laterality Date     APPENDECTOMY       AS DRAIN PILONIDAL CYST  SIMPLE       BACK SURGERY  1997    spinal fusion     CHOLECYSTECTOMY  2012     CYSTECTOMY PILONIDAL N/A 2/21/2020    Procedure: EXCISION, PILONIDAL CYST, AILEEN II Procedure;  Surgeon: Artemio Ahumada MD;  Location: [facility] OR     THUMB SURGERY   1995       Social History     Tobacco Use     Smoking status: Never Smoker     Smokeless tobacco: Never Used   Substance Use Topics     Alcohol use: No     Comment: None     Family History   Problem Relation Age of Onset     Diabetes Mother      Depression Father      Thrombosis Father         HE HAS HAD 3 CLOTS - PER PATIENT THEY WERE UNPROVOKED ON COUMADIN      Alcoholism Sister      Depression Sister      Lupus Sister      Anesthesia Reaction No family hx of      Cardiovascular No family hx of          Current Outpatient Medications   Medication Sig Dispense Refill     acetaminophen 325 MG PO tablet Take 2 tablets (650 mg) by mouth 4 times daily for 14 days Alternate with ibuprofen (ADVIL/MOTRIN), IF ordered. 100 tablet 0     cephALEXin (KEFLEX) 250 MG capsule Take 1 capsule (250 mg) by mouth 4 times daily 40 capsule 0     ibuprofen 800 MG PO tablet Take 1 tablet (800 mg) by mouth 3 times daily for 14 days Alternate with acetaminophen (TYLENOL), IF ordered. 42 tablet 0     lisinopril (PRINIVIL/ZESTRIL) 5 MG tablet Take 1 tablet (5 mg) by mouth daily 90 tablet 1     metFORMIN (GLUCOPHAGE) 1000 MG tablet Take 1 tablet (1,000 mg) by mouth 2 times daily (with meals) 180 tablet 1     multivitamin, therapeutic with minerals (MULTI-VITAMIN) TABS tablet Take 1 tablet by mouth daily       order for DME Equipment being ordered: Compression stockings 20-30 or 30-40 mmHg. To be wore for the first 1-2 years following DVT. 1 Package 1     simvastatin (ZOCOR) 40 MG tablet Take 1 tablet (40 mg) by mouth At Bedtime 90 tablet 1     venlafaxine (EFFEXOR-XR) 150 MG 24 hr capsule TAKE 2 CAPSULES(300 MG) BY MOUTH DAILY (Patient taking differently: Take 300 mg by mouth At Bedtime DO NOT  CRUSH.) 60 capsule 0     polyethylene glycol PO powder Take 17 g (1 capful) by mouth daily as needed for constipation (If no bowel movement in 24 hours. Mix in 8 oz of water.  May take twice daily.) (Patient not taking: Reported on 3/3/2020) 500 g 0         Reviewed and updated as needed this visit by Provider         Review of Systems   ROS COMP: Constitutional, HEENT, cardiovascular, pulmonary, gi and gu systems are negative, except as otherwise noted.      Objective    /82 (BP Location: Right arm, Patient Position: Chair, Cuff Size: Adult Regular)   Pulse 92   Temp 98.1  F (36.7  C) (Oral)   Resp 20   Wt 133.8 kg (295 lb)   SpO2 98%   BMI 33.44 kg/m    Body mass index is 33.44 kg/m .  Physical Exam   GENERAL APPEARANCE: healthy, alert and no distress  EYES: Eyes grossly normal to inspection and conjunctivae and sclerae normal  RESP: respirations nonlabored  INTEG:  Large vertical incision to center buttocks with sutures in place.  Sutures appear intact however there appears to be slight dehiscence of wound at inferior aspect just above fan cleft.  There is scant amount of thick yellow drainage from the incision here.  There is mild erythema surrounding sutures at inferior aspect of incision.  No palpable induration or fluctuance, no sinus tract visualized.  There is scant drainage to both the incisional and ROBERTA dressing.  ROBERTA has about 10ml serosanguinous drainage present.      PSYCH: mentation appears normal and affect normal/bright       Diagnostic Test Results:  Results for orders placed or performed in visit on 20 (from the past 24 hour(s))   CBC with platelets   Result Value Ref Range    WBC 6.1 4.0 - 11.0 10e9/L    RBC Count 5.68 4.4 - 5.9 10e12/L    Hemoglobin 16.9 13.3 - 17.7 g/dL    Hematocrit 48.9 40.0 - 53.0 %    MCV 86 78 - 100 fl    MCH 29.8 26.5 - 33.0 pg    MCHC 34.6 31.5 - 36.5 g/dL    RDW 12.9 10.0 - 15.0 %    Platelet Count 248 150 - 450 10e9/L   ESR: Erythrocyte sedimentation  rate   Result Value Ref Range    Sed Rate 6 0 - 20 mm/h           Assessment & Plan     (T81.30XA) Wound dehiscence  (primary encounter diagnosis)  Comment: possible slight dehiscence of inferior aspect of incision however I am not familiar with the typical postop course of this procedure.  Sutures do appear to be intact   Plan: scheduled for follow up with colorectal tomorrow for evaluation    (L03.317) Cellulitis of buttock  Comment: scant purulent drainage in increase in erythema around sutures concerning for a mild cellulitis.  No evidence of abscess on exam.  Reassuring normal wbc and esr.    Plan: INR, CBC with platelets, CRP, inflammation,         ESR: Erythrocyte sedimentation rate, cephALEXin        (KEFLEX) 250 MG capsule, Gram stain, Wound         Culture Aerobic Bacterial        Culture taken, started on keflex.      (L76.82) Incisional pain  Comment:   Plan: INR, CBC with platelets, CRP, inflammation,         ESR: Erythrocyte sedimentation rate        As above     (Z79.01) Anticoagulated  Comment: pt noted increased incisional bleeding after starting coumadin last Friday.    Plan: bleeding has improved, scant amount of blood in dressing today (last changed yesterday).  Suspect coumadin may have been the cause though he only took 1-2 doses.  Advise cont to hold coumadin until follow up with colorectal tomorrow.  Check INR today.           See Patient Instructions    No follow-ups on file.    SAMEER Pinon [facility]

## 2020-03-03 NOTE — PATIENT INSTRUCTIONS
wound looks like it is possibly pulled apart slightly at the base, sutures are still intact.  There is some redness and drainage.  To be safe lets cover for infection with antibiotic keflex 4 times a day for 10 days.  Labs today for INR and infection.  We are going to see if we ca get you in with colorectal sooner.  If severe pain or fever go to the ER

## 2020-03-04 ENCOUNTER — OFFICE VISIT (OUTPATIENT)
Dept: SURGERY | Facility: CLINIC | Age: 50
End: 2020-03-04
Payer: COMMERCIAL

## 2020-03-04 VITALS
OXYGEN SATURATION: 94 % | BODY MASS INDEX: 33.04 KG/M2 | SYSTOLIC BLOOD PRESSURE: 101 MMHG | WEIGHT: 291.4 LBS | DIASTOLIC BLOOD PRESSURE: 67 MMHG | HEART RATE: 101 BPM

## 2020-03-04 DIAGNOSIS — Z09 FOLLOW-UP EXAMINATION FOLLOWING SURGERY: Primary | ICD-10-CM

## 2020-03-04 DIAGNOSIS — L05.91 PILONIDAL CYST: ICD-10-CM

## 2020-03-04 ASSESSMENT — PAIN SCALES - GENERAL: PAINLEVEL: MILD PAIN (2)

## 2020-03-04 ASSESSMENT — MIFFLIN-ST. JEOR: SCORE: 2326.94

## 2020-03-04 NOTE — PROGRESS NOTES
"Colon and Rectal Surgery Postoperative Clinic Note    RE: Gaurang Rivera  : 1970  KRISTI: 2020    Gaurang Rivera is a very pleasant 49 year old male with recurrent pilonidal cyst who is now status post excision of pilonidal cyst and Hudson II procedure on 2020 with Dr. Ahumada.    Interval history: He restarted his Coumadin last week and the next day he noticed a moderate amount of bleeding from the bottom of his incision.  He was seen by his primary care who told him that the wound had opened up some and put him on oral antibiotics.  He stopped taking his Coumadin and bleeding has slowed down.  No purulent drainage.  No fevers or chills.  ROBERTA drain has been putting out approximately 30 cc/day still. He states that he has been trying not to sit but does occasionally sit for meals and in the car.    Physical Examination: Exam was chaperoned by Anna Pratt EMT  /67 (BP Location: Left arm, Patient Position: Standing, Cuff Size: Adult Large)   Pulse 101   Ht (P) 6' 6.75\"   Wt 291 lb 6.4 oz   SpO2 94%   BMI (P) 33.04 kg/m    General: Alert, oriented, in no acute distress, sitting comfortably  HEENT: Mucous membranes moist    Perianal external examination:  Surgical wound with sutures in place. 2 cm opening at the base of the wound without erythema or purulent drainage. ROBERTA drain in place with serosanguinous drainage present. Drain stripped.     Assessment/Plan:  49 year old male status post excision of pilonidal cyst and Hudson II procedure on 2020 with Dr. Ahumada.  The bottom of his wound did open up a little bit so I have asked him to not sit at all and did not remove his sutures today.  ROBERTA drain still with 30 cc a day so I did not remove this either.  Drain was stripped.  No signs of infection.  Would like him to return next week for a recheck.  We will have him hold off on his Coumadin for the time being but will reassess next week. Patient's questions were answered to his stated " satisfaction and he is in agreement with this plan.      Medical history:  Past Medical History:   Diagnosis Date     Depressive disorder 2001     Diabetes (H)      DVT (deep venous thrombosis) (H) 07/05/2019     Elevated blood pressure reading without diagnosis of hypertension 5/13/2018     Hypercholesteremia 2012     Pilonidal cyst 5/13/2018       Surgical history:  Past Surgical History:   Procedure Laterality Date     APPENDECTOMY       AS DRAIN PILONIDAL CYST SIMPLE       BACK SURGERY  1997    spinal fusion     CHOLECYSTECTOMY  2012     CYSTECTOMY PILONIDAL N/A 2/21/2020    Procedure: EXCISION, PILONIDAL CYST, AILEEN II Procedure;  Surgeon: Artemio Ahumada MD;  Location: [facility] OR     THUMB SURGERY   1995       Problem list:    Patient Active Problem List    Diagnosis Date Noted     Pilonidal cyst 10/24/2019     Priority: Medium     Added automatically from request for surgery [id number]       Closed displaced fracture of glenoid cavity of right scapula, sequela 10/21/2019     Priority: Medium     Added automatically from request for surgery [id number]       Closed displaced fracture of neck of right scapula, sequela 10/21/2019     Priority: Medium     Added automatically from request for surgery [id number]       Deep vein thrombosis (DVT) (H) 07/24/2019     Priority: Medium     MDD (major depressive disorder), recurrent episode, severe (H) 02/26/2019     Priority: Medium     Microalbuminuria due to type 2 diabetes mellitus (H) 10/01/2018     Priority: Medium     Diabetes mellitus, type 2 (H) 09/26/2018     Priority: Medium     Hyperlipidemia, unspecified hyperlipidemia type 09/26/2018     Priority: Medium     Passive suicidal ideations 09/07/2018     Priority: Medium     Erectile dysfunction 02/08/2010     Priority: Medium     Overview:   Normal testosterone, prolactin and TSH--12/10/09.         Medications:  Current Outpatient Medications   Medication Sig Dispense Refill     acetaminophen 325 MG PO tablet Take 2  tablets (650 mg) by mouth 4 times daily for 14 days Alternate with ibuprofen (ADVIL/MOTRIN), IF ordered. 100 tablet 0     cephALEXin (KEFLEX) 250 MG capsule Take 1 capsule (250 mg) by mouth 4 times daily 40 capsule 0     ibuprofen 800 MG PO tablet Take 1 tablet (800 mg) by mouth 3 times daily for 14 days Alternate with acetaminophen (TYLENOL), IF ordered. 42 tablet 0     lisinopril (PRINIVIL/ZESTRIL) 5 MG tablet Take 1 tablet (5 mg) by mouth daily 90 tablet 1     metFORMIN (GLUCOPHAGE) 1000 MG tablet Take 1 tablet (1,000 mg) by mouth 2 times daily (with meals) 180 tablet 1     multivitamin, therapeutic with minerals (MULTI-VITAMIN) TABS tablet Take 1 tablet by mouth daily       order for DME Equipment being ordered: Compression stockings 20-30 or 30-40 mmHg. To be wore for the first 1-2 years following DVT. 1 Package 1     polyethylene glycol PO powder Take 17 g (1 capful) by mouth daily as needed for constipation (If no bowel movement in 24 hours. Mix in 8 oz of water.  May take twice daily.) (Patient not taking: Reported on 3/3/2020) 500 g 0     simvastatin (ZOCOR) 40 MG tablet Take 1 tablet (40 mg) by mouth At Bedtime 90 tablet 1     venlafaxine (EFFEXOR-XR) 150 MG 24 hr capsule TAKE 2 CAPSULES(300 MG) BY MOUTH DAILY (Patient taking differently: Take 300 mg by mouth At Bedtime DO NOT CRUSH.) 60 capsule 0       Allergies:  No Known Allergies    Family history:  Family History   Problem Relation Age of Onset     Diabetes Mother      Depression Father      Thrombosis Father         HE HAS HAD 3 CLOTS - PER PATIENT THEY WERE UNPROVOKED ON COUMADIN      Alcoholism Sister      Depression Sister      Lupus Sister      Anesthesia Reaction No family hx of      Cardiovascular No family hx of        Social history:  Social History     Tobacco Use     Smoking status: Never Smoker     Smokeless tobacco: Never Used   Substance Use Topics     Alcohol use: No     Comment: None     Marital status: single.    There are no exam  notes on file for this visit.   Total face to face time was 15 minutes, >50% counseling.  This is a postop visit.    SAMEER Zazueta, NP-C  Colon and Rectal Surgery  [facility]    This note was created using speech recognition software and may contain unintended word substitutions.

## 2020-03-04 NOTE — LETTER
"3/4/2020       RE: Gaurang Rivera  [address]  [facility] [address]     Dear Colleague,    Thank you for referring your patient, Gaurang Rivera, to the [facility] COLON AND RECTAL SURGERY at [facility]. Please see a copy of my visit note below.    Colon and Rectal Surgery Postoperative Clinic Note    RE: Gaurang Rivera  : 1970  KRISTI: 2020    Gaurang Rivera is a very pleasant 49 year old male with recurrent pilonidal cyst who is now status post excision of pilonidal cyst and [place] II procedure on 2020 with Dr. Ahumada.    Interval history: He restarted his Coumadin last week and the next day he noticed a moderate amount of bleeding from the bottom of his incision.  He was seen by his primary care who told him that the wound had opened up some and put him on oral antibiotics.  He stopped taking his Coumadin and bleeding has slowed down.  No purulent drainage.  No fevers or chills.  ROBERTA drain has been putting out approximately 30 cc/day still. He states that he has been trying not to sit but does occasionally sit for meals and in the car.    Physical Examination: Exam was chaperoned by Anna Pratt EMT  /67 (BP Location: Left arm, Patient Position: Standing, Cuff Size: Adult Large)   Pulse 101   Ht (P) 6' 6.75\"   Wt 291 lb 6.4 oz   SpO2 94%   BMI (P) 33.04 kg/m     General: Alert, oriented, in no acute distress, sitting comfortably  HEENT: Mucous membranes moist    Perianal external examination:  Surgical wound with sutures in place. 2 cm opening at the base of the wound without erythema or purulent drainage. ROBERTA drain in place with serosanguinous drainage present. Drain stripped.     Assessment/Plan:  49 year old male status post excision of pilonidal cyst and [place] II procedure on 2020 with Dr. Ahumada.  The bottom of his wound did open up a little bit so I have asked him to not sit at all and did not remove his sutures today.  ROBERTA " drain still with 30 cc a day so I did not remove this either.  Drain was stripped.  No signs of infection.  Would like him to return next week for a recheck.  We will have him hold off on his Coumadin for the time being but will reassess next week. Patient's questions were answered to his stated satisfaction and he is in agreement with this plan.      Medical history:  Past Medical History:   Diagnosis Date     Depressive disorder 2001     Diabetes (H)      DVT (deep venous thrombosis) (H) 07/05/2019     Elevated blood pressure reading without diagnosis of hypertension 5/13/2018     Hypercholesteremia 2012     Pilonidal cyst 5/13/2018       Surgical history:  Past Surgical History:   Procedure Laterality Date     APPENDECTOMY       AS DRAIN PILONIDAL CYST SIMPLE       BACK SURGERY  1997    spinal fusion     CHOLECYSTECTOMY  2012     CYSTECTOMY PILONIDAL N/A 2/21/2020    Procedure: EXCISION, PILONIDAL CYST, AILEEN II Procedure;  Surgeon: Artemio Ahumada MD;  Location: [facility] OR     THUMB SURGERY   1995       Problem list:    Patient Active Problem List    Diagnosis Date Noted     Pilonidal cyst 10/24/2019     Priority: Medium     Added automatically from request for surgery [id number]       Closed displaced fracture of glenoid cavity of right scapula, sequela 10/21/2019     Priority: Medium     Added automatically from request for surgery [id number]       Closed displaced fracture of neck of right scapula, sequela 10/21/2019     Priority: Medium     Added automatically from request for surgery [id number]       Deep vein thrombosis (DVT) (H) 07/24/2019     Priority: Medium     MDD (major depressive disorder), recurrent episode, severe (H) 02/26/2019     Priority: Medium     Microalbuminuria due to type 2 diabetes mellitus (H) 10/01/2018     Priority: Medium     Diabetes mellitus, type 2 (H) 09/26/2018     Priority: Medium     Hyperlipidemia, unspecified hyperlipidemia type 09/26/2018     Priority: Medium     Passive  suicidal ideations 09/07/2018     Priority: Medium     Erectile dysfunction 02/08/2010     Priority: Medium     Overview:   Normal testosterone, prolactin and TSH--12/10/09.       Medications:  Current Outpatient Medications   Medication Sig Dispense Refill     acetaminophen 325 MG PO tablet Take 2 tablets (650 mg) by mouth 4 times daily for 14 days Alternate with ibuprofen (ADVIL/MOTRIN), IF ordered. 100 tablet 0     cephALEXin (KEFLEX) 250 MG capsule Take 1 capsule (250 mg) by mouth 4 times daily 40 capsule 0     ibuprofen 800 MG PO tablet Take 1 tablet (800 mg) by mouth 3 times daily for 14 days Alternate with acetaminophen (TYLENOL), IF ordered. 42 tablet 0     lisinopril (PRINIVIL/ZESTRIL) 5 MG tablet Take 1 tablet (5 mg) by mouth daily 90 tablet 1     metFORMIN (GLUCOPHAGE) 1000 MG tablet Take 1 tablet (1,000 mg) by mouth 2 times daily (with meals) 180 tablet 1     multivitamin, therapeutic with minerals (MULTI-VITAMIN) TABS tablet Take 1 tablet by mouth daily       order for DME Equipment being ordered: Compression stockings 20-30 or 30-40 mmHg. To be wore for the first 1-2 years following DVT. 1 Package 1     polyethylene glycol PO powder Take 17 g (1 capful) by mouth daily as needed for constipation (If no bowel movement in 24 hours. Mix in 8 oz of water.  May take twice daily.) (Patient not taking: Reported on 3/3/2020) 500 g 0     simvastatin (ZOCOR) 40 MG tablet Take 1 tablet (40 mg) by mouth At Bedtime 90 tablet 1     venlafaxine (EFFEXOR-XR) 150 MG 24 hr capsule TAKE 2 CAPSULES(300 MG) BY MOUTH DAILY (Patient taking differently: Take 300 mg by mouth At Bedtime DO NOT CRUSH.) 60 capsule 0       Allergies:  No Known Allergies    Family history:  Family History   Problem Relation Age of Onset     Diabetes Mother      Depression Father      Thrombosis Father         HE HAS HAD 3 CLOTS - PER PATIENT THEY WERE UNPROVOKED ON COUMADIN      Alcoholism Sister      Depression Sister      Lupus Sister       Anesthesia Reaction No family hx of      Cardiovascular No family hx of      Social history:  Social History     Tobacco Use     Smoking status: Never Smoker     Smokeless tobacco: Never Used   Substance Use Topics     Alcohol use: No     Comment: None     Marital status: single.    There are no exam notes on file for this visit.   Total face to face time was 15 minutes, >50% counseling.  This is a postop visit.    SAMEER Zazueta, NP-C  Colon and Rectal Surgery  [facility]    This note was created using speech recognition software and may contain unintended word substitutions.

## 2020-03-06 LAB
BACTERIA SPEC CULT: ABNORMAL
Lab: ABNORMAL
SPECIMEN SOURCE: ABNORMAL

## 2020-03-09 ENCOUNTER — OFFICE VISIT (OUTPATIENT)
Dept: SURGERY | Facility: CLINIC | Age: 50
End: 2020-03-09
Payer: COMMERCIAL

## 2020-03-09 DIAGNOSIS — Z09 FOLLOW-UP EXAMINATION FOLLOWING SURGERY: Primary | ICD-10-CM

## 2020-03-09 DIAGNOSIS — L05.91 PILONIDAL CYST: ICD-10-CM

## 2020-03-09 NOTE — LETTER
3/9/2020       RE: Gaurang Rivera  [address]  [facility] [address]     Dear Colleague,    Thank you for referring your patient, Gaurang Rivera, to the [facility] COLON AND RECTAL SURGERY at [facility]. Please see a copy of my visit note below.    Colon and Rectal Surgery Postoperative Clinic Note    RE: Gaurang Rivera  : 1970  KRISTI: 3/9/2020    Gaurang Rivera is a very pleasant 49 year old male with recurrent pilonidal cyst who is now status post excision of pilonidal cyst and Jamal II procedure on 2020 with Dr. Ahumada.    Interval history: He restarted his Coumadin last week and the next day he noticed a moderate amount of bleeding from the bottom of his incision.  He was started on antibiotics by his PCP and stopped taking his Coumadin and bleeding stopped.  No purulent drainage.  No fevers or chills.  ROBERTA drain has been putting out approximately 15-20 cc/day still. He has avoided sitting.    Physical Examination: Exam was chaperoned by Dr. Ahumada  There were no vitals taken for this visit.  General: Alert, oriented, in no acute distress, sitting comfortably  HEENT: Mucous membranes moist    Perianal external examination:  Surgical wound with sutures in place. 2 cm opening at the base of the wound with some hypergranulation tissue present but no erythema or purulent drainage. Silver nitrate applied. ROBERTA drain in place with serosanguinous drainage present and this was removed.      Assessment/Plan:  49 year old male status post excision of pilonidal cyst and Jamal II procedure on 2020 with Dr. Ahumada.  Small opening at the bottom of the incision but this appears to be healing well. No signs of infection. Silver nitrate applied to hypergranulation tissue today. Drain and every other suture removed. Would like him to remain off comadin until the rest of the sutures are removed. Will have him return to clinic in one week for a recheck and suture  removal. Patient's questions were answered to his stated satisfaction and he is in agreement with this plan.     Medical history:  Past Medical History:   Diagnosis Date     Depressive disorder 2001     Diabetes (H)      DVT (deep venous thrombosis) (H) 07/05/2019     Elevated blood pressure reading without diagnosis of hypertension 5/13/2018     Hypercholesteremia 2012     Pilonidal cyst 5/13/2018       Surgical history:  Past Surgical History:   Procedure Laterality Date     APPENDECTOMY       AS DRAIN PILONIDAL CYST SIMPLE       BACK SURGERY  1997    spinal fusion     CHOLECYSTECTOMY  2012     CYSTECTOMY PILONIDAL N/A 2/21/2020    Procedure: EXCISION, PILONIDAL CYST, AILEEN II Procedure;  Surgeon: Artemio Ahumada MD;  Location: [facility] OR     THUMB SURGERY   1995       Problem list:    Patient Active Problem List    Diagnosis Date Noted     Pilonidal cyst 10/24/2019     Priority: Medium     Added automatically from request for surgery [id number]       Closed displaced fracture of glenoid cavity of right scapula, sequela 10/21/2019     Priority: Medium     Added automatically from request for surgery [id number]       Closed displaced fracture of neck of right scapula, sequela 10/21/2019     Priority: Medium     Added automatically from request for surgery [id number]       Deep vein thrombosis (DVT) (H) 07/24/2019     Priority: Medium     MDD (major depressive disorder), recurrent episode, severe (H) 02/26/2019     Priority: Medium     Microalbuminuria due to type 2 diabetes mellitus (H) 10/01/2018     Priority: Medium     Diabetes mellitus, type 2 (H) 09/26/2018     Priority: Medium     Hyperlipidemia, unspecified hyperlipidemia type 09/26/2018     Priority: Medium     Passive suicidal ideations 09/07/2018     Priority: Medium     Erectile dysfunction 02/08/2010     Priority: Medium     Overview:   Normal testosterone, prolactin and TSH--12/10/09.         Medications:  Current Outpatient Medications   Medication Sig  Dispense Refill     cephALEXin (KEFLEX) 250 MG capsule Take 1 capsule (250 mg) by mouth 4 times daily 40 capsule 0     lisinopril (PRINIVIL/ZESTRIL) 5 MG tablet Take 1 tablet (5 mg) by mouth daily 90 tablet 1     metFORMIN (GLUCOPHAGE) 1000 MG tablet Take 1 tablet (1,000 mg) by mouth 2 times daily (with meals) 180 tablet 1     multivitamin, therapeutic with minerals (MULTI-VITAMIN) TABS tablet Take 1 tablet by mouth daily       order for DME Equipment being ordered: Compression stockings 20-30 or 30-40 mmHg. To be wore for the first 1-2 years following DVT. 1 Package 1     polyethylene glycol PO powder Take 17 g (1 capful) by mouth daily as needed for constipation (If no bowel movement in 24 hours. Mix in 8 oz of water.  May take twice daily.) (Patient not taking: Reported on 3/3/2020) 500 g 0     simvastatin (ZOCOR) 40 MG tablet Take 1 tablet (40 mg) by mouth At Bedtime 90 tablet 1     venlafaxine (EFFEXOR-XR) 150 MG 24 hr capsule TAKE 2 CAPSULES(300 MG) BY MOUTH DAILY (Patient taking differently: Take 300 mg by mouth At Bedtime DO NOT CRUSH.) 60 capsule 0       Allergies:  No Known Allergies    Family history:  Family History   Problem Relation Age of Onset     Diabetes Mother      Depression Father      Thrombosis Father         HE HAS HAD 3 CLOTS - PER PATIENT THEY WERE UNPROVOKED ON COUMADIN      Alcoholism Sister      Depression Sister      Lupus Sister      Anesthesia Reaction No family hx of      Cardiovascular No family hx of        Social history:  Social History     Tobacco Use     Smoking status: Never Smoker     Smokeless tobacco: Never Used   Substance Use Topics     Alcohol use: No     Comment: None     Marital status: single.    There are no exam notes on file for this visit.   Total face to face time was 15 minutes, >50% counseling.  This is a postop visit.    SAMEER Zazueta, NP-C  Colon and Rectal Surgery  [facility]    This note was created using speech  recognition software and may contain unintended word substitutions.

## 2020-03-09 NOTE — PROGRESS NOTES
Colon and Rectal Surgery Postoperative Clinic Note    RE: Gaurang Rivera  : 1970  KRISTI: 3/9/2020    Gaurang Rivera is a very pleasant 49 year old male with recurrent pilonidal cyst who is now status post excision of pilonidal cyst and [place] II procedure on 2020 with Dr. Ahumada.    Interval history: He restarted his Coumadin last week and the next day he noticed a moderate amount of bleeding from the bottom of his incision.  He was started on antibiotics by his PCP and stopped taking his Coumadin and bleeding stopped.  No purulent drainage.  No fevers or chills.  ROBERTA drain has been putting out approximately 15-20 cc/day still. He has avoided sitting.    Physical Examination: Exam was chaperoned by Dr. Ahumada  There were no vitals taken for this visit.  General: Alert, oriented, in no acute distress, sitting comfortably  HEENT: Mucous membranes moist    Perianal external examination:  Surgical wound with sutures in place. 2 cm opening at the base of the wound with some hypergranulation tissue present but no erythema or purulent drainage. Silver nitrate applied. ROBERTA drain in place with serosanguinous drainage present and this was removed.      Assessment/Plan:  49 year old male status post excision of pilonidal cyst and Jamal II procedure on 2020 with Dr. Ahumada.  Small opening at the bottom of the incision but this appears to be healing well. No signs of infection. Silver nitrate applied to hypergranulation tissue today. Drain and every other suture removed. Would like him to remain off comadin until the rest of the sutures are removed. Will have him return to clinic in one week for a recheck and suture removal. Patient's questions were answered to his stated satisfaction and he is in agreement with this plan.       Medical history:  Past Medical History:   Diagnosis Date     Depressive disorder      Diabetes (H)      DVT (deep venous thrombosis) (H) 2019     Elevated blood pressure  reading without diagnosis of hypertension 5/13/2018     Hypercholesteremia 2012     Pilonidal cyst 5/13/2018       Surgical history:  Past Surgical History:   Procedure Laterality Date     APPENDECTOMY       AS DRAIN PILONIDAL CYST SIMPLE       BACK SURGERY  1997    spinal fusion     CHOLECYSTECTOMY  2012     CYSTECTOMY PILONIDAL N/A 2/21/2020    Procedure: EXCISION, PILONIDAL CYST, AILEEN II Procedure;  Surgeon: Arteimo Ahumada MD;  Location: [facility] OR     THUMB SURGERY   1995       Problem list:    Patient Active Problem List    Diagnosis Date Noted     Pilonidal cyst 10/24/2019     Priority: Medium     Added automatically from request for surgery [id number]       Closed displaced fracture of glenoid cavity of right scapula, sequela 10/21/2019     Priority: Medium     Added automatically from request for surgery [id number]       Closed displaced fracture of neck of right scapula, sequela 10/21/2019     Priority: Medium     Added automatically from request for surgery [id number]       Deep vein thrombosis (DVT) (H) 07/24/2019     Priority: Medium     MDD (major depressive disorder), recurrent episode, severe (H) 02/26/2019     Priority: Medium     Microalbuminuria due to type 2 diabetes mellitus (H) 10/01/2018     Priority: Medium     Diabetes mellitus, type 2 (H) 09/26/2018     Priority: Medium     Hyperlipidemia, unspecified hyperlipidemia type 09/26/2018     Priority: Medium     Passive suicidal ideations 09/07/2018     Priority: Medium     Erectile dysfunction 02/08/2010     Priority: Medium     Overview:   Normal testosterone, prolactin and TSH--12/10/09.         Medications:  Current Outpatient Medications   Medication Sig Dispense Refill     cephALEXin (KEFLEX) 250 MG capsule Take 1 capsule (250 mg) by mouth 4 times daily 40 capsule 0     lisinopril (PRINIVIL/ZESTRIL) 5 MG tablet Take 1 tablet (5 mg) by mouth daily 90 tablet 1     metFORMIN (GLUCOPHAGE) 1000 MG tablet Take 1 tablet (1,000 mg) by mouth 2 times  daily (with meals) 180 tablet 1     multivitamin, therapeutic with minerals (MULTI-VITAMIN) TABS tablet Take 1 tablet by mouth daily       order for DME Equipment being ordered: Compression stockings 20-30 or 30-40 mmHg. To be wore for the first 1-2 years following DVT. 1 Package 1     polyethylene glycol PO powder Take 17 g (1 capful) by mouth daily as needed for constipation (If no bowel movement in 24 hours. Mix in 8 oz of water.  May take twice daily.) (Patient not taking: Reported on 3/3/2020) 500 g 0     simvastatin (ZOCOR) 40 MG tablet Take 1 tablet (40 mg) by mouth At Bedtime 90 tablet 1     venlafaxine (EFFEXOR-XR) 150 MG 24 hr capsule TAKE 2 CAPSULES(300 MG) BY MOUTH DAILY (Patient taking differently: Take 300 mg by mouth At Bedtime DO NOT CRUSH.) 60 capsule 0       Allergies:  No Known Allergies    Family history:  Family History   Problem Relation Age of Onset     Diabetes Mother      Depression Father      Thrombosis Father         HE HAS HAD 3 CLOTS - PER PATIENT THEY WERE UNPROVOKED ON COUMADIN      Alcoholism Sister      Depression Sister      Lupus Sister      Anesthesia Reaction No family hx of      Cardiovascular No family hx of        Social history:  Social History     Tobacco Use     Smoking status: Never Smoker     Smokeless tobacco: Never Used   Substance Use Topics     Alcohol use: No     Comment: None     Marital status: single.    There are no exam notes on file for this visit.   Total face to face time was 15 minutes, >50% counseling.  This is a postop visit.    SAMEER Zazueta, NP-C  Colon and Rectal Surgery  [facility]    This note was created using speech recognition software and may contain unintended word substitutions.

## 2020-03-16 ENCOUNTER — OFFICE VISIT (OUTPATIENT)
Dept: SURGERY | Facility: CLINIC | Age: 50
End: 2020-03-16
Payer: COMMERCIAL

## 2020-03-16 VITALS
HEART RATE: 93 BPM | OXYGEN SATURATION: 96 % | BODY MASS INDEX: 34.25 KG/M2 | DIASTOLIC BLOOD PRESSURE: 91 MMHG | SYSTOLIC BLOOD PRESSURE: 125 MMHG | HEIGHT: 78 IN | WEIGHT: 296 LBS

## 2020-03-16 DIAGNOSIS — Z09 FOLLOW-UP EXAMINATION FOLLOWING SURGERY: Primary | ICD-10-CM

## 2020-03-16 DIAGNOSIS — L05.91 PILONIDAL CYST: ICD-10-CM

## 2020-03-16 ASSESSMENT — MIFFLIN-ST. JEOR: SCORE: 2347.81

## 2020-03-16 ASSESSMENT — PAIN SCALES - GENERAL: PAINLEVEL: NO PAIN (0)

## 2020-03-16 NOTE — LETTER
"3/16/2020       RE: Gaurang Rivera  [address]  [facility] [address]     Dear Colleague,    Thank you for referring your patient, Gaurang Rivera, to the [facility] COLON AND RECTAL SURGERY at [facility]. Please see a copy of my visit note below.    .  Colon and Rectal Surgery Postoperative Clinic Note    RE: Gaurang Rivera  : 1970  KRISTI: 3/16/2020    Gaurang Rivera is a very pleasant 49 year old male with recurrent pilonidal cyst who is now status post excision of pilonidal cyst and Jamal II procedure on 2020 with Dr. Ahumada.    Interval history: He restarted his Coumadin and had increased bleeding so stopped his Coumadin.  We subsequently saw him in clinic with a small opening in his wound.  Silver nitrate applied last week and ROBERTA and half of his sutures were removed.  He has had a small amount of drainage since then.  No significant bleeding.  No significant pain.  He has avoided sitting more than 30 minutes.     Physical Examination: Exam was chaperoned by Ari Donnelly LPN  BP (!) 125/91 (BP Location: Left arm, Patient Position: Sitting, Cuff Size: Adult Regular)   Pulse 93   Ht 6' 6.75\"   Wt 296 lb   SpO2 96%   BMI 33.56 kg/m    General: Alert, oriented, in no acute distress, sitting comfortably  HEENT: Mucous membranes moist    Perianal external examination:  Surgical wound with remaining sutures removed. 2 cm opening at the base of the wound with good granulation tissue present and no erythema or purulent drainage.      Assessment/Plan:  49 year old male status post excision of pilonidal cyst and Jamal II procedure on 2020 with Dr. Ahumada.  Small opening at the bottom of the incision is healing well.  Remaining sutures were removed.  No erythema or purulent drainage.  He can restart his Coumadin but notify the clinic if he has significant bleeding.  If he does well with this after 1 week he can slowly increase his sitting time.  If he " has any worsening bleeding, drainage, pain, or any wound concerns, would be happy to see him in the meantime. Patient's questions were answered to his stated satisfaction and he is in agreement with this plan.    Medical history:  Past Medical History:   Diagnosis Date     Depressive disorder 2001     Diabetes (H)      DVT (deep venous thrombosis) (H) 07/05/2019     Elevated blood pressure reading without diagnosis of hypertension 5/13/2018     Hypercholesteremia 2012     Pilonidal cyst 5/13/2018       Surgical history:  Past Surgical History:   Procedure Laterality Date     APPENDECTOMY       AS DRAIN PILONIDAL CYST SIMPLE       BACK SURGERY  1997    spinal fusion     CHOLECYSTECTOMY  2012     CYSTECTOMY PILONIDAL N/A 2/21/2020    Procedure: EXCISION, PILONIDAL CYST, AILEEN II Procedure;  Surgeon: Artemio Ahumada MD;  Location: [facility] OR     THUMB SURGERY   1995       Problem list:    Patient Active Problem List    Diagnosis Date Noted     Pilonidal cyst 10/24/2019     Priority: Medium     Added automatically from request for surgery [id number]       Closed displaced fracture of glenoid cavity of right scapula, sequela 10/21/2019     Priority: Medium     Added automatically from request for surgery [id number]       Closed displaced fracture of neck of right scapula, sequela 10/21/2019     Priority: Medium     Added automatically from request for surgery [id number]       Deep vein thrombosis (DVT) (H) 07/24/2019     Priority: Medium     MDD (major depressive disorder), recurrent episode, severe (H) 02/26/2019     Priority: Medium     Microalbuminuria due to type 2 diabetes mellitus (H) 10/01/2018     Priority: Medium     Diabetes mellitus, type 2 (H) 09/26/2018     Priority: Medium     Hyperlipidemia, unspecified hyperlipidemia type 09/26/2018     Priority: Medium     Passive suicidal ideations 09/07/2018     Priority: Medium     Erectile dysfunction 02/08/2010     Priority: Medium     Overview:   Normal testosterone,  prolactin and TSH--12/10/09.         Medications:  Current Outpatient Medications   Medication Sig Dispense Refill     cephALEXin (KEFLEX) 250 MG capsule Take 1 capsule (250 mg) by mouth 4 times daily 40 capsule 0     lisinopril (PRINIVIL/ZESTRIL) 5 MG tablet Take 1 tablet (5 mg) by mouth daily 90 tablet 1     metFORMIN (GLUCOPHAGE) 1000 MG tablet Take 1 tablet (1,000 mg) by mouth 2 times daily (with meals) 180 tablet 1     multivitamin, therapeutic with minerals (MULTI-VITAMIN) TABS tablet Take 1 tablet by mouth daily       order for DME Equipment being ordered: Compression stockings 20-30 or 30-40 mmHg. To be wore for the first 1-2 years following DVT. 1 Package 1     polyethylene glycol PO powder Take 17 g (1 capful) by mouth daily as needed for constipation (If no bowel movement in 24 hours. Mix in 8 oz of water.  May take twice daily.) 500 g 0     simvastatin (ZOCOR) 40 MG tablet Take 1 tablet (40 mg) by mouth At Bedtime 90 tablet 1     venlafaxine (EFFEXOR-XR) 150 MG 24 hr capsule TAKE 2 CAPSULES(300 MG) BY MOUTH DAILY (Patient taking differently: Take 300 mg by mouth At Bedtime DO NOT CRUSH.) 60 capsule 0       Allergies:  No Known Allergies    Family history:  Family History   Problem Relation Age of Onset     Diabetes Mother      Depression Father      Thrombosis Father         HE HAS HAD 3 CLOTS - PER PATIENT THEY WERE UNPROVOKED ON COUMADIN      Alcoholism Sister      Depression Sister      Lupus Sister      Anesthesia Reaction No family hx of      Cardiovascular No family hx of        Social history:  Social History     Tobacco Use     Smoking status: Never Smoker     Smokeless tobacco: Never Used   Substance Use Topics     Alcohol use: No     Comment: None     Marital status: single.    Nursing Notes:   Shanika Donnelly LPN  3/16/2020 11:13 AM  Signed  Chief Complaint   Patient presents with     RECHECK     Pt here today for recheck, pilonidal cyst.       Vitals:    03/16/20 1111   BP: (!) 125/91   BP  "Location: Left arm   Patient Position: Sitting   Cuff Size: Adult Regular   Pulse: 93   SpO2: 96%   Weight: 296 lb   Height: 6' 6.75\"       Body mass index is 33.56 kg/m .    Ari Donnelly LPN       Total face to face time was 10 minutes, >50% counseling.  This is a postop visit.    SAMEER Zazueta, NP-C  Colon and Rectal Surgery  [facility]    This note was created using speech recognition software and may contain unintended word substitutions.  "

## 2020-03-16 NOTE — PROGRESS NOTES
".  Colon and Rectal Surgery Postoperative Clinic Note    RE: Gaurang Rivera  : 1970  KRISTI: 3/16/2020    Gaurang Rivera is a very pleasant 49 year old male with recurrent pilonidal cyst who is now status post excision of pilonidal cyst and Jamal II procedure on 2020 with Dr. Ahumada.    Interval history: He restarted his Coumadin and had increased bleeding so stopped his Coumadin.  We subsequently saw him in clinic with a small opening in his wound.  Silver nitrate applied last week and ROBERTA and half of his sutures were removed.  He has had a small amount of drainage since then.  No significant bleeding.  No significant pain.  He has avoided sitting more than 30 minutes.     Physical Examination: Exam was chaperoned by Ari Donnelly LPN  BP (!) 125/91 (BP Location: Left arm, Patient Position: Sitting, Cuff Size: Adult Regular)   Pulse 93   Ht 6' 6.75\"   Wt 296 lb   SpO2 96%   BMI 33.56 kg/m    General: Alert, oriented, in no acute distress, sitting comfortably  HEENT: Mucous membranes moist    Perianal external examination:  Surgical wound with remaining sutures removed. 2 cm opening at the base of the wound with good granulation tissue present and no erythema or purulent drainage.      Assessment/Plan:  49 year old male status post excision of pilonidal cyst and Jamal II procedure on 2020 with Dr. Ahumada.  Small opening at the bottom of the incision is healing well.  Remaining sutures were removed.  No erythema or purulent drainage.  He can restart his Coumadin but notify the clinic if he has significant bleeding.  If he does well with this after 1 week he can slowly increase his sitting time.  If he has any worsening bleeding, drainage, pain, or any wound concerns, would be happy to see him in the meantime. Patient's questions were answered to his stated satisfaction and he is in agreement with this plan.    Medical history:  Past Medical History:   Diagnosis Date     Depressive disorder      " Diabetes (H)      DVT (deep venous thrombosis) (H) 07/05/2019     Elevated blood pressure reading without diagnosis of hypertension 5/13/2018     Hypercholesteremia 2012     Pilonidal cyst 5/13/2018       Surgical history:  Past Surgical History:   Procedure Laterality Date     APPENDECTOMY       AS DRAIN PILONIDAL CYST SIMPLE       BACK SURGERY  1997    spinal fusion     CHOLECYSTECTOMY  2012     CYSTECTOMY PILONIDAL N/A 2/21/2020    Procedure: EXCISION, PILONIDAL CYST, AILEEN II Procedure;  Surgeon: Artemio Ahumada MD;  Location: [facility] OR     THUMB SURGERY   1995       Problem list:    Patient Active Problem List    Diagnosis Date Noted     Pilonidal cyst 10/24/2019     Priority: Medium     Added automatically from request for surgery [id number]       Closed displaced fracture of glenoid cavity of right scapula, sequela 10/21/2019     Priority: Medium     Added automatically from request for surgery [id number]       Closed displaced fracture of neck of right scapula, sequela 10/21/2019     Priority: Medium     Added automatically from request for surgery [id number]       Deep vein thrombosis (DVT) (H) 07/24/2019     Priority: Medium     MDD (major depressive disorder), recurrent episode, severe (H) 02/26/2019     Priority: Medium     Microalbuminuria due to type 2 diabetes mellitus (H) 10/01/2018     Priority: Medium     Diabetes mellitus, type 2 (H) 09/26/2018     Priority: Medium     Hyperlipidemia, unspecified hyperlipidemia type 09/26/2018     Priority: Medium     Passive suicidal ideations 09/07/2018     Priority: Medium     Erectile dysfunction 02/08/2010     Priority: Medium     Overview:   Normal testosterone, prolactin and TSH--12/10/09.         Medications:  Current Outpatient Medications   Medication Sig Dispense Refill     cephALEXin (KEFLEX) 250 MG capsule Take 1 capsule (250 mg) by mouth 4 times daily 40 capsule 0     lisinopril (PRINIVIL/ZESTRIL) 5 MG tablet Take 1 tablet (5 mg) by mouth daily 90  "tablet 1     metFORMIN (GLUCOPHAGE) 1000 MG tablet Take 1 tablet (1,000 mg) by mouth 2 times daily (with meals) 180 tablet 1     multivitamin, therapeutic with minerals (MULTI-VITAMIN) TABS tablet Take 1 tablet by mouth daily       order for DME Equipment being ordered: Compression stockings 20-30 or 30-40 mmHg. To be wore for the first 1-2 years following DVT. 1 Package 1     polyethylene glycol PO powder Take 17 g (1 capful) by mouth daily as needed for constipation (If no bowel movement in 24 hours. Mix in 8 oz of water.  May take twice daily.) 500 g 0     simvastatin (ZOCOR) 40 MG tablet Take 1 tablet (40 mg) by mouth At Bedtime 90 tablet 1     venlafaxine (EFFEXOR-XR) 150 MG 24 hr capsule TAKE 2 CAPSULES(300 MG) BY MOUTH DAILY (Patient taking differently: Take 300 mg by mouth At Bedtime DO NOT CRUSH.) 60 capsule 0       Allergies:  No Known Allergies    Family history:  Family History   Problem Relation Age of Onset     Diabetes Mother      Depression Father      Thrombosis Father         HE HAS HAD 3 CLOTS - PER PATIENT THEY WERE UNPROVOKED ON COUMADIN      Alcoholism Sister      Depression Sister      Lupus Sister      Anesthesia Reaction No family hx of      Cardiovascular No family hx of        Social history:  Social History     Tobacco Use     Smoking status: Never Smoker     Smokeless tobacco: Never Used   Substance Use Topics     Alcohol use: No     Comment: None     Marital status: single.    Nursing Notes:   Shanika Donnelly LPN  3/16/2020 11:13 AM  Signed  Chief Complaint   Patient presents with     RECHECK     Pt here today for recheck, pilonidal cyst.       Vitals:    03/16/20 1111   BP: (!) 125/91   BP Location: Left arm   Patient Position: Sitting   Cuff Size: Adult Regular   Pulse: 93   SpO2: 96%   Weight: 296 lb   Height: 6' 6.75\"       Body mass index is 33.56 kg/m .    Ari Donnelly LPN                   Total face to face time was 10 minutes, >50% counseling.  This is a postop visit.    Allegra" SAMEER Delvalle, NP-C  Colon and Rectal Surgery  [facility]    This note was created using speech recognition software and may contain unintended word substitutions.

## 2020-03-16 NOTE — NURSING NOTE
"Chief Complaint   Patient presents with     RECHECK     Pt here today for recheck, pilonidal cyst.       Vitals:    03/16/20 1111   BP: (!) 125/91   BP Location: Left arm   Patient Position: Sitting   Cuff Size: Adult Regular   Pulse: 93   SpO2: 96%   Weight: 296 lb   Height: 6' 6.75\"       Body mass index is 33.56 kg/m .    Ari Donnelly LPN                  "

## 2020-03-23 ENCOUNTER — TELEPHONE (OUTPATIENT)
Dept: SURGERY | Facility: CLINIC | Age: 50
End: 2020-03-23

## 2020-03-27 DIAGNOSIS — I82.409 DEEP VEIN THROMBOSIS (DVT) (H): Primary | ICD-10-CM

## 2020-04-06 ENCOUNTER — TELEPHONE (OUTPATIENT)
Dept: ORTHOPEDICS | Facility: CLINIC | Age: 50
End: 2020-04-06

## 2020-04-06 NOTE — TELEPHONE ENCOUNTER
Attempted to reach out to patient to discuss the need to cancel surgery due to COVID. Left detailed message for patient informing him of the cancel and also that once we get the approval to reschedule surgeries I will in contact with him to assist with rescheduling. Left best call back number of [phone] if patient has questions in the meantime

## 2020-04-14 ENCOUNTER — TELEPHONE (OUTPATIENT)
Dept: FAMILY MEDICINE | Facility: CLINIC | Age: 50
End: 2020-04-14

## 2020-04-14 NOTE — TELEPHONE ENCOUNTER
Attempted to reach patient, no answer. LVM instructing patient to schedule overdue INR appt at earliest convenience. Direct line for [facility] also left and patient encouraged to callback with a general update. UMA ZapataN, RN

## 2020-04-24 NOTE — TELEPHONE ENCOUNTER
Reception team, please contact patient and assist in scheduling overdue INR level (previous 2/20/20). If there are concerns about COVID-19 please review current clinic practices and transfer to [facility] if patient would like to discuss further. THANK YOU! Cyn Womack, BSN, RN

## 2020-04-24 NOTE — TELEPHONE ENCOUNTER
LM to call back to schedule a lab only appt for INR.    Lisa JUNG     [facility]

## 2020-04-28 ENCOUNTER — HOSPITAL ENCOUNTER (OUTPATIENT)
Facility: AMBULATORY SURGERY CENTER | Age: 50
End: 2020-04-28
Attending: ORTHOPAEDIC SURGERY
Payer: COMMERCIAL

## 2020-05-05 ENCOUNTER — TELEPHONE (OUTPATIENT)
Dept: FAMILY MEDICINE | Facility: CLINIC | Age: 50
End: 2020-05-05

## 2020-05-05 NOTE — TELEPHONE ENCOUNTER
Gaurang Rivera is overdue for INR check.     Left message for patient to call and schedule INR check as soon as possible. If returning call, please schedule.  UMA ZapataN, RN

## 2020-05-06 DIAGNOSIS — I82.409 DEEP VEIN THROMBOSIS (DVT) (H): ICD-10-CM

## 2020-05-06 LAB — INR PPP: 2.4 (ref 0.86–1.14)

## 2020-05-06 PROCEDURE — 36415 COLL VENOUS BLD VENIPUNCTURE: CPT | Performed by: FAMILY MEDICINE

## 2020-05-06 PROCEDURE — 85610 PROTHROMBIN TIME: CPT | Performed by: FAMILY MEDICINE

## 2020-05-07 ENCOUNTER — ANTICOAGULATION THERAPY VISIT (OUTPATIENT)
Dept: FAMILY MEDICINE | Facility: CLINIC | Age: 50
End: 2020-05-07
Payer: COMMERCIAL

## 2020-05-07 DIAGNOSIS — I82.409 DEEP VEIN THROMBOSIS (DVT) (H): ICD-10-CM

## 2020-05-07 PROCEDURE — 99207 ZZC NO CHARGE NURSE ONLY: CPT | Performed by: FAMILY MEDICINE

## 2020-05-07 NOTE — PROGRESS NOTES
Unable to reach patient, lvm requesting callback at earliest convenience. INR stable. Cyn Womack, UMAN, RN

## 2020-05-07 NOTE — PROGRESS NOTES
Unable to reach patient again, lvm with dosing and recheck plan (within 3-4 weeks). INR stable. Requested callback to report any changes to health, diet, meds, activity or any s/s of bleeding or clotting. Cyn Womack, UMAN, RN

## 2020-05-08 ENCOUNTER — TELEPHONE (OUTPATIENT)
Dept: SURGERY | Facility: CLINIC | Age: 50
End: 2020-05-08

## 2020-05-08 NOTE — TELEPHONE ENCOUNTER
Called patient to move to earlier in the day. LVM with callback number. MyChart sent with identical info.    Anna Pratt, EMT  Colon and Rectal Surgery

## 2020-05-11 ENCOUNTER — OFFICE VISIT (OUTPATIENT)
Dept: SURGERY | Facility: CLINIC | Age: 50
End: 2020-05-11
Payer: COMMERCIAL

## 2020-05-11 VITALS
SYSTOLIC BLOOD PRESSURE: 127 MMHG | DIASTOLIC BLOOD PRESSURE: 78 MMHG | HEART RATE: 92 BPM | OXYGEN SATURATION: 98 % | BODY MASS INDEX: 33.66 KG/M2 | HEIGHT: 78 IN | TEMPERATURE: 98.6 F | WEIGHT: 290.9 LBS

## 2020-05-11 DIAGNOSIS — Z09 FOLLOW-UP EXAMINATION FOLLOWING SURGERY: ICD-10-CM

## 2020-05-11 DIAGNOSIS — L05.91 PILONIDAL CYST: Primary | ICD-10-CM

## 2020-05-11 ASSESSMENT — PAIN SCALES - GENERAL: PAINLEVEL: NO PAIN (0)

## 2020-05-11 ASSESSMENT — MIFFLIN-ST. JEOR: SCORE: 2324.67

## 2020-05-11 NOTE — PROGRESS NOTES
".  Colon and Rectal Surgery Postoperative Clinic Note    RE: Gaurang Rivera  : 1970  KRISTI: 2020    Gaurang Rivera is a very pleasant 49 year old male with recurrent pilonidal cyst who is now status post excision of pilonidal cyst and Jamal II procedure on 2020 with Dr. Ahumada.    Interval history: Gaurang is doing well. He has had some  Tenderness in th middle of his incision over the past few days and a small amount of bright red blood about 10 days ago, which since resolved. He continues on warfarin. He has not started rowing again.    Physical Examination: Exam was chaperoned by Ari Donnelly LPN  /78 (BP Location: Left arm, Patient Position: Sitting, Cuff Size: Adult Regular)   Pulse 92   Temp 98.6  F (37  C) (Oral)   Ht 6' 6.75\"   Wt 290 lb 14.4 oz   SpO2 98%   BMI 32.98 kg/m    General: Alert, oriented, in no acute distress, sitting comfortably  HEENT: Mucous membranes moist    Perianal external examination:  Surgical wound well healed except for a very small, superficial opening near the anus with some granulation tissue present. Silver nitrate applied.     Assessment/Plan:  49 year old male status post excision of pilonidal cyst and Jamal II procedure on 2020 with Dr. Ahumada.  Small opening at the bottom of the incision is superficially open but healing well. Silver nitrate applied. If not totally healed in 3-4 weeks return for recheck. He can otherwise follow up as needed. Okay for regular activity but I would hold off on rowing until completely healed. Patient's questions were answered to his stated satisfaction and he is in agreement with this plan.    Medical history:  Past Medical History:   Diagnosis Date     Depressive disorder      Diabetes (H)      DVT (deep venous thrombosis) (H) 2019     Elevated blood pressure reading without diagnosis of hypertension 2018     Hypercholesteremia 2012     Pilonidal cyst 2018       Surgical history:  Past Surgical " History:   Procedure Laterality Date     APPENDECTOMY       AS DRAIN PILONIDAL CYST SIMPLE       BACK SURGERY  1997    spinal fusion     CHOLECYSTECTOMY  2012     CYSTECTOMY PILONIDAL N/A 2/21/2020    Procedure: EXCISION, PILONIDAL CYST, AILEEN II Procedure;  Surgeon: Artemio Ahumada MD;  Location: [facility] OR     THUMB SURGERY   1995       Problem list:    Patient Active Problem List    Diagnosis Date Noted     Pilonidal cyst 10/24/2019     Priority: Medium     Added automatically from request for surgery [id number]       Closed displaced fracture of glenoid cavity of right scapula, sequela 10/21/2019     Priority: Medium     Added automatically from request for surgery [id number]       Closed displaced fracture of neck of right scapula, sequela 10/21/2019     Priority: Medium     Added automatically from request for surgery [id number]       Deep vein thrombosis (DVT) (H) 07/24/2019     Priority: Medium     MDD (major depressive disorder), recurrent episode, severe (H) 02/26/2019     Priority: Medium     Microalbuminuria due to type 2 diabetes mellitus (H) 10/01/2018     Priority: Medium     Diabetes mellitus, type 2 (H) 09/26/2018     Priority: Medium     Hyperlipidemia, unspecified hyperlipidemia type 09/26/2018     Priority: Medium     Passive suicidal ideations 09/07/2018     Priority: Medium     Erectile dysfunction 02/08/2010     Priority: Medium     Overview:   Normal testosterone, prolactin and TSH--12/10/09.         Medications:  Current Outpatient Medications   Medication Sig Dispense Refill     lisinopril (PRINIVIL/ZESTRIL) 5 MG tablet Take 1 tablet (5 mg) by mouth daily 90 tablet 1     metFORMIN (GLUCOPHAGE) 1000 MG tablet Take 1 tablet (1,000 mg) by mouth 2 times daily (with meals) 180 tablet 1     multivitamin, therapeutic with minerals (MULTI-VITAMIN) TABS tablet Take 1 tablet by mouth daily       order for DME Equipment being ordered: Compression stockings 20-30 or 30-40 mmHg. To be wore for the first  "1-2 years following DVT. 1 Package 1     polyethylene glycol PO powder Take 17 g (1 capful) by mouth daily as needed for constipation (If no bowel movement in 24 hours. Mix in 8 oz of water.  May take twice daily.) 500 g 0     simvastatin (ZOCOR) 40 MG tablet Take 1 tablet (40 mg) by mouth At Bedtime 90 tablet 1     venlafaxine (EFFEXOR-XR) 150 MG 24 hr capsule TAKE 2 CAPSULES(300 MG) BY MOUTH DAILY (Patient taking differently: Take 300 mg by mouth At Bedtime DO NOT CRUSH.) 60 capsule 0       Allergies:  No Known Allergies    Family history:  Family History   Problem Relation Age of Onset     Diabetes Mother      Depression Father      Thrombosis Father         HE HAS HAD 3 CLOTS - PER PATIENT THEY WERE UNPROVOKED ON COUMADIN      Alcoholism Sister      Depression Sister      Lupus Sister      Anesthesia Reaction No family hx of      Cardiovascular No family hx of        Social history:  Social History     Tobacco Use     Smoking status: Never Smoker     Smokeless tobacco: Never Used   Substance Use Topics     Alcohol use: No     Comment: None     Marital status: single.    Nursing Notes:   Anna Pratt EMT  5/11/2020  1:16 PM  Signed  Chief Complaint   Patient presents with     RECHECK       Vitals:    05/11/20 1258   BP: 127/78   BP Location: Left arm   Patient Position: Sitting   Cuff Size: Adult Regular   Pulse: 92   Temp: 98.6  F (37  C)   TempSrc: Oral   SpO2: 98%   Weight: 290 lb 14.4 oz   Height: 6' 6.75\"       Body mass index is 32.98 kg/m .      MIHIR Lnid                       Total face to face time was 10 minutes, >50% counseling.  This is a postop visit.    SAMEER Zazueta, NP-C  Colon and Rectal Surgery  [facility]    This note was created using speech recognition software and may contain unintended word substitutions.      "

## 2020-05-11 NOTE — LETTER
"2020       RE: Gaurang Rivera  [address]  [facility] [address]     Dear Colleague,    Thank you for referring your patient, Gaurang Rivera, to the [facility] COLON AND RECTAL SURGERY at [facility]. Please see a copy of my visit note below.    .  Colon and Rectal Surgery Postoperative Clinic Note    RE: Gaurang Rivera  : 1970  KRISTI: 2020    Gaurang Rivera is a very pleasant 49 year old male with recurrent pilonidal cyst who is now status post excision of pilonidal cyst and Jamal II procedure on 2020 with Dr. Ahumada.    Interval history: Gaurang is doing well. He has had some  Tenderness in th middle of his incision over the past few days and a small amount of bright red blood about 10 days ago, which since resolved. He continues on warfarin. He has not started rowing again.    Physical Examination: Exam was chaperoned by Ari Donnelly LPN  /78 (BP Location: Left arm, Patient Position: Sitting, Cuff Size: Adult Regular)   Pulse 92   Temp 98.6  F (37  C) (Oral)   Ht 6' 6.75\"   Wt 290 lb 14.4 oz   SpO2 98%   BMI 32.98 kg/m    General: Alert, oriented, in no acute distress, sitting comfortably  HEENT: Mucous membranes moist    Perianal external examination:  Surgical wound well healed except for a very small, superficial opening near the anus with some granulation tissue present. Silver nitrate applied.     Assessment/Plan:  49 year old male status post excision of pilonidal cyst and Wellman II procedure on 2020 with Dr. Ahumada.  Small opening at the bottom of the incision is superficially open but healing well. Silver nitrate applied. If not totally healed in 3-4 weeks return for recheck. He can otherwise follow up as needed. Okay for regular activity but I would hold off on rowing until completely healed. Patient's questions were answered to his stated satisfaction and he is in agreement with this plan.    Medical history:  Past " Medical History:   Diagnosis Date     Depressive disorder 2001     Diabetes (H)      DVT (deep venous thrombosis) (H) 07/05/2019     Elevated blood pressure reading without diagnosis of hypertension 5/13/2018     Hypercholesteremia 2012     Pilonidal cyst 5/13/2018       Surgical history:  Past Surgical History:   Procedure Laterality Date     APPENDECTOMY       AS DRAIN PILONIDAL CYST SIMPLE       BACK SURGERY  1997    spinal fusion     CHOLECYSTECTOMY  2012     CYSTECTOMY PILONIDAL N/A 2/21/2020    Procedure: EXCISION, PILONIDAL CYST, AILEEN II Procedure;  Surgeon: Artemio Ahumada MD;  Location: [facility]     THUMB SURGERY   1995       Problem list:    Patient Active Problem List    Diagnosis Date Noted     Pilonidal cyst 10/24/2019     Priority: Medium     Added automatically from request for surgery [id number]       Closed displaced fracture of glenoid cavity of right scapula, sequela 10/21/2019     Priority: Medium     Added automatically from request for surgery [id number]       Closed displaced fracture of neck of right scapula, sequela 10/21/2019     Priority: Medium     Added automatically from request for surgery [id number]       Deep vein thrombosis (DVT) (H) 07/24/2019     Priority: Medium     MDD (major depressive disorder), recurrent episode, severe (H) 02/26/2019     Priority: Medium     Microalbuminuria due to type 2 diabetes mellitus (H) 10/01/2018     Priority: Medium     Diabetes mellitus, type 2 (H) 09/26/2018     Priority: Medium     Hyperlipidemia, unspecified hyperlipidemia type 09/26/2018     Priority: Medium     Passive suicidal ideations 09/07/2018     Priority: Medium     Erectile dysfunction 02/08/2010     Priority: Medium     Overview:   Normal testosterone, prolactin and TSH--12/10/09.         Medications:  Current Outpatient Medications   Medication Sig Dispense Refill     lisinopril (PRINIVIL/ZESTRIL) 5 MG tablet Take 1 tablet (5 mg) by mouth daily 90 tablet 1     metFORMIN (GLUCOPHAGE)  "1000 MG tablet Take 1 tablet (1,000 mg) by mouth 2 times daily (with meals) 180 tablet 1     multivitamin, therapeutic with minerals (MULTI-VITAMIN) TABS tablet Take 1 tablet by mouth daily       order for DME Equipment being ordered: Compression stockings 20-30 or 30-40 mmHg. To be wore for the first 1-2 years following DVT. 1 Package 1     polyethylene glycol PO powder Take 17 g (1 capful) by mouth daily as needed for constipation (If no bowel movement in 24 hours. Mix in 8 oz of water.  May take twice daily.) 500 g 0     simvastatin (ZOCOR) 40 MG tablet Take 1 tablet (40 mg) by mouth At Bedtime 90 tablet 1     venlafaxine (EFFEXOR-XR) 150 MG 24 hr capsule TAKE 2 CAPSULES(300 MG) BY MOUTH DAILY (Patient taking differently: Take 300 mg by mouth At Bedtime DO NOT CRUSH.) 60 capsule 0       Allergies:  No Known Allergies    Family history:  Family History   Problem Relation Age of Onset     Diabetes Mother      Depression Father      Thrombosis Father         HE HAS HAD 3 CLOTS - PER PATIENT THEY WERE UNPROVOKED ON COUMADIN      Alcoholism Sister      Depression Sister      Lupus Sister      Anesthesia Reaction No family hx of      Cardiovascular No family hx of        Social history:  Social History     Tobacco Use     Smoking status: Never Smoker     Smokeless tobacco: Never Used   Substance Use Topics     Alcohol use: No     Comment: None     Marital status: single.    Nursing Notes:   Anna Pratt EMT  5/11/2020  1:16 PM  Signed  Chief Complaint   Patient presents with     RECHECK       Vitals:    05/11/20 1258   BP: 127/78   BP Location: Left arm   Patient Position: Sitting   Cuff Size: Adult Regular   Pulse: 92   Temp: 98.6  F (37  C)   TempSrc: Oral   SpO2: 98%   Weight: 290 lb 14.4 oz   Height: 6' 6.75\"       Body mass index is 32.98 kg/m .      MIHIR Lind                       Total face to face time was 10 minutes, >50% counseling.  This is a postop visit.    Allegra Delvalle, " SAMEER NP-C  Colon and Rectal Surgery  [facility]    This note was created using speech recognition software and may contain unintended word substitutions.        Again, thank you for allowing me to participate in the care of your patient.      Sincerely,    SAMEER Powell CNP

## 2020-05-15 NOTE — PROGRESS NOTES
ANTICOAGULATION FOLLOW-UP CLINIC VISIT    Patient Name:  Gaurang Rivera  Date:  2020  Contact Type:  Telephone/ LVM for patient    SUBJECTIVE:  Patient Findings     Comments:   Unable to reach patient again, lvm with dosing and recheck plan (within 3-4 weeks). INR stable. Requested callback to report any changes to health, diet, meds, activity or any s/s of bleeding or clotting.             OBJECTIVE    INR: 2.4    ASSESSMENT / PLAN  INR assessment THER    Recheck INR In: 4 WEEKS    INR Location Clinic      Anticoagulation Summary  As of 2020    INR goal:   2.0-3.0   TTR:   31.7 % (9.2 mo)   INR used for dosin.40 (2020)   Warfarin maintenance plan:   10 mg (5 mg x 2) every Mon, Fri; 7.5 mg (5 mg x 1.5) all other days   Full warfarin instructions:   10 mg every Mon, Fri; 7.5 mg all other days   Weekly warfarin total:   57.5 mg   No change documented:   Cyn Womack RN   Plan last modified:   Cyn Womack RN (2/3/2020)   Next INR check:   2020   Target end date:   10/5/2019    Indications    Deep vein thrombosis (DVT) (H) [I82.409]             Anticoagulation Episode Summary     INR check location:       Preferred lab:       Send INR reminders to:   ARMOND CORNELIUS    Comments:   : Occlusive thrombus- posterior tibial veins- R calf. Lovenox stopped  not bridged fully. wearing 15-20 C.stockings. Shoulder surgery (Dr. Burdick postponed). Neli Lozano RN #[phone]. Poor diet/no greens or exercise.  MTV 60 mcg.       Anticoagulation Care Providers     Provider Role Specialty Phone number    Elvis Guzman MD [facility] [phone]            See the Encounter Report to view Anticoagulation Flowsheet and Dosing Calendar (Go to Encounters tab in chart review, and find the Anticoagulation Therapy Visit)    Dosage adjustment made based on physician directed care plan.    Cyn Womack RN

## 2020-05-20 ENCOUNTER — TELEPHONE (OUTPATIENT)
Dept: ORTHOPEDICS | Facility: CLINIC | Age: 50
End: 2020-05-20

## 2020-05-20 ENCOUNTER — MYC REFILL (OUTPATIENT)
Dept: FAMILY MEDICINE | Facility: CLINIC | Age: 50
End: 2020-05-20

## 2020-05-20 DIAGNOSIS — R45.851 PASSIVE SUICIDAL IDEATIONS: ICD-10-CM

## 2020-05-20 DIAGNOSIS — F33.1 MODERATE EPISODE OF RECURRENT MAJOR DEPRESSIVE DISORDER (H): ICD-10-CM

## 2020-05-20 NOTE — TELEPHONE ENCOUNTER
Attempted to reach out to patient to discuss rescheduling surgery with Dr. Burdick. Left best call back number of [phone]

## 2020-05-21 ENCOUNTER — TELEPHONE (OUTPATIENT)
Dept: ORTHOPEDICS | Facility: CLINIC | Age: 50
End: 2020-05-21

## 2020-05-21 DIAGNOSIS — Z11.59 ENCOUNTER FOR SCREENING FOR OTHER VIRAL DISEASES: Primary | ICD-10-CM

## 2020-05-21 NOTE — TELEPHONE ENCOUNTER
Patient is scheduled for surgery with Dr. Burdick      Spoke or left message with: Spoke with Gaurang    Date of Surgery: 6/2/20    Location: [place]    Informed patient they will need an adult  Yes    H&P: Patient to schedule with Elvis Guzman      Additional imaging/appointments: n/a    Surgery packet: Given in clinic     Additional comments: Patient will await pre admission phone call 1-2 days prior to surgery for arrival time

## 2020-05-23 NOTE — TELEPHONE ENCOUNTER
Routing refill request to provider for review/approval because:  PHQ-9 score:    PHQ 1/8/2020   PHQ-9 Total Score 10   Q9: Thoughts of better off dead/self-harm past 2 weeks Several days   F/U: Thoughts of suicide or self-harm No   F/U: Self harm-plan -   F/U: Self-harm action -   F/U: Safety concerns No

## 2020-05-25 DIAGNOSIS — I82.4Z1 ACUTE DEEP VEIN THROMBOSIS (DVT) OF DISTAL END OF RIGHT LOWER EXTREMITY (H): ICD-10-CM

## 2020-05-25 RX ORDER — VENLAFAXINE HYDROCHLORIDE 150 MG/1
300 CAPSULE, EXTENDED RELEASE ORAL DAILY
Qty: 180 CAPSULE | Refills: 3 | Status: SHIPPED | OUTPATIENT
Start: 2020-05-25 | End: 2021-03-30

## 2020-05-26 RX ORDER — WARFARIN SODIUM 5 MG/1
TABLET ORAL
Qty: 56 TABLET | Refills: 1 | Status: SHIPPED | OUTPATIENT
Start: 2020-05-26 | End: 2020-09-18

## 2020-05-26 NOTE — TELEPHONE ENCOUNTER
Warfarin refill approved per [facility] ACC protocol. Cyn Womack, BSN, RN

## 2020-05-27 ENCOUNTER — DOCUMENTATION ONLY (OUTPATIENT)
Dept: CARE COORDINATION | Facility: CLINIC | Age: 50
End: 2020-05-27

## 2020-05-27 ENCOUNTER — TELEPHONE (OUTPATIENT)
Dept: ORTHOPEDICS | Facility: CLINIC | Age: 50
End: 2020-05-27

## 2020-05-28 ENCOUNTER — TELEPHONE (OUTPATIENT)
Dept: FAMILY MEDICINE | Facility: CLINIC | Age: 50
End: 2020-05-28

## 2020-05-28 ENCOUNTER — OFFICE VISIT (OUTPATIENT)
Dept: FAMILY MEDICINE | Facility: CLINIC | Age: 50
End: 2020-05-28
Payer: COMMERCIAL

## 2020-05-28 VITALS
HEIGHT: 78 IN | HEART RATE: 82 BPM | WEIGHT: 293 LBS | RESPIRATION RATE: 14 BRPM | DIASTOLIC BLOOD PRESSURE: 82 MMHG | SYSTOLIC BLOOD PRESSURE: 116 MMHG | TEMPERATURE: 98.2 F | OXYGEN SATURATION: 94 % | BODY MASS INDEX: 33.9 KG/M2

## 2020-05-28 DIAGNOSIS — S42.151S: ICD-10-CM

## 2020-05-28 DIAGNOSIS — Z01.818 PREOP GENERAL PHYSICAL EXAM: Primary | ICD-10-CM

## 2020-05-28 DIAGNOSIS — S42.141S: ICD-10-CM

## 2020-05-28 LAB
ALBUMIN SERPL-MCNC: 4.1 G/DL (ref 3.4–5)
ALP SERPL-CCNC: 62 U/L (ref 40–150)
ALT SERPL W P-5'-P-CCNC: 64 U/L (ref 0–70)
ANION GAP SERPL CALCULATED.3IONS-SCNC: 7 MMOL/L (ref 3–14)
AST SERPL W P-5'-P-CCNC: 45 U/L (ref 0–45)
BASOPHILS # BLD AUTO: 0 10E9/L (ref 0–0.2)
BASOPHILS NFR BLD AUTO: 0.4 %
BILIRUB SERPL-MCNC: 0.5 MG/DL (ref 0.2–1.3)
BUN SERPL-MCNC: 18 MG/DL (ref 7–30)
CALCIUM SERPL-MCNC: 8.9 MG/DL (ref 8.5–10.1)
CHLORIDE SERPL-SCNC: 105 MMOL/L (ref 94–109)
CO2 SERPL-SCNC: 25 MMOL/L (ref 20–32)
CREAT SERPL-MCNC: 0.94 MG/DL (ref 0.66–1.25)
DIFFERENTIAL METHOD BLD: NORMAL
EOSINOPHIL # BLD AUTO: 0.2 10E9/L (ref 0–0.7)
EOSINOPHIL NFR BLD AUTO: 2.6 %
ERYTHROCYTE [DISTWIDTH] IN BLOOD BY AUTOMATED COUNT: 12.4 % (ref 10–15)
GFR SERPL CREATININE-BSD FRML MDRD: >90 ML/MIN/{1.73_M2}
GLUCOSE SERPL-MCNC: 138 MG/DL (ref 70–99)
HCT VFR BLD AUTO: 47.8 % (ref 40–53)
HGB BLD-MCNC: 16.1 G/DL (ref 13.3–17.7)
LYMPHOCYTES # BLD AUTO: 2.7 10E9/L (ref 0.8–5.3)
LYMPHOCYTES NFR BLD AUTO: 39.1 %
MCH RBC QN AUTO: 29.4 PG (ref 26.5–33)
MCHC RBC AUTO-ENTMCNC: 33.7 G/DL (ref 31.5–36.5)
MCV RBC AUTO: 87 FL (ref 78–100)
MONOCYTES # BLD AUTO: 0.6 10E9/L (ref 0–1.3)
MONOCYTES NFR BLD AUTO: 8.9 %
NEUTROPHILS # BLD AUTO: 3.3 10E9/L (ref 1.6–8.3)
NEUTROPHILS NFR BLD AUTO: 49 %
PLATELET # BLD AUTO: 192 10E9/L (ref 150–450)
POTASSIUM SERPL-SCNC: 4 MMOL/L (ref 3.4–5.3)
PROT SERPL-MCNC: 7.8 G/DL (ref 6.8–8.8)
RBC # BLD AUTO: 5.47 10E12/L (ref 4.4–5.9)
SODIUM SERPL-SCNC: 137 MMOL/L (ref 133–144)
WBC # BLD AUTO: 6.8 10E9/L (ref 4–11)

## 2020-05-28 PROCEDURE — 36415 COLL VENOUS BLD VENIPUNCTURE: CPT | Performed by: PHYSICIAN ASSISTANT

## 2020-05-28 PROCEDURE — 80053 COMPREHEN METABOLIC PANEL: CPT | Performed by: PHYSICIAN ASSISTANT

## 2020-05-28 PROCEDURE — 85025 COMPLETE CBC W/AUTO DIFF WBC: CPT | Performed by: PHYSICIAN ASSISTANT

## 2020-05-28 PROCEDURE — 93000 ELECTROCARDIOGRAM COMPLETE: CPT | Performed by: PHYSICIAN ASSISTANT

## 2020-05-28 PROCEDURE — 99215 OFFICE O/P EST HI 40 MIN: CPT | Performed by: PHYSICIAN ASSISTANT

## 2020-05-28 ASSESSMENT — MIFFLIN-ST. JEOR: SCORE: 2334.2

## 2020-05-28 NOTE — TELEPHONE ENCOUNTER
"Patient is scheduled to have right shoulder surgery completed on 6/2 and will need to hold warfarin for 5 days, starting today.   Patient is currently on warfarin for unprovoked DVT and heterozygous for Factor V leiden.  Dr. Dukes requested [facility] assistance and recommendations for upcoming procedure. Per 2/14/2020 notes (see below), Dr. Chang with hematology, recommended prophylactic Lovenox bridging (40 mg every day) post-procedure only if mobility was a concern. Routing to Nathalia and Dr. Chang for input if they feel shoulder surgery is \"enough of an immobility concern.\" Thank you! Cyn Womack, UMAN, RN    Perioperative Thrombotic Risk Stratification   High risk for clot    (Bridge) Medium risk for clot   (Maybe Bridge) Low risk for clot   (No Bridge)     Artificial Mitral valve    Artificial aortic valve if tilting disc or caged ball    Stroke, TIA within last 6 months    VTE within last 3 months    Severe thrombophilia (protein C or S deficiency, antithrombin, homozygous Factor V Leiden, antiphospholipid antibodies)  AFIB and    [id number] score 7-9    Stroke/TIA within last 3 months    Rheumatic valvular heart disease   Bileaflet aortic valve  Plus  AFib, prior stroke or TIA, HTN, DM, CHF, or over 75 years old    [id number] score 5-6    VTE within past 3 to 12 months    Non-severe thrombophilia (heterozygous factor V Leiden)    Recurrent VTE    Active cancer (or treated within last 6 months)     Bileaflet valve without Afib, no other risk factors    [id number] < 4 (with no history stroke or TIA)    VTE more than 12 months ago     \"Anticoagulation plan for upcoming procedures  RN had the chance to speak with Dr. Chang regarding anticoagulation plan for Peter's upcoming procedures.   Patient is fine to hold warfarin 5 days prior to surgeries. If the surgery goes well and patient will be up and moving afterwards he will not need to bridge.   If there is an immobility component patient should be bridged using " "once a day 40 mg subcutaneous dosing of Lovenox until INR is w/in therapeutic range x2.   For questions, please call [facility] nurse Nathalia at [phone]  Nathalia Rome, MSN, RN, PHN - Nurse Clinician - [facility] - [phone]\"  "

## 2020-05-28 NOTE — PROGRESS NOTES
[facility] [address] [place] [address]  [phone]  Dept: [phone]    PRE-OP EVALUATION:  Today's date: 2020    Gaurang Rivera (: 1970) presents for pre-operative evaluation assessment as requested by Dr. BurdickHe requires evaluation and anesthesia risk assessment prior to undergoing surgery/procedure for treatment of shoulder arthroscopic.     Fax number for surgical facility:   Primary Physician: Elvis Guzman  Type of Anesthesia Anticipated: to be determined    Patient has a Health Care Directive or Living Will:  NO    Preop Questions 2020   Who is doing your surgery? Dr. Burdick   What are you having done? Shoulder surgery, arthroscopic diagnostic capsular release   Date of Surgery/Procedure: 2020   Facility or Hospital where procedure/surgery will be performed: [facility]   1.  Do you have a history of Heart attack, stroke, stent, coronary bypass surgery, or other heart surgery? No   2.  Do you ever have any pain or discomfort in your chest? No   3.  Do you have a history of  Heart Failure? No   4.   Are you troubled by shortness of breath when:  walking on a level surface, or up a slight hill, or at night? No   5.  Do you currently have a cold, bronchitis or other respiratory infection? No   6.  Do you have a cough, shortness of breath, or wheezing? No   7.  Do you sometimes get pains in the calves of your legs when you walk? No   8. Do you or anyone in your family have previous history of blood clots? YES - personal history and family history on father's side    9.  Do you or does anyone in your family have a serious bleeding problem such as prolonged bleeding following surgeries or cuts? No   10. Have you ever had problems with anemia or been told to take iron pills? No   11. Have you had any abnormal blood loss such as black, tarry or bloody stools? No   12. Have you ever had a blood transfusion? No   13. Have you or any of your  relatives ever had problems with anesthesia? No   14. Do you have sleep apnea, excessive snoring or daytime drowsiness? No   15. Do you have any prosthetic heart valves? No   16. Do you have prosthetic joints? No         HPI:     HPI related to upcoming procedure:     Right shoulder injury a little over 1 year ago suffered a fall on ice. Fractured scapula. Since then limited range of motion and some weakness. He reports difficulty with activities such as opening window with right arm.    Suffered unprovoked DVT which delayed planned surgery in 2019. Has been on warfarin.       DEPRESSION - Patient has a long history of Depression of moderate severity requiring medication for control with recent symptoms being stable..Current symptoms of depression include none.     DIABETES - Patient has a longstanding history of DiabetesType Type II . Patient is being treated with diet, oral agents and exercise and denies significant side effects. Control has been good. Complicating factors include but are not limited to: hypertension and hyperlipidemia.     HYPERLIPIDEMIA - Patient has a long history of significant Hyperlipidemia requiring medication for treatment with recent fair control. Patient reports no problems or side effects with the medication.     HYPERTENSION - Patient has longstanding history of HTN , currently denies any symptoms referable to elevated blood pressure. Specifically denies chest pain, palpitations, dyspnea, orthopnea, PND or peripheral edema. Blood pressure readings have been in normal range. Current medication regimen is as listed below. Patient denies any side effects of medication.       MEDICAL HISTORY:     Patient Active Problem List    Diagnosis Date Noted     Pilonidal cyst 10/24/2019     Priority: Medium     Added automatically from request for surgery [id number]       Closed displaced fracture of glenoid cavity of right scapula, sequela 10/21/2019     Priority: Medium     Added automatically from  request for surgery [id number]       Closed displaced fracture of neck of right scapula, sequela 10/21/2019     Priority: Medium     Added automatically from request for surgery [id number]       Deep vein thrombosis (DVT) (H) 07/24/2019     Priority: Medium     MDD (major depressive disorder), recurrent episode, severe (H) 02/26/2019     Priority: Medium     Microalbuminuria due to type 2 diabetes mellitus (H) 10/01/2018     Priority: Medium     Diabetes mellitus, type 2 (H) 09/26/2018     Priority: Medium     Hyperlipidemia, unspecified hyperlipidemia type 09/26/2018     Priority: Medium     Passive suicidal ideations 09/07/2018     Priority: Medium     Erectile dysfunction 02/08/2010     Priority: Medium     Overview:   Normal testosterone, prolactin and TSH--12/10/09.        Past Medical History:   Diagnosis Date     Depressive disorder 2001     Diabetes (H)      DVT (deep venous thrombosis) (H) 07/05/2019     Elevated blood pressure reading without diagnosis of hypertension 5/13/2018     Hypercholesteremia 2012     Pilonidal cyst 5/13/2018     Past Surgical History:   Procedure Laterality Date     APPENDECTOMY       AS DRAIN PILONIDAL CYST SIMPLE       BACK SURGERY  1997    spinal fusion     CHOLECYSTECTOMY  2012     CYSTECTOMY PILONIDAL N/A 2/21/2020    Procedure: EXCISION, PILONIDAL CYST, AILEEN II Procedure;  Surgeon: Artemio Ahumada MD;  Location: [facility] OR     THUMB SURGERY   1995     Current Outpatient Medications   Medication Sig Dispense Refill     lisinopril (PRINIVIL/ZESTRIL) 5 MG tablet Take 1 tablet (5 mg) by mouth daily 90 tablet 1     metFORMIN (GLUCOPHAGE) 1000 MG tablet Take 1 tablet (1,000 mg) by mouth 2 times daily (with meals) 180 tablet 1     multivitamin, therapeutic with minerals (MULTI-VITAMIN) TABS tablet Take 1 tablet by mouth daily       simvastatin (ZOCOR) 40 MG tablet Take 1 tablet (40 mg) by mouth At Bedtime 90 tablet 1     venlafaxine (EFFEXOR-XR) 150 MG 24 hr capsule Take 2  "capsules (300 mg) by mouth daily 180 capsule 3     warfarin ANTICOAGULANT (COUMADIN) 5 MG tablet Current dose (5/26/20): 10 mg every Mon, Fri + 7.5 mg all other days or as directed by ACC team. 56 tablet 1     order for DME Equipment being ordered: Compression stockings 20-30 or 30-40 mmHg. To be wore for the first 1-2 years following DVT. (Patient not taking: Reported on 5/28/2020) 1 Package 1     polyethylene glycol PO powder Take 17 g (1 capful) by mouth daily as needed for constipation (If no bowel movement in 24 hours. Mix in 8 oz of water.  May take twice daily.) 500 g 0     OTC products: None, except as noted above    No Known Allergies   Latex Allergy: NO    Social History     Tobacco Use     Smoking status: Never Smoker     Smokeless tobacco: Never Used   Substance Use Topics     Alcohol use: No     Comment: None     History   Drug Use No       REVIEW OF SYSTEMS:   CONSTITUTIONAL: NEGATIVE for fever, chills, change in weight  INTEGUMENTARY/SKIN: NEGATIVE for worrisome rashes, moles or lesions  EYES: NEGATIVE for vision changes or irritation  ENT/MOUTH: NEGATIVE for ear, mouth and throat problems  RESP: NEGATIVE for significant cough or SOB  BREAST: NEGATIVE for masses, tenderness or discharge  CV: NEGATIVE for chest pain, palpitations or peripheral edema  GI: NEGATIVE for nausea, abdominal pain, heartburn, or change in bowel habits  : NEGATIVE for frequency, dysuria, or hematuria  MUSCULOSKELETAL: NEGATIVE for significant arthralgias or myalgia  NEURO: NEGATIVE for weakness, dizziness or paresthesias  ENDOCRINE: NEGATIVE for temperature intolerance, skin/hair changes  HEME: NEGATIVE for bleeding problems  PSYCHIATRIC: NEGATIVE for changes in mood or affect    EXAM:   /82 (BP Location: Left arm, Patient Position: Sitting, Cuff Size: Adult Regular)   Pulse 82   Temp 98.2  F (36.8  C) (Tympanic)   Resp 14   Ht 2 m (6' 6.75\")   Wt 132.9 kg (293 lb)   SpO2 94%   BMI 33.22 kg/m      GENERAL " APPEARANCE: healthy, alert and no distress     EYES: EOMI,  PERRL     HENT: ear canals and TM's normal and nose and mouth without ulcers or lesions     NECK: no adenopathy, no asymmetry, masses, or scars and thyroid normal to palpation     RESP: lungs clear to auscultation - no rales, rhonchi or wheezes     CV: regular rates and rhythm, normal S1 S2, no S3 or S4 and no murmur, click or rub     ABDOMEN:  soft, nontender, no HSM or masses and bowel sounds normal     MS: extremities normal- no gross deformities noted, no evidence of inflammation in joints, FROM in all extremities.     SKIN: no suspicious lesions or rashes     NEURO: Normal strength and tone, sensory exam grossly normal, mentation intact and speech normal     PSYCH: mentation appears normal. and affect normal/bright     LYMPHATICS: No cervical adenopathy    DIAGNOSTICS:     EKG: appears normal, NSR, normal axis, normal intervals, no acute ST/T changes c/w ischemia, Premature Atrial Contractions (PAC) noted  Labs Resulted Today:   Results for orders placed or performed in visit on 05/28/20   CBC with platelets and differential     Status: None   Result Value Ref Range    WBC 6.8 4.0 - 11.0 10e9/L    RBC Count 5.47 4.4 - 5.9 10e12/L    Hemoglobin 16.1 13.3 - 17.7 g/dL    Hematocrit 47.8 40.0 - 53.0 %    MCV 87 78 - 100 fl    MCH 29.4 26.5 - 33.0 pg    MCHC 33.7 31.5 - 36.5 g/dL    RDW 12.4 10.0 - 15.0 %    Platelet Count 192 150 - 450 10e9/L    Diff Method Automated Method     % Neutrophils 49.0 %    % Lymphocytes 39.1 %    % Monocytes 8.9 %    % Eosinophils 2.6 %    % Basophils 0.4 %    Absolute Neutrophil 3.3 1.6 - 8.3 10e9/L    Absolute Lymphocytes 2.7 0.8 - 5.3 10e9/L    Absolute Monocytes 0.6 0.0 - 1.3 10e9/L    Absolute Eosinophils 0.2 0.0 - 0.7 10e9/L    Absolute Basophils 0.0 0.0 - 0.2 10e9/L     Labs Drawn and in Process:   Unresulted Labs Ordered in the Past 30 Days of this Admission     Date and Time Order Name Status Description    5/28/2020  1126 COMPREHENSIVE METABOLIC PANEL In process           Recent Labs   Lab Test 05/06/20  1548 03/03/20  1557  10/10/19  0959  07/05/19  1201  06/28/19  0748   HGB  --  16.9  --  16.3  --  15.8   < >  --    PLT  --  248  --  206  --  184   < >  --    INR 2.40* 1.06   < >  --    < >  --   --   --    NA  --   --   --  136  --  140  --   --    POTASSIUM  --   --   --  3.9  --  3.6  --   --    CR  --   --   --  0.84  --  0.98  --   --    A1C  --   --   --  7.2*  --   --   --  7.1*    < > = values in this interval not displayed.        IMPRESSION:   Reason for surgery/procedure: Closed displaced fracture of glenoid cavity of right scapula, sequela. Closed displaced fracture of neck of right scapula, sequela     Diagnosis/reason for consult: Preoperative exam prior to Right shoulder diagnostic arthroscopy, capsular release, manipulation under anesthesia and possible open biceps tenodesis     The proposed surgical procedure is considered INTERMEDIATE risk.    REVISED CARDIAC RISK INDEX  The patient has the following serious cardiovascular risks for perioperative complications such as (MI, PE, VFib and 3  AV Block):  No serious cardiac risks  INTERPRETATION: 0 risks: Class I (very low risk - 0.4% complication rate)    The patient has the following additional risks for perioperative complications:  No identified additional risks      ICD-10-CM    1. Preop general physical exam  Z01.818 CBC with platelets and differential     Comprehensive metabolic panel (BMP + Alb, Alk Phos, ALT, AST, Total. Bili, TP)   2. Closed displaced fracture of glenoid cavity of right scapula, sequela  S42.141S    3. Closed displaced fracture of neck of right scapula, sequela  S42.151S        RECOMMENDATIONS:     --Consult hospital rounder / IM to assist post-op medical management    -- Patient is to hold warfarin 5 days prior to surgeries. If the surgery goes well and patient will be up and moving afterwards he will not need to bridge. Case discussed  with anticoagulation team.     --Patient is to take all scheduled medications on the day of surgery EXCEPT for modifications listed below.    ACE Inhibitor or Angiotensin Receptor Blocker (ARB) Use  Ace inhibitor or Angiotensin Receptor Blocker (ARB) and should HOLD this medication for the 24 hours prior to surgery.      APPROVAL GIVEN to proceed with proposed procedure, without further diagnostic evaluation       Signed Electronically by: Simone Dukes PA-C    Copy of this evaluation report is provided to requesting physician.    [facility] Preop Guidelines    Revised Cardiac Risk Index

## 2020-05-29 ENCOUNTER — TELEPHONE (OUTPATIENT)
Dept: ORTHOPEDICS | Facility: CLINIC | Age: 50
End: 2020-05-29

## 2020-05-29 NOTE — TELEPHONE ENCOUNTER
Dr. Burdick and team, please review hematology recommendations below and advise. Do you feel patient's upcoming surgery will impede mobility and increase risk for DVT. Cyn Womack, BSN, RN    Inbasket message from hematology team:    Hey guys,   Thanks for roping us in and getting this surgery on our radar. Regarding whether or not Gaurang should bridge, we will leave that up to the surgery team's discretion. Our question would be whether or not they think this surgery will affect his mobility at all. If not, then no need for bridging.     Nathalia Rome, MSN, RN, PHN - Nurse Clinician - [facility] - [phone]

## 2020-05-29 NOTE — TELEPHONE ENCOUNTER
"Writer spoke with patient and reiterated 5 day hold w/o bridging. From surgery standpoint, it does not seem mobility will be limited (see notes below). Writer encouraged patient to be in touch with recovery and if there is any concerns for immobility or clotting to call [facility] or hematology dept immediately. Patient agrees with plan. UMA ZapataN, RN    Ortho TE dated 5/29/20:    \"Patient was informed he will have a sling post-op but will start moving the arm right away.  He plans to set up appointments for physical therapy at the [facility] [place].  He is aware he should have PT 3 times a week x 2 weeks.  He states he has no further questions concerning post-op expectations.\"  "

## 2020-05-29 NOTE — TELEPHONE ENCOUNTER
[facility] Call Center    Phone Message    May a detailed message be left on voicemail: yes     Reason for Call: Other:   Pt returning Neli's ph call. Pt states that it was in regards to Pt's Warfarin and scheduling physical therapy. Pt had his pre op appt yesterday and his plan to go off of warfarin is confirmed and now just needs to schedule therapy sessions. Please call back.     Action Taken: Other:  ortho    Travel Screening: Not Applicable

## 2020-05-29 NOTE — TELEPHONE ENCOUNTER
Patient was informed he will have a sling post-op but will start moving the arm right away.  He plans to set up appointments for physical therapy [facility].  He is aware he should have PT 3 times a week x 2 weeks.  He states he has no further questions concerning post-op expectations.

## 2020-05-30 DIAGNOSIS — Z11.59 ENCOUNTER FOR SCREENING FOR OTHER VIRAL DISEASES: ICD-10-CM

## 2020-05-30 PROCEDURE — 99000 SPECIMEN HANDLING OFFICE-LAB: CPT | Performed by: ORTHOPAEDIC SURGERY

## 2020-05-30 PROCEDURE — 87635 SARS-COV-2 COVID-19 AMP PRB: CPT | Mod: 90 | Performed by: ORTHOPAEDIC SURGERY

## 2020-05-31 LAB
SARS-COV-2 RNA SPEC QL NAA+PROBE: NOT DETECTED
SPECIMEN SOURCE: NORMAL

## 2020-06-01 ENCOUNTER — ANESTHESIA EVENT (OUTPATIENT)
Dept: SURGERY | Facility: CLINIC | Age: 50
End: 2020-06-01
Payer: COMMERCIAL

## 2020-06-01 NOTE — ANESTHESIA PREPROCEDURE EVALUATION
Anesthesia Pre-Procedure Evaluation    Patient: Gaurang Rivera   MRN:     [id number] Gender:   male   Age:    49 year old :      1970        Preoperative Diagnosis: Closed displaced fracture of glenoid cavity of right scapula, sequela [S42.141S]  Closed displaced fracture of neck of right scapula, sequela [S42.151S]   Procedure(s):  Right shoulder diagnostic arthroscopy, capsular release, manipulation under anesthesia and possible open biceps tenodesis     LABS:  CBC:   Lab Results   Component Value Date    WBC 6.8 2020    WBC 6.1 2020    HGB 16.1 2020    HGB 16.9 2020    HCT 47.8 2020    HCT 48.9 2020     2020     2020     BMP:   Lab Results   Component Value Date     2020     10/10/2019    POTASSIUM 4.0 2020    POTASSIUM 3.9 10/10/2019    CHLORIDE 105 2020    CHLORIDE 105 10/10/2019    CO2 25 2020    CO2 24 10/10/2019    BUN 18 2020    BUN 13 10/10/2019    CR 0.94 2020    CR 0.84 10/10/2019     (H) 2020     (H) 10/10/2019     COAGS:   Lab Results   Component Value Date    INR 2.40 (H) 2020     POC:   Lab Results   Component Value Date     (H) 2020     OTHER:   Lab Results   Component Value Date    A1C 7.2 (H) 10/10/2019    SAVI 8.9 2020    ALBUMIN 4.1 2020    PROTTOTAL 7.8 2020    ALT 64 2020    AST 45 2020    ALKPHOS 62 2020    BILITOTAL 0.5 2020    TSH 0.88 10/10/2019    CRP 5.5 2020    SED 6 2020        Preop Vitals    BP Readings from Last 3 Encounters:   20 116/82   20 127/78   20 (!) 125/91    Pulse Readings from Last 3 Encounters:   20 82   20 92   20 93      Resp Readings from Last 3 Encounters:   20 14   20 20   20 18    SpO2 Readings from Last 3 Encounters:   20 94%   20 98%   20 96%      Temp Readings from Last 1 Encounters:  "  05/28/20 36.8  C (98.2  F) (Tympanic)    Ht Readings from Last 1 Encounters:   05/28/20 2 m (6' 6.75\")      Wt Readings from Last 1 Encounters:   05/28/20 132.9 kg (293 lb)    Estimated body mass index is 33.22 kg/m  as calculated from the following:    Height as of 5/28/20: 2 m (6' 6.75\").    Weight as of 5/28/20: 132.9 kg (293 lb).     LDA:  Closed/Suction Drain 1 Midline Other (Comment) Bulb 15 Barb[place] (Active)   Number of days: 101        Past Medical History:   Diagnosis Date     Antiplatelet or antithrombotic long-term use      Depressive disorder 2001     Diabetes (H)      DVT (deep venous thrombosis) (H) 07/05/2019     Elevated blood pressure reading without diagnosis of hypertension 5/13/2018     Hypercholesteremia 2012     Personal history of other medical treatment     blood clot foot july 2019     Pilonidal cyst 5/13/2018      Past Surgical History:   Procedure Laterality Date     APPENDECTOMY       AS DRAIN PILONIDAL CYST SIMPLE       BACK SURGERY  1997    spinal fusion     CHOLECYSTECTOMY  2012     CYSTECTOMY PILONIDAL N/A 2/21/2020    Procedure: EXCISION, PILONIDAL CYST, AILEEN II Procedure;  Surgeon: Artemio Ahumada MD;  Location: [facility] OR     THUMB SURGERY   1995      No Known Allergies     Anesthesia Evaluation     . Pt has had prior anesthetic.     No history of anesthetic complications          ROS/MED HX    ENT/Pulmonary:  - neg pulmonary ROS     Neurologic:  - neg neurologic ROS     Cardiovascular:     (+) ----. Taking blood thinners Pt has received instructions: Instructions Given to patient: hold for five days prior to surgery. . . :. . Previous cardiac testing date:results:date: results:ECG reviewed date:5/28/20 results:Sinus rhythm, occasional PACs date: results:          METS/Exercise Tolerance:     Hematologic:     (+) History of blood clots pt is anticoagulated, Other Hematologic Disorder-heterozygous for Factor V Leiden      Musculoskeletal: Comment: Fractured scapula last year " after falling on ice        GI/Hepatic:  - neg GI/hepatic ROS       Renal/Genitourinary:  - ROS Renal section negative       Endo:     (+) type II DM .      Psychiatric:     (+) psychiatric history depression      Infectious Disease: Comment: COVID negative 5/30 - neg infectious disease ROS       Malignancy:      - no malignancy   Other:    (+) no H/O Chronic Pain,no other significant disability                        PHYSICAL EXAM:   Mental Status/Neuro: A/A/O   Airway: Facies: Feasible  Mallampati: II  Mouth/Opening: Full  TM distance: > 6 cm  Neck ROM: Full   Respiratory: Auscultation: CTAB     Resp. Rate: Normal     Resp. Effort: Normal      CV: Rhythm: Regular  Rate: Age appropriate  Heart: Normal Sounds  Edema: None   Comments:      Dental: Normal Dentition                Assessment:   ASA SCORE: 2    H&P: History and physical reviewed and following examination; no interval change.   Smoking Status:  Non-Smoker/Unknown   NPO Status: NPO Appropriate     Plan:   Anes. Type:  General; For Post-op pain in coordination with surgeon; Peripheral Nerve Block     Block Details: Single Shot; Exparel; Interscalene   Pre-Medication: None   Induction:  IV (Standard)   Airway: ETT; Oral   Access/Monitoring: PIV   Maintenance: Balanced     Postop Plan:   Postop Pain: Opioids  Postop Sedation/Airway: Not planned  Disposition: Inpatient/Admit     PONV Management:   Adult Risk Factors:, Non-Smoker, Postop Opioids   Prevention: Ondansetron     CONSENT: Direct conversation   Plan and risks discussed with: Patient   Blood Products: Consented (ALL Blood Products)       Comments for Plan/Consent:  Discussed risks of general anesthesia, including sore throat/hoarse voice, abrasions/damage to lips/tongue/teeth, nausea, rare complications (including medication reactions, cardiac, pulmonary).  Discussed risks of nerve block, including nerve injury, bleeding, infection, phrenic nerve palsy/shortness of breath, hoarseness, An  syndrome.                   Kristal Koenig MD

## 2020-06-02 ENCOUNTER — ANESTHESIA (OUTPATIENT)
Dept: SURGERY | Facility: CLINIC | Age: 50
End: 2020-06-02
Payer: COMMERCIAL

## 2020-06-02 ENCOUNTER — HOSPITAL ENCOUNTER (OUTPATIENT)
Facility: CLINIC | Age: 50
Discharge: HOME | End: 2020-06-04
Attending: ORTHOPAEDIC SURGERY | Admitting: ORTHOPAEDIC SURGERY
Payer: COMMERCIAL

## 2020-06-02 ENCOUNTER — SURGERY (OUTPATIENT)
Age: 50
End: 2020-06-02
Payer: COMMERCIAL

## 2020-06-02 DIAGNOSIS — I48.91 ATRIAL FIBRILLATION, UNSPECIFIED TYPE (H): Primary | ICD-10-CM

## 2020-06-02 DIAGNOSIS — L05.91 PILONIDAL CYST: ICD-10-CM

## 2020-06-02 DIAGNOSIS — M25.611 SHOULDER STIFFNESS, RIGHT: ICD-10-CM

## 2020-06-02 DIAGNOSIS — S42.141S: ICD-10-CM

## 2020-06-02 DIAGNOSIS — S42.151S: ICD-10-CM

## 2020-06-02 LAB
ANION GAP SERPL CALCULATED.3IONS-SCNC: 10 MMOL/L (ref 3–14)
BUN SERPL-MCNC: 17 MG/DL (ref 7–30)
CA-I BLD-MCNC: 4.5 MG/DL (ref 4.4–5.2)
CALCIUM SERPL-MCNC: 8.7 MG/DL (ref 8.5–10.1)
CHLORIDE SERPL-SCNC: 105 MMOL/L (ref 94–109)
CO2 SERPL-SCNC: 23 MMOL/L (ref 20–32)
CREAT SERPL-MCNC: 0.88 MG/DL (ref 0.66–1.25)
GFR SERPL CREATININE-BSD FRML MDRD: >90 ML/MIN/{1.73_M2}
GLUCOSE BLDC GLUCOMTR-MCNC: 181 MG/DL (ref 70–99)
GLUCOSE BLDC GLUCOMTR-MCNC: 216 MG/DL (ref 70–99)
GLUCOSE SERPL-MCNC: 184 MG/DL (ref 70–99)
INR PPP: 1.14 (ref 0.86–1.14)
MAGNESIUM SERPL-MCNC: 2 MG/DL (ref 1.6–2.3)
NT-PROBNP SERPL-MCNC: 8 PG/ML (ref 0–900)
PHOSPHATE SERPL-MCNC: 4.4 MG/DL (ref 2.5–4.5)
POTASSIUM SERPL-SCNC: 4 MMOL/L (ref 3.4–5.3)
POTASSIUM SERPL-SCNC: 4.3 MMOL/L (ref 3.4–5.3)
SODIUM SERPL-SCNC: 138 MMOL/L (ref 133–144)
TROPONIN I SERPL-MCNC: <0.015 UG/L (ref 0–0.04)
TROPONIN I SERPL-MCNC: <0.015 UG/L (ref 0–0.04)
TSH SERPL DL<=0.005 MIU/L-ACNC: 1.21 MU/L (ref 0.4–4)

## 2020-06-02 PROCEDURE — 82330 ASSAY OF CALCIUM: CPT | Performed by: ANESTHESIOLOGY

## 2020-06-02 PROCEDURE — 84484 ASSAY OF TROPONIN QUANT: CPT | Performed by: INTERNAL MEDICINE

## 2020-06-02 PROCEDURE — 25000128 H RX IP 250 OP 636: Performed by: ORTHOPAEDIC SURGERY

## 2020-06-02 PROCEDURE — C1713 ANCHOR/SCREW BN/BN,TIS/BN: HCPCS | Performed by: ORTHOPAEDIC SURGERY

## 2020-06-02 PROCEDURE — 25000125 ZZHC RX 250: Performed by: STUDENT IN AN ORGANIZED HEALTH CARE EDUCATION/TRAINING PROGRAM

## 2020-06-02 PROCEDURE — 25000125 ZZHC RX 250: Performed by: ANESTHESIOLOGY

## 2020-06-02 PROCEDURE — 27110028 ZZH OR GENERAL SUPPLY NON-STERILE: Performed by: ORTHOPAEDIC SURGERY

## 2020-06-02 PROCEDURE — 36000064 ZZH SURGERY LEVEL 4 EA 15 ADDTL MIN - UMMC: Performed by: ORTHOPAEDIC SURGERY

## 2020-06-02 PROCEDURE — 25800030 ZZH RX IP 258 OP 636: Performed by: STUDENT IN AN ORGANIZED HEALTH CARE EDUCATION/TRAINING PROGRAM

## 2020-06-02 PROCEDURE — 25000132 ZZH RX MED GY IP 250 OP 250 PS 637: Performed by: ORTHOPAEDIC SURGERY

## 2020-06-02 PROCEDURE — 25000125 ZZHC RX 250: Performed by: ORTHOPAEDIC SURGERY

## 2020-06-02 PROCEDURE — 84443 ASSAY THYROID STIM HORMONE: CPT | Performed by: ANESTHESIOLOGY

## 2020-06-02 PROCEDURE — 25000128 H RX IP 250 OP 636: Performed by: STUDENT IN AN ORGANIZED HEALTH CARE EDUCATION/TRAINING PROGRAM

## 2020-06-02 PROCEDURE — 84132 ASSAY OF SERUM POTASSIUM: CPT | Mod: 91 | Performed by: ANESTHESIOLOGY

## 2020-06-02 PROCEDURE — 25800030 ZZH RX IP 258 OP 636

## 2020-06-02 PROCEDURE — C9290 INJ, BUPIVACAINE LIPOSOME: HCPCS | Performed by: ANESTHESIOLOGY

## 2020-06-02 PROCEDURE — 99204 OFFICE O/P NEW MOD 45 MIN: CPT | Performed by: INTERNAL MEDICINE

## 2020-06-02 PROCEDURE — 25000132 ZZH RX MED GY IP 250 OP 250 PS 637: Performed by: INTERNAL MEDICINE

## 2020-06-02 PROCEDURE — 37000009 ZZH ANESTHESIA TECHNICAL FEE, EACH ADDTL 15 MIN: Performed by: ORTHOPAEDIC SURGERY

## 2020-06-02 PROCEDURE — 27210794 ZZH OR GENERAL SUPPLY STERILE: Performed by: ORTHOPAEDIC SURGERY

## 2020-06-02 PROCEDURE — 85610 PROTHROMBIN TIME: CPT | Performed by: ORTHOPAEDIC SURGERY

## 2020-06-02 PROCEDURE — 93010 ELECTROCARDIOGRAM REPORT: CPT | Performed by: INTERNAL MEDICINE

## 2020-06-02 PROCEDURE — 82962 GLUCOSE BLOOD TEST: CPT

## 2020-06-02 PROCEDURE — 84100 ASSAY OF PHOSPHORUS: CPT | Performed by: ANESTHESIOLOGY

## 2020-06-02 PROCEDURE — 36415 COLL VENOUS BLD VENIPUNCTURE: CPT | Performed by: ANESTHESIOLOGY

## 2020-06-02 PROCEDURE — 80048 BASIC METABOLIC PNL TOTAL CA: CPT | Performed by: ANESTHESIOLOGY

## 2020-06-02 PROCEDURE — 83880 ASSAY OF NATRIURETIC PEPTIDE: CPT | Performed by: ANESTHESIOLOGY

## 2020-06-02 PROCEDURE — 71000015 ZZH RECOVERY PHASE 1 LEVEL 2 EA ADDTL HR: Performed by: ORTHOPAEDIC SURGERY

## 2020-06-02 PROCEDURE — 36415 COLL VENOUS BLD VENIPUNCTURE: CPT | Performed by: INTERNAL MEDICINE

## 2020-06-02 PROCEDURE — 25000128 H RX IP 250 OP 636: Performed by: INTERNAL MEDICINE

## 2020-06-02 PROCEDURE — 36415 COLL VENOUS BLD VENIPUNCTURE: CPT | Performed by: ORTHOPAEDIC SURGERY

## 2020-06-02 PROCEDURE — 36000062 ZZH SURGERY LEVEL 4 1ST 30 MIN - UMMC: Performed by: ORTHOPAEDIC SURGERY

## 2020-06-02 PROCEDURE — 37000008 ZZH ANESTHESIA TECHNICAL FEE, 1ST 30 MIN: Performed by: ORTHOPAEDIC SURGERY

## 2020-06-02 PROCEDURE — 84484 ASSAY OF TROPONIN QUANT: CPT | Performed by: ANESTHESIOLOGY

## 2020-06-02 PROCEDURE — 25000128 H RX IP 250 OP 636: Performed by: ANESTHESIOLOGY

## 2020-06-02 PROCEDURE — 83735 ASSAY OF MAGNESIUM: CPT | Performed by: ANESTHESIOLOGY

## 2020-06-02 PROCEDURE — 93005 ELECTROCARDIOGRAM TRACING: CPT

## 2020-06-02 PROCEDURE — 25000566 ZZH SEVOFLURANE, EA 15 MIN: Performed by: ORTHOPAEDIC SURGERY

## 2020-06-02 PROCEDURE — 40000170 ZZH STATISTIC PRE-PROCEDURE ASSESSMENT II: Performed by: ORTHOPAEDIC SURGERY

## 2020-06-02 PROCEDURE — 71000014 ZZH RECOVERY PHASE 1 LEVEL 2 FIRST HR: Performed by: ORTHOPAEDIC SURGERY

## 2020-06-02 DEVICE — IMP ANCHOR ARTHREX BC SWVLK TENODESIS 6.25X19.1MM AR-1662BC: Type: IMPLANTABLE DEVICE | Site: SHOULDER | Status: FUNCTIONAL

## 2020-06-02 RX ORDER — OXYCODONE HYDROCHLORIDE 5 MG/1
5-10 TABLET ORAL EVERY 4 HOURS PRN
Qty: 20 TABLET | Refills: 0 | Status: SHIPPED | OUTPATIENT
Start: 2020-06-02 | End: 2020-11-25

## 2020-06-02 RX ORDER — OXYCODONE HYDROCHLORIDE 5 MG/1
5-10 TABLET ORAL EVERY 4 HOURS PRN
Status: DISCONTINUED | OUTPATIENT
Start: 2020-06-02 | End: 2020-06-04 | Stop reason: HOSPADM

## 2020-06-02 RX ORDER — ONDANSETRON 4 MG/1
4 TABLET, ORALLY DISINTEGRATING ORAL EVERY 30 MIN PRN
Status: DISCONTINUED | OUTPATIENT
Start: 2020-06-02 | End: 2020-06-02 | Stop reason: HOSPADM

## 2020-06-02 RX ORDER — LIDOCAINE HYDROCHLORIDE 20 MG/ML
INJECTION, SOLUTION INFILTRATION; PERINEURAL PRN
Status: DISCONTINUED | OUTPATIENT
Start: 2020-06-02 | End: 2020-06-02

## 2020-06-02 RX ORDER — HYDROMORPHONE HYDROCHLORIDE 1 MG/ML
.3-.5 INJECTION, SOLUTION INTRAMUSCULAR; INTRAVENOUS; SUBCUTANEOUS EVERY 10 MIN PRN
Status: DISCONTINUED | OUTPATIENT
Start: 2020-06-02 | End: 2020-06-02 | Stop reason: HOSPADM

## 2020-06-02 RX ORDER — LISINOPRIL 5 MG/1
5 TABLET ORAL DAILY
Status: DISCONTINUED | OUTPATIENT
Start: 2020-06-02 | End: 2020-06-02

## 2020-06-02 RX ORDER — NALOXONE HYDROCHLORIDE 0.4 MG/ML
.1-.4 INJECTION, SOLUTION INTRAMUSCULAR; INTRAVENOUS; SUBCUTANEOUS
Status: DISCONTINUED | OUTPATIENT
Start: 2020-06-02 | End: 2020-06-02 | Stop reason: HOSPADM

## 2020-06-02 RX ORDER — NALOXONE HYDROCHLORIDE 0.4 MG/ML
.1-.4 INJECTION, SOLUTION INTRAMUSCULAR; INTRAVENOUS; SUBCUTANEOUS
Status: DISCONTINUED | OUTPATIENT
Start: 2020-06-02 | End: 2020-06-04 | Stop reason: HOSPADM

## 2020-06-02 RX ORDER — WARFARIN SODIUM 7.5 MG/1
7.5 TABLET ORAL
Status: COMPLETED | OUTPATIENT
Start: 2020-06-02 | End: 2020-06-02

## 2020-06-02 RX ORDER — ACETAMINOPHEN 325 MG/1
650 TABLET ORAL 4 TIMES DAILY
Qty: 100 TABLET | Refills: 0 | Status: SHIPPED | OUTPATIENT
Start: 2020-06-02 | End: 2022-05-03

## 2020-06-02 RX ORDER — FLUMAZENIL 0.1 MG/ML
0.2 INJECTION, SOLUTION INTRAVENOUS
Status: DISCONTINUED | OUTPATIENT
Start: 2020-06-02 | End: 2020-06-02 | Stop reason: HOSPADM

## 2020-06-02 RX ORDER — FENTANYL CITRATE 50 UG/ML
INJECTION, SOLUTION INTRAMUSCULAR; INTRAVENOUS PRN
Status: DISCONTINUED | OUTPATIENT
Start: 2020-06-02 | End: 2020-06-02

## 2020-06-02 RX ORDER — METOPROLOL TARTRATE 1 MG/ML
2.5 INJECTION, SOLUTION INTRAVENOUS ONCE
Status: CANCELLED | OUTPATIENT
Start: 2020-06-02

## 2020-06-02 RX ORDER — SODIUM CHLORIDE, SODIUM LACTATE, POTASSIUM CHLORIDE, CALCIUM CHLORIDE 600; 310; 30; 20 MG/100ML; MG/100ML; MG/100ML; MG/100ML
INJECTION, SOLUTION INTRAVENOUS CONTINUOUS PRN
Status: DISCONTINUED | OUTPATIENT
Start: 2020-06-02 | End: 2020-06-02

## 2020-06-02 RX ORDER — AMOXICILLIN 250 MG
1-2 CAPSULE ORAL 2 TIMES DAILY
Qty: 20 TABLET | Refills: 0 | Status: SHIPPED | OUTPATIENT
Start: 2020-06-02 | End: 2020-11-25

## 2020-06-02 RX ORDER — DEXAMETHASONE SODIUM PHOSPHATE 4 MG/ML
INJECTION, SOLUTION INTRA-ARTICULAR; INTRALESIONAL; INTRAMUSCULAR; INTRAVENOUS; SOFT TISSUE PRN
Status: DISCONTINUED | OUTPATIENT
Start: 2020-06-02 | End: 2020-06-02

## 2020-06-02 RX ORDER — SODIUM CHLORIDE, SODIUM LACTATE, POTASSIUM CHLORIDE, CALCIUM CHLORIDE 600; 310; 30; 20 MG/100ML; MG/100ML; MG/100ML; MG/100ML
INJECTION, SOLUTION INTRAVENOUS CONTINUOUS
Status: DISCONTINUED | OUTPATIENT
Start: 2020-06-02 | End: 2020-06-02 | Stop reason: HOSPADM

## 2020-06-02 RX ORDER — FENTANYL CITRATE 50 UG/ML
25-50 INJECTION, SOLUTION INTRAMUSCULAR; INTRAVENOUS EVERY 5 MIN PRN
Status: DISCONTINUED | OUTPATIENT
Start: 2020-06-02 | End: 2020-06-02 | Stop reason: HOSPADM

## 2020-06-02 RX ORDER — ONDANSETRON 4 MG/1
4 TABLET, ORALLY DISINTEGRATING ORAL
Status: DISCONTINUED | OUTPATIENT
Start: 2020-06-02 | End: 2020-06-02

## 2020-06-02 RX ORDER — ONDANSETRON 4 MG/1
4 TABLET, ORALLY DISINTEGRATING ORAL EVERY 6 HOURS PRN
Status: DISCONTINUED | OUTPATIENT
Start: 2020-06-02 | End: 2020-06-04 | Stop reason: HOSPADM

## 2020-06-02 RX ORDER — SIMVASTATIN 40 MG
40 TABLET ORAL AT BEDTIME
Status: DISCONTINUED | OUTPATIENT
Start: 2020-06-02 | End: 2020-06-04 | Stop reason: HOSPADM

## 2020-06-02 RX ORDER — ONDANSETRON 2 MG/ML
4 INJECTION INTRAMUSCULAR; INTRAVENOUS EVERY 30 MIN PRN
Status: DISCONTINUED | OUTPATIENT
Start: 2020-06-02 | End: 2020-06-02 | Stop reason: HOSPADM

## 2020-06-02 RX ORDER — ONDANSETRON 2 MG/ML
INJECTION INTRAMUSCULAR; INTRAVENOUS PRN
Status: DISCONTINUED | OUTPATIENT
Start: 2020-06-02 | End: 2020-06-02

## 2020-06-02 RX ORDER — METOPROLOL TARTRATE 1 MG/ML
2.5-5 INJECTION, SOLUTION INTRAVENOUS EVERY 6 HOURS PRN
Status: DISCONTINUED | OUTPATIENT
Start: 2020-06-02 | End: 2020-06-04 | Stop reason: HOSPADM

## 2020-06-02 RX ORDER — DEXTROSE MONOHYDRATE 25 G/50ML
25-50 INJECTION, SOLUTION INTRAVENOUS
Status: DISCONTINUED | OUTPATIENT
Start: 2020-06-02 | End: 2020-06-04 | Stop reason: HOSPADM

## 2020-06-02 RX ORDER — IBUPROFEN 800 MG/1
800 TABLET, FILM COATED ORAL 3 TIMES DAILY
Qty: 42 TABLET | Refills: 0 | Status: SHIPPED | OUTPATIENT
Start: 2020-06-02 | End: 2020-11-25

## 2020-06-02 RX ORDER — OXYCODONE HYDROCHLORIDE 5 MG/1
5 TABLET ORAL
Status: DISCONTINUED | OUTPATIENT
Start: 2020-06-02 | End: 2020-06-02

## 2020-06-02 RX ORDER — CEFAZOLIN SODIUM 1 G/3ML
1 INJECTION, POWDER, FOR SOLUTION INTRAMUSCULAR; INTRAVENOUS SEE ADMIN INSTRUCTIONS
Status: DISCONTINUED | OUTPATIENT
Start: 2020-06-02 | End: 2020-06-02 | Stop reason: HOSPADM

## 2020-06-02 RX ORDER — SODIUM CHLORIDE AND POTASSIUM CHLORIDE 150; 900 MG/100ML; MG/100ML
INJECTION, SOLUTION INTRAVENOUS CONTINUOUS
Status: DISCONTINUED | OUTPATIENT
Start: 2020-06-02 | End: 2020-06-04 | Stop reason: HOSPADM

## 2020-06-02 RX ORDER — METOPROLOL TARTRATE 1 MG/ML
2.5 INJECTION, SOLUTION INTRAVENOUS ONCE
Status: COMPLETED | OUTPATIENT
Start: 2020-06-02 | End: 2020-06-02

## 2020-06-02 RX ORDER — NICOTINE POLACRILEX 4 MG
15-30 LOZENGE BUCCAL
Status: DISCONTINUED | OUTPATIENT
Start: 2020-06-02 | End: 2020-06-04 | Stop reason: HOSPADM

## 2020-06-02 RX ORDER — ESMOLOL HYDROCHLORIDE 10 MG/ML
INJECTION INTRAVENOUS PRN
Status: DISCONTINUED | OUTPATIENT
Start: 2020-06-02 | End: 2020-06-02

## 2020-06-02 RX ORDER — CEFAZOLIN SODIUM IN 0.9 % NACL 3 G/100 ML
3 INTRAVENOUS SOLUTION, PIGGYBACK (ML) INTRAVENOUS
Status: COMPLETED | OUTPATIENT
Start: 2020-06-02 | End: 2020-06-02

## 2020-06-02 RX ORDER — FENTANYL CITRATE 50 UG/ML
25-50 INJECTION, SOLUTION INTRAMUSCULAR; INTRAVENOUS
Status: DISCONTINUED | OUTPATIENT
Start: 2020-06-02 | End: 2020-06-02 | Stop reason: HOSPADM

## 2020-06-02 RX ORDER — BUPIVACAINE HYDROCHLORIDE 2.5 MG/ML
INJECTION, SOLUTION EPIDURAL; INFILTRATION; INTRACAUDAL PRN
Status: DISCONTINUED | OUTPATIENT
Start: 2020-06-02 | End: 2020-06-02

## 2020-06-02 RX ORDER — METOPROLOL TARTRATE 1 MG/ML
5 INJECTION, SOLUTION INTRAVENOUS EVERY 6 HOURS PRN
Status: DISCONTINUED | OUTPATIENT
Start: 2020-06-02 | End: 2020-06-02

## 2020-06-02 RX ORDER — EPHEDRINE SULFATE 50 MG/ML
INJECTION, SOLUTION INTRAMUSCULAR; INTRAVENOUS; SUBCUTANEOUS PRN
Status: DISCONTINUED | OUTPATIENT
Start: 2020-06-02 | End: 2020-06-02

## 2020-06-02 RX ORDER — LIDOCAINE 40 MG/G
CREAM TOPICAL
Status: DISCONTINUED | OUTPATIENT
Start: 2020-06-02 | End: 2020-06-04 | Stop reason: HOSPADM

## 2020-06-02 RX ORDER — ONDANSETRON 2 MG/ML
4 INJECTION INTRAMUSCULAR; INTRAVENOUS EVERY 6 HOURS PRN
Status: DISCONTINUED | OUTPATIENT
Start: 2020-06-02 | End: 2020-06-04 | Stop reason: HOSPADM

## 2020-06-02 RX ORDER — SODIUM CHLORIDE 9 MG/ML
INJECTION, SOLUTION INTRAVENOUS
Status: COMPLETED
Start: 2020-06-02 | End: 2020-06-02

## 2020-06-02 RX ORDER — PROPOFOL 10 MG/ML
INJECTION, EMULSION INTRAVENOUS PRN
Status: DISCONTINUED | OUTPATIENT
Start: 2020-06-02 | End: 2020-06-02

## 2020-06-02 RX ORDER — VENLAFAXINE HYDROCHLORIDE 150 MG/1
300 CAPSULE, EXTENDED RELEASE ORAL DAILY
Status: DISCONTINUED | OUTPATIENT
Start: 2020-06-02 | End: 2020-06-04 | Stop reason: HOSPADM

## 2020-06-02 RX ADMIN — METOPROLOL TARTRATE 12.5 MG: 25 TABLET ORAL at 19:24

## 2020-06-02 RX ADMIN — PHENYLEPHRINE HYDROCHLORIDE 100 MCG: 10 INJECTION INTRAVENOUS at 12:51

## 2020-06-02 RX ADMIN — OXYCODONE HYDROCHLORIDE 5 MG: 5 TABLET ORAL at 21:30

## 2020-06-02 RX ADMIN — ESMOLOL HYDROCHLORIDE 20 MG: 10 INJECTION, SOLUTION INTRAVENOUS at 14:52

## 2020-06-02 RX ADMIN — METOPROLOL TARTRATE 2.5 MG: 5 INJECTION, SOLUTION INTRAVENOUS at 15:27

## 2020-06-02 RX ADMIN — PHENYLEPHRINE HYDROCHLORIDE 100 MCG: 10 INJECTION INTRAVENOUS at 13:10

## 2020-06-02 RX ADMIN — ONDANSETRON 4 MG: 2 INJECTION INTRAMUSCULAR; INTRAVENOUS at 13:23

## 2020-06-02 RX ADMIN — ESMOLOL HYDROCHLORIDE 50 MG: 10 INJECTION, SOLUTION INTRAVENOUS at 12:04

## 2020-06-02 RX ADMIN — BUPIVACAINE HYDROCHLORIDE 10 ML: 2.5 INJECTION, SOLUTION EPIDURAL; INFILTRATION; INTRACAUDAL at 10:56

## 2020-06-02 RX ADMIN — VENLAFAXINE HYDROCHLORIDE 300 MG: 150 CAPSULE, EXTENDED RELEASE ORAL at 21:30

## 2020-06-02 RX ADMIN — Medication 10 MG: at 12:48

## 2020-06-02 RX ADMIN — PHENYLEPHRINE HYDROCHLORIDE 100 MCG: 10 INJECTION INTRAVENOUS at 13:05

## 2020-06-02 RX ADMIN — PHENYLEPHRINE HYDROCHLORIDE 100 MCG: 10 INJECTION INTRAVENOUS at 12:39

## 2020-06-02 RX ADMIN — PHENYLEPHRINE HYDROCHLORIDE 100 MCG: 10 INJECTION INTRAVENOUS at 12:35

## 2020-06-02 RX ADMIN — Medication 3 G: at 12:18

## 2020-06-02 RX ADMIN — DEXAMETHASONE SODIUM PHOSPHATE 4 MG: 4 INJECTION, SOLUTION INTRAMUSCULAR; INTRAVENOUS at 12:42

## 2020-06-02 RX ADMIN — LIDOCAINE HYDROCHLORIDE 100 MG: 20 INJECTION, SOLUTION INFILTRATION; PERINEURAL at 12:03

## 2020-06-02 RX ADMIN — ROCURONIUM BROMIDE 5 MG: 10 INJECTION INTRAVENOUS at 12:03

## 2020-06-02 RX ADMIN — Medication 5 MG: at 12:45

## 2020-06-02 RX ADMIN — POTASSIUM CHLORIDE AND SODIUM CHLORIDE: 900; 150 INJECTION, SOLUTION INTRAVENOUS at 21:38

## 2020-06-02 RX ADMIN — FENTANYL CITRATE 50 MCG: 50 INJECTION INTRAMUSCULAR; INTRAVENOUS at 10:43

## 2020-06-02 RX ADMIN — PHENYLEPHRINE HYDROCHLORIDE 100 MCG: 10 INJECTION INTRAVENOUS at 12:41

## 2020-06-02 RX ADMIN — EPINEPHRINE 3500 ML: 1 INJECTION, SOLUTION INTRAMUSCULAR; SUBCUTANEOUS at 12:55

## 2020-06-02 RX ADMIN — MIDAZOLAM 1 MG: 1 INJECTION INTRAMUSCULAR; INTRAVENOUS at 10:42

## 2020-06-02 RX ADMIN — PROPOFOL 50 MG: 10 INJECTION, EMULSION INTRAVENOUS at 12:08

## 2020-06-02 RX ADMIN — WARFARIN SODIUM 7.5 MG: 7.5 TABLET ORAL at 19:24

## 2020-06-02 RX ADMIN — Medication 500 ML: at 19:27

## 2020-06-02 RX ADMIN — SODIUM CHLORIDE 500 ML: 9 INJECTION, SOLUTION INTRAVENOUS at 19:27

## 2020-06-02 RX ADMIN — SIMVASTATIN 40 MG: 40 TABLET, FILM COATED ORAL at 21:30

## 2020-06-02 RX ADMIN — PROPOFOL 200 MG: 10 INJECTION, EMULSION INTRAVENOUS at 12:03

## 2020-06-02 RX ADMIN — PHENYLEPHRINE HYDROCHLORIDE 100 MCG: 10 INJECTION INTRAVENOUS at 12:10

## 2020-06-02 RX ADMIN — BUPIVACAINE 10 ML: 13.3 INJECTION, SUSPENSION, LIPOSOMAL INFILTRATION at 10:56

## 2020-06-02 RX ADMIN — FENTANYL CITRATE 50 MCG: 50 INJECTION, SOLUTION INTRAMUSCULAR; INTRAVENOUS at 12:00

## 2020-06-02 RX ADMIN — PHENYLEPHRINE HYDROCHLORIDE 0.3 MCG/KG/MIN: 10 INJECTION INTRAVENOUS at 12:48

## 2020-06-02 RX ADMIN — PROPOFOL 50 MG: 10 INJECTION, EMULSION INTRAVENOUS at 12:05

## 2020-06-02 RX ADMIN — SODIUM CHLORIDE, POTASSIUM CHLORIDE, SODIUM LACTATE AND CALCIUM CHLORIDE: 600; 310; 30; 20 INJECTION, SOLUTION INTRAVENOUS at 11:50

## 2020-06-02 RX ADMIN — SODIUM CHLORIDE, POTASSIUM CHLORIDE, SODIUM LACTATE AND CALCIUM CHLORIDE 500 ML: 600; 310; 30; 20 INJECTION, SOLUTION INTRAVENOUS at 16:00

## 2020-06-02 RX ADMIN — PROPOFOL 50 MG: 10 INJECTION, EMULSION INTRAVENOUS at 12:12

## 2020-06-02 RX ADMIN — Medication 150 MG: at 12:03

## 2020-06-02 RX ADMIN — ESMOLOL HYDROCHLORIDE 20 MG: 10 INJECTION, SOLUTION INTRAVENOUS at 15:02

## 2020-06-02 ASSESSMENT — MIFFLIN-ST. JEOR: SCORE: 2313.25

## 2020-06-02 NOTE — ANESTHESIA CARE TRANSFER NOTE
Patient: Gaurang Rivera    Procedure(s):  Right shoulder diagnostic arthroscopy, capsular release,  open biceps tenodesis    Diagnosis: Closed displaced fracture of glenoid cavity of right scapula, sequela [S42.141S]  Closed displaced fracture of neck of right scapula, sequela [S42.151S]  Diagnosis Additional Information: No value filed.    Anesthesia Type:   General, For Post-op pain in coordination with surgeon, Peripheral Nerve Block     Note:  Airway :Face Mask  Patient transferred to:PACU  Handoff Report: Identifed the Patient, Identified the Reponsible Provider, Reviewed the pertinent medical history, Discussed the surgical course, Reviewed Intra-OP anesthesia mangement and issues during anesthesia, Set expectations for post-procedure period and Allowed opportunity for questions and acknowledgement of understanding      Vitals: (Last set prior to Anesthesia Care Transfer)    CRNA VITALS  6/2/2020 1313 - 6/2/2020 1349      6/2/2020             Pulse:  75    Ht Rate:  137    SpO2:  95 %    Resp Rate (observed): 12                Electronically Signed By: Kristal Koenig MD  June 2, 2020  1:49 PM

## 2020-06-02 NOTE — CONSULTS
HOSPITALIST CONSULTATION     REQUESTING PHYSICIAN: Dulce Maria Burdick MD    REASON FOR CONSULTATION: Evaluation, Recommendations and Co-management of Medical Comorbidities.     ASSESSMENT & PLAN :     Gaurang Rivera is a 49 year old male admitted on 6/2/2020 s/p following procedure: Right shoulder diagnostic arthroscopy and open biceps tenodesis for Right shoulder glenoid fracture malunion with post-traumatic stiffness; Biceps tendinopathy by Dr Burdick. Minimal blood loss. No intra op complications.     Post op, in PACU had new onset afib with RVR with soft BP. Hence admitted to medical tele bed for overnight monitoring.   Ortho primary.   Medicine consulted for medical co management.     During my evaluation on medical floor, patient reports doing well. Pain controlled. No fever. Denies cp or sob. No LH or palpitations. No NV.     # Afib with RVR: EKG reviewed. New onset, no prior hx. No hx cad or heart failure or PE. suspect paroxysmal, in the post op setting likely related to adrenergic drive, fluid shift etc.   Patient asymptomatic. Trop neg x 2, unlikely ACS. No lung hx. Patient with hx of DVT on AC on Warfarin, held pre op. Now resuming.   Hemodynamics: BP initially soft. Improving to high 90s/70s after fluid bolus. Breathing and oxygenating well on RA.   TSH: 1.21. K 4.3. Mg 2.0.   S/p iv metoprolol in pacu. Pacu team reviewed with cardiology team.     - Start on metoprolol 12.5 bid. Goal: rate control <120  - iv Metoprolol  2.5-5 mg prn for HR >120   - Tele monitor  - Hydration: IVF  ml bolus--> 125/hr  - Follow-up lytes K, mg: replace as needed to keep above 4 and 2.  - Pain control  - Consider r/o PE if persists  - Consider Echo in am.   - Consider Cardiology consult prn in am.   - Monitor vitals.       # hx of DM: A1C: 7.2   - PTA metformin. Resume in am  - Low dose sliding scale insulin  - Monitor BG    # HTN: hold pta lisinopril for soft bp. Pt says its  because he has DM. Denies hbp.     # HL: Ct PTA statin.     # hx of DVT, unprovoked on Warfarin: resume when okay with surgery team. Bridging per primary team.     # Depression: mood stable. Ct pta venlafaxine.       # Orthopedic Surgery:     Post Op Management per Primary team.   Post op capnography per protocol.   Pain control- per Primary team and/or Pain team.    Minimize use of narcotics as able. Consider bowel regimen with narcotics.   Encourage Incentive spirometry to prevent atelectasis.  Activity, antibiotics, wound and/or drain care - per Primary team.     # Anemia of acute blood loss: Pre op Hgb 16.1. Monitor hgb for anemia of acute blood loss. Transfuse for hgb <7.0     rest per primary team.   Crow Rn. Primary team.     Thank you for the consultation. Please page with any question. Hospitalist team to follow.      Urbano Barrett MD   Hospitalist, Internal Medicine  Pager: [phone].     CHIEF COMPLAINT: currently doing well.     HISTORY OF PRESENT ILLNESS: Obtained from the patient and chart review including Pre op evaluation, procedure note.    49 year old year old male  with above discussed medical problems s/p above procedure admitted on 6/2/2020  for post op care and monitoring given new onset afib, post op (for further details for indication of surgery and operative note, please refer to Dulce Maria Burdick MD note). Medicine consulted to evaluate, recommend and/or co manage medical co morbidities.  Soft bp as above. Asymptomatic. No documented  hypoxemia or other significant complications intra or post operative.   Currently: Incisional Pain controlled. Denies any chest pain, shortness of breath or LH or palpitations. Denies any nausea, vomiting or pain abdomen. No fever or chills.    Medical issues as discussed above.   Denies any other medical concern.     PAST MEDICAL HISTORY:   Past Medical History:   Diagnosis Date     Antiplatelet or antithrombotic long-term use      Depressive  disorder 2001     Diabetes (H)      DVT (deep venous thrombosis) (H) 07/05/2019     Elevated blood pressure reading without diagnosis of hypertension 5/13/2018     Hypercholesteremia 2012     Personal history of other medical treatment     blood clot foot july 2019     Pilonidal cyst 5/13/2018       PAST SURGICAL HISTORY:   Past Surgical History:   Procedure Laterality Date     APPENDECTOMY       AS DRAIN PILONIDAL CYST SIMPLE       BACK SURGERY  1997    spinal fusion     CHOLECYSTECTOMY  2012     CYSTECTOMY PILONIDAL N/A 2/21/2020    Procedure: EXCISION, PILONIDAL CYST, AILEEN II Procedure;  Surgeon: Artemio Ahumada MD;  Location: [facility] OR     THUMB SURGERY   1995       FH: reviewed.     Family History   Problem Relation Age of Onset     Diabetes Mother      Depression Father      Thrombosis Father         HE HAS HAD 3 CLOTS - PER PATIENT THEY WERE UNPROVOKED ON COUMADIN      Alcoholism Sister      Depression Sister      Lupus Sister      Anesthesia Reaction No family hx of      Cardiovascular No family hx of         SH: reviewed.     Social History     Socioeconomic History     Marital status: Single     Spouse name: None     Number of children: 0     Years of education: None     Highest education level: None   Occupational History     Occupation:       Comment: ROWING    Social Needs     Financial resource strain: None     Food insecurity     Worry: None     Inability: None     Transportation needs     Medical: None     Non-medical: None   Tobacco Use     Smoking status: Never Smoker     Smokeless tobacco: Never Used   Substance and Sexual Activity     Alcohol use: No     Comment: None     Drug use: No     Sexual activity: Not Currently     Partners: Female   Lifestyle     Physical activity     Days per week: None     Minutes per session: None     Stress: None   Relationships     Social connections     Talks on phone: None     Gets together: None     Attends Jain service: None     Active member  of club or organization: None     Attends meetings of clubs or organizations: None     Relationship status: None     Intimate partner violence     Fear of current or ex partner: None     Emotionally abused: None     Physically abused: None     Forced sexual activity: None   Other Topics Concern     Parent/sibling w/ CABG, MI or angioplasty before 65F 55M? Not Asked   Social History Narrative     None       ALLERGIES:   No Known Allergies      HOME MEDICATIONS:     Prior to Admission medications    Medication Sig Start Date End Date Taking? Authorizing Provider   acetaminophen (TYLENOL) 325 MG tablet Take 2 tablets (650 mg) by mouth 4 times daily Alternate with ibuprofen (ADVIL/MOTRIN), IF ordered. 6/2/20  Yes Dulce Maria Burdick MD   ibuprofen (ADVIL/MOTRIN) 800 MG tablet Take 1 tablet (800 mg) by mouth 3 times daily Alternate with acetaminophen (TYLENOL), IF ordered. 6/2/20  Yes Dulce Maria Burdick MD   lisinopril (PRINIVIL/ZESTRIL) 5 MG tablet Take 1 tablet (5 mg) by mouth daily 1/8/20  Yes Elvis Guzman MD   metFORMIN (GLUCOPHAGE) 1000 MG tablet Take 1 tablet (1,000 mg) by mouth 2 times daily (with meals) 1/8/20  Yes Elvis Guzman MD   multivitamin, therapeutic with minerals (MULTI-VITAMIN) TABS tablet Take 1 tablet by mouth daily   Yes Reported, Patient   oxyCODONE (ROXICODONE) 5 MG tablet Take 1-2 tablets (5-10 mg) by mouth every 4 hours as needed for moderate to severe pain 6/2/20  Yes Dulce Maria Burdick MD   senna-docusate (SENOKOT-S/PERICOLACE) 8.6-50 MG tablet Take 1-2 tablets by mouth 2 times daily 6/2/20  Yes Dulce Maria Burdick MD   simvastatin (ZOCOR) 40 MG tablet Take 1 tablet (40 mg) by mouth At Bedtime 1/8/20  Yes Elvis Guzman MD   venlafaxine (EFFEXOR-XR) 150 MG 24 hr capsule Take 2 capsules (300 mg) by mouth daily 5/25/20  Yes Elvis Guzman MD   order for DME Equipment being ordered: Compression stockings 20-30 or 30-40 mmHg. To be wore for the first 1-2 years  "following DVT.  Patient not taking: Reported on 2020   Elvis Guzman MD   polyethylene glycol PO powder Take 17 g (1 capful) by mouth daily as needed for constipation (If no bowel movement in 24 hours. Mix in 8 oz of water.  May take twice daily.) 20   Artemio Ahumada MD   warfarin ANTICOAGULANT (COUMADIN) 5 MG tablet Current dose (20): 10 mg every Mon, Fri + 7.5 mg all other days or as directed by [facility] team. 20   Elvis Guzman MD       CURRENT MEDICATIONS:    Current Facility-Administered Medications   Medication     0.9% sodium chloride + KCl 20 mEq/L infusion     0.9% sodium chloride BOLUS     lidocaine (LMX4) cream     lidocaine 1 % 0.1-1 mL     metoprolol (LOPRESSOR) injection 5 mg     metoprolol tartrate (LOPRESSOR) half-tab 12.5 mg     naloxone (NARCAN) injection 0.1-0.4 mg     ondansetron (ZOFRAN-ODT) ODT tab 4 mg    Or     ondansetron (ZOFRAN) injection 4 mg     oxyCODONE (ROXICODONE) tablet 5-10 mg     simvastatin (ZOCOR) tablet 40 mg     sodium chloride (PF) 0.9% PF flush 3 mL     sodium chloride (PF) 0.9% PF flush 3 mL     venlafaxine (EFFEXOR-XR) 24 hr capsule 300 mg     Warfarin Therapy Reminder (Check START DATE - warfarin may be starting in the FUTURE)         ROS: 10 point ROS neg other than the symptoms noted above in the HPI.    PHYSICAL EXAMINATION:     BP 99/80   Pulse 91   Temp 98.8  F (37.1  C) (Oral)   Resp 14   Ht 1.981 m (6' 6\")   Wt 132 kg (291 lb 0.1 oz)   SpO2 94%   BMI 33.63 kg/m    Temp (24hrs), Av  F (37.2  C), Min:98.6  F (37  C), Max:99.5  F (37.5  C)      BMI= Body mass index is 33.63 kg/m .      Intake/Output Summary (Last 24 hours) at 2020  Last data filed at 2020 1700  Gross per 24 hour   Intake 1731 ml   Output 425 ml   Net 1306 ml       General: Alert, interactive, NAD.   HEENT: AT/NC. Anicteric.Moist MM.    Neck: Supple. No JVD appreciated.   Heart/CVS:  S1 and S2. Irregularly irregular. Tachy.    Chest/Respi: " Non labored breathing. CTA BL.   Abdomen/GI: Soft, non distended, non tender. No g/r.  Extremities/MSK: Distal pulses 2+, well perfused. Rest per ortho.   Neuro: Alert and oriented x4.   Skin:  No new rash over exposed areas. R shoulder dressing.       LABORATORY DATA: reviewed.     Recent Results (from the past 24 hour(s))   Glucose by meter    Collection Time: 06/02/20  9:26 AM   Result Value Ref Range    Glucose 181 (H) 70 - 99 mg/dL   INR    Collection Time: 06/02/20  9:31 AM   Result Value Ref Range    INR 1.14 0.86 - 1.14   Potassium    Collection Time: 06/02/20  9:31 AM   Result Value Ref Range    Potassium 4.0 3.4 - 5.3 mmol/L   Basic metabolic panel    Collection Time: 06/02/20  2:17 PM   Result Value Ref Range    Sodium 138 133 - 144 mmol/L    Potassium 4.3 3.4 - 5.3 mmol/L    Chloride 105 94 - 109 mmol/L    Carbon Dioxide 23 20 - 32 mmol/L    Anion Gap 10 3 - 14 mmol/L    Glucose 184 (H) 70 - 99 mg/dL    Urea Nitrogen 17 7 - 30 mg/dL    Creatinine 0.88 0.66 - 1.25 mg/dL    GFR Estimate >90 >60 mL/min/[1.73_m2]    GFR Estimate If Black >90 >60 mL/min/[1.73_m2]    Calcium 8.7 8.5 - 10.1 mg/dL   Magnesium    Collection Time: 06/02/20  2:17 PM   Result Value Ref Range    Magnesium 2.0 1.6 - 2.3 mg/dL   TSH    Collection Time: 06/02/20  2:17 PM   Result Value Ref Range    TSH 1.21 0.40 - 4.00 mU/L   Phosphorus    Collection Time: 06/02/20  2:17 PM   Result Value Ref Range    Phosphorus 4.4 2.5 - 4.5 mg/dL   Calcium ionized whole blood    Collection Time: 06/02/20  2:17 PM   Result Value Ref Range    Calcium Ionized Whole Blood 4.5 4.4 - 5.2 mg/dL   Troponin I    Collection Time: 06/02/20  2:17 PM   Result Value Ref Range    Troponin I ES <0.015 0.000 - 0.045 ug/L   Nt probnp inpatient    Collection Time: 06/02/20  2:17 PM   Result Value Ref Range    N-Terminal Pro BNP Inpatient 8 0 - 900 pg/mL   EKG 12-lead, complete    Collection Time: 06/02/20  2:19 PM   Result Value Ref Range    Interpretation ECG Click  View Image link to view waveform and result        Recent Results (from the past 24 hour(s))   POC US Guidance Needle Placement    Impression    Right interscalene brachial plexus block           Urbano Barrett MD

## 2020-06-02 NOTE — OR NURSING
Dr. Zuleta at bedside frequently. Aware of HR trends and BP trends.     Report and care given back to Amy QUESADA RN.

## 2020-06-02 NOTE — OR NURSING
Oxygen saturations post block were 91% in room air. Nasal cannula left on at 3L until surgery, sats now 94-95%.

## 2020-06-02 NOTE — OR NURSING
Pt noted to be in afib, HR 110s-140s. Denies cp or sob. BPs stable. MDA called and will come to bedside. EKG ordered.

## 2020-06-02 NOTE — DISCHARGE INSTRUCTIONS
Same-Day Surgery   Adult Discharge Orders & Instructions     For 24 hours after surgery:  1. Get plenty of rest.  A responsible adult must stay with you for at least 24 hours after you leave the hospital.   2. Pain medication can slow your reflexes. Do not drive or use heavy equipment.  If you have weakness or tingling, don't drive or use heavy equipment until this feeling goes away.  3. Mixing alcohol and pain medication can cause dizziness and slow your breathing. It can even be fatal. Do not drink alcohol while taking pain medication.  4. Avoid strenuous or risky activities.  Ask for help when climbing stairs.   5. You may feel lightheaded.  If so, sit for a few minutes before standing.  Have someone help you get up.   6. If you have nausea (feel sick to your stomach), drink only clear liquids such as apple juice, ginger ale, broth or 7-Up.  Rest may also help.  Be sure to drink enough fluids.  Move to a regular diet as you feel able. Take pain medications with a small amount of solid food, such as toast or crackers, to avoid nausea.   7. A slight fever is normal. Call the doctor if your fever is over 100 F (37.7 C) (taken under the tongue) or lasts longer than 24 hours.  8. You may have a dry mouth, muscle aches, trouble sleeping or a sore throat.  These symptoms should go away after 24 hours.  9. Do not make important or legal decisions.   Pain Management:      1. Take pain medication (if prescribed) for pain as directed by your physician.        2. WARNING: If the pain medication you have been prescribed contains Tylenol  (acetaminophen), DO NOT take additional doses of Tylenol (acetaminophen).     Call your doctor for any of the followin.  Signs of infection (fever, growing tenderness at the surgery site, severe pain, a large amount of drainage or bleeding, foul-smelling drainage, redness, swelling).    2.  It has been over 8 to 10 hours since surgery and you are still not able to urinate (pee).    3.   Headache for over 24 hours.    4.  Numbness, tingling or weakness the day after surgery (if you had spinal anesthesia).  To contact a doctor, call _____________________________________ or:      [phone] and ask for the Resident On Call for:          _______orthopedic________________ (answered 24 hours a day)      Emergency Department:  [facility]: [phone]  [facility]: [phone]               Rev. 10/2014

## 2020-06-02 NOTE — OR NURSING
Right interscalene block completed in preop, tolerated well with versed and fentanyl. Saturations 95% on 2-3 L NC,  patient has cloth mask covering face which may be affecting oxygenation.

## 2020-06-02 NOTE — BRIEF OP NOTE
[facility], [place] Orthopaedic Surgery  Brief Operative Note    Pre-operative diagnosis: Right shoulder glenoid fracture malunion with post-traumatic stiffness; Biceps tendinopathy   Post-operative diagnosis Same   Procedure: Right shoulder diagnostic arthroscopy and open biceps tenodesis   Surgeon: Dulce Maria Burdick MD   Assistants(s): Hunter   Anesthesia: Combined General with Block    Estimated blood loss: Minimal   Total IV fluids: (See anesthesia record)   Blood transfusion: (See anesthesia record)   Total urine output: (See anesthesia record)   Drains: None   Specimens: * No specimens in log *   Findings: Normal shoulder motion on EUA. No glenohumeral arthrosis, Glenoid/bony bankart malunion noted but not limiting. Significant biceps tendinopathy in the groove.   Complications: None   Implants: 6.25SwivelLock

## 2020-06-02 NOTE — PHARMACY-ANTICOAGULATION SERVICE
Clinical Pharmacy - Warfarin Dosing Consult     Pharmacy has been consulted to manage this patient s warfarin therapy.  Indication: Atrial Fibrillation;DVT/PE Prophylaxis  Therapy Goal: INR 2-3  Warfarin Prior to Admission: Yes  Warfarin PTA Regimen: 10 mg every Mon, Fri + 7.5 mg all other days    INR   Date Value Ref Range Status   06/02/2020 1.14 0.86 - 1.14 Final   05/06/2020 2.40 (H) 0.86 - 1.14 Final     Comment:     This test is intended for monitoring Coumadin therapy.  Results are not   accurate in patients with prolonged INR due to factor deficiency.         Recommend warfarin 7.5 mg today.  Pharmacy will monitor Gaurang Rivera daily and order warfarin doses to achieve specified goal.      Please contact pharmacy as soon as possible if the warfarin needs to be held for a procedure or if the warfarin goals change.

## 2020-06-02 NOTE — ANESTHESIA PROCEDURE NOTES
Peripheral Nerve Block Procedure Note  Staff -   Anesthesiologist:  Parul Zuleta MD  Resident/Fellow: Kristal Koenig MD    Performed By: anesthesiologist  Procedure performed by resident/CRNA in presence of a teaching physician.        Location: Pre-op  Procedure Start/Stop TImes:      6/2/2020 10:46 AM     6/2/2020 10:58 AM    patient identified, IV checked, site marked, risks and benefits discussed, informed consent, monitors and equipment checked, pre-op evaluation, at physician/surgeon's request and post-op pain management      Correct Patient: Yes      Correct Position: Yes      Correct Site: Yes      Correct Procedure: Yes      Correct Laterality:  Yes    Site Marked:  Yes  Procedure details:     Procedure:  Interscalene    ASA:  2    Diagnosis:  Postoperative pain relief    Laterality:  Right    Position:  Sitting    Sterile Prep: chloraprep, mask and sterile gloves      Local skin infiltration:  None    Needle:  Insulated    Needle gauge:  21    Needle length (mm):  110    Ultrasound: Yes      Ultrasound used to identify targeted nerve, plexus, or vascular structure and placed a needle adjacent to it      Permanent Image entered into patiient's record      Abnormal pain on injection: No      Blood Aspirated: No      Paresthesias:  No    Bleeding at site: No      Bolus via:  Needle    Infusion Method:  Single Shot    Complications:  None  Assessment/Narrative:     Injection made incrementally with aspirations every (mL):  5     Patient tolerated well. Incremental aspiration every 2 mL. No paresthesia. Needle tip visualized throughout with appropriate spread of local anesthetic around nerves.

## 2020-06-02 NOTE — OR NURSING
PACU to Inpatient Nursing Handoff    Patient Gaurang Rivera is a 49 year old male who speaks English.   Procedure Procedure(s):  Right shoulder diagnostic arthroscopy, capsular release,  open biceps tenodesis   Surgeon(s) Primary: Dulce Maria Burdick MD     No Known Allergies    Isolation  [unfilled]     Past Medical History   has a past medical history of Antiplatelet or antithrombotic long-term use, Depressive disorder (2001), Diabetes (H), DVT (deep venous thrombosis) (H) (07/05/2019), Elevated blood pressure reading without diagnosis of hypertension (5/13/2018), Hypercholesteremia (2012), Personal history of other medical treatment, and Pilonidal cyst (5/13/2018).    Anesthesia Combined General with Block   Dermatome Level     Preop Meds Not applicable   Nerve block Interscalene.  Location:right. Med:bupivacaine and Exparel (liposomal bupivacaine). Time given: 1115   Intraop Meds dexamethasone (Decadron)  fentanyl (Sublimaze): 50 mcg total  ondansetron (Zofran): last given at 1323   Local Meds No   Antibiotics cefazolin (Ancef) - last given at 1218     Pain Patient Currently in Pain: yes  Comfort: comfortably manageable   PACU meds  metoprolol (Lopressor): 2.5 mg (total dose) last given at 1545    PCA / epidural No   Capnography     Telemetry ECG Rhythm: Atrial fibrillation   Inpatient Telemetry Monitor Ordered? Yes        Labs Glucose Lab Results   Component Value Date     06/02/2020       Hgb Lab Results   Component Value Date    HGB 16.1 05/28/2020       INR Lab Results   Component Value Date    INR 1.14 06/02/2020      PACU Imaging Not applicable     Wound/Incision Incision/Surgical Site 02/21/20 Sacrum (Active)   Number of days: 102       Incision/Surgical Site 06/02/20 Right Shoulder (Active)   Incision Assessment UTV 06/02/20 1345   Closure Adhesive strip(s) 06/02/20 1324   Incision Drainage Amount None 06/02/20 1345   Dressing Intervention Clean, dry, intact 06/02/20 1345   Number of days: 0       CMS        Equipment ice pack   Other LDA       IV Access Peripheral IV 06/02/20 Left Lower forearm (Active)   Site Assessment WDL 06/02/20 1520   Line Status Infusing 06/02/20 1520   Phlebitis Scale 0-->no symptoms 06/02/20 1520   Infiltration Scale 0 06/02/20 1520   Extravasation? Yes 06/02/20 1345   Number of days: 0      Blood Products Not applicable EBL unknown mL   Intake/Output Date 06/02/20 0700 - 06/03/20 0659   Shift 5189-5660 0225-7957 7198-6308 24 Hour Total   INTAKE   I.V. 1000 231  1231   Shift Total(mL/kg) 1000(7.58) 231(1.75)  1231(9.33)   OUTPUT   Shift Total(mL/kg)       Weight (kg) 132 132 132 132      Drains / [place] Closed/Suction Drain 1 Midline Other (Comment) Bulb 15 [place]ian (Active)   Number of days: 102      Time of void PreOp Void Prior to Procedure: 1119 (06/02/20 1119)    PostOp  1630    Diapered? No   Bladder Scan     PO    NPO     Vitals    B/P: 99/68  T: 98.9  F (37.2  C)    Temp src: Oral  P:  Pulse: 91 (06/02/20 1105)    Heart Rate: 106 (06/02/20 1615)     R: 16  O2:  SpO2: 97 %    O2 Device: Nasal cannula (06/02/20 1615)    Oxygen Delivery: 3 LPM (06/02/20 1615)         Family/support present parents contacted   Patient belongings     Patient transported on cart   DC meds/scripts (obs/outpt) Yes, meds   Inpatient Pain Meds Released? Yes       Special needs/considerations None   Tasks needing completion Inpatient team needs to see patient in PACU prior to transfer to pcu       YAW JHAVERI, CHEL  ASCOM [id number]

## 2020-06-03 ENCOUNTER — APPOINTMENT (OUTPATIENT)
Dept: CARDIOLOGY | Facility: CLINIC | Age: 50
End: 2020-06-03
Attending: HOSPITALIST
Payer: COMMERCIAL

## 2020-06-03 LAB
ANION GAP SERPL CALCULATED.3IONS-SCNC: 6 MMOL/L (ref 3–14)
BUN SERPL-MCNC: 13 MG/DL (ref 7–30)
CALCIUM SERPL-MCNC: 8.3 MG/DL (ref 8.5–10.1)
CHLORIDE SERPL-SCNC: 106 MMOL/L (ref 94–109)
CO2 SERPL-SCNC: 27 MMOL/L (ref 20–32)
CREAT SERPL-MCNC: 0.87 MG/DL (ref 0.66–1.25)
ERYTHROCYTE [DISTWIDTH] IN BLOOD BY AUTOMATED COUNT: 12.3 % (ref 10–15)
GFR SERPL CREATININE-BSD FRML MDRD: >90 ML/MIN/{1.73_M2}
GLUCOSE BLDC GLUCOMTR-MCNC: 123 MG/DL (ref 70–99)
GLUCOSE BLDC GLUCOMTR-MCNC: 143 MG/DL (ref 70–99)
GLUCOSE BLDC GLUCOMTR-MCNC: 161 MG/DL (ref 70–99)
GLUCOSE SERPL-MCNC: 174 MG/DL (ref 70–99)
HCT VFR BLD AUTO: 42.5 % (ref 40–53)
HGB BLD-MCNC: 14.4 G/DL (ref 13.3–17.7)
INR PPP: 1.27 (ref 0.86–1.14)
INTERPRETATION ECG - MUSE: NORMAL
MCH RBC QN AUTO: 30 PG (ref 26.5–33)
MCHC RBC AUTO-ENTMCNC: 33.9 G/DL (ref 31.5–36.5)
MCV RBC AUTO: 89 FL (ref 78–100)
PLATELET # BLD AUTO: 189 10E9/L (ref 150–450)
POTASSIUM SERPL-SCNC: 3.9 MMOL/L (ref 3.4–5.3)
RBC # BLD AUTO: 4.8 10E12/L (ref 4.4–5.9)
SODIUM SERPL-SCNC: 139 MMOL/L (ref 133–144)
TROPONIN I SERPL-MCNC: <0.015 UG/L (ref 0–0.04)
WBC # BLD AUTO: 9.2 10E9/L (ref 4–11)

## 2020-06-03 PROCEDURE — 84484 ASSAY OF TROPONIN QUANT: CPT | Performed by: ORTHOPAEDIC SURGERY

## 2020-06-03 PROCEDURE — 25000132 ZZH RX MED GY IP 250 OP 250 PS 637

## 2020-06-03 PROCEDURE — 99233 SBSQ HOSP IP/OBS HIGH 50: CPT | Performed by: HOSPITALIST

## 2020-06-03 PROCEDURE — 25800030 ZZH RX IP 258 OP 636: Performed by: HOSPITALIST

## 2020-06-03 PROCEDURE — 25500064 ZZH RX 255 OP 636: Performed by: INTERNAL MEDICINE

## 2020-06-03 PROCEDURE — 40000065 ZZH STATISTIC EKG NON-CHARGEABLE

## 2020-06-03 PROCEDURE — 25000131 ZZH RX MED GY IP 250 OP 636 PS 637: Performed by: INTERNAL MEDICINE

## 2020-06-03 PROCEDURE — 25000132 ZZH RX MED GY IP 250 OP 250 PS 637: Performed by: INTERNAL MEDICINE

## 2020-06-03 PROCEDURE — 36415 COLL VENOUS BLD VENIPUNCTURE: CPT | Performed by: ORTHOPAEDIC SURGERY

## 2020-06-03 PROCEDURE — 85027 COMPLETE CBC AUTOMATED: CPT | Performed by: ORTHOPAEDIC SURGERY

## 2020-06-03 PROCEDURE — 93306 TTE W/DOPPLER COMPLETE: CPT | Mod: 26 | Performed by: INTERNAL MEDICINE

## 2020-06-03 PROCEDURE — 40000264 ECHOCARDIOGRAM COMPLETE

## 2020-06-03 PROCEDURE — 25000132 ZZH RX MED GY IP 250 OP 250 PS 637: Performed by: ORTHOPAEDIC SURGERY

## 2020-06-03 PROCEDURE — 85610 PROTHROMBIN TIME: CPT | Performed by: ORTHOPAEDIC SURGERY

## 2020-06-03 PROCEDURE — 82962 GLUCOSE BLOOD TEST: CPT

## 2020-06-03 PROCEDURE — 80048 BASIC METABOLIC PNL TOTAL CA: CPT | Performed by: ORTHOPAEDIC SURGERY

## 2020-06-03 PROCEDURE — 25000128 H RX IP 250 OP 636: Performed by: INTERNAL MEDICINE

## 2020-06-03 RX ORDER — OXYCODONE HYDROCHLORIDE 5 MG/1
5-10 TABLET ORAL EVERY 4 HOURS PRN
Qty: 30 TABLET | Refills: 0 | Status: SHIPPED | OUTPATIENT
Start: 2020-06-03 | End: 2020-06-03

## 2020-06-03 RX ORDER — SODIUM CHLORIDE 9 MG/ML
INJECTION, SOLUTION INTRAVENOUS
Status: DISPENSED
Start: 2020-06-03 | End: 2020-06-03

## 2020-06-03 RX ORDER — WARFARIN SODIUM 7.5 MG/1
7.5 TABLET ORAL
Status: COMPLETED | OUTPATIENT
Start: 2020-06-03 | End: 2020-06-03

## 2020-06-03 RX ADMIN — VENLAFAXINE HYDROCHLORIDE 300 MG: 150 CAPSULE, EXTENDED RELEASE ORAL at 21:42

## 2020-06-03 RX ADMIN — METFORMIN HYDROCHLORIDE 1000 MG: 500 TABLET ORAL at 17:39

## 2020-06-03 RX ADMIN — METOPROLOL TARTRATE 12.5 MG: 25 TABLET ORAL at 12:50

## 2020-06-03 RX ADMIN — INSULIN ASPART 1 UNITS: 100 INJECTION, SOLUTION INTRAVENOUS; SUBCUTANEOUS at 09:52

## 2020-06-03 RX ADMIN — SIMVASTATIN 40 MG: 40 TABLET, FILM COATED ORAL at 21:42

## 2020-06-03 RX ADMIN — SODIUM CHLORIDE 1000 ML: 9 INJECTION, SOLUTION INTRAVENOUS at 10:05

## 2020-06-03 RX ADMIN — WARFARIN SODIUM 7.5 MG: 7.5 TABLET ORAL at 17:39

## 2020-06-03 RX ADMIN — OXYCODONE HYDROCHLORIDE 5 MG: 5 TABLET ORAL at 12:50

## 2020-06-03 RX ADMIN — OXYCODONE HYDROCHLORIDE 5 MG: 5 TABLET ORAL at 14:03

## 2020-06-03 RX ADMIN — INSULIN ASPART 1 UNITS: 100 INJECTION, SOLUTION INTRAVENOUS; SUBCUTANEOUS at 13:59

## 2020-06-03 RX ADMIN — OXYCODONE HYDROCHLORIDE 10 MG: 5 TABLET ORAL at 23:00

## 2020-06-03 RX ADMIN — POTASSIUM CHLORIDE AND SODIUM CHLORIDE: 900; 150 INJECTION, SOLUTION INTRAVENOUS at 22:09

## 2020-06-03 RX ADMIN — POTASSIUM CHLORIDE AND SODIUM CHLORIDE: 900; 150 INJECTION, SOLUTION INTRAVENOUS at 13:58

## 2020-06-03 RX ADMIN — OXYCODONE HYDROCHLORIDE 10 MG: 5 TABLET ORAL at 19:03

## 2020-06-03 RX ADMIN — POTASSIUM CHLORIDE AND SODIUM CHLORIDE: 900; 150 INJECTION, SOLUTION INTRAVENOUS at 04:04

## 2020-06-03 RX ADMIN — METFORMIN HYDROCHLORIDE 1000 MG: 500 TABLET ORAL at 09:52

## 2020-06-03 RX ADMIN — HUMAN ALBUMIN MICROSPHERES AND PERFLUTREN 5 ML: 10; .22 INJECTION, SOLUTION INTRAVENOUS at 14:30

## 2020-06-03 RX ADMIN — METOPROLOL TARTRATE 12.5 MG: 25 TABLET ORAL at 20:05

## 2020-06-03 ASSESSMENT — ACTIVITIES OF DAILY LIVING (ADL)
BATHING: 0-->INDEPENDENT
TRANSFERRING: 0-->INDEPENDENT
COGNITION: 0 - NO COGNITION ISSUES REPORTED
RETIRED_EATING: 0-->INDEPENDENT
RETIRED_COMMUNICATION: 0-->UNDERSTANDS/COMMUNICATES WITHOUT DIFFICULTY
SWALLOWING: 0-->SWALLOWS FOODS/LIQUIDS WITHOUT DIFFICULTY
FALL_HISTORY_WITHIN_LAST_SIX_MONTHS: NO
TOILETING: 0-->INDEPENDENT
DRESS: 0-->INDEPENDENT
AMBULATION: 0-->INDEPENDENT

## 2020-06-03 NOTE — PLAN OF CARE
"  VS:    BP 95/71 (BP Location: Right arm)   Pulse 88   Temp 98.3  F (36.8  C) (Oral)   Resp 18   Ht 1.981 m (6' 6\")   Wt 132 kg (291 lb 0.1 oz)   SpO2 93%   BMI 33.63 kg/m  BP soft, bolus given 1000mL. Otherwise VSS. No reports of SOB, CP, or dizziness. LS clear equal bilaterally, on RA   Output:    Voids spontaneously w/out difficulty. Last BM 6/1/20, passing gas    Activity:    Standby assist    Skin: Intact w/ the exception of incisions/devices   Pain:    Denies    Neuro/CMS:    Denies any numbness/tingling    Dressing(s):    R shoulder: CDI, no drainage    Diet:    Regular   Equipment:    Tele: NSR to tachy w/ activity, HR 110s-150s   IV's/Drains:    PIV L hand: infusing NS + KCl 20mEq @ 125mL/hr   Plan:    Continue to monitor    Additional Info:    Cardiology consult ordered   Echo ordered           "

## 2020-06-03 NOTE — PLAN OF CARE
"VS:    /66 (BP Location: Right leg)   Pulse 83   Temp 98.4  F (36.9  C) (Oral)   Resp 18   Ht 1.981 m (6' 6\")   Wt 132 kg (291 lb 0.1 oz)   SpO2 91%   BMI 33.63 kg/m   VSS. denies SOB, CP, or dizziness. LS clear equal bilaterally, on RA   Output:    Voids spontaneously w/out difficulty in the urinal. Last BM 6/1/20, passing gas    Activity:    Standby assist , didn't get OOB during this shift.   Skin: Intact w/ the exception of incisions/devices   Pain:    Denies    Neuro/CMS:    Denies any numbness/tingling    Dressing(s):    R shoulder: CDI, no drainage    Diet:    Regular   Equipment:    Tele: NSR.   IV's/Drains:    PIV L hand: infusing NS + KCl 20mEq @ 125mL/hr   Plan:    Continue to monitor    Additional Info:    Cardiology consult ordered .  Echo  done this morning.     "

## 2020-06-03 NOTE — PROGRESS NOTES
"Orthopedic Surgery Progress Note: 06/03/2020    Subjective:   No acute events overnight. Continues to be tachy into the 110's with SBP's in the 90's. Persistent Afib overnight. From an orthopedic standpoint,  Pain is very well controlled, block is just starting to wear off today so he has numbness appropriate for post-operative time course. Remains in sling. Discussed plan to transition him to medicine primary today for management of his new onset Afib. He is in agreement with this plan.     Objective:   BP 95/71 (BP Location: Right arm)   Pulse 88   Temp 98.3  F (36.8  C) (Oral)   Resp 18   Ht 1.981 m (6' 6\")   Wt 132 kg (291 lb 0.1 oz)   SpO2 93%   BMI 33.63 kg/m    I/O this shift:  In: -   Out: 300 [Urine:300]  General: NAD. Resting comfortably in bed.  Respiratory: Breathing comfortably on RA  Musculoskeletal:     Motor Strength Right Left   Deltoids: C5 4/5 4/5   Biceps: C5 4/5 4/5   Brachialis: C6 4/5 4/5   Wrist extension: C6 4/5 4/5   Triceps: C7  4/5 4/5   Wrist flexion: C7 5/5 5/5    strength: C8 5/5 5/5   Hand intrinsics: T1 5/5 5/5     Sensation from C4-L1 is preserved.    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 Burdick on 6/2/2020. Post-operative time course has been complicated by new, acute onset Afib with soft pressures into the 80's-90's SBP. From an orthopedic standpoint healing appropriately post operatively, however will now transition to medicine as primary provider for definitive management of his Afib.     Medicine Primary  - Activity: ADL's as tolerated   - Weightbearing Status: NWB RUE until the block has worn off, " then OK to progress to WBAT RUE  - Pain Management: Transition from IV to PO as tolerated. No NSAIDs.   - Antibiotics: complete  - Diet: Begin with clear fluids and progress diet as tolerated.   - DVT Prophylaxis: would recommend PTA warfarin. Regarding his Afib would recommend avoiding heparin gtt x2 weeks if able to safely manage pressures and current afib. Heparin gtt would place at much higher risk for post-op wound complications, bleeding, hematoma, etc.   - Imaging: none needed  - Labs: Labs PRN.  - Bracing/Splinting: sling RUE for comforts while block is in place. OK to remove once block has worn off  - Dressings: Keep dressings C/D/I x 7 days.  - Physical Therapy/Occupational Therapy: Evaluation and treatment.  - Consults: Hospitalist, cardiology.  - Follow-up: Clinic with Dr. Burdick in 2 weeks for wound exam    - Disposition: Pending progress with therapies, pain control on orals, and medical stability; anticipate discharge to home once medically optimized    Orthopedic surgery staff for this patient is Dr. Burdick.    ------------------------------------------------------------------------------------------    Miles Vinson MD   Orthopedic Surgery PGY-1   Pager: [phone]     Please page me directly with any questions/concerns during regular weekday hours before 5 pm. If there is no response, if it is a weekend, or if it is during evening hours then please page the orthopedic surgery resident on call.    FOLLOWUP:    Future Appointments   Date Time Provider Department Center   6/4/2020  1:00 PM Lexi Armas, PT [facility]   6/8/2020 11:00 AM [facility]   6/10/2020  2:40 PM Allegra Heller, PT [facility]   6/12/2020  1:40 PM Miguelito Ryan, PT [facility]   6/15/2020  1:00 PM Allegra Smiley APRN CNP [facility]   6/15/2020  1:40 PM Lexi Armas, PT [facility]   6/17/2020  9:00 AM Dulce Maria Burdick MD [facility]   6/17/2020  1:40 PM Lexi Armas, PT [facility]   6/19/2020 10:30 AM Lexi Armas, PT [facility]   7/15/2020 10:30 AM [facility]

## 2020-06-03 NOTE — PROGRESS NOTES
Cardiology Consult         Date of Service (when I saw the patient): 06/03/20    ASSESSMENT:     Gaurang Rivera is a 49 year old male who presents with no significant cardiac history who is being consulted for post op atrial fibrillation.    #Post op atrial fibrillation  Occurred post op. Afib now under control with rate control. Typically, we can see atrial fibrillation occur commonly after an operation 2/2 to stress and volume shifts. Seeing as this is his first episode of atrial fibrillation and it has been less than 48 hrs, he would be a candidate for MOISÉS/DCCV. However, this is limited logistically by patient being at the [facility]. Would recommend rate controlling patient and continuing his coumadin and then having him follow up for outpatient DCCV    Recommendations:  - Continue PO Metop 12.5mg BID  - With his CHADSVASC of 2, agree with Coumadin  - F/u outpatient for MOISÉS/DCCV    Staffed with Dr. Robin Gardiner MD  Cardiology Fellow  PGY4      REASON FOR CONSULT: afib w/RVR    History of Present Illness   Gaurang Rivera is a 49 year old male who presents with no significant cardiac history who is being consulted for post op atrial fibrillation.    Patient underwent right shoulder diagnostic arthroscopy and open bicep tenodesis. Shortly during post op went into afib w.RVR with heart rates into the 140's. Was given 1L of NS and given IV metop 2.5mgx1 that improved his heart rate. This morning HR is in the 80-90s.    Past Medical History    I have reviewed this patient's medical history and updated it with pertinent information if needed.   Past Medical History:   Diagnosis Date     Antiplatelet or antithrombotic long-term use      Depressive disorder 2001     Diabetes (H)      DVT (deep venous thrombosis) (H) 07/05/2019     Elevated blood pressure reading without diagnosis of hypertension 5/13/2018     Hypercholesteremia 2012     Personal history of other medical treatment     blood clot foot july 2019      Pilonidal cyst 5/13/2018       Past Surgical History   I have reviewed this patient's surgical history and updated it with pertinent information if needed.  Past Surgical History:   Procedure Laterality Date     APPENDECTOMY       ARTHROSCOPY SHOULDER, OPEN BICEP TENODESIS REPAIR, COMBINED Right 6/2/2020    Procedure: Right shoulder diagnostic arthroscopy, capsular release,  open biceps tenodesis;  Surgeon: Dulce Maria Burdick MD;  Location: [facility] OR     AS DRAIN PILONIDAL CYST SIMPLE       BACK SURGERY  1997    spinal fusion     CHOLECYSTECTOMY  2012     CYSTECTOMY PILONIDAL N/A 2/21/2020    Procedure: EXCISION, PILONIDAL CYST, AILEEN II Procedure;  Surgeon: Artemio Ahumada MD;  Location: [facility] OR     THUMB SURGERY   1995       Prior to Admission Medications   Prior to Admission Medications   Prescriptions Last Dose Informant Patient Reported? Taking?   acetaminophen 325 MG PO tablet   No No   Sig: Take 2 tablets (650 mg) by mouth 4 times daily for 14 days Alternate with ibuprofen (ADVIL/MOTRIN), IF ordered.   ibuprofen 800 MG PO tablet   No No   Sig: Take 1 tablet (800 mg) by mouth 3 times daily for 14 days Alternate with acetaminophen (TYLENOL), IF ordered.   lisinopril (PRINIVIL/ZESTRIL) 5 MG tablet 6/1/2020 at 0800  No Yes   Sig: Take 1 tablet (5 mg) by mouth daily   metFORMIN (GLUCOPHAGE) 1000 MG tablet 6/1/2020 at 2100  No Yes   Sig: Take 1 tablet (1,000 mg) by mouth 2 times daily (with meals)   multivitamin, therapeutic with minerals (MULTI-VITAMIN) TABS tablet Past Week at Unknown time  Yes Yes   Sig: Take 1 tablet by mouth daily   order for DME   No No   Sig: Equipment being ordered: Compression stockings 20-30 or 30-40 mmHg. To be wore for the first 1-2 years following DVT.   Patient not taking: Reported on 5/28/2020   polyethylene glycol PO powder   No No   Sig: Take 17 g (1 capful) by mouth daily as needed for constipation (If no bowel movement in 24 hours. Mix in 8 oz of water.  May take twice  daily.)   simvastatin (ZOCOR) 40 MG tablet 6/1/2020 at 2100  No Yes   Sig: Take 1 tablet (40 mg) by mouth At Bedtime   venlafaxine (EFFEXOR-XR) 150 MG 24 hr capsule 6/1/2020 at 2100  No Yes   Sig: Take 2 capsules (300 mg) by mouth daily   warfarin ANTICOAGULANT (COUMADIN) 5 MG tablet 5/27/2020  No No   Sig: Current dose (5/26/20): 10 mg every Mon, Fri + 7.5 mg all other days or as directed by ACC team.      Facility-Administered Medications: None     Allergies   No Known Allergies    Social History   I have reviewed this patient's social history and updated it with pertinent information if needed. Gaurang Rivera  reports that he has never smoked. He has never used smokeless tobacco. He reports that he does not drink alcohol or use drugs.    Family History   I have reviewed this patient's family history and updated it with pertinent information if needed.   Family History   Problem Relation Age of Onset     Diabetes Mother      Depression Father      Thrombosis Father         HE HAS HAD 3 CLOTS - PER PATIENT THEY WERE UNPROVOKED ON COUMADIN      Alcoholism Sister      Depression Sister      Lupus Sister      Anesthesia Reaction No family hx of      Cardiovascular No family hx of        Review of Systems   The 10 point Review of Systems is negative other than noted in the HPI or here. None    Physical Exam   Temp: 98.3  F (36.8  C) Temp src: Oral BP: 95/71 Pulse: 88 Heart Rate: 100 Resp: 18 SpO2: 93 % O2 Device: Nasal cannula Oxygen Delivery: 3 LPM  Vital Signs with Ranges  Temp:  [98.3  F (36.8  C)-99  F (37.2  C)] 98.3  F (36.8  C)  Pulse:  [] 88  Heart Rate:  [100-155] 100  Resp:  [13-18] 18  BP: ()/(27-95) 95/71  SpO2:  [92 %-100 %] 93 %  291 lbs .12 oz    Deferred presently due to patient being a telephone consult           Data   Data reviewed today:  I personally reviewed no images or EKG's today.  Recent Labs   Lab 06/03/20  0706 06/02/20 2003 06/02/20  1417 06/02/20  0931 05/28/20  1135   WBC 9.2   --   --   --  6.8   HGB 14.4  --   --   --  16.1   MCV 89  --   --   --  87     --   --   --  192   INR 1.27*  --   --  1.14  --      --  138  --  137   POTASSIUM 3.9  --  4.3 4.0 4.0   CHLORIDE 106  --  105  --  105   CO2 27  --  23  --  25   BUN 13  --  17  --  18   CR 0.87  --  0.88  --  0.94   ANIONGAP 6  --  10  --  7   SAVI 8.3*  --  8.7  --  8.9   *  --  184*  --  138*   ALBUMIN  --   --   --   --  4.1   PROTTOTAL  --   --   --   --  7.8   BILITOTAL  --   --   --   --  0.5   ALKPHOS  --   --   --   --  62   ALT  --   --   --   --  64   AST  --   --   --   --  45   TROPI <0.015 <0.015 <0.015  --   --        No results found for this or any previous visit (from the past 24 hour(s)).     I very much appreciated the opportunity to see and assess Gaurang Rivera in the hospital with CV Fellow Dr Gardiner.     I agree with the note above which summarizes my findings and current recommendations.  Please do not hesitate to contact my office if you have any questions or concerns.      Rupert Kelly MD  Cardiac Arrhythmia Service  [facility]  [phone]

## 2020-06-03 NOTE — ANESTHESIA POSTPROCEDURE EVALUATION
Anesthesia POST Procedure Evaluation    Patient: Gaurang Rivera   MRN:     [id number] Gender:   male   Age:    49 year old :      1970        Preoperative Diagnosis: Closed displaced fracture of glenoid cavity of right scapula, sequela [S42.141S]  Closed displaced fracture of neck of right scapula, sequela [S42.151S]   Procedure(s):  Right shoulder diagnostic arthroscopy, capsular release,  open biceps tenodesis   Postop Comments: No value filed.     Anesthesia Type: General, For Post-op pain in coordination with surgeon, Peripheral Nerve Block          Postop Pain Control: Uneventful            Sign Out: Well controlled pain   PONV: No   Neuro/Psych: Uneventful            Sign Out: Acceptable/Baseline neuro status   Airway/Respiratory: Uneventful            Sign Out: Acceptable/Baseline resp. status   CV/Hemodynamics: Uneventful            Sign Out: Acceptable CV status   Other NRE: NONE   DID A NON-ROUTINE EVENT OCCUR? No         Last Anesthesia Record Vitals:  CRNA VITALS  2020 1313 - 2020 1413      2020             Pulse:  75    Ht Rate:  137    SpO2:  95 %    Resp Rate (observed):  (!) 2          Last PACU Vitals:  Vitals Value Taken Time   /60 2020  9:00 PM   Temp 37.1  C (98.8  F) 2020  6:00 PM   Pulse 109 2020  9:00 PM   Resp 14 2020  6:00 PM   SpO2 79 % 2020  7:06 PM   Temp src     NIBP     Pulse     SpO2     Resp     Temp     Ht Rate     Temp 2     Vitals shown include unvalidated device data.      Electronically Signed By: Arnoldo Ellis MD, 2020, 9:12 PM

## 2020-06-03 NOTE — PROGRESS NOTES
[facility], [facility], [place], [place]  Internal Medicine Progress Note    Assessment and Plans:     Gaurang Rivera is a 49 year old male admitted on 6/2/2020 following Right shoulder diagnostic arthroscopy and open biceps tenodesis for Right shoulder glenoid fracture malunion with post-traumatic stiffness; Biceps tendinopathy by Dr Burdick on 6/2.  He had minimal blood loss. No intra op complications.      Post op, in PACU he had had new onset afib with RVR with soft BP. Hence admitted to medical tele.      # Afib with RVR: New onset, no prior hx. No hx of CAD or heart failure. TSH: 1.21. K 4.3. Mg 2.0.  Potassium was 3.9. S/p iv metoprolol in pacu.     -Blood pressure running on the soft side with SBP's in the 90s.  Baseline blood pressure is 110 range.  -Give 1 L IV fluid bolus to see if the pressure comes up and if it does go ahead and administer 12.5 mg metoprolol twice a day with holding parameters.  Hold for SBP less than 95 or heart rate less than 55.  -Repeat another troponin  -Get 2D echocardiogram  -Patient denies any symptoms of sleep apnea, no excessive coffee drinking or alcohol abuse, no history of drug abuse  -Denies any chest pain at home or palpitations or dizzy episodes.  There is no shortness of breath  -He Gurdeep vascular score is at least 2.  Needs long-term anticoagulation from that perspective.  Patient does have a history of DVT and family history of clotting disorder.  Is supposed to be on lifelong anticoagulation anyway.  His Coumadin was held for the surgery.  It is been restarted without bridging for now.  -Spoke with cardiology on call risk and benefits of heparinization in the postop.  And cardioversion discussed.  Orthopedics favored not starting him on heparin drip for at least couple of weeks.  We will try to manage his A. fib from that perspective if things are uncontrolled at that point will consider cardioversion.  If you had to do  that patient would be started on low-dose heparin drip without bolus.  -We will get official cardiology consultation.     # DM-2: A1C: 7.2   -PTA metformin.  -Restart metformin  -Continue low dose sliding scale insulin    # HTN: Patient denies history of hypertension.  Primary started on lisinopril for renal protection per patient.  We will hold that for now     # HL: Ct PTA simvastatin continue that     # hx of DVT, unprovoked on Warfarin.  He has a family history of DVT as discussed earlier.  Supposed to be on lifelong anticoagulation.  He has not had hypercoagulable work-up per se  # Depression: mood stable. Ct pta venlafaxine.     # Shoulder surgery: Management per orthopedic surgery.  They were primary but now they want medicine to be primary.  I think it is okay.  They have agreed to enter all the orders for surgery and manage his shoulder and enter follow-up and discharge orders from a shoulder standpoint.  So medicine would not be expected after doing anything in that regard.    DVT Prophylaxis: On warfarin  Code Status: Full Code  Disposition: Deferred, needs 1 to 2 days      Dulce Gunter MD  Text Page  (7am - 5pm, M-F)    Subjective:          Overnight Events reviewed, Chart Reviewed  Denies any chest pain or shortness of breath no palpitations.       -Data reviewed today: I reviewed all new labs and imaging results over the last 24 hours.     Exam:         Temp: 98.3  F (36.8  C) Temp src: Oral BP: 95/71 Pulse: 88 Heart Rate: 100 Resp: 18 SpO2: 93 % O2 Device: Nasal cannula Oxygen Delivery: 3 LPM  Vitals:    06/02/20 0918   Weight: 132 kg (291 lb 0.1 oz)     Vital Signs with Ranges  Temp:  [98.3  F (36.8  C)-99  F (37.2  C)] 98.3  F (36.8  C)  Pulse:  [] 88  Heart Rate:  [] 100  Resp:  [13-18] 18  BP: ()/(27-95) 95/71  SpO2:  [92 %-100 %] 93 %  I/O last 3 completed shifts:  In: 1731 [I.V.:1731]  Out: 1125 [Urine:1125]    Constitutional: No distress noted, Alert, Answering questions  appropriately  Respiratory: AE is goog on both sides, no wheezing onr crackles  Cardiovascular: S1S2 normal, no new murmur  GI: Soft, non tender  Skin/Integumen: No rash  Status post right shoulder surgery      Medications     0.9% sodium chloride + KCl 20 mEq/L 125 mL/hr at 06/03/20 0404     Warfarin Therapy Reminder         sodium chloride 0.9%  1,000 mL Intravenous Once     insulin aspart  1-3 Units Subcutaneous TID AC     insulin aspart  1-3 Units Subcutaneous At Bedtime     metFORMIN  1,000 mg Oral BID w/meals     metoprolol tartrate  12.5 mg Oral BID     simvastatin  40 mg Oral At Bedtime     sodium chloride (PF)  3 mL Intracatheter Q8H     venlafaxine  300 mg Oral Daily     warfarin ANTICOAGULANT  7.5 mg Oral ONCE at 18:00       Data   Recent Labs   Lab 06/03/20  0706 06/02/20 2003 06/02/20  1417 06/02/20  0931 05/28/20  1135   WBC 9.2  --   --   --  6.8   HGB 14.4  --   --   --  16.1   MCV 89  --   --   --  87     --   --   --  192   INR 1.27*  --   --  1.14  --      --  138  --  137   POTASSIUM 3.9  --  4.3 4.0 4.0   CHLORIDE 106  --  105  --  105   CO2 27  --  23  --  25   BUN 13  --  17  --  18   CR 0.87  --  0.88  --  0.94   ANIONGAP 6  --  10  --  7   ASVI 8.3*  --  8.7  --  8.9   *  --  184*  --  138*   ALBUMIN  --   --   --   --  4.1   PROTTOTAL  --   --   --   --  7.8   BILITOTAL  --   --   --   --  0.5   ALKPHOS  --   --   --   --  62   ALT  --   --   --   --  64   AST  --   --   --   --  45   TROPI  --  <0.015 <0.015  --   --        No results found for this or any previous visit (from the past 24 hour(s)).

## 2020-06-03 NOTE — PLAN OF CARE
A&O x4. CMS and Neuros intact. Pt denies nausea, SOB, or pain overnight. Pt continues to be tachy in the 100s. Pt voiding well in urinal at bedside. IV patent and infusing fluids overnight. Call light within reach and pt able to make needs known.

## 2020-06-03 NOTE — PLAN OF CARE
"    Pt came up from PACU on cart and transferred to bed with hover mat around 1900. Pt alert and oriented and complained of minimal pain. Skin check completed with second nurse upon arrival.   VS:   BP 90/60   Pulse 123   Temp 98.8  F (37.1  C) (Oral)   Resp 14   Ht 1.981 m (6' 6\")   Wt 132 kg (291 lb 0.1 oz)   SpO2 100%   BMI 33.63 kg/m    A-fib- -140's, BP soft   Output:   Voiding spontaneously in urinal at bedside. Not passing gas yet. LBM 6/1.   Lungs CTA, diminished   Activity:   IND repositions in bed, able to sit on edge of bed IND   Skin: Intact ex incision   Pain:   Tolerable with discomfort. BLOCK in place. Given 5mg oxy.   Neuro/CMS:   A&Ox3, numbness in R arm due to block   Dressing(s):   CDI   Diet:   Regular-tolerating   LDA:   L PIV infusing, 3L NC   Equipment:   CAPNO, IV pole, TELE   Plan:   Continue to monitor and follow plan of care. Able to make needs known and call light within reach.   Additional Info:          "

## 2020-06-04 VITALS
SYSTOLIC BLOOD PRESSURE: 121 MMHG | BODY MASS INDEX: 33.67 KG/M2 | HEIGHT: 78 IN | DIASTOLIC BLOOD PRESSURE: 84 MMHG | RESPIRATION RATE: 16 BRPM | TEMPERATURE: 98.5 F | OXYGEN SATURATION: 94 % | WEIGHT: 291.01 LBS | HEART RATE: 78 BPM

## 2020-06-04 LAB
GLUCOSE BLDC GLUCOMTR-MCNC: 105 MG/DL (ref 70–99)
GLUCOSE BLDC GLUCOMTR-MCNC: 115 MG/DL (ref 70–99)
INR PPP: 1.37 (ref 0.86–1.14)

## 2020-06-04 PROCEDURE — 82962 GLUCOSE BLOOD TEST: CPT

## 2020-06-04 PROCEDURE — 25000128 H RX IP 250 OP 636: Performed by: INTERNAL MEDICINE

## 2020-06-04 PROCEDURE — 36415 COLL VENOUS BLD VENIPUNCTURE: CPT | Performed by: ORTHOPAEDIC SURGERY

## 2020-06-04 PROCEDURE — 85610 PROTHROMBIN TIME: CPT | Performed by: ORTHOPAEDIC SURGERY

## 2020-06-04 PROCEDURE — 99233 SBSQ HOSP IP/OBS HIGH 50: CPT | Performed by: INTERNAL MEDICINE

## 2020-06-04 PROCEDURE — 25000132 ZZH RX MED GY IP 250 OP 250 PS 637: Performed by: ORTHOPAEDIC SURGERY

## 2020-06-04 PROCEDURE — 25000132 ZZH RX MED GY IP 250 OP 250 PS 637: Performed by: INTERNAL MEDICINE

## 2020-06-04 RX ORDER — METOPROLOL TARTRATE 25 MG/1
12.5 TABLET, FILM COATED ORAL 2 TIMES DAILY
Qty: 60 TABLET | Refills: 0 | Status: SHIPPED | OUTPATIENT
Start: 2020-06-04 | End: 2020-07-14

## 2020-06-04 RX ORDER — WARFARIN SODIUM 7.5 MG/1
7.5 TABLET ORAL
Status: DISCONTINUED | OUTPATIENT
Start: 2020-06-04 | End: 2020-06-04 | Stop reason: HOSPADM

## 2020-06-04 RX ADMIN — OXYCODONE HYDROCHLORIDE 10 MG: 5 TABLET ORAL at 06:12

## 2020-06-04 RX ADMIN — METFORMIN HYDROCHLORIDE 1000 MG: 500 TABLET ORAL at 09:33

## 2020-06-04 RX ADMIN — POTASSIUM CHLORIDE AND SODIUM CHLORIDE: 900; 150 INJECTION, SOLUTION INTRAVENOUS at 06:12

## 2020-06-04 RX ADMIN — OXYCODONE HYDROCHLORIDE 10 MG: 5 TABLET ORAL at 10:24

## 2020-06-04 RX ADMIN — METOPROLOL TARTRATE 12.5 MG: 25 TABLET ORAL at 09:33

## 2020-06-04 ASSESSMENT — PAIN DESCRIPTION - DESCRIPTORS
DESCRIPTORS: ACHING
DESCRIPTORS: ACHING

## 2020-06-04 NOTE — PLAN OF CARE
VS: VSS except desatting to 88%. Put on 2L O2 @ 0100, now satting >95%. Pt refused @ 0500 to wear capno for rest of shift. Continuous pulse oximetry applied. Satting 95% on RA and A&Ox4. Denied SOB. Denied chest pain.   Output: Voiding spontaneously in bedside urinal w/ no issues. Last BM 6/1/2020 per pt report.   Activity: SBA.   Skin: Intact except for incisions and devices.    Pain: Pt c/o pain. Given PRN oxycodone 10mg w/ moderate relief.    CMS: Denied numbness or tingling.    Dressing: R shoulder dressing CDI.   Diet: Regular   LDA: PIV L hand infusing 0.9% NS + KCl 20 Ginny @ 125 mL/hr   Equipment: IV pole, capno, telemetry, personal belongings   Additional Info/Plan: Able to make needs known. Call light within reach. Continue POC.

## 2020-06-04 NOTE — PLAN OF CARE
Nursing   post op shoulder/  new afib  S- NSR with ectopy   no afib noted.  Tolerated up inhall and chair activity.  Tolerated reg diet.  Voiding.  Pt states oxycodone helping to keep Rt shoulder pain in managed control.  Using q4hr.  Pt states ready to go home.     A- ready for discharge to home  R-  Reviewed discharge instructions w teachback.  Instructed how to check own pulse  enc to do daily to be comfortable with it, to monitor for change.    Reviewed discharge meds.  Prescritpitioins sent.    Home w belongings including cell phone.  Home per w/ch w parents per car.

## 2020-06-04 NOTE — PROGRESS NOTES
"Orthopedic Surgery Progress Note: 06/04/2020    Subjective:   No acute events overnight, VSS and heart rate well controlled to the 70's today. No orthopedic concerns including new numbness, tingling, motor weakness. Discussed with him that he can discontinue the sling today and use only for comforts. Discussed that he can WBAT with the right arm.     Objective:   /84 (BP Location: Left arm)   Pulse 72   Temp 98.5  F (36.9  C) (Oral)   Resp 16   Ht 1.981 m (6' 6\")   Wt 132 kg (291 lb 0.1 oz)   SpO2 90%   BMI 33.63 kg/m    I/O this shift:  In: -   Out: 200 [Urine:200]  General: NAD. Resting comfortably in bed.  Respiratory: Breathing comfortably on RA  Musculoskeletal:     Motor Strength Right Left   Deltoids: C5 5/5 5/5   Biceps: C5 5/5 5/5   Brachialis: C6 5/5 5/5   Wrist extension: C6 5/5 5/5   Triceps: C7  5/5 5/5   Wrist flexion: C7 5/5 5/5    strength: C8 5/5 5/5   Hand intrinsics: T1 5/5 5/5     Sensation from C4-L1 is preserved.    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 Burdick on 6/2/2020. Post-operative time course complicated by new, acute onset Afib with soft pressures into the 80's-90's SBP, medicine following closely and vitals have improved to WNL baseline.  From an orthopedic standpoint healing appropriately post operatively, and OK for discharge to home.     Medicine Primary  - Activity: ADL's as tolerated   - Weightbearing Status: WBAT RUE   - Pain Management: Transition from IV to PO as tolerated. No NSAIDs.   - Antibiotics: complete  - Diet: Begin with clear fluids and progress diet as tolerated.   - DVT Prophylaxis: " PTA Warfarin   - Imaging: none needed  - Labs: Labs PRN.  - Bracing/Splinting :none; OK to discontinue use of sling today June 4, 2020. If he would like to continue wearing for comforts, that is OK, but he does not need to wear it for post-operative protection at this point   - Dressings: Keep dressings C/D/I x 7 days.  - Physical Therapy/Occupational Therapy: Evaluation and treatment.  - Consults: Hospitalist, cardiology.  - Follow-up: Clinic with Dr. Burdick in 2 weeks for wound exam    - Disposition: Pending progress with therapies, pain control on orals, and medical stability; anticipate discharge to home once medically optimized    Orthopedic surgery staff for this patient is Dr. Burdick.    ------------------------------------------------------------------------------------------    Miles Vinson MD   Orthopedic Surgery PGY-1   Pager: [phone]     Please page me directly with any questions/concerns during regular weekday hours before 5 pm. If there is no response, if it is a weekend, or if it is during evening hours then please page the orthopedic surgery resident on call.    FOLLOWUP:    Future Appointments   Date Time Provider Department Center   6/4/2020  1:00 PM Lexi Armas, PT [facility]   6/8/2020 11:00 AM [facility]   6/10/2020  2:40 PM Allegra eHller, PT [facility]   6/12/2020  1:40 PM Miguelito Ryan, PT [facility]   6/15/2020  1:00 PM Allegra Smiley APRN [facility]   6/15/2020  1:40 PM Lexi Armas, PT [facility]   6/17/2020  9:00 AM Dulce Maria Burdick MD [facility]   6/17/2020  1:40 PM Lexi Armas, PT [facility]   6/19/2020 10:30 AM Lexi Armas, PT [facility]   7/15/2020 10:30 AM Dulce Maria Burdick MD [facility]

## 2020-06-04 NOTE — PROGRESS NOTES
[facility], [facility] Progress Note - Hospitalist Service       Date of Admission:  6/2/2020  Assessment & Plan   Gauragn Rivera is a 49 year old male admitted on 6/2/2020 following Right shoulder diagnostic arthroscopy and open biceps tenodesis for Right shoulder glenoid fracture malunion with post-traumatic stiffness; Biceps tendinopathy by Dr Burdick on 6/2.  He had minimal blood loss. No intra op complications.      Post op, in PACU he had had new onset afib with RVR with soft BP. Hence admitted to  Medical Telemetry      # Afib with RVR:   New onset, no prior hx. No hx of CAD or heart failure. TSH: 1.21. K 4.3. Mg 2.0.  Potassium was 3.9. S/p iv metoprolol in pacu.    Treated with 1 L IV fluid bolus followed by 12.5 mg metoprolol twice a day with holding parameters.  -  Get 2D echocardiogram within normal limits   -Patient denies any symptoms of sleep apnea, no excessive coffee drinking or alcohol abuse, no history of drug abuse  -Denies any chest pain at home or palpitations or dizzy episodes.  There is no shortness of breath  -He Gurdeep vascular score is at least 2.  Needs long-term anticoagulation from that perspective.    Patient does have a history of DVT and family history of clotting disorder. Is on life long anticoagulation with Warfarin   - The patients case was discussed with Cardiology initially, who felt that tate may benefit from cardioversion. However, as of this morning, patient is back in sinus rhythm.  - Discussed with cardiology team aging. No further need for cardioversion. Discharge on 12.5 metoprolol BID, continue anticoagulation with warfarin ( No bridging)  - Follow up with PCP in 2-3 weeks        # DM-2: A1C: 7.2   -PTA metformin.  -Restart metformin  - Stop low dose sliding scale      # HTN:   Patient denies history of hypertension.  Primary started on lisinopril for renal protection per patient.    Resume at discharge      # HL:   Ct PTA  simvastatin     # hx of DVT, unprovoked on Warfarin.  He has a family history of DVT as discussed earlier.    Supposed to be on lifelong anticoagulation. Resume    # Depression: mood stable. Ct pta venlafaxine.     # Shoulder surgery:   Management per orthopedic surgery.       Diet: Advance Diet as Tolerated: Regular Diet Adult    DVT Prophylaxis: Warfarin  [place] Catheter: not present  Code Status: Full Code           Disposition Plan   Expected discharge: Today, recommended to prior living arrangement once adequate pain management/ tolerating PO medications.  Entered: Eloisa Cordero MD 06/04/2020, 8:25 AM       The patient's care was discussed with the Bedside Nurse, Care Coordinator/, Patient and Primary team.    Eloisa Cordero MD  Hospitalist Service  [facility], [place]    ______________________________________________________________________    Interval History   Patient feels well this morning. No more palpitations  No fevers or chills  Tolerating diet  Wants to be able to go home       Data reviewed today: I reviewed all medications, new labs and imaging results over the last 24 hours. I personally reviewed the EKG tracing showing Normal Sinus rythm,  71/min.    Physical Exam   Vital Signs: Temp: 98.5  F (36.9  C) Temp src: Oral BP: 121/84 Pulse: 72 Heart Rate: 71 Resp: 16 SpO2: 90 % O2 Device: None (Room air) Oxygen Delivery: 2 LPM  Weight: 291 lbs .12 oz  General Appearance: Awake, alert and not in distress  Respiratory:  Clear breath sounds bilaterally   Cardiovascular: Normal heart sounds. No murmurs   GI: Soft, non tender. Normal bowel sounds   Skin: No bruising or bleeding   Other: Rt shoulder with dressings. No distal deficits     Data   Recent Labs   Lab 06/04/20  0557 06/03/20  0706 06/02/20 2003 06/02/20  1417 06/02/20  0931 05/28/20  1135   WBC  --  9.2  --   --   --  6.8   HGB  --  14.4  --   --   --  16.1   MCV  --  89  --    --   --  87   PLT  --  189  --   --   --  192   INR 1.37* 1.27*  --   --  1.14  --    NA  --  139  --  138  --  137   POTASSIUM  --  3.9  --  4.3 4.0 4.0   CHLORIDE  --  106  --  105  --  105   CO2  --  27  --  23  --  25   BUN  --  13  --  17  --  18   CR  --  0.87  --  0.88  --  0.94   ANIONGAP  --  6  --  10  --  7   SAVI  --  8.3*  --  8.7  --  8.9   GLC  --  174*  --  184*  --  138*   ALBUMIN  --   --   --   --   --  4.1   PROTTOTAL  --   --   --   --   --  7.8   BILITOTAL  --   --   --   --   --  0.5   ALKPHOS  --   --   --   --   --  62   ALT  --   --   --   --   --  64   AST  --   --   --   --   --  45   TROPI  --  <0.015 <0.015 <0.015  --   --

## 2020-06-05 NOTE — DISCHARGE SUMMARY
"[facility], [place]  Orthopaedic Discharge Summary    Patient: Gaurang Rivera MRN# [id number]   Age/Sex: 49 year old male YOB: 1970      Date of Admission:  6/2/2020  Date of Discharge:  6/4/2020  Admitting Physician:  Dulce Maria Burdick MD   Discharge Physician:  Imer Vinson MD  Primary Care Provider:  Elvis Guzman    DISCHARGE DIAGNOSIS:  Closed displaced fracture of glenoid cavity of right scapula, sequela [S42.141S]  Closed displaced fracture of neck of right scapula, sequela [S42.151S]    PROCEDURE(S):   6/2/2020 Procedure(s):  Right shoulder diagnostic arthroscopy, capsular release,  open biceps tenodesis     BRIEF HISTORY:  \"Gaurang Rivera is a 49 year old male who sustained a right shoulder injury in Feb 2019 but did not seek care initially. Upon presentation to a clinic he was found to have a glenoid fracture which had already healed. He did not have instability but did have stiffness. That was treated non-surgically but for a time did not improve. He also had long head biceps pain. He has not had adequate lasting relief of pain with non-operative treatment and is at this time having lifestyle limiting symptoms. He wishes at this time to proceed with surgical intervention. We had discussed the risks and benefits of both non-operative and surgical management. We discussed the expected postoperative restrictions. We discussed the risks of surgery including failure of the cuff to heal or risk of retear, risk of being no better or even worse if he  became stiff, infected, had an injury to the nerves or arteries which power the arm or hand or had a reaction to anesthesia. We discussed the postoperative rehabilitation course. The patient wished to proceed with surgery and informed consent was completed.\"    HOSPITAL COURSE:    Patient was admitted on 6/2/2020 and underwent the above stated procedure(s). There were no intraoperative complications however it " was noted immediately following the case that the patient had entered into acute, first time onset atrial fibrillation. He was transferred to the PACU and was rate-controlled with a beta blocker. His SBP's were moderately low in the 90's however he remained hemodynamically stable during his PACU stay and was admitted to the floor. Medicine was consulted postoperatively to aid in management of his atrial fibrillation and on POD#1 assumed primary care.  Patient was kept in a sling for comforts.  After cardiovascular workup and stabilization, the patient continued demonstrating the ability to tolerate a diet, pain control on PO pain meds, and clearance by PT/OT, patient was deemed medically safe for discharge to home on 6/4/2020.    Antibiotics:  Ancef given preop and 24 hours postop for prophylaxis.  DVT Prophylaxis:  Defer to primary medicine team given recent onset Afib, was previously on Warfarin prior to admission, this is reasonable from an orthopedic standpoint.   PT/OT Progress: Has met PT/OT goals for safe mobility.  Pain Medications: Weaned off all IV pain meds by time of discharge.  Inpatient Events: Atrial fibrillation, as above.     DISCHARGE EXAM:  Please see daily progress note for full discharge exam.     DISCHARGE MEDICATIONS AND PROCEDURES:      Discharge Medication List as of 6/4/2020 10:29 AM      START taking these medications    Details   metoprolol tartrate (LOPRESSOR) 25 MG tablet Take 0.5 tablets (12.5 mg) by mouth 2 times daily, Disp-60 tablet,R-0, E-Prescribe      senna-docusate (SENOKOT-S/PERICOLACE) 8.6-50 MG tablet Take 1-2 tablets by mouth 2 times daily, Disp-20 tablet,R-0, E-Prescribe         CONTINUE these medications which have CHANGED    Details   acetaminophen (TYLENOL) 325 MG tablet Take 2 tablets (650 mg) by mouth 4 times daily Alternate with ibuprofen (ADVIL/MOTRIN), IF ordered., Disp-100 tablet,R-0, E-Prescribe      ibuprofen (ADVIL/MOTRIN) 800 MG tablet Take 1 tablet (800 mg) by  mouth 3 times daily Alternate with acetaminophen (TYLENOL), IF ordered., Disp-42 tablet,R-0, E-Prescribe      oxyCODONE (ROXICODONE) 5 MG tablet Take 1-2 tablets (5-10 mg) by mouth every 4 hours as needed for moderate to severe pain, Disp-20 tablet,R-0, E-Prescribe         CONTINUE these medications which have NOT CHANGED    Details   lisinopril (PRINIVIL/ZESTRIL) 5 MG tablet Take 1 tablet (5 mg) by mouth daily, Disp-90 tablet, R-1, E-Prescribe      metFORMIN (GLUCOPHAGE) 1000 MG tablet Take 1 tablet (1,000 mg) by mouth 2 times daily (with meals), Disp-180 tablet, R-1, E-Prescribe      multivitamin, therapeutic with minerals (MULTI-VITAMIN) TABS tablet Take 1 tablet by mouth daily, Historical      order for DME Equipment being ordered: Compression stockings 20-30 or 30-40 mmHg. To be wore for the first 1-2 years following DVT.Disp-1 Package, R-1, Local Print      polyethylene glycol PO powder Take 17 g (1 capful) by mouth daily as needed for constipation (If no bowel movement in 24 hours. Mix in 8 oz of water.  May take twice daily.), Disp-500 g, R-0, Local Print      simvastatin (ZOCOR) 40 MG tablet Take 1 tablet (40 mg) by mouth At Bedtime, Disp-90 tablet, R-1, E-Prescribe      venlafaxine (EFFEXOR-XR) 150 MG 24 hr capsule Take 2 capsules (300 mg) by mouth daily, Disp-180 capsule,R-3, E-Prescribe      warfarin ANTICOAGULANT (COUMADIN) 5 MG tablet Current dose (5/26/20): 10 mg every Mon, Fri + 7.5 mg all other days or as directed by ACC team., Disp-56 tablet,R-1, E-PrescribePatient uses 7.5 mg and 5 mg tablets.               Discharge Procedure Orders   Discharge Instructions   Order Comments: Review outpatient procedure discharge instructions with patient as directed by Provider     Notify Provider   Order Comments: For signs and symptoms of infection: Fever greater than 101, redness, drainage.  During business hours call [phone] with questions.  On evenings or weekends call the [facility]  service on call at [phone] (option 4).  Note that medication refills are only filled during business hours and will not be filled evenings or weekends.     Ice to affected area   Order Comments: Ice pack to surgical site every 15 minutes per hour for 24 hours     Remove dressing - at 72 hours    Order Comments: Keep bandage dry for 72 hours.   Remove outer dressing after 72 hours and leave Steri-Strips in place over incisions.   After 72 hours may let water run over incisions in shower - pat dry. Do not soak in tub or pool.     Wound care   Order Comments: Do not immerse wound in water until sutures removed      Diet as Tolerated   Order Comments: Return to diet before surgery, unless instructed otherwise.     No weight bearing   Order Comments: ENCOURAGE motion of fingers, wrist, hand. May do gentle elbow motion. May do Pendulums for shoulder motion.     Return to clinic   Order Comments: Return to clinic in 2 weeks     Adult [facility] Follow-up and recommended labs and tests   Order Comments: Please follow up with your PCP with regards to Atrial Fibrillation ( now resolved) in 2-3 weeks       Appointments on [facility] and/or [facility] (with [facility] or [facility] provider or service). Call [phone] if you haven't heard regarding these appointments within 7 days of discharge.     Reason for your hospital stay   Order Comments: A Fib with RVR following shoulder surgery     Activity   Order Comments: Your activity upon discharge: activity as tolerated   Please refer to special instructions from shoulder surgeon for activity on your shoulder     Order Specific Question Answer Comments   Is discharge order? Yes      Diet   Order Comments: Follow this diet upon discharge: Orders Placed This Encounter      Advance Diet as Tolerated: Regular Diet Adult     Order Specific Question Answer Comments   Is discharge order? Yes          FOLLOWUP:    Future Appointments   Date Time Provider Department Center   6/8/2020 11:00  AM [facility]   6/15/2020  1:00 PM Allegra Smiley APRN CNP [facility]   6/17/2020  9:00 AM Dulce Maria Burdick MD [facility]   7/15/2020 10:30 AM Dulce Maria Burdick MD [facility]     Appointments at [facility] [place]: [address] [place] [address]  Please call [phone] if you haven't heard regarding these appointments within 7 days of discharge.    ATTESTATION:  I have reviewed today's vital signs, notes, medications, labs and imaging.    Miles Vinson MD   Orthopedic Surgery PGY-1   Pager: [phone]

## 2020-06-05 NOTE — OP NOTE
DATE OF PROCEDURE: 6/2/2020     PREOPERATIVE DIAGNOSES:   1. Right shoulder glenoid fracture malunion  2. Right shoulder post-traumatic stiffness  3. Right long head biceps tendinopathy    POSTOPERATIVE DIAGNOSES:   1. Right shoulder glenoid fracture malunion  2. Right shoulder post-traumatic stiffness  3. Right long head biceps tendinopathy     PROCEDURES:   1. Right shoulder diagnostic arthroscopy  2. Right shoulder open subpec biceps tenodesis    STAFF SURGEON: Dulce Maria Burdick MD   ASSISTANT: Watson Girard MD and Miles Vinson MD    ANESTHESIA: General endotracheal anesthesia with supplementary interscalene block.   ESTIMATED BLOOD LOSS: 5 mL.     IMPLANTS: Arthrex 6.25 Bio-SwiveLock   COMPLICATIONS: None.     BRIEF PATIENT HISTORY: Gaurang Rivera is a 49 year old male I have seen in clinic. Please refer to that documentation. He sustained a right shoulder injury in Feb 2019 but did not seek care initially. Upon presentation to a clinic he was found to have a glenoid fracture which had already healed. He did not have instability but did have stiffness. That was treated non-surgically but for a time did not improve. He also had long head biceps pain. He has not had adequate lasting relief of pain with non-operative treatment and is at this time having lifestyle limiting symptoms. He wishes at this time to proceed with surgical intervention. We had discussed the risks and benefits of both non-operative and surgical management. We discussed the expected postoperative restrictions. We discussed the risks of surgery including failure of the cuff to heal or risk of retear, risk of being no better or even worse if he  became stiff, infected, had an injury to the nerves or arteries which power the arm or hand or had a reaction to anesthesia. We discussed the postoperative rehabilitation course. The patient wished to proceed with surgery and informed consent was completed.    DESCRIPTION OF PROCEDURE: The patient was  identified in the preoperative area and the correct right shoulder was marked for surgery. The patient was provided an interscalene block by our Anesthesia colleagues and was taken to the operating room where he was surrendered to general endotracheal anesthesia. The patient was moved to the operating table in the beachchair position with all bony prominences well padded. The head was placed in a head gutierrez with the neck in neutral position. The right upper extremity was prepped and draped in the usual sterile fashion. A timeout was held in accordance with hospital policy, confirming correct patient, side, site, procedure and administration of IV antibiotics prior to incision.   Note under anesthesia he had full range of motion in forward elevation, external rotation, and internal rotation.   I initiated shoulder arthroscopy through a posterior viewing portal. I created an anterior working portal under direct visualization. I performed a diagnostic arthroscopy. The subscap, supra, infra, and teres were intact. The glenoid fracture malunion was noted but the articular surfaces were very well preserved. There were no loose bodies in the axillary pouch. The intra-articular long head of the biceps was intact as was the labrum but there was tremendous injection and tenosynovitis in the groove.   I tenotomized the long head of the biceps and allowed it to retract out of the shoulder. I debrided the stump back to a stable edge. I then moved to the open biceps tenodesis.   I made a longitudinal incision near the axilla centered over the inferior edge of the pectoralis. I dissected down to the pec and came underneath the muscle where I identified the long head of the biceps. This tendon was delivered into the wound and starting at the musculotendinous junction I placed a stitch with FiberLoop extending 2 cm down the tendon. I removed the remaining proximal tendon. I then exposed the site for tenodesis beneath the pectoralis  below the biceps groove. I placed a noni retractor and long thin rich retractor to safely expose the site. I drilled a 6.5 tunnel and used the 6.25 SwivelLock to deliver the tendon into the tunnel. I anchored the SwivelLock with good purchase and appropriate tension on the tendon. The sutures were cut flush.   All instruments were removed from the shoulder and then portals were closed with interrupted 3-0 Monocryl. Steri-Strips and a soft sterile dressing were applied. The arm was placed into an abduction sling and the patient was extubated and transported to the recovery room in stable condition. There were no apparent intraoperative complications.     POSTOPERATIVE PLAN:   1. The patient will be discharged to home when he meets Same Day discharge criteria.   2. The patient will be allowed hand, wrist, and gentle elbow motion immediately. He can do pendulums and progress PROM of the shoulder as tolerated.     OF NOTE: Patient was found to have new onset a fib in the PACU and was admitted for further evaluation and management of this findings. Please refer to patient's admission and Hospitalist notes for further details.       RENE BERRY MD

## 2020-06-08 ENCOUNTER — ANTICOAGULATION THERAPY VISIT (OUTPATIENT)
Dept: FAMILY MEDICINE | Facility: CLINIC | Age: 50
End: 2020-06-08

## 2020-06-08 ENCOUNTER — TELEPHONE (OUTPATIENT)
Dept: ORTHOPEDICS | Facility: CLINIC | Age: 50
End: 2020-06-08

## 2020-06-08 DIAGNOSIS — I82.409 DEEP VEIN THROMBOSIS (DVT) (H): ICD-10-CM

## 2020-06-08 LAB
CAPILLARY BLOOD COLLECTION: NORMAL
INR PPP: 2 (ref 0.86–1.14)

## 2020-06-08 PROCEDURE — 99207 ZZC NO CHARGE NURSE ONLY: CPT | Performed by: FAMILY MEDICINE

## 2020-06-08 PROCEDURE — 85610 PROTHROMBIN TIME: CPT | Performed by: FAMILY MEDICINE

## 2020-06-08 PROCEDURE — 36416 COLLJ CAPILLARY BLOOD SPEC: CPT | Performed by: FAMILY MEDICINE

## 2020-06-08 NOTE — TELEPHONE ENCOUNTER
Patient was called back and he stated that he had PT appointments scheduled and that they have been cancelled by Neli KENDRICK RN.  He will be contacted tomorrow with the plan for his PT.  He was agreeable with this plan.

## 2020-06-08 NOTE — PROGRESS NOTES
ANTICOAGULATION FOLLOW-UP CLINIC VISIT    Patient Name:  Gaurang Rivera  Date:  2020  Contact Type:  Telephone/ Patient    SUBJECTIVE:  Patient Findings     Positives:   Change in health (Post-surgery Afib.), Change in activity (Lower activity. Pt using an arm sling when necessary to remind himself not to use his shoulder/arm. Physical therapy starting this week, pt is working out details with Ortho team.), Missed doses (Patient held 5 days prior to surgery (-6/3)), Extra doses (Pt was instructed to take 10 mg on .), Change in medications (Metoprolol 12.5 mg BID started for rate control. Pt to f/u with PCP, writer encouraged pt to do so within a week.), Hospital admission (- for right shoulder surgery.)    Comments:   Patient denies any changes to diet or s/s of bruising/bleeding or clotting.             OBJECTIVE    Recent labs: (last 7 days)     20  1106   INR 2.00*       ASSESSMENT / PLAN  INR assessment THER    Recheck INR In: 1 WEEK    INR Location Clinic      Anticoagulation Summary  As of 2020    INR goal:   2.0-3.0   TTR:   31.1 % (10.3 mo)   INR used for dosin.00 (2020)   Warfarin maintenance plan:   10 mg (5 mg x 2) every Mon, Fri; 7.5 mg (5 mg x 1.5) all other days   Full warfarin instructions:   10 mg every Mon, Fri; 7.5 mg all other days   Weekly warfarin total:   57.5 mg   Plan last modified:   Cyn Womack RN (2/3/2020)   Next INR check:   2020   Target end date:   10/5/2019    Indications    Deep vein thrombosis (DVT) (H) [I82.409]             Anticoagulation Episode Summary     INR check location:       Preferred lab:       Send INR reminders to:   ARMOND CORNELIUS    Comments:   : Occlusive thrombus- posterior tibial veins- R calf. Lovenox stopped  not bridged fully. wearing 15-20 C.stockings. Shoulder surgery (Dr. Burdick postponed). Neli Lozano RN #[phone]. Poor diet/no greens or exercise.  MTV 60 mcg.       Anticoagulation Care  Providers     Provider Role Specialty Phone number    Elvis Guzman MD [facility] [phone]            See the Encounter Report to view Anticoagulation Flowsheet and Dosing Calendar (Go to Encounters tab in chart review, and find the Anticoagulation Therapy Visit)    Writer advised patient to resume maintenance dose and recheck in one week to ensure stability post-surgery.    Patient made aware if signs of clotting (pain, tenderness, swelling, or color change in any extremity) AND/OR bleeding occur (nosebleeds, bleeding gums, bruising, or blood in stool or urine) to notify provider & seek medical attention. If severe symptoms develop, such as major bleeding, chest pain, shortness of breath, fall, trauma or s/s of stroke, patient to call 911 immediately.     Dosage adjustment made based on physician directed care plan.    Cyn Womack RN

## 2020-06-08 NOTE — TELEPHONE ENCOUNTER
[facility] Call Center    Phone Message    May a detailed message be left on voicemail: yes     Reason for Call: Other: Pt called and requested Neli to give pt a call to help assist on scheduling PT. Please call back pt. Thanks.     Action Taken: Message routed to:  [facility] ORTHO    Travel Screening: Not Applicable

## 2020-06-09 NOTE — TELEPHONE ENCOUNTER
Per Dr Burdick PT for stretching exercises is not needed after his surgery.  He can do hand, wrist, and elbow exercises as well as pendulums, and progress to passive range of motion as tolerated.  Message was left on patient's voicemail to call the clinic to discuss the post-op therapy.

## 2020-06-16 ENCOUNTER — ANTICOAGULATION THERAPY VISIT (OUTPATIENT)
Dept: FAMILY MEDICINE | Facility: CLINIC | Age: 50
End: 2020-06-16

## 2020-06-16 DIAGNOSIS — I82.409 DEEP VEIN THROMBOSIS (DVT) (H): ICD-10-CM

## 2020-06-16 LAB
CAPILLARY BLOOD COLLECTION: NORMAL
INR PPP: 2.7 (ref 0.86–1.14)

## 2020-06-16 PROCEDURE — 85610 PROTHROMBIN TIME: CPT | Performed by: FAMILY MEDICINE

## 2020-06-16 PROCEDURE — 99207 ZZC NO CHARGE NURSE ONLY: CPT | Performed by: FAMILY MEDICINE

## 2020-06-16 PROCEDURE — 36416 COLLJ CAPILLARY BLOOD SPEC: CPT | Performed by: FAMILY MEDICINE

## 2020-06-16 NOTE — PROGRESS NOTES
ANTICOAGULATION FOLLOW-UP CLINIC VISIT    Patient Name:  Gaurang Rivera  Date:  2020  Contact Type:  Telephone/ LVM for Patient    SUBJECTIVE:  Patient Findings     Comments:   Unable to reach patient, lvm with dosing and recheck plan (within 2 weeks). INR stable. Encouraged callback to report any changes to health, diet, meds, activity or any s/s of bleeding or clotting.              OBJECTIVE    Recent labs: (last 7 days)     20  1329   INR 2.70*       ASSESSMENT / PLAN  INR assessment THER    Recheck INR In: 2 WEEKS    INR Location Clinic      Anticoagulation Summary  As of 2020    INR goal:   2.0-3.0   TTR:   32.8 % (10.6 mo)   INR used for dosin.70 (2020)   Warfarin maintenance plan:   10 mg (5 mg x 2) every Mon, Fri; 7.5 mg (5 mg x 1.5) all other days   Full warfarin instructions:   10 mg every Mon, Fri; 7.5 mg all other days   Weekly warfarin total:   57.5 mg   No change documented:   Cyn Womack RN   Plan last modified:   Cyn Womack RN (2/3/2020)   Next INR check:   2020   Target end date:   10/5/2019    Indications    Deep vein thrombosis (DVT) (H) [I82.409]             Anticoagulation Episode Summary     INR check location:       Preferred lab:       Send INR reminders to:   ARMOND CORNELIUS    Comments:   : Occlusive thrombus- posterior tibial veins- R calf. Lovenox stopped  not bridged fully. wearing 15-20 C.stockings. Shoulder surgery (Dr. Burdick postponed). Neli Lozano RN #[phone]. Poor diet/no greens or exercise.  MTV 60 mcg.       Anticoagulation Care Providers     Provider Role Specialty Phone number    Elvis Guzman MD [facility] [phone]            See the Encounter Report to view Anticoagulation Flowsheet and Dosing Calendar (Go to Encounters tab in chart review, and find the Anticoagulation Therapy Visit)    Dosage adjustment made based on physician directed care plan.    Cyn Womack, CHEL

## 2020-06-17 ENCOUNTER — OFFICE VISIT (OUTPATIENT)
Dept: ORTHOPEDICS | Facility: CLINIC | Age: 50
End: 2020-06-17
Payer: COMMERCIAL

## 2020-06-17 DIAGNOSIS — S42.141S: Primary | ICD-10-CM

## 2020-06-17 NOTE — NURSING NOTE
Reason For Visit:   Chief Complaint   Patient presents with     Surgical Followup     2 week pop  6/2/2020 Right shoulder diagnostic arthroscopy, manipulation under anesthesia and possible open biceps tenodesis      PCP: Elvis Guzman  Ref: Self     ?  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  Currently working? Yes.  Work status?  Part-time.  Date of injury: 2/2019  Type of injury: fall.  Date of surgery: 6/2/20  Type of surgery:   1. Right shoulder diagnostic arthroscopy  2. Right shoulder open subpec biceps tenodesis  Smoker: No  Request smoking cessation information: No     Right hand dominant    SANE score  Affected shoulder: Right  Right shoulder SANE: 50-60  Left shoulder SANE: 100    There were no vitals taken for this visit.      Pain Assessment  Patient Currently in Pain: Denies(little pain with movement)    Edith Sam ATC

## 2020-06-17 NOTE — PROGRESS NOTES
CHIEF CONCERN: Status post Right shoulder arthroscopy and open biceps tenodesis  DATE OF SURGERY: 6/2/2020    HISTORY OF PRESENT ILLNESS: Mr Rivera is a pleasant 49 year-old male who is 2 weeks status post the above procedure. He has newly diagnosed A fib and remains on his warfarin. He denies any lightheadedness, SOB, etc. He has not been wearing his sling - but states he has been mindful of careful use of the arm.    EXAM:  Pleasant adult male in NAD  Respirations even and unlabored.  Right upper extremity: Incisions clean, dry, and intact. Distally neurovascularly intact without deficits. Shoulder range of motion is assisted FE to 95, ER to 45.    ASSESSMENT:  1. Two weeks status post above procedure    PLAN:    Range of Motion: Progress ROM of the shoulder per operative note.     Sling: Wean from sling  Over next 2 weeks    Pain medication: NSAIDs and Tylenol PRN    Follow up: 4 weeks with no new radiographs needed.

## 2020-06-17 NOTE — LETTER
6/17/2020     RE: Gaurang Rivera  [address]  [place] [address]    Dear Colleague,    Thank you for referring your patient, Gaurang Rivera, to the [facility]. Please see a copy of my visit note below.    CHIEF CONCERN: Status post Right shoulder arthroscopy and open biceps tenodesis  DATE OF SURGERY: 6/2/2020    HISTORY OF PRESENT ILLNESS: Mr Rivera is a pleasant 49 year-old male who is 2 weeks status post the above procedure. He has newly diagnosed A fib and remains on his warfarin. He denies any lightheadedness, SOB, etc. He has not been wearing his sling - but states he has been mindful of careful use of the arm.    EXAM:  Pleasant adult male in NAD  Respirations even and unlabored.  Right upper extremity: Incisions clean, dry, and intact. Distally neurovascularly intact without deficits. Shoulder range of motion is assisted FE to 95, ER to 45.  ASSESSMENT:1. Two weeks status post above procedure    PLAN:    Range of Motion: Progress ROM of the shoulder per operative note.     Sling: Wean from sling  Over next 2 weeks    Pain medication: NSAIDs and Tylenol PRN    Follow up: 4 weeks with no new radiographs needed.      Dulce Maria Burdick MD

## 2020-06-24 DIAGNOSIS — S42.141S: Primary | ICD-10-CM

## 2020-06-30 ENCOUNTER — THERAPY VISIT (OUTPATIENT)
Dept: PHYSICAL THERAPY | Facility: CLINIC | Age: 50
End: 2020-06-30
Attending: ORTHOPAEDIC SURGERY
Payer: COMMERCIAL

## 2020-06-30 DIAGNOSIS — S42.141S: ICD-10-CM

## 2020-06-30 PROCEDURE — 97110 THERAPEUTIC EXERCISES: CPT | Mod: GP | Performed by: PHYSICAL THERAPIST

## 2020-06-30 PROCEDURE — 97161 PT EVAL LOW COMPLEX 20 MIN: CPT | Mod: GP | Performed by: PHYSICAL THERAPIST

## 2020-06-30 NOTE — PROGRESS NOTES
"Physical Therapy Initial Evaluation: Subjective History  Date of Surgery: 6/2/2020.    Surgical Procedure/Limb: Right shoulder arthroscopy, open biceps tenodesis  Surgeon Name: Dr. Burdick  Average Daily Pain Levels: 2-3/10 (Location: Global shoulder area; Quality: Aching/Throbbing)  Other Symptoms: Denies n/t, denies weakness (reports he just has not been using it)  Symptom Mgmt Strategies: Ice, activity modification, sling x first 2 weeks.   Prior orthopaedic history/procedures: See Epic Chart  Prior non-operative management: See Epic Chart  Next MD Appt Date: 7/15/2020    Functional limitations following procedure: Does not have full function or daily activity tolerance. Sleeping is OK.   Previous level of function: Independent    Patient Employment: Coaching rowing at [employer]  Typical Physical Activities: \"every day activities\"        Post-Operative Physical Therapy Examination      Anthropometric Measures     Right Left Difference   Joint ROM      Flexion 115 deg 160 deg 45 deg   External Rotation at neutral 41 deg 65 deg 24 deg   Internal Rotation at neutral T12 T6 - deg   Abduction 87 deg 140 deg 53 deg        Right Left   Deltoid Muscle Activation Depressed intensity but good Normal     Status of Incision: Clean & healing      Assessment/Plan  Patient is a 49 year old male with right side shoulder complaints.    Patient has the following significant findings with corresponding treatment plan.                Diagnosis 1:  S/P shoulder surgery  Pain -  hot/cold therapy, manual therapy, STS, splint/taping/bracing/orthotics, self management and education  Decreased ROM/flexibility - manual therapy, therapeutic exercise and home program  Decreased joint mobility - manual therapy, therapeutic exercise and home program  Decreased strength - therapeutic exercise, therapeutic activities and home program  Impaired muscle performance - neuro re-education and home program  Decreased function - therapeutic " activities and home program    Therapy Evaluation Codes:   1) History comprised of:   Personal factors that impact the plan of care:      None.    Comorbidity factors that impact the plan of care are:      None.     Medications impacting care: None.  2) Examination of Body Systems comprised of:   Body structures and functions that impact the plan of care:      Shoulder.   Activity limitations that impact the plan of care are:      Driving, Dressing and Lifting.  3) Clinical presentation characteristics are:   Stable/Uncomplicated.  4) Decision-Making    Low complexity using standardized patient assessment instrument and/or measureable assessment of functional outcome.  Cumulative Therapy Evaluation is: Low complexity.    Previous and current functional limitations:  (See Goal Flow Sheet for this information)    Short term and Long term goals: (See Goal Flow Sheet for this information)     Communication ability:  Patient appears to be able to clearly communicate and understand verbal and written communication and follow directions correctly.  Treatment Explanation - The following has been discussed with the patient:   RX ordered/plan of care  Anticipated outcomes  Possible risks and side effects  This patient would benefit from PT intervention to resume normal activities.   Rehab potential is excellent.    Frequency:  1 X week, once daily  Duration:  for 6 weeks  Discharge Plan:  Achieve all LTG.  Independent in home treatment program.  Reach maximal therapeutic benefit.    Please refer to the daily flowsheet for treatment today, total treatment time and time spent performing 1:1 timed codes.     Answers for HPI/ROS submitted by the patient on 6/29/2020   History Reported by Patient  Reason for Visit:: physical therapy following shoulder surgery  When problem began:: 3/15/2019  How problem occurred:: slipped on ice and fell directly on shoulder  Number scale: 3/10  General health as reported by patient: fair  Please  check all that apply to your current or past medical history: depression, diabetes, overweight  Medical allergies: none  Surgeries: orthopedic surgery  Medications you are currently taking: anti-depressants, cardiac medication, heparin/coumadin  Occupation:: rowing   What are your primary job tasks: driving

## 2020-07-14 ENCOUNTER — THERAPY VISIT (OUTPATIENT)
Dept: PHYSICAL THERAPY | Facility: CLINIC | Age: 50
End: 2020-07-14
Payer: COMMERCIAL

## 2020-07-14 ENCOUNTER — VIRTUAL VISIT (OUTPATIENT)
Dept: FAMILY MEDICINE | Facility: CLINIC | Age: 50
End: 2020-07-14
Payer: COMMERCIAL

## 2020-07-14 DIAGNOSIS — Z47.89 AFTERCARE FOLLOWING SURGERY OF THE MUSCULOSKELETAL SYSTEM: ICD-10-CM

## 2020-07-14 DIAGNOSIS — I48.91 ATRIAL FIBRILLATION, UNSPECIFIED TYPE (H): ICD-10-CM

## 2020-07-14 DIAGNOSIS — M25.511 ACUTE PAIN OF RIGHT SHOULDER: ICD-10-CM

## 2020-07-14 DIAGNOSIS — S42.141S: Primary | ICD-10-CM

## 2020-07-14 PROCEDURE — 99213 OFFICE O/P EST LOW 20 MIN: CPT | Mod: 95 | Performed by: FAMILY MEDICINE

## 2020-07-14 PROCEDURE — 97530 THERAPEUTIC ACTIVITIES: CPT | Mod: GP | Performed by: PHYSICAL THERAPIST

## 2020-07-14 PROCEDURE — 97110 THERAPEUTIC EXERCISES: CPT | Mod: GP | Performed by: PHYSICAL THERAPIST

## 2020-07-14 RX ORDER — METOPROLOL TARTRATE 25 MG/1
12.5 TABLET, FILM COATED ORAL 2 TIMES DAILY
Qty: 60 TABLET | Refills: 1 | Status: SHIPPED | OUTPATIENT
Start: 2020-07-14 | End: 2020-07-27

## 2020-07-14 NOTE — PROGRESS NOTES
"Gaurang Rivera is a 49 year old male who is being evaluated via a billable telephone visit.      The patient has been notified of following:     \"This telephone visit will be conducted via a call between you and your physician/provider. We have found that certain health care needs can be provided without the need for a physical exam.  This service lets us provide the care you need with a short phone conversation.  If a prescription is necessary we can send it directly to your pharmacy.  If lab work is needed we can place an order for that and you can then stop by our lab to have the test done at a later time.    Telephone visits are billed at different rates depending on your insurance coverage. During this emergency period, for some insurers they may be billed the same as an in-person visit.  Please reach out to your insurance provider with any questions.    If during the course of the call the physician/provider feels a telephone visit is not appropriate, you will not be charged for this service.\"    Patient has given verbal consent for Telephone visit?  Yes    What phone number would you like to be contacted at? [phone]    How would you like to obtain your AVS? Freddyharjad Douglas     Gaurang Rivera is a 49 year old male who presents via phone visit today for the following health issues:    HPI      follow up -     HOSPITAL COURSE:    Patient was admitted on 6/2/2020 and underwent the above stated procedure(s). There were no intraoperative complications however it was noted immediately following the case that the patient had entered into acute, first time onset atrial fibrillation. He was transferred to the PACU and was rate-controlled with a beta blocker. His SBP's were moderately low in the 90's however he remained hemodynamically stable during his PACU stay and was admitted to the floor. Medicine was consulted postoperatively to aid in management of his atrial fibrillation and on POD#1 assumed primary care. "  Patient was kept in a sling for comforts.  After cardiovascular workup and stabilization, the patient continued demonstrating the ability to tolerate a diet, pain control on PO pain meds, and clearance by PT/OT, patient was deemed medically safe for discharge to home on 6/4/2020.       Update -   He has one dose of metoprolol.   No palpitations, dizziness, chest pain or shortness of breath.     Reviewed and updated as needed this visit by Provider         Review of Systems   Constitutional, HEENT, cardiovascular, pulmonary, gi and gu systems are negative, except as otherwise noted.       Objective   Reported vitals:  There were no vitals taken for this visit.   healthy, alert and no distress  PSYCH: Alert and oriented times 3; coherent speech, normal   rate and volume, able to articulate logical thoughts, able   to abstract reason, no tangential thoughts, no hallucinations   or delusions  His affect is normal  RESP: No cough, no audible wheezing, able to talk in full sentences  Remainder of exam unable to be completed due to telephone visits    Diagnostic Test Results:  none         Assessment/Plan:    1. Atrial fibrillation, unspecified type (H)  - metoprolol tartrate (LOPRESSOR) 25 MG tablet; Take 0.5 tablets (12.5 mg) by mouth 2 times daily  Dispense: 60 tablet; Refill: 1  - CARDIOLOGY EVAL ADULT REFERRAL; Future    Phone call duration:  4 minutes    Elvis Guzman MD

## 2020-07-15 ENCOUNTER — TELEPHONE (OUTPATIENT)
Dept: ORTHOPEDICS | Facility: CLINIC | Age: 50
End: 2020-07-15

## 2020-07-15 ENCOUNTER — VIRTUAL VISIT (OUTPATIENT)
Dept: ORTHOPEDICS | Facility: CLINIC | Age: 50
End: 2020-07-15
Payer: COMMERCIAL

## 2020-07-15 DIAGNOSIS — S42.141S: Primary | ICD-10-CM

## 2020-07-15 NOTE — LETTER
"    7/15/2020         RE: Gaurang Rivera  [address]  [facility] [address]        Dear Colleague,    Thank you for referring your patient, Gaurang Rivera, to the [facility]. Please see a copy of my visit note below.    Gaurang Rivera is a 49 year old male who is being evaluated via a billable video visit.      The patient has been notified of following:     \"This video visit will be conducted via a call between you and your physician/provider. We have found that certain health care needs can be provided without the need for an in-person physical exam.  This service lets us provide the care you need with a video conversation.  If a prescription is necessary we can send it directly to your pharmacy.  If lab work is needed we can place an order for that and you can then stop by our lab to have the test done at a later time.    Video visits are billed at different rates depending on your insurance coverage.  Please reach out to your insurance provider with any questions.    If during the course of the call the physician/provider feels a video visit is not appropriate, you will not be charged for this service.\"    Patient has given verbal consent for Video visit? Yes  How would you like to obtain your AVS? [employer]  Video Visit Technology for this patient: [employer] Video Visit- Patient was left in waiting room    Will anyone else be joining your video visit? No      CHIEF CONCERN: Status post Right shoulder arthroscopy and open biceps tenodesis  DATE OF SURGERY: 6/2/2020    HISTORY OF PRESENT ILLNESS: Mr Rivera is a pleasant 49 year-old male who is 6 weeks status post the above procedure. He is working with [facility] and has made excellent progress such that he has no discomfort at rest. Is able to do many activities.    EXAM:  Pleasant adult male in NAD  Respirations even and unlabored.  Right upper extremity: Incisions clean, dry, and intact. Distally neurovascularly intact without deficits. " Shoulder range of motion is active FE to 170, ER to 65.    ASSESSMENT:  1. Six weeks status post above procedure    PLAN:    Range of Motion: Progress ROM and strengthening of the shoulder with PT     Activities: progress as tolerated    Pain medication: NSAIDs and Tylenol PRN    Follow up: 6 weeks with no new radiographs needed.        Video-Visit Details    Type of service:  Video Visit    Video Start Time: 1:41pm  Video End Time: 1:49 PM    Originating Location (pt. Location): Home    Distant Location (provider location):  [facility]     Platform used for Video Visit: Vaibhav Burdick MD

## 2020-07-15 NOTE — PROGRESS NOTES
"Gaurang Rivera is a 49 year old male who is being evaluated via a billable video visit.      The patient has been notified of following:     \"This video visit will be conducted via a call between you and your physician/provider. We have found that certain health care needs can be provided without the need for an in-person physical exam.  This service lets us provide the care you need with a video conversation.  If a prescription is necessary we can send it directly to your pharmacy.  If lab work is needed we can place an order for that and you can then stop by our lab to have the test done at a later time.    Video visits are billed at different rates depending on your insurance coverage.  Please reach out to your insurance provider with any questions.    If during the course of the call the physician/provider feels a video visit is not appropriate, you will not be charged for this service.\"    Patient has given verbal consent for Video visit? Yes  How would you like to obtain your AVS? [employer]  Video Visit Technology for this patient: [employer] Video Visit- Patient was left in waiting room    Will anyone else be joining your video visit? No      CHIEF CONCERN: Status post Right shoulder arthroscopy and open biceps tenodesis  DATE OF SURGERY: 6/2/2020    HISTORY OF PRESENT ILLNESS: Mr Rivera is a pleasant 49 year-old male who is 6 weeks status post the above procedure. He is working with [facility] and has made excellent progress such that he has no discomfort at rest. Is able to do many activities.    EXAM:  Pleasant adult male in NAD  Respirations even and unlabored.  Right upper extremity: Incisions clean, dry, and intact. Distally neurovascularly intact without deficits. Shoulder range of motion is active FE to 170, ER to 65.    ASSESSMENT:  1. Six weeks status post above procedure    PLAN:    Range of Motion: Progress ROM and strengthening of the shoulder with PT     Activities: progress as tolerated    Pain " medication: NSAIDs and Tylenol PRN    Follow up: 6 weeks with no new radiographs needed.        Video-Visit Details    Type of service:  Video Visit    Video Start Time: 1:41pm  Video End Time: 1:49 PM    Originating Location (pt. Location): Home    Distant Location (provider location):  [facility]     Platform used for Video Visit: Vaibhav Burdick MD

## 2020-07-15 NOTE — TELEPHONE ENCOUNTER
Patient was called to get checked in for his virtual appointment with Dr. Burdick.  A message was left that we will call back in a little bit.

## 2020-07-15 NOTE — NURSING NOTE
Reason For Visit:   Chief Complaint   Patient presents with     Surgical Followup     DOS 6/2/2020 Right shoulder diagnostic arthroscopy, manipulation under anesthesia and possible open biceps tenodesis        PCP: Elvis Guzman  Ref: Self     ?  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  Currently working? Yes.  Work status?  Part-time.  Date of injury: 2/2019  Type of injury: fall.  Date of surgery: 6/2/20  Type of surgery:   1. Right shoulder diagnostic arthroscopy  2. Right shoulder open subpec biceps tenodesis  Smoker: No  Request smoking cessation information: No     Right hand dominant    SANE score  Affected shoulder: Right  Right shoulder SANE: 70  Left shoulder SANE: 100    There were no vitals taken for this visit.      Pain Assessment  Patient Currently in Pain: Alessandro Sam, ATC

## 2020-07-17 DIAGNOSIS — F33.1 MODERATE EPISODE OF RECURRENT MAJOR DEPRESSIVE DISORDER (H): ICD-10-CM

## 2020-07-17 DIAGNOSIS — R45.851 PASSIVE SUICIDAL IDEATIONS: ICD-10-CM

## 2020-07-17 DIAGNOSIS — E78.5 HYPERLIPIDEMIA, UNSPECIFIED HYPERLIPIDEMIA TYPE: ICD-10-CM

## 2020-07-17 DIAGNOSIS — E11.9 TYPE 2 DIABETES MELLITUS WITHOUT COMPLICATION, WITHOUT LONG-TERM CURRENT USE OF INSULIN (H): ICD-10-CM

## 2020-07-19 RX ORDER — SIMVASTATIN 40 MG
TABLET ORAL
Qty: 90 TABLET | Refills: 0 | Status: SHIPPED | OUTPATIENT
Start: 2020-07-19 | End: 2020-10-24

## 2020-07-19 RX ORDER — VENLAFAXINE HYDROCHLORIDE 150 MG/1
CAPSULE, EXTENDED RELEASE ORAL
Qty: 180 CAPSULE | Refills: 0 | OUTPATIENT
Start: 2020-07-19

## 2020-07-19 NOTE — TELEPHONE ENCOUNTER
Medication is being filled for 1 time refill only due to:  Patient needs to be seen because need recheck.   Parul Conteh RN

## 2020-07-20 NOTE — TELEPHONE ENCOUNTER
RECORDS RECEIVED FROM: Internal   DATE RECEIVED: 7-27-20   NOTES STATUS DETAILS   OFFICE NOTE from referring provider    Internal Dr. Guzman 7-14-20   OFFICE NOTE from other cardiologist    N/A    DISCHARGE SUMMARY from hospital    Internal 6-2-20 [facility]   DISCHARGE REPORT from the ER   N/A    OPERATIVE REPORT    N/A    MEDICATION LIST   Internal    LABS     BMP   Internal 6-3-20   CBC   Internal 6-3-20   CMP   Internal 5-28-20   Lipids   Internal 6-28-19   TSH   Internal 6-2-20   DIAGNOSTIC PROCEDURES     EKG   Internal 6-4-20   Monitor Reports   N/A    IMAGING (DISC & REPORT)      Echo   Internal 6-3-20   Stress Tests   N/A    Cath   N/A    MRI/MRA   N/A    CT/CTA   N/A

## 2020-07-27 ENCOUNTER — PRE VISIT (OUTPATIENT)
Dept: CARDIOLOGY | Facility: CLINIC | Age: 50
End: 2020-07-27

## 2020-07-27 ENCOUNTER — VIRTUAL VISIT (OUTPATIENT)
Dept: CARDIOLOGY | Facility: CLINIC | Age: 50
End: 2020-07-27
Attending: FAMILY MEDICINE
Payer: COMMERCIAL

## 2020-07-27 DIAGNOSIS — Z86.718 PERSONAL HISTORY OF DVT (DEEP VEIN THROMBOSIS): ICD-10-CM

## 2020-07-27 DIAGNOSIS — I97.89 POSTOPERATIVE ATRIAL FIBRILLATION (H): Primary | ICD-10-CM

## 2020-07-27 DIAGNOSIS — I48.91 POSTOPERATIVE ATRIAL FIBRILLATION (H): Primary | ICD-10-CM

## 2020-07-27 DIAGNOSIS — E11.9 TYPE 2 DIABETES MELLITUS WITHOUT COMPLICATION, WITHOUT LONG-TERM CURRENT USE OF INSULIN (H): ICD-10-CM

## 2020-07-27 DIAGNOSIS — I10 BENIGN ESSENTIAL HYPERTENSION: ICD-10-CM

## 2020-07-27 DIAGNOSIS — E78.1 HYPERTRIGLYCERIDEMIA: ICD-10-CM

## 2020-07-27 PROCEDURE — 99203 OFFICE O/P NEW LOW 30 MIN: CPT | Mod: 95 | Performed by: INTERNAL MEDICINE

## 2020-07-27 NOTE — PATIENT INSTRUCTIONS
Patient Instructions:    Discontinue metoprolol.    Continue all other current medications.    Please let us know if you have recurrent palpitations.  You can take metoprolol 25 mg if you feel sustained palpitations.  Please present to either ED or urgent care to get an EKG done at that time.    Return to cardiology clinic as needed.      It was a pleasure to see you in the cardiology clinic today.    We are encouraging the use of [employer] to communicate with your Healthcare Provider.  If you have any questions, call  Kota Stewart LPN, at [phone].  Press Option #1 for the [facility], and then press Option #4 for nursing.  Cardiology Fax  : [phone]      If you have an urgent need after hours (8:00 am to 4:30 pm) please call [phone] and ask for the cardiology fellow on call.

## 2020-07-27 NOTE — PROGRESS NOTES
"Gaurang Rivera is a 49 year old male who is being evaluated via a billable video visit.      The patient has been notified of following:     \"This video visit will be conducted via a call between you and your physician/provider. We have found that certain health care needs can be provided without the need for an in-person physical exam.  This service lets us provide the care you need with a video conversation.  If a prescription is necessary we can send it directly to your pharmacy.  If lab work is needed we can place an order for that and you can then stop by our lab to have the test done at a later time.    Video visits are billed at different rates depending on your insurance coverage.  Please reach out to your insurance provider with any questions.    If during the course of the call the physician/provider feels a video visit is not appropriate, you will not be charged for this service.\"    Patient has given verbal consent for Video visit? Yes    How would you like to obtain your AVS? Freddyhart    Patient would like the video invitation sent by: Send to e-mail at: [email or web address]      Video Start Time: 4:00 pm    Video-Visit Details    Type of service:  Video Visit    Video End Time (time video stopped): 4:20 pm    Originating Location (pt. Location): Home    Distant Location (provider location):  [facility]     Mode of Communication:  Video Conference via Doximity    Referring provider: Elvis Guzman MD    HPI: Mr. 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    Patient is being seen today through video encounter at request of  regarding paroxysmal atrial fibrillation episode during postoperative period on 6/2/2020.  Patient underwent right shoulder surgery on 6/2/2020 and he was found to be in atrial fibrillation RVR while he was in the PACU.  He was hemodynamically stable. "  Cardiology was consulted and he was recommended to start metoprolol and continue warfarin.  Patient spontaneously cardioverted to sinus rhythm before he was dischasrged.  EKG on 6/4/2020 shows sinus rhythm. The patient denies any recurrent palpitations since then. Echocardiogram 6/3/2020 showed a structurally normal heart with no valvular disease.    Since hospital discharge he has been on metoprolol 12.5 mg twice daily.  Patient overall reports doing well.  Denies chest pain, no exertion, palpitations, dizziness, lower extremity edema or syncope. He is not physically very active.  At most he does some walking.  He has no difficulty with ADLs.    Patient has history of unprovoked DVT a year ago.  He was recommended to stay on warfarin lifelong due to unprovoked nature of the clot.  Denies PE.     Lifetime non-smoker.  No alcohol abuse.    No prior hx of afib, stroke or coronary artery disease.    Current medications are warfarin, simvastatin 40 mg, metoprolol 12.5 mg twice daily, lisinopril 5 mg and metformin. Patient's blood pressure is well controlled.  Triglycerides are high at 400 mg/deciliter.  Significant improvement since 2018 which was 1015 milligrams/deciliter at that time.    EKG on 6/2/2020 shows A. fib with RVR.  EKG on 6/4/2020 is sinus rhythm otherwise normal.  Patient's lab tests show normal BMP, CBC and TSH.  Hemoglobin A1c is pretty well controlled at 7.2 last time checked 10/2019.    PAST MEDICAL HISTORY:  Past Medical History:   Diagnosis Date     Antiplatelet or antithrombotic long-term use      Depressive disorder 2001     Diabetes (H)      DVT (deep venous thrombosis) (H) 07/05/2019     Elevated blood pressure reading without diagnosis of hypertension 5/13/2018     Hypercholesteremia 2012     Personal history of other medical treatment     blood clot foot july 2019     Pilonidal cyst 5/13/2018       CURRENT MEDICATIONS:  Current Outpatient Medications   Medication Sig Dispense Refill      acetaminophen (TYLENOL) 325 MG tablet Take 2 tablets (650 mg) by mouth 4 times daily Alternate with ibuprofen (ADVIL/MOTRIN), IF ordered. 100 tablet 0     ibuprofen (ADVIL/MOTRIN) 800 MG tablet Take 1 tablet (800 mg) by mouth 3 times daily Alternate with acetaminophen (TYLENOL), IF ordered. 42 tablet 0     lisinopril (PRINIVIL/ZESTRIL) 5 MG tablet Take 1 tablet (5 mg) by mouth daily 90 tablet 1     metFORMIN (GLUCOPHAGE) 1000 MG tablet TAKE 1 TABLET(1000 MG) BY MOUTH TWICE DAILY WITH MEALS 180 tablet 0     metoprolol tartrate (LOPRESSOR) 25 MG tablet Take 0.5 tablets (12.5 mg) by mouth 2 times daily 60 tablet 1     multivitamin, therapeutic with minerals (MULTI-VITAMIN) TABS tablet Take 1 tablet by mouth daily       senna-docusate (SENOKOT-S/PERICOLACE) 8.6-50 MG tablet Take 1-2 tablets by mouth 2 times daily 20 tablet 0     simvastatin (ZOCOR) 40 MG tablet TAKE 1 TABLET(40 MG) BY MOUTH AT BEDTIME 90 tablet 0     venlafaxine (EFFEXOR-XR) 150 MG 24 hr capsule Take 2 capsules (300 mg) by mouth daily 180 capsule 3     warfarin ANTICOAGULANT (COUMADIN) 5 MG tablet Current dose (5/26/20): 10 mg every Mon, Fri + 7.5 mg all other days or as directed by ACC team. 56 tablet 1     order for DME Equipment being ordered: Compression stockings 20-30 or 30-40 mmHg. To be wore for the first 1-2 years following DVT. (Patient not taking: Reported on 5/28/2020) 1 Package 1     oxyCODONE (ROXICODONE) 5 MG tablet Take 1-2 tablets (5-10 mg) by mouth every 4 hours as needed for moderate to severe pain (Patient not taking: Reported on 7/27/2020) 20 tablet 0     polyethylene glycol PO powder Take 17 g (1 capful) by mouth daily as needed for constipation (If no bowel movement in 24 hours. Mix in 8 oz of water.  May take twice daily.) 500 g 0       PAST SURGICAL HISTORY:  Past Surgical History:   Procedure Laterality Date     APPENDECTOMY       ARTHROSCOPY SHOULDER, OPEN BICEP TENODESIS REPAIR, COMBINED Right 6/2/2020    Procedure: Right  shoulder diagnostic arthroscopy, capsular release,  open biceps tenodesis;  Surgeon: Dulce Maria Burdick MD;  Location: [facility] OR     AS DRAIN PILONIDAL CYST SIMPLE       BACK SURGERY  1997    spinal fusion     CHOLECYSTECTOMY  2012     CYSTECTOMY PILONIDAL N/A 2/21/2020    Procedure: EXCISION, PILONIDAL CYST, AILEEN II Procedure;  Surgeon: Artemio Ahumada MD;  Location: [facility] OR     THUMB SURGERY   1995       ALLERGIES:   No Known Allergies    FAMILY HISTORY:  Family History   Problem Relation Age of Onset     Diabetes Mother      Depression Father      Thrombosis Father         HE HAS HAD 3 CLOTS - PER PATIENT THEY WERE UNPROVOKED ON COUMADIN      Alcoholism Sister      Depression Sister      Lupus Sister      Anesthesia Reaction No family hx of      Cardiovascular No family hx of          SOCIAL HISTORY:  Social History     Tobacco Use     Smoking status: Never Smoker     Smokeless tobacco: Never Used   Substance Use Topics     Alcohol use: No     Comment: None     Drug use: No       ROS:   Constitutional: No fever, chills, or sweats. Weight stable.   ENT: No visual disturbance, ear ache, epistaxis, sore throat.   Cardiovascular: As per HPI.   Respiratory: No cough, hemoptysis.    GI: No nausea, vomiting, hematemesis, melena, or hematochezia.   : No hematuria.   Integument: Negative.   Psychiatric: Negative.   Hematologic:  No easy bruising, no easy bleeding.  Neuro: Negative.   Endocrinology: No significant heat or cold intolerance   Musculoskeletal: No myalgia.    Exam:  Physical Exam Elements attainable via telehealth:       Constitutional - alert and no distress    Eyes - no redness, no discharge    Respiratory - no cough, no labored breathing    Skin - no discoloration or lesions on the face or the arm.    Neurological -alert, normal speech,and affect, no tremor.     I have reviewed the labs and personally reviewed the imaging below and made my comment in the assessment and plan.    Labs:  CBC  RESULTS:   Lab Results   Component Value Date    WBC 9.2 06/03/2020    RBC 4.80 06/03/2020    HGB 14.4 06/03/2020    HCT 42.5 06/03/2020    MCV 89 06/03/2020    MCH 30.0 06/03/2020    MCHC 33.9 06/03/2020    RDW 12.3 06/03/2020     06/03/2020       BMP RESULTS:  Lab Results   Component Value Date     06/03/2020    POTASSIUM 3.9 06/03/2020    CHLORIDE 106 06/03/2020    CO2 27 06/03/2020    ANIONGAP 6 06/03/2020     (H) 06/03/2020    BUN 13 06/03/2020    CR 0.87 06/03/2020    GFRESTIMATED >90 06/03/2020    GFRESTBLACK >90 06/03/2020    SAVI 8.3 (L) 06/03/2020        INR RESULTS:  Lab Results   Component Value Date    INR 2.70 (H) 06/16/2020    INR 2.00 (H) 06/08/2020    INR 1.37 (H) 06/04/2020    INR 1.27 (H) 06/03/2020         Echocardiogram 6/3/2020  Technically difficult study.  Left ventricular function, chamber size, wall motion, and wall thickness are  normal.The EF is 60-65%.  Right ventricular function, chamber size, wall motion, and thickness are  normal.  Previous study not available for comparison.    EKG 6/2/2020 A. fib with RVR.  EKG 6/4/2020 sinus rhythm normal.    Assessment and Plan:   Mr. 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    1. Post-operative paroxysmal atrial fibrillation on 6/2/2020: Patient was found to be in atrial fibrillation after shoulder surgery on 6/2/2020 which spontaneously cardioverted to sinus rhythm.  He has been on rate control with metoprolol 12.5 mg twice daily since then.  Patient denies recurrent palpitations.  Patient has no prior history of atrial fibrillation.  Denies sleep apnea symptoms. Blood pressure and diabetes are well controlled.  I think at this point I do not see a very strong indication to continue metoprolol.  Therefore I recommended to discontinue metoprolol for now unless patient has recurrent symptoms in the future.  Patient  will continue warfarin for DVT prophylaxis purposes.    2.  Hyperlipidemia: Patient's triglycerides are still high however improved since 2018.  Recommended lifestyle changes.    3.  Hypertension: On lisinopril 5 mg.  Blood pressure well controlled.  Continue current treatment.    4.  Diabetes type 2 on metformin only with well-controlled hemoglobin A1c.    Medication change today: Discontinue metoprolol.  Continue all other current medications.    Return to clinic as needed.    A total of 20 minutes spent face-to-face through video encounter today with greater than 50% of the time spent in counseling and coordinating cares of the issues above.     Please donot hesitate to contact me if you have any questions or concerns. Again, thank you for allowing me to participate in the care of your patient.    Arleen BARRIOS MD  [facility] Division of Cardiology  Pager [phone]     Cara Guzman MD

## 2020-07-27 NOTE — LETTER
7/27/2020      RE: Gaurang Rivera  [address]  [facility] [address]       Dear Colleague,    Thank you for the opportunity to participate in the care of your patient, Gaurang Rivera, at the [facility]. Please see a copy of my visit note below.    Gaurang Rivera is a 49 year old male who is being evaluated via a billable video visit.      Referring provider: Elvis Guzman MD    HPI: Mr. 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    Patient is being seen today through video encounter at request of  regarding paroxysmal atrial fibrillation episode during postoperative period on 6/2/2020.  Patient underwent right shoulder surgery on 6/2/2020 and he was found to be in atrial fibrillation RVR while he was in the PACU.  He was hemodynamically stable.  Cardiology was consulted and he was recommended to start metoprolol and continue warfarin.  Patient spontaneously cardioverted to sinus rhythm before he was dischasrged.  EKG on 6/4/2020 shows sinus rhythm. The patient denies any recurrent palpitations since then. Echocardiogram 6/3/2020 showed a structurally normal heart with no valvular disease.    Since hospital discharge he has been on metoprolol 12.5 mg twice daily.  Patient overall reports doing well.  Denies chest pain, no exertion, palpitations, dizziness, lower extremity edema or syncope. He is not physically very active.  At most he does some walking.  He has no difficulty with ADLs.    Patient has history of unprovoked DVT a year ago.  He was recommended to stay on warfarin lifelong due to unprovoked nature of the clot.  Denies PE.     Lifetime non-smoker.  No alcohol abuse.    No prior hx of afib, stroke or coronary artery disease.    Current medications are warfarin, simvastatin 40 mg, metoprolol 12.5 mg twice  daily, lisinopril 5 mg and metformin. Patient's blood pressure is well controlled.  Triglycerides are high at 400 mg/deciliter.  Significant improvement since 2018 which was 1015 milligrams/deciliter at that time.    EKG on 6/2/2020 shows A. fib with RVR.  EKG on 6/4/2020 is sinus rhythm otherwise normal.  Patient's lab tests show normal BMP, CBC and TSH.  Hemoglobin A1c is pretty well controlled at 7.2 last time checked 10/2019.    PAST MEDICAL HISTORY:  Past Medical History:   Diagnosis Date     Antiplatelet or antithrombotic long-term use      Depressive disorder 2001     Diabetes (H)      DVT (deep venous thrombosis) (H) 07/05/2019     Elevated blood pressure reading without diagnosis of hypertension 5/13/2018     Hypercholesteremia 2012     Personal history of other medical treatment     blood clot foot july 2019     Pilonidal cyst 5/13/2018       CURRENT MEDICATIONS:  Current Outpatient Medications   Medication Sig Dispense Refill     acetaminophen (TYLENOL) 325 MG tablet Take 2 tablets (650 mg) by mouth 4 times daily Alternate with ibuprofen (ADVIL/MOTRIN), IF ordered. 100 tablet 0     ibuprofen (ADVIL/MOTRIN) 800 MG tablet Take 1 tablet (800 mg) by mouth 3 times daily Alternate with acetaminophen (TYLENOL), IF ordered. 42 tablet 0     lisinopril (PRINIVIL/ZESTRIL) 5 MG tablet Take 1 tablet (5 mg) by mouth daily 90 tablet 1     metFORMIN (GLUCOPHAGE) 1000 MG tablet TAKE 1 TABLET(1000 MG) BY MOUTH TWICE DAILY WITH MEALS 180 tablet 0     metoprolol tartrate (LOPRESSOR) 25 MG tablet Take 0.5 tablets (12.5 mg) by mouth 2 times daily 60 tablet 1     multivitamin, therapeutic with minerals (MULTI-VITAMIN) TABS tablet Take 1 tablet by mouth daily       senna-docusate (SENOKOT-S/PERICOLACE) 8.6-50 MG tablet Take 1-2 tablets by mouth 2 times daily 20 tablet 0     simvastatin (ZOCOR) 40 MG tablet TAKE 1 TABLET(40 MG) BY MOUTH AT BEDTIME 90 tablet 0     venlafaxine (EFFEXOR-XR) 150 MG 24 hr capsule Take 2 capsules (300  mg) by mouth daily 180 capsule 3     warfarin ANTICOAGULANT (COUMADIN) 5 MG tablet Current dose (5/26/20): 10 mg every Mon, Fri + 7.5 mg all other days or as directed by ACC team. 56 tablet 1     order for DME Equipment being ordered: Compression stockings 20-30 or 30-40 mmHg. To be wore for the first 1-2 years following DVT. (Patient not taking: Reported on 5/28/2020) 1 Package 1     oxyCODONE (ROXICODONE) 5 MG tablet Take 1-2 tablets (5-10 mg) by mouth every 4 hours as needed for moderate to severe pain (Patient not taking: Reported on 7/27/2020) 20 tablet 0     polyethylene glycol PO powder Take 17 g (1 capful) by mouth daily as needed for constipation (If no bowel movement in 24 hours. Mix in 8 oz of water.  May take twice daily.) 500 g 0       PAST SURGICAL HISTORY:  Past Surgical History:   Procedure Laterality Date     APPENDECTOMY       ARTHROSCOPY SHOULDER, OPEN BICEP TENODESIS REPAIR, COMBINED Right 6/2/2020    Procedure: Right shoulder diagnostic arthroscopy, capsular release,  open biceps tenodesis;  Surgeon: Dulce Maria Burdick MD;  Location: [facility] OR     AS DRAIN PILONIDAL CYST SIMPLE       BACK SURGERY  1997    spinal fusion     CHOLECYSTECTOMY  2012     CYSTECTOMY PILONIDAL N/A 2/21/2020    Procedure: EXCISION, PILONIDAL CYST, AILEEN II Procedure;  Surgeon: Artemio Ahumada MD;  Location: [facility] OR     THUMB SURGERY   1995       ALLERGIES:   No Known Allergies    FAMILY HISTORY:  Family History   Problem Relation Age of Onset     Diabetes Mother      Depression Father      Thrombosis Father         HE HAS HAD 3 CLOTS - PER PATIENT THEY WERE UNPROVOKED ON COUMADIN      Alcoholism Sister      Depression Sister      Lupus Sister      Anesthesia Reaction No family hx of      Cardiovascular No family hx of          SOCIAL HISTORY:  Social History     Tobacco Use     Smoking status: Never Smoker     Smokeless tobacco: Never Used   Substance Use Topics     Alcohol use: No     Comment: None     Drug  use: No       ROS:   Constitutional: No fever, chills, or sweats. Weight stable.   ENT: No visual disturbance, ear ache, epistaxis, sore throat.   Cardiovascular: As per HPI.   Respiratory: No cough, hemoptysis.    GI: No nausea, vomiting, hematemesis, melena, or hematochezia.   : No hematuria.   Integument: Negative.   Psychiatric: Negative.   Hematologic:  No easy bruising, no easy bleeding.  Neuro: Negative.   Endocrinology: No significant heat or cold intolerance   Musculoskeletal: No myalgia.    Exam:  Physical Exam Elements attainable via telehealth:       Constitutional - alert and no distress    Eyes - no redness, no discharge    Respiratory - no cough, no labored breathing    Skin - no discoloration or lesions on the face or the arm.    Neurological -alert, normal speech,and affect, no tremor.     I have reviewed the labs and personally reviewed the imaging below and made my comment in the assessment and plan.    Labs:  CBC RESULTS:   Lab Results   Component Value Date    WBC 9.2 06/03/2020    RBC 4.80 06/03/2020    HGB 14.4 06/03/2020    HCT 42.5 06/03/2020    MCV 89 06/03/2020    MCH 30.0 06/03/2020    MCHC 33.9 06/03/2020    RDW 12.3 06/03/2020     06/03/2020       BMP RESULTS:  Lab Results   Component Value Date     06/03/2020    POTASSIUM 3.9 06/03/2020    CHLORIDE 106 06/03/2020    CO2 27 06/03/2020    ANIONGAP 6 06/03/2020     (H) 06/03/2020    BUN 13 06/03/2020    CR 0.87 06/03/2020    GFRESTIMATED >90 06/03/2020    GFRESTBLACK >90 06/03/2020    SAVI 8.3 (L) 06/03/2020        INR RESULTS:  Lab Results   Component Value Date    INR 2.70 (H) 06/16/2020    INR 2.00 (H) 06/08/2020    INR 1.37 (H) 06/04/2020    INR 1.27 (H) 06/03/2020         Echocardiogram 6/3/2020  Technically difficult study.  Left ventricular function, chamber size, wall motion, and wall thickness are  normal.The EF is 60-65%.  Right ventricular function, chamber size, wall motion, and thickness  are  normal.  Previous study not available for comparison.    EKG 6/2/2020 A. fib with RVR.  EKG 6/4/2020 sinus rhythm normal.    Assessment and Plan:   Mr. 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    1. Post-operative paroxysmal atrial fibrillation on 6/2/2020: Patient was found to be in atrial fibrillation after shoulder surgery on 6/2/2020 which spontaneously cardioverted to sinus rhythm.  He has been on rate control with metoprolol 12.5 mg twice daily since then.  Patient denies recurrent palpitations.  Patient has no prior history of atrial fibrillation.  Denies sleep apnea symptoms. Blood pressure and diabetes are well controlled.  I think at this point I do not see a very strong indication to continue metoprolol.  Therefore I recommended to discontinue metoprolol for now unless patient has recurrent symptoms in the future.  Patient will continue warfarin for DVT prophylaxis purposes.    2.  Hyperlipidemia: Patient's triglycerides are still high however improved since 2018.  Recommended lifestyle changes.    3.  Hypertension: On lisinopril 5 mg.  Blood pressure well controlled.  Continue current treatment.    4.  Diabetes type 2 on metformin only with well-controlled hemoglobin A1c.    Medication change today: Discontinue metoprolol.  Continue all other current medications.    Return to clinic as needed.    A total of 20 minutes spent face-to-face through video encounter today with greater than 50% of the time spent in counseling and coordinating cares of the issues above.     Please donot hesitate to contact me if you have any questions or concerns. Again, thank you for allowing me to participate in the care of your patient.    Arleen BARRIOS MD  [facility] Division of Cardiology  Pager [phone]     Cara Guzman MD      Please do not hesitate to contact me if you have any  questions/concerns.     Sincerely,     Arleen Hawkins MD

## 2020-08-27 ENCOUNTER — VIRTUAL VISIT (OUTPATIENT)
Dept: ORTHOPEDICS | Facility: CLINIC | Age: 50
End: 2020-08-27
Payer: COMMERCIAL

## 2020-08-27 DIAGNOSIS — M71.9 DISORDER OF BURSAE AND TENDONS IN SHOULDER REGION: Primary | ICD-10-CM

## 2020-08-27 DIAGNOSIS — M67.919 DISORDER OF BURSAE AND TENDONS IN SHOULDER REGION: Primary | ICD-10-CM

## 2020-08-27 PROCEDURE — 99024 POSTOP FOLLOW-UP VISIT: CPT | Mod: 95 | Performed by: ORTHOPAEDIC SURGERY

## 2020-08-27 RX ORDER — OMEGA-3 FATTY ACIDS/FISH OIL 300-1000MG
600-800 CAPSULE ORAL EVERY 8 HOURS PRN
Status: ON HOLD | COMMUNITY
End: 2024-06-29

## 2020-08-27 NOTE — PROGRESS NOTES
"Gaurang Rivera is a 49 year old male who is being evaluated via a billable video visit.      The patient has been notified of following:     \"This video visit will be conducted via a call between you and your physician/provider. We have found that certain health care needs can be provided without the need for an in-person physical exam.  This service lets us provide the care you need with a video conversation.  If a prescription is necessary we can send it directly to your pharmacy.  If lab work is needed we can place an order for that and you can then stop by our lab to have the test done at a later time.    Video visits are billed at different rates depending on your insurance coverage.  Please reach out to your insurance provider with any questions.    If during the course of the call the physician/provider feels a video visit is not appropriate, you will not be charged for this service.\"    Patient has given verbal consent for Video visit? Yes  How would you like to obtain your AVS? MyChart  If you are dropped from the video visit, the video invite should be resent to: Text to cell phone: [phone]  Will anyone else be joining your video visit? No      CHIEF CONCERN: Status post Right shoulder arthroscopy and open biceps tenodesis  DATE OF SURGERY: 6/2/2020    HISTORY OF PRESENT ILLNESS: Mr Rivera is a pleasant 49 year-old male who is 6 weeks status post the above procedure. He is working with [facility] and has made excellent progress such that he has no discomfort at rest. Is able to do many activities.      ASSESSMENT:  1. Three months status post above procedure    PLAN:    Activities:  as tolerated    Pain medication: NSAIDs and Tylenol PRN    Follow up: prn    Video-Visit Details    Type of service:  Video Visit    Video Start Time: 1:32pm  Video End Time: 1:41 PM    Originating Location (pt. Location): Home    Distant Location (provider location):  [facility]     Platform used for Video " Visit: Vaibhav Burdick MD

## 2020-08-27 NOTE — LETTER
"    8/27/2020         RE: Gaurang Rivera  [address]  [facility] [address]        Dear Colleague,    Thank you for referring your patient, Gaurang Rivera, to the [facility]. Please see a copy of my visit note below.    Gaurang Rivera is a 49 year old male who is being evaluated via a billable video visit.      The patient has been notified of following:     \"This video visit will be conducted via a call between you and your physician/provider. We have found that certain health care needs can be provided without the need for an in-person physical exam.  This service lets us provide the care you need with a video conversation.  If a prescription is necessary we can send it directly to your pharmacy.  If lab work is needed we can place an order for that and you can then stop by our lab to have the test done at a later time.    Video visits are billed at different rates depending on your insurance coverage.  Please reach out to your insurance provider with any questions.    If during the course of the call the physician/provider feels a video visit is not appropriate, you will not be charged for this service.\"    Patient has given verbal consent for Video visit? Yes  How would you like to obtain your AVS? MyChart  If you are dropped from the video visit, the video invite should be resent to: Text to cell phone: [phone]  Will anyone else be joining your video visit? No      CHIEF CONCERN: Status post Right shoulder arthroscopy and open biceps tenodesis  DATE OF SURGERY: 6/2/2020    HISTORY OF PRESENT ILLNESS: Mr Rivera is a pleasant 49 year-old male who is 6 weeks status post the above procedure. He is working with [employer] and has made excellent progress such that he has no discomfort at rest. Is able to do many activities.      ASSESSMENT:  1. Three months status post above procedure    PLAN:    Activities:  as tolerated    Pain medication: NSAIDs and Tylenol PRN    Follow up: " prn    Video-Visit Details    Type of service:  Video Visit    Video Start Time: 1:32pm  Video End Time: 1:41 PM    Originating Location (pt. Location): Home    Distant Location (provider location):  [facility]     Platform used for Video Visit: [facility]    Dulce Maria Burdick MD          Again, thank you for allowing me to participate in the care of your patient.        Sincerely,        Dulce Maria Burdick MD

## 2020-10-06 ENCOUNTER — TELEPHONE (OUTPATIENT)
Dept: FAMILY MEDICINE | Facility: CLINIC | Age: 50
End: 2020-10-06

## 2020-10-06 NOTE — TELEPHONE ENCOUNTER
Attempted to reach patient, no answer, lvm instructing patient to schedule overdue INR appt at earliest convenience. Encouraged patient to call [facility] directly d/t long hold times for general scheduling. Cyn Womack, UMAN, RN

## 2020-10-16 NOTE — TELEPHONE ENCOUNTER
Attempted to reach patient again, lvm advising patient to call [facility] team at his earliest convenience to schedule overdue INR appt. Cyn Womack, UMAN, RN

## 2020-10-21 DIAGNOSIS — E78.5 HYPERLIPIDEMIA, UNSPECIFIED HYPERLIPIDEMIA TYPE: Primary | ICD-10-CM

## 2020-10-21 NOTE — LETTER
October 24, 2020      Gaurang Rivera  [address]   [place] [address]        Ada Merlos,      We received a refill request for simvastatin (ZOCOR) 40 MG tablet. We were able to approve the request and sent it to your pharmacy; however, you are due for a LAB ONLY visit to receive further refills. Please call us at [phone] at your earliest convenience to schedule an appointment.       We greatly appreciate the opportunity to serve you and thank you for trusting us with your health care.        Your health care team at [facility]             Sincerely,        Elvis Guzman MD

## 2020-10-24 RX ORDER — SIMVASTATIN 40 MG
40 TABLET ORAL AT BEDTIME
Qty: 30 TABLET | Refills: 0 | Status: SHIPPED | OUTPATIENT
Start: 2020-10-24 | End: 2021-01-21

## 2020-10-24 NOTE — TELEPHONE ENCOUNTER
Medication is being filled for 1 time refill only due to:  Patient needs labs Fasting.   Order placed. Please call to schedule lab appt.  Parul Conteh RN

## 2020-10-24 NOTE — TELEPHONE ENCOUNTER
LM to call back and schedule a LAB ONLY visit. Informed pt of approved med and sent to pharmacy. Sent [employer] message and letter.      Flor MCKINLEY  [facility]

## 2020-11-10 NOTE — TELEPHONE ENCOUNTER
Attempted to reach patient, no answer, lvm reiterating the importance of INR appts. No further refills until seen. Patient's last INR was 6/16/20, this is unacceptable. Routing to PCP as an FYI. Cyn Womack, UMAN, RN

## 2020-11-19 DIAGNOSIS — I48.91 ATRIAL FIBRILLATION, UNSPECIFIED TYPE (H): ICD-10-CM

## 2020-11-24 ENCOUNTER — TELEPHONE (OUTPATIENT)
Dept: FAMILY MEDICINE | Facility: CLINIC | Age: 50
End: 2020-11-24

## 2020-11-24 DIAGNOSIS — I82.4Y9 DEEP VEIN THROMBOSIS (DVT) OF PROXIMAL LOWER EXTREMITY, UNSPECIFIED CHRONICITY, UNSPECIFIED LATERALITY (H): ICD-10-CM

## 2020-11-24 DIAGNOSIS — I48.91 ATRIAL FIBRILLATION, UNSPECIFIED TYPE (H): Primary | ICD-10-CM

## 2020-11-24 RX ORDER — METOPROLOL TARTRATE 25 MG/1
TABLET, FILM COATED ORAL
Qty: 60 TABLET | Refills: 1 | OUTPATIENT
Start: 2020-11-24

## 2020-11-25 PROBLEM — I82.4Y9 DEEP VEIN THROMBOSIS (DVT) OF PROXIMAL LOWER EXTREMITY, UNSPECIFIED CHRONICITY, UNSPECIFIED LATERALITY (H): Status: ACTIVE | Noted: 2020-11-25

## 2020-11-25 PROBLEM — I48.91 ATRIAL FIBRILLATION, UNSPECIFIED TYPE (H): Status: ACTIVE | Noted: 2020-11-25

## 2020-11-25 NOTE — TELEPHONE ENCOUNTER
ANTICOAGULATION MANAGEMENT      Gaurang Rivera due for annual renewal of referral to anticoagulation monitoring. Order pended for your review and signature.      ANTICOAGULATION SUMMARY      Warfarin indication(s)     DVT (unprovoked)  Post-operative paroxysmal atrial fibrillation on 6/2/2020    Heart valve present?  NO       Current goal range   INR: 2.0-3.0     Goal appropriate for indication? Yes, INR 2-3 appropriate for hx of DVT, PE, hypercoagulable state, Afib, LVAD, or bileaflet AVR without risk factors     Current duration of therapy Indefinite/long term therapy   Time in Therapeutic Range (TTR)  (Goal > 60%) 32.8%       Office visit with referring provider's group within last year yes on 7/14/20     Noncompliance with INR checks continue despite several reminder calls. Last INR 6/16/20. [facility] will continue to attempt to reach patient and send a letter.     Cyn Womack RN

## 2020-11-25 NOTE — TELEPHONE ENCOUNTER
Referral approved by PCP.     Writer left  for patient reiterating the importance of INR monitoring. Patient is quite overdue, last INR was 6/16/20 (level was 2.7).     Requested callback at patient's earliest convenience. If patient does not check INR within a week, writer will have to discharge him from [facility] and patient will require an appt with PCP to re-establish care with [facility] team. This is not ideal, hopefully patient returns call as requested. Routing to PCP as an FYI. Cyn Womack, UMAN, RN

## 2020-11-26 NOTE — TELEPHONE ENCOUNTER
Patient returned [facility] call today (11/25/20) acknowledging overdue INR. Patient reports marcio COVID-19 and has had a lingering cough. Patient had an appointment scheduled for today, but cancelled d/t sx.    This is appropriate, patient will obtain INR as soon as he feels well enough. [facility] will monitor for INR appt in the coming week. Cyn Womack, UMAN, RN

## 2020-12-02 ENCOUNTER — ANTICOAGULATION THERAPY VISIT (OUTPATIENT)
Dept: FAMILY MEDICINE | Facility: CLINIC | Age: 50
End: 2020-12-02

## 2020-12-02 DIAGNOSIS — I82.4Y9 DEEP VEIN THROMBOSIS (DVT) OF PROXIMAL LOWER EXTREMITY, UNSPECIFIED CHRONICITY, UNSPECIFIED LATERALITY (H): ICD-10-CM

## 2020-12-02 DIAGNOSIS — I48.91 ATRIAL FIBRILLATION, UNSPECIFIED TYPE (H): ICD-10-CM

## 2020-12-02 DIAGNOSIS — I82.409 DEEP VEIN THROMBOSIS (DVT) (H): ICD-10-CM

## 2020-12-02 LAB
CAPILLARY BLOOD COLLECTION: NORMAL
INR PPP: 2.4 (ref 0.86–1.14)

## 2020-12-02 PROCEDURE — 85610 PROTHROMBIN TIME: CPT | Performed by: FAMILY MEDICINE

## 2020-12-02 PROCEDURE — 99207 PR NO CHARGE NURSE ONLY: CPT | Performed by: FAMILY MEDICINE

## 2020-12-02 PROCEDURE — 36416 COLLJ CAPILLARY BLOOD SPEC: CPT | Performed by: FAMILY MEDICINE

## 2020-12-03 NOTE — PROGRESS NOTES
ANTICOAGULATION FOLLOW-UP CLINIC VISIT    Patient Name:  Gaurang Rivera  Date:  12/3/2020  Contact Type:  Telephone/ LVM for patient    SUBJECTIVE:  Patient Findings     Comments:  Unable to reach patient, lvm with dosing and recheck plan (within 4 weeks). INR stable. Requested callback to verify current weekly dose and to report any changes to health, diet, meds, activity or any s/s of bleeding or clotting. Patient tentatively scheduled for next INR appt on  at 315 pm, encouraged callback to [facility] w/ any scheduling conflicts.             OBJECTIVE    Recent labs: (last 7 days)     20  1515   INR 2.40*       ASSESSMENT / PLAN  INR assessment THER    Recheck INR In: 4 WEEKS    INR Location Clinic      Anticoagulation Summary  As of 2020    INR goal:  2.0-3.0   TTR:  23.2 % (6.5 mo)   INR used for dosin.40 (2020)   Warfarin maintenance plan:  10 mg (5 mg x 2) every Mon, Fri; 7.5 mg (5 mg x 1.5) all other days   Full warfarin instructions:  10 mg every Mon, Fri; 7.5 mg all other days   Weekly warfarin total:  57.5 mg   No change documented:  Cyn Womack RN   Plan last modified:  Cyn Womack RN (2/3/2020)   Next INR check:  2020   Target end date:  10/5/2019    Indications    Deep vein thrombosis (DVT) (H) [I82.409]  Atrial fibrillation  unspecified type (H) [I48.91]  Deep vein thrombosis (DVT) of proximal lower extremity  unspecified chronicity  unspecified laterality (H) [I82.4Y9]             Anticoagulation Episode Summary     INR check location:      Preferred lab:      Send INR reminders to:  ARMOND CORNELIUS    Comments:  : Occlusive thrombus- posterior tibial veins- R calf. Lovenox stopped  not bridged fully. wearing 15-20 C.stockings. Shoulder surgery (Dr. Burdick postponed). Neli Lozano RN #[phone]. Poor diet/no greens or exercise.  MTV 60 mcg.       Anticoagulation Care Providers     Provider Role Specialty Phone number    Elvis Guzman MD  Referring Family Medicine [phone]            See the Encounter Report to view Anticoagulation Flowsheet and Dosing Calendar (Go to Encounters tab in chart review, and find the Anticoagulation Therapy Visit)    Dosage adjustment made based on physician directed care plan.    Cyn Womack RN

## 2020-12-06 DIAGNOSIS — I82.4Z1 ACUTE DEEP VEIN THROMBOSIS (DVT) OF DISTAL END OF RIGHT LOWER EXTREMITY (H): ICD-10-CM

## 2020-12-07 RX ORDER — WARFARIN SODIUM 5 MG/1
TABLET ORAL
Qty: 140 TABLET | Refills: 0 | Status: SHIPPED | OUTPATIENT
Start: 2020-12-07 | End: 2020-12-28

## 2020-12-07 NOTE — TELEPHONE ENCOUNTER
INR stable on 12/2. Warfarin refill approved per [facility] protocol. Cyn Womack, UMAN, RN

## 2020-12-11 NOTE — TELEPHONE ENCOUNTER
Patient completed INR on 12/2. Writer continues to be unable to reach patient directly to discuss, but level was stable at 2.4. Due next in 4 weeks (12/30). Cyn Womack, UMAN, RN

## 2020-12-28 ENCOUNTER — MYC REFILL (OUTPATIENT)
Dept: FAMILY MEDICINE | Facility: CLINIC | Age: 50
End: 2020-12-28

## 2020-12-28 DIAGNOSIS — I82.4Z1 ACUTE DEEP VEIN THROMBOSIS (DVT) OF DISTAL END OF RIGHT LOWER EXTREMITY (H): ICD-10-CM

## 2020-12-31 ENCOUNTER — ANTICOAGULATION THERAPY VISIT (OUTPATIENT)
Dept: FAMILY MEDICINE | Facility: CLINIC | Age: 50
End: 2020-12-31

## 2020-12-31 DIAGNOSIS — I82.409 DEEP VEIN THROMBOSIS (DVT) (H): ICD-10-CM

## 2020-12-31 DIAGNOSIS — I82.4Y9 DEEP VEIN THROMBOSIS (DVT) OF PROXIMAL LOWER EXTREMITY, UNSPECIFIED CHRONICITY, UNSPECIFIED LATERALITY (H): ICD-10-CM

## 2020-12-31 DIAGNOSIS — I48.91 ATRIAL FIBRILLATION, UNSPECIFIED TYPE (H): ICD-10-CM

## 2020-12-31 LAB
CAPILLARY BLOOD COLLECTION: NORMAL
INR PPP: 1.1 (ref 0.86–1.14)

## 2020-12-31 PROCEDURE — 85610 PROTHROMBIN TIME: CPT | Performed by: FAMILY MEDICINE

## 2020-12-31 PROCEDURE — 36416 COLLJ CAPILLARY BLOOD SPEC: CPT | Performed by: FAMILY MEDICINE

## 2020-12-31 RX ORDER — WARFARIN SODIUM 5 MG/1
TABLET ORAL
Qty: 140 TABLET | Refills: 0 | Status: SHIPPED | OUTPATIENT
Start: 2020-12-31 | End: 2021-03-24

## 2020-12-31 NOTE — TELEPHONE ENCOUNTER
Patient ran out of Warfarin on 12/27 and requested Monday 12/28. [facility] did not receive refill request. Reminded patient that clinic staff requires 72 hours to process and approve refills. Patient should contact ACC directly if low on supply and refill is urgent.     Patient's INR is now 1.1 d/t missing doses. See today's [facility] plan. Warfarin refill sent and pharmacy called to ensure patient can p/u today. Patient informed. Cyn Womack, UMAN, RN

## 2021-01-01 NOTE — PROGRESS NOTES
ANTICOAGULATION MANAGEMENT     Patient Name:  Gaurang Rivera  Date:  2020    ASSESSMENT /SUBJECTIVE:    Today's INR result of 1.1 is subtherapeutic. Goal INR of 2.0-3.0. PCP informed of today's INR level.      Warfarin dose taken: Missed dose(s) may be affecting INR (Missed 4 doses d/t running out of medication)    Diet: No new diet changes affecting INR    Medication changes/ interactions: No new medications/supplements affecting INR    Previous INR: Therapeutic     S/S of bleeding or thromboembolism: No    New injury or illness: No    Upcoming surgery, procedure or cardioversion: No    Additional findings: None      PLAN:    Telephone call with Gaurang regarding INR result and instructed:     Warfarin Dosing Instructions: 15 mg tonight and tomorrow then continue your current warfarin dose of 10 mg Mon, Fri + 7.5 mg ROW.    Instructed patient to follow up no later than: 5 days  Lab visit scheduled    Education provided: Target INR goal and significance of current INR result, Importance of therapeutic range, Importance of following up for INR monitoring at instructed interval, Importance of taking warfarin as instructed and Monitoring for clotting signs and symptoms Reminded patient that clinic staff requires 72 hours to process and approve refills. Patient should contact ACC directly if low on supply and refill is urgent.        Patient verbalizes understanding and agrees to warfarin dosing plan.    Instructed to call the Anticoagulation Clinic for any changes, questions or concerns. (#[phone])        Cyn Womack RN      OBJECTIVE:  Recent labs: (last 7 days)     20  1518   INR 1.10         No question data found.  Anticoagulation Summary  As of 2020    INR goal:  2.0-3.0   TTR:  27.6 % (6.5 mo)   INR used for dosin.10 (2020)   Warfarin maintenance plan:  10 mg (5 mg x 2) every Mon, Fri; 7.5 mg (5 mg x 1.5) all other days   Full warfarin instructions:  : 15 mg; : 15  mg; Otherwise 10 mg every Mon, Fri; 7.5 mg all other days   Weekly warfarin total:  57.5 mg   Plan last modified:  Cyn Womack RN (2/3/2020)   Next INR check:  1/4/2021   Target end date:  10/5/2019    Indications    Deep vein thrombosis (DVT) (H) [I82.409]  Atrial fibrillation  unspecified type (H) [I48.91]  Deep vein thrombosis (DVT) of proximal lower extremity  unspecified chronicity  unspecified laterality (H) [I82.4Y9]             Anticoagulation Episode Summary     INR check location:      Preferred lab:      Send INR reminders to:  ARMOND CORNELIUS    Comments:  7/5: Occlusive thrombus- posterior tibial veins- R calf. Lovenox stopped 7/22 not bridged fully. wearing 15-20 C.stockings. Shoulder surgery (Dr. Burdick postponed). Neli Lozano RN #[phone]. Poor diet/no greens or exercise.  MTV 60 mcg.       Anticoagulation Care Providers     Provider Role Specialty Phone number    Elvis Guzman MD Referring Family Medicine [phone]

## 2021-01-05 ENCOUNTER — ANTICOAGULATION THERAPY VISIT (OUTPATIENT)
Dept: FAMILY MEDICINE | Facility: CLINIC | Age: 51
End: 2021-01-05

## 2021-01-05 DIAGNOSIS — I82.4Y9 DEEP VEIN THROMBOSIS (DVT) OF PROXIMAL LOWER EXTREMITY, UNSPECIFIED CHRONICITY, UNSPECIFIED LATERALITY (H): ICD-10-CM

## 2021-01-05 DIAGNOSIS — I82.409 DEEP VEIN THROMBOSIS (DVT) (H): ICD-10-CM

## 2021-01-05 DIAGNOSIS — I48.91 ATRIAL FIBRILLATION, UNSPECIFIED TYPE (H): ICD-10-CM

## 2021-01-05 LAB
CAPILLARY BLOOD COLLECTION: NORMAL
INR PPP: 2 (ref 0.86–1.14)

## 2021-01-05 PROCEDURE — 36416 COLLJ CAPILLARY BLOOD SPEC: CPT | Performed by: FAMILY MEDICINE

## 2021-01-05 PROCEDURE — 85610 PROTHROMBIN TIME: CPT | Performed by: FAMILY MEDICINE

## 2021-01-05 NOTE — PROGRESS NOTES
Anticoagulation Management     Unable to reach patient and assess for missed/extra doses, diet, medication or activity level changes. Encouraged callback with any changes or new concerns.      Today's INR result of 2.0 is therapeutic (goal INR of 2.0- 3.0).  Result received from: Clinic lab        PLAN:     Detailed message left for Gaurang regarding INR result and instructed:      Warfarin Dosing Instructions: Resume maintenance warfarin dose 10 mg Mon, Fri + 7.5 mg ROW.     Instructed patient to follow up no later than: 2 weeks  Lab visit scheduled for 21 at 315 pm.         Instructed to call the Anticoagulation Clinic for any changes, questions or concerns. (#[phone])      Cyn Womack RN        OBJECTIVE:  Recent labs: (last 7 days)     20  1518 21  1502   INR 1.10 2.00*         No question data found.  Anticoagulation Summary  As of 2021    INR goal:  2.0-3.0   TTR:  27.6 % (6.5 mo)   INR used for dosin.00 (2021)   Warfarin maintenance plan:  10 mg (5 mg x 2) every Mon, Fri; 7.5 mg (5 mg x 1.5) all other days   Full warfarin instructions:  10 mg every Mon, Fri; 7.5 mg all other days   Weekly warfarin total:  57.5 mg   Plan last modified:  Cyn Womack RN (2/3/2020)   Next INR check:  2021   Target end date:  10/5/2019    Indications    Deep vein thrombosis (DVT) (H) [I82.409]  Atrial fibrillation  unspecified type (H) [I48.91]  Deep vein thrombosis (DVT) of proximal lower extremity  unspecified chronicity  unspecified laterality (H) [I82.4Y9]             Anticoagulation Episode Summary     INR check location:      Preferred lab:      Send INR reminders to:  ARMOND CORNELIUS    Comments:  : Occlusive thrombus- posterior tibial veins- R calf. Lovenox stopped  not bridged fully. wearing 15-20 C.stockings. Shoulder surgery (Dr. Burdick postponed). Neli Lozano RN #[phone]. Poor diet/no greens or exercise.  MTV 60 mcg.       Anticoagulation Care  Providers     Provider Role Specialty Phone number    Elvis Guzman MD Referring Family Medicine [phone]

## 2021-01-15 ENCOUNTER — HEALTH MAINTENANCE LETTER (OUTPATIENT)
Age: 51
End: 2021-01-15

## 2021-01-20 DIAGNOSIS — E78.5 HYPERLIPIDEMIA, UNSPECIFIED HYPERLIPIDEMIA TYPE: ICD-10-CM

## 2021-01-21 RX ORDER — SIMVASTATIN 40 MG
TABLET ORAL
Qty: 90 TABLET | Refills: 0 | Status: SHIPPED | OUTPATIENT
Start: 2021-01-21 | End: 2021-03-30

## 2021-01-21 NOTE — TELEPHONE ENCOUNTER
Authorized 90 day supply per nursing refill protocol..  Patient needs fasting labs.  Please call patient to schedule labs before further refills needed.  Jessica Contreras RN  [facility]

## 2021-01-29 ENCOUNTER — ANTICOAGULATION THERAPY VISIT (OUTPATIENT)
Dept: FAMILY MEDICINE | Facility: CLINIC | Age: 51
End: 2021-01-29

## 2021-01-29 DIAGNOSIS — I48.91 ATRIAL FIBRILLATION, UNSPECIFIED TYPE (H): ICD-10-CM

## 2021-01-29 DIAGNOSIS — I82.409 DEEP VEIN THROMBOSIS (DVT) (H): ICD-10-CM

## 2021-01-29 DIAGNOSIS — I82.4Y9 DEEP VEIN THROMBOSIS (DVT) OF PROXIMAL LOWER EXTREMITY, UNSPECIFIED CHRONICITY, UNSPECIFIED LATERALITY (H): ICD-10-CM

## 2021-01-29 LAB
CAPILLARY BLOOD COLLECTION: NORMAL
INR PPP: 3.3 (ref 0.86–1.14)

## 2021-01-29 PROCEDURE — 36416 COLLJ CAPILLARY BLOOD SPEC: CPT | Performed by: FAMILY MEDICINE

## 2021-01-29 PROCEDURE — 85610 PROTHROMBIN TIME: CPT | Performed by: FAMILY MEDICINE

## 2021-01-29 NOTE — PROGRESS NOTES
ANTICOAGULATION MANAGEMENT     Patient Name:  Gaurang Rivera  Date:  1/29/2021    ASSESSMENT /SUBJECTIVE:    Today's INR result of 3.3 is supratherapeutic. Goal INR of 2.0-3.0      Warfarin dose taken: Warfarin taken differently, but did not change total weekly dose. Patient missed Tuesday's dose, but made it up Wednesday morning.    Diet: No new diet changes affecting INR    Medication changes/ interactions: No new medications/supplements affecting INR    Previous INR: Therapeutic     S/S of bleeding or thromboembolism: No    New injury or illness: No    Upcoming surgery, procedure or cardioversion: No    Additional findings: None      PLAN:    Telephone call with Gaurang regarding INR result and instructed:     Warfarin Dosing Instructions: Continue your current warfarin dose 10 mg Mon, Fri + 7.5 mg ROW. Encouraged patient to have some extra greens this weekend.    Instructed patient to follow up no later than: 3 weeks  Lab visit scheduled    Education provided: Please call back if any changes to your diet, medications or how you've been taking warfarin, Monitoring for bleeding signs and symptoms, Monitoring for clotting signs and symptoms and Importance of notifying clinic for changes in medications; a sooner lab recheck maybe needed.      Patient verbalizes understanding and agrees to warfarin dosing plan.    Instructed to call the Anticoagulation Clinic for any changes, questions or concerns. (#[phone])        Cyn Womack RN      OBJECTIVE:  Recent labs: (last 7 days)     01/29/21  1448   INR 3.30*         INR assessment SUPRA    Recheck INR In: 2 WEEKS    INR Location Clinic      Anticoagulation Summary  As of 1/29/2021    INR goal:  2.0-3.0   TTR:  37.0 % (6.5 mo)   INR used for dosing:  3.30 (1/29/2021)   Warfarin maintenance plan:  10 mg (5 mg x 2) every Mon, Fri; 7.5 mg (5 mg x 1.5) all other days   Full warfarin instructions:  10 mg every Mon, Fri; 7.5 mg all other days   Weekly warfarin total:   57.5 mg   No change documented:  Cyn Womack RN   Plan last modified:  Cyn Womack RN (2/3/2020)   Next INR check:  2/12/2021   Target end date:  10/5/2019    Indications    Deep vein thrombosis (DVT) (H) [I82.409]  Atrial fibrillation  unspecified type (H) [I48.91]  Deep vein thrombosis (DVT) of proximal lower extremity  unspecified chronicity  unspecified laterality (H) [I82.4Y9]             Anticoagulation Episode Summary     INR check location:      Preferred lab:      Send INR reminders to:  ARMOND CORNELIUS    Comments:  7/5: Occlusive thrombus- posterior tibial veins- R calf. Lovenox stopped 7/22 not bridged fully. wearing 15-20 C.stockings. Shoulder surgery (Dr. Burdick postponed). Neli Lozano RN #[phone]. Poor diet/no greens or exercise.  MTV 60 mcg.       Anticoagulation Care Providers     Provider Role Specialty Phone number    Elvis Guzman MD Referring Family Medicine [phone]

## 2021-01-29 NOTE — PROGRESS NOTES
Attempt #1:    Anticoagulation Management    Unable to reach Gaurang today.    Today's INR result of 3.3 is supratherapeutic (goal INR of 2.0-3.0).  Result received from: Clinic Lab    Follow up required to confirm warfarin dose taken and assess for changes    Left message requesting callback at pt's earliest convenience today.    Anticoagulation clinic to follow up    Cyn Womack RN

## 2021-02-15 ENCOUNTER — ANTICOAGULATION THERAPY VISIT (OUTPATIENT)
Dept: FAMILY MEDICINE | Facility: CLINIC | Age: 51
End: 2021-02-15

## 2021-02-15 DIAGNOSIS — I82.4Y9 DEEP VEIN THROMBOSIS (DVT) OF PROXIMAL LOWER EXTREMITY, UNSPECIFIED CHRONICITY, UNSPECIFIED LATERALITY (H): ICD-10-CM

## 2021-02-15 DIAGNOSIS — I82.409 DEEP VEIN THROMBOSIS (DVT) (H): ICD-10-CM

## 2021-02-15 DIAGNOSIS — I48.91 ATRIAL FIBRILLATION, UNSPECIFIED TYPE (H): ICD-10-CM

## 2021-02-15 LAB
CAPILLARY BLOOD COLLECTION: NORMAL
INR PPP: 2.4 (ref 0.86–1.14)

## 2021-02-15 PROCEDURE — 85610 PROTHROMBIN TIME: CPT | Performed by: FAMILY MEDICINE

## 2021-02-15 PROCEDURE — 36416 COLLJ CAPILLARY BLOOD SPEC: CPT | Performed by: FAMILY MEDICINE

## 2021-02-15 NOTE — PROGRESS NOTES
ANTICOAGULATION MANAGEMENT     Patient Name:  Gaurang Rivera  Date:  2/15/2021    ASSESSMENT /SUBJECTIVE:    Today's INR result of 2.4 is therapeutic. Goal INR of 2.0-3.0      Warfarin dose taken: Warfarin taken as instructed    Diet: No new diet changes affecting INR    Medication changes/ interactions: No new medications/supplements affecting INR    Previous INR: Supratherapeutic     S/S of bleeding or thromboembolism: No    New injury or illness: No    Upcoming surgery, procedure or cardioversion: No    Additional findings: None      PLAN:    Telephone call with Gaurang regarding INR result and instructed:     Warfarin Dosing Instructions: Continue your current warfarin dose 10 mg Mon, Fri + 7.5 mg AOD.    Instructed patient to follow up no later than: 4 weeks  Lab visit scheduled    Education provided: Please call back if any changes to your diet, medications or how you've been taking warfarin, Importance of following up for INR monitoring at instructed interval, Importance of taking warfarin as instructed, Monitoring for bleeding signs and symptoms, Monitoring for clotting signs and symptoms and Importance of notifying clinic for changes in medications; a sooner lab recheck maybe needed.      Patient verbalizes understanding and agrees to warfarin dosing plan.    Instructed to call the Anticoagulation Clinic for any changes, questions or concerns. (#[phone])        Cyn Womack RN      OBJECTIVE:  Recent labs: (last 7 days)     02/15/21  1421   INR 2.40*         INR assessment THER    Recheck INR In: 4 WEEKS    INR Location Clinic      Anticoagulation Summary  As of 2/15/2021    INR goal:  2.0-3.0   TTR:  38.5 % (6.5 mo)   INR used for dosin.40 (2/15/2021)   Warfarin maintenance plan:  10 mg (5 mg x 2) every Mon, Fri; 7.5 mg (5 mg x 1.5) all other days   Full warfarin instructions:  10 mg every Mon, Fri; 7.5 mg all other days   Weekly warfarin total:  57.5 mg   Plan last modified:  Vu  Cyn AUGUST RN (2/3/2020)   Next INR check:  3/15/2021   Target end date:  10/5/2019    Indications    Deep vein thrombosis (DVT) (H) [I82.409]  Atrial fibrillation  unspecified type (H) [I48.91]  Deep vein thrombosis (DVT) of proximal lower extremity  unspecified chronicity  unspecified laterality (H) [I82.4Y9]             Anticoagulation Episode Summary     INR check location:      Preferred lab:      Send INR reminders to:  ARMOND CORNELIUS    Comments:  7/5: Occlusive thrombus- posterior tibial veins- R calf. Lovenox stopped 7/22 not bridged fully. wearing 15-20 C.stockings. Shoulder surgery (Dr. Burdick postponed). Neli Lozano RN #[phone]. Poor diet/no greens or exercise.  MTV 60 mcg.       Anticoagulation Care Providers     Provider Role Specialty Phone number    Elvis Guzman MD Referring Family Medicine [phone]

## 2021-02-22 DIAGNOSIS — E78.5 HYPERLIPIDEMIA, UNSPECIFIED HYPERLIPIDEMIA TYPE: ICD-10-CM

## 2021-02-25 RX ORDER — SIMVASTATIN 40 MG
TABLET ORAL
Qty: 30 TABLET | OUTPATIENT
Start: 2021-02-25

## 2021-02-25 NOTE — TELEPHONE ENCOUNTER
simvastatin (ZOCOR) 40 MG tablet  Message sent to pharmacy - Refusal reason: Should already have refills on file (ORDER SENT 1/21/21 FOR 3 MO SUPPLY TO REQUESTING SUPRIYA [id number].).  Cyn MILLIGAN

## 2021-03-15 ENCOUNTER — ANTICOAGULATION THERAPY VISIT (OUTPATIENT)
Dept: FAMILY MEDICINE | Facility: CLINIC | Age: 51
End: 2021-03-15

## 2021-03-15 DIAGNOSIS — I82.409 DEEP VEIN THROMBOSIS (DVT) (H): ICD-10-CM

## 2021-03-15 DIAGNOSIS — I82.4Y9 DEEP VEIN THROMBOSIS (DVT) OF PROXIMAL LOWER EXTREMITY, UNSPECIFIED CHRONICITY, UNSPECIFIED LATERALITY (H): ICD-10-CM

## 2021-03-15 DIAGNOSIS — I48.91 ATRIAL FIBRILLATION, UNSPECIFIED TYPE (H): ICD-10-CM

## 2021-03-15 LAB
CAPILLARY BLOOD COLLECTION: NORMAL
INR PPP: 3.5 (ref 0.86–1.14)

## 2021-03-15 PROCEDURE — 85610 PROTHROMBIN TIME: CPT | Performed by: FAMILY MEDICINE

## 2021-03-15 PROCEDURE — 99207 PR NO CHARGE NURSE ONLY: CPT | Performed by: FAMILY MEDICINE

## 2021-03-15 PROCEDURE — 36416 COLLJ CAPILLARY BLOOD SPEC: CPT | Performed by: FAMILY MEDICINE

## 2021-03-15 NOTE — PROGRESS NOTES
ANTICOAGULATION FOLLOW-UP CLINIC VISIT    Patient Name:  Gaurang Rivera  Date:  3/15/2021  Contact Type:  Telephone/ Peter    SUBJECTIVE:  Patient Findings     Comments:  Patient denies any identifiable changes that caused the supratherapeutic INR.         Clinical Outcomes     Negatives:  Major bleeding event, Thromboembolic event, Anticoagulation-related hospital admission, Anticoagulation-related ED visit, Anticoagulation-related fatality    Comments:  Patient denies any identifiable changes that caused the supratherapeutic INR.            OBJECTIVE    Recent labs: (last 7 days)     03/15/21  1424   INR 3.50*       ASSESSMENT / PLAN  INR assessment SUPRA    Recheck INR In: 2 WEEKS    INR Location Clinic      Anticoagulation Summary  As of 3/15/2021    INR goal:  2.0-3.0   TTR:  45.6 % (6.5 mo)   INR used for dosing:  3.50 (3/15/2021)   Warfarin maintenance plan:  10 mg (5 mg x 2) every Mon, Fri; 7.5 mg (5 mg x 1.5) all other days   Full warfarin instructions:  3/15: 5 mg; Otherwise 10 mg every Mon, Fri; 7.5 mg all other days   Weekly warfarin total:  57.5 mg   Plan last modified:  Cyn Womack RN (2/3/2020)   Next INR check:  3/29/2021   Priority:  Maintenance   Target end date:  10/5/2019    Indications    Deep vein thrombosis (DVT) (H) [I82.409]  Atrial fibrillation  unspecified type (H) [I48.91]  Deep vein thrombosis (DVT) of proximal lower extremity  unspecified chronicity  unspecified laterality (H) [I82.4Y9]             Anticoagulation Episode Summary     INR check location:      Preferred lab:      Send INR reminders to:  ARMOND CORNELIUS    Comments:  7/5: Occlusive thrombus- posterior tibial veins- R calf. Lovenox stopped 7/22 not bridged fully. wearing 15-20 C.stockings. Shoulder surgery (Dr. Burdick postponed). Neli Lozano RN #[phone]. Poor diet/no greens or exercise.  MTV 60 mcg.       Anticoagulation Care Providers     Provider Role Specialty Phone number    Elvis Guzman MD  Referring Family Medicine [phone]            See the Encounter Report to view Anticoagulation Flowsheet and Dosing Calendar (Go to Encounters tab in chart review, and find the Anticoagulation Therapy Visit)    Patient INR is supra therapeutic today.  Patient will adjust dosing today by taking 5 mg and then resume maintenance dosing of 57.5 mg and follow up in 2 weeks or sooner if there are any concerns or problems.    Signs of bleeding and when appropriate to seek care for symptoms reviewed.    Adjustment Rational: Dosage adjustment made based on physician directed care plan.      Harshil Salamanca RN

## 2021-03-18 DIAGNOSIS — E11.29 MICROALBUMINURIA DUE TO TYPE 2 DIABETES MELLITUS (H): ICD-10-CM

## 2021-03-18 DIAGNOSIS — E11.9 TYPE 2 DIABETES MELLITUS WITHOUT COMPLICATION, WITHOUT LONG-TERM CURRENT USE OF INSULIN (H): ICD-10-CM

## 2021-03-18 DIAGNOSIS — R80.9 MICROALBUMINURIA DUE TO TYPE 2 DIABETES MELLITUS (H): ICD-10-CM

## 2021-03-19 RX ORDER — LISINOPRIL 5 MG/1
5 TABLET ORAL DAILY
Qty: 90 TABLET | Refills: 0 | Status: SHIPPED | OUTPATIENT
Start: 2021-03-19 | End: 2021-03-30

## 2021-03-19 NOTE — TELEPHONE ENCOUNTER
Medication is being filled for 1 time refill only due to:  Patient needs to be seen because Diabetes and labs. .

## 2021-03-21 ENCOUNTER — HEALTH MAINTENANCE LETTER (OUTPATIENT)
Age: 51
End: 2021-03-21

## 2021-03-23 DIAGNOSIS — I82.4Z1 ACUTE DEEP VEIN THROMBOSIS (DVT) OF DISTAL END OF RIGHT LOWER EXTREMITY (H): ICD-10-CM

## 2021-03-23 PROBLEM — M25.511 ACUTE PAIN OF RIGHT SHOULDER: Status: RESOLVED | Noted: 2020-07-14 | Resolved: 2021-03-23

## 2021-03-23 PROBLEM — Z47.89 AFTERCARE FOLLOWING SURGERY OF THE MUSCULOSKELETAL SYSTEM: Status: RESOLVED | Noted: 2020-07-14 | Resolved: 2021-03-23

## 2021-03-23 PROBLEM — S42.141S: Status: RESOLVED | Noted: 2019-10-21 | Resolved: 2021-03-23

## 2021-03-24 RX ORDER — WARFARIN SODIUM 5 MG/1
TABLET ORAL
Qty: 140 TABLET | Refills: 0 | Status: SHIPPED | OUTPATIENT
Start: 2021-03-24 | End: 2021-06-18

## 2021-03-24 NOTE — TELEPHONE ENCOUNTER
Prescription approved per [facility] Refill Protocol.  Floridalma Salamanca RN   [facility]  [place], [place], [place]

## 2021-03-30 ENCOUNTER — OFFICE VISIT (OUTPATIENT)
Dept: FAMILY MEDICINE | Facility: CLINIC | Age: 51
End: 2021-03-30
Payer: COMMERCIAL

## 2021-03-30 ENCOUNTER — ANTICOAGULATION THERAPY VISIT (OUTPATIENT)
Dept: FAMILY MEDICINE | Facility: CLINIC | Age: 51
End: 2021-03-30

## 2021-03-30 VITALS
HEART RATE: 99 BPM | WEIGHT: 298.08 LBS | TEMPERATURE: 97.6 F | DIASTOLIC BLOOD PRESSURE: 88 MMHG | HEIGHT: 78 IN | SYSTOLIC BLOOD PRESSURE: 124 MMHG | BODY MASS INDEX: 34.49 KG/M2 | OXYGEN SATURATION: 95 %

## 2021-03-30 DIAGNOSIS — E78.5 HYPERLIPIDEMIA, UNSPECIFIED HYPERLIPIDEMIA TYPE: ICD-10-CM

## 2021-03-30 DIAGNOSIS — E11.29 MICROALBUMINURIA DUE TO TYPE 2 DIABETES MELLITUS (H): ICD-10-CM

## 2021-03-30 DIAGNOSIS — F33.1 MODERATE EPISODE OF RECURRENT MAJOR DEPRESSIVE DISORDER (H): ICD-10-CM

## 2021-03-30 DIAGNOSIS — R45.851 PASSIVE SUICIDAL IDEATIONS: ICD-10-CM

## 2021-03-30 DIAGNOSIS — Z11.59 NEED FOR HEPATITIS C SCREENING TEST: ICD-10-CM

## 2021-03-30 DIAGNOSIS — N40.1 BENIGN PROSTATIC HYPERPLASIA WITH LOWER URINARY TRACT SYMPTOMS, SYMPTOM DETAILS UNSPECIFIED: ICD-10-CM

## 2021-03-30 DIAGNOSIS — Z11.59 SCREENING FOR VIRAL DISEASE: ICD-10-CM

## 2021-03-30 DIAGNOSIS — I82.4Y9 DEEP VEIN THROMBOSIS (DVT) OF PROXIMAL LOWER EXTREMITY, UNSPECIFIED CHRONICITY, UNSPECIFIED LATERALITY (H): ICD-10-CM

## 2021-03-30 DIAGNOSIS — Z00.00 ROUTINE GENERAL MEDICAL EXAMINATION AT A HEALTH CARE FACILITY: Primary | ICD-10-CM

## 2021-03-30 DIAGNOSIS — R80.9 MICROALBUMINURIA DUE TO TYPE 2 DIABETES MELLITUS (H): ICD-10-CM

## 2021-03-30 DIAGNOSIS — I48.91 ATRIAL FIBRILLATION, UNSPECIFIED TYPE (H): ICD-10-CM

## 2021-03-30 DIAGNOSIS — E11.9 TYPE 2 DIABETES MELLITUS WITHOUT COMPLICATION, WITHOUT LONG-TERM CURRENT USE OF INSULIN (H): ICD-10-CM

## 2021-03-30 DIAGNOSIS — G89.29 CHRONIC BACK PAIN, UNSPECIFIED BACK LOCATION, UNSPECIFIED BACK PAIN LATERALITY: ICD-10-CM

## 2021-03-30 DIAGNOSIS — Z12.11 SCREEN FOR COLON CANCER: ICD-10-CM

## 2021-03-30 DIAGNOSIS — M79.651 RIGHT THIGH PAIN: ICD-10-CM

## 2021-03-30 DIAGNOSIS — I82.409 DEEP VEIN THROMBOSIS (DVT) (H): ICD-10-CM

## 2021-03-30 DIAGNOSIS — M54.9 CHRONIC BACK PAIN, UNSPECIFIED BACK LOCATION, UNSPECIFIED BACK PAIN LATERALITY: ICD-10-CM

## 2021-03-30 LAB
CAPILLARY BLOOD COLLECTION: NORMAL
ERYTHROCYTE [DISTWIDTH] IN BLOOD BY AUTOMATED COUNT: 12.1 % (ref 10–15)
HBA1C MFR BLD: 9.5 % (ref 0–5.6)
HCT VFR BLD AUTO: 51.2 % (ref 40–53)
HGB BLD-MCNC: 18 G/DL (ref 13.3–17.7)
INR PPP: 3 (ref 0.86–1.14)
MCH RBC QN AUTO: 29.6 PG (ref 26.5–33)
MCHC RBC AUTO-ENTMCNC: 35.2 G/DL (ref 31.5–36.5)
MCV RBC AUTO: 84 FL (ref 78–100)
PLATELET # BLD AUTO: 251 10E9/L (ref 150–450)
RBC # BLD AUTO: 6.09 10E12/L (ref 4.4–5.9)
WBC # BLD AUTO: 6.8 10E9/L (ref 4–11)

## 2021-03-30 PROCEDURE — 83036 HEMOGLOBIN GLYCOSYLATED A1C: CPT | Performed by: FAMILY MEDICINE

## 2021-03-30 PROCEDURE — 99214 OFFICE O/P EST MOD 30 MIN: CPT | Mod: 25 | Performed by: FAMILY MEDICINE

## 2021-03-30 PROCEDURE — 85027 COMPLETE CBC AUTOMATED: CPT | Performed by: FAMILY MEDICINE

## 2021-03-30 PROCEDURE — G0103 PSA SCREENING: HCPCS | Performed by: FAMILY MEDICINE

## 2021-03-30 PROCEDURE — 86803 HEPATITIS C AB TEST: CPT | Performed by: FAMILY MEDICINE

## 2021-03-30 PROCEDURE — 36415 COLL VENOUS BLD VENIPUNCTURE: CPT | Performed by: FAMILY MEDICINE

## 2021-03-30 PROCEDURE — 99396 PREV VISIT EST AGE 40-64: CPT | Performed by: FAMILY MEDICINE

## 2021-03-30 PROCEDURE — 85610 PROTHROMBIN TIME: CPT | Performed by: FAMILY MEDICINE

## 2021-03-30 PROCEDURE — 86704 HEP B CORE ANTIBODY TOTAL: CPT | Performed by: FAMILY MEDICINE

## 2021-03-30 PROCEDURE — 86706 HEP B SURFACE ANTIBODY: CPT | Performed by: FAMILY MEDICINE

## 2021-03-30 PROCEDURE — 96127 BRIEF EMOTIONAL/BEHAV ASSMT: CPT | Mod: 59 | Performed by: FAMILY MEDICINE

## 2021-03-30 PROCEDURE — 80053 COMPREHEN METABOLIC PANEL: CPT | Performed by: FAMILY MEDICINE

## 2021-03-30 PROCEDURE — 80061 LIPID PANEL: CPT | Performed by: FAMILY MEDICINE

## 2021-03-30 PROCEDURE — 87340 HEPATITIS B SURFACE AG IA: CPT | Performed by: FAMILY MEDICINE

## 2021-03-30 PROCEDURE — 84443 ASSAY THYROID STIM HORMONE: CPT | Performed by: FAMILY MEDICINE

## 2021-03-30 PROCEDURE — 99207 PR FOOT EXAM NO CHARGE: CPT | Mod: 25 | Performed by: FAMILY MEDICINE

## 2021-03-30 PROCEDURE — 82306 VITAMIN D 25 HYDROXY: CPT | Performed by: FAMILY MEDICINE

## 2021-03-30 PROCEDURE — 82043 UR ALBUMIN QUANTITATIVE: CPT | Performed by: FAMILY MEDICINE

## 2021-03-30 PROCEDURE — 99207 PR NO CHARGE NURSE ONLY: CPT | Performed by: FAMILY MEDICINE

## 2021-03-30 RX ORDER — BLOOD-GLUCOSE METER
KIT MISCELLANEOUS
Qty: 100 STRIP | Refills: 6 | Status: SHIPPED | OUTPATIENT
Start: 2021-03-30 | End: 2022-01-27

## 2021-03-30 RX ORDER — GLUCOSAMINE HCL/CHONDROITIN SU 500-400 MG
CAPSULE ORAL
Qty: 100 EACH | Refills: 3 | Status: SHIPPED | OUTPATIENT
Start: 2021-03-30 | End: 2022-02-01

## 2021-03-30 RX ORDER — LANCETS 28 GAUGE
EACH MISCELLANEOUS
Qty: 100 EACH | Refills: 6 | Status: SHIPPED | OUTPATIENT
Start: 2021-03-30 | End: 2022-01-27

## 2021-03-30 RX ORDER — SIMVASTATIN 40 MG
40 TABLET ORAL AT BEDTIME
Qty: 90 TABLET | Refills: 1 | Status: SHIPPED | OUTPATIENT
Start: 2021-03-30 | End: 2021-04-06

## 2021-03-30 RX ORDER — BLOOD-GLUCOSE CONTROL, NORMAL
EACH MISCELLANEOUS
Qty: 100 EACH | Refills: 3 | Status: SHIPPED | OUTPATIENT
Start: 2021-03-30 | End: 2022-01-27

## 2021-03-30 RX ORDER — VENLAFAXINE HYDROCHLORIDE 150 MG/1
300 CAPSULE, EXTENDED RELEASE ORAL DAILY
Qty: 180 CAPSULE | Refills: 1 | Status: SHIPPED | OUTPATIENT
Start: 2021-03-30 | End: 2021-07-26

## 2021-03-30 RX ORDER — BLOOD-GLUCOSE METER
KIT MISCELLANEOUS
Qty: 1 KIT | Refills: 0 | Status: SHIPPED | OUTPATIENT
Start: 2021-03-30 | End: 2022-01-27

## 2021-03-30 RX ORDER — LISINOPRIL 5 MG/1
5 TABLET ORAL DAILY
Qty: 90 TABLET | Refills: 1 | Status: SHIPPED | OUTPATIENT
Start: 2021-03-30 | End: 2021-06-23

## 2021-03-30 ASSESSMENT — ENCOUNTER SYMPTOMS: FREQUENCY: 1

## 2021-03-30 ASSESSMENT — MIFFLIN-ST. JEOR: SCORE: 2353.03

## 2021-03-30 ASSESSMENT — ANXIETY QUESTIONNAIRES
3. WORRYING TOO MUCH ABOUT DIFFERENT THINGS: NOT AT ALL
7. FEELING AFRAID AS IF SOMETHING AWFUL MIGHT HAPPEN: NOT AT ALL
6. BECOMING EASILY ANNOYED OR IRRITABLE: NOT AT ALL
2. NOT BEING ABLE TO STOP OR CONTROL WORRYING: NOT AT ALL
GAD7 TOTAL SCORE: 0
1. FEELING NERVOUS, ANXIOUS, OR ON EDGE: NOT AT ALL
5. BEING SO RESTLESS THAT IT IS HARD TO SIT STILL: NOT AT ALL
IF YOU CHECKED OFF ANY PROBLEMS ON THIS QUESTIONNAIRE, HOW DIFFICULT HAVE THESE PROBLEMS MADE IT FOR YOU TO DO YOUR WORK, TAKE CARE OF THINGS AT HOME, OR GET ALONG WITH OTHER PEOPLE: NOT DIFFICULT AT ALL

## 2021-03-30 ASSESSMENT — PATIENT HEALTH QUESTIONNAIRE - PHQ9
5. POOR APPETITE OR OVEREATING: NOT AT ALL
SUM OF ALL RESPONSES TO PHQ QUESTIONS 1-9: 17

## 2021-03-30 NOTE — PROGRESS NOTES
SUBJECTIVE:   Gaurang Rivera is a 50 year old male who presents for Preventive Visit.      Patient has been advised of split billing requirements and indicates understanding: Yes   Are you in the first 12 months of your Medicare coverage?  No    Healthy Habits:     Getting at least 3 servings of Calcium per day:  Yes    Bi-annual eye exam:  NO    Dental care twice a year:  NO    Sleep apnea or symptoms of sleep apnea:  None    Diet:  Regular (no restrictions)    Frequency of exercise:  None    Taking medications regularly:  Yes    Medication side effects:  None    PHQ-2 Total Score: 2    Additional concerns today:  Yes      BPH - He notes that flomax did not help with symptoms.     MDD - He wants to reestablish care with psychiatrist. He feels that symptoms are ok. He got a new job. He has been feeling hopeless. He is is an introvert. He feels that medications are stable but wants talk therapy. Has has passive chronic suicidal ideations, no active thoughts. No weapons at home.     Diabetes - He is not checking his blood sugars. Finger stick bothers him. Diet is terrible and he doesn't exercise. He had back surgery and has had constant back pain. He has been so inactive for so long that it is hard to get started again.     Right inner thigh - He notes that it is a little tender and sore to the touch. He notices it mainly at night when he lays down. He notes moving legs apart makes it a little better. It is not that painful or bothersome. No testicular symptoms.     Do you feel safe in your environment? Yes    Have you ever done Advance Care Planning? (For example, a Health Directive, POLST, or a discussion with a medical provider or your loved ones about your wishes): No, advance care planning information given to patient to review.  Patient declined advance care planning discussion at this time.      Fall risk  Fallen 2 or more times in the past year?: No  Any fall with injury in the past year?: No  =  Cognitive  Screening   1) Repeat 3 items (Leader, Season, Table)    2) Clock draw: ABNORMAL   3) 3 item recall: Recalls 2 objects   Results: ABNORMAL clock, 1-2 items recalled: PROBABLE COGNITIVE IMPAIRMENT, **INFORM PROVIDER**    Mini-CogTM Copyright S Mariah. Licensed by the author for use in [facility]; reprinted with permission (phyllis@[facility]). All rights reserved.      Do you have sleep apnea, excessive snoring or daytime drowsiness?: no    Reviewed and updated as needed this visit by clinical staff  Tobacco  Allergies  Meds   Med Hx  Surg Hx  Fam Hx  Soc Hx        Reviewed and updated as needed this visit by Provider                Social History     Tobacco Use     Smoking status: Never Smoker     Smokeless tobacco: Never Used   Substance Use Topics     Alcohol use: No     Comment: None     If you drink alcohol do you typically have >3 drinks per day or >7 drinks per week? No    Alcohol Use 3/30/2021   Prescreen: >3 drinks/day or >7 drinks/week? Not Applicable   Prescreen: >3 drinks/day or >7 drinks/week? -       Current providers sharing in care for this patient include:   Patient Care Team:  Elvis Guzman MD as PCP - General (Family Practice)  Elvis Guzman MD as Assigned PCP  Dulce Maria Burdick MD as Assigned Musculoskeletal Provider  Arleen Hawkins MD as Assigned Heart and Vascular Provider  Alyssa Chang MD as Assigned Cancer Care Provider    The following health maintenance items are reviewed in Epic and correct as of today:  Health Maintenance Due   Topic Date Due     ADVANCE CARE PLANNING  Never done     EYE EXAM  Never done     COLORECTAL CANCER SCREENING  Never done     COVID-19 Vaccine (1) Never done     HEPATITIS C SCREENING  Never done     HEPATITIS B IMMUNIZATION (1 of 3 - Risk 3-dose series) Never done     ZOSTER IMMUNIZATION (1 of 2) Never done     A1C  04/10/2020     MICROALBUMIN  06/28/2020     PHQ-9  07/08/2020     LIPID  10/10/2020     DIABETIC FOOT EXAM  10/10/2020      "PREVENTIVE CARE VISIT  01/08/2021       Review of Systems   Genitourinary: Positive for frequency and impotence.         OBJECTIVE:   /88 (BP Location: Right arm, Patient Position: Chair, Cuff Size: Adult Large)   Pulse 99   Temp 97.6  F (36.4  C) (Tympanic)   Ht 2.002 m (6' 6.8\")   Wt 135.2 kg (298 lb 1.3 oz)   SpO2 95%   BMI 33.75 kg/m   Estimated body mass index is 33.75 kg/m  as calculated from the following:    Height as of this encounter: 2.002 m (6' 6.8\").    Weight as of this encounter: 135.2 kg (298 lb 1.3 oz).  Physical Exam  GENERAL: healthy, alert and no distress  EYES: Eyes grossly normal to inspection,conjunctivae and sclerae normal  NECK: no adenopathy, no asymmetry, masses, or scars and thyroid normal to palpation  RESP: lungs clear to auscultation - no rales, rhonchi or wheezes  CV: regular rate and rhythm, normal S1 S2  ABDOMEN: soft, nontender, no hepatosplenomegaly, no masses and bowel sounds normal   (male): normal male genitalia without lesions or urethral discharge, no hernia  MS: no gross musculoskeletal defects noted, no edema  SKIN: no suspicious lesions or rashes  NEURO: Normal strength and tone, mentation intact and speech normal  PSYCH: mentation appears normal, mood depressed   FOOT - normal monofilament   Diagnostic Test Results:  Labs reviewed in Epic    ASSESSMENT / PLAN:     1. Routine general medical examination at a health care facility  - deferred vaccinations as pt scheduled for COVID vaccine  - cognitive impairment could be related to MDD, continue to monitor  - defer skin cancer screening     2. Moderate episode of recurrent major depressive disorder (H)  - Passive SI, no active thoughts. No weapons at home. Pt not interested in medication adjustment. Would like to start seeing psychologist, referral placed.   - venlafaxine (EFFEXOR-XR) 150 MG 24 hr capsule; Take 2 capsules (300 mg) by mouth daily  Dispense: 180 capsule; Refill: 1  - MENTAL HEALTH REFERRAL  - " Adult; Outpatient Treatment; Individual/Couples/Family/Group Therapy/Health Psychology; [facility] [place]: [facility] [phone]; We will contact you to schedule the appointment or please call with any questions  - Vitamin D Deficiency  - TSH with free T4 reflex    3. Microalbuminuria due to type 2 diabetes mellitus (H)  - lisinopril (ZESTRIL) 5 MG tablet; Take 1 tablet (5 mg) by mouth daily  Dispense: 90 tablet; Refill: 1    4. Atrial fibrillation, unspecified type (H)  - per cardiology note -   Post-operative paroxysmal atrial fibrillation on 6/2/2020: Patient was found to be in atrial fibrillation after shoulder surgery on 6/2/2020 which spontaneously cardioverted to sinus rhythm.  He has been on rate control with metoprolol 12.5 mg twice daily since then.  Patient denies recurrent palpitations.  Patient has no prior history of atrial fibrillation.  Denies sleep apnea symptoms. Blood pressure and diabetes are well controlled.  I think at this point I do not see a very strong indication to continue metoprolol.  Therefore I recommended to discontinue metoprolol for now unless patient has recurrent symptoms in the future.  Patient will continue warfarin for DVT prophylaxis purposes    5. Type 2 diabetes mellitus without complication, without long-term current use of insulin (H)  - if not controlled pt would like medication adjustment   - HEMOGLOBIN A1C  - Albumin Random Urine Quantitative with Creat Ratio  - metFORMIN (GLUCOPHAGE) 1000 MG tablet; Take 1 tablet (1,000 mg) by mouth 2 times daily (with meals)  Dispense: 180 tablet; Refill: 1  - blood glucose monitoring (FREESTYLE FREEDOM LITE) meter device kit; Use to test blood sugar one times daily.  Dispense: 1 kit; Refill: 0  - blood glucose (FREESTYLE LITE) test strip; Use to test blood sugar one times daily.  Dispense: 100 strip; Refill: 6  - blood glucose calibration (FREESTYLE CONTROL) solution; Use to calibrate blood glucose monitor as directed.   Dispense: 100 each; Refill: 3  - blood glucose monitoring (FREESTYLE) lancets; Use to test blood sugar one times daily.  Dispense: 100 each; Refill: 6  - alcohol swab prep pads; Use to swab area of injection/kate as directed.  Dispense: 100 each; Refill: 3  - EYE ADULT REFERRAL; Future  - FOOT EXAM  - CBC with platelets  - Comprehensive metabolic panel (BMP + Alb, Alk Phos, ALT, AST, Total. Bili, TP)    6. Passive suicidal ideations  - venlafaxine (EFFEXOR-XR) 150 MG 24 hr capsule; Take 2 capsules (300 mg) by mouth daily  Dispense: 180 capsule; Refill: 1  - MENTAL HEALTH REFERRAL  - Adult; Outpatient Treatment; Individual/Couples/Family/Group Therapy/Health Psychology; [facility] [place]: [facility] [phone]; We will contact you to schedule the appointment or please call with any questions    7. Screen for colon cancer  - GASTROENTEROLOGY ADULT REF PROCEDURE ONLY; Future    8. Hyperlipidemia, unspecified hyperlipidemia type  - simvastatin (ZOCOR) 40 MG tablet; Take 1 tablet (40 mg) by mouth At Bedtime  Dispense: 90 tablet; Refill: 1  - Lipid Profile (Chol, Trig, HDL, LDL calc)    9. Need for hepatitis C screening test  - Hepatitis C Screen Reflex to HCV RNA Quant and Genotype    10. Benign prostatic hyperplasia with lower urinary tract symptoms, symptom details unspecified  - UROLOGY ADULT REFERRAL; Future  - PSA, screen    11. Screening for viral disease  - Hepatitis B core antibody  - Hepatitis B Surface Antibody  - Hepatitis B surface antigen    12. Chronic back pain, unspecified back location, unspecified back pain laterality  - Orthopedic & Spine  Referral; Future    13. Right thigh pain  - likely muscular etiology  - advised stretching along with yoga  - declined PT  - continue to monitor     14. BMI 33.0-33.9,adult  - knows lifestyle changes however hard to implement  - interested in seeing psychologist  - declined weight loss clinic             Patient has been advised of split billing  "requirements and indicates understanding: Yes  COUNSELING:  Reviewed preventive health counseling, as reflected in patient instructions    Estimated body mass index is 33.75 kg/m  as calculated from the following:    Height as of this encounter: 2.002 m (6' 6.8\").    Weight as of this encounter: 135.2 kg (298 lb 1.3 oz).    Weight management plan: Discussed healthy diet and exercise guidelines    He reports that he has never smoked. He has never used smokeless tobacco.      Appropriate preventive services were discussed with this patient, including applicable screening as appropriate for cardiovascular disease, diabetes, osteopenia/osteoporosis, and glaucoma.  As appropriate for age/gender, discussed screening for colorectal cancer, prostate cancer, breast cancer, and cervical cancer. Checklist reviewing preventive services available has been given to the patient.    Reviewed patients plan of care and provided an AVS. The Basic Care Plan (routine screening as documented in Health Maintenance) for Gaurang meets the Care Plan requirement. This Care Plan has been established and reviewed with the Patient.    Counseling Resources:  ATP IV Guidelines  Pooled Cohorts Equation Calculator  Breast Cancer Risk Calculator  Breast Cancer: Medication to Reduce Risk  FRAX Risk Assessment  ICSI Preventive Guidelines  Dietary Guidelines for Americans, 2010  USDA's MyPlate  ASA Prophylaxis  Lung CA Screening    Elvis Guzman MD  [facility]    Identified Health Risks:  "

## 2021-03-30 NOTE — PROGRESS NOTES
ANTICOAGULATION FOLLOW-UP CLINIC VISIT    Patient Name:  Gaurang Rivera  Date:  3/30/2021  Contact Type:  Telephone/ Peter    SUBJECTIVE:  Patient Findings     Comments:  The patient was assessed for diet, medication, and activity level changes, missed or extra doses, bruising or bleeding, with no problem findings.          Clinical Outcomes     Negatives:  Major bleeding event, Thromboembolic event, Anticoagulation-related hospital admission, Anticoagulation-related ED visit, Anticoagulation-related fatality    Comments:  The patient was assessed for diet, medication, and activity level changes, missed or extra doses, bruising or bleeding, with no problem findings.             OBJECTIVE    Recent labs: (last 7 days)     03/30/21  1417   INR 3.00*       ASSESSMENT / PLAN  INR assessment THER    Recheck INR In: 3 WEEKS    INR Location Clinic      Anticoagulation Summary  As of 3/30/2021    INR goal:  2.0-3.0   TTR:  45.6 % (6.5 mo)   INR used for dosing:  3.00 (3/30/2021)   Warfarin maintenance plan:  10 mg (5 mg x 2) every Mon, Fri; 7.5 mg (5 mg x 1.5) all other days   Full warfarin instructions:  10 mg every Mon, Fri; 7.5 mg all other days   Weekly warfarin total:  57.5 mg   No change documented:  Harshil Salamanca RN   Plan last modified:  Cyn Womack RN (2/3/2020)   Next INR check:  4/20/2021   Priority:  Maintenance   Target end date:  10/5/2019    Indications    Deep vein thrombosis (DVT) (H) [I82.409]  Atrial fibrillation  unspecified type (H) [I48.91]  Deep vein thrombosis (DVT) of proximal lower extremity  unspecified chronicity  unspecified laterality (H) [I82.4Y9]             Anticoagulation Episode Summary     INR check location:      Preferred lab:      Send INR reminders to:  ARMOND RITCHIE    Comments:  7/5: Occlusive thrombus- posterior tibial veins- R calf. Lovenox stopped 7/22 not bridged fully. wearing 15-20 C.stockings. Shoulder surgery (Dr. Burdick postponed). Neli Heck  Ortho RN #[phone]. Poor diet/no greens or exercise.  MTV 60 mcg.       Anticoagulation Care Providers     Provider Role Specialty Phone number    Elvis Guzman MD Referring Family Medicine [phone]            See the Encounter Report to view Anticoagulation Flowsheet and Dosing Calendar (Go to Encounters tab in chart review, and find the Anticoagulation Therapy Visit)    Patient INR is therapeutic.  Patient will continue to take 57.5 mg weekly dosing and follow up in 3 weeks or sooner for any problems or concerns.        Harshil Salamanca RN

## 2021-03-31 LAB
ALBUMIN SERPL-MCNC: 4.3 G/DL (ref 3.4–5)
ALP SERPL-CCNC: 70 U/L (ref 40–150)
ALT SERPL W P-5'-P-CCNC: 73 U/L (ref 0–70)
ANION GAP SERPL CALCULATED.3IONS-SCNC: 6 MMOL/L (ref 3–14)
AST SERPL W P-5'-P-CCNC: 44 U/L (ref 0–45)
BILIRUB SERPL-MCNC: 0.8 MG/DL (ref 0.2–1.3)
BUN SERPL-MCNC: 16 MG/DL (ref 7–30)
CALCIUM SERPL-MCNC: 9.3 MG/DL (ref 8.5–10.1)
CHLORIDE SERPL-SCNC: 105 MMOL/L (ref 94–109)
CHOLEST SERPL-MCNC: 235 MG/DL
CO2 SERPL-SCNC: 24 MMOL/L (ref 20–32)
CREAT SERPL-MCNC: 0.89 MG/DL (ref 0.66–1.25)
CREAT UR-MCNC: 234 MG/DL
DEPRECATED CALCIDIOL+CALCIFEROL SERPL-MC: 34 UG/L (ref 20–75)
GFR SERPL CREATININE-BSD FRML MDRD: >90 ML/MIN/{1.73_M2}
GLUCOSE SERPL-MCNC: 259 MG/DL (ref 70–99)
HBV CORE AB SERPL QL IA: NONREACTIVE
HBV SURFACE AB SERPL IA-ACNC: 0 M[IU]/ML
HBV SURFACE AG SERPL QL IA: NONREACTIVE
HCV AB SERPL QL IA: NONREACTIVE
HDLC SERPL-MCNC: 35 MG/DL
LDLC SERPL CALC-MCNC: ABNORMAL MG/DL
MICROALBUMIN UR-MCNC: 143 MG/L
MICROALBUMIN/CREAT UR: 61.11 MG/G CR (ref 0–17)
NONHDLC SERPL-MCNC: 200 MG/DL
POTASSIUM SERPL-SCNC: 4.3 MMOL/L (ref 3.4–5.3)
PROT SERPL-MCNC: 7.8 G/DL (ref 6.8–8.8)
PSA SERPL-ACNC: 0.47 UG/L (ref 0–4)
SODIUM SERPL-SCNC: 135 MMOL/L (ref 133–144)
TRIGL SERPL-MCNC: 656 MG/DL
TSH SERPL DL<=0.005 MIU/L-ACNC: 0.77 MU/L (ref 0.4–4)

## 2021-03-31 ASSESSMENT — ANXIETY QUESTIONNAIRES: GAD7 TOTAL SCORE: 0

## 2021-04-05 ENCOUNTER — HOSPITAL ENCOUNTER (OUTPATIENT)
Facility: AMBULATORY SURGERY CENTER | Age: 51
End: 2021-04-05
Attending: INTERNAL MEDICINE
Payer: COMMERCIAL

## 2021-04-05 ENCOUNTER — TELEPHONE (OUTPATIENT)
Dept: GASTROENTEROLOGY | Facility: CLINIC | Age: 51
End: 2021-04-05

## 2021-04-05 NOTE — TELEPHONE ENCOUNTER
Patient is scheduled for COLONOSCOPY with Dr. APODACA    Spoke with: ISAURA    Date of Procedure: 05/11/2021    Location: [facility]    Sedation Type CS    Conscious Sedation- Needs  for 6 hours after the procedure  MAC/General-Needs  for 24 hours after procedure    Pre-op for Unit [facility] MAC and OR NOT NEEDED    (if yes advise patient they will need a pre-op prior to procedure)      Is patient on blood thinners? -YES (If yes- inform patient to follow up with PCP or provider for follow up instructions)     Informed patient they will need an adult  YES  Cannot take any type of public or medical transportation alone    Informed Patient of COVID Test Requirement YES    Preferred Pharmacy for Pre Prescription NA    Confirmed Nurse will call to complete assessment YES    Additional comments: NA

## 2021-04-06 RX ORDER — GLIPIZIDE 5 MG/1
5 TABLET, FILM COATED, EXTENDED RELEASE ORAL
Qty: 90 TABLET | Refills: 0 | Status: SHIPPED | OUTPATIENT
Start: 2021-04-06 | End: 2021-05-12

## 2021-04-06 RX ORDER — ATORVASTATIN CALCIUM 40 MG/1
40 TABLET, FILM COATED ORAL DAILY
Qty: 90 TABLET | Refills: 0 | Status: SHIPPED | OUTPATIENT
Start: 2021-04-06 | End: 2021-05-12

## 2021-04-14 DIAGNOSIS — I82.4Y9 DEEP VEIN THROMBOSIS (DVT) OF PROXIMAL LOWER EXTREMITY, UNSPECIFIED CHRONICITY, UNSPECIFIED LATERALITY (H): Primary | ICD-10-CM

## 2021-04-14 DIAGNOSIS — I48.91 ATRIAL FIBRILLATION, UNSPECIFIED TYPE (H): ICD-10-CM

## 2021-04-20 ENCOUNTER — TELEPHONE (OUTPATIENT)
Dept: FAMILY MEDICINE | Facility: CLINIC | Age: 51
End: 2021-04-20

## 2021-04-20 ENCOUNTER — ANTICOAGULATION THERAPY VISIT (OUTPATIENT)
Dept: FAMILY MEDICINE | Facility: CLINIC | Age: 51
End: 2021-04-20

## 2021-04-20 DIAGNOSIS — I82.409 DEEP VEIN THROMBOSIS (DVT) (H): ICD-10-CM

## 2021-04-20 DIAGNOSIS — I48.91 ATRIAL FIBRILLATION, UNSPECIFIED TYPE (H): ICD-10-CM

## 2021-04-20 DIAGNOSIS — I82.4Y9 DEEP VEIN THROMBOSIS (DVT) OF PROXIMAL LOWER EXTREMITY, UNSPECIFIED CHRONICITY, UNSPECIFIED LATERALITY (H): ICD-10-CM

## 2021-04-20 LAB
CAPILLARY BLOOD COLLECTION: NORMAL
INR PPP: 2.8 (ref 0.86–1.14)

## 2021-04-20 PROCEDURE — 36416 COLLJ CAPILLARY BLOOD SPEC: CPT | Performed by: FAMILY MEDICINE

## 2021-04-20 PROCEDURE — 85610 PROTHROMBIN TIME: CPT | Performed by: FAMILY MEDICINE

## 2021-04-20 NOTE — PROGRESS NOTES
ANTICOAGULATION MANAGEMENT     Patient Name:  Gaurang Rivera  Date:  2021    ASSESSMENT /SUBJECTIVE:    Today's INR result of 2.8 is therapeutic. Goal INR of 2.0-3.0      Warfarin dose taken: Warfarin taken as instructed    Diet: No new diet changes affecting INR    Medication changes/ interactions: No new medications/supplements affecting INR    Previous INR: Therapeutic     S/S of bleeding or thromboembolism: No    New injury or illness: No    Upcoming surgery, procedure or cardioversion: Yes: colonoscopy scheduled for . Sent TE to [facility] team for review.    Additional findings: None      PLAN:    Telephone call with Gaurang regarding INR result and instructed:     Warfarin Dosing Instructions: Continue your current warfarin dose 10 mg Mon, Fri + 7.5 mg AOD.    Instructed patient to follow up no later than: 3 weeks  Lab visit scheduled    Education provided: Please call back if any changes to your diet, medications or how you've been taking warfarin, Monitoring for bleeding signs and symptoms, Monitoring for clotting signs and symptoms and Importance of notifying clinic for changes in medications; a sooner lab recheck maybe needed.      Patient verbalizes understanding and agrees to warfarin dosing plan.    Instructed to call the Anticoagulation Clinic for any changes, questions or concerns. (#[phone])        Cyn Womack RN      OBJECTIVE:  Recent labs: (last 7 days)     21  1446   INR 2.80*         INR assessment THER    Recheck INR In: 4 WEEKS    INR Location Clinic      Anticoagulation Summary  As of 2021    INR goal:  2.0-3.0   TTR:  55.1 % (6.5 mo)   INR used for dosin.80 (2021)   Warfarin maintenance plan:  10 mg (5 mg x 2) every Mon, Fri; 7.5 mg (5 mg x 1.5) all other days   Full warfarin instructions:  10 mg every Mon, Fri; 7.5 mg all other days   Weekly warfarin total:  57.5 mg   No change documented:  Cyn Womack RN   Plan last modified:  Cyn Womack,  RN (2/3/2020)   Next INR check:  5/18/2021   Priority:  Maintenance   Target end date:  10/5/2019    Indications    Deep vein thrombosis (DVT) (H) [I82.409]  Atrial fibrillation  unspecified type (H) [I48.91]  Deep vein thrombosis (DVT) of proximal lower extremity  unspecified chronicity  unspecified laterality (H) [I82.4Y9]             Anticoagulation Episode Summary     INR check location:      Preferred lab:      Send INR reminders to:  ARMOND RITCHIE    Comments:  7/5: Occlusive thrombus- posterior tibial veins- R calf. Lovenox stopped 7/22 not bridged fully. wearing 15-20 C.stockings. Shoulder surgery (Dr. Burdick postponed). Neli Lozano RN #[phone]. Poor diet/no greens or exercise.  MTV 60 mcg.       Anticoagulation Care Providers     Provider Role Specialty Phone number    Elvis Guzman MD Referring Family Medicine [phone]

## 2021-04-20 NOTE — CONFIDENTIAL NOTE
Patient is scheduled for a colonoscopy on 5/11. Routing TE to [facility] team for review and recommendations. Thank you! UAM ZapataN, RN

## 2021-04-27 NOTE — TELEPHONE ENCOUNTER
ARACELI-PROCEDURAL ANTICOAGULATION  MANAGEMENT    Assessment     Warfarin interruption plan for colonoscopy on 5/11/21.    unprovoked DVT and heterozygous Factor V leiden      Risk stratification for thromboembolism: moderate (2012 Chest guidelines)      From Dr. Guzman's 3/30/21 OV: Post-operative paroxysmal atrial fibrillation on 6/2/2020: Patient was found to be in atrial fibrillation after shoulder surgery on 6/2/2020 which spontaneously cardioverted to sinus rhythm.  He has been on rate control with metoprolol 12.5 mg twice daily since then.  Patient denies recurrent palpitations.  Patient has no prior history of atrial fibrillation.  Denies sleep apnea symptoms. Blood pressure and diabetes are well controlled.  I think at this point I do not see a very strong indication to continue metoprolol.  Therefore I recommended to discontinue metoprolol for now unless patient has recurrent symptoms in the future.  Patient will continue warfarin for DVT prophylaxis purposes     VTE: 2018 American Society of Hematology recommends against bridging in low and moderate risk VTE patients holding warfarin    Plan       Pre-Procedure:    Hold warfarin for 4 days until after procedure starting: Friday, May 7     Okay to cancel INR on 5/10 unless specifically requested by GI       Post-Procedure:    Resume warfarin dose if okay with provider doing procedure on night of procedure, Tuesday, May 11 PM: take 15 mg x 1, then resume home dosing    Per 2/14/2020 notes, Dr. Chang with hematology, recommended prophylactic Lovenox bridging (40 mg every day) post-procedure only if mobility was a concern. Would not consider a colonoscopy to be mobility impairing and therefore do not recommend post-procedure bridge.    Recheck INR 7-10 days after resuming warfarin   ?   Tamara Guerrier RPH    Subjective/Objective:      Gaurang Rivera, a 50 year old male    Reason for Anticoagulation: DVT and Factor V Leiden    Goal INR Range: 2.0-3.0    Patient  bridged in past: Yes: at time of DVT diagnosis in July 2019    Wt Readings from Last 3 Encounters:   03/30/21 135.2 kg (298 lb 1.3 oz)   06/02/20 132 kg (291 lb 0.1 oz)   05/28/20 132.9 kg (293 lb)        Ideal body weight: 93.2 kg (205 lb 8.9 oz)  Adjusted ideal body weight: 110 kg (242 lb 9.1 oz)     Lab Results   Component Value Date    INR 2.80 (H) 04/20/2021    INR 3.00 (H) 03/30/2021    INR 3.50 (H) 03/15/2021     Lab Results   Component Value Date    HGB 18.0 03/30/2021    HCT 51.2 03/30/2021     03/30/2021     Lab Results   Component Value Date    CR 0.89 03/30/2021    CR 0.87 06/03/2020    CR 0.88 06/02/2020     Estimated Creatinine Clearance: 152.8 mL/min (based on SCr of 0.89 mg/dL).

## 2021-04-28 DIAGNOSIS — Z11.59 ENCOUNTER FOR SCREENING FOR OTHER VIRAL DISEASES: ICD-10-CM

## 2021-04-28 NOTE — TELEPHONE ENCOUNTER
Called Gaurang to discuss recommendations to hold warfarin with no bridging and schedule INR for 7 days post-procedure.     No answer. Left  to call [phone]. Can transfer to [facility] at [phone].    Velia COULTER RN  Anticoagulation Team

## 2021-04-29 ENCOUNTER — TELEPHONE (OUTPATIENT)
Dept: FAMILY MEDICINE | Facility: CLINIC | Age: 51
End: 2021-04-29

## 2021-04-29 NOTE — TELEPHONE ENCOUNTER
Patient called into the ACC to discuss warfarin hold plan for upcoming procedure.    Please call back when able.    Savage Haque RN  [facility]

## 2021-04-30 NOTE — TELEPHONE ENCOUNTER
Writer returned patient's call, no answer, left vm to return call to ACC team at earliest convenience. Please transfer to Cyn #[phone]. UMA ZapataN, RN    See TE 4/20/21 for full procedure note:      Pre-Procedure:  ? Hold warfarin for 4 days until after procedure starting: Friday, May 7   ? Okay to cancel INR on 5/10 unless specifically requested by GI            ? Post-Procedure:    Resume warfarin dose if okay with provider doing procedure on night of procedure, Tuesday, May 11 PM: take 15 mg x 1, then resume home dosing    Per 2/14/2020 notes, Dr. Chang with hematology, recommended prophylactic Lovenox bridging (40 mg every day) post-procedure only if mobility was a concern. Would not consider a colonoscopy to be mobility impairing and therefore do not recommend post-procedure bridge.    Recheck INR 7-10 days after resuming warfarin   ?   Tamara Guerrier, [facility]

## 2021-04-30 NOTE — TELEPHONE ENCOUNTER
Patient returned call to ACC team. Writer reviewed procedure plan as outlined below. Patient verbalized understanding. Post-procedure INR check made for 5/17 and cancelled 5/10. Cyn Womack, UMAN, RN

## 2021-04-30 NOTE — TELEPHONE ENCOUNTER
Patient returned ACRN call yesterday evening (see 4/29 TE).   Writer attempted to reach patient this morning, no answer.  LVM instructing patient to callback at earliest convenience.   UMA ZapataN, RN

## 2021-05-03 ENCOUNTER — TELEPHONE (OUTPATIENT)
Dept: GASTROENTEROLOGY | Facility: CLINIC | Age: 51
End: 2021-05-03

## 2021-05-03 NOTE — TELEPHONE ENCOUNTER
Attempted to contact patient regarding upcoming colonoscopy procedure on 5/11/21 for pre assessment questions. No answer.     Left message to return call to [phone] #[phone]    Covid test scheduled 5/8/21    Upon review patient has an anticouagulation holding plan per telephone encounter date 4/29/21. Patient to hold for 4 days starting 5/7/21.    Shellie Rangel RN

## 2021-05-08 DIAGNOSIS — Z11.59 ENCOUNTER FOR SCREENING FOR OTHER VIRAL DISEASES: ICD-10-CM

## 2021-05-08 PROCEDURE — U0005 INFEC AGEN DETEC AMPLI PROBE: HCPCS | Performed by: INTERNAL MEDICINE

## 2021-05-08 PROCEDURE — U0003 INFECTIOUS AGENT DETECTION BY NUCLEIC ACID (DNA OR RNA); SEVERE ACUTE RESPIRATORY SYNDROME CORONAVIRUS 2 (SARS-COV-2) (CORONAVIRUS DISEASE [COVID-19]), AMPLIFIED PROBE TECHNIQUE, MAKING USE OF HIGH THROUGHPUT TECHNOLOGIES AS DESCRIBED BY CMS-2020-01-R: HCPCS | Performed by: INTERNAL MEDICINE

## 2021-05-09 ENCOUNTER — TELEPHONE (OUTPATIENT)
Dept: LAB | Facility: CLINIC | Age: 51
End: 2021-05-09

## 2021-05-09 LAB
LABORATORY COMMENT REPORT: ABNORMAL
SARS-COV-2 RNA RESP QL NAA+PROBE: NORMAL
SARS-COV-2 RNA RESP QL NAA+PROBE: POSITIVE
SPECIMEN SOURCE: ABNORMAL
SPECIMEN SOURCE: NORMAL

## 2021-05-10 ENCOUNTER — TELEPHONE (OUTPATIENT)
Dept: GASTROENTEROLOGY | Facility: CLINIC | Age: 51
End: 2021-05-10

## 2021-05-10 NOTE — TELEPHONE ENCOUNTER
1st reschedule attempt - Colonoscopy at [facility] under CS - mid-August due to positive COVID-19 Dx    Procedure: Lower Endoscopy    Lower Endoscopy Type: Colonoscopy    Purpose of Colonoscopy Procedure: Screening    Colonoscopy Sedation: Conscious/Moderate    Preferred Location: [facility]    Scheduling Instructions: If you have not heard from the scheduling office within 2 business days, please call [phone].           Associated Diagnoses    Screen for colon cancer [Z12.11]

## 2021-05-10 NOTE — TELEPHONE ENCOUNTER
"-Coronavirus (COVID-19) Notification    Pt agrees to reach out to proc clinic tomorrow about upcoming gastro procedure. He did have questions about being fully vaccinated and how he had caught it. Informed him the best route is direct to pcp. He understood this, and agreed.     Caller Name (Patient, parent, daughter/son, grandparent, etc)  Pt    Reason for call  Notify of Positive Coronavirus (COVID-19) lab results, assess symptoms,  review [facility] recommendations    Lab Result    Lab test:  2019-nCoV rRt-PCR or SARS-CoV-2 PCR    Oropharyngeal AND/OR nasopharyngeal swabs is POSITIVE for 2019-nCoV RNA/SARS-COV-2 PCR (COVID-19 virus)    RN Recommendations/Instructions per [facility] Coronavirus COVID-19 recommendations    Brief introduction script  Introduce self then review script:  \"I am calling on behalf of [email or web address].  We were notified that your Coronavirus test (COVID-19) for was POSITIVE for the virus.  I have some information to relay to you but first I wanted to mention that the [place] Dept of Health will be contacting you shortly [it's possible MD already called Patient] to talk to you more about how you are feeling and other people you have had contact with who might now also have the virus.  Also, [facility] is Partnering with the [facility] for Covid-19 research, you may be contacted directly by research staff.\"    Assessment (Inquire about Patient's current symptoms)   Assessment   Current Symptoms at time of phone call: (if no symptoms, document No symptoms] none   Symptoms onset (if applicable) n/a     If at time of call, Patients symptoms hare worsened, the Patient should contact 911 or have someone drive them to Emergency Dept promptly:      If Patient calling 911, inform 911 personal that you have tested positive for the Coronavirus (COVID-19).  Place mask on and await 911 to arrive.    If Emergency Dept, If possible, please have another adult drive you to the " "Emergency Dept but you need to wear mask when in contact with other people.      Monoclonal Antibody Administration    You may be eligible to receive a new treatment with a monoclonal antibody for preventing hospitalization in patients at high risk for complications from COVID-19.   This medication is still experimental and available on a limited basis; it is given through an IV and must be given at an infusion center. Please note that not all people who are eligible will receive the medication since it is in limited supply.     Are you interested in being considered for this medication?  No.   Does the patient fit the criteria: No     If patient qualifies based on above criteria:  \"You will be contacted if you are selected to receive this treatment in the next 1-2 business days.   This is time sensitive and if you are not selected in the next 1-2 business days, you will not receive the medication.  If you do not receive a call to schedule, you have not been selected.\"      Review information with Patient    Your result was positive. This means you have COVID-19 (coronavirus).  We have sent you a letter that reviews the information that I'll be reviewing with you now.    How can I protect others?    If you have symptoms: stay home and away from others (self-isolate) until:    You've had no fever--and no medicine that reduces fever--for 1 full day (24 hours). And       Your other symptoms have gotten better. For example, your cough or breathing has improved. And     At least 10 days have passed since your symptoms started. (If you've been told by a doctor that you have a weak immune system, wait 20 days.)     If you don't have symptoms: Stay home and away from others (self-isolate) until at least 10 days have passed since your first positive COVID-19 test. (Date test collected)    During this time:    Stay in your own room, including for meals. Use your own bathroom if you can.    Stay away from others in your home. " No hugging, kissing or shaking hands. No visitors.     Don't go to work, school or anywhere else.     Clean  high touch  surfaces often (doorknobs, counters, handles, etc.). Use a household cleaning spray or wipes. You'll find a full list on the EPA website at www.epa.gov/pesticide-registration/list-n-disinfectants-use-against-sars-cov-2.     Cover your mouth and nose with a mask, tissue or other face covering to avoid spreading germs.    Wash your hands and face often with soap and water.    Make a list of people you have been in close contact with recently, even if either of you wore a face covering.   ; Start your list from 2 days before you became ill or had a positive test.  ; Include anyone that was within 6 feet of you for a cumulative total of 15 minutes or more in 24 hours. (Example: if you sat next to Betito for 5 minutes in the morning and 10 minutes in the afternoon, then you were in close contact for 15 minutes total that day. Betito would be added to your list.)    A public health worker will call or text you. It is important that you answer. They will ask you questions about possible exposures to COVID-19, such as people you have been in direct contact with and places you have visited.    Tell the people on your list that you have COVID-19; they should stay away from others for 14 days starting from the last time they were in contact with you (unless you are told something different from a public health worker).     Caregivers in these groups are at risk for severe illness due to COVID-19:  o People 65 years and older  o People who live in a nursing home or long-term care facility  o People with chronic disease (lung, heart, cancer, diabetes, kidney, liver, immunologic)  o People who have a weakened immune system, including those who:  - Are in cancer treatment  - Take medicine that weakens the immune system, such as corticosteroids  - Had a bone marrow or organ transplant  - Have an immune  deficiency  - Have poorly controlled HIV or AIDS  - Are obese (body mass index of 40 or higher)  - Smoke regularly    Caregivers should wear gloves while washing dishes, handling laundry and cleaning bedrooms and bathrooms.    Wash and dry laundry with special caution. Don't shake dirty laundry, and use the warmest water setting you can.    If you have a weakened immune system, ask your doctor about other actions you should take.    For more tips, go to www.cdc.gov/coronavirus/2019-ncov/downloads/10Things.pdf.    You should not go back to work until you meet the guidelines above for ending your home isolation. You don't need to be retested for COVID-19 before going back to work--studies show that you won't spread the virus if it's been at least 10 days since your symptoms started (or 20 days, if you have a weak immune system).    Employers: This document serves as formal notice of your employee's medical guidelines for going back to work. They must meet the above guidelines before going back to work in person.    How can I take care of myself?    1. Get lots of rest. Drink extra fluids (unless a doctor has told you not to).    2. Take Tylenol (acetaminophen) for fever or pain. If you have liver or kidney problems, ask your family doctor if it's okay to take Tylenol.     Take either:     650 mg (two 325 mg pills) every 4 to 6 hours, or     1,000 mg (two 500 mg pills) every 8 hours as needed.     Note: Don't take more than 3,000 mg in one day. Acetaminophen is found in many medicines (both prescribed and over-the-counter medicines). Read all labels to be sure you don't take too much.    For children, check the Tylenol bottle for the right dose (based on their age or weight).    3. If you have other health problems (like cancer, heart failure, an organ transplant or severe kidney disease): Call your specialty clinic if you don't feel better in the next 2 days.    4. Know when to call 911: Emergency warning signs  include:    Trouble breathing or shortness of breath    Pain or pressure in the chest that doesn't go away    Feeling confused like you haven't felt before, or not being able to wake up    Bluish-colored lips or face    5. Sign up for [employer]. We know it's scary to hear that you have COVID-19. We want to track your symptoms to make sure you're okay over the next 2 weeks. Please look for an email from [employer]--this is a free, online program that we'll use to keep in touch. To sign up, follow the link in the email. Learn more at [email or web address].    Where can I get more information?    [facility]: [email or web address]    Coronavirus Basics: www.health.[facility].[place]/diseases/coronavirus/basics.html    What to Do If You're Sick: www.cdc.gov/coronavirus/2019-ncov/about/steps-when-sick.html    Ending Home Isolation: www.cdc.gov/coronavirus/2019-ncov/hcp/disposition-in-home-patients.html     Caring for Someone with COVID-19: www.cdc.gov/coronavirus/2019-ncov/if-you-are-sick/care-for-someone.html     [facility] clinical trials (COVID-19 research studies): clinicalaffairs.[facility]/n-clinical-trials     A Positive COVID-19 letter will be sent via [employer] or the mail. (Exception, no letters sent to Presurgerical/Preprocedure Patients)    Radha Avalos

## 2021-05-11 ENCOUNTER — TELEPHONE (OUTPATIENT)
Dept: GASTROENTEROLOGY | Facility: CLINIC | Age: 51
End: 2021-05-11

## 2021-05-11 NOTE — TELEPHONE ENCOUNTER
2nd reschedule attempt - Colonoscopy at [facility] under CS - mid-August due to positive COVID-19 Dx     Procedure: Lower Endoscopy   Lower Endoscopy Type: Colonoscopy   Purpose of Colonoscopy Procedure: Screening   Colonoscopy Sedation: Conscious/Moderate   Preferred Location: [facility]   Scheduling Instructions: If you have not heard from the scheduling office within 2 business days, please call [phone].         Associated Diagnoses     Screen for colon cancer [Z12.11]

## 2021-05-25 ENCOUNTER — ANTICOAGULATION THERAPY VISIT (OUTPATIENT)
Dept: FAMILY MEDICINE | Facility: CLINIC | Age: 51
End: 2021-05-25

## 2021-05-25 DIAGNOSIS — I82.4Y9 DEEP VEIN THROMBOSIS (DVT) OF PROXIMAL LOWER EXTREMITY, UNSPECIFIED CHRONICITY, UNSPECIFIED LATERALITY (H): ICD-10-CM

## 2021-05-25 DIAGNOSIS — I48.91 ATRIAL FIBRILLATION, UNSPECIFIED TYPE (H): ICD-10-CM

## 2021-05-25 DIAGNOSIS — I82.409 DEEP VEIN THROMBOSIS (DVT) (H): ICD-10-CM

## 2021-05-25 LAB — INR PPP: 3.6 (ref 0.86–1.14)

## 2021-05-25 PROCEDURE — 36416 COLLJ CAPILLARY BLOOD SPEC: CPT | Performed by: FAMILY MEDICINE

## 2021-05-25 PROCEDURE — 85610 PROTHROMBIN TIME: CPT | Performed by: FAMILY MEDICINE

## 2021-05-25 NOTE — PROGRESS NOTES
ANTICOAGULATION MANAGEMENT     Patient Name:  Gaurang Rivera  Date:  5/25/2021    ASSESSMENT /SUBJECTIVE:    Today's INR result of 3.6 is supratherapeutic. Goal INR of 2.0-3.0      Warfarin dose taken: Warfarin taken as instructed    Diet: No new diet changes affecting INR    Medication changes/ interactions: No new medications/supplements affecting INR    Previous INR: Therapeutic     S/S of bleeding or thromboembolism: No    New injury or illness: Yes: covid + on 5/8/21; improved    Upcoming surgery, procedure or cardioversion: No    Additional findings: None      PLAN:    Telephone call with Gaurang regarding INR result and instructed:     Warfarin Dosing Instructions: 2.5 mg  then continue your current warfarin dose of 10 mg MF; 7.5 mg ROW    Instructed patient to follow up no later than: 9 days  Lab visit scheduled    Education provided: None required      Gaurang verbalizes understanding and agrees to warfarin dosing plan.    Instructed to call the Anticoagulation Clinic for any changes, questions or concerns. (#[phone])        Juani Gonsalves RN      OBJECTIVE:  Recent labs: (last 7 days)     05/25/21  1539   INR 3.60*         No question data found.  Anticoagulation Summary  As of 5/25/2021    INR goal:  2.0-3.0   TTR:  46.7 % (6.5 mo)   INR used for dosing:  3.60 (5/25/2021)   Warfarin maintenance plan:  10 mg (5 mg x 2) every Mon, Fri; 7.5 mg (5 mg x 1.5) all other days   Full warfarin instructions:  5/25: 2.5 mg; Otherwise 10 mg every Mon, Fri; 7.5 mg all other days   Weekly warfarin total:  57.5 mg   Plan last modified:  Cyn Womack RN (2/3/2020)   Next INR check:  6/3/2021   Priority:  Maintenance   Target end date:  10/5/2019    Indications    Deep vein thrombosis (DVT) (H) [I82.409]  Atrial fibrillation  unspecified type (H) [I48.91]  Deep vein thrombosis (DVT) of proximal lower extremity  unspecified chronicity  unspecified laterality (H) [I82.4Y9]             Anticoagulation Episode  Summary     INR check location:      Preferred lab:      Send INR reminders to:  ARMOND RITCHIE    Comments:  7/5: Occlusive thrombus- posterior tibial veins- R calf. Lovenox stopped 7/22 not bridged fully. wearing 15-20 C.stockings. Shoulder surgery (Dr. Burdick postponed). Neli Lozano RN #[phone]. Poor diet/no greens or exercise.  MTV 60 mcg.       Anticoagulation Care Providers     Provider Role Specialty Phone number    Elvis Guzman MD Referring Family Medicine [phone]

## 2021-05-25 NOTE — PROGRESS NOTES
Anticoagulation Management    Unable to reach Gaurang today.    Today's INR result of 3.6 is supratherapeutic (goal INR of 2.0-3.0).  Result received from: Clinic Lab    Follow up required to discuss out of range INR     Left message to take reduced dose of warfarin, 2.5 mg tonight. Call [facility], transfer to [phone].      Anticoagulation clinic to follow up    Harshil Salamanca RN

## 2021-06-11 ENCOUNTER — ANTICOAGULATION THERAPY VISIT (OUTPATIENT)
Dept: FAMILY MEDICINE | Facility: CLINIC | Age: 51
End: 2021-06-11

## 2021-06-11 DIAGNOSIS — I48.91 ATRIAL FIBRILLATION, UNSPECIFIED TYPE (H): ICD-10-CM

## 2021-06-11 DIAGNOSIS — I82.4Y9 DEEP VEIN THROMBOSIS (DVT) OF PROXIMAL LOWER EXTREMITY, UNSPECIFIED CHRONICITY, UNSPECIFIED LATERALITY (H): ICD-10-CM

## 2021-06-11 DIAGNOSIS — I82.409 DEEP VEIN THROMBOSIS (DVT) (H): ICD-10-CM

## 2021-06-11 LAB
CAPILLARY BLOOD COLLECTION: NORMAL
INR PPP: 3.6 (ref 0.86–1.14)

## 2021-06-11 PROCEDURE — 36416 COLLJ CAPILLARY BLOOD SPEC: CPT | Performed by: FAMILY MEDICINE

## 2021-06-11 PROCEDURE — 85610 PROTHROMBIN TIME: CPT | Performed by: FAMILY MEDICINE

## 2021-06-11 PROCEDURE — 99207 PR NO CHARGE NURSE ONLY: CPT | Performed by: FAMILY MEDICINE

## 2021-06-11 NOTE — PROGRESS NOTES
ANTICOAGULATION FOLLOW-UP CLINIC VISIT    Patient Name:  Gaurang Rivera  Date:  6/11/2021  Contact Type:  Telephone  Continues to be high made small 4% adjustment and one day hold recheck in 2 weeks  SUBJECTIVE:  Patient Findings         Clinical Outcomes     Negatives:  Major bleeding event, Thromboembolic event, Anticoagulation-related hospital admission, Anticoagulation-related ED visit, Anticoagulation-related fatality           OBJECTIVE    Recent labs: (last 7 days)     06/11/21  1500   INR 3.60*       ASSESSMENT / PLAN  INR assessment SUPRA    Recheck INR In: 2 WEEKS    INR Location Clinic      Anticoagulation Summary  As of 6/11/2021    INR goal:  2.0-3.0   TTR:  45.4 % (6.5 mo)   INR used for dosing:  3.60 (6/11/2021)   Warfarin maintenance plan:  10 mg (5 mg x 2) every Mon; 7.5 mg (5 mg x 1.5) all other days   Full warfarin instructions:  6/11: Hold; Otherwise 10 mg every Mon; 7.5 mg all other days   Weekly warfarin total:  55 mg   Plan last modified:  Zeina Hughes RN (6/11/2021)   Next INR check:  6/25/2021   Priority:  Maintenance   Target end date:  10/5/2019    Indications    Deep vein thrombosis (DVT) (H) [I82.409]  Atrial fibrillation  unspecified type (H) [I48.91]  Deep vein thrombosis (DVT) of proximal lower extremity  unspecified chronicity  unspecified laterality (H) [I82.4Y9]             Anticoagulation Episode Summary     INR check location:      Preferred lab:      Send INR reminders to:  ARMOND RITCHEI    Comments:  7/5: Occlusive thrombus- posterior tibial veins- R calf. Lovenox stopped 7/22 not bridged fully. wearing 15-20 C.stockings. Shoulder surgery (Dr. Burdick postponed). Neli Lozano RN #[phone]. Poor diet/no greens or exercise.  MTV 60 mcg.       Anticoagulation Care Providers     Provider Role Specialty Phone number    Elvis Guzman MD Referring Family Medicine [phone]            See the Encounter Report to view Anticoagulation Flowsheet and Dosing  Calendar (Go to Encounters tab in chart review, and find the Anticoagulation Therapy Visit)  Some signs and symptoms of clots include: pain or tenderness in arm or leg, swelling in arm or leg, changes in skin color, or area is warm to touch, shortness or breath, trouble breathing.  Numbness or weakness especially on 1 side of the body, sudden trouble speaking or swallowing, sudden trouble seeing, sudden confusion, dizzy spells or headache.  If you have these please call 911 or seek medical care immediately.        Zeina Hughes RN

## 2021-06-17 DIAGNOSIS — I82.4Z1 ACUTE DEEP VEIN THROMBOSIS (DVT) OF DISTAL END OF RIGHT LOWER EXTREMITY (H): ICD-10-CM

## 2021-06-18 RX ORDER — WARFARIN SODIUM 5 MG/1
TABLET ORAL
Qty: 140 TABLET | Refills: 0 | Status: SHIPPED | OUTPATIENT
Start: 2021-06-18 | End: 2021-07-05

## 2021-06-18 NOTE — TELEPHONE ENCOUNTER
Warfarin refill approved per [facility] ACC protocol. PCP alerted to medication alert between Warfarin and Ibuprofen. Cyn Womack, UMAN, RN

## 2021-06-22 DIAGNOSIS — R80.9 MICROALBUMINURIA DUE TO TYPE 2 DIABETES MELLITUS (H): ICD-10-CM

## 2021-06-22 DIAGNOSIS — E78.5 HYPERLIPIDEMIA, UNSPECIFIED HYPERLIPIDEMIA TYPE: Primary | ICD-10-CM

## 2021-06-22 DIAGNOSIS — E11.29 MICROALBUMINURIA DUE TO TYPE 2 DIABETES MELLITUS (H): ICD-10-CM

## 2021-06-22 DIAGNOSIS — E11.9 TYPE 2 DIABETES MELLITUS WITHOUT COMPLICATION, WITHOUT LONG-TERM CURRENT USE OF INSULIN (H): ICD-10-CM

## 2021-06-23 RX ORDER — LISINOPRIL 5 MG/1
TABLET ORAL
Qty: 90 TABLET | Refills: 1 | Status: SHIPPED | OUTPATIENT
Start: 2021-06-23 | End: 2022-01-27

## 2021-06-23 NOTE — TELEPHONE ENCOUNTER
Dr Guzman,    Pt coming for INR on 6/25    He is asking for refills on metformin     Do you want to add future labs and they can be done 6/25?  Lisinopril and metformin were refilled by this RN so he does not run out      Lab Results   Component Value Date    A1C 9.5 03/30/2021    A1C 7.2 10/10/2019    A1C 7.1 06/28/2019    A1C 6.5 09/26/2018     Lucia Barrett, RN, BSN  [facility]

## 2021-06-25 ENCOUNTER — ANTICOAGULATION THERAPY VISIT (OUTPATIENT)
Dept: FAMILY MEDICINE | Facility: CLINIC | Age: 51
End: 2021-06-25

## 2021-06-25 DIAGNOSIS — I48.91 ATRIAL FIBRILLATION, UNSPECIFIED TYPE (H): ICD-10-CM

## 2021-06-25 DIAGNOSIS — I82.409 DEEP VEIN THROMBOSIS (DVT) (H): ICD-10-CM

## 2021-06-25 DIAGNOSIS — I82.4Y9 DEEP VEIN THROMBOSIS (DVT) OF PROXIMAL LOWER EXTREMITY, UNSPECIFIED CHRONICITY, UNSPECIFIED LATERALITY (H): ICD-10-CM

## 2021-06-25 LAB
CAPILLARY BLOOD COLLECTION: NORMAL
INR PPP: 3 (ref 0.86–1.14)

## 2021-06-25 PROCEDURE — 85610 PROTHROMBIN TIME: CPT | Performed by: FAMILY MEDICINE

## 2021-06-25 PROCEDURE — 36416 COLLJ CAPILLARY BLOOD SPEC: CPT | Performed by: FAMILY MEDICINE

## 2021-06-25 NOTE — PROGRESS NOTES
Anticoagulation Management    Unable to reach Peter today.    Today's INR result of 3.0 is therapeutic (goal INR of 2.0-3.0).  Result received from: Clinic Lab    Follow up required to confirm warfarin dose taken and assess for changes    Left message advising pt to continue current dose: 10 mg Mon + 7.5 mg AOD. Recheck within 3 weeks. Encouraged callback with any question or concerns.       Anticoagulation clinic to follow up    Cyn Womack RN

## 2021-06-25 NOTE — PROGRESS NOTES
Attempted to reach patient, no answer at this time.  Left vm requesting callback to ACC team at his earliest convenience.   Please transfer to [facility] direct line #[phone].   UMA ZapataN, RN

## 2021-06-25 NOTE — PROGRESS NOTES
ANTICOAGULATION MANAGEMENT     Gaurang Rivera 50 year old male is on warfarin with therapeutic INR result. (Goal INR 2.0-3.0)    Recent labs: (last 7 days)     06/25/21  0926   INR 3.00*       ASSESSMENT     Source(s): Patient/Caregiver Call       Warfarin doses taken: Warfarin taken as instructed    Diet: No new diet changes identified    New illness, injury, or hospitalization: No    Medication/supplement changes: None noted    Signs or symptoms of bleeding or clotting: No    Previous INR: Supratherapeutic    Additional findings: None     PLAN     Recommended plan for no diet, medication or health factor changes affecting INR     Dosing Instructions: Continue your current warfarin dose with next INR in 3 weeks       Summary  As of 6/25/2021    Full warfarin instructions:  10 mg every Mon; 7.5 mg all other days   Next INR check:  7/16/2021             Telephone call with Gaurang whom verbalizes understanding and agrees to plan    Lab visit scheduled    Education provided: Please call back if any changes to your diet, medications or how you've been taking warfarin, Monitoring for bleeding signs and symptoms, Monitoring for clotting signs and symptoms and Importance of notifying clinic for changes in medications; a sooner lab recheck maybe needed.    Plan made per [facility] anticoagulation protocol    Cyn Womack RN  Anticoagulation Clinic  6/25/2021    _______________________________________________________________________     Anticoagulation Episode Summary     Current INR goal:  2.0-3.0   TTR:  40.9 % (6.8 mo)   Target end date:  10/5/2019   Send INR reminders to:  [facility]    Indications    Deep vein thrombosis (DVT) (H) [I82.409]  Atrial fibrillation  unspecified type (H) [I48.91]  Deep vein thrombosis (DVT) of proximal lower extremity  unspecified chronicity  unspecified laterality (H) [I82.4Y9]           Comments:  7/5: Occlusive thrombus- posterior tibial veins- R calf. Lovenox stopped 7/22 not  bridged fully. wearing 15-20 C.stockings. Shoulder surgery (Dr. Burdick postponed). Neli Lozano RN #[phone]. Poor diet/no greens or exercise.  MTV 60 mcg.          Anticoagulation Care Providers     Provider Role Specialty Phone number    Elvis Guzman MD Referring Family Medicine [phone]

## 2021-06-28 LAB — INTERPRETATION ECG - MUSE: NORMAL

## 2021-07-05 ENCOUNTER — TELEPHONE (OUTPATIENT)
Dept: FAMILY MEDICINE | Facility: CLINIC | Age: 51
End: 2021-07-05

## 2021-07-05 DIAGNOSIS — I82.4Z1 ACUTE DEEP VEIN THROMBOSIS (DVT) OF DISTAL END OF RIGHT LOWER EXTREMITY (H): ICD-10-CM

## 2021-07-05 RX ORDER — WARFARIN SODIUM 5 MG/1
TABLET ORAL
Qty: 140 TABLET | Refills: 1 | Status: SHIPPED | OUTPATIENT
Start: 2021-07-05 | End: 2021-09-29

## 2021-07-05 NOTE — TELEPHONE ENCOUNTER
Patient calling for refill of warfarin.  NO refills were sent on 6/18/21. Rx filled per protocol.    UMA AlvarengaN, RN  Anticoagulation Clinic

## 2021-07-23 ENCOUNTER — MYC MEDICAL ADVICE (OUTPATIENT)
Dept: FAMILY MEDICINE | Facility: CLINIC | Age: 51
End: 2021-07-23

## 2021-07-23 DIAGNOSIS — F33.1 MODERATE EPISODE OF RECURRENT MAJOR DEPRESSIVE DISORDER (H): ICD-10-CM

## 2021-07-23 DIAGNOSIS — R45.851 PASSIVE SUICIDAL IDEATIONS: ICD-10-CM

## 2021-07-26 RX ORDER — VENLAFAXINE HYDROCHLORIDE 150 MG/1
300 CAPSULE, EXTENDED RELEASE ORAL DAILY
Qty: 180 CAPSULE | Refills: 0 | Status: SHIPPED | OUTPATIENT
Start: 2021-07-26 | End: 2021-10-11

## 2021-08-03 ENCOUNTER — ANTICOAGULATION THERAPY VISIT (OUTPATIENT)
Dept: ANTICOAGULATION | Facility: CLINIC | Age: 51
End: 2021-08-03

## 2021-08-03 ENCOUNTER — LAB (OUTPATIENT)
Dept: LAB | Facility: CLINIC | Age: 51
End: 2021-08-03
Payer: COMMERCIAL

## 2021-08-03 DIAGNOSIS — I48.91 ATRIAL FIBRILLATION, UNSPECIFIED TYPE (H): ICD-10-CM

## 2021-08-03 DIAGNOSIS — I82.409 DEEP VEIN THROMBOSIS (DVT) (H): Primary | ICD-10-CM

## 2021-08-03 DIAGNOSIS — I82.4Y9 DEEP VEIN THROMBOSIS (DVT) OF PROXIMAL LOWER EXTREMITY, UNSPECIFIED CHRONICITY, UNSPECIFIED LATERALITY (H): ICD-10-CM

## 2021-08-03 DIAGNOSIS — I82.409 DEEP VEIN THROMBOSIS (DVT) (H): ICD-10-CM

## 2021-08-03 LAB — INR BLD: 2.9 (ref 0.9–1.1)

## 2021-08-03 PROCEDURE — 36416 COLLJ CAPILLARY BLOOD SPEC: CPT

## 2021-08-03 PROCEDURE — 85610 PROTHROMBIN TIME: CPT

## 2021-08-03 NOTE — PROGRESS NOTES
ANTICOAGULATION MANAGEMENT     Gaurang Rivera 50 year old male is on warfarin with therapeutic INR result. (Goal INR 2.0-3.0)    Recent labs: (last 7 days)     08/03/21  0857   INR 2.9*       ASSESSMENT     Source(s): Chart Review and Patient/Caregiver Call       Warfarin doses taken: Warfarin taken as instructed    Diet: No new diet changes identified    New illness, injury, or hospitalization: No    Medication/supplement changes: None noted    Signs or symptoms of bleeding or clotting: No    Previous INR: Therapeutic last visit; previously outside of goal range    Additional findings: None     PLAN     Recommended plan for no diet, medication or health factor changes affecting INR     Dosing Instructions: Continue your current warfarin dose with next INR in 4-6 weeks       Summary  As of 8/3/2021    Full warfarin instructions:  10 mg every Mon; 7.5 mg all other days   Next INR check:  9/7/2021             Telephone call with Gaurang who verbalizes understanding and agrees to plan    Lab visit scheduled    Education provided: Please call back if any changes to your diet, medications or how you've been taking warfarin, Monitoring for bleeding signs and symptoms, Monitoring for clotting signs and symptoms and Importance of notifying clinic for changes in medications; a sooner lab recheck maybe needed.    Plan made per [facility] anticoagulation protocol    Cyn Womack RN  Anticoagulation Clinic  8/3/2021    _______________________________________________________________________     Anticoagulation Episode Summary     Current INR goal:  2.0-3.0   TTR:  50.4 % (8.1 mo)   Target end date:  10/5/2019   Send INR reminders to:  [facility]    Indications    Deep vein thrombosis (DVT) (H) [I82.409]  Atrial fibrillation  unspecified type (H) [I48.91]  Deep vein thrombosis (DVT) of proximal lower extremity  unspecified chronicity  unspecified laterality (H) [I82.4Y9]           Comments:  7/5: Occlusive thrombus-  posterior tibial veins- R calf. Lovenox stopped 7/22 not bridged fully. wearing 15-20 C.stockings. Shoulder surgery (Dr. Burdick postponed). Neli Lozano RN #[phone]. Poor diet/no greens or exercise.  MTV 60 mcg.          Anticoagulation Care Providers     Provider Role Specialty Phone number    Elvis Guzman MD Referring Family Medicine [phone]

## 2021-09-01 ENCOUNTER — TELEPHONE (OUTPATIENT)
Dept: FAMILY MEDICINE | Facility: CLINIC | Age: 51
End: 2021-09-01

## 2021-09-01 DIAGNOSIS — Z79.01 CURRENT USE OF LONG TERM ANTICOAGULATION: ICD-10-CM

## 2021-09-01 DIAGNOSIS — I48.91 ATRIAL FIBRILLATION, UNSPECIFIED TYPE (H): ICD-10-CM

## 2021-09-01 DIAGNOSIS — D68.51 HETEROZYGOUS FACTOR V LEIDEN MUTATION (H): ICD-10-CM

## 2021-09-01 DIAGNOSIS — I82.4Z1 ACUTE DEEP VEIN THROMBOSIS (DVT) OF DISTAL END OF RIGHT LOWER EXTREMITY (H): Primary | ICD-10-CM

## 2021-09-01 NOTE — TELEPHONE ENCOUNTER
ANTICOAGULATION MANAGEMENT      Gaurang Rivera due for annual renewal of referral to anticoagulation monitoring. Order pended for your review and signature.      ANTICOAGULATION SUMMARY      Warfarin indication(s)     Atrial fibrillation  Unprovoked nature of DVT   Heterozygous for Factor V leiden    Heart valve present?  NO       Current goal range   INR: 2.0-3.0     Goal appropriate for indication? Yes, INR 2-3 appropriate for hx of DVT, PE, hypercoagulable state, Afib, LVAD, or bileaflet AVR without risk factors     Current duration of therapy Indefinite/long term therapy   Time in Therapeutic Range (TTR)  (Goal > 60%) 50.4%       Office visit with referring provider's group within last year yes on 3/30/21       Cyn Womack RN

## 2021-09-04 ENCOUNTER — HEALTH MAINTENANCE LETTER (OUTPATIENT)
Age: 51
End: 2021-09-04

## 2021-09-07 ENCOUNTER — LAB (OUTPATIENT)
Dept: LAB | Facility: CLINIC | Age: 51
End: 2021-09-07
Payer: COMMERCIAL

## 2021-09-07 ENCOUNTER — ANTICOAGULATION THERAPY VISIT (OUTPATIENT)
Dept: ANTICOAGULATION | Facility: CLINIC | Age: 51
End: 2021-09-07

## 2021-09-07 DIAGNOSIS — I48.91 ATRIAL FIBRILLATION, UNSPECIFIED TYPE (H): ICD-10-CM

## 2021-09-07 DIAGNOSIS — I82.4Y9 DEEP VEIN THROMBOSIS (DVT) OF PROXIMAL LOWER EXTREMITY, UNSPECIFIED CHRONICITY, UNSPECIFIED LATERALITY (H): ICD-10-CM

## 2021-09-07 DIAGNOSIS — I82.409 DEEP VEIN THROMBOSIS (DVT) (H): ICD-10-CM

## 2021-09-07 DIAGNOSIS — I82.409 DEEP VEIN THROMBOSIS (DVT) (H): Primary | ICD-10-CM

## 2021-09-07 LAB — INR BLD: 2.2 (ref 0.9–1.1)

## 2021-09-07 PROCEDURE — 85610 PROTHROMBIN TIME: CPT

## 2021-09-07 PROCEDURE — 36416 COLLJ CAPILLARY BLOOD SPEC: CPT

## 2021-09-07 NOTE — PROGRESS NOTES
Anticoagulation Management     Attempted to reach Gaurang to discuss today's INR result, no answer at this time.      Left  requesting callback at patient's earliest convenience.      Anticoagulation clinic to follow up     Cyn Womack RN

## 2021-09-07 NOTE — PROGRESS NOTES
ANTICOAGULATION MANAGEMENT     Gaurang Rivera 50 year old male is on warfarin with therapeutic INR result. (Goal INR 2.0-3.0)    Recent labs: (last 7 days)     09/07/21  0854   INR 2.2*       ASSESSMENT     Source(s): Chart Review and Patient/Caregiver Call       Warfarin doses taken: Warfarin taken as instructed    Diet: No new diet changes identified    New illness, injury, or hospitalization: No    Medication/supplement changes: None noted    Signs or symptoms of bleeding or clotting: No    Previous INR: Therapeutic last 2 visits    Additional findings: None     PLAN     Recommended plan for no diet, medication or health factor changes affecting INR     Dosing Instructions: Continue your current warfarin dose with next INR in 6 weeks       Summary  As of 9/7/2021    Full warfarin instructions:  10 mg every Mon; 7.5 mg all other days   Next INR check:  10/19/2021             Telephone call with Gaurang who verbalizes understanding and agrees to plan    Lab visit scheduled    Education provided: Please call back if any changes to your diet, medications or how you've been taking warfarin, Monitoring for bleeding signs and symptoms, Monitoring for clotting signs and symptoms and Importance of notifying clinic for changes in medications; a sooner lab recheck maybe needed.    Plan made per [facility] anticoagulation protocol    Cyn Womack RN  Anticoagulation Clinic  9/7/2021    _______________________________________________________________________     Anticoagulation Episode Summary     Current INR goal:  2.0-3.0   TTR:  56.6 % (9.3 mo)   Target end date:  10/5/2019   Send INR reminders to:  [facility]    Indications    Deep vein thrombosis (DVT) (H) [I82.409]  Atrial fibrillation  unspecified type (H) [I48.91]  Deep vein thrombosis (DVT) of proximal lower extremity  unspecified chronicity  unspecified laterality (H) [I82.4Y9]           Comments:  7/5: Occlusive thrombus- posterior tibial veins- R calf.  Lovenox stopped 7/22 not bridged fully. wearing 15-20 C.stockings. Shoulder surgery (Dr. Burdick postponed). Neli Lozano RN #[phone]. Poor diet/no greens or exercise.  MTV 60 mcg.          Anticoagulation Care Providers     Provider Role Specialty Phone number    Elvis Guzman MD Referring Family Medicine [phone]

## 2021-09-28 DIAGNOSIS — E11.9 TYPE 2 DIABETES MELLITUS WITHOUT COMPLICATION, WITHOUT LONG-TERM CURRENT USE OF INSULIN (H): ICD-10-CM

## 2021-09-28 PROBLEM — D68.51 HETEROZYGOUS FACTOR V LEIDEN MUTATION (H): Status: ACTIVE | Noted: 2021-09-28

## 2021-09-28 PROBLEM — I82.4Z1 ACUTE DEEP VEIN THROMBOSIS (DVT) OF DISTAL END OF RIGHT LOWER EXTREMITY (H): Status: ACTIVE | Noted: 2021-09-28

## 2021-09-28 PROBLEM — Z79.01 CURRENT USE OF LONG TERM ANTICOAGULATION: Status: ACTIVE | Noted: 2021-09-28

## 2021-09-29 ENCOUNTER — TELEPHONE (OUTPATIENT)
Dept: FAMILY MEDICINE | Facility: CLINIC | Age: 51
End: 2021-09-29

## 2021-09-29 DIAGNOSIS — I82.4Z1 ACUTE DEEP VEIN THROMBOSIS (DVT) OF DISTAL END OF RIGHT LOWER EXTREMITY (H): ICD-10-CM

## 2021-09-29 RX ORDER — WARFARIN SODIUM 5 MG/1
TABLET ORAL
Qty: 180 TABLET | Refills: 0 | Status: SHIPPED | OUTPATIENT
Start: 2021-09-29 | End: 2021-12-03

## 2021-09-29 NOTE — TELEPHONE ENCOUNTER
Central scheduling left VM reporting patient is asking for a call back from Cyn - noted Cyn is off today - please call patient back today.      Bharati Bryant, RN, BSN, PHN  Anticoagulation Nurse  [phone]

## 2021-09-29 NOTE — TELEPHONE ENCOUNTER
Spoke with patient, states he needs a warfarin refill. Medication sent to pharmacy.     Floridalma Salamanca RN   [facility]  [facility]

## 2021-09-30 NOTE — TELEPHONE ENCOUNTER
Routing refill request to provider for review/approval because:  Labs not current:     Patient has documented A1c within the specified period of time. Protocol Details    Recent (6 mo) or future (30 days) visit within the authorizing provider's specialty      GLADYS Vila RN  [facility]

## 2021-10-05 ENCOUNTER — PRE VISIT (OUTPATIENT)
Dept: UROLOGY | Facility: CLINIC | Age: 51
End: 2021-10-05

## 2021-10-05 NOTE — TELEPHONE ENCOUNTER
Action 10/05/2021   Action Taken Records in epic  Patient referred to urology in 03/2021   Referral Dr. Megan Jerez

## 2021-10-07 NOTE — PROGRESS NOTES
Chief Complaint:   Urinary frequency and nocturia          History of Present Illness:   Gaurang Rivera is a 51 year old male with a history of depression, diabetes mellitus, DVT and factor V leiden, and atrial fibrillation on chronic anticoagulation who presents for evaluation of BPH.  Patient reports symptoms of urinary frequency and nocturia, which has been ongoing for several years.  Symptoms are most bothersome when he travels, which he doesn't do very often, but are also noticeable during his daily life.  He recently kept a 48 hour log of his urinary symptoms and reports that he usually urinates ~9 times during the day and nocturia of 3 times per night.  He reports some mild urgency, but denies any  incontinence.  He does report that he needs to double void occasionally, and will need to urinate ~30 minutes after his first void.  He also reports he sometimes is able to urinate more when he sits down rather than stands.  He feels he fully empties his bladder and denies any noticeable slowing of his urinary stream.  No dysuria or hematuria.  No history of UTI.  He does report drinking 4 cans of Diet Coke during the day, no coffee intake.  No issues with constipation.     Of note, the patient has tried Flomax 0.4 mg daily in the past with minimal improvement in symptoms.  PSA from 3/30/2021 was normal at 0.47ug/L.  No family history of prostate cancer.          Past Medical History:     Past Medical History:   Diagnosis Date     Antiplatelet or antithrombotic long-term use      Depressive disorder 2001     Diabetes (H)      DVT (deep venous thrombosis) (H) 07/05/2019     Elevated blood pressure reading without diagnosis of hypertension 5/13/2018     Hypercholesteremia 2012     Personal history of other medical treatment     blood clot foot july 2019     Pilonidal cyst 5/13/2018            Past Surgical History:     Past Surgical History:   Procedure Laterality Date     APPENDECTOMY       ARTHROSCOPY  SHOULDER, OPEN BICEP TENODESIS REPAIR, COMBINED Right 6/2/2020    Procedure: Right shoulder diagnostic arthroscopy, capsular release,  open biceps tenodesis;  Surgeon: Dulce Maria Burdick MD;  [facility]     AS DRAIN PILONIDAL CYST SIMPLE       BACK SURGERY  1997    spinal fusion     CHOLECYSTECTOMY  2012     CYSTECTOMY PILONIDAL N/A 2/21/2020    Procedure: EXCISION, PILONIDAL CYST, AILEEN II Procedure;  Surgeon: Artemio Ahumada MD;  [facility]     THUMB SURGERY   1995            Medications     Current Outpatient Medications   Medication     acetaminophen (TYLENOL) 325 MG tablet     alcohol swab prep pads     atorvastatin (LIPITOR) 40 MG tablet     blood glucose (FREESTYLE LITE) test strip     blood glucose calibration (FREESTYLE CONTROL) solution     blood glucose monitoring (FREESTYLE FREEDOM LITE) meter device kit     blood glucose monitoring (FREESTYLE) lancets     glipiZIDE (GLUCOTROL XL) 5 MG 24 hr tablet     ibuprofen (ADVIL/MOTRIN) 200 MG capsule     lisinopril (ZESTRIL) 5 MG tablet     metFORMIN (GLUCOPHAGE) 1000 MG tablet     multivitamin, therapeutic with minerals (MULTI-VITAMIN) TABS tablet     venlafaxine (EFFEXOR-XR) 150 MG 24 hr capsule     warfarin ANTICOAGULANT (COUMADIN) 5 MG tablet     No current facility-administered medications for this visit.            Family History:     Family History   Problem Relation Age of Onset     Diabetes Mother      Depression Father      Thrombosis Father         HE HAS HAD 3 CLOTS - PER PATIENT THEY WERE UNPROVOKED ON COUMADIN      Alcoholism Sister      Depression Sister      Lupus Sister      Anesthesia Reaction No family hx of      Cardiovascular No family hx of             Social History:     Social History     Socioeconomic History     Marital status: Single     Spouse name: Not on file     Number of children: 0     Years of education: Not on file     Highest education level: Not on file   Occupational History     Occupation:        Comment: ROWING    Tobacco Use     Smoking status: Never Smoker     Smokeless tobacco: Never Used   Substance and Sexual Activity     Alcohol use: No     Comment: None     Drug use: No     Sexual activity: Not Currently     Partners: Female   Other Topics Concern     Parent/sibling w/ CABG, MI or angioplasty before 65F 55M? Not Asked   Social History Narrative     Not on file     Social Determinants of Health     Financial Resource Strain:      Difficulty of Paying Living Expenses:    Food Insecurity:      Worried About Running Out of Food in the Last Year:      Ran Out of Food in the Last Year:    Transportation Needs:      Lack of Transportation (Medical):      Lack of Transportation (Non-Medical):    Physical Activity:      Days of Exercise per Week:      Minutes of Exercise per Session:    Stress:      Feeling of Stress :    Social Connections:      Frequency of Communication with Friends and Family:      Frequency of Social Gatherings with Friends and Family:      Attends Anglican Services:      Active Member of Clubs or Organizations:      Attends Club or Organization Meetings:      Marital Status:    Intimate Partner Violence:      Fear of Current or Ex-Partner:      Emotionally Abused:      Physically Abused:      Sexually Abused:             Allergies:   Patient has no known allergies.         Review of Systems:  From intake questionnaire   Negative 14 system review except as noted on HPI, nurse's note.         Physical Exam:   Patient is a 51 year old  male   General Appearance Adult: Alert, no acute distress, oriented  Lungs: no respiratory distress, or pursed lip breathing  Heart: No obvious jugular venous distension present  Skin: no suspicious lesions or rashes  Neuro: Alert, oriented, speech and mentation normal  Further examination is deferred due to the nature of our visit.       Labs and Pathology:    I personally reviewed all applicable laboratory data and went over findings with  patient  Significant for:    CBC RESULTS:  Recent Labs   Lab Test 03/30/21  1411 06/03/20  0706 05/28/20  1135 03/03/20  1557   WBC 6.8 9.2 6.8 6.1   HGB 18.0* 14.4 16.1 16.9    189 192 248        BMP RESULTS:  Recent Labs   Lab Test 03/30/21  1411 06/03/20  0706 06/02/20  1417 06/02/20  0931 05/28/20  1135 05/28/20  1135    139 138  --   --  137   POTASSIUM 4.3 3.9 4.3 4.0   < > 4.0   CHLORIDE 105 106 105  --   --  105   CO2 24 27 23  --   --  25   ANIONGAP 6 6 10  --   --  7   * 174* 184*  --   --  138*   BUN 16 13 17  --   --  18   CR 0.89 0.87 0.88  --   --  0.94   GFRESTIMATED >90 >90 >90  --   --  >90   GFRESTBLACK >90 >90 >90  --   --  >90   SAVI 9.3 8.3* 8.7  --   --  8.9    < > = values in this interval not displayed.       PSA RESULTS  PSA   Date Value Ref Range Status   03/30/2021 0.47 0 - 4 ug/L Final     Comment:     Assay Method:  Chemiluminescence using Siemens Vista analyzer   09/11/2018 0.46 0 - 4 ug/L Final     Comment:     Assay Method:  Chemiluminescence using Siemens Vista analyzer         Imaging:    I personally reviewed all applicable imaging and went over findings with patient.  Significant for: no recent relevant imaging           Assessment and Plan:     Assessment: 51 year old male with BPH with urinary frequency and nocturia.  Patient has trialed Flomax in the past with no change in symptoms.  Discussed initiating an alternative alpha blocker versus trying a 5ARI, and we elected to initiate alfuzosin 10 mg daily at bedtime.  Patient will follow up with me for an in person clinic visit in 6 weeks to assess for symptom improvement; will also plan to complete a prostate exam that date, and obtain a PVR to rule out urinary retention as a contributing factor.  If patient is not having any improvement in symptoms, could switch to finasteride versus treating for OAB with an anticholinergic after a PVR is obtained.  We also discussed lifestyle modification including limiting  fluids before bedtime, and reducing caffeine intake.     Patient also with an elevated PHQ-9 screening questionnaire today, with positive response for suicidal ideation.  This was discussed with the patient who reports his symptoms are stable and he is currently seeing a therapist who is helping him manage his depression.  He admits to passive suicidal thoughts, but denies any active suicidal ideation and feels safe at home.      Plan:  - Start alfuzosin 10 mg once daily at bedtime.    - Follow up for in person visit in the clinic in 6 weeks.  Will plan to complete a prostate exam and PVR that date.  Will also need an AUA symptom score.   - Recommend limiting fluids ~3 hours before bedtime to help reduce nighttime urinary frequency.  - Caffeine is a bladder irritant, so trying to cut back on the Diet Coke during the day may also help with the urinary frequency.       DIANNE Renee  Department of Urology

## 2021-10-08 ENCOUNTER — PRE VISIT (OUTPATIENT)
Dept: UROLOGY | Facility: CLINIC | Age: 51
End: 2021-10-08

## 2021-10-08 NOTE — TELEPHONE ENCOUNTER
Reason for visit: BPH consult     Relevant information: BPH    Problem list   Patient Active Problem List   Diagnosis     Passive suicidal ideations     Diabetes mellitus, type 2 (H)     Hyperlipidemia, unspecified hyperlipidemia type     Microalbuminuria due to type 2 diabetes mellitus (H)     MDD (major depressive disorder), recurrent episode, severe (H)     Deep vein thrombosis (DVT) (H)     Erectile dysfunction     Closed displaced fracture of neck of right scapula, sequela     Pilonidal cyst     Atrial fibrillation (H)     Atrial fibrillation, unspecified type (H)     Deep vein thrombosis (DVT) of proximal lower extremity, unspecified chronicity, unspecified laterality (H)     Heterozygous factor V Leiden mutation (H)     Current use of long term anticoagulation     Acute deep vein thrombosis (DVT) of distal end of right lower extremity (H)       Records/imaging/labs/orders: in epic    Pt called: n/a    At Rooming: virtual

## 2021-10-11 ENCOUNTER — MYC REFILL (OUTPATIENT)
Dept: FAMILY MEDICINE | Facility: CLINIC | Age: 51
End: 2021-10-11

## 2021-10-11 DIAGNOSIS — F33.1 MODERATE EPISODE OF RECURRENT MAJOR DEPRESSIVE DISORDER (H): ICD-10-CM

## 2021-10-11 DIAGNOSIS — R45.851 PASSIVE SUICIDAL IDEATIONS: ICD-10-CM

## 2021-10-14 ENCOUNTER — VIRTUAL VISIT (OUTPATIENT)
Dept: UROLOGY | Facility: CLINIC | Age: 51
End: 2021-10-14
Attending: FAMILY MEDICINE
Payer: COMMERCIAL

## 2021-10-14 DIAGNOSIS — N40.1 BENIGN PROSTATIC HYPERPLASIA WITH LOWER URINARY TRACT SYMPTOMS, SYMPTOM DETAILS UNSPECIFIED: ICD-10-CM

## 2021-10-14 PROCEDURE — 99203 OFFICE O/P NEW LOW 30 MIN: CPT | Mod: GT | Performed by: PHYSICIAN ASSISTANT

## 2021-10-14 RX ORDER — VENLAFAXINE HYDROCHLORIDE 150 MG/1
300 CAPSULE, EXTENDED RELEASE ORAL DAILY
Qty: 180 CAPSULE | Refills: 0 | Status: SHIPPED | OUTPATIENT
Start: 2021-10-14 | End: 2022-01-07

## 2021-10-14 RX ORDER — ALFUZOSIN HYDROCHLORIDE 10 MG/1
10 TABLET, EXTENDED RELEASE ORAL DAILY
Qty: 30 TABLET | Refills: 11 | Status: SHIPPED | OUTPATIENT
Start: 2021-10-14 | End: 2022-09-06

## 2021-10-14 ASSESSMENT — PATIENT HEALTH QUESTIONNAIRE - PHQ9: SUM OF ALL RESPONSES TO PHQ QUESTIONS 1-9: 17

## 2021-10-14 NOTE — TELEPHONE ENCOUNTER
Routing refill request to provider for review/approval because:  Patient needs to be seen because:  It has been >6 mo since last seen  PHQ-9 score greater than 4 per [facility] protocol  PHQ-9 score:    PHQ 10/14/2021   PHQ-9 Total Score 17   Q9: Thoughts of better off dead/self-harm past 2 weeks More than half the days   F/U: Thoughts of suicide or self-harm -   F/U: Self harm-plan -   F/U: Self-harm action -   F/U: Safety concerns -     Team Coordinators: Please contact patient to set up depression follow up for any further refills.     UMA RealN RN  [facility]

## 2021-10-14 NOTE — LETTER
10/14/2021       RE: Gaurang Rivera  [address]  [place] [address]     Dear Colleague,    Thank you for referring your patient, Gaurang Rivera, to the [facility] [place] at [facility]. Please see a copy of my visit note below.          Chief Complaint:   Urinary frequency and nocturia          History of Present Illness:   Gaurang Rivera is a 51 year old male with a history of depression, diabetes mellitus, DVT and factor V leiden, and atrial fibrillation on chronic anticoagulation who presents for evaluation of BPH.  Patient reports symptoms of urinary frequency and nocturia, which has been ongoing for several years.  Symptoms are most bothersome when he travels, which he doesn't do very often, but are also noticeable during his daily life.  He recently kept a 48 hour log of his urinary symptoms and reports that he usually urinates ~9 times during the day and nocturia of 3 times per night.  He reports some mild urgency, but denies any  incontinence.  He does report that he needs to double void occasionally, and will need to urinate ~30 minutes after his first void.  He also reports he sometimes is able to urinate more when he sits down rather than stands.  He feels he fully empties his bladder and denies any noticeable slowing of his urinary stream.  No dysuria or hematuria.  No history of UTI.  He does report drinking 4 cans of Diet Coke during the day, no coffee intake.  No issues with constipation.     Of note, the patient has tried Flomax 0.4 mg daily in the past with minimal improvement in symptoms.  PSA from 3/30/2021 was normal at 0.47ug/L.  No family history of prostate cancer.          Past Medical History:     Past Medical History:   Diagnosis Date     Antiplatelet or antithrombotic long-term use      Depressive disorder 2001     Diabetes (H)      DVT (deep venous thrombosis) (H) 07/05/2019     Elevated blood  pressure reading without diagnosis of hypertension 5/13/2018     Hypercholesteremia 2012     Personal history of other medical treatment     blood clot foot july 2019     Pilonidal cyst 5/13/2018            Past Surgical History:     Past Surgical History:   Procedure Laterality Date     APPENDECTOMY       ARTHROSCOPY SHOULDER, OPEN BICEP TENODESIS REPAIR, COMBINED Right 6/2/2020    Procedure: Right shoulder diagnostic arthroscopy, capsular release,  open biceps tenodesis;  Surgeon: Dulce Maria Burdick MD;  [facility] OR     AS DRAIN PILONIDAL CYST SIMPLE       BACK SURGERY  1997    spinal fusion     CHOLECYSTECTOMY  2012     CYSTECTOMY PILONIDAL N/A 2/21/2020    Procedure: EXCISION, PILONIDAL CYST, AILEEN II Procedure;  Surgeon: Artemio Ahumada MD;  [facility]     THUMB SURGERY   1995            Medications     Current Outpatient Medications   Medication     acetaminophen (TYLENOL) 325 MG tablet     alcohol swab prep pads     atorvastatin (LIPITOR) 40 MG tablet     blood glucose (FREESTYLE LITE) test strip     blood glucose calibration (FREESTYLE CONTROL) solution     blood glucose monitoring (FREESTYLE FREEDOM LITE) meter device kit     blood glucose monitoring (FREESTYLE) lancets     glipiZIDE (GLUCOTROL XL) 5 MG 24 hr tablet     ibuprofen (ADVIL/MOTRIN) 200 MG capsule     lisinopril (ZESTRIL) 5 MG tablet     metFORMIN (GLUCOPHAGE) 1000 MG tablet     multivitamin, therapeutic with minerals (MULTI-VITAMIN) TABS tablet     venlafaxine (EFFEXOR-XR) 150 MG 24 hr capsule     warfarin ANTICOAGULANT (COUMADIN) 5 MG tablet     No current facility-administered medications for this visit.            Family History:     Family History   Problem Relation Age of Onset     Diabetes Mother      Depression Father      Thrombosis Father         HE HAS HAD 3 CLOTS - PER PATIENT THEY WERE UNPROVOKED ON COUMADIN      Alcoholism Sister      Depression Sister      Lupus Sister      Anesthesia Reaction No family hx  of      Cardiovascular No family hx of             Social History:     Social History     Socioeconomic History     Marital status: Single     Spouse name: Not on file     Number of children: 0     Years of education: Not on file     Highest education level: Not on file   Occupational History     Occupation:       Comment: ROWING    Tobacco Use     Smoking status: Never Smoker     Smokeless tobacco: Never Used   Substance and Sexual Activity     Alcohol use: No     Comment: None     Drug use: No     Sexual activity: Not Currently     Partners: Female   Other Topics Concern     Parent/sibling w/ CABG, MI or angioplasty before 65F 55M? Not Asked   Social History Narrative     Not on file     Social Determinants of Health     Financial Resource Strain:      Difficulty of Paying Living Expenses:    Food Insecurity:      Worried About Running Out of Food in the Last Year:      Ran Out of Food in the Last Year:    Transportation Needs:      Lack of Transportation (Medical):      Lack of Transportation (Non-Medical):    Physical Activity:      Days of Exercise per Week:      Minutes of Exercise per Session:    Stress:      Feeling of Stress :    Social Connections:      Frequency of Communication with Friends and Family:      Frequency of Social Gatherings with Friends and Family:      Attends Sabianist Services:      Active Member of Clubs or Organizations:      Attends Club or Organization Meetings:      Marital Status:    Intimate Partner Violence:      Fear of Current or Ex-Partner:      Emotionally Abused:      Physically Abused:      Sexually Abused:             Allergies:   Patient has no known allergies.         Review of Systems:  From intake questionnaire   Negative 14 system review except as noted on HPI, nurse's note.         Physical Exam:   Patient is a 51 year old  male   General Appearance Adult: Alert, no acute distress, oriented  Lungs: no respiratory distress, or pursed lip breathing  Heart: No  obvious jugular venous distension present  Skin: no suspicious lesions or rashes  Neuro: Alert, oriented, speech and mentation normal  Further examination is deferred due to the nature of our visit.       Labs and Pathology:    I personally reviewed all applicable laboratory data and went over findings with patient  Significant for:    CBC RESULTS:  Recent Labs   Lab Test 03/30/21  1411 06/03/20  0706 05/28/20  1135 03/03/20  1557   WBC 6.8 9.2 6.8 6.1   HGB 18.0* 14.4 16.1 16.9    189 192 248        BMP RESULTS:  Recent Labs   Lab Test 03/30/21  1411 06/03/20  0706 06/02/20  1417 06/02/20  0931 05/28/20  1135 05/28/20  1135    139 138  --   --  137   POTASSIUM 4.3 3.9 4.3 4.0   < > 4.0   CHLORIDE 105 106 105  --   --  105   CO2 24 27 23  --   --  25   ANIONGAP 6 6 10  --   --  7   * 174* 184*  --   --  138*   BUN 16 13 17  --   --  18   CR 0.89 0.87 0.88  --   --  0.94   GFRESTIMATED >90 >90 >90  --   --  >90   GFRESTBLACK >90 >90 >90  --   --  >90   SAVI 9.3 8.3* 8.7  --   --  8.9    < > = values in this interval not displayed.       PSA RESULTS  PSA   Date Value Ref Range Status   03/30/2021 0.47 0 - 4 ug/L Final     Comment:     Assay Method:  Chemiluminescence using Siemens Vista analyzer   09/11/2018 0.46 0 - 4 ug/L Final     Comment:     Assay Method:  Chemiluminescence using Siemens Vista analyzer         Imaging:    I personally reviewed all applicable imaging and went over findings with patient.  Significant for: no recent relevant imaging           Assessment and Plan:     Assessment: 51 year old male with BPH with urinary frequency and nocturia.  Patient has trialed Flomax in the past with no change in symptoms.  Discussed initiating an alternative alpha blocker versus trying a 5ARI, and we elected to initiate alfuzosin 10 mg daily at bedtime.  Patient will follow up with me for an in person clinic visit in 6 weeks to assess for symptom improvement; will also plan to complete a  prostate exam that date, and obtain a PVR to rule out urinary retention as a contributing factor.  If patient is not having any improvement in symptoms, could switch to finasteride versus treating for OAB with an anticholinergic after a PVR is obtained.  We also discussed lifestyle modification including limiting fluids before bedtime, and reducing caffeine intake.     Patient also with an elevated PHQ-9 screening questionnaire today, with positive response for suicidal ideation.  This was discussed with the patient who reports his symptoms are stable and he is currently seeing a therapist who is helping him manage his depression.  He admits to passive suicidal thoughts, but denies any active suicidal ideation and feels safe at home.      Plan:  - Start alfuzosin 10 mg once daily at bedtime.    - Follow up for in person visit in the clinic in 6 weeks.  Will plan to complete a prostate exam and PVR that date.  Will also need an AUA symptom score.   - Recommend limiting fluids ~3 hours before bedtime to help reduce nighttime urinary frequency.  - Caffeine is a bladder irritant, so trying to cut back on the Diet Coke during the day may also help with the urinary frequency.       DIANNE Renee  Department of Urology     Gaurang is a 51 year old who is being evaluated via a billable video visit.      How would you like to obtain your AVS? MyChart  If the video visit is dropped, the invitation should be resent by: Send to e-mail at: [email or web address]  Will anyone else be joining your video visit? No      Video Start Time: 8:26 AM  Video-Visit Details    Type of service:  Video Visit    Video End Time:8:41 AM    Originating Location (pt. Location): Home    Distant Location (provider location):  [facility] [place]     Platform used for Video Visit: [facility]    Depression Response    Patient completed the PHQ-9 assessment for depression and scored >9? Yes  Question 9 on the PHQ-9 was  positive for suicidality? Yes  Does patient have current mental health provider? Yes    Is this a virtual visit? Yes   Does patient have suicidal ideation (positive question 9)? Yes - transfer to Red Flag Triage ([phone]) Patient declined triage.  Notify provider.     I personally notified the following: visit provider

## 2021-10-14 NOTE — PATIENT INSTRUCTIONS
UROLOGY CLINIC VISIT PATIENT INSTRUCTIONS    - Start alfuzosin 10 mg once daily at bedtime.    - Follow up with me in 6 weeks for an in person visit for a recheck of symptoms.  We will also complete a prostate exam during this visit.   - Recommend limiting fluids ~3 hours before bedtime to help reduce nighttime urinary frequency.  - Caffeine is a bladder irritant, so trying to cut back on the Diet Coke during the day may also help with the urinary frequency.     If you have any issues, questions or concerns in the meantime, do not hesitate to contact us at [phone] or via [email or web address].     It was a pleasure meeting with you today.  Thank you for allowing me and my team the privilege of caring for you today.  YOU are the reason we are here, and I truly hope we provided you with the excellent service you deserve.  Please let us know if there is anything else we can do for you so that we can be sure you are leaving completely satisfied with your care experience.    Vaishnavi Rock PA-C  Department of Urology

## 2021-10-14 NOTE — PROGRESS NOTES
Depression Response    Patient completed the PHQ-9 assessment for depression and scored >9? Yes  Question 9 on the PHQ-9 was positive for suicidality? Yes  Does patient have current mental health provider? Yes    Is this a virtual visit? Yes   Does patient have suicidal ideation (positive question 9)? Yes - transfer to Red Flag Triage ([phone]) Patient declined triage.  Notify provider.     I personally notified the following: visit provider

## 2021-10-14 NOTE — PROGRESS NOTES
Gaurang is a 51 year old who is being evaluated via a billable video visit.      How would you like to obtain your AVS? [employer]  If the video visit is dropped, the invitation should be resent by: Send to e-mail at: [email or web address]  Will anyone else be joining your video visit? No      Video Start Time: 8:26 AM  Video-Visit Details    Type of service:  Video Visit    Video End Time:8:41 AM    Originating Location (pt. Location): Home    Distant Location (provider location):  [facility] UROLOG[facility] [place]     Platform used for Video Visit: Lifes[employer]

## 2021-10-15 ENCOUNTER — TELEPHONE (OUTPATIENT)
Dept: UROLOGY | Facility: CLINIC | Age: 51
End: 2021-10-15

## 2021-10-19 ENCOUNTER — LAB (OUTPATIENT)
Dept: LAB | Facility: CLINIC | Age: 51
End: 2021-10-19
Payer: COMMERCIAL

## 2021-10-19 ENCOUNTER — ANTICOAGULATION THERAPY VISIT (OUTPATIENT)
Dept: ANTICOAGULATION | Facility: CLINIC | Age: 51
End: 2021-10-19

## 2021-10-19 DIAGNOSIS — I82.4Z1 ACUTE DEEP VEIN THROMBOSIS (DVT) OF DISTAL END OF RIGHT LOWER EXTREMITY (H): ICD-10-CM

## 2021-10-19 DIAGNOSIS — I82.409 DEEP VEIN THROMBOSIS (DVT) (H): ICD-10-CM

## 2021-10-19 DIAGNOSIS — I48.91 ATRIAL FIBRILLATION, UNSPECIFIED TYPE (H): ICD-10-CM

## 2021-10-19 DIAGNOSIS — Z79.01 CURRENT USE OF LONG TERM ANTICOAGULATION: ICD-10-CM

## 2021-10-19 DIAGNOSIS — I82.4Y9 DEEP VEIN THROMBOSIS (DVT) OF PROXIMAL LOWER EXTREMITY, UNSPECIFIED CHRONICITY, UNSPECIFIED LATERALITY (H): ICD-10-CM

## 2021-10-19 DIAGNOSIS — D68.51 HETEROZYGOUS FACTOR V LEIDEN MUTATION (H): ICD-10-CM

## 2021-10-19 DIAGNOSIS — I82.409 DEEP VEIN THROMBOSIS (DVT) (H): Primary | ICD-10-CM

## 2021-10-19 LAB — INR BLD: 2.2 (ref 0.9–1.1)

## 2021-10-19 PROCEDURE — 85610 PROTHROMBIN TIME: CPT

## 2021-10-19 PROCEDURE — 36416 COLLJ CAPILLARY BLOOD SPEC: CPT

## 2021-10-19 NOTE — PROGRESS NOTES
ANTICOAGULATION MANAGEMENT     Gaurang Rivera 51 year old male is on warfarin with therapeutic INR result. (Goal INR 2.0-3.0)    Recent labs: (last 7 days)     10/19/21  0907   INR 2.2*       ASSESSMENT     Source(s): Chart Review and Patient/Caregiver Call       Warfarin doses taken: Warfarin taken as instructed    Diet: No new diet changes identified    New illness, injury, or hospitalization: No    Medication/supplement changes: None noted    Signs or symptoms of bleeding or clotting: No    Previous INR: Therapeutic last 2(+) visits    Additional findings: None     PLAN     Recommended plan for no diet, medication or health factor changes affecting INR     Dosing Instructions: Continue your current warfarin dose with next INR in 6 weeks       Summary  As of 10/19/2021    Full warfarin instructions:  10 mg every Mon; 7.5 mg all other days   Next INR check:  11/30/2021             Telephone call with Gaurang who verbalizes understanding and agrees to plan    Lab visit scheduled    Education provided: Please call back if any changes to your diet, medications or how you've been taking warfarin    Plan made per [facility] anticoagulation protocol    Harshil Salamanca RN  Anticoagulation Clinic  10/19/2021    _______________________________________________________________________     Anticoagulation Episode Summary     Current INR goal:  2.0-3.0   TTR:  62.3 % (10.7 mo)   Target end date:  Indefinite   Send INR reminders to:  [facility]    Indications    Atrial fibrillation  unspecified type (H) [I48.91]  Heterozygous factor V Leiden mutation (H) [D68.51]  Current use of long term anticoagulation [Z79.01]  Acute deep vein thrombosis (DVT) of distal end of right lower extremity (H) [I82.4Z1]           Comments:  Poor diet/no greens or exercise.  MTV 60 mcg.         Anticoagulation Care Providers     Provider Role Specialty Phone number    Elvis Guzman MD Referring Family Medicine [phone]

## 2021-10-30 ENCOUNTER — HEALTH MAINTENANCE LETTER (OUTPATIENT)
Age: 51
End: 2021-10-30

## 2021-12-02 ENCOUNTER — LAB (OUTPATIENT)
Dept: LAB | Facility: CLINIC | Age: 51
End: 2021-12-02
Payer: COMMERCIAL

## 2021-12-02 ENCOUNTER — ANTICOAGULATION THERAPY VISIT (OUTPATIENT)
Dept: ANTICOAGULATION | Facility: CLINIC | Age: 51
End: 2021-12-02

## 2021-12-02 DIAGNOSIS — I82.4Y9 DEEP VEIN THROMBOSIS (DVT) OF PROXIMAL LOWER EXTREMITY, UNSPECIFIED CHRONICITY, UNSPECIFIED LATERALITY (H): ICD-10-CM

## 2021-12-02 DIAGNOSIS — I82.4Z1 ACUTE DEEP VEIN THROMBOSIS (DVT) OF DISTAL END OF RIGHT LOWER EXTREMITY (H): ICD-10-CM

## 2021-12-02 DIAGNOSIS — D68.51 HETEROZYGOUS FACTOR V LEIDEN MUTATION (H): ICD-10-CM

## 2021-12-02 DIAGNOSIS — Z79.01 CURRENT USE OF LONG TERM ANTICOAGULATION: ICD-10-CM

## 2021-12-02 DIAGNOSIS — I48.91 ATRIAL FIBRILLATION, UNSPECIFIED TYPE (H): ICD-10-CM

## 2021-12-02 DIAGNOSIS — I82.409 DEEP VEIN THROMBOSIS (DVT) (H): ICD-10-CM

## 2021-12-02 DIAGNOSIS — I48.91 ATRIAL FIBRILLATION, UNSPECIFIED TYPE (H): Primary | ICD-10-CM

## 2021-12-02 LAB — INR BLD: 2.9 (ref 0.9–1.1)

## 2021-12-02 PROCEDURE — 85610 PROTHROMBIN TIME: CPT

## 2021-12-02 PROCEDURE — 36416 COLLJ CAPILLARY BLOOD SPEC: CPT

## 2021-12-02 NOTE — PROGRESS NOTES
Anticoagulation Management    Unable to reach Gaurang today.    Today's INR result of 2.9 is therapeutic (goal INR of 2.0-3.0).  Result received from: Clinic Lab    Follow up required to confirm warfarin dose taken and assess for changes. Last two INRs therapeutic. If no changes or concerns after RN assessment, then next INR in another 6 weeks.    Left vm to return call to [facility] RN.    Anticoagulation clinic to follow up    Velia Persaud RN

## 2021-12-02 NOTE — PROGRESS NOTES
Anticoagulation Management    Unable to reach Gaurang today.    Today's INR result of 2.9 is therapeutic (goal INR of 2.0-3.0).  Result received from: Clinic Lab    Follow up required to confirm warfarin dose taken and assess for changes    Left message to continue current dose of warfarin 7.5 mg tonight. Encouraged callback at patient's earliest convenience.      Anticoagulation clinic to follow up    Cyn Womack RN

## 2021-12-03 RX ORDER — WARFARIN SODIUM 5 MG/1
TABLET ORAL
Qty: 185 TABLET | Refills: 1 | Status: SHIPPED | OUTPATIENT
Start: 2021-12-03 | End: 2022-03-29

## 2021-12-03 NOTE — PROGRESS NOTES
Patient returned call and discussed INR and dosing plan. Patient denies any changes to diet, meds, health or s/s of bleeding/clotting. Patient schedule for 1/13/21. Refill sent. Cyn Womack, UMAN, RN

## 2021-12-09 ENCOUNTER — IMMUNIZATION (OUTPATIENT)
Dept: NURSING | Facility: CLINIC | Age: 51
End: 2021-12-09
Payer: COMMERCIAL

## 2021-12-09 DIAGNOSIS — E78.5 HYPERLIPIDEMIA, UNSPECIFIED HYPERLIPIDEMIA TYPE: ICD-10-CM

## 2021-12-09 PROCEDURE — 90471 IMMUNIZATION ADMIN: CPT

## 2021-12-09 PROCEDURE — 91300 PR COVID VAC PFIZER DIL RECON 30 MCG/0.3 ML IM: CPT

## 2021-12-09 PROCEDURE — 0004A PR COVID VAC PFIZER DIL RECON 30 MCG/0.3 ML IM: CPT

## 2021-12-09 PROCEDURE — 90682 RIV4 VACC RECOMBINANT DNA IM: CPT

## 2021-12-09 RX ORDER — ATORVASTATIN CALCIUM 40 MG/1
TABLET, FILM COATED ORAL
Qty: 90 TABLET | Refills: 0 | Status: CANCELLED | OUTPATIENT
Start: 2021-12-09

## 2021-12-09 RX ORDER — ATORVASTATIN CALCIUM 40 MG/1
TABLET, FILM COATED ORAL
Qty: 90 TABLET | Refills: 0 | Status: SHIPPED | OUTPATIENT
Start: 2021-12-09 | End: 2022-01-07

## 2021-12-09 NOTE — TELEPHONE ENCOUNTER
Prescription approved per [facility] Refill Protocol.  Amelia Vila RN  [facility]

## 2021-12-15 NOTE — RESULT ENCOUNTER NOTE
Gaurang,    Your INR is in normal range.  The wound culture did grow some bacteria but that doesn't necessarily indicate infection as it may have been contaminated.  I have sent the results to your colorectal provider.  No change in plan at this time.  I would recommend follow up with colorectal if increased pain or drainage.    Smitha Perez, CNP ATTENDING NOTE:   23 y/o M here for R upper dental pain, no abscess on exam, transfer to dental clinic.

## 2021-12-28 DIAGNOSIS — E11.9 TYPE 2 DIABETES MELLITUS WITHOUT COMPLICATION, WITHOUT LONG-TERM CURRENT USE OF INSULIN (H): ICD-10-CM

## 2022-01-13 ENCOUNTER — ANTICOAGULATION THERAPY VISIT (OUTPATIENT)
Dept: ANTICOAGULATION | Facility: CLINIC | Age: 52
End: 2022-01-13

## 2022-01-13 ENCOUNTER — LAB (OUTPATIENT)
Dept: LAB | Facility: CLINIC | Age: 52
End: 2022-01-13
Payer: COMMERCIAL

## 2022-01-13 DIAGNOSIS — I82.409 DEEP VEIN THROMBOSIS (DVT) (H): ICD-10-CM

## 2022-01-13 DIAGNOSIS — I82.4Z1 ACUTE DEEP VEIN THROMBOSIS (DVT) OF DISTAL END OF RIGHT LOWER EXTREMITY (H): ICD-10-CM

## 2022-01-13 DIAGNOSIS — D68.51 HETEROZYGOUS FACTOR V LEIDEN MUTATION (H): ICD-10-CM

## 2022-01-13 DIAGNOSIS — Z79.01 CURRENT USE OF LONG TERM ANTICOAGULATION: ICD-10-CM

## 2022-01-13 DIAGNOSIS — I48.91 ATRIAL FIBRILLATION, UNSPECIFIED TYPE (H): Primary | ICD-10-CM

## 2022-01-13 DIAGNOSIS — I48.91 ATRIAL FIBRILLATION, UNSPECIFIED TYPE (H): ICD-10-CM

## 2022-01-13 DIAGNOSIS — I82.4Y9 DEEP VEIN THROMBOSIS (DVT) OF PROXIMAL LOWER EXTREMITY, UNSPECIFIED CHRONICITY, UNSPECIFIED LATERALITY (H): ICD-10-CM

## 2022-01-13 LAB — INR BLD: 3.3 (ref 0.9–1.1)

## 2022-01-13 PROCEDURE — 36416 COLLJ CAPILLARY BLOOD SPEC: CPT

## 2022-01-13 PROCEDURE — 85610 PROTHROMBIN TIME: CPT

## 2022-01-13 NOTE — PROGRESS NOTES
ANTICOAGULATION MANAGEMENT     Gaurang Rivera 51 year old male is on warfarin with supratherapeutic INR result. (Goal INR 2.0-3.0)    Recent labs: (last 7 days)     01/13/22  1320   INR 3.3*       ASSESSMENT     Source(s): Chart Review and Patient/Caregiver Call       Warfarin doses taken: Warfarin taken as instructed    Diet: No new diet changes identified    New illness, injury, or hospitalization: No    Medication/supplement changes: None noted    Signs or symptoms of bleeding or clotting: No    Previous INR: Therapeutic last 2(+) visits    Additional findings: None     PLAN     Recommended plan for no diet, medication or health factor changes affecting INR     Dosing Instructions: Partial hold then continue your current warfarin dose with next INR in 2 weeks       Summary  As of 1/13/2022    Full warfarin instructions:  1/13: 5 mg; Otherwise 10 mg every Mon; 7.5 mg all other days   Next INR check:  1/27/2022             Telephone call with Gaurang who verbalizes understanding and agrees to plan    Lab visit scheduled    Education provided: Please call back if any changes to your diet, medications or how you've been taking warfarin, Monitoring for bleeding signs and symptoms, Monitoring for clotting signs and symptoms and Importance of notifying clinic for changes in medications; a sooner lab recheck maybe needed.    Plan made per [facility] anticoagulation protocol    Cyn Womack RN  Anticoagulation Clinic  1/13/2022    _______________________________________________________________________     Anticoagulation Episode Summary     Current INR goal:  2.0-3.0   TTR:  65.1 % (1 y)   Target end date:  Indefinite   Send INR reminders to:  [facility]    Indications    Atrial fibrillation  unspecified type (H) [I48.91]  Heterozygous factor V Leiden mutation (H) [D68.51]  Current use of long term anticoagulation [Z79.01]  Acute deep vein thrombosis (DVT) of distal end of right lower extremity (H) [I82.4Z1]            Comments:  Poor diet/no greens or exercise.  MTV 60 mcg.         Anticoagulation Care Providers     Provider Role Specialty Phone number    Elvis Guzman MD Referring Family Medicine [phone]

## 2022-01-13 NOTE — PROGRESS NOTES
Anticoagulation Management    Unable to reach Peter today.    Today's INR result of 3.3 is supratherapeutic (goal INR of 2.0-3.0).  Result received from: Clinic Lab    Follow up required to assess for changes  and discuss out of range INR     Left message to take reduced dose of warfarin, 5 mg tonight. Request call back for assessment.      Anticoagulation clinic to follow up    Cyn Womack RN

## 2022-01-14 NOTE — PROGRESS NOTES
Chief Complaint:   Follow up         History of Present Illness:   Gaurang Rivera is a 51 year old male with a history of depression, diabetes mellitus, DVT and factor V leiden, and atrial fibrillation on chronic anticoagulation who presents for follow up of BPH.  Patient was initially seen on 10/14/2021 at which time he reported symptoms of urinary frequency and nocturia, which has been ongoing for several years.  He reports urinary frequency ~9 times during the day and nocturia of 3 times per night.  He reports some mild urgency, but denies any  incontinence.  He does report that he needs to double void occasionally, and will need to urinate ~30 minutes after his first void.  He also reports he sometimes is able to urinate more when he sits down rather than stands.  He feels he fully empties his bladder and denies any noticeable slowing of his urinary stream.  The shared decision was made to trial an alternative alpha blocker with alfuzosin, and the patient presents today for follow up of symptoms.      He reports no change in symptoms on the alfuzosin.  He continues to have urinary frequency and nocturia.  No change in urinary stream strength.  He feels he is emptying his bladder, but he is voiding frequently within 1-2 hours later.  He denies any dysuria or hematuria.  He drinks 3-4 cans of coke per day.  No alcohol use.  He reports regular bowel movements with no constipation.           Past Medical History:     Past Medical History:   Diagnosis Date     Antiplatelet or antithrombotic long-term use      Depressive disorder 2001     Diabetes (H)      DVT (deep venous thrombosis) (H) 07/05/2019     Elevated blood pressure reading without diagnosis of hypertension 5/13/2018     Hypercholesteremia 2012     Personal history of other medical treatment     blood clot foot july 2019     Pilonidal cyst 5/13/2018            Past Surgical History:     Past Surgical History:   Procedure Laterality Date      APPENDECTOMY       ARTHROSCOPY SHOULDER, OPEN BICEP TENODESIS REPAIR, COMBINED Right 6/2/2020    Procedure: Right shoulder diagnostic arthroscopy, capsular release,  open biceps tenodesis;  Surgeon: Dulce Maria Burdick MD;  Location: [facility] OR     AS DRAIN PILONIDAL CYST SIMPLE       BACK SURGERY  1997    spinal fusion     CHOLECYSTECTOMY  2012     CYSTECTOMY PILONIDAL N/A 2/21/2020    Procedure: EXCISION, PILONIDAL CYST, AILEEN II Procedure;  Surgeon: Artemio Ahumada MD;  Location: [facility] OR     THUMB SURGERY   1995            Medications     Current Outpatient Medications   Medication     acetaminophen (TYLENOL) 325 MG tablet     alcohol swab prep pads     alfuzosin ER (UROXATRAL) 10 MG 24 hr tablet     atorvastatin (LIPITOR) 40 MG tablet     blood glucose (FREESTYLE LITE) test strip     blood glucose calibration (FREESTYLE CONTROL) solution     blood glucose monitoring (FREESTYLE FREEDOM LITE) meter device kit     blood glucose monitoring (FREESTYLE) lancets     glipiZIDE (GLUCOTROL XL) 5 MG 24 hr tablet     ibuprofen (ADVIL/MOTRIN) 200 MG capsule     lisinopril (ZESTRIL) 5 MG tablet     metFORMIN (GLUCOPHAGE) 1000 MG tablet     multivitamin, therapeutic with minerals (MULTI-VITAMIN) TABS tablet     venlafaxine (EFFEXOR-XR) 150 MG 24 hr capsule     warfarin ANTICOAGULANT (COUMADIN) 5 MG tablet     No current facility-administered medications for this visit.            Family History:     Family History   Problem Relation Age of Onset     Diabetes Mother      Depression Father      Thrombosis Father         HE HAS HAD 3 CLOTS - PER PATIENT THEY WERE UNPROVOKED ON COUMADIN      Alcoholism Sister      Depression Sister      Lupus Sister      Anesthesia Reaction No family hx of      Cardiovascular No family hx of             Social History:     Social History     Socioeconomic History     Marital status: Single     Spouse name: Not on file     Number of children: 0     Years of education: Not on file      Highest education level: Not on file   Occupational History     Occupation:       Comment: ROWING    Tobacco Use     Smoking status: Never Smoker     Smokeless tobacco: Never Used   Substance and Sexual Activity     Alcohol use: No     Comment: None     Drug use: No     Sexual activity: Not Currently     Partners: Female   Other Topics Concern     Parent/sibling w/ CABG, MI or angioplasty before 65F 55M? Not Asked   Social History Narrative     Not on file     Social Determinants of Health     Financial Resource Strain: Not on file   Food Insecurity: Not on file   Transportation Needs: Not on file   Physical Activity: Not on file   Stress: Not on file   Social Connections: Not on file   Intimate Partner Violence: Not on file   Housing Stability: Not on file            Allergies:   Patient has no known allergies.         Review of Systems:  From intake questionnaire   Negative 14 system review except as noted on HPI, nurse's note.         Physical Exam:   Patient is a 51 year old  male   Vitals: There were no vitals taken for this visit.  General Appearance Adult: Alert, no acute distress, oriented  Lungs: no respiratory distress, or pursed lip breathing  Heart: No obvious jugular venous distension present  Abdomen: soft, nontender, no organomegaly or masses, There is no height or weight on file to calculate BMI.  Musculoskeltal: extremities normal, no peripheral edema  Skin: no suspicious lesions or rashes  Neuro: Alert, oriented, speech and mentation normal  : MATTHEW anodular, symmetric, exam limited by body habitus     Uroflow and Post-Void Residual by Bladder Scan   Voided Volume: 107 ml   QMax: 12.5 ml/s  QAv.7 ml/s  PVR: 76 ml       Labs and Pathology:    I personally reviewed all applicable laboratory data and went over findings with patient  Significant for:    CBC RESULTS:  Recent Labs   Lab Test 21  1411 20  0706 20  1135 20  1557   WBC 6.8 9.2 6.8 6.1   HGB 18.0* 14.4 16.1  16.9    189 192 248        BMP RESULTS:  Recent Labs   Lab Test 03/30/21  1411 06/03/20  0706 06/02/20  1417 06/02/20  0931 05/28/20  1135    139 138  --  137   POTASSIUM 4.3 3.9 4.3 4.0 4.0   CHLORIDE 105 106 105  --  105   CO2 24 27 23  --  25   ANIONGAP 6 6 10  --  7   * 174* 184*  --  138*   BUN 16 13 17  --  18   CR 0.89 0.87 0.88  --  0.94   GFRESTIMATED >90 >90 >90  --  >90   GFRESTBLACK >90 >90 >90  --  >90   SAVI 9.3 8.3* 8.7  --  8.9       UA RESULTS:   No results for input(s): SG, URINEPH, NITRITE, RBCU, WBCU in the last 87197 hours.    PSA RESULTS  PSA   Date Value Ref Range Status   03/30/2021 0.47 0 - 4 ug/L Final     Comment:     Assay Method:  Chemiluminescence using Siemens Vista analyzer   09/11/2018 0.46 0 - 4 ug/L Final     Comment:     Assay Method:  Chemiluminescence using Siemens Vista analyzer         Imaging:    I personally reviewed all applicable imaging and went over findings with patient.  Significant for:     No recent relevant imaging     Standardized Questionnaire:      [employer] SYMPTOM SCORE  SUM 19/35  QOL 4/6           Assessment and Plan:     Assessment: 51 year old male with a history of BPH with irritative voiding symptoms of urinary frequency and nocturia.  Patient has tried Flomax in the past and is currently maintained on alfuzosin with no change in symptoms.  Uroflow indicating possible obstruction with Qmax 12.5 ml/s with voided volume 107 ml, and PVR reassuringly lower at 76 ml.  Given that the patient's symptoms are irritative in nature and he denies any obstructive symptoms, we discussed trialing treatment for OAB including pharmacologic therapy with anticholinergic medications or PFPT.  Patient wishes to trial medication and have a referral placed for PFPT.  We will start Oxybutynin ER 5 mg daily.  Reviewed benefits and adverse effects.  Patient will remain on the alfuzosin at this time given signs of obstruction on uroflow, but  could consider discontinuing in the future if oxybutynin is effective.  Patient also counseled on decreasing his caffeine intake, as this is likely contributing to his urinary frequency.  Patient will follow up in 2-3 months for recheck of symptoms.      Plan:  - Schedule follow up appointment with me in 2-3 months for symptom recheck.  This can be virtual or in person.   - Start Oxybutynin ER 5 mg once daily.   - Continue the alfuzosin for now.   - Recommend reducing your caffeine intake, as this can contribute to the urinary frequency.    - Referral placed for pelvic floor physical therapy.        DIANNE Renee  Department of Urology

## 2022-01-16 ENCOUNTER — PRE VISIT (OUTPATIENT)
Dept: UROLOGY | Facility: CLINIC | Age: 52
End: 2022-01-16
Payer: COMMERCIAL

## 2022-01-17 ENCOUNTER — OFFICE VISIT (OUTPATIENT)
Dept: UROLOGY | Facility: CLINIC | Age: 52
End: 2022-01-17
Payer: COMMERCIAL

## 2022-01-17 VITALS
DIASTOLIC BLOOD PRESSURE: 89 MMHG | WEIGHT: 295 LBS | BODY MASS INDEX: 34.13 KG/M2 | HEIGHT: 78 IN | SYSTOLIC BLOOD PRESSURE: 133 MMHG | HEART RATE: 95 BPM

## 2022-01-17 DIAGNOSIS — N40.1 BENIGN PROSTATIC HYPERPLASIA WITH URINARY FREQUENCY: Primary | ICD-10-CM

## 2022-01-17 DIAGNOSIS — R35.0 URINARY FREQUENCY: ICD-10-CM

## 2022-01-17 DIAGNOSIS — R35.0 BENIGN PROSTATIC HYPERPLASIA WITH URINARY FREQUENCY: Primary | ICD-10-CM

## 2022-01-17 PROCEDURE — 51798 US URINE CAPACITY MEASURE: CPT | Performed by: PHYSICIAN ASSISTANT

## 2022-01-17 PROCEDURE — 51741 ELECTRO-UROFLOWMETRY FIRST: CPT | Performed by: PHYSICIAN ASSISTANT

## 2022-01-17 PROCEDURE — 99213 OFFICE O/P EST LOW 20 MIN: CPT | Mod: 25 | Performed by: PHYSICIAN ASSISTANT

## 2022-01-17 RX ORDER — OXYBUTYNIN CHLORIDE 5 MG/1
5 TABLET, EXTENDED RELEASE ORAL DAILY
Qty: 30 TABLET | Refills: 3 | Status: SHIPPED | OUTPATIENT
Start: 2022-01-17 | End: 2022-03-15

## 2022-01-17 ASSESSMENT — MIFFLIN-ST. JEOR: SCORE: 2321.36

## 2022-01-17 ASSESSMENT — PAIN SCALES - GENERAL: PAINLEVEL: NO PAIN (0)

## 2022-01-17 NOTE — PATIENT INSTRUCTIONS
UROLOGY CLINIC VISIT PATIENT INSTRUCTIONS    - Schedule follow up appointment with me in 2-3 months for symptom recheck.  This can be virtual or in person.   - Start Oxybutynin ER 5 mg once daily.   - Continue the alfuzosin for now.   - Recommend reducing your caffeine intake, as this can contribute to the urinary frequency.    - Referral placed for pelvic floor physical therapy.  See below for scheduling phone number and locations.      If you have any issues, questions or concerns in the meantime, do not hesitate to contact us at [phone] or via [employer].     It was a pleasure meeting with you today.  Thank you for allowing me and my team the privilege of caring for you today.  YOU are the reason we are here, and I truly hope we provided you with the excellent service you deserve.  Please let us know if there is anything else we can do for you so that we can be sure you are leaving completely satisfied with your care experience.    Vaishnavi Rock PA-C  Department of Urology    --------------------------------------------------------------------------  Physical Therapy Referral    Please call [phone] to make an appointment     [facility] - [email or web address]  [facility] - [email or web address]    Locations:    [facility]-Orth[facility] [place]   [place] [facility] [place]   [facility] [place] [facility] [place]  [place] [facility]

## 2022-01-17 NOTE — LETTER
1/17/2022       RE: Gaurang Rivera  [address]  [facility] [id number]     Dear Colleague,    Thank you for referring your patient, Gaurang Rivera, to the [facility] [place] at [facility]. Please see a copy of my visit note below.          Chief Complaint:   Follow up         History of Present Illness:   Gaurang Rivera is a 51 year old male with a history of depression, diabetes mellitus, DVT and factor V leiden, and atrial fibrillation on chronic anticoagulation who presents for follow up of BPH.  Patient was initially seen on 10/14/2021 at which time he reported symptoms of urinary frequency and nocturia, which has been ongoing for several years.  He reports urinary frequency ~9 times during the day and nocturia of 3 times per night.  He reports some mild urgency, but denies any  incontinence.  He does report that he needs to double void occasionally, and will need to urinate ~30 minutes after his first void.  He also reports he sometimes is able to urinate more when he sits down rather than stands.  He feels he fully empties his bladder and denies any noticeable slowing of his urinary stream.  The shared decision was made to trial an alternative alpha blocker with alfuzosin, and the patient presents today for follow up of symptoms.      He reports no change in symptoms on the alfuzosin.  He continues to have urinary frequency and nocturia.  No change in urinary stream strength.  He feels he is emptying his bladder, but he is voiding frequently within 1-2 hours later.  He denies any dysuria or hematuria.  He drinks 3-4 cans of coke per day.  No alcohol use.  He reports regular bowel movements with no constipation.           Past Medical History:     Past Medical History:   Diagnosis Date     Antiplatelet or antithrombotic long-term use      Depressive disorder 2001     Diabetes (H)      DVT (deep venous thrombosis) (H)  07/05/2019     Elevated blood pressure reading without diagnosis of hypertension 5/13/2018     Hypercholesteremia 2012     Personal history of other medical treatment     blood clot foot july 2019     Pilonidal cyst 5/13/2018            Past Surgical History:     Past Surgical History:   Procedure Laterality Date     APPENDECTOMY       ARTHROSCOPY SHOULDER, OPEN BICEP TENODESIS REPAIR, COMBINED Right 6/2/2020    Procedure: Right shoulder diagnostic arthroscopy, capsular release,  open biceps tenodesis;  Surgeon: Dulce Maria Burdick MD;  Location: [facility] OR     AS DRAIN PILONIDAL CYST SIMPLE       BACK SURGERY  1997    spinal fusion     CHOLECYSTECTOMY  2012     CYSTECTOMY PILONIDAL N/A 2/21/2020    Procedure: EXCISION, PILONIDAL CYST, AILEEN II Procedure;  Surgeon: Artemio Ahumada MD;  Location: [facility] OR     THUMB SURGERY   1995            Medications     Current Outpatient Medications   Medication     acetaminophen (TYLENOL) 325 MG tablet     alcohol swab prep pads     alfuzosin ER (UROXATRAL) 10 MG 24 hr tablet     atorvastatin (LIPITOR) 40 MG tablet     blood glucose (FREESTYLE LITE) test strip     blood glucose calibration (FREESTYLE CONTROL) solution     blood glucose monitoring (FREESTYLE FREEDOM LITE) meter device kit     blood glucose monitoring (FREESTYLE) lancets     glipiZIDE (GLUCOTROL XL) 5 MG 24 hr tablet     ibuprofen (ADVIL/MOTRIN) 200 MG capsule     lisinopril (ZESTRIL) 5 MG tablet     metFORMIN (GLUCOPHAGE) 1000 MG tablet     multivitamin, therapeutic with minerals (MULTI-VITAMIN) TABS tablet     venlafaxine (EFFEXOR-XR) 150 MG 24 hr capsule     warfarin ANTICOAGULANT (COUMADIN) 5 MG tablet     No current facility-administered medications for this visit.            Family History:     Family History   Problem Relation Age of Onset     Diabetes Mother      Depression Father      Thrombosis Father         HE HAS HAD 3 CLOTS - PER PATIENT THEY WERE UNPROVOKED ON COUMADIN      Alcoholism Sister       Depression Sister      Lupus Sister      Anesthesia Reaction No family hx of      Cardiovascular No family hx of             Social History:     Social History     Socioeconomic History     Marital status: Single     Spouse name: Not on file     Number of children: 0     Years of education: Not on file     Highest education level: Not on file   Occupational History     Occupation:       Comment: ROWING    Tobacco Use     Smoking status: Never Smoker     Smokeless tobacco: Never Used   Substance and Sexual Activity     Alcohol use: No     Comment: None     Drug use: No     Sexual activity: Not Currently     Partners: Female   Other Topics Concern     Parent/sibling w/ CABG, MI or angioplasty before 65F 55M? Not Asked   Social History Narrative     Not on file     Social Determinants of Health     Financial Resource Strain: Not on file   Food Insecurity: Not on file   Transportation Needs: Not on file   Physical Activity: Not on file   Stress: Not on file   Social Connections: Not on file   Intimate Partner Violence: Not on file   Housing Stability: Not on file            Allergies:   Patient has no known allergies.         Review of Systems:  From intake questionnaire   Negative 14 system review except as noted on HPI, nurse's note.         Physical Exam:   Patient is a 51 year old  male   Vitals: There were no vitals taken for this visit.  General Appearance Adult: Alert, no acute distress, oriented  Lungs: no respiratory distress, or pursed lip breathing  Heart: No obvious jugular venous distension present  Abdomen: soft, nontender, no organomegaly or masses, There is no height or weight on file to calculate BMI.  Musculoskeltal: extremities normal, no peripheral edema  Skin: no suspicious lesions or rashes  Neuro: Alert, oriented, speech and mentation normal  : MATTHEW anodular, symmetric, exam limited by body habitus     Uroflow and Post-Void Residual by Bladder Scan   Voided Volume: 107 ml   QMax: 12.5  ml/s  QAv.7 ml/s  PVR: 76 ml       Labs and Pathology:    I personally reviewed all applicable laboratory data and went over findings with patient  Significant for:    CBC RESULTS:  Recent Labs   Lab Test 21  1411 20  0706 20  1135 20  1557   WBC 6.8 9.2 6.8 6.1   HGB 18.0* 14.4 16.1 16.9    189 192 248        BMP RESULTS:  Recent Labs   Lab Test 21  1411 20  0706 20  1417 20  0931 20  1135    139 138  --  137   POTASSIUM 4.3 3.9 4.3 4.0 4.0   CHLORIDE 105 106 105  --  105   CO2 24 27 23  --  25   ANIONGAP 6 6 10  --  7   * 174* 184*  --  138*   BUN 16 13 17  --  18   CR 0.89 0.87 0.88  --  0.94   GFRESTIMATED >90 >90 >90  --  >90   GFRESTBLACK >90 >90 >90  --  >90   SAVI 9.3 8.3* 8.7  --  8.9       UA RESULTS:   No results for input(s): SG, URINEPH, NITRITE, RBCU, WBCU in the last 11988 hours.    PSA RESULTS  PSA   Date Value Ref Range Status   2021 0.47 0 - 4 ug/L Final     Comment:     Assay Method:  Chemiluminescence using Siemens Vista analyzer   2018 0.46 0 - 4 ug/L Final     Comment:     Assay Method:  Chemiluminescence using Siemens Vista analyzer         Imaging:    I personally reviewed all applicable imaging and went over findings with patient.  Significant for:     No recent relevant imaging     Standardized Questionnaire:      [employer] SYMPTOM SCORE  SUM 19/35  QOL /           Assessment and Plan:     Assessment: 51 year old male with a history of BPH with irritative voiding symptoms of urinary frequency and nocturia.  Patient has tried Flomax in the past and is currently maintained on alfuzosin with no change in symptoms.  Uroflow indicating possible obstruction with Qmax 12.5 ml/s with voided volume 107 ml, and PVR reassuringly lower at 76 ml.  Given that the patient's symptoms are irritative in nature and he denies any obstructive symptoms, we discussed trialing treatment for OAB  including pharmacologic therapy with anticholinergic medications or PFPT.  Patient wishes to trial medication and have a referral placed for PFPT.  We will start Oxybutynin ER 5 mg daily.  Reviewed benefits and adverse effects.  Patient will remain on the alfuzosin at this time given signs of obstruction on uroflow, but could consider discontinuing in the future if oxybutynin is effective.  Patient also counseled on decreasing his caffeine intake, as this is likely contributing to his urinary frequency.  Patient will follow up in 2-3 months for recheck of symptoms.      Plan:  - Schedule follow up appointment with me in 2-3 months for symptom recheck.  This can be virtual or in person.   - Start Oxybutynin ER 5 mg once daily.   - Continue the alfuzosin for now.   - Recommend reducing your caffeine intake, as this can contribute to the urinary frequency.    - Referral placed for pelvic floor physical therapy.        DIANNE Renee  Department of Urology

## 2022-01-17 NOTE — CONFIDENTIAL NOTE
Reason for visit: follow-up for prostate exam     Relevant information: BPH with LUTS    Records/imaging/labs/orders: in epic    Pt called: n/a    At Rooming: PVR and AUA

## 2022-01-17 NOTE — NURSING NOTE
"Chief Complaint   Patient presents with     Follow Up     excessive urination and prostate exam       Blood pressure 133/89, pulse 95, height 1.981 m (6' 6\"), weight 133.8 kg (295 lb). Body mass index is 34.09 kg/m .    Patient Active Problem List   Diagnosis     Passive suicidal ideations     Diabetes mellitus, type 2 (H)     Hyperlipidemia, unspecified hyperlipidemia type     Microalbuminuria due to type 2 diabetes mellitus (H)     MDD (major depressive disorder), recurrent episode, severe (H)     Deep vein thrombosis (DVT) (H)     Erectile dysfunction     Closed displaced fracture of neck of right scapula, sequela     Pilonidal cyst     Atrial fibrillation (H)     Atrial fibrillation, unspecified type (H)     Deep vein thrombosis (DVT) of proximal lower extremity, unspecified chronicity, unspecified laterality (H)     Heterozygous factor V Leiden mutation (H)     Current use of long term anticoagulation     Acute deep vein thrombosis (DVT) of distal end of right lower extremity (H)       No Known Allergies    Current Outpatient Medications   Medication Sig Dispense Refill     acetaminophen (TYLENOL) 325 MG tablet Take 2 tablets (650 mg) by mouth 4 times daily Alternate with ibuprofen (ADVIL/MOTRIN), IF ordered. 100 tablet 0     alfuzosin ER (UROXATRAL) 10 MG 24 hr tablet Take 1 tablet (10 mg) by mouth daily 30 tablet 11     atorvastatin (LIPITOR) 40 MG tablet Take 1 tablet (40 mg) by mouth daily 90 tablet 0     glipiZIDE (GLUCOTROL XL) 5 MG 24 hr tablet TAKE 1 TABLET BY MOUTH DAILY WITH BREAKFAST 90 tablet 0     ibuprofen (ADVIL/MOTRIN) 200 MG capsule Take 600-800 mg by mouth every 8 hours as needed for fever       lisinopril (ZESTRIL) 5 MG tablet TAKE 1 TABLET(5 MG) BY MOUTH DAILY 90 tablet 1     metFORMIN (GLUCOPHAGE) 1000 MG tablet TAKE 1 TABLET(1000 MG) BY MOUTH TWICE DAILY WITH MEALS 180 tablet 0     multivitamin, therapeutic with minerals (MULTI-VITAMIN) TABS tablet Take 1 tablet by mouth daily       " venlafaxine (EFFEXOR-XR) 150 MG 24 hr capsule Take 2 capsules (300 mg) by mouth daily 180 capsule 0     warfarin ANTICOAGULANT (COUMADIN) 5 MG tablet Current (12/3/21): 10 mg every Mon + 7.5 mg all other days or as directed by ACC team. 185 tablet 1     alcohol swab prep pads Use to swab area of injection/kate as directed. 100 each 3     blood glucose (FREESTYLE LITE) test strip Use to test blood sugar one times daily. (Patient not taking: Reported on 10/14/2021) 100 strip 6     blood glucose calibration (FREESTYLE CONTROL) solution Use to calibrate blood glucose monitor as directed. (Patient not taking: Reported on 10/14/2021) 100 each 3     blood glucose monitoring (FREESTYLE FREEDOM LITE) meter device kit Use to test blood sugar one times daily. 1 kit 0     blood glucose monitoring (FREESTYLE) lancets Use to test blood sugar one times daily. 100 each 6       Social History     Tobacco Use     Smoking status: Never Smoker     Smokeless tobacco: Never Used   Substance Use Topics     Alcohol use: No     Comment: None     Drug use: No       Barb Stroud  1/17/2022  8:36 AM

## 2022-01-26 ASSESSMENT — PATIENT HEALTH QUESTIONNAIRE - PHQ9: SUM OF ALL RESPONSES TO PHQ QUESTIONS 1-9: 15

## 2022-01-27 ENCOUNTER — LAB (OUTPATIENT)
Dept: LAB | Facility: CLINIC | Age: 52
End: 2022-01-27

## 2022-01-27 ENCOUNTER — OFFICE VISIT (OUTPATIENT)
Dept: FAMILY MEDICINE | Facility: CLINIC | Age: 52
End: 2022-01-27
Payer: COMMERCIAL

## 2022-01-27 ENCOUNTER — ANTICOAGULATION THERAPY VISIT (OUTPATIENT)
Dept: ANTICOAGULATION | Facility: CLINIC | Age: 52
End: 2022-01-27

## 2022-01-27 ENCOUNTER — TELEPHONE (OUTPATIENT)
Dept: FAMILY MEDICINE | Facility: CLINIC | Age: 52
End: 2022-01-27

## 2022-01-27 VITALS
BODY MASS INDEX: 35.37 KG/M2 | TEMPERATURE: 97.2 F | WEIGHT: 306.08 LBS | DIASTOLIC BLOOD PRESSURE: 78 MMHG | HEART RATE: 102 BPM | SYSTOLIC BLOOD PRESSURE: 116 MMHG | OXYGEN SATURATION: 94 %

## 2022-01-27 DIAGNOSIS — I82.4Y9 DEEP VEIN THROMBOSIS (DVT) OF PROXIMAL LOWER EXTREMITY, UNSPECIFIED CHRONICITY, UNSPECIFIED LATERALITY (H): ICD-10-CM

## 2022-01-27 DIAGNOSIS — Z12.83 SKIN CANCER SCREENING: ICD-10-CM

## 2022-01-27 DIAGNOSIS — E11.41 DIABETIC MONONEUROPATHY ASSOCIATED WITH TYPE 2 DIABETES MELLITUS (H): ICD-10-CM

## 2022-01-27 DIAGNOSIS — D68.51 HETEROZYGOUS FACTOR V LEIDEN MUTATION (H): ICD-10-CM

## 2022-01-27 DIAGNOSIS — R80.9 MICROALBUMINURIA DUE TO TYPE 2 DIABETES MELLITUS (H): ICD-10-CM

## 2022-01-27 DIAGNOSIS — N40.0 BENIGN PROSTATIC HYPERPLASIA, UNSPECIFIED WHETHER LOWER URINARY TRACT SYMPTOMS PRESENT: ICD-10-CM

## 2022-01-27 DIAGNOSIS — Z00.00 ROUTINE HISTORY AND PHYSICAL EXAMINATION OF ADULT: ICD-10-CM

## 2022-01-27 DIAGNOSIS — I82.4Z1 ACUTE DEEP VEIN THROMBOSIS (DVT) OF DISTAL END OF RIGHT LOWER EXTREMITY (H): ICD-10-CM

## 2022-01-27 DIAGNOSIS — E78.5 HYPERLIPIDEMIA, UNSPECIFIED HYPERLIPIDEMIA TYPE: ICD-10-CM

## 2022-01-27 DIAGNOSIS — I82.409 DEEP VEIN THROMBOSIS (DVT) (H): ICD-10-CM

## 2022-01-27 DIAGNOSIS — R45.851 PASSIVE SUICIDAL IDEATIONS: ICD-10-CM

## 2022-01-27 DIAGNOSIS — E11.9 TYPE 2 DIABETES MELLITUS WITHOUT COMPLICATION, WITHOUT LONG-TERM CURRENT USE OF INSULIN (H): ICD-10-CM

## 2022-01-27 DIAGNOSIS — Z79.01 CURRENT USE OF LONG TERM ANTICOAGULATION: ICD-10-CM

## 2022-01-27 DIAGNOSIS — R79.89 ELEVATED LFTS: Primary | ICD-10-CM

## 2022-01-27 DIAGNOSIS — Z12.11 COLON CANCER SCREENING: ICD-10-CM

## 2022-01-27 DIAGNOSIS — I48.91 ATRIAL FIBRILLATION, UNSPECIFIED TYPE (H): ICD-10-CM

## 2022-01-27 DIAGNOSIS — F33.1 MODERATE EPISODE OF RECURRENT MAJOR DEPRESSIVE DISORDER (H): ICD-10-CM

## 2022-01-27 DIAGNOSIS — E66.01 MORBID OBESITY (H): ICD-10-CM

## 2022-01-27 DIAGNOSIS — L91.8 SKIN TAG: ICD-10-CM

## 2022-01-27 DIAGNOSIS — I48.91 ATRIAL FIBRILLATION, UNSPECIFIED TYPE (H): Primary | ICD-10-CM

## 2022-01-27 DIAGNOSIS — E11.29 MICROALBUMINURIA DUE TO TYPE 2 DIABETES MELLITUS (H): ICD-10-CM

## 2022-01-27 LAB
ALBUMIN SERPL-MCNC: 4.2 G/DL (ref 3.4–5)
ALP SERPL-CCNC: 81 U/L (ref 40–150)
ALT SERPL W P-5'-P-CCNC: 72 U/L (ref 0–70)
ANION GAP SERPL CALCULATED.3IONS-SCNC: 6 MMOL/L (ref 3–14)
AST SERPL W P-5'-P-CCNC: 35 U/L (ref 0–45)
BILIRUB SERPL-MCNC: 0.8 MG/DL (ref 0.2–1.3)
BUN SERPL-MCNC: 15 MG/DL (ref 7–30)
CALCIUM SERPL-MCNC: 9.3 MG/DL (ref 8.5–10.1)
CHLORIDE BLD-SCNC: 103 MMOL/L (ref 94–109)
CO2 SERPL-SCNC: 27 MMOL/L (ref 20–32)
CREAT SERPL-MCNC: 0.98 MG/DL (ref 0.66–1.25)
ERYTHROCYTE [DISTWIDTH] IN BLOOD BY AUTOMATED COUNT: 12.2 % (ref 10–15)
GFR SERPL CREATININE-BSD FRML MDRD: >90 ML/MIN/1.73M2
GLUCOSE BLD-MCNC: 291 MG/DL (ref 70–99)
HBA1C MFR BLD: 8.7 % (ref 0–5.6)
HCT VFR BLD AUTO: 49.9 % (ref 40–53)
HGB BLD-MCNC: 17.3 G/DL (ref 13.3–17.7)
INR BLD: 4.3 (ref 0.9–1.1)
MCH RBC QN AUTO: 28.9 PG (ref 26.5–33)
MCHC RBC AUTO-ENTMCNC: 34.7 G/DL (ref 31.5–36.5)
MCV RBC AUTO: 83 FL (ref 78–100)
PLATELET # BLD AUTO: 207 10E3/UL (ref 150–450)
POTASSIUM BLD-SCNC: 4.2 MMOL/L (ref 3.4–5.3)
PROT SERPL-MCNC: 7.9 G/DL (ref 6.8–8.8)
RBC # BLD AUTO: 5.98 10E6/UL (ref 4.4–5.9)
SODIUM SERPL-SCNC: 136 MMOL/L (ref 133–144)
TSH SERPL DL<=0.005 MIU/L-ACNC: 1.29 MU/L (ref 0.4–4)
WBC # BLD AUTO: 5.3 10E3/UL (ref 4–11)

## 2022-01-27 PROCEDURE — 83036 HEMOGLOBIN GLYCOSYLATED A1C: CPT | Performed by: FAMILY MEDICINE

## 2022-01-27 PROCEDURE — 36416 COLLJ CAPILLARY BLOOD SPEC: CPT

## 2022-01-27 PROCEDURE — 99214 OFFICE O/P EST MOD 30 MIN: CPT | Mod: 25 | Performed by: FAMILY MEDICINE

## 2022-01-27 PROCEDURE — 80053 COMPREHEN METABOLIC PANEL: CPT | Performed by: FAMILY MEDICINE

## 2022-01-27 PROCEDURE — 85027 COMPLETE CBC AUTOMATED: CPT | Performed by: FAMILY MEDICINE

## 2022-01-27 PROCEDURE — 90471 IMMUNIZATION ADMIN: CPT | Performed by: FAMILY MEDICINE

## 2022-01-27 PROCEDURE — 36415 COLL VENOUS BLD VENIPUNCTURE: CPT | Performed by: FAMILY MEDICINE

## 2022-01-27 PROCEDURE — 84443 ASSAY THYROID STIM HORMONE: CPT | Performed by: FAMILY MEDICINE

## 2022-01-27 PROCEDURE — 90750 HZV VACC RECOMBINANT IM: CPT | Performed by: FAMILY MEDICINE

## 2022-01-27 PROCEDURE — 99396 PREV VISIT EST AGE 40-64: CPT | Mod: 25 | Performed by: FAMILY MEDICINE

## 2022-01-27 PROCEDURE — 85610 PROTHROMBIN TIME: CPT

## 2022-01-27 PROCEDURE — 82306 VITAMIN D 25 HYDROXY: CPT | Performed by: FAMILY MEDICINE

## 2022-01-27 PROCEDURE — 90472 IMMUNIZATION ADMIN EACH ADD: CPT | Performed by: FAMILY MEDICINE

## 2022-01-27 PROCEDURE — 90746 HEPB VACCINE 3 DOSE ADULT IM: CPT | Performed by: FAMILY MEDICINE

## 2022-01-27 RX ORDER — FLASH GLUCOSE SENSOR
1 KIT MISCELLANEOUS
Qty: 2 EACH | Refills: 5 | Status: SHIPPED | OUTPATIENT
Start: 2022-01-27 | End: 2022-07-19

## 2022-01-27 RX ORDER — LISINOPRIL 5 MG/1
TABLET ORAL
Qty: 90 TABLET | Refills: 1 | Status: SHIPPED | OUTPATIENT
Start: 2022-01-27 | End: 2022-07-25

## 2022-01-27 RX ORDER — GLIPIZIDE 10 MG/1
10 TABLET, FILM COATED, EXTENDED RELEASE ORAL DAILY
Qty: 90 TABLET | Refills: 1 | Status: SHIPPED | OUTPATIENT
Start: 2022-01-27 | End: 2022-02-01 | Stop reason: DRUGHIGH

## 2022-01-27 RX ORDER — FLASH GLUCOSE SCANNING READER
1 EACH MISCELLANEOUS ONCE
Qty: 1 EACH | Refills: 0 | Status: SHIPPED | OUTPATIENT
Start: 2022-01-27 | End: 2022-01-27

## 2022-01-27 RX ORDER — VENLAFAXINE HYDROCHLORIDE 150 MG/1
300 CAPSULE, EXTENDED RELEASE ORAL DAILY
Qty: 180 CAPSULE | Refills: 1 | Status: SHIPPED | OUTPATIENT
Start: 2022-01-27 | End: 2022-10-20

## 2022-01-27 ASSESSMENT — ENCOUNTER SYMPTOMS
WEAKNESS: 0
HEMATOCHEZIA: 0
NERVOUS/ANXIOUS: 0
NAUSEA: 0
HEMATURIA: 0
SORE THROAT: 0
ARTHRALGIAS: 1
ABDOMINAL PAIN: 0
HEADACHES: 0
EYE PAIN: 0
DIZZINESS: 0
CHILLS: 0
MYALGIAS: 0
FEVER: 0
JOINT SWELLING: 0
PALPITATIONS: 0
COUGH: 0
HEARTBURN: 0
DIARRHEA: 0
SHORTNESS OF BREATH: 0
FREQUENCY: 1
DYSURIA: 0
PARESTHESIAS: 0
CONSTIPATION: 0

## 2022-01-27 ASSESSMENT — PATIENT HEALTH QUESTIONNAIRE - PHQ9
10. IF YOU CHECKED OFF ANY PROBLEMS, HOW DIFFICULT HAVE THESE PROBLEMS MADE IT FOR YOU TO DO YOUR WORK, TAKE CARE OF THINGS AT HOME, OR GET ALONG WITH OTHER PEOPLE: VERY DIFFICULT
SUM OF ALL RESPONSES TO PHQ QUESTIONS 1-9: 15
SUM OF ALL RESPONSES TO PHQ QUESTIONS 1-9: 15

## 2022-01-27 NOTE — PROGRESS NOTES
Prior to immunization administration, verified patients identity using patient s name and date of birth. Please see Immunization Activity for additional information.     Screening Questionnaire for Adult Immunization    Are you sick today?   No   Do you have allergies to medications, food, a vaccine component or latex?   No   Have you ever had a serious reaction after receiving a vaccination?   No   Do you have a long-term health problem with heart, lung, kidney, or metabolic disease (e.g., diabetes), asthma, a blood disorder, no spleen, complement component deficiency, a cochlear implant, or a spinal fluid leak?  Are you on long-term aspirin therapy?   No   Do you have cancer, leukemia, HIV/AIDS, or any other immune system problem?   No   Do you have a parent, brother, or sister with an immune system problem?   No   In the past 3 months, have you taken medications that affect  your immune system, such as prednisone, other steroids, or anticancer drugs; drugs for the treatment of rheumatoid arthritis, Crohn s disease, or psoriasis; or have you had radiation treatments?   No   Have you had a seizure, or a brain or other nervous system problem?   No   During the past year, have you received a transfusion of blood or blood    products, or been given immune (gamma) globulin or antiviral drug?   No   For women: Are you pregnant or is there a chance you could become       pregnant during the next month?   No   Have you received any vaccinations in the past 4 weeks?   No     Immunization questionnaire answers were all negative.        Per orders of Dr. Guzman, injection of Hep-B and Shingrix  given by Monserrat Crabtree MA. Patient instructed to remain in clinic for 15 minutes afterwards, and to report any adverse reaction to me immediately.       Screening performed by Monserrat Crabtree MA on 1/27/2022 at 12:22 PM.

## 2022-01-27 NOTE — TELEPHONE ENCOUNTER
Faxed request of [facility] eye [facility] and fax# [phone] and place form in [employer] in [place] [facility]  Taiwo Gallagher MA on 1/27/2022 at 2:18 PM

## 2022-01-27 NOTE — PROGRESS NOTES
SUBJECTIVE:   CC: Gaurang Rivera is an 51 year old male who presents for preventative health visit.     {  Patient has been advised of split billing requirements and indicates understanding: Yes  Healthy Habits:     Getting at least 3 servings of Calcium per day:  Yes    Bi-annual eye exam:  NO    Dental care twice a year:  NO    Sleep apnea or symptoms of sleep apnea:  None    Diet:  Regular (no restrictions)    Frequency of exercise:  1 day/week    Duration of exercise:  Less than 15 minutes    Taking medications regularly:  Yes    Medication side effects:  None    PHQ-2 Total Score: 5    Additional concerns today:  Yes  diabetes - he wants continuous glucometer. He feels some intermittent neuropathy in his feet. He saw an eye doctor but needs to go back in. He is more motivated to get his sugars under better control.   A.fib - no symptoms   Skin tag around the right eye.   He is seeing psychologist once week. He is no longer seeing psychiatrist. He doesn't work as much in the winter and mood a little low. He has chronic passive SI, no active thoughts.     Today's PHQ-2 Score:   PHQ-2 ( 1999 Pfizer) 1/27/2022   Q1: Little interest or pleasure in doing things 3   Q2: Feeling down, depressed or hopeless 2   PHQ-2 Score 5   PHQ-2 Total Score (12-17 Years)- Positive if 3 or more points; Administer PHQ-A if positive -   Q1: Little interest or pleasure in doing things Nearly every day   Q2: Feeling down, depressed or hopeless More than half the days   PHQ-2 Score 5       Abuse: Current or Past(Physical, Sexual or Emotional)- NO  Do you feel safe in your environment? Yes        Social History     Tobacco Use     Smoking status: Never Smoker     Smokeless tobacco: Never Used   Substance Use Topics     Alcohol use: No     Comment: None     If you drink alcohol do you typically have >3 drinks per day or >7 drinks per week? No    Alcohol Use 1/27/2022   Prescreen: >3 drinks/day or >7 drinks/week? Not Applicable   Prescreen:  >3 drinks/day or >7 drinks/week? -       Last PSA:   PSA   Date Value Ref Range Status   03/30/2021 0.47 0 - 4 ug/L Final     Comment:     Assay Method:  Chemiluminescence using Siemens Vista analyzer       Reviewed orders with patient. Reviewed health maintenance and updated orders accordingly - Yes      Reviewed and updated as needed this visit by clinical staff                Reviewed and updated as needed this visit by Provider                 Review of Systems   Constitutional: Negative for chills and fever.   HENT: Negative for congestion, ear pain, hearing loss and sore throat.    Eyes: Negative for pain and visual disturbance.   Respiratory: Negative for cough and shortness of breath.    Cardiovascular: Negative for chest pain, palpitations and peripheral edema.   Gastrointestinal: Negative for abdominal pain, constipation, diarrhea, heartburn, hematochezia and nausea.   Genitourinary: Positive for frequency and impotence. Negative for dysuria, genital sores, hematuria, penile discharge and urgency.   Musculoskeletal: Positive for arthralgias. Negative for joint swelling and myalgias.   Skin: Negative for rash.   Neurological: Negative for dizziness, weakness, headaches and paresthesias.   Psychiatric/Behavioral: Negative for mood changes. The patient is not nervous/anxious.          OBJECTIVE:   /78 (BP Location: Right arm, Patient Position: Sitting, Cuff Size: Adult Large)   Pulse 102   Temp 97.2  F (36.2  C) (Temporal)   Wt 138.8 kg (306 lb 1.3 oz)   SpO2 94%   BMI 35.37 kg/m    Physical Exam  GENERAL: healthy, alert and no distress  EYES: Eyes grossly normal to inspection, conjunctivae and sclerae normal  HENT: ear canals and TM's normal  NECK: no adenopathy, no asymmetry, masses, or scars and thyroid normal to palpation  RESP: lungs clear to auscultation - no rales, rhonchi or wheezes  CV: regular rate and rhythm, normal S1 S2  ABDOMEN: soft, nontender, no hepatosplenomegaly, no masses and  bowel sounds normal  MS: no gross musculoskeletal defects noted, no edema  SKIN: no suspicious lesions or rashes  NEURO: Normal strength and tone, mentation intact and speech normal  PSYCH: mentation appears normal, affect normal/bright  Diabetic foot exam: normal DP and PT pulses, no trophic changes or ulcerative lesions and normal sensory exam    Diagnostic Test Results:  Labs reviewed in Epic    ASSESSMENT/PLAN:     Routine history and physical examination of adult    Microalbuminuria due to type 2 diabetes mellitus (H)  - lisinopril (ZESTRIL) 5 MG tablet; TAKE 1 TABLET(5 MG) BY MOUTH DAILY  Dispense: 90 tablet; Refill: 1    Type 2 diabetes mellitus without complication, without long-term current use of insulin   Hemoglobin A1C POCT   Date Value Ref Range Status   03/30/2021 9.5 (H) 0 - 5.6 % Final     Comment:     Results confirmed by repeat test  Normal <5.7% Prediabetes 5.7-6.4%  Diabetes 6.5% or higher - adopted from ADA   consensus guidelines.       Hemoglobin A1C   Date Value Ref Range Status   01/27/2022 8.7 (H) 0.0 - 5.6 % Final     Comment:     Normal <5.7%   Prediabetes 5.7-6.4%    Diabetes 6.5% or higher     Note: Adopted from ADA consensus guidelines.   - not controlled  - increased glipizide XL from 5 mg daily to 10 mg daily   - Continuous Blood Gluc  (FREESTYLE JAVI 14 DAY READER) VIN; 1 each once for 1 dose Use to read blood sugars as per 's instructions.  Dispense: 1 each; Refill: 0  - Continuous Blood Gluc Sensor (FREESTYLE JAVI 14 DAY SENSOR) MISC; 1 each every 14 days Change every 14 days.  Dispense: 2 each; Refill: 5  - metFORMIN (GLUCOPHAGE) 1000 MG tablet; TAKE 1 TABLET(1000 MG) BY MOUTH TWICE DAILY WITH MEALS  Dispense: 180 tablet; Refill: 1  - CBC with platelets; Future  - Comprehensive metabolic panel (BMP + Alb, Alk Phos, ALT, AST, Total. Bili, TP); Future  - TSH with free T4 reflex; Future  - Albumin Random Urine Quantitative with Creat Ratio; Future  - Hemoglobin  A1c; Future  - CBC with platelets  - Comprehensive metabolic panel (BMP + Alb, Alk Phos, ALT, AST, Total. Bili, TP)  - TSH with free T4 reflex  - Albumin Random Urine Quantitative with Creat Ratio  - Hemoglobin A1c  - will schedule eye exam and sign LEIF    - Addendum - Your blood sugars have improved but are still elevated. I increased your Glipizide XL from 5 mg daily to 10 mg daily. I also placed the referral for [facility] ([facility] pharmacist).      Rest of your labs are stable.     Please call or message me if you have questions or concerns.     Diabetic mononeuropathy associated with type 2 diabetes mellitus (H)    Atrial fibrillation  Atrial fibrillation - on anticoagulation, Per cardiology note   . Atrial fibrillation, unspecified type (H)  - per cardiology note -   Post-operative paroxysmal atrial fibrillation on 6/2/2020: Patient was found to be in atrial fibrillation after shoulder surgery on 6/2/2020 which spontaneously cardioverted to sinus rhythm.  He has been on rate control with metoprolol 12.5 mg twice daily since then.  Patient denies recurrent palpitations.  Patient has no prior history of atrial fibrillation.  Denies sleep apnea symptoms. Blood pressure and diabetes are well controlled.  I think at this point I do not see a very strong indication to continue metoprolol.  Therefore I recommended to discontinue metoprolol for now unless patient has recurrent symptoms in the future.  Patient will continue warfarin for DVT prophylaxis purposes    Moderate episode of recurrent major depressive disorder   - chronic passive SI, no active thoughts  - agreeable to psychiatrist referral, referral placed  - venlafaxine (EFFEXOR-XR) 150 MG 24 hr capsule; Take 2 capsules (300 mg) by mouth daily  Dispense: 180 capsule; Refill: 1  - Vitamin D Deficiency; Future  - Adult Mental Health  Referral; Future  - Vitamin D Deficiency    Morbid obesity  - Discussed healthy diet and exercise.     Hyperlipidemia,  "unspecified hyperlipidemia type    Skin tag, Skin cancer screening  - Adult Dermatology Referral; Future    Colon cancer screening  - MAH[facility])    Benign prostatic hyperplasia, unspecified whether lower urinary tract symptoms present  - followed by urology, recommended f/u April/May 2022      Patient has been advised of split billing requirements and indicates understanding: Yes    COUNSELING:   Reviewed preventive health counseling, as reflected in patient instructions    Estimated body mass index is 34.09 kg/m  as calculated from the following:    Height as of 1/17/22: 1.981 m (6' 6\").    Weight as of 1/17/22: 133.8 kg (295 lb).     Weight management plan: Discussed healthy diet and exercise guidelines    He reports that he has never smoked. He has never used smokeless tobacco.      Counseling Resources:  ATP IV Guidelines  Pooled Cohorts Equation Calculator  FRAX Risk Assessment  ICSI Preventive Guidelines  Dietary Guidelines for Americans, 2010  USDA's MyPlate  ASA Prophylaxis  Lung CA Screening    Elvis Guzman MD  [facility]  "

## 2022-01-27 NOTE — PROGRESS NOTES
ANTICOAGULATION MANAGEMENT     Gaurang Rivera 51 year old male is on warfarin with supratherapeutic INR result. (Goal INR 2.0-3.0)    Recent labs: (last 7 days)     01/27/22  1044   INR 4.3*       ASSESSMENT     Source(s): Chart Review and Patient/Caregiver Call       Warfarin doses taken: Warfarin taken as instructed    Diet: No new diet changes identified    New illness, injury, or hospitalization: No    Medication/supplement changes: Has taken tylenol a few times over the past week for tooth pain    Signs or symptoms of bleeding or clotting: No    Previous INR: Supratherapeutic    Additional findings: Second supra INR in a row. I imagine more supra than it would be since hes had recent tylenol use. Because of this smaller MD adjustment made     PLAN     Recommended plan for ongoing change(s) affecting INR     Dosing Instructions: Hold dose then Decrease your warfarin dose (4.5% change) with next INR in 10 days       Summary  As of 1/27/2022    Full warfarin instructions:  1/27: Hold; Otherwise 7.5 mg every day   Next INR check:  2/7/2022             Telephone call with Gaurang who verbalizes understanding and agrees to plan    Lab visit scheduled    Education provided: Please call back if any changes to your diet, medications or how you've been taking warfarin    Plan made per [facility] anticoagulation protocol    Harshil Salamanca RN  Anticoagulation Clinic  1/27/2022    _______________________________________________________________________     Anticoagulation Episode Summary     Current INR goal:  2.0-3.0   TTR:  62.1 % (1 y)   Target end date:  Indefinite   Send INR reminders to:  [facility]    Indications    Atrial fibrillation  unspecified type (H) [I48.91]  Heterozygous factor V Leiden mutation (H) [D68.51]  Current use of long term anticoagulation [Z79.01]  Acute deep vein thrombosis (DVT) of distal end of right lower extremity (H) [I82.4Z1]           Comments:  MTV 30 mcg.          Anticoagulation Care Providers     Provider Role Specialty Phone number    Elvis Guzman MD Referring Family Medicine [phone]

## 2022-01-28 LAB — DEPRECATED CALCIDIOL+CALCIFEROL SERPL-MC: 27 UG/L (ref 20–75)

## 2022-01-31 NOTE — PROGRESS NOTES
Medication Therapy Management (MTM) Encounter    ASSESSMENT:                            Medication Adherence/Access: No issues identified      Type 2 Diabetes: Patient is not meeting A1c goal of < 7%. Self monitoring of blood glucose is not at goal of fasting  mg/dL and post prandial < 150 mg/dL. Patient is not meeting average glucose goal of <150mg/dl  Also NOT meeting >70% time in target range with bebo CGM. Patient would benefit from ideal SMBG: Check blood sugars pre-prandial, 2 hours post-prandial, and with symptoms of hypoglycemia, Metformin :  stay on the same dose., Sulfonylurea (Glipizide 10 Er tab switched 5mg. IR tablet) :  decrease dose to 5mg. IR tab 2 x day , GLP-1 agonist (Bydureon bcise ) :  Start at dose : one injection weekly , Increased exercise-walk dog daily , Increase in home glucose monitoring-scan blood sugars before all meals/snacks and 2 hours after  and weight loss recommended thru low-carb + intermittent fasting lifestyle plan .       Hyperlipidemia: Stable.  Patient is on moderate intensity statin which is indicated based on 2019 ACC/AHA guidelines for lipid management.        Hypertension: Stable. Patient is meeting blood pressure goal of < 140/90mmHg. Patient would benefit from the following changes - continued blood pressure monitoring and watching diet, increasing exercise and weight loss.    Vitamin D3: is not at goal of 50-75ng/mL. Pt would benefit from initiation of OTC vitamin D3 -5K IU daily.    Depression: Patient would benefit from starting Bupropion --see plan for details.       BPH/Urinary incontinence : slight improvement --working with urology with 2 meds.       DVT: INR out of range --drug interaction cause ? Consider ppi interaction, recheck INR soon.     PLAN:                            1. FYI--Lets ADD -Bupropion 150mg. ER tablet --1 daily with first meal of the day --this will help pep you up mentally -more energy etc. Combined with your venlafaxine for your  depression.     2. FYI--Lets ADD Bydureon B-cise once weekly injection -this medication helps you feel fullness quicker when eating and also helps lower your blood sugars , nausea can be a side effect --let me know if that is an issue on this drug.  Also - lets stop your glipizide 10mg Er tablet and Start Glipizide 5mg. Tablet at first main meal of the day and last main meal of the day , stay on Metformin twice daily as is .  Try to scan your blood sugars before all meals and snacks and 2 hours after --Goal blood sugar in Am fasting is  , Goal 2 hours post meal is <150.  Work on adjusting diet --less carbs in diet as best you can and try some intermittent fasting - strive to eat 2 main meals /day in a daily 8 hour eat window , try not to snack , drink water, coffee, tea and for now diet coke to fill you up, strive to walk your dog for 30 minutes - 7 days /week.     3. Start taking a daily Vitamin-D 5,000 unit pill once daily with a meal --will help improve your mood and strengthen your immune system.    4. Have IN rechecked soon last result was too high at 4.3 (Goal is 2-3 range) -your Omeprazole stomach drug can alter your INR --stay on it daily for now.          Follow-up: Return in about 3 weeks (around 2/22/2022), or 9 AM via secure Video, for Blood sugar meter review, Medication Therapy Management Visit, Depression.    SUBJECTIVE/OBJECTIVE:                          Gaurang Rivera is a 51 year old male contacted via secure video for an initial visit. He was referred to me from Elvis Guzman  .       Reason for visit:      Reason for Referral:  uncontrolled diabetes              Allergies/ADRs: Reviewed in chart  Past Medical History: Reviewed in chart  Tobacco: He reports that he has never smoked. He has never used smokeless tobacco.  Alcohol: not currently using  Caffeine: diet coke - 4 cans/day or so   Social:   rowing at [facility]. Rowing club.   Personal Healthcare Goals:  DM at goal is #1 issue, food  is his drug -addicted to it , sleep is poor -tired , craves caffeine /sugar . Wants to exercise more.   Activity: winter months - sedentary , summer months - more active walking to [employer] , [employer] gym membership close to his house --infrequent use.       Medication Adherence/Access: no issues reported      Type 2 Diabetes:  Currently taking : Glipizide 10mg er tab qam , metformin 1 gm bid pc,  Patient is not experiencing side effects.  Blood sugar monitoring: Continuous Glucose Monitor-checks 2 x day . Ranges (patient reported): Fasting- 220 today can be > 300.   Post-Prandial-  250-300.   Symptoms of low blood sugar? none  Symptoms of high blood sugar? none  Eye exam: up to date  Foot exam: mild tingling in feet.   Diet/Exercise:     Eating has increased and exercise decreased in pandemic --now very concerned.    Taking  steps to decrease your blood sugars : diet and exercise changes --has a dog --tries to walk a few minutes after each meal or walks her to dog park, trying to eat less as well.   Eats FF all the time lives in appt.   Has stopped last week eating FF at [employer], eat a little less portions and at healthier.     bfast - yogurt + cereal honey nut cherrios   Mid am snacks= not usually  Lunch --FF large meal - 2 mcdonalds sandwiches + filet o fish  + shake + FF but quit those now , diet coke   Mid afternoon snacks= not much maybe some nuts   Dinner = home - usually sandwiches, cheese/crackers he doesn't cook, microwave dinner   Late night snacks= in the past another bad meal of chips /cookies, he is now trying to cut out those snacks , he did have triscuits /salami /cheese/ fig newtons dinner then no late night snack last night .       Aspirin: Not taking due to warfarin   Statin: Yes: Atorvastatin 40   ACEi/ARB: Yes: Lisinopril 5mg.    Urine Albumin:   Lab Results   Component Value Date    UMALCR 61.11 (H) 03/30/2021      Lab Results   Component Value Date    A1C 8.7 01/27/2022    A1C 9.5 03/30/2021     A1C 7.2 10/10/2019    A1C 7.1 06/28/2019    A1C 6.5 09/26/2018       Hyperlipidemia: Current therapy includes : Atorvastatin 40mg daily.  Patient reports no significant myalgias or other side effects.  The 10-year ASCVD risk score (Cindy FITZPATRICK Jr., et al., 2013) is: 13.5%    Values used to calculate the score:      Age: 51 years      Sex: Male      Is Non- : No      Diabetic: Yes      Tobacco smoker: No      Systolic Blood Pressure: 116 mmHg      Is BP treated: Yes      HDL Cholesterol: 35 mg/dL      Total Cholesterol: 235 mg/dL  Recent Labs   Lab Test 03/30/21  1411 10/10/19  0959   CHOL 235* 205*   HDL 35* 36*   LDL Cannot estimate LDL when triglyceride exceeds 400 mg/dL Cannot estimate LDL when triglyceride exceeds 400 mg/dL  109*   TRIG 656* 401*       Hypertension: Current medications include : Lisinopril 5mg. Day .  Patient does not self-monitor blood pressure.  Patient reports no current medication side effects.  BP Readings from Last 3 Encounters:   01/27/22 116/78   01/17/22 133/89   03/30/21 124/88       BPH/Incontinence :  Urologist has him on alflusozin 10mg qday , on oxybutynin 5mg er tab qday(added 2 weeks ago)  -- he urinates 9 x day , 3 x during night .       Depression:  Current medications include: Venlafaxine [id number] once daily. Pt reports that depression symptoms are unchanged. Current depression symptoms include: very low energy , withdrawal from world, passive suicidal ideations --long standing most of his adult life. . Patient reports the following stressors: chronic health condition-knee and back problems  and leads a lonely life --has a dog and cat that help. Therapies tried and response: Bupropion  -it helped in the past --has had 25 + years depression .   PHQ-9 SCORE 10/14/2021 1/26/2022 1/27/2022   PHQ-9 Total Score MyChart - - 15 (Moderately severe depression)   PHQ-9 Total Score 17 15 15       Does have a therapist --[facility]il[facility] begin -- it's helpful 1 x week on zoom.      He has a referral to see psych md  In 2022.       DVT:   2 years ago had blood clot in his foot , has family hx blood clots.  Takes warfarin 7.5mg tues-sunday ,monday 10mg./day .  Keeps greens in diet steady --not sure why INR off last week. No missed doses.     INR   Date Value Ref Range Status   01/27/2022 4.3 (H) 0.9 - 1.1 Final   06/25/2021 3.00 (H) 0.86 - 1.14 Final     Comment:     This test is intended for monitoring Coumadin therapy.  Results are not   accurate in patients with prolonged INR due to factor deficiency.          Vitamin D3: level was 27 on 1-27-22. Has not yet started any daily drug.                 I spent 60 minutes with this patient today. All changes were made via collaborative practice agreement with Elvis Guzman MD. A copy of the visit note was provided to the patient's provider(s).    The patient was sent via [employer] a summary of these recommendations.     Adair Waite [facility].  Medication Therapy Management Provider  [phone]      Telemedicine Visit Details  Type of service:  Video Conference via [employer] (formerly [employer])  Start Time: 1:00 PM  End Time: 2:00 PM  Originating Location (patient location): Home  Distant Location (provider location):  [facility]     Medication Therapy Recommendations  Diabetes mellitus, type 2 (H)    Current Medication: exenatide ER (BYDUREON BCISE) 2 MG/0.85ML auto-injector   Rationale: Synergistic therapy - Needs additional medication therapy - Indication   Recommendation: Start Medication   Status: Accepted per CPA          Current Medication: glipiZIDE (GLUCOTROL XL) 10 MG 24 hr tablet (Discontinued)   Rationale: Dose too high - Dosage too high - Safety   Recommendation: Decrease Dose - glipiZIDE 5 MG tablet - 1 tab twice daily.   Status: Accepted per CPA         MDD (major depressive disorder), recurrent episode, severe (H)    Current Medication: buPROPion (WELLBUTRIN XL) 150 MG 24 hr tablet   Rationale: Synergistic therapy -  Needs additional medication therapy - Indication   Recommendation: Start Medication   Status: Accepted per CPA         Vitamin D deficiency    Current Medication: Cholecalciferol (VITAMIN D-3) 125 MCG (5000 UT) TABS   Rationale: Untreated condition - Needs additional medication therapy - Indication   Recommendation: Start Medication   Status: Accepted - no CPA Needed

## 2022-02-01 ENCOUNTER — VIRTUAL VISIT (OUTPATIENT)
Dept: PHARMACY | Facility: CLINIC | Age: 52
End: 2022-02-01
Attending: FAMILY MEDICINE
Payer: COMMERCIAL

## 2022-02-01 DIAGNOSIS — F33.2 SEVERE EPISODE OF RECURRENT MAJOR DEPRESSIVE DISORDER, WITHOUT PSYCHOTIC FEATURES (H): ICD-10-CM

## 2022-02-01 DIAGNOSIS — I10 ESSENTIAL HYPERTENSION: ICD-10-CM

## 2022-02-01 DIAGNOSIS — E78.5 HYPERLIPIDEMIA, UNSPECIFIED HYPERLIPIDEMIA TYPE: ICD-10-CM

## 2022-02-01 DIAGNOSIS — I82.409 DEEP VEIN THROMBOSIS (DVT) (H): ICD-10-CM

## 2022-02-01 DIAGNOSIS — E55.9 VITAMIN D DEFICIENCY: ICD-10-CM

## 2022-02-01 DIAGNOSIS — N40.1 BENIGN PROSTATIC HYPERPLASIA WITH LOWER URINARY TRACT SYMPTOMS, SYMPTOM DETAILS UNSPECIFIED: ICD-10-CM

## 2022-02-01 DIAGNOSIS — E11.9 TYPE 2 DIABETES MELLITUS WITHOUT COMPLICATION, WITHOUT LONG-TERM CURRENT USE OF INSULIN (H): Primary | ICD-10-CM

## 2022-02-01 DIAGNOSIS — R32 URINARY INCONTINENCE, UNSPECIFIED TYPE: ICD-10-CM

## 2022-02-01 PROCEDURE — 99607 MTMS BY PHARM ADDL 15 MIN: CPT | Performed by: PHARMACIST

## 2022-02-01 PROCEDURE — 99605 MTMS BY PHARM NP 15 MIN: CPT | Performed by: PHARMACIST

## 2022-02-01 RX ORDER — OMEPRAZOLE 20 MG/1
20 TABLET, DELAYED RELEASE ORAL DAILY PRN
COMMUNITY
End: 2024-09-03

## 2022-02-01 RX ORDER — EXENATIDE 2 MG/.85ML
2 INJECTION, SUSPENSION, EXTENDED RELEASE SUBCUTANEOUS
Qty: 3.4 ML | Refills: 5 | Status: SHIPPED | OUTPATIENT
Start: 2022-02-01 | End: 2022-09-09

## 2022-02-01 RX ORDER — BUPROPION HYDROCHLORIDE 150 MG/1
150 TABLET ORAL EVERY MORNING
Qty: 90 TABLET | Refills: 1 | Status: SHIPPED | OUTPATIENT
Start: 2022-02-01 | End: 2022-03-31 | Stop reason: DRUGHIGH

## 2022-02-01 RX ORDER — GLIPIZIDE 5 MG/1
5 TABLET ORAL 2 TIMES DAILY WITH MEALS
Qty: 60 TABLET | Refills: 3 | Status: SHIPPED | OUTPATIENT
Start: 2022-02-01 | End: 2022-05-03

## 2022-02-01 NOTE — Clinical Note
Brendaa--thx for mtm referal --we addressed all his med issue s--I added glp-1 weekly drug for bs's , added wellbutrin for depression as well as some vit-d.     3 weeks f/up with us -thx.    -Adair/nicolasa .

## 2022-02-01 NOTE — PATIENT INSTRUCTIONS
Recommendations from today's MTM visit:                                                    MTM (medication therapy management) is a service provided by a clinical pharmacist designed to help you get the most of out of your medicines.   Today we reviewed what your medicines are for, how to know if they are working, that your medicines are safe and how to make your medicine regimen as easy as possible.      1. FYI--Lets ADD -Bupropion 150mg. ER tablet --1 daily with first meal of the day --this will help pep you up mentally -more energy etc. Combined with your venlafaxine for your depression.     2. FYI--Lets ADD Bydureon B-cise once weekly injection -this medication helps you feel fullness quicker when eating and also helps lower your blood sugars , nausea can be a side effect --let me know if that is an issue on this drug.  Also - lets stop your glipizide 10mg Er tablet and Start Glipizide 5mg. Tablet at first main meal oif the day and last main meal of the day , stay on Metformin twice daily as is .  Try to scan your blood sugars before all meals and snacks and 2 hours after --Goal blood sugar in Am fasting is  , Goal 2 hours post meal is <150.  Work on adjusting diet --less carbs in diet as best you can and try some intermittent fasting - strive to eat 2 main meals /day in a daily 8 hour eat window , try not to snack , drink water, coffee, tea and for now diet coke to fill you up, strive to walk your dog for 30 minutes - 7 days /week.     3. Start taking a daily Vitamin-D 5,000 unit pill once daily with a meal --will help improve your mood and strengthen your immune system.    4. Have IN rechecked soon last result was too high at 4.3 (Goal is 2-3 range) -your Omeprazole stomach drug can alter your INR --stay on it daily for now.          Follow-up: Return in about 3 weeks (around 2/22/2022), or 9 AM via secure Video, for Blood sugar meter review, Medication Therapy Management Visit, Depression.    It was  great to speak with you today.  I value your experience and would be very thankful for your time with providing feedback on our clinic survey. You may receive a survey via email or text message in the next few days.     To schedule another MTM appointment, please call the clinic directly or you may call the MTM scheduling line at [phone] or toll-free at [phone].     My Clinical Pharmacist's contact information:                                                      Please feel free to contact me with any questions or concerns you have.      Adair Waite [facility].  Medication Therapy Management Provider  [phone]

## 2022-02-10 ENCOUNTER — NURSE TRIAGE (OUTPATIENT)
Dept: NURSING | Facility: CLINIC | Age: 52
End: 2022-02-10

## 2022-02-10 ENCOUNTER — TELEPHONE (OUTPATIENT)
Dept: FAMILY MEDICINE | Facility: CLINIC | Age: 52
End: 2022-02-10

## 2022-02-10 ENCOUNTER — ANCILLARY PROCEDURE (OUTPATIENT)
Dept: ULTRASOUND IMAGING | Facility: CLINIC | Age: 52
End: 2022-02-10
Attending: FAMILY MEDICINE
Payer: COMMERCIAL

## 2022-02-10 DIAGNOSIS — R16.0 HEPATOMEGALY: Primary | ICD-10-CM

## 2022-02-10 DIAGNOSIS — R79.89 ELEVATED LFTS: ICD-10-CM

## 2022-02-10 PROCEDURE — 76705 ECHO EXAM OF ABDOMEN: CPT | Performed by: RADIOLOGY

## 2022-02-10 NOTE — TELEPHONE ENCOUNTER
Erroneous encounter-disregard    Marlee Garcia RN, MA  [facility] Triage Nurse Advisor

## 2022-02-10 NOTE — TELEPHONE ENCOUNTER
Addendum  2/10/2022  1732    Patient calling back for US results.  Writer shared with patient Dr. Guzman's below note.      Marlee Garcia RN, [facility] Triage Nurse Advisor            Left message to call back and ask to speak with an available triage nurse.    GLADYS Mesa, BSN, RN  [facility]

## 2022-02-10 NOTE — TELEPHONE ENCOUNTER
RN, can you please inform pt that liver was enlarged and cyst was seen. No cancer seen. I would recommend avoiding any OTC tylenol and alcohol use. Referred pt to hepatology for further evaluation    IMPRESSION:  1. Severely limited visualization. Hepatomegaly. Increased  echogenicity and echotexture. 6.9 x 10.4 x 10.7 mm hypoechoic cyst in  the left lobe. Differential includes hepatitis, hepatosteatosis, and  cirrhosis.       A. LI-RADS US Category: US-1 Negative: No US evidence of HCC       B. Recommend continued surveillance US.

## 2022-02-14 ENCOUNTER — TELEPHONE (OUTPATIENT)
Dept: FAMILY MEDICINE | Facility: CLINIC | Age: 52
End: 2022-02-14
Payer: COMMERCIAL

## 2022-02-14 NOTE — TELEPHONE ENCOUNTER
ANTICOAGULATION     Gaurang Rivera is overdue for INR check.      Patient has INR scheduled for 2/15/22    Harshil Salamanca RN

## 2022-02-15 ENCOUNTER — ANTICOAGULATION THERAPY VISIT (OUTPATIENT)
Dept: ANTICOAGULATION | Facility: CLINIC | Age: 52
End: 2022-02-15

## 2022-02-15 ENCOUNTER — LAB (OUTPATIENT)
Dept: LAB | Facility: CLINIC | Age: 52
End: 2022-02-15
Payer: COMMERCIAL

## 2022-02-15 DIAGNOSIS — I82.4Y9 DEEP VEIN THROMBOSIS (DVT) OF PROXIMAL LOWER EXTREMITY, UNSPECIFIED CHRONICITY, UNSPECIFIED LATERALITY (H): ICD-10-CM

## 2022-02-15 DIAGNOSIS — I48.91 ATRIAL FIBRILLATION, UNSPECIFIED TYPE (H): Primary | ICD-10-CM

## 2022-02-15 DIAGNOSIS — I82.4Z1 ACUTE DEEP VEIN THROMBOSIS (DVT) OF DISTAL END OF RIGHT LOWER EXTREMITY (H): ICD-10-CM

## 2022-02-15 DIAGNOSIS — D68.51 HETEROZYGOUS FACTOR V LEIDEN MUTATION (H): ICD-10-CM

## 2022-02-15 DIAGNOSIS — I82.409 DEEP VEIN THROMBOSIS (DVT) (H): ICD-10-CM

## 2022-02-15 DIAGNOSIS — I48.91 ATRIAL FIBRILLATION, UNSPECIFIED TYPE (H): ICD-10-CM

## 2022-02-15 DIAGNOSIS — Z79.01 CURRENT USE OF LONG TERM ANTICOAGULATION: ICD-10-CM

## 2022-02-15 LAB
COLOGUARD-ABSTRACT: NEGATIVE
INR BLD: 2.1 (ref 0.9–1.1)

## 2022-02-15 PROCEDURE — 85610 PROTHROMBIN TIME: CPT

## 2022-02-15 PROCEDURE — 36415 COLL VENOUS BLD VENIPUNCTURE: CPT

## 2022-02-15 NOTE — PROGRESS NOTES
ANTICOAGULATION MANAGEMENT     Gaurang Rivera 51 year old male is on warfarin with therapeutic INR result. (Goal INR 2.0-3.0)    Recent labs: (last 7 days)     02/15/22  0914   INR 2.1*       ASSESSMENT     Source(s): Chart Review and Patient/Caregiver Call       Warfarin doses taken: Warfarin taken as instructed    Diet: Eating healthier, less carbs since getting continuous arm glucose monitor    New illness, injury, or hospitalization: No    Medication/supplement changes: Wellbutrin started about 1 week ago No interaction anticipated     Signs or symptoms of bleeding or clotting: No    Previous INR: Supratherapeutic    Additional findings: Increasing walking + Blood sugars have improved     PLAN     Recommended plan for ongoing change(s) affecting INR     Dosing Instructions: Continue your current warfarin dose with next INR in 2 weeks   *If next INR remains on low end of range or subtherapeutic - recommend increasing weekly dose back to 55 mg    Summary  As of 2/15/2022    Full warfarin instructions:  7.5 mg every day   Next INR check:  3/1/2022             Telephone call with Gaurang who verbalizes understanding and agrees to plan    Lab visit scheduled    Education provided: Please call back if any changes to your diet, medications or how you've been taking warfarin, Monitoring for bleeding signs and symptoms, Monitoring for clotting signs and symptoms and Importance of notifying clinic for changes in medications; a sooner lab recheck maybe needed.    Plan made per [facility] anticoagulation protocol    Cyn Womack RN  Anticoagulation Clinic  2/15/2022    _______________________________________________________________________     Anticoagulation Episode Summary     Current INR goal:  2.0-3.0   TTR:  61.1 % (1 y)   Target end date:  Indefinite   Send INR reminders to:  [facility]    Indications    Atrial fibrillation  unspecified type (H) [I48.91]  Heterozygous factor V Leiden mutation (H)  [D68.51]  Current use of long term anticoagulation [Z79.01]  Acute deep vein thrombosis (DVT) of distal end of right lower extremity (H) [I82.4Z1]           Comments:  MTV 30 mcg.         Anticoagulation Care Providers     Provider Role Specialty Phone number    Elvis Guzman MD Referring Family Medicine [phone]

## 2022-02-17 NOTE — PROGRESS NOTES
Medication Therapy Management (MTM) Encounter    ASSESSMENT:                            Medication Adherence/Access: No issues identified      Type 2 Diabetes: Patient is not meeting A1c goal of < 7%. Would likely benefit from Bydureon initiation and decrease in Glipizide dose following two low blood sugar events (2.5 mg twice daily). Diet changes and exercise have helped to decrease his blood sugars as well as awareness using his CGM almost hourly.    Hyperlipidemia: currently not at goal, would likely benefit from continued exercise and dietary changes. Currently on guideline directed statin therapy.     Vitamin D3: is not at goal of 50-75ng/mL. Pt would benefit from continuation of OTC vitamin D3 -5K IU daily.    Depression: Stable.    DVT: Stable.    PLAN:                            1. Start Bydureon, inject once weekly    2. Great job working on your diet, continue to keep working on low carb diet and exercise. Keep trying intermittent fasting.    3. Take one-half a tablet glipizide (2.5 mg total) in the morning and evening. If you start getting low sugars again (less than 70 mg/dL) once we start the Bydureon, stop the glipizide.     4. Keep scanning blood sugars frequently as you are to help with dietary changes    Follow-up: Return in about 3 weeks (around 3/17/2022), or 9:00 AM via Video+ telephone , for Medication Therapy Management Visit, Blood sugar meter review, Depression.    SUBJECTIVE/OBJECTIVE:                          Gaurang Rivera is a 51 year old male contacted via secure video for a follow up visit (2-1-22). He was referred to me from Elvis Guzman  .       Reason for visit: Medication Review, dieting update, blood sugar review,  bupropion and Bydureon follow-up      Allergies/ADRs: Reviewed in chart  Past Medical History: Reviewed in chart  Tobacco: He reports that he has never smoked. He has never used smokeless tobacco.  Alcohol: not currently using  Caffeine: diet coke - 4 cans/day or so    Social:   rowing at [facility]. Rowing club.   Personal Healthcare Goals:  DM at goal is #1 issue, food is his drug -addicted to it , sleep is poor -tired , craves caffeine /sugar . Wants to exercise more.   Activity: winter months - sedentary , summer months - more active [place] , [employer] gym membership close to his house --infrequent use.       Medication Adherence/Access: no issues reported      Type 2 Diabetes:  Currently taking : Glipizide IR 5 mg in the morning and 5 mg in the evening , metformin 1 gm bid pc,  Has not started weekly Bydureon yet.   Patient is not experiencing side effects.  Blood sugar monitoring: Herve via reader device  - Continuous Glucose Monitor-checks constantly . Ranges (patient reported): Fasting- 130 right in the morning, then 154 then 190 right before call   Ate a lean cuisine swedish meatballs meal last night and licorice  Last night 94  Symptoms of low blood sugar? Two episodes after exercising or working for a long time. 49 and 55, feeling a little light headed and funny.   Symptoms of high blood sugar? none  Eye exam: up to date  Foot exam: mild tingling in feet.   Diet/Exercise:   Not sure if he is eating better but he is eating less. Has been trying to get some exercise and walk a little more. Trying the intermittent fasting 12-8, not successful every day but has been making small steps. Exercise has been helping bring his blood sugars down. Has not started the Bydureon, he doesn't eat because he is eating emotionally rather than when he is hungry. Has done some shots before and feels comfortable with injections. Still goes to [employer] more than he should, has completely stopped getting fries when he goes though.     Stated that [facility] told him that his insides were screaming - he stated that he responded to this and it really got him to start working hard on his health.     Aspirin: Not taking due to warfarin   Statin: Yes: Atorvastatin 40   ACEi/ARB: Yes:  Lisinopril 5mg.    Urine Albumin:   Lab Results   Component Value Date    UMALCR 61.11 (H) 03/30/2021      Lab Results   Component Value Date    A1C 8.7 01/27/2022    A1C 9.5 03/30/2021    A1C 7.2 10/10/2019    A1C 7.1 06/28/2019    A1C 6.5 09/26/2018       Hyperlipidemia: Current therapy includes : Atorvastatin 40mg daily.  Patient reports no significant myalgias or other side effects.  The 10-year ASCVD risk score (Cindy FITZPATRICK JrJeanmarie, et al., 2013) is: 13.5%    Values used to calculate the score:      Age: 51 years      Sex: Male      Is Non- : No      Diabetic: Yes      Tobacco smoker: No      Systolic Blood Pressure: 116 mmHg      Is BP treated: Yes      HDL Cholesterol: 35 mg/dL      Total Cholesterol: 235 mg/dL  Recent Labs   Lab Test 03/30/21  1411 10/10/19  0959   CHOL 235* 205*   HDL 35* 36*   LDL Cannot estimate LDL when triglyceride exceeds 400 mg/dL Cannot estimate LDL when triglyceride exceeds 400 mg/dL  109*   TRIG 656* 401*       Depression:  Current medications include: Venlafaxine [id number] once daily and bupropion  mg daily. Pt reports that depression symptoms are unchanged. Current depression symptoms include: very low energy , withdrawal from world, passive suicidal ideations --long standing most of his adult life. . Patient reports the following stressors: chronic health condition-knee and back problems  and leads a lonely life --has a dog and cat that help.    PHQ-9 SCORE 10/14/2021 1/26/2022 1/27/2022   PHQ-9 Total Score MyChart - - 15 (Moderately severe depression)   PHQ-9 Total Score 17 15 15     Feels like bupropion has been helping, especially in the morning helps to jump start him a little. Would like to stay were he is at on his current dose of bupropion. Hard to say if the vitamin D is actually helping.       DVT:   2 years ago had blood clot in his foot , has family hx blood clots.  Takes warfarin 7.5mg tues-sunday ,monday 10mg./day .      INR   Date Value Ref  Range Status   02/15/2022 2.1 (H) 0.9 - 1.1 Final   06/25/2021 3.00 (H) 0.86 - 1.14 Final     Comment:     This test is intended for monitoring Coumadin therapy.  Results are not   accurate in patients with prolonged INR due to factor deficiency.          Vitamin D3: level was 27 on 1-27-22. Taking vitamin D 5000 units once daily. Not feeling like it is doing much at the moment.        I spent 30 minutes with this patient today. All changes were made via collaborative practice agreement with Elvis Guzman MD. A copy of the visit note was provided to the patient's provider(s).    The patient was sent via [employer] a summary of these recommendations.     Adair Waite [facility].  Medication Therapy Management Provider  [phone]    Brennon Moreno  Pharm -D4 student     Telemedicine Visit Details  Type of service:  Video Conference via [employer]  Start Time: 9:00 AM  End Time: 9:30 AM  Originating Location (patient location): Home  Distant Location (provider location):  [facility]     Medication Therapy Recommendations  Diabetes mellitus, type 2 (H)    Current Medication: exenatide ER (BYDUREON BCISE) 2 MG/0.85ML auto-injector   Rationale: Patient prefers not to take - Adherence - Adherence   Recommendation: Provide Education - exenatide ER 2 MG/0.85ML auto-injector   Status: Accepted per CPA   Note: Patient didn't start taking Bydureon because he is an emotional eater and doesn't really struggle with eating because he is hungry. Provided education and suggested to start.          Current Medication: glipiZIDE (GLUCOTROL) 5 MG tablet   Rationale: Dose too high - Dosage too high - Safety   Recommendation: Decrease Dose - glipiZIDE 5 MG tablet - take one-half tablet by mouth twice daily   Status: Accepted per CPA   Note: low blood sugar events - decrease in dose especially with addition of bydureon.

## 2022-02-22 ENCOUNTER — VIRTUAL VISIT (OUTPATIENT)
Dept: PHARMACY | Facility: CLINIC | Age: 52
End: 2022-02-22
Payer: COMMERCIAL

## 2022-02-22 DIAGNOSIS — E55.9 VITAMIN D DEFICIENCY: ICD-10-CM

## 2022-02-22 DIAGNOSIS — F33.2 SEVERE EPISODE OF RECURRENT MAJOR DEPRESSIVE DISORDER, WITHOUT PSYCHOTIC FEATURES (H): ICD-10-CM

## 2022-02-22 DIAGNOSIS — I82.4Z1 ACUTE DEEP VEIN THROMBOSIS (DVT) OF DISTAL END OF RIGHT LOWER EXTREMITY (H): ICD-10-CM

## 2022-02-22 DIAGNOSIS — E11.9 TYPE 2 DIABETES MELLITUS WITHOUT COMPLICATION, WITHOUT LONG-TERM CURRENT USE OF INSULIN (H): Primary | ICD-10-CM

## 2022-02-22 PROCEDURE — 99607 MTMS BY PHARM ADDL 15 MIN: CPT | Performed by: PHARMACIST

## 2022-02-22 PROCEDURE — 99606 MTMS BY PHARM EST 15 MIN: CPT | Performed by: PHARMACIST

## 2022-02-22 NOTE — PATIENT INSTRUCTIONS
Recommendations from today's MTM visit:                                                       1. Start Bydureon, inject once weekly    2. Great job working on your diet, continue to keep working on low carb diet and exercise. Keep trying intermittent fasting.    3. Take one-half a tablet glipizide (2.5 mg total) in the morning and evening. If you start getting low sugars again (less than 70 mg/dL) once we start the Bydureon, stop the glipizide.     4. Keep scanning blood sugars frequently as you are to help with dietary changes    Follow-up: Return in about 23 days (around 3/17/2022), or 9:00 AM  via Video and Telephone ., for Medication Therapy Management Visit, Blood sugar meter review, Depression.    It was great to speak with you today.  I value your experience and would be very thankful for your time with providing feedback on our clinic survey. You may receive a survey via email or text message in the next few days.     To schedule another MTM appointment, please call the clinic directly or you may call the MTM scheduling line at [phone] or toll-free at [phone].     My Clinical Pharmacist's contact information:                                                      Please feel free to contact me with any questions or concerns you have.      Adair Waite [facility].  Medication Therapy Management Provider  [phone]  Brennon Moreno  Pharm -D4 student

## 2022-02-22 NOTE — Clinical Note
Elvis--layla-- peter making rapid positive dm changes --bebo is a game changer for him !!    Next a1c will be close to goal.    -Adair/nicolasa

## 2022-02-24 NOTE — TELEPHONE ENCOUNTER
RECORDS RECEIVED FROM: Internal   Appt Date: 03.07.2022   NOTES STATUS DETAILS   OFFICE NOTE from referring provider Internal 02.10.2022 Elvis Guzman MD   MEDICATION LIST Internal    IMAGING     ULTRASOUND LIVER Internal 02.10.2022 ULTRASOUND ABDOMEN LIMITED    LABS     BASIC METABOLIC PANEL Internal 06.03.2021   COMPLETE METABOLIC PANEL Internal 01.27.2022   COMPLETE BLOOD COUNT (CBC) Internal 01.27.2022   INTERNATIONAL NORMALIZED RATIO (INR) Internal 02.15.2022   HEPATITIS C ANTIBODY Internal 03.30.2021   HEPATITIS B SURFACE ANTIGEN Internal 03.30.2021   HEPATITIS B SURFACE ANTIBODY Internal 03.30.2021   HEPATITIS B CORE ANTIBODY Internal 03.30.2021

## 2022-03-01 DIAGNOSIS — R16.0 HEPATOMEGALY: Primary | ICD-10-CM

## 2022-03-03 ENCOUNTER — ANTICOAGULATION THERAPY VISIT (OUTPATIENT)
Dept: ANTICOAGULATION | Facility: CLINIC | Age: 52
End: 2022-03-03

## 2022-03-03 ENCOUNTER — PRE VISIT (OUTPATIENT)
Dept: UROLOGY | Facility: CLINIC | Age: 52
End: 2022-03-03

## 2022-03-03 ENCOUNTER — LAB (OUTPATIENT)
Dept: LAB | Facility: CLINIC | Age: 52
End: 2022-03-03
Payer: COMMERCIAL

## 2022-03-03 DIAGNOSIS — I82.4Y9 DEEP VEIN THROMBOSIS (DVT) OF PROXIMAL LOWER EXTREMITY, UNSPECIFIED CHRONICITY, UNSPECIFIED LATERALITY (H): ICD-10-CM

## 2022-03-03 DIAGNOSIS — I82.4Z1 ACUTE DEEP VEIN THROMBOSIS (DVT) OF DISTAL END OF RIGHT LOWER EXTREMITY (H): ICD-10-CM

## 2022-03-03 DIAGNOSIS — Z79.01 CURRENT USE OF LONG TERM ANTICOAGULATION: ICD-10-CM

## 2022-03-03 DIAGNOSIS — I48.91 ATRIAL FIBRILLATION, UNSPECIFIED TYPE (H): ICD-10-CM

## 2022-03-03 DIAGNOSIS — D68.51 HETEROZYGOUS FACTOR V LEIDEN MUTATION (H): ICD-10-CM

## 2022-03-03 DIAGNOSIS — I48.91 ATRIAL FIBRILLATION, UNSPECIFIED TYPE (H): Primary | ICD-10-CM

## 2022-03-03 DIAGNOSIS — I82.409 DEEP VEIN THROMBOSIS (DVT) (H): ICD-10-CM

## 2022-03-03 LAB — INR BLD: 2.3 (ref 0.9–1.1)

## 2022-03-03 PROCEDURE — 85610 PROTHROMBIN TIME: CPT

## 2022-03-03 PROCEDURE — 36415 COLL VENOUS BLD VENIPUNCTURE: CPT

## 2022-03-03 NOTE — PROGRESS NOTES
ANTICOAGULATION MANAGEMENT     Gaurang Rivera 51 year old male is on warfarin with therapeutic INR result. (Goal INR 2.0-3.0)    Recent labs: (last 7 days)     03/03/22  1348   INR 2.3*       ASSESSMENT       Source(s): Chart Review    Previous INR was Therapeutic last visit; previously outside of goal range    Medication, diet, health changes since last INR chart reviewed; none identified           PLAN     Unable to reach Gaurang today.    Left message to continue current dose of warfarin 7.5 mg tonight. Request call back for assessment.    Follow up required to assess for changes     Harshil Salamanca RN  Anticoagulation Clinic  3/3/2022

## 2022-03-03 NOTE — CONFIDENTIAL NOTE
Reason for visit: follow-up symptom recheck     Relevant information: BPH wit urinary frequency    Records/imaging/labs/orders: in epic    Pt called: n/a    At Rooming: virtual

## 2022-03-04 NOTE — PROGRESS NOTES
Tried calling patient again next day, the call went straight to . Left message to return call to [facility] RN. It looks like he checks his MyC so sent a message there as well.     Velia COULTER RN  Anticoagulation Team

## 2022-03-04 NOTE — PROGRESS NOTES
ANTICOAGULATION MANAGEMENT     Gaurang Rivera 51 year old male is on warfarin with therapeutic INR result. (Goal INR 2.0-3.0)    Recent labs: (last 7 days)     03/03/22  1348   INR 2.3*       ASSESSMENT       Source(s): Chart Review and Patient/Caregiver Call       Warfarin doses taken: Warfarin taken as instructed    Diet: No new diet changes identified    New illness, injury, or hospitalization: No    Medication/supplement changes: None noted    Signs or symptoms of bleeding or clotting: No    Previous INR: Therapeutic last visit; previously outside of goal range    Additional findings: None       PLAN     Recommended plan for no diet, medication or health factor changes affecting INR     Dosing Instructions: Continue your current warfarin dose with next INR in 3 weeks       Summary  As of 3/3/2022    Full warfarin instructions:  7.5 mg every day   Next INR check:  3/24/2022             Telephone call with Gaurang who verbalizes understanding and agrees to plan    Lab visit scheduled    Education provided: Please call back if any changes to your diet, medications or how you've been taking warfarin and Contact [phone]  with any changes, questions or concerns.     Plan made per [facility] anticoagulation protocol    Velia Persaud RN  Anticoagulation Clinic  3/4/2022    _______________________________________________________________________     Anticoagulation Episode Summary     Current INR goal:  2.0-3.0   TTR:  61.5 % (1 y)   Target end date:  Indefinite   Send INR reminders to:  [facility]    Indications    Atrial fibrillation  unspecified type (H) [I48.91]  Heterozygous factor V Leiden mutation (H) [D68.51]  Current use of long term anticoagulation [Z79.01]  Acute deep vein thrombosis (DVT) of distal end of right lower extremity (H) [I82.4Z1]           Comments:  MTV 30 mcg.         Anticoagulation Care Providers     Provider Role Specialty Phone number    Elvis Guzman MD Referring Family  Medicine [phone]

## 2022-03-08 ENCOUNTER — PRE VISIT (OUTPATIENT)
Dept: GASTROENTEROLOGY | Facility: CLINIC | Age: 52
End: 2022-03-08
Payer: COMMERCIAL

## 2022-03-15 ENCOUNTER — VIRTUAL VISIT (OUTPATIENT)
Dept: UROLOGY | Facility: CLINIC | Age: 52
End: 2022-03-15
Payer: COMMERCIAL

## 2022-03-15 DIAGNOSIS — R35.1 NOCTURIA: ICD-10-CM

## 2022-03-15 DIAGNOSIS — R35.0 URINARY FREQUENCY: Primary | ICD-10-CM

## 2022-03-15 DIAGNOSIS — Z12.5 SCREENING FOR PROSTATE CANCER: ICD-10-CM

## 2022-03-15 PROCEDURE — 99214 OFFICE O/P EST MOD 30 MIN: CPT | Mod: 95 | Performed by: PHYSICIAN ASSISTANT

## 2022-03-15 RX ORDER — TOLTERODINE TARTRATE 2 MG/1
2 TABLET, EXTENDED RELEASE ORAL 2 TIMES DAILY
Qty: 30 TABLET | Refills: 3 | Status: SHIPPED | OUTPATIENT
Start: 2022-03-15 | End: 2022-09-06

## 2022-03-15 NOTE — PROGRESS NOTES
Gaurang is a 51 year old who is being evaluated via a billable video visit.      How would you like to obtain your AVS? NulogyharOvuline  If the video visit is dropped, the invitation should be resent by: Send to e-mail at: [email or web address]  Will anyone else be joining your video visit? No      Video Start Time: 7:34 AM  Video-Visit Details    Type of service:  Video Visit    Video End Time:7:49 AM    Originating Location (pt. Location): Home    Distant Location (provider location):  [facility] [place]     Platform used for Video Visit: [employer]

## 2022-03-15 NOTE — PATIENT INSTRUCTIONS
UROLOGY CLINIC VISIT PATIENT INSTRUCTIONS    - Stop your oxybutynin.   - Start tolterodine (Detrol LA) 2 mg once daily.   - PSA at your next lab visit.   - Work on cutting back on your soda intake.   - Restrict fluids ~3 hours before bedtime.   - Follow up with me in 2 months for recheck.     If you have any issues, questions or concerns in the meantime, do not hesitate to contact us at [phone] or via clipkit.     It was a pleasure meeting with you today.  Thank you for allowing me and my team the privilege of caring for you today.  YOU are the reason we are here, and I truly hope we provided you with the excellent service you deserve.  Please let us know if there is anything else we can do for you so that we can be sure you are leaving completely satisfied with your care experience.    Vaishnavi Rock PA-C  Department of Urology

## 2022-03-15 NOTE — PROGRESS NOTES
Chief Complaint:   Follow up          History of Present Illness:   Gaurang Rivera is a 51 year old male with a history of depression, diabetes mellitus, DVT and factor V leiden, and atrial fibrillation on chronic anticoagulation who presents for follow up of BPH.  Patient was initially seen on 10/14/2021 at which time he reported symptoms of urinary frequency and nocturia, which has been ongoing for several years.  He reports urinary frequency ~9 times during the day and nocturia of 3 times per night.  He reports some mild urgency, but denies any  incontinence.  He does report that he needs to double void occasionally, and will need to urinate ~30 minutes after his first void.  He also reports he sometimes is able to urinate more when he sits down rather than stands.  He feels he fully empties his bladder and denies any noticeable slowing of his urinary stream.  He was initially started on alfuzosin but at follow up on 1/17/2022 he denied any change in symptoms with persistent urinary frequency and nocturia.  Uroflow from that date showing Qmax 12.5 ml/s and PVR 76 ml.  He was subsequently started on oxybutynin ER 5 mg once daily.       Today, the patient reports no change in symptoms with the oxybutynin.  He reports daytime frequency 9 times during the day, and up to 2-3 times at night.  He denies any urgency or incontinence.  No change in his urinary stream.  He feels that he is fully emptying his bladder.  He is currently drinking 3-4 cans per day, and it's usually all at once around lunch time.  He denies any alcohol use.  He is drinking fluids in the evening, but is trying to cut down a little bit on his water at night.  He denies any issues with constipation.           Past Medical History:     Past Medical History:   Diagnosis Date     Antiplatelet or antithrombotic long-term use      Depressive disorder 2001     Diabetes (H)      DVT (deep venous thrombosis) (H) 07/05/2019     Elevated blood pressure  reading without diagnosis of hypertension 5/13/2018     Hypercholesteremia 2012     Personal history of other medical treatment     blood clot foot july 2019     Pilonidal cyst 5/13/2018            Past Surgical History:     Past Surgical History:   Procedure Laterality Date     APPENDECTOMY       ARTHROSCOPY SHOULDER, OPEN BICEP TENODESIS REPAIR, COMBINED Right 6/2/2020    Procedure: Right shoulder diagnostic arthroscopy, capsular release,  open biceps tenodesis;  Surgeon: Dulce Maria Burdick MD;  Location: [facility]     AS DRAIN PILONIDAL CYST SIMPLE       BACK SURGERY  1997    spinal fusion     CHOLECYSTECTOMY  2012     CYSTECTOMY PILONIDAL N/A 2/21/2020    Procedure: EXCISION, PILONIDAL CYST, AILEEN II Procedure;  Surgeon: Artemio Ahumada MD;  Location: [facility]     THUMB SURGERY   1995            Medications     Current Outpatient Medications   Medication     acetaminophen (TYLENOL) 325 MG tablet     alfuzosin ER (UROXATRAL) 10 MG 24 hr tablet     atorvastatin (LIPITOR) 40 MG tablet     buPROPion (WELLBUTRIN XL) 150 MG 24 hr tablet     Cholecalciferol (VITAMIN D-3) 125 MCG (5000 UT) TABS     Continuous Blood Gluc Sensor ([employer]erm[employer] InternationalSTYLE JAVI 14 DAY SENSOR) MISC     exenatide ER (BYDUREON BCISE) 2 MG/0.85ML auto-injector     glipiZIDE (GLUCOTROL) 5 MG tablet     ibuprofen (ADVIL/MOTRIN) 200 MG capsule     lisinopril (ZESTRIL) 5 MG tablet     metFORMIN (GLUCOPHAGE) 1000 MG tablet     multivitamin, therapeutic with minerals (MULTI-VITAMIN) TABS tablet     omeprazole (PRILOSEC OTC) 20 MG EC tablet     oxybutynin ER (DITROPAN-XL) 5 MG 24 hr tablet     venlafaxine (EFFEXOR-XR) 150 MG 24 hr capsule     warfarin ANTICOAGULANT (COUMADIN) 5 MG tablet     No current facility-administered medications for this visit.            Family History:     Family History   Problem Relation Age of Onset     Diabetes Mother      Depression Father      Thrombosis Father         HE HAS HAD 3 CLOTS - PER PATIENT THEY WERE UNPROVOKED ON  COUMADIN      Alcoholism Sister      Depression Sister      Lupus Sister      Anesthesia Reaction No family hx of      Cardiovascular No family hx of             Social History:     Social History     Socioeconomic History     Marital status: Single     Spouse name: Not on file     Number of children: 0     Years of education: Not on file     Highest education level: Not on file   Occupational History     Occupation:       Comment: ROWING    Tobacco Use     Smoking status: Never Smoker     Smokeless tobacco: Never Used   Substance and Sexual Activity     Alcohol use: No     Comment: None     Drug use: No     Sexual activity: Not Currently     Partners: Female   Other Topics Concern     Parent/sibling w/ CABG, MI or angioplasty before 65F 55M? Not Asked   Social History Narrative     Not on file     Social Determinants of Health     Financial Resource Strain: Not on file   Food Insecurity: Not on file   Transportation Needs: Not on file   Physical Activity: Not on file   Stress: Not on file   Social Connections: Not on file   Intimate Partner Violence: Not on file   Housing Stability: Not on file            Allergies:   Patient has no known allergies.         Review of Systems:  From intake questionnaire   Negative 14 system review except as noted on HPI, nurse's note.         Physical Exam:   Patient is a 51 year old  male    General Appearance Adult: Alert, no acute distress, oriented  Lungs: no respiratory distress, or pursed lip breathing  Heart: No obvious jugular venous distension present  Skin: no suspicious lesions or rashes  Neuro: Alert, oriented, speech and mentation normal  Further examination is deferred due to the nature of our visit.        Labs and Pathology:    I personally reviewed all applicable laboratory data and went over findings with patient  Significant for:    CBC RESULTS:  Recent Labs   Lab Test 01/27/22  1228 03/30/21  1411 06/03/20  0706 05/28/20  1135   WBC 5.3 6.8 9.2 6.8   HGB  17.3 18.0* 14.4 16.1    251 189 192        BMP RESULTS:  Recent Labs   Lab Test 01/27/22  1228 03/30/21  1411 06/03/20  0706 06/02/20  1417 06/02/20  0931 05/28/20  1135    135 139 138  --  137   POTASSIUM 4.2 4.3 3.9 4.3   < > 4.0   CHLORIDE 103 105 106 105  --  105   CO2 27 24 27 23  --  25   ANIONGAP 6 6 6 10  --  7   * 259* 174* 184*  --  138*   BUN 15 16 13 17  --  18   CR 0.98 0.89 0.87 0.88  --  0.94   GFRESTIMATED >90 >90 >90 >90  --  >90   GFRESTBLACK  --  >90 >90 >90  --  >90   SAVI 9.3 9.3 8.3* 8.7  --  8.9    < > = values in this interval not displayed.       UA RESULTS:   No results for input(s): SG, URINEPH, NITRITE, RBCU, WBCU in the last 19415 hours.    PSA RESULTS  PSA   Date Value Ref Range Status   03/30/2021 0.47 0 - 4 ug/L Final     Comment:     Assay Method:  Chemiluminescence using Siemens Vista analyzer   09/11/2018 0.46 0 - 4 ug/L Final     Comment:     Assay Method:  Chemiluminescence using Siemens Vista analyzer         Imaging:    I personally reviewed all applicable imaging and went over findings with patient.  Significant for:    Results for orders placed or performed in visit on 02/10/22   US Abdomen Limited    Narrative    ULTRASOUND ABDOMEN LIMITED 2/10/2022 7:31 AM    CLINICAL HISTORY: Elevated LFTs.     COMPARISONS: None available.    REFERRING PROVIDER: RANDA COLEMAN    TECHNIQUE: Right upper quadrant viscera evaluated with grayscale  imaging. Splenic and main portal veins evaluated with color Doppler  ultrasound.     Cine clips through the liver were saved in the patient's record.    FINDINGS: Visualization limited by patient body habitus and obscuring  gas.  Liver: 21.1 cm. Increased echogenicity and echotexture. 6.9 x 10.4 x  10.7 mm hypoechoic cyst in the left liver lobe with increased through  transmission and no internal vascular flow by color Doppler image.  US visualization score: C - Severe limitations    Right kidney: 13.8 cm. Normal. No mass, stone,  or hydronephrosis.    Aorta:        Proximal: Obscured       Mid: 2.4 cm.    Inferior vena cava: 1.5 cm.    Main portal vein diameter: 1.4 cm.    Ascites: None    Gallbladder: History of cholecystectomy    Common bile duct: 4.8 mm    Pancreas: Poorly visualized    Splenic vein: antegrade  Main portal vein: antegrade      Impression    IMPRESSION:  1. Severely limited visualization. Hepatomegaly. Increased  echogenicity and echotexture. 6.9 x 10.4 x 10.7 mm hypoechoic cyst in  the left lobe. Differential includes hepatitis, hepatosteatosis, and  cirrhosis.       A. LI-RADS US Category: US-1 Negative: No US evidence of HCC       B. Recommend continued surveillance US.    AMBROSE ROJAS MD         SYSTEM ID:  [id number]              Assessment and Plan:     Assessment: 51 year old male with a history of BPH with irritative voiding symptoms of urinary frequency and nocturia.  Patient has tried Flomax in the past and is currently maintained on alfuzosin with no change in symptoms.  Patient recently started on Oxybutynin ER 5 mg daily at our follow up from 1/17/2022 but reports no change in symptoms at this time.  The shared decision was made to stop oxybutynin and will switch to Detrol LA 2 mg once daily.  Reviewed benefits and adverse effects.  We also discussed decreasing his caffeine intake, as this is likely contributing to his urinary frequency, and restricting fluids ~3 hours before bedtime.  Referral previously placed for PFPT and this was again discussed as another option.  We will plan to update a PSA in the interim for prostate cancer screening.  Patient will follow up in 2 months for recheck of symptoms.  If not change in symptoms at that time, could consider trial of Myrbetriq versus further evaluation with UDS.      Plan:  - Stop your oxybutynin.   - Start tolterodine (Detrol LA) 2 mg once daily.   - PSA at your next lab visit.   - Work on cutting back on your soda intake.   - Restrict fluids ~3 hours before  bedtime.   - Follow up with me in 2 months for recheck.       DIANNE Renee  Department of Urology

## 2022-03-15 NOTE — LETTER
3/15/2022       RE: Gaurang Rivera  [address]  [facility] [address]     Dear Colleague,    Thank you for referring your patient, Gaurang Rivera, to the [facility] [place] at [facility]. Please see a copy of my visit note below.    Gaurang is a 51 year old who is being evaluated via a billable video visit.      How would you like to obtain your AVS? MyChart  If the video visit is dropped, the invitation should be resent by: Send to e-mail at: [email or web address]  Will anyone else be joining your video visit? No      Video Start Time: 7:34 AM  Video-Visit Details    Type of service:  Video Visit    Video End Time:7:49 AM    Originating Location (pt. Location): Home    Distant Location (provider location):  [facility] [place]     Platform used for Video Visit: Rdio          Chief Complaint:   Follow up          History of Present Illness:   Gaurang Rivera is a 51 year old male with a history of depression, diabetes mellitus, DVT and factor V leiden, and atrial fibrillation on chronic anticoagulation who presents for follow up of BPH.  Patient was initially seen on 10/14/2021 at which time he reported symptoms of urinary frequency and nocturia, which has been ongoing for several years.  He reports urinary frequency ~9 times during the day and nocturia of 3 times per night.  He reports some mild urgency, but denies any  incontinence.  He does report that he needs to double void occasionally, and will need to urinate ~30 minutes after his first void.  He also reports he sometimes is able to urinate more when he sits down rather than stands.  He feels he fully empties his bladder and denies any noticeable slowing of his urinary stream.  He was initially started on alfuzosin but at follow up on 1/17/2022 he denied any change in symptoms with persistent urinary frequency and nocturia.  Uroflow from  that date showing Qmax 12.5 ml/s and PVR 76 ml.  He was subsequently started on oxybutynin ER 5 mg once daily.       Today, the patient reports no change in symptoms with the oxybutynin.  He reports daytime frequency 9 times during the day, and up to 2-3 times at night.  He denies any urgency or incontinence.  No change in his urinary stream.  He feels that he is fully emptying his bladder.  He is currently drinking 3-4 cans per day, and it's usually all at once around lunch time.  He denies any alcohol use.  He is drinking fluids in the evening, but is trying to cut down a little bit on his water at night.  He denies any issues with constipation.           Past Medical History:     Past Medical History:   Diagnosis Date     Antiplatelet or antithrombotic long-term use      Depressive disorder 2001     Diabetes (H)      DVT (deep venous thrombosis) (H) 07/05/2019     Elevated blood pressure reading without diagnosis of hypertension 5/13/2018     Hypercholesteremia 2012     Personal history of other medical treatment     blood clot foot july 2019     Pilonidal cyst 5/13/2018            Past Surgical History:     Past Surgical History:   Procedure Laterality Date     APPENDECTOMY       ARTHROSCOPY SHOULDER, OPEN BICEP TENODESIS REPAIR, COMBINED Right 6/2/2020    Procedure: Right shoulder diagnostic arthroscopy, capsular release,  open biceps tenodesis;  Surgeon: Dulce Maria Burdick MD;  Location: [facility] OR     AS DRAIN PILONIDAL CYST SIMPLE       BACK SURGERY  1997    spinal fusion     CHOLECYSTECTOMY  2012     CYSTECTOMY PILONIDAL N/A 2/21/2020    Procedure: EXCISION, PILONIDAL CYST, AILEEN II Procedure;  Surgeon: Artemio Ahumada MD;  Location: [facility]     THUMB SURGERY   1995            Medications     Current Outpatient Medications   Medication     acetaminophen (TYLENOL) 325 MG tablet     alfuzosin ER (UROXATRAL) 10 MG 24 hr tablet     atorvastatin (LIPITOR) 40 MG tablet     buPROPion (WELLBUTRIN XL) 150  MG 24 hr tablet     Cholecalciferol (VITAMIN D-3) 125 MCG (5000 UT) TABS     Continuous Blood Gluc Sensor (FREESTYLE JAVI 14 DAY SENSOR) MISC     exenatide ER (BYDUREON BCISE) 2 MG/0.85ML auto-injector     glipiZIDE (GLUCOTROL) 5 MG tablet     ibuprofen (ADVIL/MOTRIN) 200 MG capsule     lisinopril (ZESTRIL) 5 MG tablet     metFORMIN (GLUCOPHAGE) 1000 MG tablet     multivitamin, therapeutic with minerals (MULTI-VITAMIN) TABS tablet     omeprazole (PRILOSEC OTC) 20 MG EC tablet     oxybutynin ER (DITROPAN-XL) 5 MG 24 hr tablet     venlafaxine (EFFEXOR-XR) 150 MG 24 hr capsule     warfarin ANTICOAGULANT (COUMADIN) 5 MG tablet     No current facility-administered medications for this visit.            Family History:     Family History   Problem Relation Age of Onset     Diabetes Mother      Depression Father      Thrombosis Father         HE HAS HAD 3 CLOTS - PER PATIENT THEY WERE UNPROVOKED ON COUMADIN      Alcoholism Sister      Depression Sister      Lupus Sister      Anesthesia Reaction No family hx of      Cardiovascular No family hx of             Social History:     Social History     Socioeconomic History     Marital status: Single     Spouse name: Not on file     Number of children: 0     Years of education: Not on file     Highest education level: Not on file   Occupational History     Occupation:       Comment: ROWING    Tobacco Use     Smoking status: Never Smoker     Smokeless tobacco: Never Used   Substance and Sexual Activity     Alcohol use: No     Comment: None     Drug use: No     Sexual activity: Not Currently     Partners: Female   Other Topics Concern     Parent/sibling w/ CABG, MI or angioplasty before 65F 55M? Not Asked   Social History Narrative     Not on file     Social Determinants of Health     Financial Resource Strain: Not on file   Food Insecurity: Not on file   Transportation Needs: Not on file   Physical Activity: Not on file   Stress: Not on file   Social Connections: Not on file    Intimate Partner Violence: Not on file   Housing Stability: Not on file            Allergies:   Patient has no known allergies.         Review of Systems:  From intake questionnaire   Negative 14 system review except as noted on HPI, nurse's note.         Physical Exam:   Patient is a 51 year old  male    General Appearance Adult: Alert, no acute distress, oriented  Lungs: no respiratory distress, or pursed lip breathing  Heart: No obvious jugular venous distension present  Skin: no suspicious lesions or rashes  Neuro: Alert, oriented, speech and mentation normal  Further examination is deferred due to the nature of our visit.        Labs and Pathology:    I personally reviewed all applicable laboratory data and went over findings with patient  Significant for:    CBC RESULTS:  Recent Labs   Lab Test 01/27/22  1228 03/30/21  1411 06/03/20  0706 05/28/20  1135   WBC 5.3 6.8 9.2 6.8   HGB 17.3 18.0* 14.4 16.1    251 189 192        BMP RESULTS:  Recent Labs   Lab Test 01/27/22  1228 03/30/21  1411 06/03/20  0706 06/02/20  1417 06/02/20  0931 05/28/20  1135    135 139 138  --  137   POTASSIUM 4.2 4.3 3.9 4.3   < > 4.0   CHLORIDE 103 105 106 105  --  105   CO2 27 24 27 23  --  25   ANIONGAP 6 6 6 10  --  7   * 259* 174* 184*  --  138*   BUN 15 16 13 17  --  18   CR 0.98 0.89 0.87 0.88  --  0.94   GFRESTIMATED >90 >90 >90 >90  --  >90   GFRESTBLACK  --  >90 >90 >90  --  >90   SAVI 9.3 9.3 8.3* 8.7  --  8.9    < > = values in this interval not displayed.       UA RESULTS:   No results for input(s): SG, URINEPH, NITRITE, RBCU, WBCU in the last 01525 hours.    PSA RESULTS  PSA   Date Value Ref Range Status   03/30/2021 0.47 0 - 4 ug/L Final     Comment:     Assay Method:  Chemiluminescence using Siemens Vista analyzer   09/11/2018 0.46 0 - 4 ug/L Final     Comment:     Assay Method:  Chemiluminescence using Siemens Vista analyzer         Imaging:    I personally reviewed all applicable imaging and went  over findings with patient.  Significant for:    Results for orders placed or performed in visit on 02/10/22   US Abdomen Limited    Narrative    ULTRASOUND ABDOMEN LIMITED 2/10/2022 7:31 AM    CLINICAL HISTORY: Elevated LFTs.     COMPARISONS: None available.    REFERRING PROVIDER: RANDA COLEMAN    TECHNIQUE: Right upper quadrant viscera evaluated with grayscale  imaging. Splenic and main portal veins evaluated with color Doppler  ultrasound.     Cine clips through the liver were saved in the patient's record.    FINDINGS: Visualization limited by patient body habitus and obscuring  gas.  Liver: 21.1 cm. Increased echogenicity and echotexture. 6.9 x 10.4 x  10.7 mm hypoechoic cyst in the left liver lobe with increased through  transmission and no internal vascular flow by color Doppler image.  US visualization score: C - Severe limitations    Right kidney: 13.8 cm. Normal. No mass, stone, or hydronephrosis.    Aorta:        Proximal: Obscured       Mid: 2.4 cm.    Inferior vena cava: 1.5 cm.    Main portal vein diameter: 1.4 cm.    Ascites: None    Gallbladder: History of cholecystectomy    Common bile duct: 4.8 mm    Pancreas: Poorly visualized    Splenic vein: antegrade  Main portal vein: antegrade      Impression    IMPRESSION:  1. Severely limited visualization. Hepatomegaly. Increased  echogenicity and echotexture. 6.9 x 10.4 x 10.7 mm hypoechoic cyst in  the left lobe. Differential includes hepatitis, hepatosteatosis, and  cirrhosis.       A. LI-RADS US Category: US-1 Negative: No US evidence of HCC       B. Recommend continued surveillance US.    AMBROSE ROJAS MD         SYSTEM ID:  [id number]              Assessment and Plan:     Assessment: 51 year old male with a history of BPH with irritative voiding symptoms of urinary frequency and nocturia.  Patient has tried Flomax in the past and is currently maintained on alfuzosin with no change in symptoms.  Patient recently started on Oxybutynin ER 5 mg daily at our  follow up from 1/17/2022 but reports no change in symptoms at this time.  The shared decision was made to stop oxybutynin and will switch to Detrol LA 2 mg once daily.  Reviewed benefits and adverse effects.  We also discussed decreasing his caffeine intake, as this is likely contributing to his urinary frequency, and restricting fluids ~3 hours before bedtime.  Referral previously placed for PFPT and this was again discussed as another option.  We will plan to update a PSA in the interim for prostate cancer screening.  Patient will follow up in 2 months for recheck of symptoms.  If not change in symptoms at that time, could consider trial of Myrbetriq versus further evaluation with UDS.      Plan:  - Stop your oxybutynin.   - Start tolterodine (Detrol LA) 2 mg once daily.   - PSA at your next lab visit.   - Work on cutting back on your soda intake.   - Restrict fluids ~3 hours before bedtime.   - Follow up with me in 2 months for recheck.       DIANNE Renee  Department of Urology

## 2022-03-21 ENCOUNTER — TELEPHONE (OUTPATIENT)
Dept: GASTROENTEROLOGY | Facility: CLINIC | Age: 52
End: 2022-03-21
Payer: COMMERCIAL

## 2022-03-25 ENCOUNTER — ANTICOAGULATION THERAPY VISIT (OUTPATIENT)
Dept: ANTICOAGULATION | Facility: CLINIC | Age: 52
End: 2022-03-25

## 2022-03-25 ENCOUNTER — LAB (OUTPATIENT)
Dept: LAB | Facility: CLINIC | Age: 52
End: 2022-03-25
Payer: COMMERCIAL

## 2022-03-25 DIAGNOSIS — I48.91 ATRIAL FIBRILLATION, UNSPECIFIED TYPE (H): Primary | ICD-10-CM

## 2022-03-25 DIAGNOSIS — I82.409 DEEP VEIN THROMBOSIS (DVT) (H): ICD-10-CM

## 2022-03-25 DIAGNOSIS — I48.91 ATRIAL FIBRILLATION, UNSPECIFIED TYPE (H): ICD-10-CM

## 2022-03-25 DIAGNOSIS — D68.51 HETEROZYGOUS FACTOR V LEIDEN MUTATION (H): ICD-10-CM

## 2022-03-25 DIAGNOSIS — Z79.01 CURRENT USE OF LONG TERM ANTICOAGULATION: ICD-10-CM

## 2022-03-25 DIAGNOSIS — I82.4Y9 DEEP VEIN THROMBOSIS (DVT) OF PROXIMAL LOWER EXTREMITY, UNSPECIFIED CHRONICITY, UNSPECIFIED LATERALITY (H): ICD-10-CM

## 2022-03-25 DIAGNOSIS — I82.4Z1 ACUTE DEEP VEIN THROMBOSIS (DVT) OF DISTAL END OF RIGHT LOWER EXTREMITY (H): ICD-10-CM

## 2022-03-25 LAB — INR BLD: 2.2 (ref 0.9–1.1)

## 2022-03-25 PROCEDURE — 36416 COLLJ CAPILLARY BLOOD SPEC: CPT

## 2022-03-25 PROCEDURE — 85610 PROTHROMBIN TIME: CPT

## 2022-03-25 NOTE — PROGRESS NOTES
"ANTICOAGULATION MANAGEMENT     Gaurang Rivera 51 year old male is on warfarin with therapeutic INR result. (Goal INR 2.0-3.0)    Recent labs: (last 7 days)     03/25/22  1032   INR 2.2*       ASSESSMENT       Source(s): Chart Review    Previous INR was Therapeutic last 2 visits    Medication, diet, health changes since last INR chart reviewed: 3/15/22 Urology ov noted ongoing urinary frequency and nocturia. Urology advised patient to stop oxybutynin and start tolterodine (Detrol LA) 2 mg once daily. Per up-to-date, \"Tolterodine may enhance the anticoagulant effect of Warfarin.\"         PLAN     Unable to reach Gaurang today.    Left message to continue 7.5 mg daily this weekend. Request call back for assessment.    Follow up required to confirm warfarin dose taken and assess for changes    Cyn Womack RN  Anticoagulation Clinic  3/25/2022        "

## 2022-03-25 NOTE — PROGRESS NOTES
Writer attempted to reach patient again - no answer - left vm reiterating weekend dosing plan as noted below. Also advised recheck in two weeks d/t recent medication changes. Encouraged callback prior to 6 pm today or first thing Monday. Cyn Womack, UMAN, RN

## 2022-03-28 DIAGNOSIS — I82.4Z1 ACUTE DEEP VEIN THROMBOSIS (DVT) OF DISTAL END OF RIGHT LOWER EXTREMITY (H): ICD-10-CM

## 2022-03-28 NOTE — PROGRESS NOTES
ANTICOAGULATION MANAGEMENT     Gaurang Rivera 51 year old male is on warfarin with therapeutic INR result. (Goal INR 2.0-3.0)    Recent labs: (last 7 days)     03/25/22  1032   INR 2.2*       ASSESSMENT       Source(s): Chart Review and Patient/Caregiver Call       Warfarin doses taken: Warfarin taken as instructed    Diet: No new diet changes identified    New illness, injury, or hospitalization: Yes: Detrol LA prescribed on 3/15 and started on 3/21 or 3/22. Has potential to raise INR. Since he has only been on it a few days prior to INR check will make sure INR remains stable.    Medication/supplement changes: None noted    Signs or symptoms of bleeding or clotting: No    Previous INR: Therapeutic last 2(+) visits    Additional findings: None       PLAN     Recommended plan for ongoing change(s) affecting INR     Dosing Instructions: Continue your current warfarin dose with next INR in 2 weeks       Summary  As of 3/25/2022    Full warfarin instructions:  7.5 mg every day   Next INR check:  4/8/2022             Telephone call with Gaurang who verbalizes understanding and agrees to plan    Lab visit scheduled    Education provided: Please call back if any changes to your diet, medications or how you've been taking warfarin    Plan made per [facility] anticoagulation protocol    Harshil Salamanca RN  Anticoagulation Clinic  3/28/2022    _______________________________________________________________________     Anticoagulation Episode Summary     Current INR goal:  2.0-3.0   TTR:  67.9 % (1 y)   Target end date:  Indefinite   Send INR reminders to:  [facility]    Indications    Atrial fibrillation  unspecified type (H) [I48.91]  Heterozygous factor V Leiden mutation (H) [D68.51]  Current use of long term anticoagulation [Z79.01]  Acute deep vein thrombosis (DVT) of distal end of right lower extremity (H) [I82.4Z1]           Comments:  MTV 30 mcg.         Anticoagulation Care Providers     Provider Role  Specialty Phone number    Elvis Guzman MD Referring Family Medicine [phone]

## 2022-03-29 RX ORDER — WARFARIN SODIUM 5 MG/1
TABLET ORAL
Qty: 135 TABLET | Refills: 1 | Status: SHIPPED | OUTPATIENT
Start: 2022-03-29 | End: 2022-10-19

## 2022-03-29 NOTE — TELEPHONE ENCOUNTER
Warfarin - Prescription approved per [facility] Refill Protocol.    Floridalma Salamanca RN   [facility]

## 2022-03-30 NOTE — PROGRESS NOTES
Medication Therapy Management (MTM) Encounter    ASSESSMENT:                            Medication Adherence/Access: No issues identified      Type 2 Diabetes: Patient is not meeting A1c goal of < 7%. Patient is not meeting goal of >70% time in target with continuous glucose monitoring. Would likely benefit from follow up A1c in May. His actual CGM percent in target range may be higher than it appears, would benefit from changing his target range to  mg/dL (was  mg/dL) to allow for higher blood sugars following eating.     Hyperlipidemia: currently not at goal, would likely benefit from continued exercise and dietary changes. Currently on guideline directed statin therapy.     Vitamin D3: is not at goal of 50-75ng/mL. Pt would benefit from continuation of OTC vitamin D3 -5K IU daily.    Depression: Would likely benefit from increasing bupropion XL dose to 300 mg daily with follow up in May to assess effectiveness of higher dose.     DVT: Stable.    PLAN:                            1. I will send a prescription for Bupropion 300 mg once daily to your pharmacy. You can take 2 of your 150 mg tablets until you are out of those. We see how that works for you and see if it needs to be adjusted at the next visit.     2. Set your goal range on your Freestyle Herve to  mg/dL to allow for your blood sugars to elevate a little more following your meals. You told us your in range percentage was 60% today (our goal is 70% or more). Hopefully with the change in range and continued diet and exercise you will achieve this at our next visit.    3. Continue frequent scanning of your blood sugars and bring your meter in with you to your in person visit with me in May    Follow-up: Return in about 1 month (around 5/3/2022), or 9:30 AM, for Lab Work, Medication Therapy Management Visit, Blood sugar meter review.    SUBJECTIVE/OBJECTIVE:                          Gaurang Rivera is a 51 year old male contacted via secure  video for a follow up visit (2-22-22). He was referred to me from Elvis Guzman  .       Reason for visit: Medication Review, dieting update, blood sugar review,  bupropion and Bydureon follow-up      Allergies/ADRs: Reviewed in chart  Past Medical History: Reviewed in chart  Tobacco: He reports that he has never smoked. He has never used smokeless tobacco.  Alcohol: not currently using  Caffeine: diet coke - 4 cans/day or so   Social:   rowing at [facility]. Rowing club.   Personal Healthcare Goals:  DM at goal is #1 issue, food is his drug -addicted to it , sleep is poor -tired , craves caffeine /sugar . Wants to exercise more.   Activity: winter months - sedentary , summer months - more active walking to [employer] , [employer] gym membership close to his house --infrequent use. 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      Medication Adherence/Access: no issues reported      Type 2 Diabetes:  Currently taking : Glipizide IR 2.5 mg in the morning and 2.5 mg in the evening , metformin 1 gm bid pc, weekly bydureon 2 mg now.   Patient is not experiencing side effects.  Blood sugar monitoring: Herve via reader device  - Continuous Glucose Monitor-checks constantly . Ranges (patient reported): Fasting- -set on his cgm--last 14 days above 25% , in range= 60 %, 15 % below range.   Last 14 days avg bs =117.  Last 30 days = 122.     Symptoms of low blood sugar? None now.   Symptoms of high blood sugar? none  Eye exam: up to date  Foot exam: mild tingling in feet.   Diet/Exercise:   Has been working on keeping IF diet as best as he can but fails some days. He says he tries to keep his eating to an 8 hour window each day. Rowing season is starting soon so he is looking forward to that.      Aspirin: Not taking due to warfarin   Statin: Yes: Atorvastatin 40   ACEi/ARB: Yes: Lisinopril 5mg.    Urine Albumin:   Lab Results   Component Value Date    UMALCR 61.11 (H) 03/30/2021      Lab Results   Component Value Date    A1C 8.7  01/27/2022    A1C 9.5 03/30/2021    A1C 7.2 10/10/2019    A1C 7.1 06/28/2019    A1C 6.5 09/26/2018       Hyperlipidemia: Current therapy includes : Atorvastatin 40mg daily.  Patient reports no significant myalgias or other side effects.  The 10-year ASCVD risk score (Cindy FITZPATRICK Jr., et al., 2013) is: 13.5%    Values used to calculate the score:      Age: 51 years      Sex: Male      Is Non- : No      Diabetic: Yes      Tobacco smoker: No      Systolic Blood Pressure: 116 mmHg      Is BP treated: Yes      HDL Cholesterol: 35 mg/dL      Total Cholesterol: 235 mg/dL  Recent Labs   Lab Test 03/30/21  1411 10/10/19  0959   CHOL 235* 205*   HDL 35* 36*   LDL Cannot estimate LDL when triglyceride exceeds 400 mg/dL Cannot estimate LDL when triglyceride exceeds 400 mg/dL  109*   TRIG 656* 401*       Depression:  Current medications include: Venlafaxine [id number] once daily and bupropion  mg daily. States that he feels that he thinks the bupropion is helping but he is not sure. He is open to trying a higher dose to see if it helps with his motivation.    PHQ-9 SCORE 10/14/2021 1/26/2022 1/27/2022   PHQ-9 Total Score MyChart - - 15 (Moderately severe depression)   PHQ-9 Total Score 17 15 15       DVT:   2 years ago had blood clot in his foot , has family hx blood clots.  Takes warfarin 7.5mg tues-sunday ,monday 10mg./day .  INR   Date Value Ref Range Status   03/25/2022 2.2 (H) 0.9 - 1.1 Final   06/25/2021 3.00 (H) 0.86 - 1.14 Final     Comment:     This test is intended for monitoring Coumadin therapy.  Results are not   accurate in patients with prolonged INR due to factor deficiency.          Vitamin D3: level was 27 on 1-27-22. Taking vitamin D 5000 units once daily. Not feeling like it is doing much at the moment.  Vitamin D Deficiency Screening Results:  Lab Results   Component Value Date    VITDT 27 01/27/2022    VITDT 34 03/30/2021       I spent 30 minutes with this patient today. All changes  were made via collaborative practice agreement with Elvis Guzman MD. A copy of the visit note was provided to the patient's provider(s).    The patient was sent via [employer] [place] a summary of these recommendations.     Adair Waite [facility].  Medication Therapy Management Provider  [phone]    Brennon Moreno  Pharm -D4 student     Telemedicine Visit Details  Type of service:  Video Conference via [employer]unity  Start Time: 9:00 AM  End Time: 9:30 AM  Originating Location (patient location): Home  Distant Location (provider location):  [facility]     Medication Therapy Recommendations  MDD (major depressive disorder), recurrent episode, severe (H)    Current Medication: buPROPion (WELLBUTRIN XL) 150 MG 24 hr tablet (Discontinued)   Rationale: Dose too low - Dosage too low - Effectiveness   Recommendation: Increase Dose - buPROPion 300 MG 24 hr tablet - Take 1 tablet by mouth daily   Status: Accepted per CPA   Note: Increasing dose to hopefully help with motivation and mood

## 2022-03-31 ENCOUNTER — VIRTUAL VISIT (OUTPATIENT)
Dept: PHARMACY | Facility: CLINIC | Age: 52
End: 2022-03-31
Payer: COMMERCIAL

## 2022-03-31 DIAGNOSIS — E78.5 HYPERLIPIDEMIA, UNSPECIFIED HYPERLIPIDEMIA TYPE: ICD-10-CM

## 2022-03-31 DIAGNOSIS — F33.2 SEVERE EPISODE OF RECURRENT MAJOR DEPRESSIVE DISORDER, WITHOUT PSYCHOTIC FEATURES (H): ICD-10-CM

## 2022-03-31 DIAGNOSIS — E11.9 TYPE 2 DIABETES MELLITUS WITHOUT COMPLICATION, WITHOUT LONG-TERM CURRENT USE OF INSULIN (H): Primary | ICD-10-CM

## 2022-03-31 DIAGNOSIS — I82.409 DEEP VEIN THROMBOSIS (DVT) (H): ICD-10-CM

## 2022-03-31 DIAGNOSIS — E53.8 VITAMIN B12 DEFICIENCY (NON ANEMIC): ICD-10-CM

## 2022-03-31 DIAGNOSIS — E55.9 VITAMIN D DEFICIENCY: ICD-10-CM

## 2022-03-31 PROCEDURE — 99606 MTMS BY PHARM EST 15 MIN: CPT | Performed by: PHARMACIST

## 2022-03-31 PROCEDURE — 99607 MTMS BY PHARM ADDL 15 MIN: CPT | Performed by: PHARMACIST

## 2022-03-31 RX ORDER — BUPROPION HYDROCHLORIDE 300 MG/1
300 TABLET ORAL EVERY MORNING
Qty: 90 TABLET | Refills: 1 | Status: SHIPPED | OUTPATIENT
Start: 2022-03-31 | End: 2022-09-27

## 2022-03-31 NOTE — Clinical Note
Elvis--layla--peter doing well-- will see me 5-3-22 for fasting labs .  We increased his bupropion dose today .    -Adair/Brennon

## 2022-03-31 NOTE — PATIENT INSTRUCTIONS
Recommendations from today's MTM visit:                                                       1. I will send a prescription for Bupropion ER-300 mg once daily to your pharmacy. You can take 2 of your 150 mg tablets until you are out of those. We see how that works for your energy/depression and see if it needs to be adjusted at the next visit.     2. Set your goal range on your Freestyle Herve to  mg/dL to allow for your blood sugars to elevate a little more following your meals. You told us your in range percentage was 60% today (our goal is 70% or more). Hopefully with the change in range and continued diet and exercise you will achieve this at our next visit.    3. Continue frequent scanning of your blood sugars and bring your meter in with you to your in person visit with me in May    4. Please see me [id number] fasting labs .      Follow-up: Return in about 1 month (around 5/3/2022), or 9:30 AM, for Lab Work, Medication Therapy Management Visit, Blood sugar meter review.    It was great to speak with you today.  I value your experience and would be very thankful for your time with providing feedback on our clinic survey. You may receive a survey via email or text message in the next few days.     To schedule another MTM appointment, please call the clinic directly or you may call the MTM scheduling line at [phone] or toll-free at [phone].     My Clinical Pharmacist's contact information:                                                      Please feel free to contact me with any questions or concerns you have.      Adair Waite Rph.  Medication Therapy Management Provider  [phone]

## 2022-04-11 ENCOUNTER — OFFICE VISIT (OUTPATIENT)
Dept: GASTROENTEROLOGY | Facility: CLINIC | Age: 52
End: 2022-04-11
Attending: PHYSICIAN ASSISTANT
Payer: COMMERCIAL

## 2022-04-11 ENCOUNTER — LAB (OUTPATIENT)
Dept: LAB | Facility: CLINIC | Age: 52
End: 2022-04-11
Attending: PHYSICIAN ASSISTANT
Payer: COMMERCIAL

## 2022-04-11 ENCOUNTER — ANTICOAGULATION THERAPY VISIT (OUTPATIENT)
Dept: ANTICOAGULATION | Facility: CLINIC | Age: 52
End: 2022-04-11

## 2022-04-11 VITALS
DIASTOLIC BLOOD PRESSURE: 83 MMHG | SYSTOLIC BLOOD PRESSURE: 121 MMHG | WEIGHT: 298.7 LBS | HEART RATE: 107 BPM | BODY MASS INDEX: 34.52 KG/M2 | OXYGEN SATURATION: 92 %

## 2022-04-11 DIAGNOSIS — I48.91 ATRIAL FIBRILLATION, UNSPECIFIED TYPE (H): Primary | ICD-10-CM

## 2022-04-11 DIAGNOSIS — R35.0 URINARY FREQUENCY: ICD-10-CM

## 2022-04-11 DIAGNOSIS — R35.1 NOCTURIA: ICD-10-CM

## 2022-04-11 DIAGNOSIS — I82.4Z1 ACUTE DEEP VEIN THROMBOSIS (DVT) OF DISTAL END OF RIGHT LOWER EXTREMITY (H): ICD-10-CM

## 2022-04-11 DIAGNOSIS — R16.0 HEPATOMEGALY: Primary | ICD-10-CM

## 2022-04-11 DIAGNOSIS — Z79.01 CURRENT USE OF LONG TERM ANTICOAGULATION: ICD-10-CM

## 2022-04-11 DIAGNOSIS — Z12.5 SCREENING FOR PROSTATE CANCER: ICD-10-CM

## 2022-04-11 DIAGNOSIS — D68.51 HETEROZYGOUS FACTOR V LEIDEN MUTATION (H): ICD-10-CM

## 2022-04-11 DIAGNOSIS — R16.0 HEPATOMEGALY: ICD-10-CM

## 2022-04-11 LAB
ALBUMIN SERPL-MCNC: 4.1 G/DL (ref 3.4–5)
ALP SERPL-CCNC: 72 U/L (ref 40–150)
ALT SERPL W P-5'-P-CCNC: 61 U/L (ref 0–70)
ANION GAP SERPL CALCULATED.3IONS-SCNC: 7 MMOL/L (ref 3–14)
AST SERPL W P-5'-P-CCNC: 31 U/L (ref 0–45)
BILIRUB DIRECT SERPL-MCNC: 0.2 MG/DL (ref 0–0.2)
BILIRUB SERPL-MCNC: 0.7 MG/DL (ref 0.2–1.3)
BUN SERPL-MCNC: 15 MG/DL (ref 7–30)
CALCIUM SERPL-MCNC: 9.1 MG/DL (ref 8.5–10.1)
CHLORIDE BLD-SCNC: 107 MMOL/L (ref 94–109)
CO2 SERPL-SCNC: 26 MMOL/L (ref 20–32)
CREAT SERPL-MCNC: 1.09 MG/DL (ref 0.66–1.25)
ERYTHROCYTE [DISTWIDTH] IN BLOOD BY AUTOMATED COUNT: 12.5 % (ref 10–15)
GFR SERPL CREATININE-BSD FRML MDRD: 82 ML/MIN/1.73M2
GLUCOSE BLD-MCNC: 168 MG/DL (ref 70–99)
HCT VFR BLD AUTO: 51.4 % (ref 40–53)
HGB BLD-MCNC: 17.1 G/DL (ref 13.3–17.7)
INR PPP: 1.66 (ref 0.85–1.15)
MCH RBC QN AUTO: 29.1 PG (ref 26.5–33)
MCHC RBC AUTO-ENTMCNC: 33.3 G/DL (ref 31.5–36.5)
MCV RBC AUTO: 88 FL (ref 78–100)
PLATELET # BLD AUTO: 230 10E3/UL (ref 150–450)
POTASSIUM BLD-SCNC: 4.1 MMOL/L (ref 3.4–5.3)
PROT SERPL-MCNC: 7.6 G/DL (ref 6.8–8.8)
PSA SERPL-MCNC: 0.38 UG/L (ref 0–4)
RBC # BLD AUTO: 5.87 10E6/UL (ref 4.4–5.9)
SODIUM SERPL-SCNC: 140 MMOL/L (ref 133–144)
WBC # BLD AUTO: 7.2 10E3/UL (ref 4–11)

## 2022-04-11 PROCEDURE — 84153 ASSAY OF PSA TOTAL: CPT | Performed by: PATHOLOGY

## 2022-04-11 PROCEDURE — 85610 PROTHROMBIN TIME: CPT | Performed by: PATHOLOGY

## 2022-04-11 PROCEDURE — 36415 COLL VENOUS BLD VENIPUNCTURE: CPT | Performed by: PATHOLOGY

## 2022-04-11 PROCEDURE — 99205 OFFICE O/P NEW HI 60 MIN: CPT | Performed by: PHYSICIAN ASSISTANT

## 2022-04-11 PROCEDURE — 82248 BILIRUBIN DIRECT: CPT | Performed by: PATHOLOGY

## 2022-04-11 PROCEDURE — 80053 COMPREHEN METABOLIC PANEL: CPT | Performed by: PATHOLOGY

## 2022-04-11 PROCEDURE — 85027 COMPLETE CBC AUTOMATED: CPT | Performed by: PATHOLOGY

## 2022-04-11 NOTE — PROGRESS NOTES
Hepatology Clinic note  Gaurang Rivera   Date of Birth 1970  Date of Service 4/11/2022    REASON FOR CONSULTATION: Elevated LFTs   REFERRING PROVIDER: Elvis Guzman MD          Assessment/plan:   Gaurang Rivera is a 51 year old male with history of mildly elevated LFTs and recent imaging findings of hepatic steatosis/hepatomegaly.  He also has a 10 cm hypoechoic cyst in the left lobe.     Risk factors for fatty liver disease includes: obesity, SOHAIL, HTN, hyperlipidemia and diabetes.  We discussed the pathophysiology and natural history of nonalcoholic fatty liver disease and cirrhosis. We discussed NAFLD treatment includes slow gradual weight loss, as well as optimization of risk factors including hyperlipidemia/blood glucose. We discussed how a weight loss of 3-5% can show improvement on steatosis and weight loss of 7-10% can show improvement on fibrosis on histology. Will also rule out genetic and autoimmune causes of hepatobiliary disease.      - MRI abdomen to further characterize liver cyst  - Will obtain a fibrosis scan to evaluate any fibrosis  - Continue optimization of mental health. Consider health psychology referral   - Recommend slow gradual weight loss   - Maintain good control of cholesterol (No contraindication to starting a statin with LFT elevations)   - Optimize blood glucose as needed. Can consider use of GLP-1 inhibitor for both insulin resistance and help with weight loss.   - Improve nutrition:  emphasizing limit carbohydrates, especially simple carbohydrates.  - Increase physical activity: maintain physical activity more than 150 minutes a week  - Labs: Hep B, Hep C, Iron panel, alpha-1-antitrypsin deficiency, CHARISSA, F-actin, TTG   - Limit alcohol use to <3 drinks a week and less than one a day.  - Follow up in six months     Lluvia Cárdenas PA-C   [facility]     Total time for E/M services performed on the date of the encounter 60 minutes; excluding performing and  official interpretation of fibrosis scan reads.  This included review of previous: clinic visits, hospital records, lab results, imaging studies, and procedural documentation. Time also includes patient visit, documentation.  The findings from this review are summarized in the above note.   -----------------------------------------------------       HPI:   Gaurang Rivera is a 51 year old male  presenting for the evaluation of elevated LFT's.     Per chart review, transaminases high normal over the past few years. Had recent imaging that showed hepatic steatosis/hepatomegaly and 10 cm hypoechoic cyst in the left lobe.     Appetite is good. No particular diet. Eats a fair amount of fast food, admits to stress eating. Trying to cut down on portions and exercise more. Weight stable over the past year. Do not like cooking. Bagged salads, yogurt, cereal, almonds.     Admits to struggles with depression leading to low motivation and poor food choices. Sees a behavior therapist. Long term therapist moved away and has just established care with new therapist. He is not sure if it is a good fit.     Patient denies jaundice, lower extremity edema, abdominal distension or confusion.  Patient also denies melena, hematochezia or hematemesis.  Patient denies weight loss, fevers, sweats or chills.    PMH: BPH, cholesterol, depression, DM type II, morbid obesity, hyperlipidemia history of DVT    SMH: Appendectomy, biceps tenodesis repair, pilonidal cyst drain, spinal fusion, cholecystectomy, thumb surgery    Medications:   See below     No previous tobacco use. No history of alcohol use. No previous IV/IN drug use.   rowing ([employer]). Patient currently lives alone. No known family history liver disease or liver cancer.     Previous work-up:  Hepatitis B surface antigen nonreactive  Hepatitis B core antibody nonreactive  Hepatitis B surface antibody 0.0-not immune  Hepatitis C antibody nonreactive  HIV:  CHARISSA    F-actin  AMA  Iron panel:   Ferritin   Iron Sats:   TTG   Alpha-1-antripsin  Ceruloplasmin   TSH -1.29  Cholesterol Total   HDL  LDL  Triglycerides  Hemoglobin A1c 8.7    Medical hx Surgical hx   Past Medical History:   Diagnosis Date     Antiplatelet or antithrombotic long-term use      Depressive disorder 2001     Diabetes (H)      DVT (deep venous thrombosis) (H) 07/05/2019     Elevated blood pressure reading without diagnosis of hypertension 5/13/2018     Hypercholesteremia 2012     Personal history of other medical treatment      Pilonidal cyst 5/13/2018    Past Surgical History:   Procedure Laterality Date     APPENDECTOMY       ARTHROSCOPY SHOULDER, OPEN BICEP TENODESIS REPAIR, COMBINED Right 6/2/2020    Procedure: Right shoulder diagnostic arthroscopy, capsular release,  open biceps tenodesis;  Surgeon: Dulce Maria Burdick MD;  Location: [facility] OR     AS DRAIN PILONIDAL CYST SIMPLE       BACK SURGERY  1997    spinal fusion     CHOLECYSTECTOMY  2012     CYSTECTOMY PILONIDAL N/A 2/21/2020    Procedure: EXCISION, PILONIDAL CYST, AILEEN II Procedure;  Surgeon: Artemio Ahumada MD;  Location: [facility] OR     THUMB SURGERY   1995                 Medications:     Current Outpatient Medications   Medication     acetaminophen (TYLENOL) 325 MG tablet     alfuzosin ER (UROXATRAL) 10 MG 24 hr tablet     atorvastatin (LIPITOR) 40 MG tablet     buPROPion (WELLBUTRIN XL) 300 MG 24 hr tablet     Cholecalciferol (VITAMIN D-3) 125 MCG (5000 UT) TABS     Continuous Blood Gluc Sensor (FREESTYLE JAVI 14 DAY SENSOR) MISC     exenatide ER (BYDUREON BCISE) 2 MG/0.85ML auto-injector     glipiZIDE (GLUCOTROL) 5 MG tablet     ibuprofen (ADVIL/MOTRIN) 200 MG capsule     lisinopril (ZESTRIL) 5 MG tablet     metFORMIN (GLUCOPHAGE) 1000 MG tablet     multivitamin w/minerals (THERA-VIT-M) tablet     omeprazole (PRILOSEC OTC) 20 MG EC tablet     tolterodine (DETROL) 2 MG tablet     venlafaxine (EFFEXOR-XR) 150 MG 24 hr capsule      warfarin ANTICOAGULANT (COUMADIN) 5 MG tablet     No current facility-administered medications for this visit.            Allergies:   No Known Allergies         Social History:     Social History     Socioeconomic History     Marital status: Single     Spouse name: Not on file     Number of children: 0     Years of education: Not on file     Highest education level: Not on file   Occupational History     Occupation:       Comment: ROWING    Tobacco Use     Smoking status: Never Smoker     Smokeless tobacco: Never Used   Substance and Sexual Activity     Alcohol use: No     Comment: None     Drug use: No     Sexual activity: Not Currently     Partners: Female   Other Topics Concern     Parent/sibling w/ CABG, MI or angioplasty before 65F 55M? Not Asked   Social History Narrative     Not on file     Social Determinants of Health     Financial Resource Strain: Not on file   Food Insecurity: Not on file   Transportation Needs: Not on file   Physical Activity: Not on file   Stress: Not on file   Social Connections: Not on file   Intimate Partner Violence: Not on file   Housing Stability: Not on file            Family History:     Family History   Problem Relation Age of Onset     Diabetes Mother      Depression Father      Thrombosis Father         HE HAS HAD 3 CLOTS - PER PATIENT THEY WERE UNPROVOKED ON COUMADIN      Alcoholism Sister      Depression Sister      Lupus Sister      Anesthesia Reaction No family hx of      Cardiovascular No family hx of               Review of Systems:   Gen: See HPI     HEENT: No change in vision or hearing, mouth sores, dysphagia, lymph nodes  Resp: No shortness of breath, coughing, hx of asthma  CV: No chest pain, palpitations, syncope   GI: See HPI  : No dysuria, history of stones, urine color    Skin: No rash; no pruritus or psoriasis  MS: No arthralgias, myalgias, joint swelling  Neuro: No memory changes, confusion, numbness    Heme: No difficulty clotting, bruising,  bleeding  Psych:  No anxiety, depression, agitation          Physical Exam:   VS:  /83   Pulse 107   Wt 135.5 kg (298 lb 11.2 oz)   SpO2 92%   BMI 34.52 kg/m        Gen: A&Ox3, NAD, well developed  HEENT: non-icteric  CV: RRR, no overt murmurs  Lung: CTA Bilatererally, no wheezing or crackles.   Lym- no palpable lymphadenopathy  Abd: soft, NT, ND, no palpable splenomegaly, liver is not palpable.   Ext: no edema, intact pulses.   Skin: No rash,  no palmar erythema, telangiectasias or jaundice  Neuro: grossly intact, no asterixis   Psych: appropriate mood and affects         Data:   Reviewed in person and significant for:    Lab Results   Component Value Date     04/11/2022     03/30/2021      Lab Results   Component Value Date    POTASSIUM 4.1 04/11/2022    POTASSIUM 4.3 03/30/2021     Lab Results   Component Value Date    CHLORIDE 107 04/11/2022    CHLORIDE 105 03/30/2021     Lab Results   Component Value Date    CO2 26 04/11/2022    CO2 24 03/30/2021     Lab Results   Component Value Date    BUN 15 04/11/2022    BUN 16 03/30/2021     Lab Results   Component Value Date    CR 1.09 04/11/2022    CR 0.89 03/30/2021       Lab Results   Component Value Date    WBC 7.2 04/11/2022    WBC 6.8 03/30/2021     Lab Results   Component Value Date    HGB 17.1 04/11/2022    HGB 18.0 03/30/2021     Lab Results   Component Value Date    HCT 51.4 04/11/2022    HCT 51.2 03/30/2021     Lab Results   Component Value Date    MCV 88 04/11/2022    MCV 84 03/30/2021     Lab Results   Component Value Date     04/11/2022     03/30/2021       Lab Results   Component Value Date    AST 31 04/11/2022    AST 44 03/30/2021     Lab Results   Component Value Date    ALT 61 04/11/2022    ALT 73 03/30/2021     No results found for: BILICONJ   Lab Results   Component Value Date    BILITOTAL 0.7 04/11/2022    BILITOTAL 0.8 03/30/2021       Lab Results   Component Value Date    ALBUMIN 4.1 04/11/2022    ALBUMIN 4.3  03/30/2021     Lab Results   Component Value Date    PROTTOTAL 7.6 04/11/2022    PROTTOTAL 7.8 03/30/2021      Lab Results   Component Value Date    ALKPHOS 72 04/11/2022    ALKPHOS 70 03/30/2021       Lab Results   Component Value Date    INR 1.66 04/11/2022    INR 3.00 06/25/2021         Imaging:      ULTRASOUND ABDOMEN LIMITED 2/10/2022 7:31 AM     CLINICAL HISTORY: Elevated LFTs.      COMPARISONS: None available.     REFERRING PROVIDER: RANDA COLEMAN     TECHNIQUE: Right upper quadrant viscera evaluated with grayscale  imaging. Splenic and main portal veins evaluated with color Doppler  ultrasound.      Cine clips through the liver were saved in the patient's record.     FINDINGS: Visualization limited by patient body habitus and obscuring  gas.  Liver: 21.1 cm. Increased echogenicity and echotexture. 6.9 x 10.4 x  10.7 mm hypoechoic cyst in the left liver lobe with increased through  transmission and no internal vascular flow by color Doppler image.  US visualization score: C - Severe limitations     Right kidney: 13.8 cm. Normal. No mass, stone, or hydronephrosis.     Aorta:        Proximal: Obscured       Mid: 2.4 cm.     Inferior vena cava: 1.5 cm.     Main portal vein diameter: 1.4 cm.     Ascites: None     Gallbladder: History of cholecystectomy     Common bile duct: 4.8 mm     Pancreas: Poorly visualized     Splenic vein: antegrade  Main portal vein: antegrade                                                                      IMPRESSION:  1. Severely limited visualization. Hepatomegaly. Increased echogenicity and echotexture. 6.9 x 10.4 x 10.7 mm hypoechoic cyst in  the left lobe. Differential includes hepatitis, hepatosteatosis, and  cirrhosis.       A. LI-RADS US Category: US-1 Negative: No US evidence of HCC       B. Recommend continued surveillance US.

## 2022-04-11 NOTE — LETTER
4/11/2022         RE: Gaurang Rivera  [address]  [facility] [address]        Dear Colleague,    Thank you for referring your patient, Gaurang Rivera, to the [facility] [place]. Please see a copy of my visit note below.    Hepatology Clinic note  Gaurang Rivera   Date of Birth 1970  Date of Service 4/11/2022    REASON FOR CONSULTATION: Elevated LFTs   REFERRING PROVIDER: Elvis Guzman MD          Assessment/plan:   Gaurang Rivera is a 51 year old male with history of mildly elevated LFTs and recent imaging findings of hepatic steatosis/hepatomegaly.  He also has a 10 cm hypoechoic cyst in the left lobe.     Risk factors for fatty liver disease includes: obesity, SOHAIL, HTN, hyperlipidemia and diabetes.  We discussed the pathophysiology and natural history of nonalcoholic fatty liver disease and cirrhosis. We discussed NAFLD treatment includes slow gradual weight loss, as well as optimization of risk factors including hyperlipidemia/blood glucose. We discussed how a weight loss of 3-5% can show improvement on steatosis and weight loss of 7-10% can show improvement on fibrosis on histology. Will also rule out genetic and autoimmune causes of hepatobiliary disease.      - MRI abdomen to further characterize liver cyst  - Will obtain a fibrosis scan to evaluate any fibrosis  - Continue optimization of mental health. Consider health psychology referral   - Recommend slow gradual weight loss   - Maintain good control of cholesterol (No contraindication to starting a statin with LFT elevations)   - Optimize blood glucose as needed. Can consider use of GLP-1 inhibitor for both insulin resistance and help with weight loss.   - Improve nutrition:  emphasizing limit carbohydrates, especially simple carbohydrates.  - Increase physical activity: maintain physical activity more than 150 minutes a week  - Labs: Hep B, Hep C, Iron panel, alpha-1-antitrypsin deficiency, CHARISSA,  F-actin, TTG   - Limit alcohol use to <3 drinks a week and less than one a day.  - Follow up in six months     Lluvia Cárdenas PA-C   [facility] Hepatology     Total time for E/M services performed on the date of the encounter 60 minutes; excluding performing and official interpretation of fibrosis scan reads.  This included review of previous: clinic visits, hospital records, lab results, imaging studies, and procedural documentation. Time also includes patient visit, documentation.  The findings from this review are summarized in the above note.   -----------------------------------------------------       HPI:   Gaurang Rivera is a 51 year old male  presenting for the evaluation of elevated LFT's.     Per chart review, transaminases high normal over the past few years. Had recent imaging that showed hepatic steatosis/hepatomegaly and 10 cm hypoechoic cyst in the left lobe.     Appetite is good. No particular diet. Eats a fair amount of fast food, admits to stress eating. Trying to cut down on portions and exercise more. Weight stable over the past year. Do not like cooking. Bagged salads, yogurt, cereal, almonds.     Admits to struggles with depression leading to low motivation and poor food choices. Sees a behavior therapist. Long term therapist moved away and has just established care with new therapist. He is not sure if it is a good fit.     Patient denies jaundice, lower extremity edema, abdominal distension or confusion.  Patient also denies melena, hematochezia or hematemesis.  Patient denies weight loss, fevers, sweats or chills.    PMH: BPH, cholesterol, depression, DM type II, morbid obesity, hyperlipidemia history of DVT    SMH: Appendectomy, biceps tenodesis repair, pilonidal cyst drain, spinal fusion, cholecystectomy, thumb surgery    Medications:   See below     No previous tobacco use. No history of alcohol use. No previous IV/IN drug use.   rowing ([place] row[employer] club). Patient currently  lives alone. No known family history liver disease or liver cancer.     Previous work-up:  Hepatitis B surface antigen nonreactive  Hepatitis B core antibody nonreactive  Hepatitis B surface antibody 0.0-not immune  Hepatitis C antibody nonreactive  HIV:  CHARISSA   F-actin  AMA  Iron panel:   Ferritin   Iron Sats:   TTG   Alpha-1-antripsin  Ceruloplasmin   TSH -1.29  Cholesterol Total   HDL  LDL  Triglycerides  Hemoglobin A1c 8.7    Medical hx Surgical hx   Past Medical History:   Diagnosis Date     Antiplatelet or antithrombotic long-term use      Depressive disorder 2001     Diabetes (H)      DVT (deep venous thrombosis) (H) 07/05/2019     Elevated blood pressure reading without diagnosis of hypertension 5/13/2018     Hypercholesteremia 2012     Personal history of other medical treatment      Pilonidal cyst 5/13/2018    Past Surgical History:   Procedure Laterality Date     APPENDECTOMY       ARTHROSCOPY SHOULDER, OPEN BICEP TENODESIS REPAIR, COMBINED Right 6/2/2020    Procedure: Right shoulder diagnostic arthroscopy, capsular release,  open biceps tenodesis;  Surgeon: Dulce Maria Burdick MD;  Location: [facility] OR     AS DRAIN PILONIDAL CYST SIMPLE       BACK SURGERY  1997    spinal fusion     CHOLECYSTECTOMY  2012     CYSTECTOMY PILONIDAL N/A 2/21/2020    Procedure: EXCISION, PILONIDAL CYST, AILEEN II Procedure;  Surgeon: Artemio Ahumada MD;  Location: [facility] OR     THUMB SURGERY   1995                 Medications:     Current Outpatient Medications   Medication     acetaminophen (TYLENOL) 325 MG tablet     alfuzosin ER (UROXATRAL) 10 MG 24 hr tablet     atorvastatin (LIPITOR) 40 MG tablet     buPROPion (WELLBUTRIN XL) 300 MG 24 hr tablet     Cholecalciferol (VITAMIN D-3) 125 MCG (5000 UT) TABS     Continuous Blood Gluc Sensor (FREESTYLE JAVI 14 DAY SENSOR) MISC     exenatide ER (BYDUREON BCISE) 2 MG/0.85ML auto-injector     glipiZIDE (GLUCOTROL) 5 MG tablet     ibuprofen (ADVIL/MOTRIN) 200 MG capsule      lisinopril (ZESTRIL) 5 MG tablet     metFORMIN (GLUCOPHAGE) 1000 MG tablet     multivitamin w/minerals (THERA-VIT-M) tablet     omeprazole (PRILOSEC OTC) 20 MG EC tablet     tolterodine (DETROL) 2 MG tablet     venlafaxine (EFFEXOR-XR) 150 MG 24 hr capsule     warfarin ANTICOAGULANT (COUMADIN) 5 MG tablet     No current facility-administered medications for this visit.            Allergies:   No Known Allergies         Social History:     Social History     Socioeconomic History     Marital status: Single     Spouse name: Not on file     Number of children: 0     Years of education: Not on file     Highest education level: Not on file   Occupational History     Occupation:       Comment: ROWING    Tobacco Use     Smoking status: Never Smoker     Smokeless tobacco: Never Used   Substance and Sexual Activity     Alcohol use: No     Comment: None     Drug use: No     Sexual activity: Not Currently     Partners: Female   Other Topics Concern     Parent/sibling w/ CABG, MI or angioplasty before 65F 55M? Not Asked   Social History Narrative     Not on file     Social Determinants of Health     Financial Resource Strain: Not on file   Food Insecurity: Not on file   Transportation Needs: Not on file   Physical Activity: Not on file   Stress: Not on file   Social Connections: Not on file   Intimate Partner Violence: Not on file   Housing Stability: Not on file            Family History:     Family History   Problem Relation Age of Onset     Diabetes Mother      Depression Father      Thrombosis Father         HE HAS HAD 3 CLOTS - PER PATIENT THEY WERE UNPROVOKED ON COUMADIN      Alcoholism Sister      Depression Sister      Lupus Sister      Anesthesia Reaction No family hx of      Cardiovascular No family hx of               Review of Systems:   Gen: See HPI     HEENT: No change in vision or hearing, mouth sores, dysphagia, lymph nodes  Resp: No shortness of breath, coughing, hx of asthma  CV: No chest pain,  palpitations, syncope   GI: See HPI  : No dysuria, history of stones, urine color    Skin: No rash; no pruritus or psoriasis  MS: No arthralgias, myalgias, joint swelling  Neuro: No memory changes, confusion, numbness    Heme: No difficulty clotting, bruising, bleeding  Psych:  No anxiety, depression, agitation          Physical Exam:   VS:  /83   Pulse 107   Wt 135.5 kg (298 lb 11.2 oz)   SpO2 92%   BMI 34.52 kg/m        Gen: A&Ox3, NAD, well developed  HEENT: non-icteric  CV: RRR, no overt murmurs  Lung: CTA Bilatererally, no wheezing or crackles.   Lym- no palpable lymphadenopathy  Abd: soft, NT, ND, no palpable splenomegaly, liver is not palpable.   Ext: no edema, intact pulses.   Skin: No rash,  no palmar erythema, telangiectasias or jaundice  Neuro: grossly intact, no asterixis   Psych: appropriate mood and affects         Data:   Reviewed in person and significant for:    Lab Results   Component Value Date     04/11/2022     03/30/2021      Lab Results   Component Value Date    POTASSIUM 4.1 04/11/2022    POTASSIUM 4.3 03/30/2021     Lab Results   Component Value Date    CHLORIDE 107 04/11/2022    CHLORIDE 105 03/30/2021     Lab Results   Component Value Date    CO2 26 04/11/2022    CO2 24 03/30/2021     Lab Results   Component Value Date    BUN 15 04/11/2022    BUN 16 03/30/2021     Lab Results   Component Value Date    CR 1.09 04/11/2022    CR 0.89 03/30/2021       Lab Results   Component Value Date    WBC 7.2 04/11/2022    WBC 6.8 03/30/2021     Lab Results   Component Value Date    HGB 17.1 04/11/2022    HGB 18.0 03/30/2021     Lab Results   Component Value Date    HCT 51.4 04/11/2022    HCT 51.2 03/30/2021     Lab Results   Component Value Date    MCV 88 04/11/2022    MCV 84 03/30/2021     Lab Results   Component Value Date     04/11/2022     03/30/2021       Lab Results   Component Value Date    AST 31 04/11/2022    AST 44 03/30/2021     Lab Results   Component  Value Date    ALT 61 04/11/2022    ALT 73 03/30/2021     No results found for: BILICONJ   Lab Results   Component Value Date    BILITOTAL 0.7 04/11/2022    BILITOTAL 0.8 03/30/2021       Lab Results   Component Value Date    ALBUMIN 4.1 04/11/2022    ALBUMIN 4.3 03/30/2021     Lab Results   Component Value Date    PROTTOTAL 7.6 04/11/2022    PROTTOTAL 7.8 03/30/2021      Lab Results   Component Value Date    ALKPHOS 72 04/11/2022    ALKPHOS 70 03/30/2021       Lab Results   Component Value Date    INR 1.66 04/11/2022    INR 3.00 06/25/2021         Imaging:      ULTRASOUND ABDOMEN LIMITED 2/10/2022 7:31 AM     CLINICAL HISTORY: Elevated LFTs.      COMPARISONS: None available.     REFERRING PROVIDER: RANDA COLEMAN     TECHNIQUE: Right upper quadrant viscera evaluated with grayscale  imaging. Splenic and main portal veins evaluated with color Doppler  ultrasound.      Cine clips through the liver were saved in the patient's record.     FINDINGS: Visualization limited by patient body habitus and obscuring  gas.  Liver: 21.1 cm. Increased echogenicity and echotexture. 6.9 x 10.4 x  10.7 mm hypoechoic cyst in the left liver lobe with increased through  transmission and no internal vascular flow by color Doppler image.  US visualization score: C - Severe limitations     Right kidney: 13.8 cm. Normal. No mass, stone, or hydronephrosis.     Aorta:        Proximal: Obscured       Mid: 2.4 cm.     Inferior vena cava: 1.5 cm.     Main portal vein diameter: 1.4 cm.     Ascites: None     Gallbladder: History of cholecystectomy     Common bile duct: 4.8 mm     Pancreas: Poorly visualized     Splenic vein: antegrade  Main portal vein: antegrade                                                                      IMPRESSION:  1. Severely limited visualization. Hepatomegaly. Increased echogenicity and echotexture. 6.9 x 10.4 x 10.7 mm hypoechoic cyst in  the left lobe. Differential includes hepatitis, hepatosteatosis,  and  cirrhosis.       A. LI-RADS US Category: US-1 Negative: No US evidence of HCC       B. Recommend continued surveillance US.      Again, thank you for allowing me to participate in the care of your patient.        Sincerely,        Lluvia Cárdenas PA-C

## 2022-04-11 NOTE — PROGRESS NOTES
ANTICOAGULATION MANAGEMENT     Gaurang Rivera 51 year old male is on warfarin with subtherapeutic INR result. (Goal INR 2.0-3.0)    Recent labs: (last 7 days)     04/11/22  1314   INR 1.66*       ASSESSMENT       Source(s): Chart Review and Patient/Caregiver Call       Warfarin doses taken: Warfarin taken as instructed    Diet: No new diet changes identified    New illness, injury, or hospitalization: No    Medication/supplement changes: None noted    Signs or symptoms of bleeding or clotting: No    Previous INR: Therapeutic last 2(+) visits    Additional findings: None       PLAN     Recommended plan for no diet, medication or health factor changes affecting INR     Dosing Instructions: continue your current warfarin dose with next INR in 2 weeks       Summary  As of 4/11/2022    Full warfarin instructions:  4/11: 15 mg; Otherwise 7.5 mg every day   Next INR check:  4/25/2022             Telephone call with  Gaurang who verbalizes understanding and agrees to plan    Lab visit scheduled    Education provided: Please call back if any changes to your diet, medications or how you've been taking warfarin    Plan made per [facility] anticoagulation protocol    Harshil Salamanca RN  Anticoagulation Clinic  4/11/2022    _______________________________________________________________________     Anticoagulation Episode Summary     Current INR goal:  2.0-3.0   TTR:  66.0 % (1 y)   Target end date:  Indefinite   Send INR reminders to:  [facility]    Indications    Atrial fibrillation  unspecified type (H) [I48.91]  Heterozygous factor V Leiden mutation (H) [D68.51]  Current use of long term anticoagulation [Z79.01]  Acute deep vein thrombosis (DVT) of distal end of right lower extremity (H) [I82.4Z1]           Comments:  MTV 30 mcg.         Anticoagulation Care Providers     Provider Role Specialty Phone number    Elvis Guzman MD Referring Family Medicine [phone]

## 2022-04-18 DIAGNOSIS — E78.5 HYPERLIPIDEMIA, UNSPECIFIED HYPERLIPIDEMIA TYPE: ICD-10-CM

## 2022-04-18 RX ORDER — ATORVASTATIN CALCIUM 40 MG/1
TABLET, FILM COATED ORAL
Qty: 90 TABLET | Refills: 0 | Status: CANCELLED | OUTPATIENT
Start: 2022-04-18

## 2022-04-19 RX ORDER — ATORVASTATIN CALCIUM 40 MG/1
TABLET, FILM COATED ORAL
Qty: 90 TABLET | Refills: 0 | Status: SHIPPED | OUTPATIENT
Start: 2022-04-19 | End: 2023-01-12

## 2022-04-19 NOTE — TELEPHONE ENCOUNTER
Medication is being filled for 1 time refill only due to:  Patient needs to be seen because needs labs.     Team Coordinators: Please contact patient to set up FASTING lab only appt (lab orders are in) for any further refills.     VIOLETTE Real RN  [facility]

## 2022-04-29 ENCOUNTER — ANTICOAGULATION THERAPY VISIT (OUTPATIENT)
Dept: ANTICOAGULATION | Facility: CLINIC | Age: 52
End: 2022-04-29

## 2022-04-29 ENCOUNTER — LAB (OUTPATIENT)
Dept: LAB | Facility: CLINIC | Age: 52
End: 2022-04-29
Payer: COMMERCIAL

## 2022-04-29 DIAGNOSIS — I82.409 DEEP VEIN THROMBOSIS (DVT) (H): ICD-10-CM

## 2022-04-29 DIAGNOSIS — I82.4Z1 ACUTE DEEP VEIN THROMBOSIS (DVT) OF DISTAL END OF RIGHT LOWER EXTREMITY (H): ICD-10-CM

## 2022-04-29 DIAGNOSIS — I48.91 ATRIAL FIBRILLATION, UNSPECIFIED TYPE (H): ICD-10-CM

## 2022-04-29 DIAGNOSIS — I82.4Y9 DEEP VEIN THROMBOSIS (DVT) OF PROXIMAL LOWER EXTREMITY, UNSPECIFIED CHRONICITY, UNSPECIFIED LATERALITY (H): ICD-10-CM

## 2022-04-29 DIAGNOSIS — I48.91 ATRIAL FIBRILLATION, UNSPECIFIED TYPE (H): Primary | ICD-10-CM

## 2022-04-29 DIAGNOSIS — D68.51 HETEROZYGOUS FACTOR V LEIDEN MUTATION (H): ICD-10-CM

## 2022-04-29 DIAGNOSIS — Z79.01 CURRENT USE OF LONG TERM ANTICOAGULATION: ICD-10-CM

## 2022-04-29 LAB — INR BLD: 2.2 (ref 0.9–1.1)

## 2022-04-29 PROCEDURE — 85610 PROTHROMBIN TIME: CPT

## 2022-04-29 PROCEDURE — 36415 COLL VENOUS BLD VENIPUNCTURE: CPT

## 2022-04-29 NOTE — PROGRESS NOTES
ANTICOAGULATION MANAGEMENT     Gaurang Rivera 51 year old male is on warfarin with therapeutic INR result. (Goal INR 2.0-3.0)    Recent labs: (last 7 days)     04/29/22  1423   INR 2.2*       ASSESSMENT       Source(s): Chart Review    Previous INR was Subtherapeutic    Medication, diet, health changes since last INR chart reviewed; none identified       PLAN     Recommended plan for no diet, medication or health factor changes affecting INR     Dosing Instructions: continue your current warfarin dose with next INR in 3-4 weeks       Summary  As of 4/29/2022    Full warfarin instructions:  7.5 mg every day   Next INR check:  5/27/2022             Telephone call with Gaurang who verbalizes understanding and agrees to plan    Lab visit scheduled    Education provided: Please call back if any changes to your diet, medications or how you've been taking warfarin, Monitoring for bleeding signs and symptoms, Monitoring for clotting signs and symptoms and Importance of notifying clinic for changes in medications; a sooner lab recheck maybe needed.    Plan made per [facility] anticoagulation protocol    Cyn Womack RN  Anticoagulation Clinic  4/29/2022    _______________________________________________________________________     Anticoagulation Episode Summary     Current INR goal:  2.0-3.0   TTR:  62.8 % (1 y)   Target end date:  Indefinite   Send INR reminders to:  [facility]    Indications    Atrial fibrillation  unspecified type (H) [I48.91]  Heterozygous factor V Leiden mutation (H) [D68.51]  Current use of long term anticoagulation [Z79.01]  Acute deep vein thrombosis (DVT) of distal end of right lower extremity (H) [I82.4Z1]           Comments:  MTV 30 mcg.         Anticoagulation Care Providers     Provider Role Specialty Phone number    Elvis Guzman MD Referring Family Medicine [phone]            
Yes - the patient is able to be screened

## 2022-05-01 ENCOUNTER — ANCILLARY PROCEDURE (OUTPATIENT)
Dept: MRI IMAGING | Facility: CLINIC | Age: 52
End: 2022-05-01
Attending: PHYSICIAN ASSISTANT
Payer: COMMERCIAL

## 2022-05-01 DIAGNOSIS — R16.0 HEPATOMEGALY: ICD-10-CM

## 2022-05-01 PROCEDURE — 74183 MRI ABD W/O CNTR FLWD CNTR: CPT | Performed by: RADIOLOGY

## 2022-05-01 PROCEDURE — A9585 GADOBUTROL INJECTION: HCPCS | Performed by: RADIOLOGY

## 2022-05-01 RX ORDER — GADOBUTROL 604.72 MG/ML
15 INJECTION INTRAVENOUS ONCE
Status: COMPLETED | OUTPATIENT
Start: 2022-05-01 | End: 2022-05-01

## 2022-05-01 RX ADMIN — GADOBUTROL 14 ML: 604.72 INJECTION INTRAVENOUS at 07:25

## 2022-05-01 NOTE — PROGRESS NOTES
Medication Therapy Management (MTM) Encounter    ASSESSMENT:                            Medication Adherence/Access: No issues identified      Type 2 Diabetes: Patient is meeting A1c goal of < 7%. Self monitoring of blood glucose is at goal of fasting  mg/dL and post prandial < 150 mg/dL. Patient is meeting average glucose goal of <150mg/dl Patient is meeting goal of >70% time in target with continuous glucose monitoring.  Patient would benefit from ideal SMBG: Check blood sugars pre-prandial, 2 hours post-prandial, and with symptoms of hypoglycemia, Metformin :  stay on the same dose., Sulfonylurea (Glipizide) :  STOP this drug today , GLP-1 agonist (Bydureon) :  stay on the same dose., updated Hemoglobin A1c (6%) and weight loss recommended. Microalbumin is not at goal < 30 mg/g. Taking an ACE-inhibitor not at max dose.       Hyperlipidemia: currently not at goal, would likely benefit from continued exercise and dietary changes. Currently on guideline directed statin therapy.     Vitamin D3: is not at goal of 50-75ng/mL. Pt would benefit from lab recheck today result is pending.     Depression:  Stable-continue same medications as is.  Weekly therapist very helpful as well.     DVT: Stable INR.     PLAN:                            1. A1c today = 6.0% --fantastic drop from 8.7% --lets keep you on weekly Bydureon and twice daily Metformin but lets STOP all the glipizide now --not needed. Continue your excellent diet /exercise routine.     2. Watch mychart for other labs results/plan.    3. Make appt for yearly exam with Dr. Guzman this summer .          Follow-up: Return in about 18 weeks (around 9/6/2022), or 9 AM, for A1c lab, Blood sugar meter review, Medication Therapy Management Visit.    SUBJECTIVE/OBJECTIVE:                          Gaurang Rivera is a 51 year old male coming in for a follow up visit (3-31-22). He was referred to me from Elvis Guzman  .       Reason for visit:   Fasting labs.      Allergies/ADRs: Reviewed in chart; no tylenol 2022--due to liver growth .   Past Medical History: Reviewed in chart  Tobacco: He reports that he has never smoked. He has never used smokeless tobacco.  Alcohol: not currently using  Caffeine: diet coke - 4 cans/day or so   Social:   rowing at [facility]. Rowing club.   Personal Healthcare Goals:  DM at goal is #1 issue, food is his drug -addicted to it , sleep is poor -tired , craves caffeine /sugar . Wants to exercise more.   Activity: winter months - sedentary , summer months - more active [place] , [employer] gym membership close to his house --infrequent use. 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      Medication Adherence/Access: no issues reported      Type 2 Diabetes:  Currently taking : Glipizide IR 2.5 mg in the morning and 2.5 mg in the evening , metformin 1 gm bid pc, weekly bydureon 2 mg now.   Patient is not experiencing side effects.  Blood sugar monitoring: [employer] CGM:               Symptoms of low blood sugar? None now.   Symptoms of high blood sugar? none  Eye exam: up to date  Foot exam: mild tingling in feet.   Diet/Exercise:   Has been working on keeping IF diet as best as he can but fails some days. He says he tries to keep his eating to an 8 hour window each day. Rowing season is starting soon so he is looking forward to that.      Aspirin: Not taking due to warfarin   Statin: Yes: Atorvastatin 40   ACEi/ARB: Yes: Lisinopril 5mg.    Urine Albumin:   Lab Results   Component Value Date    UMALCR 61.11 (H) 03/30/2021      Lab Results   Component Value Date    A1C 6.0 05/03/2022    A1C 8.7 01/27/2022    A1C 9.5 03/30/2021    A1C 7.2 10/10/2019    A1C 7.1 06/28/2019    A1C 6.5 09/26/2018         Hyperlipidemia: Current therapy includes : Atorvastatin 40mg daily.  Patient reports no significant myalgias or other side effects.  The 10-year ASCVD risk score (Cindy FITZPATRICK Jr., et al., 2013) is: 12.3%    Values used to calculate the score:      Age: 51  years      Sex: Male      Is Non- : No      Diabetic: Yes      Tobacco smoker: No      Systolic Blood Pressure: 110 mmHg      Is BP treated: Yes      HDL Cholesterol: 35 mg/dL      Total Cholesterol: 235 mg/dL  Recent Labs   Lab Test 03/30/21  1411 10/10/19  0959   CHOL 235* 205*   HDL 35* 36*   LDL Cannot estimate LDL when triglyceride exceeds 400 mg/dL Cannot estimate LDL when triglyceride exceeds 400 mg/dL  109*   TRIG 656* 401*   Recheck fasting lab 5-3-22.     Depression:  Current medications include: Venlafaxine 2m234nh once daily and bupropion XL -300 mg daily.   Things are stable right now --he feels ok where he is at .   Seeing weekly therapist --very helpful.   PHQ-9 SCORE 10/14/2021 1/26/2022 1/27/2022   PHQ-9 Total Score MyChart - - 15 (Moderately severe depression)   PHQ-9 Total Score 17 15 15       DVT:   2 years ago had blood clot in his foot , has family hx blood clots.  Takes warfarin 7.5mg tues-sunday ,monday 10mg./day .  INR   Date Value Ref Range Status   04/29/2022 2.2 (H) 0.9 - 1.1 Final   04/11/2022 1.66 (H) 0.85 - 1.15 Final   06/25/2021 3.00 (H) 0.86 - 1.14 Final     Comment:     This test is intended for monitoring Coumadin therapy.  Results are not   accurate in patients with prolonged INR due to factor deficiency.          Vitamin D3: level was 27 on 1-27-22. Taking vitamin D 5000 units once daily.   Vitamin D Deficiency Screening Results:  Lab Results   Component Value Date    VITDT 27 01/27/2022    VITDT 34 03/30/2021         I spent 30 minutes with this patient today. All changes were made via collaborative practice agreement with Elvis Guzman MD. A copy of the visit note was provided to the patient's provider(s).    The patient was given a summary of these recommendations after todays visit.      Adair Waite [facility].  Medication Therapy Management Provider  [phone]         Medication Therapy Recommendations  Diabetes mellitus, type 2 (H)     Current Medication: glipiZIDE (GLUCOTROL) 5 MG tablet (Discontinued)   Rationale: Duplicate Therapy - Unnecessary medication therapy - Indication   Recommendation: Discontinue Medication   Status: Accepted per CPA

## 2022-05-03 ENCOUNTER — OFFICE VISIT (OUTPATIENT)
Dept: PHARMACY | Facility: CLINIC | Age: 52
End: 2022-05-03
Payer: COMMERCIAL

## 2022-05-03 ENCOUNTER — LAB (OUTPATIENT)
Dept: LAB | Facility: CLINIC | Age: 52
End: 2022-05-03
Payer: COMMERCIAL

## 2022-05-03 VITALS
DIASTOLIC BLOOD PRESSURE: 80 MMHG | SYSTOLIC BLOOD PRESSURE: 110 MMHG | OXYGEN SATURATION: 94 % | HEART RATE: 83 BPM | BODY MASS INDEX: 34.32 KG/M2 | WEIGHT: 297 LBS

## 2022-05-03 DIAGNOSIS — E53.8 VITAMIN B12 DEFICIENCY (NON ANEMIC): ICD-10-CM

## 2022-05-03 DIAGNOSIS — F33.2 SEVERE EPISODE OF RECURRENT MAJOR DEPRESSIVE DISORDER, WITHOUT PSYCHOTIC FEATURES (H): ICD-10-CM

## 2022-05-03 DIAGNOSIS — E78.5 HYPERLIPIDEMIA, UNSPECIFIED HYPERLIPIDEMIA TYPE: ICD-10-CM

## 2022-05-03 DIAGNOSIS — E11.9 TYPE 2 DIABETES MELLITUS WITHOUT COMPLICATION, WITHOUT LONG-TERM CURRENT USE OF INSULIN (H): Primary | ICD-10-CM

## 2022-05-03 DIAGNOSIS — E55.9 VITAMIN D DEFICIENCY: ICD-10-CM

## 2022-05-03 DIAGNOSIS — E11.9 TYPE 2 DIABETES MELLITUS WITHOUT COMPLICATION, WITHOUT LONG-TERM CURRENT USE OF INSULIN (H): ICD-10-CM

## 2022-05-03 DIAGNOSIS — I82.4Y9 DEEP VEIN THROMBOSIS (DVT) OF PROXIMAL LOWER EXTREMITY, UNSPECIFIED CHRONICITY, UNSPECIFIED LATERALITY (H): ICD-10-CM

## 2022-05-03 DIAGNOSIS — I82.409 DEEP VEIN THROMBOSIS (DVT) (H): ICD-10-CM

## 2022-05-03 LAB
ALBUMIN SERPL-MCNC: 4.1 G/DL (ref 3.4–5)
ALP SERPL-CCNC: 72 U/L (ref 40–150)
ALT SERPL W P-5'-P-CCNC: 63 U/L (ref 0–70)
ANION GAP SERPL CALCULATED.3IONS-SCNC: 8 MMOL/L (ref 3–14)
AST SERPL W P-5'-P-CCNC: 37 U/L (ref 0–45)
BILIRUB SERPL-MCNC: 0.8 MG/DL (ref 0.2–1.3)
BUN SERPL-MCNC: 15 MG/DL (ref 7–30)
CALCIUM SERPL-MCNC: 9.1 MG/DL (ref 8.5–10.1)
CHLORIDE BLD-SCNC: 107 MMOL/L (ref 94–109)
CHOLEST SERPL-MCNC: 134 MG/DL
CO2 SERPL-SCNC: 24 MMOL/L (ref 20–32)
CREAT SERPL-MCNC: 0.99 MG/DL (ref 0.66–1.25)
CREAT UR-MCNC: 225 MG/DL
DEPRECATED CALCIDIOL+CALCIFEROL SERPL-MC: 53 UG/L (ref 20–75)
FASTING STATUS PATIENT QL REPORTED: YES
GFR SERPL CREATININE-BSD FRML MDRD: >90 ML/MIN/1.73M2
GLUCOSE BLD-MCNC: 109 MG/DL (ref 70–99)
HBA1C MFR BLD: 6 % (ref 0–5.6)
HDLC SERPL-MCNC: 36 MG/DL
LDLC SERPL CALC-MCNC: 58 MG/DL
MICROALBUMIN UR-MCNC: 23 MG/L
MICROALBUMIN/CREAT UR: 10.22 MG/G CR (ref 0–17)
NONHDLC SERPL-MCNC: 98 MG/DL
POTASSIUM BLD-SCNC: 3.7 MMOL/L (ref 3.4–5.3)
PROT SERPL-MCNC: 7.6 G/DL (ref 6.8–8.8)
SODIUM SERPL-SCNC: 139 MMOL/L (ref 133–144)
TRIGL SERPL-MCNC: 201 MG/DL
TSH SERPL DL<=0.005 MIU/L-ACNC: 1.1 MU/L (ref 0.4–4)
VIT B12 SERPL-MCNC: 582 PG/ML (ref 193–986)

## 2022-05-03 PROCEDURE — 80053 COMPREHEN METABOLIC PANEL: CPT

## 2022-05-03 PROCEDURE — 82043 UR ALBUMIN QUANTITATIVE: CPT

## 2022-05-03 PROCEDURE — 36415 COLL VENOUS BLD VENIPUNCTURE: CPT

## 2022-05-03 PROCEDURE — 99606 MTMS BY PHARM EST 15 MIN: CPT | Performed by: PHARMACIST

## 2022-05-03 PROCEDURE — 84443 ASSAY THYROID STIM HORMONE: CPT

## 2022-05-03 PROCEDURE — 82306 VITAMIN D 25 HYDROXY: CPT

## 2022-05-03 PROCEDURE — 80061 LIPID PANEL: CPT

## 2022-05-03 PROCEDURE — 82607 VITAMIN B-12: CPT

## 2022-05-03 PROCEDURE — 99607 MTMS BY PHARM ADDL 15 MIN: CPT | Performed by: PHARMACIST

## 2022-05-03 PROCEDURE — 83036 HEMOGLOBIN GLYCOSYLATED A1C: CPT

## 2022-05-03 NOTE — Clinical Note
Elvis--fasting labs today --peter a1c 6% --back in the ball game --other labs pending , bp wnl.  Depression wise feels stable . Suiggested he see you this summer for yearly exam  Thx.[email or web address]

## 2022-05-03 NOTE — PATIENT INSTRUCTIONS
"Recommendations from today's MTM visit:                                                       A1c today = 6.0% --fantastic drop from 8.7% --lets keep you on weekly Bydureon and twice daily Metformin but lets STOP all the glipizide now --not needed. Continue your excellent diet /exercise routine.     2. Watch mychart for other labs results/plan.    3. Make appt for yearly exam with Dr. Guzman this summer .          Follow-up: Return in about 18 weeks (around 9/6/2022), or 9 AM, for A1c lab, Blood sugar meter review, Medication Therapy Management Visit.    It was great speaking with you today.  I value your experience and would be very thankful for your time in providing feedback in our clinic survey. In the next few days, you may receive an email or text message from [employer] with a link to a survey related to your  clinical pharmacist.\"     To schedule another MTM appointment, please call the clinic directly or you may call the MTM scheduling line at [phone] or toll-free at [phone].     My Clinical Pharmacist's contact information:                                                      Please feel free to contact me with any questions or concerns you have.      Adair Waite Rp.  Medication Therapy Management Provider  [phone]    "

## 2022-05-09 ENCOUNTER — OFFICE VISIT (OUTPATIENT)
Dept: DERMATOLOGY | Facility: CLINIC | Age: 52
End: 2022-05-09
Payer: COMMERCIAL

## 2022-05-09 DIAGNOSIS — Z12.83 SKIN CANCER SCREENING: ICD-10-CM

## 2022-05-09 DIAGNOSIS — L73.8 SENILE SEBACEOUS GLAND HYPERPLASIA: ICD-10-CM

## 2022-05-09 DIAGNOSIS — L91.8 SKIN TAG: Primary | ICD-10-CM

## 2022-05-09 DIAGNOSIS — L73.9 FOLLICULITIS: ICD-10-CM

## 2022-05-09 PROCEDURE — 11200 RMVL SKIN TAGS UP TO&INC 15: CPT | Mod: GC | Performed by: DERMATOLOGY

## 2022-05-09 PROCEDURE — 99203 OFFICE O/P NEW LOW 30 MIN: CPT | Mod: 25 | Performed by: DERMATOLOGY

## 2022-05-09 ASSESSMENT — PAIN SCALES - GENERAL: PAINLEVEL: NO PAIN (0)

## 2022-05-09 NOTE — LETTER
5/9/2022       RE: Gaurang Rivera  [address]  [facility] [address]     Dear Colleague,    Thank you for referring your patient, Gaurang Rivera, to the [facility]ERMATOLOG[facility] [place] at [facility]. Please see a copy of my visit note below.    [facility] Dermatology Note  Encounter Date: May 9, 2022  Office Visit     Dermatology Problem List:  1. Folliculitis - BPO wash  2. skin tags near L and R lateral canthi - forceps cryo 5/09/22  ____________________________________________    Assessment & Plan:     # Inflammed skin tags near L and R lateral canthi  - Reassured patient of the benign nature/appearance of this lesion but patient desires cryo given irritation.   - cryo therapy today (see procedure note below)     # Folliculitis, back, chest, buttocks  - start BPO wash in shower up to daily     # Sebaceous hyperplasia   - Reassured patient of the benign nature/appearance of this lesions    # Skin cancer screening  - no lesions on today's exam concerning for skin cancer  - encouraged continued sun protection    Procedures Performed:   - Cryotherapy procedure note, location(s): L and R canthi. After verbal consent and discussion of risks and benefits, 2 lesion(s) was(were) treated with 1-2 mm freeze border for 1-2 cycles with liquid nitrogen using a forceps. Post cryotherapy instructions were provided.    Follow-up: prn for new or changing lesions    Staff and Resident:     Kay Shelton MD  Dermatology Resident, PGY3  I was present for the entire procedure. Angeles Wise MD  I, Angeles Wise MD, saw this patient with the resident and agree with the resident s findings and plan of care as documented in the resident s note.      ____________________________________________    CC: Skin Check (Gaurang is here today for his first FBSE, fhx skin cancer. Reports growths around eyes)    HPI:  Mr. Gaurang Rivera  is a(n) 51 year old male who presents today as a new patient for skin check and evaluation of a few skin tags around the eyes.   - says skin tags around the eyes are bothersome to him and would like them removed   - denies noticing any other spots he is worried about on his skin except for a few spots on his left cheek that have been there for years and aren't changing.   - not using any special soap/wash on his back. Back gets itchy sometimes.   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  SKIN: Total skin excluding the undergarment areas was performed. The exam included the head/face, neck, both arms, chest, back, abdomen, both legs, buttocks, digits and/or nails.   - many pink papules and pustules on the back, buttocks, and posterior neck   - two tiny fleshy pedunculated papules near the L and R lateral canthi   - two yellow-pink papules on L cheek with radiating vessels and yellow globules under dermoscopy  - No other lesions of concern on areas examined.     Medications:  Current Outpatient Medications   Medication     alfuzosin ER (UROXATRAL) 10 MG 24 hr tablet     atorvastatin (LIPITOR) 40 MG tablet     benzoyl peroxide 5 % external liquid     buPROPion (WELLBUTRIN XL) 300 MG 24 hr tablet     Cholecalciferol (VITAMIN D-3) 125 MCG (5000 UT) TABS     Continuous Blood Gluc Sensor (FREESTYLE JAVI 14 DAY SENSOR) MISC     exenatide ER (BYDUREON BCISE) 2 MG/0.85ML auto-injector     ibuprofen (ADVIL/MOTRIN) 200 MG capsule     lisinopril (ZESTRIL) 5 MG tablet     metFORMIN (GLUCOPHAGE) 1000 MG tablet     multivitamin w/minerals (THERA-VIT-M) tablet     omeprazole (PRILOSEC OTC) 20 MG EC tablet     tolterodine (DETROL) 2 MG tablet     venlafaxine (EFFEXOR-XR) 150 MG 24 hr capsule     warfarin ANTICOAGULANT (COUMADIN) 5 MG tablet     No current  facility-administered medications for this visit.      Past Medical History:   Patient Active Problem List   Diagnosis     Passive suicidal ideations     Diabetes mellitus, type 2 (H)     Hyperlipidemia, unspecified hyperlipidemia type     Microalbuminuria due to type 2 diabetes mellitus (H)     MDD (major depressive disorder), recurrent episode, severe (H)     Deep vein thrombosis (DVT) (H)     Erectile dysfunction     Closed displaced fracture of neck of right scapula, sequela     Pilonidal cyst     Atrial fibrillation (H)     Atrial fibrillation, unspecified type (H)     Deep vein thrombosis (DVT) of proximal lower extremity, unspecified chronicity, unspecified laterality (H)     Heterozygous factor V Leiden mutation (H)     Current use of long term anticoagulation     Acute deep vein thrombosis (DVT) of distal end of right lower extremity (H)     Morbid obesity (H)     Past Medical History:   Diagnosis Date     Antiplatelet or antithrombotic long-term use      Depressive disorder 2001     Diabetes (H)      DVT (deep venous thrombosis) (H) 07/05/2019     Elevated blood pressure reading without diagnosis of hypertension 5/13/2018     Hypercholesteremia 2012     Personal history of other medical treatment     blood clot foot july 2019     Pilonidal cyst 5/13/2018       CC Elvis Guzman MD  [facility] [address] [address] on close of this encounter.

## 2022-05-09 NOTE — NURSING NOTE
Dermatology Rooming Note    Gaurang Rivera's goals for this visit include:   Chief Complaint   Patient presents with     Skin Check     Gaurang is here today for his first FBSE, fhx skin cancer. Reports growths around eyes     Sarah Ibrahim, EMT

## 2022-05-09 NOTE — PATIENT INSTRUCTIONS
"Benzoyl Peroxide    Use the Benzoyl Peroxide wash on your back/buttocks/chest once daily in the shower.     Benzoyl Peroxide wash can be found over the counter. Look for one with \"5% Benzoyl Peroxide\". Some brands include Neutrogena Clear Pore or PanOxyl.     Note that this wash can bleach your towels, clothing, pillow cases, and bath mats. Be sure to rinse it off thoroughly to prevent bleaching of fabrics. This wash does not bleach your skin. Most people find it easiest to use benzoyl peroxide wash in the shower so it can be washed off thoroughly. For increased effectiveness, leave the wash on for a few minutes while in the shower before rinsing it off.           Cryotherapy    What is it?  Use of a very cold liquid, such as liquid nitrogen, to freeze and destroy abnormal skin cells that need to be removed    What should I expect?  Tenderness and redness  A small blister that might grow and fill with dark purple blood. There may be crusting.  More than one treatment may be needed if the lesions do not go away.    How do I care for the treated area?  Gently wash the area with your hands when bathing.  Use a thin layer of Vaseline to help with healing. You may use a Band-Aid.   The area should heal within 7-10 days and may leave behind a pink or lighter color.   Do not use an antibiotic or Neosporin ointment.   You may take acetaminophen (Tylenol) for pain.     Call your doctor if you have:  Severe pain  Signs of infection (warmth, redness, cloudy yellow drainage, and or a bad smell)  Questions or concerns    Who should I call with questions?      [facility]: [phone]      [facility]: [phone]      For urgent needs outside of business hours call the [facility] at [phone] and ask for the dermatology resident on call   "

## 2022-05-09 NOTE — PROGRESS NOTES
[facility] Dermatology Note  Encounter Date: May 9, 2022  Office Visit     Dermatology Problem List:  1. Folliculitis - BPO wash  2. skin tags near L and R lateral canthi - forceps cryo 5/09/22  ____________________________________________    Assessment & Plan:     # Inflammed skin tags near L and R lateral canthi  - Reassured patient of the benign nature/appearance of this lesion but patient desires cryo given irritation.   - cryo therapy today (see procedure note below)     # Folliculitis, back, chest, buttocks  - start BPO wash in shower up to daily     # Sebaceous hyperplasia   - Reassured patient of the benign nature/appearance of this lesions    # Skin cancer screening  - no lesions on today's exam concerning for skin cancer  - encouraged continued sun protection    Procedures Performed:   - Cryotherapy procedure note, location(s): L and R canthi. After verbal consent and discussion of risks and benefits, 2 lesion(s) was(were) treated with 1-2 mm freeze border for 1-2 cycles with liquid nitrogen using a forceps. Post cryotherapy instructions were provided.    Follow-up: prn for new or changing lesions    Staff and Resident:     Kay Shelton MD  Dermatology Resident, PGY3  I was present for the entire procedure. Angeles Wise MD  I, Angeles Wise MD, saw this patient with the resident and agree with the resident s findings and plan of care as documented in the resident s note.      ____________________________________________    CC: Skin Check (Gaurang is here today for his first FBSE, fhx skin cancer. Reports growths around eyes)    HPI:  Mr. Gaurang Rivera is a(n) 51 year old male who presents today as a new patient for skin check and evaluation of a few skin tags around the eyes.   - says skin tags around the eyes are bothersome to him and would like them removed   - denies noticing any other spots he is worried about on his skin except for a few spots on his left cheek that  have been there for years and aren't changing.   - not using any special soap/wash on his back. Back gets itchy sometimes.   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  SKIN: Total skin excluding the undergarment areas was performed. The exam included the head/face, neck, both arms, chest, back, abdomen, both legs, buttocks, digits and/or nails.   - many pink papules and pustules on the back, buttocks, and posterior neck   - two tiny fleshy pedunculated papules near the L and R lateral canthi   - two yellow-pink papules on L cheek with radiating vessels and yellow globules under dermoscopy  - No other lesions of concern on areas examined.     Medications:  Current Outpatient Medications   Medication     alfuzosin ER (UROXATRAL) 10 MG 24 hr tablet     atorvastatin (LIPITOR) 40 MG tablet     benzoyl peroxide 5 % external liquid     buPROPion (WELLBUTRIN XL) 300 MG 24 hr tablet     Cholecalciferol (VITAMIN D-3) 125 MCG (5000 UT) TABS     Continuous Blood Gluc Sensor (FREESTYLE JAVI 14 DAY SENSOR) MISC     exenatide ER (BYDUREON BCISE) 2 MG/0.85ML auto-injector     ibuprofen (ADVIL/MOTRIN) 200 MG capsule     lisinopril (ZESTRIL) 5 MG tablet     metFORMIN (GLUCOPHAGE) 1000 MG tablet     multivitamin w/minerals (THERA-VIT-M) tablet     omeprazole (PRILOSEC OTC) 20 MG EC tablet     tolterodine (DETROL) 2 MG tablet     venlafaxine (EFFEXOR-XR) 150 MG 24 hr capsule     warfarin ANTICOAGULANT (COUMADIN) 5 MG tablet     No current facility-administered medications for this visit.      Past Medical History:   Patient Active Problem List   Diagnosis     Passive suicidal ideations     Diabetes mellitus, type 2 (H)     Hyperlipidemia, unspecified hyperlipidemia type     Microalbuminuria due to type 2 diabetes mellitus (H)     MDD (major depressive disorder),  recurrent episode, severe (H)     Deep vein thrombosis (DVT) (H)     Erectile dysfunction     Closed displaced fracture of neck of right scapula, sequela     Pilonidal cyst     Atrial fibrillation (H)     Atrial fibrillation, unspecified type (H)     Deep vein thrombosis (DVT) of proximal lower extremity, unspecified chronicity, unspecified laterality (H)     Heterozygous factor V Leiden mutation (H)     Current use of long term anticoagulation     Acute deep vein thrombosis (DVT) of distal end of right lower extremity (H)     Morbid obesity (H)     Past Medical History:   Diagnosis Date     Antiplatelet or antithrombotic long-term use      Depressive disorder 2001     Diabetes (H)      DVT (deep venous thrombosis) (H) 07/05/2019     Elevated blood pressure reading without diagnosis of hypertension 5/13/2018     Hypercholesteremia 2012     Personal history of other medical treatment     blood clot foot july 2019     Pilonidal cyst 5/13/2018       CC Elvis Guzman MD  [address] [address] on close of this encounter.

## 2022-06-07 ENCOUNTER — TELEPHONE (OUTPATIENT)
Dept: FAMILY MEDICINE | Facility: CLINIC | Age: 52
End: 2022-06-07
Payer: COMMERCIAL

## 2022-06-07 NOTE — TELEPHONE ENCOUNTER
ANTICOAGULATION     Gaurang Rivera is overdue for INR check.     Left message for patient to call and schedule lab appointment as soon as possible. If returning call, please schedule.     Cyn Womack RN

## 2022-06-14 ENCOUNTER — TELEPHONE (OUTPATIENT)
Dept: ANTICOAGULATION | Facility: CLINIC | Age: 52
End: 2022-06-14
Payer: COMMERCIAL

## 2022-06-14 NOTE — TELEPHONE ENCOUNTER
ANTICOAGULATION     Gaurang Rivera is overdue for INR check.      Left message for patient to call and schedule lab appointment as soon as possible. If returning call, please schedule.  and MyChart message sent.    Tessa Moreno RN

## 2022-06-28 ENCOUNTER — DOCUMENTATION ONLY (OUTPATIENT)
Dept: ANTICOAGULATION | Facility: CLINIC | Age: 52
End: 2022-06-28

## 2022-06-28 NOTE — LETTER
June 28, 2022      Gaurang Rivera  [address]   [facility] [address]    Dear Gaurang,    You are currently under the care of [facility] for your warfarin (Coumadin , Jantoven ) therapy.  We are contacting you because our records show you were due for an INR on 5/27/22.    There are potentially serious risks when taking warfarin without careful monitoring and we want to make sure you are safely managed.  Routine lab monitoring is required for warfarin refills.     Please call [phone]  as soon as possible to schedule an appointment.  If there has been a change in your care or other concerns, please let us know so we can help and or update our records.     Sincerely,       [facility]

## 2022-06-28 NOTE — PROGRESS NOTES
ANTICOAGULATION     Gaurang Rivera is overdue for INR check.      Reminder letter sent    Tessa Moreno RN

## 2022-07-14 ENCOUNTER — DOCUMENTATION ONLY (OUTPATIENT)
Dept: ANTICOAGULATION | Facility: CLINIC | Age: 52
End: 2022-07-14

## 2022-07-14 ENCOUNTER — DOCUMENTATION ONLY (OUTPATIENT)
Dept: LAB | Facility: CLINIC | Age: 52
End: 2022-07-14

## 2022-07-14 DIAGNOSIS — I48.91 ATRIAL FIBRILLATION, UNSPECIFIED TYPE (H): ICD-10-CM

## 2022-07-14 DIAGNOSIS — I82.4Y9 DEEP VEIN THROMBOSIS (DVT) OF PROXIMAL LOWER EXTREMITY, UNSPECIFIED CHRONICITY, UNSPECIFIED LATERALITY (H): ICD-10-CM

## 2022-07-14 DIAGNOSIS — Z79.01 CURRENT USE OF LONG TERM ANTICOAGULATION: ICD-10-CM

## 2022-07-14 DIAGNOSIS — I82.4Z1 ACUTE DEEP VEIN THROMBOSIS (DVT) OF DISTAL END OF RIGHT LOWER EXTREMITY (H): ICD-10-CM

## 2022-07-14 DIAGNOSIS — D68.51 HETEROZYGOUS FACTOR V LEIDEN MUTATION (H): ICD-10-CM

## 2022-07-14 NOTE — PROGRESS NOTES
Gaurang Rivera has an upcoming lab appointment:    Future Appointments   Date Time Provider Department Center   7/18/2022 11:30 AM  LAB [facility]    9/6/2022  9:00 AM Adair Waite RPH [facility]    10/10/2022  8:15 AM Lluvia Cárdenas PA-C [facility]     Patient is scheduled for the following lab(s):  Patient requesting his INR, but it expires on 7/15/2022.  Please extend orders if needed    There is no order available. Please review and place either future orders or HMPO (Review of Health Maintenance Protocol Orders), as appropriate.    Health Maintenance Due   Topic     ANNUAL REVIEW OF HM ORDERS      Melina Sun

## 2022-07-22 ENCOUNTER — LAB (OUTPATIENT)
Dept: LAB | Facility: CLINIC | Age: 52
End: 2022-07-22
Payer: COMMERCIAL

## 2022-07-22 ENCOUNTER — ANTICOAGULATION THERAPY VISIT (OUTPATIENT)
Dept: ANTICOAGULATION | Facility: CLINIC | Age: 52
End: 2022-07-22

## 2022-07-22 DIAGNOSIS — I82.4Z1 ACUTE DEEP VEIN THROMBOSIS (DVT) OF DISTAL END OF RIGHT LOWER EXTREMITY (H): ICD-10-CM

## 2022-07-22 DIAGNOSIS — I48.91 ATRIAL FIBRILLATION, UNSPECIFIED TYPE (H): Primary | ICD-10-CM

## 2022-07-22 DIAGNOSIS — D68.51 HETEROZYGOUS FACTOR V LEIDEN MUTATION (H): ICD-10-CM

## 2022-07-22 DIAGNOSIS — R80.9 MICROALBUMINURIA DUE TO TYPE 2 DIABETES MELLITUS (H): ICD-10-CM

## 2022-07-22 DIAGNOSIS — E11.29 MICROALBUMINURIA DUE TO TYPE 2 DIABETES MELLITUS (H): ICD-10-CM

## 2022-07-22 DIAGNOSIS — Z79.01 CURRENT USE OF LONG TERM ANTICOAGULATION: ICD-10-CM

## 2022-07-22 DIAGNOSIS — I48.91 ATRIAL FIBRILLATION, UNSPECIFIED TYPE (H): ICD-10-CM

## 2022-07-22 DIAGNOSIS — I82.4Y9 DEEP VEIN THROMBOSIS (DVT) OF PROXIMAL LOWER EXTREMITY, UNSPECIFIED CHRONICITY, UNSPECIFIED LATERALITY (H): ICD-10-CM

## 2022-07-22 LAB — INR BLD: 2.4 (ref 0.9–1.1)

## 2022-07-22 PROCEDURE — 85610 PROTHROMBIN TIME: CPT

## 2022-07-22 PROCEDURE — 36415 COLL VENOUS BLD VENIPUNCTURE: CPT

## 2022-07-22 NOTE — PROGRESS NOTES
ANTICOAGULATION MANAGEMENT     Gaurang Rivera 51 year old male is on warfarin with therapeutic INR result. (Goal INR 2.0-3.0)    Recent labs: (last 7 days)     07/22/22  1350   INR 2.4*       ASSESSMENT       Source(s): Chart Review and Patient/Caregiver Call       Warfarin doses taken: Warfarin taken as instructed    Diet: No new diet changes identified    New illness, injury, or hospitalization: No    Medication/supplement changes: None noted    Signs or symptoms of bleeding or clotting: No    Previous INR: Therapeutic last 2(+) visits    Additional findings: Reported having car trouble, which is why patient had to keep r/s INR check       PLAN     Recommended plan for no diet, medication or health factor changes affecting INR     Dosing Instructions: continue your current warfarin dose with next INR in 6 weeks       Summary  As of 7/22/2022    Full warfarin instructions:  7.5 mg every day   Next INR check:  8/31/2022             Telephone call with Gaurang who verbalizes understanding and agrees to plan    Lab visit scheduled    Education provided: Please call back if any changes to your diet, medications or how you've been taking warfarin, Monitoring for bleeding signs and symptoms, Monitoring for clotting signs and symptoms and Importance of notifying clinic for changes in medications; a sooner lab recheck maybe needed.    Plan made per [facility] anticoagulation protocol    Cyn Womack RN  Anticoagulation Clinic  7/22/2022    _______________________________________________________________________     Anticoagulation Episode Summary     Current INR goal:  2.0-3.0   TTR:  78.4 % (1 y)   Target end date:  Indefinite   Send INR reminders to:  [facility]    Indications    Atrial fibrillation  unspecified type (H) [I48.91]  Heterozygous factor V Leiden mutation (H) [D68.51]  Current use of long term anticoagulation [Z79.01]  Acute deep vein thrombosis (DVT) of distal end of right lower extremity (H)  [I82.4Z1]           Comments:  MTV 30 mcg.         Anticoagulation Care Providers     Provider Role Specialty Phone number    Elvis Guzman MD Referring Family Medicine [phone]

## 2022-07-25 RX ORDER — LISINOPRIL 5 MG/1
TABLET ORAL
Qty: 90 TABLET | Refills: 1 | Status: SHIPPED | OUTPATIENT
Start: 2022-07-25 | End: 2023-08-03

## 2022-08-28 NOTE — PROGRESS NOTES
Medication Therapy Management (MTM) Encounter    ASSESSMENT:                            Medication Adherence/Access: No issues identified      Type 2 Diabetes: Patient is not quite meeting A1c goal of < 7%. Self monitoring of blood glucose is at goal of fasting  mg/dL and post prandial < 150 mg/dL. Patient is not quite meeting average glucose goal of <150mg/dl Patient is not quite meeting goal of >70% time in target with continuous glucose monitoring(68%).  Patient would benefit from ideal SMBG: Check blood sugars pre-prandial, 2 hours post-prandial, and with symptoms of hypoglycemia, Metformin :  stay on the same dose.,  GLP-1 agonist (Bydureon) :  stay on the same dose., updated Hemoglobin A1c (7.1%) and weight loss recommended. Microalbumin is  at goal < 30 mg/g. Taking an ACE-inhibitor not at max dose.       Hyperlipidemia: Stable.  Patient is on high intensity statin which is indicated based on 2019 ACC/AHA guidelines for lipid management.         Vitamin D3: is not at goal of 50-75ng/mL. Pt would benefit from restarting daily otc med in winter months.     Depression:  Worsened, added 5mg. abilify today --phq-9 =19, he thinks about suicide but has no active plan and feels safe today --does not suicide crises line intervention.       DVT: Stable INR.     Hypertension: Stable. Patient is meeting blood pressure goal of < 140/90mmHg. Patient would benefit from the following changes - continued blood pressure monitoring, watching diet, increasing exercise and weight loss and limiting/reducing sodium.      PLAN:                            1. A1c today = 7.1% --up a bit but not too bad --stay on metformin and bydureon , concentrate on improved diet (low-carb) , less fast food), walk daily for exercise.     2. Also --your depression screening would suggest we need an additive medication --lets try 5mg. Aripiprazole --take with first food day . Stay on bupropion and venlafaxine as is .          Follow-up: Return  in about 4 weeks (around 10/4/2022), or 9:30 AM, for BP Recheck, Medication Therapy Management Visit, Blood sugar meter review, Depression.    SUBJECTIVE/OBJECTIVE:                          Gaurang Rivera is a 51 year old male coming in for a follow up visit (5-3-22). He was referred to me from Elvis Guzman  .       Reason for visit:   A1c today --admits eating [employer] FF a lot.     Allergies/ADRs: Reviewed in chart; no tylenol 2022--due to liver growth .   Past Medical History: Reviewed in chart  Tobacco: He reports that he has never smoked. He has never used smokeless tobacco.  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   Personal Healthcare Goals:  DM at goal is #1 issue, food is his drug -addicted to it , sleep is poor -tired , craves caffeine /sugar . Wants to exercise more.   Activity: winter months - sedentary , summer months - more active walking to [place] , [employer] gym membership close to his house --infrequent use. 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      Medication Adherence/Access: no issues reported      Type 2 Diabetes:  Currently taking : Off all Glipizide , metformin 1 gm bid pc, weekly bydureon 2 mg now.   Patient is not experiencing side effects.  Blood sugar monitoring: [employer]erve CGM:                             Symptoms of low blood sugar? None now.   Symptoms of high blood sugar? none  Eye exam: up to date  Foot exam: mild tingling in feet.   Diet/Exercise:   Not walking this summer too warm out .    Sort of intermittent fasting but has fallen off it. avg 2 large meals/day . Lots of FF.     Aspirin: Not taking due to warfarin   Statin: Yes: Atorvastatin 40   ACEi/ARB: Yes: Lisinopril 5mg.    Urine Albumin:   Lab Results   Component Value Date    UMALCR 10.22 05/03/2022        Lab Results   Component Value Date    A1C 7.1 09/06/2022    A1C 6.0 05/03/2022    A1C 8.7 01/27/2022    A1C 9.5 03/30/2021    A1C 7.2 10/10/2019    A1C  7.1 06/28/2019    A1C 6.5 09/26/2018             Hyperlipidemia: Current therapy includes : Atorvastatin 40mg daily.  Patient reports no significant myalgias or other side effects.  The 10-year ASCVD risk score (Cindy FITZPATRICK Jr., et al., 2013) is: 7.1%    Values used to calculate the score:      Age: 51 years      Sex: Male      Is Non- : No      Diabetic: Yes      Tobacco smoker: No      Systolic Blood Pressure: 122 mmHg      Is BP treated: Yes      HDL Cholesterol: 36 mg/dL      Total Cholesterol: 134 mg/dL  Recent Labs   Lab Test 05/03/22  0942 03/30/21  1411   CHOL 134 235*   HDL 36* 35*   LDL 58 Cannot estimate LDL when triglyceride exceeds 400 mg/dL   TRIG 201* 656*         Depression:  Current medications include: Venlafaxine [id number] once daily and bupropion XL -300 mg daily.   He just feels down , life not great . More depressed last few months.   Seeing weekly therapist --very helpful.   PHQ-9 SCORE 1/26/2022 1/27/2022 9/6/2022   PHQ-9 Total Score MyChart - 15 (Moderately severe depression) -   PHQ-9 Total Score 15 15 19       DVT:   2 years ago had blood clot in his foot , has family hx blood clots.  Takes warfarin 7.5mg tues-sunday ,monday 10mg./day .  INR   Date Value Ref Range Status   09/02/2022 2.3 (H) 0.9 - 1.1 Final   04/11/2022 1.66 (H) 0.85 - 1.15 Final   06/25/2021 3.00 (H) 0.86 - 1.14 Final     Comment:     This test is intended for monitoring Coumadin therapy.  Results are not   accurate in patients with prolonged INR due to factor deficiency.          Vitamin D3:Taking vitamin D 5000 units once daily. Off otc drug for the summer --will restart winter?  Vitamin D Deficiency Screening Results:  Lab Results   Component Value Date    VITDT 53 05/03/2022    VITDT 27 01/27/2022    VITDT 34 03/30/2021         Hypertension: Current medications include: lisinopril 5mg./day .  Patient does not self-monitor blood pressure.  Patient reports no current medication side effects.  BP  Readings from Last 3 Encounters:   09/06/22 122/86   05/03/22 110/80   04/11/22 121/83             I spent 30 minutes with this patient today. All changes were made via collaborative practice agreement with Elvis Guzman MD. A copy of the visit note was provided to the patient's provider(s).    The patient was given a summary of these recommendations after todays visit.      Adair Waite Rph.  Medication Therapy Management Provider  [phone]  Cesia Apar[employer] Student          Medication Therapy Recommendations  MDD (major depressive disorder), recurrent episode, severe (H)    Current Medication: ARIPiprazole (ABILIFY) 5 MG tablet   Rationale: Synergistic therapy - Needs additional medication therapy - Indication   Recommendation: Start Medication   Status: Accepted per CPA

## 2022-09-02 ENCOUNTER — LAB (OUTPATIENT)
Dept: LAB | Facility: CLINIC | Age: 52
End: 2022-09-02
Payer: COMMERCIAL

## 2022-09-02 ENCOUNTER — DOCUMENTATION ONLY (OUTPATIENT)
Dept: ANTICOAGULATION | Facility: CLINIC | Age: 52
End: 2022-09-02

## 2022-09-02 ENCOUNTER — ANTICOAGULATION THERAPY VISIT (OUTPATIENT)
Dept: ANTICOAGULATION | Facility: CLINIC | Age: 52
End: 2022-09-02

## 2022-09-02 DIAGNOSIS — I82.4Z1 ACUTE DEEP VEIN THROMBOSIS (DVT) OF DISTAL END OF RIGHT LOWER EXTREMITY (H): ICD-10-CM

## 2022-09-02 DIAGNOSIS — Z79.01 CURRENT USE OF LONG TERM ANTICOAGULATION: ICD-10-CM

## 2022-09-02 DIAGNOSIS — I82.4Y9 DEEP VEIN THROMBOSIS (DVT) OF PROXIMAL LOWER EXTREMITY, UNSPECIFIED CHRONICITY, UNSPECIFIED LATERALITY (H): ICD-10-CM

## 2022-09-02 DIAGNOSIS — I48.91 ATRIAL FIBRILLATION, UNSPECIFIED TYPE (H): ICD-10-CM

## 2022-09-02 DIAGNOSIS — D68.51 HETEROZYGOUS FACTOR V LEIDEN MUTATION (H): ICD-10-CM

## 2022-09-02 DIAGNOSIS — I48.91 ATRIAL FIBRILLATION, UNSPECIFIED TYPE (H): Primary | ICD-10-CM

## 2022-09-02 LAB — INR BLD: 2.3 (ref 0.9–1.1)

## 2022-09-02 PROCEDURE — 36415 COLL VENOUS BLD VENIPUNCTURE: CPT

## 2022-09-02 PROCEDURE — 85610 PROTHROMBIN TIME: CPT

## 2022-09-02 NOTE — PROGRESS NOTES
ANTICOAGULATION MANAGEMENT     Gaurang Rivera 51 year old male is on warfarin with therapeutic INR result. (Goal INR 2.0-3.0)    Recent labs: (last 7 days)     09/02/22  0958   INR 2.3*       ASSESSMENT       Source(s): Chart Review and Patient/Caregiver Call       Warfarin doses taken: Warfarin taken as instructed    Diet: No new diet changes identified    New illness, injury, or hospitalization: No    Medication/supplement changes: None noted    Signs or symptoms of bleeding or clotting: No    Previous INR: Therapeutic last 2(+) visits    Additional findings: None       PLAN     Recommended plan for no diet, medication or health factor changes affecting INR     Dosing Instructions: Continue your current warfarin dose with next INR in 6 weeks       Summary  As of 9/2/2022    Full warfarin instructions:  7.5 mg every day   Next INR check:  10/14/2022             Telephone call with Gaurang who verbalizes understanding and agrees to plan    Lab visit scheduled    Education provided: Monitoring for bleeding signs and symptoms and Monitoring for clotting signs and symptoms    Plan made per [facility] anticoagulation protocol    Serena Padron RN  Anticoagulation Clinic  9/2/2022    _______________________________________________________________________     Anticoagulation Episode Summary     Current INR goal:  2.0-3.0   TTR:  78.4 % (1 y)   Target end date:  Indefinite   Send INR reminders to:  [facility]    Indications    Atrial fibrillation  unspecified type (H) [I48.91]  Heterozygous factor V Leiden mutation (H) [D68.51]  Current use of long term anticoagulation [Z79.01]  Acute deep vein thrombosis (DVT) of distal end of right lower extremity (H) [I82.4Z1]           Comments:  MTV 30 mcg.         Anticoagulation Care Providers     Provider Role Specialty Phone number    Elvis Guzman MD Referring Family Medicine [phone]

## 2022-09-02 NOTE — PROGRESS NOTES
ANTICOAGULATION CLINIC REFERRAL RENEWAL REQUEST       An annual renewal order is required for all patients referred to [facility].?  Please review and sign the pended referral order for Gaurang Rivera.       ANTICOAGULATION SUMMARY      Warfarin indication(s)   Atrial Fibrillation, DVT and Heterozygous Factor V Leiden    Mechanical heart valve present?  NO       Current goal range   INR: 2.0-3.0     Goal appropriate for indication? Goal INR 2-3, standard for indication(s) above     Time in Therapeutic Range (TTR)  (Goal > 60%) 78%       Office visit with referring provider's group within last year yes on 1/27/22       Serena Padron RN  [facility]

## 2022-09-06 ENCOUNTER — LAB (OUTPATIENT)
Dept: LAB | Facility: CLINIC | Age: 52
End: 2022-09-06
Payer: COMMERCIAL

## 2022-09-06 ENCOUNTER — OFFICE VISIT (OUTPATIENT)
Dept: PHARMACY | Facility: CLINIC | Age: 52
End: 2022-09-06
Payer: COMMERCIAL

## 2022-09-06 VITALS
HEART RATE: 81 BPM | BODY MASS INDEX: 33.34 KG/M2 | DIASTOLIC BLOOD PRESSURE: 86 MMHG | OXYGEN SATURATION: 96 % | WEIGHT: 288.5 LBS | SYSTOLIC BLOOD PRESSURE: 122 MMHG

## 2022-09-06 DIAGNOSIS — I10 ESSENTIAL HYPERTENSION: ICD-10-CM

## 2022-09-06 DIAGNOSIS — E11.9 TYPE 2 DIABETES MELLITUS WITHOUT COMPLICATION, WITHOUT LONG-TERM CURRENT USE OF INSULIN (H): ICD-10-CM

## 2022-09-06 DIAGNOSIS — E55.9 VITAMIN D DEFICIENCY: ICD-10-CM

## 2022-09-06 DIAGNOSIS — F33.2 SEVERE EPISODE OF RECURRENT MAJOR DEPRESSIVE DISORDER, WITHOUT PSYCHOTIC FEATURES (H): Primary | ICD-10-CM

## 2022-09-06 DIAGNOSIS — E78.5 HYPERLIPIDEMIA, UNSPECIFIED HYPERLIPIDEMIA TYPE: ICD-10-CM

## 2022-09-06 DIAGNOSIS — I82.409 DEEP VEIN THROMBOSIS (DVT) (H): ICD-10-CM

## 2022-09-06 LAB — HBA1C MFR BLD: 7.1 % (ref 0–5.6)

## 2022-09-06 PROCEDURE — 99606 MTMS BY PHARM EST 15 MIN: CPT | Performed by: PHARMACIST

## 2022-09-06 PROCEDURE — 36415 COLL VENOUS BLD VENIPUNCTURE: CPT

## 2022-09-06 PROCEDURE — 99607 MTMS BY PHARM ADDL 15 MIN: CPT | Performed by: PHARMACIST

## 2022-09-06 PROCEDURE — 83036 HEMOGLOBIN GLYCOSYLATED A1C: CPT

## 2022-09-06 RX ORDER — ARIPIPRAZOLE 5 MG/1
5 TABLET ORAL DAILY
Qty: 30 TABLET | Refills: 1 | Status: SHIPPED | OUTPATIENT
Start: 2022-09-06 | End: 2022-10-04 | Stop reason: DRUGHIGH

## 2022-09-06 ASSESSMENT — PATIENT HEALTH QUESTIONNAIRE - PHQ9: SUM OF ALL RESPONSES TO PHQ QUESTIONS 1-9: 19

## 2022-09-06 NOTE — Clinical Note
Elvis--jareki--peter A1c is up a but but ok , he admits more depression issues so we added abilify today --recheck in 4 weeks. Malcolm/Cesia

## 2022-09-06 NOTE — PATIENT INSTRUCTIONS
"Recommendations from today's MTM visit:                                                         1. A1c today = 7.1% --up a bit but not too bad --stay on metformin and bydureon , concentrate on improved diet (low-carb) , less fast food), walk daily for exercise.     2. Also --your depression screening would suggest we need an additive medication --lets try 5mg. Aripiprazole --take with first food day . Stay on bupropion and venlafaxine as is .          Follow-up: Return in about 4 weeks (around 10/4/2022), or 9:30 AM, for BP Recheck, Medication Therapy Management Visit, Blood sugar meter review, Depression.    It was great speaking with you today.  I value your experience and would be very thankful for your time in providing feedback in our clinic survey. In the next few days, you may receive an email or text message from [employer] with a link to a survey related to your  clinical pharmacist.\"     To schedule another MTM appointment, please call the clinic directly or you may call the MTM scheduling line at [phone] or toll-free at [phone].     My Clinical Pharmacist's contact information:                                                      Please feel free to contact me with any questions or concerns you have.      Adair Waite [facility].  Medication Therapy Management Provider  [phone]  Cesia Hardy   Pharm-D-4 Student     "

## 2022-09-07 DIAGNOSIS — E11.9 TYPE 2 DIABETES MELLITUS WITHOUT COMPLICATION, WITHOUT LONG-TERM CURRENT USE OF INSULIN (H): ICD-10-CM

## 2022-09-09 RX ORDER — EXENATIDE 2 MG/.85ML
INJECTION, SUSPENSION, EXTENDED RELEASE SUBCUTANEOUS
Qty: 3.4 ML | Refills: 0 | Status: SHIPPED | OUTPATIENT
Start: 2022-09-09 | End: 2022-09-26

## 2022-09-15 DIAGNOSIS — R16.0 HEPATOMEGALY: Primary | ICD-10-CM

## 2022-09-26 DIAGNOSIS — F33.2 SEVERE EPISODE OF RECURRENT MAJOR DEPRESSIVE DISORDER, WITHOUT PSYCHOTIC FEATURES (H): ICD-10-CM

## 2022-09-27 RX ORDER — BUPROPION HYDROCHLORIDE 300 MG/1
TABLET ORAL
Qty: 90 TABLET | Refills: 0 | Status: SHIPPED | OUTPATIENT
Start: 2022-09-27 | End: 2022-12-27

## 2022-09-27 NOTE — TELEPHONE ENCOUNTER
Medication is being filled for 1 time refill only due to:  due for visit and PHQ9   Future appt 11/22/22    GLADYS Vila RN  [facility]

## 2022-09-28 ENCOUNTER — MYC MEDICAL ADVICE (OUTPATIENT)
Dept: PHARMACY | Facility: CLINIC | Age: 52
End: 2022-09-28

## 2022-09-28 DIAGNOSIS — E11.9 TYPE 2 DIABETES MELLITUS WITHOUT COMPLICATION, WITHOUT LONG-TERM CURRENT USE OF INSULIN (H): ICD-10-CM

## 2022-09-28 RX ORDER — FLASH GLUCOSE SENSOR
KIT MISCELLANEOUS
Qty: 6 EACH | Refills: 3 | Status: SHIPPED | OUTPATIENT
Start: 2022-09-28 | End: 2023-11-22

## 2022-09-28 NOTE — TELEPHONE ENCOUNTER
Harrison Khan,     I tried to refill my prescription for the FreeStyle Herve 14 day sensor but got a message saying refill not approved. Can you approve that renewal?      I have an appointment with you on October 4 but ideally I would like to get a new sensor before then.     Thanks,  Gaurang mccarthy [facility] 3 months ref[facility] + [facility] --will f/up 10-4-22.    Adair Waite [facility].  Medication Therapy Management Provider  [phone]

## 2022-10-03 NOTE — PROGRESS NOTES
Medication Therapy Management (MTM) Encounter    ASSESSMENT:                            Medication Adherence/Access: No issues identified      Type 2 Diabetes: Patient is not quite meeting A1c goal of < 7%. Self monitoring of blood glucose is at goal of fasting  mg/dL and post prandial < 150 mg/dL. Patient is not quite meeting average glucose goal of <150mg/dl Patient is not quite meeting goal of >70% time in target with continuous glucose monitoring(54%).  Patient would benefit from ideal SMBG: Check blood sugars pre-prandial, 2 hours post-prandial, and with symptoms of hypoglycemia, Metformin :  stay on the same dose.,  GLP-1 agonist (Bydureon) :  stay on the same dose., updated Hemoglobin A1c (7.1%) and weight loss recommended. Microalbumin is  at goal < 30 mg/g. Taking an ACE-inhibitor not at max dose.     Blood sugars still too high today. See assessment under depression. Discussed the possibility of starting pioglitazone for his fatty liver --will consult his gi provider tamy olmos.    Late night snacking --stop food intake drink protein shake instead--see plan.     Hyperlipidemia: Stable.  Patient is on high intensity statin which is indicated based on 2019 ACC/AHA guidelines for lipid management.         Vitamin D3: is not at goal of 50-75ng/mL. Pt would benefit from restarting daily otc med in winter months.     Depression:  Improved PHQ9 score by one point(19 to18). Could benefit from taking a higher dose of abilify. He feels that his blood sugars have been higher since starting the abilify so he is reluctant to increase dose but we discussed risk vs benefit on mental health. Monitor blood sugars and if they increase even more after this dose increase 10mg. Today as he feels drug helps him ;  we will discuss alternatives in 1 month if necessary.       DVT: Stable INR.     Hypertension: Stable. Patient is meeting blood pressure goal of < 140/90mmHg. Patient would benefit from the following changes -  continued blood pressure monitoring, watching diet, increasing exercise and weight loss and limiting/reducing sodium.      PLAN:                            1. If you get the urge to eat at night, do not eat any solid food. Try making a protein shake instead (Ensure Max Protein Nutrition Shake). Remember to find one with low carbs ( < 8 carbs per bottle would be ideal).    2. We will increase your aripiprazole to 10mg daily. Continue to monitor your blood sugars at home to make sure they aren't getting too high.     Follow-up: Return in about 30 days (around 11/3/2022), or @9:30am, for Medication Therapy Management Visit, Blood sugar meter review.    SUBJECTIVE/OBJECTIVE:                          Gaurang Rivera is a 52 year old male coming in for a follow up visit (9-6-22). He was referred to me from Elvis Guzman  .       Reason for visit:   Depression f/up post abilify start.      Allergies/ADRs: Reviewed in chart; no tylenol 2022--due to liver growth   Past Medical History: Reviewed in chart  Tobacco: He reports that he has never smoked. He has never used smokeless tobacco.  Alcohol: not currently using  Caffeine: diet coke - 4 cans/day or so   Social:   rowing at [facility]. Rowing club.   Personal Healthcare Goals:  DM at goal is #1 issue, food is his drug -addicted to it , sleep is poor -tired , craves caffeine /sugar . Wants to exercise more.   Activity: winter months - sedentary , summer months - more active walking to [employer] , [employer] gym membership close to his house --infrequent use. 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      Medication Adherence/Access: no issues reported      Type 2 Diabetes:  Currently taking : Off all Glipizide , metformin 1 gm bid pc, weekly bydureon 2 mg now.   Patient is not experiencing side effects.  Blood sugar monitoring: Herve CGM:   He feels that the Abilify (started 1 month ago) has caused an increase in trend of his blood sugars.               Symptoms of low blood sugar? None  "now.   Symptoms of high blood sugar? none  Eye exam: up to date  Foot exam: mild tingling in feet.   Diet/Exercise:   Not walking this summer too warm out .    Sort of intermittent fasting but has fallen off it. avg 2 large meals/day . Lots of FF.  Does not cook for himself , has known fatty liver disease, late night candy bars lately --feels hungry .     Aspirin: Not taking due to warfarin   Statin: Yes: Atorvastatin 40   ACEi/ARB: Yes: Lisinopril 5mg.    Urine Albumin:   Lab Results   Component Value Date    UMALCR 10.22 05/03/2022        Lab Results   Component Value Date    A1C 7.1 09/06/2022    A1C 6.0 05/03/2022    A1C 8.7 01/27/2022    A1C 9.5 03/30/2021    A1C 7.2 10/10/2019    A1C 7.1 06/28/2019    A1C 6.5 09/26/2018             Hyperlipidemia: Current therapy includes : Atorvastatin 40mg daily.  Patient reports no significant myalgias or other side effects.  The 10-year ASCVD risk score (Naplesjonel FITZPATRICK Jr., et al., 2013) is: 7.6%    Values used to calculate the score:      Age: 52 years      Sex: Male      Is Non- : No      Diabetic: Yes      Tobacco smoker: No      Systolic Blood Pressure: 120 mmHg      Is BP treated: Yes      HDL Cholesterol: 36 mg/dL      Total Cholesterol: 134 mg/dL  Recent Labs   Lab Test 05/03/22  0942 03/30/21  1411   CHOL 134 235*   HDL 36* 35*   LDL 58 Cannot estimate LDL when triglyceride exceeds 400 mg/dL   TRIG 201* 656*         Depression:  Current medications include: Venlafaxine [id number] once daily, bupropion XL -300 mg daily, and aripiprazole 5mg daily. He notes that he feels that the aripiprazole has allowed for him to concentrate more and \"get more stuff done\".     Seeing weekly therapist --very helpful.   PHQ-9 SCORE 1/27/2022 9/6/2022 10/4/2022   PHQ-9 Total Score MyChart 15 (Moderately severe depression) - -   PHQ-9 Total Score 15 19 18       DVT:   2 years ago had blood clot in his foot , has family hx blood clots.  Takes warfarin 7.5mg tues-sunday " ,monday 10mg./day .  INR   Date Value Ref Range Status   09/02/2022 2.3 (H) 0.9 - 1.1 Final   04/11/2022 1.66 (H) 0.85 - 1.15 Final   06/25/2021 3.00 (H) 0.86 - 1.14 Final     Comment:     This test is intended for monitoring Coumadin therapy.  Results are not   accurate in patients with prolonged INR due to factor deficiency.          Vitamin D3:Taking vitamin D 5000 units once daily. Off otc drug for the summer --will restart winter?  Vitamin D Deficiency Screening Results:  Lab Results   Component Value Date    VITDT 53 05/03/2022    VITDT 27 01/27/2022    VITDT 34 03/30/2021         Hypertension: Current medications include: lisinopril 5mg./day .  Patient does not self-monitor blood pressure.  Patient reports no current medication side effects.  BP Readings from Last 3 Encounters:   10/04/22 120/80   09/06/22 122/86   05/03/22 110/80             I spent 30 minutes with this patient today. All changes were made via collaborative practice agreement with Elvis Guzman MD. A copy of the visit note was provided to the patient's provider(s).    The patient was given a summary of these recommendations after todays visit.      Adair Waite [facility].  Medication Therapy Management Provider  [phone]  Cesia Hardy   Pharm-D-4 Student          Medication Therapy Recommendations  MDD (major depressive disorder), recurrent episode, severe (H)    Current Medication: ARIPiprazole (ABILIFY) 5 MG tablet (Discontinued)   Rationale: Dose too low - Dosage too low - Effectiveness   Recommendation: Increase Dose - Increase dose to 10mg   Status: Accepted per CPA

## 2022-10-04 ENCOUNTER — OFFICE VISIT (OUTPATIENT)
Dept: PHARMACY | Facility: CLINIC | Age: 52
End: 2022-10-04
Payer: COMMERCIAL

## 2022-10-04 VITALS
OXYGEN SATURATION: 97 % | BODY MASS INDEX: 33.86 KG/M2 | DIASTOLIC BLOOD PRESSURE: 80 MMHG | HEART RATE: 86 BPM | WEIGHT: 293 LBS | SYSTOLIC BLOOD PRESSURE: 120 MMHG

## 2022-10-04 DIAGNOSIS — E55.9 VITAMIN D DEFICIENCY: ICD-10-CM

## 2022-10-04 DIAGNOSIS — F33.2 SEVERE EPISODE OF RECURRENT MAJOR DEPRESSIVE DISORDER, WITHOUT PSYCHOTIC FEATURES (H): Primary | ICD-10-CM

## 2022-10-04 DIAGNOSIS — I82.409 DEEP VEIN THROMBOSIS (DVT) (H): ICD-10-CM

## 2022-10-04 DIAGNOSIS — E78.5 HYPERLIPIDEMIA, UNSPECIFIED HYPERLIPIDEMIA TYPE: ICD-10-CM

## 2022-10-04 DIAGNOSIS — I10 ESSENTIAL HYPERTENSION: ICD-10-CM

## 2022-10-04 DIAGNOSIS — E11.9 TYPE 2 DIABETES MELLITUS WITHOUT COMPLICATION, WITHOUT LONG-TERM CURRENT USE OF INSULIN (H): ICD-10-CM

## 2022-10-04 PROCEDURE — 99606 MTMS BY PHARM EST 15 MIN: CPT | Performed by: PHARMACIST

## 2022-10-04 PROCEDURE — 99607 MTMS BY PHARM ADDL 15 MIN: CPT | Performed by: PHARMACIST

## 2022-10-04 RX ORDER — ARIPIPRAZOLE 10 MG/1
10 TABLET ORAL DAILY
Qty: 30 TABLET | Refills: 1 | Status: SHIPPED | OUTPATIENT
Start: 2022-10-04 | End: 2022-11-03 | Stop reason: DRUGHIGH

## 2022-10-04 ASSESSMENT — PATIENT HEALTH QUESTIONNAIRE - PHQ9: SUM OF ALL RESPONSES TO PHQ QUESTIONS 1-9: 18

## 2022-10-04 NOTE — Clinical Note
Deqa--abilify helping him get more done so I increased his daily dose --recheck in 1 month.  Jag Merlos to see you this week for f/up liver scan --his bs's are higher lately --more FF and late night candy bars --will try and get him off that and use protein shake instead.  Let me know if you want me to start him on some pioglitazone for his fatty liver issue?  Frank,Adair Waite, [facility]. Medication Therapy Management Provider [phone] Cesia Hardy  Pharm-D-4 Student

## 2022-10-04 NOTE — PATIENT INSTRUCTIONS
"Recommendations from today's MTM visit:                                                       1. If you get the urge to eat at night, do not eat any solid food. Try making a protein shake instead (Ensure Max Protein Nutrition Shake). Remember to find one with low carbs ( < 8 carbs per bottle would be ideal).    2. We will increase your aripiprazole to 10mg daily. Continue to monitor your blood sugars at home to make sure they aren't getting too high.       Follow-up: Return in about 30 days (around 11/3/2022), or @9:30am, for Medication Therapy Management Visit, Blood sugar meter review.    It was great speaking with you today.  I value your experience and would be very thankful for your time in providing feedback in our clinic survey. In the next few days, you may receive an email or text message from [employer] with a link to a survey related to your  clinical pharmacist.\"     To schedule another MTM appointment, please call the clinic directly or you may call the MTM scheduling line at [phone] or toll-free at [phone].     My Clinical Pharmacist's contact information:                                                      Please feel free to contact me with any questions or concerns you have.      Adair Waite [facility].  Medication Therapy Management Provider  [phone]    Cesia Hardy   Pharm-D-4 Student     "

## 2022-10-17 DIAGNOSIS — R45.851 PASSIVE SUICIDAL IDEATIONS: ICD-10-CM

## 2022-10-17 DIAGNOSIS — F33.1 MODERATE EPISODE OF RECURRENT MAJOR DEPRESSIVE DISORDER (H): ICD-10-CM

## 2022-10-20 RX ORDER — VENLAFAXINE HYDROCHLORIDE 150 MG/1
300 CAPSULE, EXTENDED RELEASE ORAL DAILY
Qty: 180 CAPSULE | Refills: 0 | Status: SHIPPED | OUTPATIENT
Start: 2022-10-20 | End: 2023-01-12

## 2022-10-20 NOTE — TELEPHONE ENCOUNTER
Routing refill request to provider for review/approval because:  --PHQ-9 >4.  --Last visit:  1/27/2022 Megan for CPE and plan RTC 6 months.  --Future Visit: 11/22/22 Megan but note says has to be rescheduled.            --Last Written Prescription Date:    Disp Refills Start End YESY   venlafaxine (EFFEXOR-XR) 150 MG 24 hr capsule 180 capsule 1 1/27/2022  No   Sig - Route: Take 2 capsules (300 mg) by mouth daily          PHQ 1/27/2022 9/6/2022 10/4/2022   PHQ-9 Total Score 15 19 18   Q9: Thoughts of better off dead/self-harm past 2 weeks Several days More than half the days More than half the days   F/U: Thoughts of suicide or self-harm Yes - -   F/U: Self harm-plan No - -   F/U: Self-harm action No - -   F/U: Safety concerns No - -

## 2022-10-22 ENCOUNTER — HEALTH MAINTENANCE LETTER (OUTPATIENT)
Age: 52
End: 2022-10-22

## 2022-11-03 ENCOUNTER — LAB (OUTPATIENT)
Dept: LAB | Facility: CLINIC | Age: 52
End: 2022-11-03
Payer: COMMERCIAL

## 2022-11-03 ENCOUNTER — OFFICE VISIT (OUTPATIENT)
Dept: PHARMACY | Facility: CLINIC | Age: 52
End: 2022-11-03
Payer: COMMERCIAL

## 2022-11-03 ENCOUNTER — ANTICOAGULATION THERAPY VISIT (OUTPATIENT)
Dept: ANTICOAGULATION | Facility: CLINIC | Age: 52
End: 2022-11-03

## 2022-11-03 ENCOUNTER — TELEPHONE (OUTPATIENT)
Dept: FAMILY MEDICINE | Facility: CLINIC | Age: 52
End: 2022-11-03

## 2022-11-03 VITALS
WEIGHT: 295.5 LBS | BODY MASS INDEX: 34.15 KG/M2 | SYSTOLIC BLOOD PRESSURE: 104 MMHG | OXYGEN SATURATION: 93 % | HEART RATE: 87 BPM | DIASTOLIC BLOOD PRESSURE: 75 MMHG

## 2022-11-03 DIAGNOSIS — E78.5 HYPERLIPIDEMIA, UNSPECIFIED HYPERLIPIDEMIA TYPE: ICD-10-CM

## 2022-11-03 DIAGNOSIS — I10 ESSENTIAL HYPERTENSION: ICD-10-CM

## 2022-11-03 DIAGNOSIS — D68.51 HETEROZYGOUS FACTOR V LEIDEN MUTATION (H): ICD-10-CM

## 2022-11-03 DIAGNOSIS — I48.91 ATRIAL FIBRILLATION, UNSPECIFIED TYPE (H): Primary | ICD-10-CM

## 2022-11-03 DIAGNOSIS — I82.4Z1 ACUTE DEEP VEIN THROMBOSIS (DVT) OF DISTAL END OF RIGHT LOWER EXTREMITY (H): ICD-10-CM

## 2022-11-03 DIAGNOSIS — Z79.01 CURRENT USE OF LONG TERM ANTICOAGULATION: ICD-10-CM

## 2022-11-03 DIAGNOSIS — E11.9 TYPE 2 DIABETES MELLITUS WITHOUT COMPLICATION, WITHOUT LONG-TERM CURRENT USE OF INSULIN (H): Primary | ICD-10-CM

## 2022-11-03 DIAGNOSIS — F33.2 SEVERE EPISODE OF RECURRENT MAJOR DEPRESSIVE DISORDER, WITHOUT PSYCHOTIC FEATURES (H): ICD-10-CM

## 2022-11-03 DIAGNOSIS — E55.9 VITAMIN D DEFICIENCY: ICD-10-CM

## 2022-11-03 DIAGNOSIS — I82.4Y9 DEEP VEIN THROMBOSIS (DVT) OF PROXIMAL LOWER EXTREMITY, UNSPECIFIED CHRONICITY, UNSPECIFIED LATERALITY (H): ICD-10-CM

## 2022-11-03 DIAGNOSIS — I48.91 ATRIAL FIBRILLATION, UNSPECIFIED TYPE (H): ICD-10-CM

## 2022-11-03 DIAGNOSIS — I82.409 DEEP VEIN THROMBOSIS (DVT) (H): ICD-10-CM

## 2022-11-03 LAB — INR BLD: 1.9 (ref 0.9–1.1)

## 2022-11-03 PROCEDURE — 85610 PROTHROMBIN TIME: CPT | Performed by: PHARMACIST

## 2022-11-03 PROCEDURE — 99607 MTMS BY PHARM ADDL 15 MIN: CPT | Performed by: PHARMACIST

## 2022-11-03 PROCEDURE — 99606 MTMS BY PHARM EST 15 MIN: CPT | Performed by: PHARMACIST

## 2022-11-03 PROCEDURE — 36416 COLLJ CAPILLARY BLOOD SPEC: CPT | Performed by: PHARMACIST

## 2022-11-03 RX ORDER — ARIPIPRAZOLE 5 MG/1
5 TABLET ORAL DAILY
Qty: 90 TABLET | Refills: 1 | Status: SHIPPED | OUTPATIENT
Start: 2022-11-03 | End: 2023-06-08

## 2022-11-03 NOTE — Clinical Note
Deqa--joy doing well--but I gave him too much abilify dose --will back down a bit today to eliminate tremor SE, a1c with me in 6 weeks -mark

## 2022-11-03 NOTE — PROGRESS NOTES
Medication Therapy Management (MTM) Encounter    ASSESSMENT:                            Medication Adherence/Access: No issues identified      Type 2 Diabetes: Patient is not quite meeting A1c goal of < 7%. Self monitoring of blood glucose is at goal of fasting  mg/dL and post prandial < 150 mg/dL. Patient is not quite meeting average glucose goal of <150mg/dL (154 mg/dL). Patient is meeting goal of >70% time in target with continuous glucose monitoring (74%).  Patient would benefit from ideal SMBG: Check blood sugars pre-prandial, 2 hours post-prandial, and with symptoms of hypoglycemia, Metformin :  stay on the same dose.,  GLP-1 agonist (Bydureon) :  stay on the same dose., updated Hemoglobin A1c (7.1%) and weight loss recommended. Microalbumin is  at goal < 30 mg/g. Taking an ACE-inhibitor not at max dose.     Late night snacking --stop food intake drink protein shake instead--see plan.     Hyperlipidemia: Stable.  Patient is on high intensity statin which is indicated based on 2019 ACC/AHA guidelines for lipid management.         Vitamin D3: is at goal of 50-75ng/mL. Pt will restart 5,000 units vitamin d daily for winter months (see plan).     Depression:  Since increasing Abilify to 10mg daily patient noted tremors and increased fatigue. Will decrease back down to 5mg daily since patient noticed improvement without side effects at that dose.    DVT: INR slightly low (1.9) in clinic today. Patient following with anticoagulation clinic.     Hypertension: Stable. Patient is meeting blood pressure goal of < 140/90mmHg. Patient would benefit from the following changes - continued blood pressure monitoring, watching diet, increasing exercise and weight loss and limiting/reducing sodium.      PLAN:                            1. Aripiprazole seems to be helping mental health but 10mg dose too high. Will go back to 5mg daily to avoid side effects.    2. Restart 5,000 units vitamin d once daily and we will recheck  levels in 2023.    3. Blood Pressure was great and blood sugars within goal! Keep monitoring blood sugar levels with herve sensor and try to cut down on nighttime snacking.  Try making a protein shake instead (Ensure Max Protein Nutrition Shake). Remember to find one with low carbs ( < 8 carbs per bottle would be ideal).    Follow-up: Return in about 7 weeks (around 12/19/2022), or @9:30AM, for A1c lab, Medication Therapy Management Visit.    SUBJECTIVE/OBJECTIVE:                          Gaurang Rivera is a 52 year old male coming in for a follow up visit (10-4-22). He was referred to me from Elvis Guzman  .       Reason for visit:   Depression f/up post abilify start. bs review.       Allergies/ADRs: Reviewed in chart; no tylenol 2022--due to liver growth   Past Medical History: Reviewed in chart  Tobacco: He reports that he has never smoked. He has never used smokeless tobacco.  Alcohol: not currently using  Caffeine: diet coke - 4 cans/day or so   Social:   rowing at hospitals. Rowing club.   Personal Healthcare Goals:  DM at goal is #1 issue, food is his drug -addicted to it , sleep is poor -tired , craves caffeine /sugar . Wants to exercise more.   Activity: winter months - sedentary , summer months - more active walking to [employer] , [employer] gym membership close to his house --infrequent use. 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      Medication Adherence/Access: no issues reported      Type 2 Diabetes:  Currently taking : Off all Glipizide , metformin 1 gm bid pc, weekly bydureon 2 mg now.   Patient is not experiencing side effects.  Blood sugar monitoring: Herve CGM:   He feels that the Abilify (started 3 months ago) has caused an increase in trend of his blood sugars.         Patient is meeting goal of >70% in target range. Noted high blood sugars this morning (~220) that are not lowering despite not eating breakfast. Hate cookies last night at 10:30 PM.    Symptoms of low blood sugar? None now.   Symptoms of  "high blood sugar? none  Eye exam: up to date  Foot exam: mild tingling in feet.   Diet/Exercise:   Not walking this summer too warm out .    Sort of intermittent fasting but has fallen off it. avg 2 large meals/day . Lots of FF.  Does not cook for himself , has known fatty liver disease, late night candy bars lately --feels hungry .     Aspirin: Not taking due to warfarin   Statin: Yes: Atorvastatin 40   ACEi/ARB: Yes: Lisinopril 5mg.    Urine Albumin:   Lab Results   Component Value Date    UMALCR 10.22 05/03/2022        Lab Results   Component Value Date    A1C 7.1 09/06/2022    A1C 6.0 05/03/2022    A1C 8.7 01/27/2022    A1C 9.5 03/30/2021    A1C 7.2 10/10/2019    A1C 7.1 06/28/2019    A1C 6.5 09/26/2018             Hyperlipidemia: Current therapy includes : Atorvastatin 40mg daily.  Patient reports no significant myalgias or other side effects.  The 10-year ASCVD risk score (Marilyn PIKE, et al., 2019) is: 5.9%    Values used to calculate the score:      Age: 52 years      Sex: Male      Is Non- : No      Diabetic: Yes      Tobacco smoker: No      Systolic Blood Pressure: 104 mmHg      Is BP treated: Yes      HDL Cholesterol: 36 mg/dL      Total Cholesterol: 134 mg/dL  Recent Labs   Lab Test 05/03/22  0942 03/30/21  1411   CHOL 134 235*   HDL 36* 35*   LDL 58 Cannot estimate LDL when triglyceride exceeds 400 mg/dL   TRIG 201* 656*         Depression:  Current medications include: Venlafaxine [id number] once daily, bupropion XL -300 mg daily, and aripiprazole 5mg daily. He notes that he feels that the aripiprazole has allowed for him to concentrate more and \"get more stuff done\". Increased dose aripiprazole to 10mg and noted tremors and increased fatigue on 11/3/22.     Seeing weekly therapist --very helpful.   PHQ-9 SCORE 1/27/2022 9/6/2022 10/4/2022   PHQ-9 Total Score MyChart 15 (Moderately severe depression) - -   PHQ-9 Total Score 15 19 18       DVT:   2 years ago had blood clot in his foot " , has family hx blood clots.  Takes warfarin 7.5mg tues-sunday ,monday 10mg./day . INR in clinic today and follows with anticoag clinic.  INR   Date Value Ref Range Status   11/03/2022 1.9 (H) 0.9 - 1.1 Final   04/11/2022 1.66 (H) 0.85 - 1.15 Final   06/25/2021 3.00 (H) 0.86 - 1.14 Final     Comment:     This test is intended for monitoring Coumadin therapy.  Results are not   accurate in patients with prolonged INR due to factor deficiency.          Vitamin D3:Taking vitamin D 5000 units once daily. Was not taking over the summer month since last level drawn in 05/03/22.     Vitamin D Deficiency Screening Results:  Lab Results   Component Value Date    VITDT 53 05/03/2022    VITDT 27 01/27/2022    VITDT 34 03/30/2021         Hypertension: Current medications include: lisinopril 5mg./day .  Patient does not self-monitor blood pressure.  Patient reports no current medication side effects.  BP Readings from Last 3 Encounters:   11/03/22 104/75   10/04/22 120/80   09/06/22 122/86             I spent 30 minutes with this patient today. All changes were made via collaborative practice agreement with Elvis Guzman MD. A copy of the visit note was provided to the patient's provider(s).    The patient was given a summary of these recommendations after todays visit.      Adair Waite [facility].  Medication Therapy Management Provider  [phone]    Rachel Moniz, PharmD-4         Medication Therapy Recommendations  MDD (major depressive disorder), recurrent episode, severe (H)    Current Medication: ARIPiprazole (ABILIFY) 10 MG tablet (Discontinued)   Rationale: Dose too high - Dosage too high - Safety   Recommendation: Decrease Dose - ARIPiprazole 5 MG tablet - 1 tab daily.   Status: Accepted per CPA

## 2022-11-03 NOTE — LETTER
[facility]  [address] [place] [address]  Phone: [phone]         November 3, 2022        Gaurang Rivera  [address]   [facility] [address]            Dear Gaurang,    You are currently under the care of [facility] for your warfarin (Coumadin , Jantoven ) therapy.  We are contacting you because our records show you were due for an INR on 10/14/22.    There are potentially serious risks when taking warfarin without careful monitoring and we want to make sure you are safely managed.  Routine lab monitoring is required for warfarin refills.     Please call [phone]  as soon as possible to schedule an appointment.  If there has been a change in your care or other concerns, please let us know so we can help and or update our records.     Sincerely,       [facility]

## 2022-11-03 NOTE — TELEPHONE ENCOUNTER
ANTICOAGULATION     Gaurang Rivera is overdue for INR check.      Reminder letter sent    Harshil Salamanca RN

## 2022-11-03 NOTE — PATIENT INSTRUCTIONS
"Recommendations from today's MTM visit:                                                       1. Aripiprazole seems to be helping mental health but 10mg dose too high. Will go back to 5mg daily to avoid side effects.    2. Restart 5,000 units vitamin d once daily and we will recheck levels in 2023.    3. Blood Pressure was great and blood sugars within goal! Keep monitoring blood sugar levels with bebo sensor and try to cut down on nighttime snacking.  Try making a protein shake instead (Ensure Max Protein Nutrition Shake). Remember to find one with low carbs ( < 8 carbs per bottle would be ideal).      Follow-up: Return in about 7 weeks (around 12/19/2022), or @9:30AM, for A1c lab, Medication Therapy Management Visit.    It was great speaking with you today.  I value your experience and would be very thankful for your time in providing feedback in our clinic survey. In the next few days, you may receive an email or text message from [employer] with a link to a survey related to your  clinical pharmacist.\"     To schedule another MTM appointment, please call the clinic directly or you may call the MTM scheduling line at [phone] or toll-free at [phone].     My Clinical Pharmacist's contact information:                                                      Please feel free to contact me with any questions or concerns you have.      Adair Waite Rph.  Medication Therapy Management Provider  [phone]  Rachel Moniz, PharmD-4    "

## 2022-11-03 NOTE — PROGRESS NOTES
ANTICOAGULATION MANAGEMENT     Gaurang Rivera 52 year old male is on warfarin with subtherapeutic INR result. (Goal INR 2.0-3.0)    Recent labs: (last 7 days)     11/03/22  1015   INR 1.9*       ASSESSMENT       Source(s): Chart Review    Previous INR was Therapeutic last 2(+) visits    Medication, diet, health changes since last INR chart reviewed; none identified           PLAN     Unable to reach Gaurang today.    Left message to continue current dose of warfarin 7.5 mg tonight. Request call back for assessment.    Follow up required to discuss out of range result     Harshil Salamanca RN  Anticoagulation Clinic  11/3/2022

## 2022-11-04 NOTE — PROGRESS NOTES
ANTICOAGULATION MANAGEMENT     Gaurang Rivera 52 year old male is on warfarin with subtherapeutic INR result. (Goal INR 2.0-3.0)    Recent labs: (last 7 days)     11/03/22  1015   INR 1.9*       ASSESSMENT       Source(s): Chart Review    Previous INR was Therapeutic last 2(+) visits    Medication, diet, health changes since last INR chart reviewed - aripiprazole was increased to 10mg daily 10/4/22, but reduced again to 5 mg yesterday 11/3/22. Patient advised to start vitamin D as well. No interactions noted per up-to-date       PLAN     Unable to reach Gaurang today.    Left message to continue current dose of warfarin 7.5 mg daily with a recheck in 2 weeks. Request call back for assessment.    Follow up required to assess for changes  and discuss out of range result     Cyn Womack RN  Anticoagulation Clinic  11/4/2022

## 2022-11-04 NOTE — PROGRESS NOTES
ANTICOAGULATION MANAGEMENT     Gaurang Rivera 52 year old male is on warfarin with subtherapeutic INR result. (Goal INR 2.0-3.0)    Recent labs: (last 7 days)     11/03/22  1015   INR 1.9*       ASSESSMENT       Source(s): Chart Review and Patient/Caregiver Call       Warfarin doses taken: Warfarin taken as instructed    Diet: No new diet changes identified    New illness, injury, or hospitalization: S/e to Abilify - increase fatigue and shakiness     Medication/supplement changes: Abilify dose decreased on 11/3/22 No interaction anticipated    Signs or symptoms of bleeding or clotting: No    Previous INR: Therapeutic last 2(+) visits    Additional findings: None       PLAN     Recommended plan for temporary change(s) and ongoing change(s) affecting INR     Dosing Instructions: Continue your current warfarin dose and hold vitamin K intake for 2 days with next INR in 2 weeks       Summary  As of 11/3/2022    Full warfarin instructions:  7.5 mg every day; Starting 11/3/2022   Next INR check:  11/17/2022             Telephone call with Gaurang who verbalizes understanding and agrees to plan    Lab visit scheduled    Education provided:     Please call back if any changes to your diet, medications or how you've been taking warfarin    Symptom monitoring: monitoring for bleeding signs and symptoms and monitoring for clotting signs and symptoms    Plan made per [facility] anticoagulation protocol    Cyn Womack RN  Anticoagulation Clinic  11/4/2022    _______________________________________________________________________     Anticoagulation Episode Summary     Current INR goal:  2.0-3.0   TTR:  74.1 % (1 y)   Target end date:  Indefinite   Send INR reminders to:  [facility]    Indications    Atrial fibrillation  unspecified type (H) [I48.91]  Heterozygous factor V Leiden mutation (H) [D68.51]  Current use of long term anticoagulation [Z79.01]  Acute deep vein thrombosis (DVT) of distal end of right lower  extremity (H) [I82.4Z1]           Comments:  MTV 30 mcg.         Anticoagulation Care Providers     Provider Role Specialty Phone number    Elvis Guzman MD Referring Family Medicine [phone]

## 2022-11-15 DIAGNOSIS — E11.9 TYPE 2 DIABETES MELLITUS WITHOUT COMPLICATION, WITHOUT LONG-TERM CURRENT USE OF INSULIN (H): ICD-10-CM

## 2022-11-16 NOTE — TELEPHONE ENCOUNTER
1/17/22 was last OV with PCP.  PT sees MTM quite frequently, but we can't use these visits for refill prot.    I will send this to MTM and PCP.  Please fill this refill request.  CHEL Goode    Last Written Prescription Date:  1/27/22  Last Fill Quantity: 180,  # refills: 1   Last office visit: 1/27/2022 with prescribing provider:  Dr. Elvis Guzman   Future Office Visit:   Next 5 appointments (look out 90 days)    Nov 23, 2022  8:30 AM  (Arrive by 8:10 AM)  Adult Preventative Visit with Elvis Guzman MD  [facility] ([facility] ) [address] [place] [address]  [phone]   Dec 19, 2022  9:30 AM  Pharmacist Visit with Adair Waite RPH  [facility] ([facility] ) [address] [place] [address]  [phone]

## 2022-11-16 NOTE — TELEPHONE ENCOUNTER
11-16-22      mtm refilled for patient. Today .  F/up mtm 12-19-22.     Adair Waite Rph.  Medication Therapy Management Provider  [phone]

## 2022-11-21 ASSESSMENT — ENCOUNTER SYMPTOMS
ARTHRALGIAS: 1
FREQUENCY: 1

## 2022-11-21 ASSESSMENT — PATIENT HEALTH QUESTIONNAIRE - PHQ9
SUM OF ALL RESPONSES TO PHQ QUESTIONS 1-9: 19
SUM OF ALL RESPONSES TO PHQ QUESTIONS 1-9: 19
10. IF YOU CHECKED OFF ANY PROBLEMS, HOW DIFFICULT HAVE THESE PROBLEMS MADE IT FOR YOU TO DO YOUR WORK, TAKE CARE OF THINGS AT HOME, OR GET ALONG WITH OTHER PEOPLE: VERY DIFFICULT

## 2022-11-23 ENCOUNTER — OFFICE VISIT (OUTPATIENT)
Dept: FAMILY MEDICINE | Facility: CLINIC | Age: 52
End: 2022-11-23
Payer: COMMERCIAL

## 2022-11-23 ENCOUNTER — ANTICOAGULATION THERAPY VISIT (OUTPATIENT)
Dept: ANTICOAGULATION | Facility: CLINIC | Age: 52
End: 2022-11-23

## 2022-11-23 VITALS
RESPIRATION RATE: 16 BRPM | WEIGHT: 298 LBS | HEIGHT: 78 IN | BODY MASS INDEX: 34.48 KG/M2 | TEMPERATURE: 97.3 F | HEART RATE: 89 BPM | SYSTOLIC BLOOD PRESSURE: 122 MMHG | DIASTOLIC BLOOD PRESSURE: 83 MMHG | OXYGEN SATURATION: 100 %

## 2022-11-23 DIAGNOSIS — I48.91 ATRIAL FIBRILLATION, UNSPECIFIED TYPE (H): ICD-10-CM

## 2022-11-23 DIAGNOSIS — R80.9 MICROALBUMINURIA DUE TO TYPE 2 DIABETES MELLITUS (H): ICD-10-CM

## 2022-11-23 DIAGNOSIS — Z79.01 CURRENT USE OF LONG TERM ANTICOAGULATION: ICD-10-CM

## 2022-11-23 DIAGNOSIS — I48.91 ATRIAL FIBRILLATION, UNSPECIFIED TYPE (H): Primary | ICD-10-CM

## 2022-11-23 DIAGNOSIS — R47.89 WORD FINDING DIFFICULTY: ICD-10-CM

## 2022-11-23 DIAGNOSIS — I82.4Y9 DEEP VEIN THROMBOSIS (DVT) OF PROXIMAL LOWER EXTREMITY, UNSPECIFIED CHRONICITY, UNSPECIFIED LATERALITY (H): ICD-10-CM

## 2022-11-23 DIAGNOSIS — Z00.00 ROUTINE GENERAL MEDICAL EXAMINATION AT A HEALTH CARE FACILITY: ICD-10-CM

## 2022-11-23 DIAGNOSIS — I82.4Z1 ACUTE DEEP VEIN THROMBOSIS (DVT) OF DISTAL END OF RIGHT LOWER EXTREMITY (H): ICD-10-CM

## 2022-11-23 DIAGNOSIS — E11.29 MICROALBUMINURIA DUE TO TYPE 2 DIABETES MELLITUS (H): ICD-10-CM

## 2022-11-23 DIAGNOSIS — R16.0 HEPATOMEGALY: ICD-10-CM

## 2022-11-23 DIAGNOSIS — D68.51 HETEROZYGOUS FACTOR V LEIDEN MUTATION (H): ICD-10-CM

## 2022-11-23 DIAGNOSIS — F33.2 SEVERE EPISODE OF RECURRENT MAJOR DEPRESSIVE DISORDER, WITHOUT PSYCHOTIC FEATURES (H): ICD-10-CM

## 2022-11-23 DIAGNOSIS — Z23 HIGH PRIORITY FOR 2019-NCOV VACCINE: ICD-10-CM

## 2022-11-23 LAB
ANION GAP SERPL CALCULATED.3IONS-SCNC: 12 MMOL/L (ref 7–15)
BUN SERPL-MCNC: 17.7 MG/DL (ref 6–20)
CALCIUM SERPL-MCNC: 10 MG/DL (ref 8.6–10)
CHLORIDE SERPL-SCNC: 101 MMOL/L (ref 98–107)
CREAT SERPL-MCNC: 1.07 MG/DL (ref 0.67–1.17)
DEPRECATED HCO3 PLAS-SCNC: 25 MMOL/L (ref 22–29)
GFR SERPL CREATININE-BSD FRML MDRD: 83 ML/MIN/1.73M2
GLUCOSE SERPL-MCNC: 145 MG/DL (ref 70–99)
HBA1C MFR BLD: 7.4 % (ref 0–5.6)
INR BLD: 2.4 (ref 0.9–1.1)
POTASSIUM SERPL-SCNC: 4.4 MMOL/L (ref 3.4–5.3)
SODIUM SERPL-SCNC: 138 MMOL/L (ref 136–145)

## 2022-11-23 PROCEDURE — 90677 PCV20 VACCINE IM: CPT | Performed by: FAMILY MEDICINE

## 2022-11-23 PROCEDURE — 99214 OFFICE O/P EST MOD 30 MIN: CPT | Mod: 25 | Performed by: FAMILY MEDICINE

## 2022-11-23 PROCEDURE — 90750 HZV VACC RECOMBINANT IM: CPT | Performed by: FAMILY MEDICINE

## 2022-11-23 PROCEDURE — 0124A COVID-19 VACCINE BIVALENT BOOSTER 12+ (PFIZER): CPT | Performed by: FAMILY MEDICINE

## 2022-11-23 PROCEDURE — 91312 COVID-19 VACCINE BIVALENT BOOSTER 12+ (PFIZER): CPT | Performed by: FAMILY MEDICINE

## 2022-11-23 PROCEDURE — 90682 RIV4 VACC RECOMBINANT DNA IM: CPT | Performed by: FAMILY MEDICINE

## 2022-11-23 PROCEDURE — 96127 BRIEF EMOTIONAL/BEHAV ASSMT: CPT | Performed by: FAMILY MEDICINE

## 2022-11-23 PROCEDURE — 99396 PREV VISIT EST AGE 40-64: CPT | Mod: 25 | Performed by: FAMILY MEDICINE

## 2022-11-23 PROCEDURE — 80048 BASIC METABOLIC PNL TOTAL CA: CPT | Performed by: FAMILY MEDICINE

## 2022-11-23 PROCEDURE — 83036 HEMOGLOBIN GLYCOSYLATED A1C: CPT | Performed by: FAMILY MEDICINE

## 2022-11-23 PROCEDURE — 36415 COLL VENOUS BLD VENIPUNCTURE: CPT | Performed by: FAMILY MEDICINE

## 2022-11-23 PROCEDURE — 85610 PROTHROMBIN TIME: CPT | Performed by: FAMILY MEDICINE

## 2022-11-23 PROCEDURE — 90471 IMMUNIZATION ADMIN: CPT | Performed by: FAMILY MEDICINE

## 2022-11-23 PROCEDURE — 90472 IMMUNIZATION ADMIN EACH ADD: CPT | Performed by: FAMILY MEDICINE

## 2022-11-23 ASSESSMENT — ENCOUNTER SYMPTOMS
ARTHRALGIAS: 1
FREQUENCY: 1

## 2022-11-23 ASSESSMENT — PAIN SCALES - GENERAL: PAINLEVEL: NO PAIN (0)

## 2022-11-23 NOTE — PROGRESS NOTES
ANTICOAGULATION MANAGEMENT     Gaurang Rivera 52 year old male is on warfarin with therapeutic INR result. (Goal INR 2.0-3.0)    Recent labs: (last 7 days)     11/23/22  0947   INR 2.4*       ASSESSMENT       Source(s): Chart Review and Patient/Caregiver Call       Warfarin doses taken: Warfarin taken as instructed    Diet: No new diet changes identified    New illness, injury, or hospitalization: No    Medication/supplement changes: None noted    Signs or symptoms of bleeding or clotting: No    Previous INR: Subtherapeutic    Additional findings: None       PLAN     Recommended plan for no diet, medication or health factor changes affecting INR     Dosing Instructions: Continue your current warfarin dose with next INR in 4-5 weeks       Summary  As of 11/23/2022    Full warfarin instructions:  7.5 mg every day; Starting 11/23/2022   Next INR check:  12/28/2022           Telephone call with Gaurang who verbalizes understanding and agrees to plan    Lab visit scheduled    Education provided:     Please call back if any changes to your diet, medications or how you've been taking warfarin    Symptom monitoring: monitoring for bleeding signs and symptoms and monitoring for clotting signs and symptoms    Plan made per [facility] anticoagulation protocol    Cyn Womack RN  Anticoagulation Clinic  11/23/2022    _______________________________________________________________________     Anticoagulation Episode Summary     Current INR goal:  2.0-3.0   TTR:  73.1 % (1 y)   Target end date:  Indefinite   [facility]    Indications    Atrial fibrillation  unspecified type (H) [I48.91]  Heterozygous factor V Leiden mutation (H) [D68.51]  Current use of long term anticoagulation [Z79.01]  Acute deep vein thrombosis (DVT) of distal end of right lower extremity (H) [I82.4Z1]           Comments:  MTV 30 mcg.         Anticoagulation Care Providers     Provider Role Specialty Phone number    Megan,  Elvis NEAL MD [facility] Family [facility] [phone]

## 2022-11-23 NOTE — PROGRESS NOTES
SUBJECTIVE:   CC: Gaurang is an 52 year old who presents for preventative health visit.     Patient has been advised of split billing requirements and indicates understanding: Yes  Healthy Habits:     Getting at least 3 servings of Calcium per day:  Yes    Bi-annual eye exam:  Yes    Dental care twice a year:  NO    Sleep apnea or symptoms of sleep apnea:  None    Diet:  Regular (no restrictions)    Frequency of exercise:  None    Taking medications regularly:  Yes    Medication side effects:  Not applicable    PHQ-2 Total Score: 6    Additional concerns today:  Yes    Up to date with eye exam.     Sometimes he has problems getting the words out which has been going on for one year. Symptoms are intermittent and sometimes it is really hard to speak. No other associated symptoms. No recent history of COVID.     Mood wise same but feels that getting more done with Abilify. Passive SI, no active thoughts.       Today's PHQ-2 Score:   PHQ-2 ( 1999 Pfizer) 11/21/2022   Q1: Little interest or pleasure in doing things 3   Q2: Feeling down, depressed or hopeless 3   PHQ-2 Score 6   PHQ-2 Total Score (12-17 Years)- Positive if 3 or more points; Administer PHQ-A if positive -   Q1: Little interest or pleasure in doing things Nearly every day   Q2: Feeling down, depressed or hopeless Nearly every day   PHQ-2 Score 6           Social History     Tobacco Use     Smoking status: Never     Smokeless tobacco: Never   Substance Use Topics     Alcohol use: No     Comment: None         Alcohol Use 11/21/2022   Prescreen: >3 drinks/day or >7 drinks/week? Not Applicable   Prescreen: >3 drinks/day or >7 drinks/week? -       Last PSA:   PSA   Date Value Ref Range Status   03/30/2021 0.47 0 - 4 ug/L Final     Comment:     Assay Method:  Chemiluminescence using Siemens Vista analyzer     PSA Tumor Marker   Date Value Ref Range Status   04/11/2022 0.38 0.00 - 4.00 ug/L Final       Reviewed orders with patient. Reviewed health maintenance and  "updated orders accordingly - Yes      Reviewed and updated as needed this visit by clinical staff                  Reviewed and updated as needed this visit by Provider                     Review of Systems   Genitourinary: Positive for frequency and impotence.   Musculoskeletal: Positive for arthralgias.         OBJECTIVE:   /83 (BP Location: Left arm, Patient Position: Sitting, Cuff Size: Adult Large)   Pulse 89   Temp 97.3  F (36.3  C) (Temporal)   Resp 16   Ht 1.981 m (6' 6\")   Wt 135.2 kg (298 lb)   SpO2 100%   BMI 34.44 kg/m    Physical Exam  GENERAL: healthy, alert and no distress  EYES: Eyes grossly normal to inspection,  conjunctivae and sclerae normal  HENT: ear canals and TM's normal  NECK: no adenopathy, no asymmetry, masses, or scars and thyroid normal to palpation  RESP: lungs clear to auscultation - no rales, rhonchi or wheezes  CV: normal S1 S2  ABDOMEN: soft, nontender, no hepatosplenomegaly, no masses and bowel sounds normal   (male): normal hernia  MS: no gross musculoskeletal defects noted, no edema  SKIN: no suspicious lesions or rashes  NEURO: Normal strength and tone, mentation intact and speech normal  PSYCH: mentation appears normal, affect normal    Diagnostic Test Results:  Labs reviewed in Epic    ASSESSMENT/PLAN:     Routine general medical examination at a health care facility  - Pneumococcal 20 Valent Conjugate (Prevnar 20)  - INFLUENZA QUAD, RECOMBINANT, P-FREE (RIV4) (FLUBLOK) AGE 50-64 [[id number]]  - ZOSTER VACCINE RECOMBINANT ADJUVANTED (SHINGRIX)    Severe episode of recurrent major depressive disorder, without psychotic features   - Chronic passive SI, no active thoughts.   - Wants to continue current medication.  - Declines psychiatrist.     Microalbuminuria due to type 2 diabetes mellitus   - up to date with eye exam, LEIF signed   - Hemoglobin A1c; Future  - Basic metabolic panel  (Ca, Cl, CO2, Creat, Gluc, K, Na, BUN); Future  - Hemoglobin A1c  - Basic metabolic " "panel  (Ca, Cl, CO2, Creat, Gluc, K, Na, BUN)    Heterozygous factor V Leiden mutation   - INR point of care    Atrial fibrillation, unspecified type   - INR point of care    Acute deep vein thrombosis (DVT) of distal end of right lower extremity   - INR point of care    Deep vein thrombosis (DVT) of proximal lower extremity, unspecified chronicity, unspecified laterality (H)  - INR point of care    Hepatomegaly  - followed by GI     Word finding difficulty  - Symptoms are not acute. Referred to neurology for further evaluation.  - Adult Neurology  Referral; Future    High priority for 2019-nCoV vaccine  - COVID-19,PF,PFIZER BOOSTER BIVALENT 12+Yrs    Current use of long term anticoagulation  - INR point of care    Patient has been advised of split billing requirements and indicates understanding: Yes      COUNSELING:   Reviewed preventive health counseling, as reflected in patient instructions      BMI:   Estimated body mass index is 34.15 kg/m  as calculated from the following:    Height as of 1/17/22: 1.981 m (6' 6\").    Weight as of 11/3/22: 134 kg (295 lb 8 oz).         He reports that he has never smoked. He has never used smokeless tobacco.            Elvis Guzman MD  [facility]  Answers for HPI/ROS submitted by the patient on 11/21/2022  If you checked off any problems, how difficult have these problems made it for you to do your work, take care of things at home, or get along with other people?: Very difficult  PHQ9 TOTAL SCORE: 19      "

## 2022-12-28 ENCOUNTER — ANTICOAGULATION THERAPY VISIT (OUTPATIENT)
Dept: ANTICOAGULATION | Facility: CLINIC | Age: 52
End: 2022-12-28

## 2022-12-28 ENCOUNTER — LAB (OUTPATIENT)
Dept: LAB | Facility: CLINIC | Age: 52
End: 2022-12-28
Payer: COMMERCIAL

## 2022-12-28 DIAGNOSIS — I48.91 ATRIAL FIBRILLATION, UNSPECIFIED TYPE (H): ICD-10-CM

## 2022-12-28 DIAGNOSIS — I82.4Z1 ACUTE DEEP VEIN THROMBOSIS (DVT) OF DISTAL END OF RIGHT LOWER EXTREMITY (H): ICD-10-CM

## 2022-12-28 DIAGNOSIS — D68.51 HETEROZYGOUS FACTOR V LEIDEN MUTATION (H): ICD-10-CM

## 2022-12-28 DIAGNOSIS — I48.91 ATRIAL FIBRILLATION, UNSPECIFIED TYPE (H): Primary | ICD-10-CM

## 2022-12-28 DIAGNOSIS — Z79.01 CURRENT USE OF LONG TERM ANTICOAGULATION: ICD-10-CM

## 2022-12-28 DIAGNOSIS — I82.4Y9 DEEP VEIN THROMBOSIS (DVT) OF PROXIMAL LOWER EXTREMITY, UNSPECIFIED CHRONICITY, UNSPECIFIED LATERALITY (H): ICD-10-CM

## 2022-12-28 LAB — INR BLD: 2.5 (ref 0.9–1.1)

## 2022-12-28 PROCEDURE — 36415 COLL VENOUS BLD VENIPUNCTURE: CPT

## 2022-12-28 PROCEDURE — 85610 PROTHROMBIN TIME: CPT

## 2022-12-28 NOTE — PROGRESS NOTES
ANTICOAGULATION MANAGEMENT     Gaurang Rivera 52 year old male is on warfarin with therapeutic INR result. (Goal INR 2.0-3.0)    Recent labs: (last 7 days)     12/28/22  0740   INR 2.5*       ASSESSMENT       Source(s): Chart Review and Patient/Caregiver Call       Warfarin doses taken: Warfarin taken as instructed    Diet: No new diet changes identified    New illness, injury, or hospitalization: No    Medication/supplement changes: None noted    Signs or symptoms of bleeding or clotting: No    Previous INR: Therapeutic last 2(+) visits    Additional findings: None       PLAN     Recommended plan for no diet, medication or health factor changes affecting INR     Dosing Instructions: Continue your current warfarin dose with next INR in 4-5 weeks       Summary  As of 12/28/2022    Full warfarin instructions:  7.5 mg every day   Next INR check:  2/1/2023             Telephone call with Gaurang who verbalizes understanding and agrees to plan    Patient offered & declined to schedule next visit    Education provided:     Please call back if any changes to your diet, medications or how you've been taking warfarin    Symptom monitoring: monitoring for bleeding signs and symptoms and monitoring for clotting signs and symptoms    Plan made per [facility] anticoagulation protocol    Cyn Womack RN  Anticoagulation Clinic  12/28/2022    _______________________________________________________________________     Anticoagulation Episode Summary     Current INR goal:  2.0-3.0   TTR:  77.2 % (1 y)   Target end date:  Indefinite   Send INR reminders to:  [facility]    Indications    Atrial fibrillation  unspecified type (H) [I48.91]  Heterozygous factor V Leiden mutation (H) [D68.51]  Current use of long term anticoagulation [Z79.01]  Acute deep vein thrombosis (DVT) of distal end of right lower extremity (H) [I82.4Z1]           Comments:  MTV 30 mcg.         Anticoagulation Care Providers     Provider Role Specialty  Phone number    Elvis Guzman MD Referring Family Medicine [phone]

## 2023-01-12 DIAGNOSIS — E78.5 HYPERLIPIDEMIA, UNSPECIFIED HYPERLIPIDEMIA TYPE: ICD-10-CM

## 2023-01-16 RX ORDER — ATORVASTATIN CALCIUM 40 MG/1
40 TABLET, FILM COATED ORAL DAILY
Qty: 90 TABLET | Refills: 0 | Status: SHIPPED | OUTPATIENT
Start: 2023-01-16 | End: 2023-04-13

## 2023-01-16 NOTE — TELEPHONE ENCOUNTER
Prescription approved per [facility] Refill Protocol.  UMA CarverN, RN-[facility]

## 2023-01-28 NOTE — PROGRESS NOTES
Medication Therapy Management (MTM) Encounter    ASSESSMENT:                            Medication Adherence/Access: No issues identified      Type 2 Diabetes: Patient is not meeting A1c goal of < 7%. Self monitoring of blood glucose is NOT at goal of fasting  mg/dL and post prandial < 150 mg/dL. Patient is not quite meeting average glucose goal of <150mg/dL (154 mg/dL). Patient is meeting goal of >70% time in target with continuous glucose monitoring (52%).  Patient would benefit from ideal SMBG: Check blood sugars pre-prandial, 2 hours post-prandial, and with symptoms of hypoglycemia, Metformin :  stay on the same dose.,  GLP-1 agonist (Bydureon) :  stay on the same dose., SGLT-2 :  Start 10mg./day Jardiance pil in AM .   updated Hemoglobin A1c (7.8%) and weight loss recommended-thru low carb lifestyle/exercise --see plan for details.   Microalbumin is  at goal < 30 mg/g. Taking an ACE-inhibitor not at max dose.     Late night snacking --stop food intake drink protein shake instead--see plan.     Hyperlipidemia: Stable.  Patient is on high intensity statin which is indicated based on 2019 ACC/AHA guidelines for lipid management.         Vitamin D3: is at goal of 50-75ng/mL. Pt will restart 5,000 units vitamin d daily for winter months (see plan).       Depression: Patient would benefit from increasing Bupropion dose to 450mg./day now --see plan.       DVT: stable.     Hypertension: Stable. Patient is meeting blood pressure goal of < 140/90mmHg. Patient would benefit from the following changes - continued blood pressure monitoring, watching diet, increasing exercise and weight loss and limiting/reducing sodium.      PLAN:                              1. A1c today = 7.8% -- lets ADD 10mg./day Jardiance now in am , stay on weekly Bydureon and 2 x day Metformin , dod your best with dietary changes, scan blood sugars , drink plenty of water/day .  See if you can motivate yourself to walk -30 minutes /day at the  [facility] until its nice outside then walk outside.     2, Please restart 5000 units/day otc -Vitamin D pill.  Nature made is a very good brand.     3. Keep searching for a new therapist .     4. Lets ADD another 150mg. Bupropion ER tab -- 1 tab in am with your 300mg tab for daily total of 450mg.          Follow-up: Return in about 1 month (around 2/28/2023), or @ 9 AM at the new clinic, for Blood sugar meter review, Medication Therapy Management Visit, Depression.    SUBJECTIVE/OBJECTIVE:                          Gaurang Rivera is a 52 year old male coming in for a follow up visit (11-3-22). He was referred to me from Elvis Guzman  .       Reason for visit:   A1c, fasting lipids, INR labs today .  and bp rechecks. , stopped  walking at [facility] or outside now .    Allergies/ADRs: Reviewed in chart; no tylenol 2022--due to liver growth   Past Medical History: Reviewed in chart  Tobacco: He reports that he has never smoked. He has never used smokeless tobacco.  Alcohol: not currently using  Caffeine: diet coke - 4 cans/day or so   Social:   rowing at [facility]. Rowing club.   Personal Healthcare Goals:  DM at goal is #1 issue, food is his drug -addicted to it , sleep is poor -tired , craves caffeine /sugar . Wants to exercise more.   Activity: winter months - sedentary , summer months - [place] active [place] , [facility] gym membership close to his house --infrequent use. 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      Medication Adherence/Access: no issues reported      Type 2 Diabetes:  Currently taking : Off all Glipizide , metformin 1 gm bid pc, weekly bydureon 2 mg now.   Patient is not experiencing side effects.  Blood sugar monitoring: [employer] CGM:                           Symptoms of low blood sugar? None now.   Symptoms of high blood sugar? none  Eye exam: up to date  Foot exam: mild tingling in feet.   Diet/Exercise:   He stopped walking outside due to weather , lives 2 miles from [facility] --stopped going --lost  "motivation.      Sort of intermittent fasting but has fallen off it. avg 2 large meals/day . Lots of FF again --addicted to [employer].   Does not cook for himself , has known fatty liver disease, late night candy bars/cookies  lately --feels hungry .     Aspirin: Not taking due to warfarin   Statin: Yes: Atorvastatin 40   ACEi/ARB: Yes: Lisinopril 5mg.    Urine Albumin:   Lab Results   Component Value Date    UMALCR 10.22 05/03/2022        Lab Results   Component Value Date    A1C 7.8 01/30/2023    A1C 7.4 11/23/2022    A1C 7.1 09/06/2022    A1C 6.0 05/03/2022    A1C 8.7 01/27/2022    A1C 9.5 03/30/2021    A1C 7.2 10/10/2019    A1C 7.1 06/28/2019    A1C 6.5 09/26/2018         Hyperlipidemia: Current therapy includes : Atorvastatin 40mg daily.  Patient reports no significant myalgias or other side effects.  The 10-year ASCVD risk score (Marilyn PIKE, et al., 2019) is: 6.5%    Values used to calculate the score:      Age: 52 years      Sex: Male      Is Non- : No      Diabetic: Yes      Tobacco smoker: No      Systolic Blood Pressure: 110 mmHg      Is BP treated: Yes      HDL Cholesterol: 36 mg/dL      Total Cholesterol: 134 mg/dL  Recent Labs   Lab Test 05/03/22  0942 03/30/21  1411   CHOL 134 235*   HDL 36* 35*   LDL 58 Cannot estimate LDL when triglyceride exceeds 400 mg/dL   TRIG 201* 656*         Depression:  Current medications include: Venlafaxine 9a706sh once daily, bupropion XL -300 mg daily, and aripiprazole 5mg daily. He notes that he feels that the aripiprazole has allowed for him to concentrate more and \"get more stuff done\". Increased dose aripiprazole to 10mg and noted tremors and increased fatigue on 11/3/22.     Seeing weekly therapist --via zoom --has a new therapist --not working very well .   PHQ-9 SCORE 9/6/2022 10/4/2022 11/21/2022   PHQ-9 Total Score MyChart - - 19 (Moderately severe depression)   PHQ-9 Total Score 19 18 19       DVT:   2 years ago had blood clot in his foot " , has family hx blood clots.  Takes warfarin 7.5mg tues-sunday ,monday 10mg./day . INR in clinic today and follows with anticoag clinic.  INR   Date Value Ref Range Status   01/30/2023 2.1 (H) 0.9 - 1.1 Final   04/11/2022 1.66 (H) 0.85 - 1.15 Final   06/25/2021 3.00 (H) 0.86 - 1.14 Final     Comment:     This test is intended for monitoring Coumadin therapy.  Results are not   accurate in patients with prolonged INR due to factor deficiency.          Vitamin D3: he stopped Vitamin d in summer --hasnt restarted it .     Vitamin D Deficiency Screening Results:  Lab Results   Component Value Date    VITDT 53 05/03/2022    VITDT 27 01/27/2022    VITDT 34 03/30/2021         Hypertension: Current medications include: lisinopril 5mg./day .  Patient does not self-monitor blood pressure.  Patient reports no current medication side effects.  BP Readings from Last 3 Encounters:   01/30/23 110/84   11/23/22 122/83   11/03/22 104/75         /84   Pulse 87   Wt 301 lb (136.5 kg)   SpO2 92%   BMI 34.78 kg/m        I spent 50 minutes with this patient today. All changes were made via collaborative practice agreement with Elvis Guzmna MD. A copy of the visit note was provided to the patient's provider(s).    The patient was given a summary of these recommendations after todays visit.      Adair Waite abril.  Medication Therapy Management Provider  [phone]             Medication Therapy Recommendations  MDD (major depressive disorder), recurrent episode, severe (H)    Current Medication: buPROPion (WELLBUTRIN XL) 150 MG 24 hr tablet   Rationale: Dose too low - Dosage too low - Effectiveness   Recommendation: Increase Dose   Status: Accepted per CPA         Microalbuminuria due to type 2 diabetes mellitus (H)    Current Medication: empagliflozin (JARDIANCE) 10 MG TABS tablet   Rationale: Synergistic therapy - Needs additional medication therapy - Indication   Recommendation: Start Medication   Status: Accepted  per CPA

## 2023-01-30 ENCOUNTER — ALLIED HEALTH/NURSE VISIT (OUTPATIENT)
Dept: PHARMACY | Facility: CLINIC | Age: 53
End: 2023-01-30
Payer: COMMERCIAL

## 2023-01-30 ENCOUNTER — ANTICOAGULATION THERAPY VISIT (OUTPATIENT)
Dept: ANTICOAGULATION | Facility: CLINIC | Age: 53
End: 2023-01-30

## 2023-01-30 ENCOUNTER — LAB (OUTPATIENT)
Dept: LAB | Facility: CLINIC | Age: 53
End: 2023-01-30
Payer: COMMERCIAL

## 2023-01-30 VITALS
WEIGHT: 301 LBS | SYSTOLIC BLOOD PRESSURE: 110 MMHG | DIASTOLIC BLOOD PRESSURE: 84 MMHG | OXYGEN SATURATION: 92 % | HEART RATE: 87 BPM | BODY MASS INDEX: 34.78 KG/M2

## 2023-01-30 DIAGNOSIS — I48.91 ATRIAL FIBRILLATION, UNSPECIFIED TYPE (H): ICD-10-CM

## 2023-01-30 DIAGNOSIS — I10 ESSENTIAL HYPERTENSION: ICD-10-CM

## 2023-01-30 DIAGNOSIS — Z79.01 CURRENT USE OF LONG TERM ANTICOAGULATION: ICD-10-CM

## 2023-01-30 DIAGNOSIS — F33.2 SEVERE EPISODE OF RECURRENT MAJOR DEPRESSIVE DISORDER, WITHOUT PSYCHOTIC FEATURES (H): ICD-10-CM

## 2023-01-30 DIAGNOSIS — I82.409 DEEP VEIN THROMBOSIS (DVT) (H): ICD-10-CM

## 2023-01-30 DIAGNOSIS — D68.51 HETEROZYGOUS FACTOR V LEIDEN MUTATION (H): ICD-10-CM

## 2023-01-30 DIAGNOSIS — I48.91 ATRIAL FIBRILLATION, UNSPECIFIED TYPE (H): Primary | ICD-10-CM

## 2023-01-30 DIAGNOSIS — I82.4Z1 ACUTE DEEP VEIN THROMBOSIS (DVT) OF DISTAL END OF RIGHT LOWER EXTREMITY (H): ICD-10-CM

## 2023-01-30 DIAGNOSIS — E78.5 HYPERLIPIDEMIA, UNSPECIFIED HYPERLIPIDEMIA TYPE: ICD-10-CM

## 2023-01-30 DIAGNOSIS — I82.4Y9 DEEP VEIN THROMBOSIS (DVT) OF PROXIMAL LOWER EXTREMITY, UNSPECIFIED CHRONICITY, UNSPECIFIED LATERALITY (H): ICD-10-CM

## 2023-01-30 DIAGNOSIS — E11.9 TYPE 2 DIABETES MELLITUS WITHOUT COMPLICATION, WITHOUT LONG-TERM CURRENT USE OF INSULIN (H): ICD-10-CM

## 2023-01-30 DIAGNOSIS — E11.9 TYPE 2 DIABETES MELLITUS WITHOUT COMPLICATION, WITHOUT LONG-TERM CURRENT USE OF INSULIN (H): Primary | ICD-10-CM

## 2023-01-30 LAB
CHOLEST SERPL-MCNC: 200 MG/DL
HBA1C MFR BLD: 7.8 % (ref 0–5.6)
HDLC SERPL-MCNC: 36 MG/DL
INR BLD: 2.1 (ref 0.9–1.1)
LDLC SERPL CALC-MCNC: ABNORMAL MG/DL
LDLC SERPL DIRECT ASSAY-MCNC: 89 MG/DL
NONHDLC SERPL-MCNC: 164 MG/DL
TRIGL SERPL-MCNC: 420 MG/DL

## 2023-01-30 PROCEDURE — 80061 LIPID PANEL: CPT

## 2023-01-30 PROCEDURE — 85610 PROTHROMBIN TIME: CPT

## 2023-01-30 PROCEDURE — 83721 ASSAY OF BLOOD LIPOPROTEIN: CPT | Mod: 59

## 2023-01-30 PROCEDURE — 36415 COLL VENOUS BLD VENIPUNCTURE: CPT

## 2023-01-30 PROCEDURE — 99605 MTMS BY PHARM NP 15 MIN: CPT | Performed by: PHARMACIST

## 2023-01-30 PROCEDURE — 83036 HEMOGLOBIN GLYCOSYLATED A1C: CPT

## 2023-01-30 PROCEDURE — 99607 MTMS BY PHARM ADDL 15 MIN: CPT | Performed by: PHARMACIST

## 2023-01-30 RX ORDER — BUPROPION HYDROCHLORIDE 150 MG/1
150 TABLET ORAL EVERY MORNING
Qty: 90 TABLET | Refills: 1 | Status: SHIPPED | OUTPATIENT
Start: 2023-01-30 | End: 2023-08-03

## 2023-01-30 NOTE — PATIENT INSTRUCTIONS
"Recommendations from today's MTM visit:                                                         1. A1c today = 7.8% -- lets ADD 10mg./day Jardiance now in am , stay on weekly Bydureon and 2 x day Metformin , dod your best with dietary changes, scan blood sugars , drink plenty of water/day .  See if you can motivate yourself to walk -30 minutes /day at the [facility] until its nice outside then walk outside.     2, Please restart 5000 units/day otc -Vitamin D pill.  Nature made is a very good brand.     3. Keep searching for a new therapist .     4. Lets ADD another 150mg. Bupropion ER tab -- 1 tab in am with your 300mg tab for daily total of 450mg.          Follow-up: Return in about 1 month (around 2/28/2023), or @ 9 AM at the new clinic, for Blood sugar meter review, Medication Therapy Management Visit, Depression.    It was great speaking with you today.  I value your experience and would be very thankful for your time in providing feedback in our clinic survey. In the next few days, you may receive an email or text message from [employer] with a link to a survey related to your  clinical pharmacist.\"     To schedule another MTM appointment, please call the clinic directly or you may call the MTM scheduling line at [phone] or toll-free at [phone].     My Clinical Pharmacist's contact information:                                                      Please feel free to contact me with any questions or concerns you have.      Adair Waite Rp.  Medication Therapy Management Provider  [phone]    "

## 2023-01-30 NOTE — PROGRESS NOTES
ANTICOAGULATION MANAGEMENT     Gaurang Rivera 52 year old male is on warfarin with therapeutic INR result. (Goal INR 2.0-3.0)    Recent labs: (last 7 days)     01/30/23  0922   INR 2.1*       ASSESSMENT       Source(s): Chart Review and Patient/Caregiver Call       Warfarin doses taken: Warfarin taken as instructed    Diet: No new diet changes identified    New illness, injury, or hospitalization: No    Medication/supplement changes: None noted    Signs or symptoms of bleeding or clotting: No    Previous INR: Therapeutic last 2(+) visits    Additional findings: None       PLAN     Recommended plan for no diet, medication or health factor changes affecting INR     Dosing Instructions: Continue your current warfarin dose with next INR in 6 weeks       Summary  As of 1/30/2023    Full warfarin instructions:  7.5 mg every day   Next INR check:  3/13/2023             Telephone call with Gaurang who verbalizes understanding and agrees to plan    Lab visit scheduled    Education provided:     Please call back if any changes to your diet, medications or how you've been taking warfarin    Contact [phone]  with any changes, questions or concerns.     Plan made per [facility] anticoagulation protocol    Velia Persaud RN  Anticoagulation Clinic  1/30/2023    _______________________________________________________________________     Anticoagulation Episode Summary     Current INR goal:  2.0-3.0   TTR:  86.2 % (1 y)   Target end date:  Indefinite   Send INR reminders to:  [facility]    Indications    Atrial fibrillation  unspecified type (H) [I48.91]  Heterozygous factor V Leiden mutation (H) [D68.51]  Current use of long term anticoagulation [Z79.01]  Acute deep vein thrombosis (DVT) of distal end of right lower extremity (H) [I82.4Z1]           Comments:  MTV 30 mcg.         Anticoagulation Care Providers     Provider Role Specialty Phone number    Elvis Guzman MD Referring Family Medicine [phone]

## 2023-02-27 NOTE — PROGRESS NOTES
Medication Therapy Management (MTM) Encounter    ASSESSMENT:                            Medication Adherence/Access: No issues identified      Type 2 Diabetes: Patient is not meeting A1c goal of < 7%. Self monitoring of blood glucose is NOT at goal of fasting  mg/dL and post prandial < 150 mg/dL. Patient is meeting average glucose goal of <150mg/dL (145 mg/dL). Patient is meeting goal of >70% time in target with continuous glucose monitoring (82%).  Patient would benefit from ideal SMBG: Check blood sugars pre-prandial, 2 hours post-prandial, and with symptoms of hypoglycemia, Metformin :  stay on the same dose.,  GLP-1 agonist (Bydureon) :  stay on the same dose(see plan for administration issue)., SGLT-2 :  Stay on same dose.   Last Hemoglobin A1c (7.8%) and weight loss recommended-thru low carb lifestyle/exercise --see plan for details.   Microalbumin is  at goal < 30 mg/g. Taking an ACE-inhibitor not at max dose.     Late night snacking --stop food intake drink protein shake instead--see plan.     Hyperlipidemia: Stable.  Patient is on high intensity statin which is indicated based on 2019 ACC/AHA guidelines for lipid management.         Vitamin D3: is at goal of 50-75ng/mL. Pt will restart 5,000 units vitamin d daily for winter months (see plan).       Depression: Continue at current Bupropion dose of 450mg/day --see plan.       DVT: stable.     Hypertension: Stable. Patient is meeting blood pressure goal of < 140/90mmHg. Patient would benefit from the following changes - continued blood pressure monitoring, watching diet, increasing exercise and weight loss and limiting/reducing sodium.      PLAN:                            1. Please change site of Bydureon injection by a couple inches - See if less liquid comes out  2. Please restart 5000 units/day otc -Vitamin D pill.  Nature made is a very good brand.   3. Congrats, blood sugar as improved since addition of Jardiance and reduction of late night  snacking - continue this plan    Follow-up: Return in about 2 months (around 5/1/2023), or @9:30am, for Medication Therapy Management Visit, A1c lab, Lab Work, Blood sugar meter review.    SUBJECTIVE/OBJECTIVE:                          Gaurang Rivera is a 52 year old male coming in for a follow up visit (1-30-23). He was referred to me from Elvis Guzman  .       Reason for visit:   Depression and BS review.     Allergies/ADRs: Reviewed in chart; no tylenol 2022--due to liver growth   Past Medical History: Reviewed in chart  Tobacco: He reports that he has never smoked. He has never used smokeless tobacco.  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   Personal Healthcare Goals:  DM at goal is #1 issue, food is his drug -addicted to it , sleep is poor -tired , craves caffeine /sugar . Wants to exercise more.   Activity: winter months - sedentary , summer months - more active walking to [place] , [employer] gym membership close to his house --infrequent use. 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      Medication Adherence/Access: no issues reported      Type 2 Diabetes:  Currently taking : Off all Glipizide , metformin 1 gm bid pc, weekly bydureon 2 mg now, Jardiance 10mg every day PO.   Patient is not experiencing side effects.  Blood sugar monitoring: [employer] CGM:                       Symptoms of low blood sugar? None now.   Symptoms of high blood sugar? none  Eye exam: up to date  Foot exam: mild tingling in feet.   Diet/Exercise:   He stopped walking outside due to weather , lives 2 miles from [employer] --stopped going --lost motivation.      Sort of intermittent fasting but has fallen off it. avg 2 large meals/day . Lots of FF again --addicted to [employer].   Does not cook for himself , has known fatty liver disease, late night candy bars/cookies  lately --feels hungry- Patient stated that he has had less late night snacking (2/28/23).     Aspirin: Not  "taking due to warfarin   Statin: Yes: Atorvastatin 40   ACEi/ARB: Yes: Lisinopril 5mg.    Urine Albumin:   Lab Results   Component Value Date    UMALCR 10.22 05/03/2022        Lab Results   Component Value Date    A1C 7.8 01/30/2023    A1C 7.4 11/23/2022    A1C 7.1 09/06/2022    A1C 6.0 05/03/2022    A1C 8.7 01/27/2022    A1C 9.5 03/30/2021    A1C 7.2 10/10/2019    A1C 7.1 06/28/2019    A1C 6.5 09/26/2018         Hyperlipidemia: Current therapy includes : Atorvastatin 40mg daily.  Patient reports no significant myalgias or other side effects.  The 10-year ASCVD risk score (Marilyn PIKE, et al., 2019) is: 12%    Values used to calculate the score:      Age: 52 years      Sex: Male      Is Non- : No      Diabetic: Yes      Tobacco smoker: No      Systolic Blood Pressure: 118 mmHg      Is BP treated: Yes      HDL Cholesterol: 36 mg/dL      Total Cholesterol: 200 mg/dL  Recent Labs   Lab Test 05/03/22  0942 03/30/21  1411   CHOL 134 235*   HDL 36* 35*   LDL 58 Cannot estimate LDL when triglyceride exceeds 400 mg/dL   TRIG 201* 656*         Depression:  Current medications include: Venlafaxine 2h580vu once daily, bupropion XL - 450 mg daily, and aripiprazole 5mg daily. He notes that he feels that the aripiprazole has allowed for him to concentrate more and \"get more stuff done\". Increased dose aripiprazole to 10mg and noted tremors and increased fatigue on 11/3/22.     Seeing weekly therapist --via zoom --has a new therapist --not working very well . Looking for a new therapist.   PHQ-9 SCORE 9/6/2022 10/4/2022 11/21/2022   PHQ-9 Total Score MyChart - - 19 (Moderately severe depression)   PHQ-9 Total Score 19 18 19       DVT:   2 years ago had blood clot in his foot , has family hx blood clots.  Takes warfarin 7.5mg/day or as directed by INR clinic. Follows with anticoag clinic.  INR   Date Value Ref Range Status   01/30/2023 2.1 (H) 0.9 - 1.1 Final   04/11/2022 1.66 (H) 0.85 - 1.15 Final "   06/25/2021 3.00 (H) 0.86 - 1.14 Final     Comment:     This test is intended for monitoring Coumadin therapy.  Results are not   accurate in patients with prolonged INR due to factor deficiency.          Vitamin D3: he stopped Vitamin d in summer --hasnt restarted it .     Vitamin D Deficiency Screening Results:  Lab Results   Component Value Date    VITDT 53 05/03/2022    VITDT 27 01/27/2022    VITDT 34 03/30/2021         Hypertension: Current medications include: lisinopril 5mg./day .  Patient does not self-monitor blood pressure.  Patient reports no current medication side effects.  BP Readings from Last 3 Encounters:   02/28/23 118/86   01/30/23 110/84   11/23/22 122/83         /86   Pulse 91   Wt 293 lb 8 oz (133.1 kg)   SpO2 93%   BMI 33.92 kg/m        I spent 30 minutes with this patient today. All changes were made via collaborative practice agreement with Elvis Guzman MD. A copy of the visit note was provided to the patient's provider(s).    The patient was given a summary of these recommendations after todays visit.      Adair Waite [facility].  Medication Therapy Management Provider  [phone]  Frac[employer] Baig  Pharm -D4 student          Medication Therapy Recommendations  Diabetes mellitus, type 2 (H)    Current Medication: exenatide ER (BYDUREON BCISE) 2 MG/0.85ML auto-injector   Rationale: Incorrect administration - Adverse medication event - Safety   Recommendation: Provide Education   Status: Accepted per CPA   Note: Patient has been noticing more fluid seeps out of the injection site after injection         MDD (major depressive disorder), recurrent episode, severe (H)    Current Medication: Cholecalciferol (VITAMIN D-3) 125 MCG (5000 UT) TABS   Rationale: Patient forgets to take - Adherence - Adherence   Recommendation: Provide Adherence Intervention   Status: Accepted - no CPA Needed   Note: Patient has not re-started his OTC vitamin D for his mood, he will  a bottle  this week

## 2023-02-28 ENCOUNTER — OFFICE VISIT (OUTPATIENT)
Dept: PHARMACY | Facility: CLINIC | Age: 53
End: 2023-02-28
Payer: COMMERCIAL

## 2023-02-28 VITALS
OXYGEN SATURATION: 93 % | HEART RATE: 91 BPM | DIASTOLIC BLOOD PRESSURE: 86 MMHG | WEIGHT: 293.5 LBS | BODY MASS INDEX: 33.92 KG/M2 | SYSTOLIC BLOOD PRESSURE: 118 MMHG

## 2023-02-28 DIAGNOSIS — E78.5 HYPERLIPIDEMIA WITH TARGET LDL LESS THAN 160: ICD-10-CM

## 2023-02-28 DIAGNOSIS — E11.9 TYPE 2 DIABETES MELLITUS (H): Primary | ICD-10-CM

## 2023-02-28 DIAGNOSIS — I10 ESSENTIAL HYPERTENSION: ICD-10-CM

## 2023-02-28 DIAGNOSIS — F32.A DEPRESSION: ICD-10-CM

## 2023-02-28 DIAGNOSIS — E55.9 VITAMIN D DEFICIENCY: ICD-10-CM

## 2023-02-28 PROCEDURE — 99607 MTMS BY PHARM ADDL 15 MIN: CPT | Performed by: PHARMACIST

## 2023-02-28 PROCEDURE — 99606 MTMS BY PHARM EST 15 MIN: CPT | Performed by: PHARMACIST

## 2023-02-28 NOTE — PATIENT INSTRUCTIONS
"Recommendations from today's MTM visit:                                                       1. Please site of Bydureon injection by a couple inches - See if less liquid comes out  2. Please restart 5000 units/day otc -Vitamin D pill.  Nature made is a very good brand.   3. Congrats, blood sugar as improved since addition of Jardiance and reduction of late night snacking - continue this plan    Follow-up: Return in about 2 months (around 5/1/2023), or @9:30am, for Medication Therapy Management Visit, A1c lab, Lab Work, Blood sugar meter review.    It was great speaking with you today.  I value your experience and would be very thankful for your time in providing feedback in our clinic survey. In the next few days, you may receive an email or text message from [employer] with a link to a survey related to your  clinical pharmacist.\"     To schedule another MTM appointment, please call the clinic directly or you may call the MTM scheduling line at [phone] or toll-free at [phone].     My Clinical Pharmacist's contact information:                                                      Please feel free to contact me with any questions or concerns you have.      Adair Waite Rph.  Medication Therapy Management Provider  [phone]  Frac[employer] Baig  Pharm -D4 student       "

## 2023-02-28 NOTE — Clinical Note
Elvis--layla--peter back in the ballgame --jardiance lowering bs's nicely.  Labs recheck 5-1-23 with me.  Malcolm/tray

## 2023-03-14 ENCOUNTER — ANTICOAGULATION THERAPY VISIT (OUTPATIENT)
Dept: ANTICOAGULATION | Facility: CLINIC | Age: 53
End: 2023-03-14

## 2023-03-14 ENCOUNTER — LAB (OUTPATIENT)
Dept: LAB | Facility: CLINIC | Age: 53
End: 2023-03-14
Payer: COMMERCIAL

## 2023-03-14 DIAGNOSIS — D68.51 HETEROZYGOUS FACTOR V LEIDEN MUTATION (H): ICD-10-CM

## 2023-03-14 DIAGNOSIS — I48.91 ATRIAL FIBRILLATION, UNSPECIFIED TYPE (H): Primary | ICD-10-CM

## 2023-03-14 DIAGNOSIS — Z79.01 CURRENT USE OF LONG TERM ANTICOAGULATION: ICD-10-CM

## 2023-03-14 DIAGNOSIS — I82.4Z1 ACUTE DEEP VEIN THROMBOSIS (DVT) OF DISTAL END OF RIGHT LOWER EXTREMITY (H): ICD-10-CM

## 2023-03-14 DIAGNOSIS — I82.4Y9 DEEP VEIN THROMBOSIS (DVT) OF PROXIMAL LOWER EXTREMITY, UNSPECIFIED CHRONICITY, UNSPECIFIED LATERALITY (H): ICD-10-CM

## 2023-03-14 DIAGNOSIS — I48.91 ATRIAL FIBRILLATION, UNSPECIFIED TYPE (H): ICD-10-CM

## 2023-03-14 LAB — INR BLD: 2.4 (ref 0.9–1.1)

## 2023-03-14 PROCEDURE — 36416 COLLJ CAPILLARY BLOOD SPEC: CPT

## 2023-03-14 PROCEDURE — 85610 PROTHROMBIN TIME: CPT

## 2023-03-14 NOTE — PROGRESS NOTES
ANTICOAGULATION MANAGEMENT     Gaurang Rivera 52 year old male is on warfarin with therapeutic INR result. (Goal INR 2.0-3.0)    Recent labs: (last 7 days)     03/14/23  1531   INR 2.4*       ASSESSMENT       Source(s): Chart Review and Patient/Caregiver Call       Warfarin doses taken: Warfarin taken as instructed    Diet: No new diet changes identified    New illness, injury, or hospitalization: No    Medication/supplement changes: None noted    Signs or symptoms of bleeding or clotting: No    Previous INR: Therapeutic last 2(+) visits    Additional findings: None         PLAN     Recommended plan for no diet, medication or health factor changes affecting INR     Dosing Instructions: Continue your current warfarin dose with next INR in 6 weeks       Summary  As of 3/14/2023    Full warfarin instructions:  7.5 mg every day   Next INR check:  4/25/2023             Telephone call with Gaurang who verbalizes understanding and agrees to plan    Lab visit scheduled    Education provided:     Please call back if any changes to your diet, medications or how you've been taking warfarin    Plan made per [facility] anticoagulation protocol    Alanna Hahn RN  Anticoagulation Clinic  3/14/2023    _______________________________________________________________________     Anticoagulation Episode Summary     Current INR goal:  2.0-3.0   TTR:  88.6 % (1 y)   Target end date:  Indefinite   Send INR reminders to:  [facility]    Indications    Atrial fibrillation  unspecified type (H) [I48.91]  Heterozygous factor V Leiden mutation (H) [D68.51]  Current use of long term anticoagulation [Z79.01]  Acute deep vein thrombosis (DVT) of distal end of right lower extremity (H) [I82.4Z1]           Comments:  MTV 30 mcg.         Anticoagulation Care Providers     Provider Role Specialty Phone number    Elvis Guzman MD Referring Family Medicine [phone]

## 2023-04-17 DIAGNOSIS — I82.4Z1 ACUTE DEEP VEIN THROMBOSIS (DVT) OF DISTAL END OF RIGHT LOWER EXTREMITY (H): ICD-10-CM

## 2023-04-17 RX ORDER — WARFARIN SODIUM 5 MG/1
TABLET ORAL
Qty: 180 TABLET | Refills: 0 | Status: SHIPPED | OUTPATIENT
Start: 2023-04-17 | End: 2023-07-20

## 2023-04-27 NOTE — PROGRESS NOTES
Medication Therapy Management (MTM) Encounter    ASSESSMENT:                            Medication Adherence/Access: No issues identified      Type 2 Diabetes: Patient is  meeting A1c goal of < 7%. Self monitoring of blood glucose is at goal of fasting  mg/dL and post prandial < 150 mg/dL. Patient is meeting average glucose goal of <150mg/dL (145 mg/dL). Patient is meeting goal of >70% time in target with continuous glucose monitoring (79%).  Patient would benefit from ideal SMBG: Check blood sugars pre-prandial, 2 hours post-prandial, and with symptoms of hypoglycemia, Metformin :  stay on the same dose.,  GLP-1 agonist (Bydureon) :  stay on the same dose-but due to cost -ok now to take every other week , SGLT-2 :  Stay on same dose.   weight loss recommended-thru low carb lifestyle/exercise --see plan for details.   Microalbumin is  at goal < 30 mg/g. Taking an ACE-inhibitor not at max dose.         Hyperlipidemia: Stable.  Patient is on high intensity statin which is indicated based on 2019 ACC/AHA guidelines for lipid management.         Vitamin D3: is at goal of 50-75ng/mL. Pt will restart 5,000 units vitamin d daily for winter months (see plan).       Depression: Continue at current Bupropion dose of 450mg/day --see plan.       DVT: stable.     Hypertension: Stable. Patient is meeting blood pressure goal of < 140/90mmHg. Patient would benefit from the following changes - continued blood pressure monitoring, watching diet, increasing exercise and weight loss and limiting/reducing sodium.      PLAN:                            1. A1c today = 6.6 % --fantastic -- due to cost issues --inject Bydureon every OTHER WEEK, stay on all other medications as is .   Weight today 290 lbs.- down 11lbs. Since January --excellent -do your best with diet /exercise.    2. I will send in rx for you for 5000 unit daily Vitamin d dose.  In summer take 2 x week if in sun a lot.    3.  Please make a physical appt. With   Megan in 1-2 months .           Follow-up: Return in about 17 weeks (around 8/28/2023), or @ 9 AM, for Blood sugar meter review, A1c lab, Medication Therapy Management Visit.    SUBJECTIVE/OBJECTIVE:                          Gaurang Rivera is a 52 year old male coming in for a follow up visit (2-28-23). He was referred to me from Elvis Guzman  .       Reason for visit:   Depression and BS review.     Allergies/ADRs: Reviewed in chart; no tylenol 2022--due to liver growth   Past Medical History: Reviewed in chart  Tobacco: He reports that he has never smoked. He has never used smokeless tobacco.  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   Personal Healthcare Goals:  DM at goal is #1 issue, food is his drug -addicted to it , sleep is poor -tired , craves caffeine /sugar . Wants to exercise more.   Activity: winter months - sedentary , summer months - more active walking to [place] , [employer] gym membership close to his house --infrequent use. 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      Medication Adherence/Access: no issues reported      Type 2 Diabetes:  Currently taking : Off all Glipizide , metformin 1 gm bid pc, weekly bydureon 2 mg now-ran out 2 months ago -cost 35$/month --a lot for him to afford, Jardiance 10mg every day PO.   Patient is not experiencing side effects.  Blood sugar monitoring: [employer] CGM:                                           Symptoms of low blood sugar? None now.   Symptoms of high blood sugar? none  Eye exam: up to date  Foot exam: mild tingling in feet.   Diet/Exercise:   He stopped walking outside due to weather , lives 2 miles from [employer] --stopped going --lost motivation.      Sort of intermittent fasting but has fallen off it. avg 2 large meals/day . Lots of FF again --addicted to [employer].   Does not cook for himself , has known fatty liver disease, late night candy bars/cookies  lately --feels hungry- Patient  "stated that he has had less late night snacking (2/28/23).     Aspirin: Not taking due to warfarin   Statin: Yes: Atorvastatin 40   ACEi/ARB: Yes: Lisinopril 5mg.    Urine Albumin:   Lab Results   Component Value Date    UMALCR 10.22 05/03/2022        Lab Results   Component Value Date    A1C 6.6 05/01/2023    A1C 7.8 01/30/2023    A1C 7.4 11/23/2022    A1C 7.1 09/06/2022    A1C 6.0 05/03/2022    A1C 9.5 03/30/2021    A1C 7.2 10/10/2019    A1C 7.1 06/28/2019    A1C 6.5 09/26/2018         Hyperlipidemia: Current therapy includes : Atorvastatin 40mg daily.  Patient reports no significant myalgias or other side effects.  The 10-year ASCVD risk score (Marilyn PIKE, et al., 2019) is: 11%    Values used to calculate the score:      Age: 52 years      Sex: Male      Is Non- : No      Diabetic: Yes      Tobacco smoker: No      Systolic Blood Pressure: 112 mmHg      Is BP treated: Yes      HDL Cholesterol: 36 mg/dL      Total Cholesterol: 200 mg/dL  Recent Labs   Lab Test 05/03/22  0942 03/30/21  1411   CHOL 134 235*   HDL 36* 35*   LDL 58 Cannot estimate LDL when triglyceride exceeds 400 mg/dL   TRIG 201* 656*         Depression:  Current medications include: Venlafaxine [id number] once daily, bupropion XL - 450 mg daily, and aripiprazole 5mg daily. He notes that he feels that the aripiprazole has allowed for him to concentrate more and \"get more stuff done\". Increased dose aripiprazole to 10mg and noted tremors and increased fatigue on 11/3/22.   Weather is nicer -helps mood as well.     Seeing weekly therapist --via zoom --has a new therapist --ok --looking for a new therapist though.        9/6/2022     9:22 AM 10/4/2022     9:33 AM 11/21/2022     6:59 AM   PHQ-9 SCORE   PHQ-9 Total Score MyChart   19 (Moderately severe depression)   PHQ-9 Total Score 19 18 19       DVT:   2 years ago had blood clot in his foot , has family hx blood clots.  Takes warfarin 7.5mg/day or as directed by INR clinic. Follows " with antico clinic.  INR   Date Value Ref Range Status   05/01/2023 2.7 (H) 0.9 - 1.1 Final   04/11/2022 1.66 (H) 0.85 - 1.15 Final   06/25/2021 3.00 (H) 0.86 - 1.14 Final     Comment:     This test is intended for monitoring Coumadin therapy.  Results are not   accurate in patients with prolonged INR due to factor deficiency.          Vitamin D3: he stopped Vitamin d in summer --hasnt restarted it .     Vitamin D Deficiency Screening Results:  Lab Results   Component Value Date    VITDT 53 05/03/2022    VITDT 27 01/27/2022    VITDT 34 03/30/2021         Hypertension: Current medications include: lisinopril 5mg./day .  Patient does not self-monitor blood pressure.  Patient reports no current medication side effects.  BP Readings from Last 3 Encounters:   05/01/23 112/88   02/28/23 118/86   01/30/23 110/84         /88   Pulse 104   Wt 290 lb 1.6 oz (131.6 kg)   SpO2 93%   BMI 33.52 kg/m        I spent 30 minutes with this patient today. All changes were made via collaborative practice agreement with Elvis Guzman MD. A copy of the visit note was provided to the patient's provider(s).    The patient was given a summary of these recommendations after todays visit.      [facility], located [facility].  Medication Therapy Management Provider  [phone]           Medication Therapy Recommendations  Diabetes mellitus, type 2 (H)    Current Medication: BYDUREON BCISE 2 MG/0.85ML auto-injector (Discontinued)   Rationale: Cannot afford medication product - Cost - Adherence   Recommendation: Change Administration Time - mtm ok'd him to take every OTHER week now.   Status: Accepted per CPA         Vitamin D deficiency    Current Medication: cholecalciferol (VITAMIN D3) 125 mcg (5000 units) capsule   Rationale: Patient forgets to take - Adherence - Adherence   Recommendation: Provide Adherence Intervention   Status: Accepted - no CPA Needed

## 2023-05-01 ENCOUNTER — LAB (OUTPATIENT)
Dept: LAB | Facility: CLINIC | Age: 53
End: 2023-05-01
Payer: COMMERCIAL

## 2023-05-01 ENCOUNTER — ANTICOAGULATION THERAPY VISIT (OUTPATIENT)
Dept: ANTICOAGULATION | Facility: CLINIC | Age: 53
End: 2023-05-01

## 2023-05-01 ENCOUNTER — OFFICE VISIT (OUTPATIENT)
Dept: PHARMACY | Facility: CLINIC | Age: 53
End: 2023-05-01
Payer: COMMERCIAL

## 2023-05-01 VITALS
HEART RATE: 104 BPM | WEIGHT: 290.1 LBS | SYSTOLIC BLOOD PRESSURE: 112 MMHG | OXYGEN SATURATION: 93 % | DIASTOLIC BLOOD PRESSURE: 88 MMHG | BODY MASS INDEX: 33.52 KG/M2

## 2023-05-01 DIAGNOSIS — E53.8 VITAMIN B12 DEFICIENCY (NON ANEMIC): ICD-10-CM

## 2023-05-01 DIAGNOSIS — E11.9 TYPE 2 DIABETES MELLITUS WITHOUT COMPLICATION, WITHOUT LONG-TERM CURRENT USE OF INSULIN (H): Primary | ICD-10-CM

## 2023-05-01 DIAGNOSIS — F33.2 SEVERE EPISODE OF RECURRENT MAJOR DEPRESSIVE DISORDER, WITHOUT PSYCHOTIC FEATURES (H): ICD-10-CM

## 2023-05-01 DIAGNOSIS — I48.91 ATRIAL FIBRILLATION, UNSPECIFIED TYPE (H): ICD-10-CM

## 2023-05-01 DIAGNOSIS — I82.4Y9 DEEP VEIN THROMBOSIS (DVT) OF PROXIMAL LOWER EXTREMITY, UNSPECIFIED CHRONICITY, UNSPECIFIED LATERALITY (H): ICD-10-CM

## 2023-05-01 DIAGNOSIS — Z79.01 CURRENT USE OF LONG TERM ANTICOAGULATION: ICD-10-CM

## 2023-05-01 DIAGNOSIS — E78.5 HYPERLIPIDEMIA WITH TARGET LDL LESS THAN 160: ICD-10-CM

## 2023-05-01 DIAGNOSIS — D68.51 HETEROZYGOUS FACTOR V LEIDEN MUTATION (H): ICD-10-CM

## 2023-05-01 DIAGNOSIS — I48.91 ATRIAL FIBRILLATION, UNSPECIFIED TYPE (H): Primary | ICD-10-CM

## 2023-05-01 DIAGNOSIS — I82.4Z1 ACUTE DEEP VEIN THROMBOSIS (DVT) OF DISTAL END OF RIGHT LOWER EXTREMITY (H): ICD-10-CM

## 2023-05-01 DIAGNOSIS — E55.9 VITAMIN D DEFICIENCY: ICD-10-CM

## 2023-05-01 DIAGNOSIS — E11.9 TYPE 2 DIABETES MELLITUS WITHOUT COMPLICATION, WITHOUT LONG-TERM CURRENT USE OF INSULIN (H): ICD-10-CM

## 2023-05-01 DIAGNOSIS — E78.5 HYPERLIPIDEMIA, UNSPECIFIED HYPERLIPIDEMIA TYPE: ICD-10-CM

## 2023-05-01 DIAGNOSIS — I10 ESSENTIAL HYPERTENSION: ICD-10-CM

## 2023-05-01 LAB
ANION GAP SERPL CALCULATED.3IONS-SCNC: 16 MMOL/L (ref 7–15)
BUN SERPL-MCNC: 15 MG/DL (ref 6–20)
CALCIUM SERPL-MCNC: 9.9 MG/DL (ref 8.6–10)
CHLORIDE SERPL-SCNC: 102 MMOL/L (ref 98–107)
CHOLEST SERPL-MCNC: 203 MG/DL
CREAT SERPL-MCNC: 1.16 MG/DL (ref 0.67–1.17)
DEPRECATED CALCIDIOL+CALCIFEROL SERPL-MC: 34 UG/L (ref 20–75)
DEPRECATED HCO3 PLAS-SCNC: 21 MMOL/L (ref 22–29)
GFR SERPL CREATININE-BSD FRML MDRD: 75 ML/MIN/1.73M2
GLUCOSE SERPL-MCNC: 145 MG/DL (ref 70–99)
HBA1C MFR BLD: 6.6 % (ref 0–5.6)
HDLC SERPL-MCNC: 41 MG/DL
INR BLD: 2.7 (ref 0.9–1.1)
LDLC SERPL CALC-MCNC: 93 MG/DL
NONHDLC SERPL-MCNC: 162 MG/DL
POTASSIUM SERPL-SCNC: 4 MMOL/L (ref 3.4–5.3)
SODIUM SERPL-SCNC: 139 MMOL/L (ref 136–145)
TRIGL SERPL-MCNC: 347 MG/DL
TSH SERPL DL<=0.005 MIU/L-ACNC: 1.73 UIU/ML (ref 0.3–4.2)
VIT B12 SERPL-MCNC: 718 PG/ML (ref 232–1245)

## 2023-05-01 PROCEDURE — 99606 MTMS BY PHARM EST 15 MIN: CPT | Performed by: PHARMACIST

## 2023-05-01 PROCEDURE — 99607 MTMS BY PHARM ADDL 15 MIN: CPT | Performed by: PHARMACIST

## 2023-05-01 PROCEDURE — 84443 ASSAY THYROID STIM HORMONE: CPT

## 2023-05-01 PROCEDURE — 80061 LIPID PANEL: CPT

## 2023-05-01 PROCEDURE — 36415 COLL VENOUS BLD VENIPUNCTURE: CPT

## 2023-05-01 PROCEDURE — 80048 BASIC METABOLIC PNL TOTAL CA: CPT

## 2023-05-01 PROCEDURE — 85610 PROTHROMBIN TIME: CPT

## 2023-05-01 PROCEDURE — 83036 HEMOGLOBIN GLYCOSYLATED A1C: CPT

## 2023-05-01 PROCEDURE — 82607 VITAMIN B-12: CPT

## 2023-05-01 PROCEDURE — 82306 VITAMIN D 25 HYDROXY: CPT

## 2023-05-01 RX ORDER — EXENATIDE 2 MG/.85ML
INJECTION, SUSPENSION, EXTENDED RELEASE SUBCUTANEOUS
Qty: 3.4 ML | Refills: 5 | Status: SHIPPED | OUTPATIENT
Start: 2023-05-01 | End: 2024-01-15

## 2023-05-01 NOTE — PROGRESS NOTES
PLAN      Unable to reach Gaurang today x 2 attempts.     Left a 2nd message to continue current dose of warfarin 7.5 mg tonight. Request call back for assessment.     Follow up required to confirm warfarin dose taken and assess for changes     Cyn Womack RN  Anticoagulation Clinic  5/1/2023

## 2023-05-01 NOTE — PATIENT INSTRUCTIONS
"Recommendations from today's MTM visit:                                                         1. A1c today = 6.6 % --fantastic -- due to cost issues --inject Bydureon every OTHER WEEK, stay on all other medications as is .   Weight today 290 lbs.- down 11lbs. Since January --excellent -do your best with diet /exercise.    2. I will send in rx for you for 5000 unit daily Vitamin d dose.  In summer take 2 x week if in sun a lot.    3.  Please make a physical appt. With Dr. Guzman in 1-2 months .           Follow-up: Return in about 17 weeks (around 8/28/2023), or @ 9 AM, for Blood sugar meter review, A1c lab, Medication Therapy Management Visit.    It was great speaking with you today.  I value your experience and would be very thankful for your time in providing feedback in our clinic survey. In the next few days, you may receive an email or text message from [employer] with a link to a survey related to your  clinical pharmacist.\"     To schedule another MTM appointment, please call the clinic directly or you may call the MTM scheduling line at [phone] or toll-free at [phone].     My Clinical Pharmacist's contact information:                                                      Please feel free to contact me with any questions or concerns you have.      [facility].  Medication Therapy Management Provider  [phone]    "

## 2023-05-01 NOTE — PROGRESS NOTES
ANTICOAGULATION MANAGEMENT     Gaurang Rivera 52 year old male is on warfarin with therapeutic INR result. (Goal INR 2.0-3.0)    Recent labs: (last 7 days)     05/01/23  0939   INR 2.7*       ASSESSMENT       Source(s): Chart Review    Previous INR was Therapeutic last 2(+) visits    Medication, diet, health changes since last INR chart reviewed:  o Bydureon dose decreased from weekly to every other week d/t cost - this reduction may reduce INR slightly  o MTM started patient on 5000 units Vit D daily - no interactions       PLAN     Unable to reach Gaurang today.    Left message to continue current dose of warfarin 7.5 mg tonight. Request call back for assessment.    Follow up required to confirm warfarin dose taken and assess for changes    Cyn Womack, RN  Anticoagulation Clinic  5/1/2023

## 2023-05-01 NOTE — Clinical Note
Elvis--fyi--a1c now excellent 6.6% , wt. Down 11 lbs . Mood better , bydureon a little expensive so we are trying todo every other week now --see how it goes.  He plans to see you in 1-2 months .  Frank robbins

## 2023-06-07 DIAGNOSIS — E11.9 TYPE 2 DIABETES MELLITUS WITHOUT COMPLICATION, WITHOUT LONG-TERM CURRENT USE OF INSULIN (H): ICD-10-CM

## 2023-06-08 ENCOUNTER — MYC MEDICAL ADVICE (OUTPATIENT)
Dept: FAMILY MEDICINE | Facility: CLINIC | Age: 53
End: 2023-06-08
Payer: COMMERCIAL

## 2023-06-09 ENCOUNTER — NURSE TRIAGE (OUTPATIENT)
Dept: FAMILY MEDICINE | Facility: CLINIC | Age: 53
End: 2023-06-09
Payer: COMMERCIAL

## 2023-06-09 NOTE — TELEPHONE ENCOUNTER
PHQ9 returned positive for thoughts of being better off dead.    Noted at last office visit:   Severe episode of recurrent major depressive disorder, without psychotic features   - Chronic passive SI, no active thoughts.   - Wants to continue current medication.  - Declines psychiatrist.     Left message to call back and ask to speak with an available triage nurse.    Amelia BRUSH RN  [facility]

## 2023-06-11 NOTE — TELEPHONE ENCOUNTER
,  --Please contact patient and ask to schedule a six month diabetes visit with Megan..      --Last visit:  11/23/2022     --Future Visit: NONE with family practice.    ---Prescription approved per [facility] Refill Protocol.       Jackie Oconnor RN BSN     [facility]      Next 5 appointments (look out 90 days)    Aug 28, 2023  9:00 AM  Pharmacist Visit with [facility] RPH  [facility] ([facility] [address] [place] [address]  [address]  [place] [id number] [phone]

## 2023-06-15 ENCOUNTER — TELEPHONE (OUTPATIENT)
Dept: ANTICOAGULATION | Facility: CLINIC | Age: 53
End: 2023-06-15
Payer: COMMERCIAL

## 2023-06-15 NOTE — TELEPHONE ENCOUNTER
Left second message to call back and ask to speak with an available triage nurse.    MyChart message sent to patient with crisis resources as well (6/8/23 encounter).    Noted recent log in on 6/12/23.    Amelia BRUSH RN  [facility]

## 2023-06-15 NOTE — LETTER
[facility] ANTICOAGULATION CLINIC  [address]  [facility] [address]  Phone: [phone]  Fax: [phone]   Sofia 15, 2023        Gaurang Rivera  [address]   [facility] [address]            Dear Gaurang,    You are currently under the care of [facility] Anticoagulation Management Program for your warfarin (Coumadin , Jantoven ) therapy.  We are contacting you because our records show you were due for an INR on 5/15/23.    There are potentially serious risks when taking warfarin without careful monitoring and we want to make sure you are safely managed.  Routine lab monitoring is required for warfarin refills.     Please call [phone]  as soon as possible to schedule an appointment.  If there has been a change in your care or other concerns, please let us know so we can help and or update our records.     Sincerely,       [facility]tico[facility]

## 2023-06-15 NOTE — TELEPHONE ENCOUNTER
[employer] message sent to patient.  See also TE 6/9/23    Amelia BRUSH RN  [facility]

## 2023-06-29 ENCOUNTER — TELEPHONE (OUTPATIENT)
Dept: ANTICOAGULATION | Facility: CLINIC | Age: 53
End: 2023-06-29
Payer: COMMERCIAL

## 2023-06-29 NOTE — TELEPHONE ENCOUNTER
Anticoagulation clinic notificiation    Dr. Guzman,    Your patient, Gaurang Rivera, is past due for an INR. Their last result was 2.7 on 5/1/23 and was due to come back on 5/15/23.    Gaurang Rivera received phone calls and letters over the last several weeks in attempt to arrange a follow up appointment.  Gaurang Rivera will be sent another letter today.     No action is required from you unless you have additional information or if you would like to reach out personally to Gaurang Rivera.    Please don t hesitate to contact the Anticoagulation Management Program if you have any questions or concerns.     Sincerely,     [facility] Anticoagulation Management Program

## 2023-06-29 NOTE — LETTER
[facility] ANTICOAGULATION CLINIC  [address]  [facility] [address]  Phone: [phone]  Fax: [phone]   June 29, 2023        Gaurang Rivera  [address]   [facility] [address]            Dear Gaurang,    You are currently under the care of [facility] Anticoagulation Management Program for your warfarin (Coumadin , Jantoven ) therapy.  We are contacting you because our records show you were due for an INR on 5/15/23.    There are potentially serious risks when taking warfarin without careful monitoring and we want to make sure you are safely managed.  Routine lab monitoring is required for warfarin refills.     Please call [phone]  as soon as possible to schedule an appointment.  If there has been a change in your care or other concerns, please let us know so we can help and or update our records.     Sincerely,       [facility] Anticoagulation Management Program

## 2023-06-30 NOTE — NURSING NOTE
"Chief Complaint   Patient presents with     RECHECK       Vitals:    05/11/20 1258   BP: 127/78   BP Location: Left arm   Patient Position: Sitting   Cuff Size: Adult Regular   Pulse: 92   Temp: 98.6  F (37  C)   TempSrc: Oral   SpO2: 98%   Weight: 290 lb 14.4 oz   Height: 6' 6.75\"       Body mass index is 32.98 kg/m .      Anna Pratt, EMT                      " Merlin Mathew, MD   Hospitalist  Pager #[phone]    PROGRESS NOTE:     Patient is a 77y old  Male who presents with a chief complaint of Heart disease  77M w/ HFpEF (EF 50% 5/2023), CAD s/p NSTEMI 2/2 cocaine use (2019), pAF/aflutter (on Eliquis), ESRD on HD (MWF via LUE AVF), HTN, BPH, recurrent UTI transferred from [facility] for PPM placement with EP, currently asymptomatic and HDS.       (30 Jun 2023 12:19)      SUBJECTIVE / OVERNIGHT EVENTS: NEON   Patient feels fine  Underwent HD this AM   Permacath removed   Worked with PT   No complaints, denies any CP, palpitations, SOB     ADDITIONAL REVIEW OF SYSTEMS:    MEDICATIONS  (STANDING):  albuterol/ipratropium for Nebulization 3 milliLiter(s) Nebulizer every 6 hours  aspirin enteric coated 81 milliGRAM(s) Oral daily  atorvastatin 40 milliGRAM(s) Oral at bedtime  calcium acetate 667 milliGRAM(s) Oral three times a day with meals  chlorhexidine 2% Cloths 1 Application(s) Topical <User Schedule>  ferrous    sulfate 325 milliGRAM(s) Oral daily  metoprolol tartrate 25 milliGRAM(s) Oral two times a day  midodrine. 10 milliGRAM(s) Oral <User Schedule>  multivitamin 1 Tablet(s) Oral daily  mupirocin 2% Ointment 1 Application(s) Both Nostrils two times a day  pantoprazole    Tablet 40 milliGRAM(s) Oral before breakfast  senna 2 Tablet(s) Oral at bedtime  tamsulosin 0.4 milliGRAM(s) Oral at bedtime    MEDICATIONS  (PRN):  sodium chloride 0.9% Bolus. 100 milliLiter(s) IV Bolus every 5 minutes PRN SBP LESS THAN or EQUAL to 90 mmHg      CAPILLARY BLOOD GLUCOSE        I&O's Summary    29 Jun 2023 07:01  -  30 Jun 2023 07:00  --------------------------------------------------------  IN: 500 mL / OUT: 200 mL / NET: 300 mL    30 Jun 2023 07:01  -  30 Jun 2023 15:21  --------------------------------------------------------  IN: 400 mL / OUT: 1200 mL / NET: -800 mL        PHYSICAL EXAM:  Vital Signs Last 24 Hrs  T(C): 36.8 (30 Jun 2023 14:30), Max: 36.8 (30 Jun 2023 01:07)  T(F): 98.3 (30 Jun 2023 14:30), Max: 98.3 (30 Jun 2023 01:07)  HR: 92 (30 Jun 2023 14:30) (87 - 114)  BP: 108/54 (30 Jun 2023 14:30) (101/59 - 123/63)  BP(mean): --  RR: 18 (30 Jun 2023 14:30) (17 - 18)  SpO2: 99% (30 Jun 2023 14:30) (97% - 99%)    Parameters below as of 30 Jun 2023 14:30  Patient On (Oxygen Delivery Method): room air        CONSTITUTIONAL: NAD, well-developed elderly male   RESPIRATORY: Normal respiratory effort; lungs are clear to auscultation bilaterally  CARDIOVASCULAR: Regular rate and rhythm, normal S1 and S2, no murmur/rub/gallop; No lower extremity edema; Peripheral pulses are 2+ bilaterally, Dressing over chest wall C/D/I   ABDOMEN: Nontender to palpation, normoactive bowel sounds, no rebound/guarding; No hepatosplenomegaly  MUSCULOSKELETAL no clubbing or cyanosis of digits; no joint swelling or tenderness to palpation  PSYCH: A+O to person, place, and time; affect appropriate    LABS:                        9.6    6.37  )-----------( 257      ( 30 Jun 2023 05:20 )             29.8     06-30    133<L>  |  95<L>  |  29<H>  ----------------------------<  94  4.2   |  24  |  2.58<H>    Ca    8.8      30 Jun 2023 05:20  Phos  3.7     06-30  Mg     2.20     06-30    TPro  7.4  /  Alb  3.2<L>  /  TBili  0.7  /  DBili  x   /  AST  17  /  ALT  12  /  AlkPhos  121<H>  06-29          Urinalysis Basic - ( 30 Jun 2023 05:20 )    Color: x / Appearance: x / SG: x / pH: x  Gluc: 94 mg/dL / Ketone: x  / Bili: x / Urobili: x   Blood: x / Protein: x / Nitrite: x   Leuk Esterase: x / RBC: x / WBC x   Sq Epi: x / Non Sq Epi: x / Bacteria: x          RADIOLOGY & ADDITIONAL TESTS:  Results Reviewed:   Imaging Personally Reviewed:  Electrocardiogram Personally Reviewed:    COORDINATION OF CARE:  Care Discussed with Consultants/Other Providers [Y/N]:  Prior or Outpatient Records Reviewed [Y/N]:

## 2023-07-19 DIAGNOSIS — I82.4Z1 ACUTE DEEP VEIN THROMBOSIS (DVT) OF DISTAL END OF RIGHT LOWER EXTREMITY (H): ICD-10-CM

## 2023-07-20 RX ORDER — WARFARIN SODIUM 5 MG/1
TABLET ORAL
Qty: 45 TABLET | Refills: 0 | Status: SHIPPED | OUTPATIENT
Start: 2023-07-20 | End: 2023-09-18

## 2023-07-20 NOTE — TELEPHONE ENCOUNTER
ANTICOAGULATION MANAGEMENT:  Medication Refill    Anticoagulation Summary  As of 5/1/2023    Warfarin maintenance plan:  7.5 mg (5 mg x 1.5) every day   Next INR check:  5/15/2023   Target end date:  Indefinite    Indications    Atrial fibrillation  unspecified type (H) [I48.91]  Heterozygous factor V Leiden mutation (H) [D68.51]  Current use of long term anticoagulation [Z79.01]  Acute deep vein thrombosis (DVT) of distal end of right lower extremity (H) [I82.4Z1]             Anticoagulation Care Providers     Provider Role Specialty Phone number    Elvis Gzuman MD Referring Family Medicine [phone]          Refill Criteria      Visit with referring provider/group: Meets criteria: office visit within referring provider group in the last 1 year on 11/23/22      [facility] referral signed last signed: 09/12/2022; within last year: Yes      Lab monitoring not exceeding 2 weeks overdue:  No - overdue since 5/15/23    Peter does NOT meet all criteria for refill: > 2 weeks overdue for lab monitoring . 30 day rula fill approved; patient notified to schedule labs per [facility] protocol    Cyn Womack, RN  Anticoagulation Clinic

## 2023-07-21 ENCOUNTER — ANTICOAGULATION THERAPY VISIT (OUTPATIENT)
Dept: ANTICOAGULATION | Facility: CLINIC | Age: 53
End: 2023-07-21

## 2023-07-21 ENCOUNTER — LAB (OUTPATIENT)
Dept: LAB | Facility: CLINIC | Age: 53
End: 2023-07-21
Payer: COMMERCIAL

## 2023-07-21 DIAGNOSIS — I82.4Z1 ACUTE DEEP VEIN THROMBOSIS (DVT) OF DISTAL END OF RIGHT LOWER EXTREMITY (H): ICD-10-CM

## 2023-07-21 DIAGNOSIS — Z79.01 CURRENT USE OF LONG TERM ANTICOAGULATION: ICD-10-CM

## 2023-07-21 DIAGNOSIS — I48.91 ATRIAL FIBRILLATION, UNSPECIFIED TYPE (H): Primary | ICD-10-CM

## 2023-07-21 DIAGNOSIS — I48.91 ATRIAL FIBRILLATION, UNSPECIFIED TYPE (H): ICD-10-CM

## 2023-07-21 DIAGNOSIS — D68.51 HETEROZYGOUS FACTOR V LEIDEN MUTATION (H): ICD-10-CM

## 2023-07-21 DIAGNOSIS — I82.4Z1 ACUTE DEEP VEIN THROMBOSIS (DVT) OF DISTAL END OF RIGHT LOWER EXTREMITY (H): Primary | ICD-10-CM

## 2023-07-21 LAB — INR BLD: 2.6 (ref 0.9–1.1)

## 2023-07-21 PROCEDURE — 36415 COLL VENOUS BLD VENIPUNCTURE: CPT

## 2023-07-21 PROCEDURE — 85610 PROTHROMBIN TIME: CPT

## 2023-07-21 NOTE — PROGRESS NOTES
ANTICOAGULATION MANAGEMENT     Gaurang Rivera 52 year old male is on warfarin with therapeutic INR result. (Goal INR 2.0-3.0)    Recent labs: (last 7 days)     07/21/23  1509   INR 2.6*       ASSESSMENT       Source(s): Chart Review    Previous INR was Therapeutic last 2(+) visits    Medication, diet, health changes since last INR chart reviewed; none identified             PLAN     Recommended plan for no diet, medication or health factor changes affecting INR     Dosing Instructions: Continue your current warfarin dose with next INR in 6 weeks       Summary  As of 7/21/2023    Full warfarin instructions:  7.5 mg every day   Next INR check:  9/1/2023             Detailed voice message left for Gaurang with dosing instructions and follow up date.     Contact [phone]  to schedule and with any changes, questions or concerns.     Education provided:     Please call back if any changes to your diet, medications or how you've been taking warfarin    Plan made per [facility] anticoagulation protocol    Smitha Wiley RN  Anticoagulation Clinic  7/21/2023    _______________________________________________________________________     Anticoagulation Episode Summary     Current INR goal:  2.0-3.0   TTR:  94.7 % (1 y)   Target end date:  Indefinite   Send INR reminders to:  [facility]    Indications    Atrial fibrillation  unspecified type (H) [I48.91]  Heterozygous factor V Leiden mutation (H) [D68.51]  Current use of long term anticoagulation [Z79.01]  Acute deep vein thrombosis (DVT) of distal end of right lower extremity (H) [I82.4Z1]           Comments:  MTV 30 mcg.         Anticoagulation Care Providers     Provider Role Specialty Phone number    Elvis Guzman MD Referring Family Medicine [phone]

## 2023-08-03 ENCOUNTER — OFFICE VISIT (OUTPATIENT)
Dept: FAMILY MEDICINE | Facility: CLINIC | Age: 53
End: 2023-08-03
Payer: COMMERCIAL

## 2023-08-03 VITALS
BODY MASS INDEX: 33.69 KG/M2 | OXYGEN SATURATION: 95 % | HEART RATE: 99 BPM | TEMPERATURE: 97.6 F | WEIGHT: 291.5 LBS | SYSTOLIC BLOOD PRESSURE: 118 MMHG | RESPIRATION RATE: 18 BRPM | DIASTOLIC BLOOD PRESSURE: 78 MMHG

## 2023-08-03 DIAGNOSIS — E78.5 HYPERLIPIDEMIA, UNSPECIFIED HYPERLIPIDEMIA TYPE: ICD-10-CM

## 2023-08-03 DIAGNOSIS — E66.01 MORBID OBESITY (H): ICD-10-CM

## 2023-08-03 DIAGNOSIS — R80.9 MICROALBUMINURIA DUE TO TYPE 2 DIABETES MELLITUS (H): ICD-10-CM

## 2023-08-03 DIAGNOSIS — D68.51 HETEROZYGOUS FACTOR V LEIDEN MUTATION (H): ICD-10-CM

## 2023-08-03 DIAGNOSIS — R45.851 PASSIVE SUICIDAL IDEATIONS: ICD-10-CM

## 2023-08-03 DIAGNOSIS — E11.29 MICROALBUMINURIA DUE TO TYPE 2 DIABETES MELLITUS (H): ICD-10-CM

## 2023-08-03 DIAGNOSIS — Z23 ENCOUNTER FOR IMMUNIZATION: ICD-10-CM

## 2023-08-03 DIAGNOSIS — F33.2 SEVERE EPISODE OF RECURRENT MAJOR DEPRESSIVE DISORDER, WITHOUT PSYCHOTIC FEATURES (H): Primary | ICD-10-CM

## 2023-08-03 DIAGNOSIS — I48.91 ATRIAL FIBRILLATION, UNSPECIFIED TYPE (H): ICD-10-CM

## 2023-08-03 PROBLEM — I82.409 DEEP VEIN THROMBOSIS (DVT) (H): Status: RESOLVED | Noted: 2019-07-24 | Resolved: 2023-08-03

## 2023-08-03 PROBLEM — S42.151S: Status: RESOLVED | Noted: 2019-10-21 | Resolved: 2023-08-03

## 2023-08-03 PROBLEM — L05.91 PILONIDAL CYST: Status: RESOLVED | Noted: 2019-10-24 | Resolved: 2023-08-03

## 2023-08-03 PROBLEM — I82.4Y9 DEEP VEIN THROMBOSIS (DVT) OF PROXIMAL LOWER EXTREMITY, UNSPECIFIED CHRONICITY, UNSPECIFIED LATERALITY (H): Status: RESOLVED | Noted: 2020-11-25 | Resolved: 2023-08-03

## 2023-08-03 PROBLEM — Z86.718 H/O DEEP VENOUS THROMBOSIS: Status: ACTIVE | Noted: 2021-09-28

## 2023-08-03 PROCEDURE — 96127 BRIEF EMOTIONAL/BEHAV ASSMT: CPT | Performed by: PHYSICIAN ASSISTANT

## 2023-08-03 PROCEDURE — 90746 HEPB VACCINE 3 DOSE ADULT IM: CPT | Performed by: PHYSICIAN ASSISTANT

## 2023-08-03 PROCEDURE — 90471 IMMUNIZATION ADMIN: CPT | Performed by: PHYSICIAN ASSISTANT

## 2023-08-03 PROCEDURE — 99214 OFFICE O/P EST MOD 30 MIN: CPT | Mod: 25 | Performed by: PHYSICIAN ASSISTANT

## 2023-08-03 RX ORDER — ATORVASTATIN CALCIUM 40 MG/1
40 TABLET, FILM COATED ORAL DAILY
Qty: 90 TABLET | Refills: 1 | Status: SHIPPED | OUTPATIENT
Start: 2023-08-03 | End: 2024-09-03

## 2023-08-03 RX ORDER — BUPROPION HYDROCHLORIDE 150 MG/1
150 TABLET ORAL EVERY MORNING
Qty: 90 TABLET | Refills: 1 | Status: SHIPPED | OUTPATIENT
Start: 2023-08-03 | End: 2024-01-12

## 2023-08-03 RX ORDER — LISINOPRIL 5 MG/1
5 TABLET ORAL DAILY
Qty: 90 TABLET | Refills: 1 | Status: SHIPPED | OUTPATIENT
Start: 2023-08-03 | End: 2024-09-03

## 2023-08-03 RX ORDER — BUPROPION HYDROCHLORIDE 300 MG/1
300 TABLET ORAL EVERY MORNING
Qty: 90 TABLET | Refills: 1 | Status: SHIPPED | OUTPATIENT
Start: 2023-08-03 | End: 2024-01-12

## 2023-08-03 RX ORDER — VENLAFAXINE HYDROCHLORIDE 150 MG/1
300 CAPSULE, EXTENDED RELEASE ORAL DAILY
Qty: 180 CAPSULE | Refills: 1 | Status: SHIPPED | OUTPATIENT
Start: 2023-08-03 | End: 2024-01-15

## 2023-08-03 RX ORDER — QUETIAPINE FUMARATE 25 MG/1
25-50 TABLET, FILM COATED ORAL AT BEDTIME
Qty: 60 TABLET | Refills: 0 | Status: SHIPPED | OUTPATIENT
Start: 2023-08-03 | End: 2023-09-14

## 2023-08-03 ASSESSMENT — PATIENT HEALTH QUESTIONNAIRE - PHQ9
SUM OF ALL RESPONSES TO PHQ QUESTIONS 1-9: 19
10. IF YOU CHECKED OFF ANY PROBLEMS, HOW DIFFICULT HAVE THESE PROBLEMS MADE IT FOR YOU TO DO YOUR WORK, TAKE CARE OF THINGS AT HOME, OR GET ALONG WITH OTHER PEOPLE: EXTREMELY DIFFICULT
SUM OF ALL RESPONSES TO PHQ QUESTIONS 1-9: 19

## 2023-08-03 ASSESSMENT — ANXIETY QUESTIONNAIRES
3. WORRYING TOO MUCH ABOUT DIFFERENT THINGS: MORE THAN HALF THE DAYS
7. FEELING AFRAID AS IF SOMETHING AWFUL MIGHT HAPPEN: SEVERAL DAYS
GAD7 TOTAL SCORE: 5
5. BEING SO RESTLESS THAT IT IS HARD TO SIT STILL: NOT AT ALL
IF YOU CHECKED OFF ANY PROBLEMS ON THIS QUESTIONNAIRE, HOW DIFFICULT HAVE THESE PROBLEMS MADE IT FOR YOU TO DO YOUR WORK, TAKE CARE OF THINGS AT HOME, OR GET ALONG WITH OTHER PEOPLE: VERY DIFFICULT
2. NOT BEING ABLE TO STOP OR CONTROL WORRYING: MORE THAN HALF THE DAYS
GAD7 TOTAL SCORE: 5
4. TROUBLE RELAXING: NOT AT ALL
1. FEELING NERVOUS, ANXIOUS, OR ON EDGE: NOT AT ALL
6. BECOMING EASILY ANNOYED OR IRRITABLE: NOT AT ALL

## 2023-08-03 NOTE — PROGRESS NOTES
Assessment & Plan     Severe episode of recurrent major depressive disorder, without psychotic features (H)  Passive suicidal ideations - acutely worse, he would like to meet with psychiatry to discuss different medications, I am agreeable with this plan. Urgent referral provided. Refills provided for current meds (has sufficient abilify at home). We also will start quetiapine 25mg-50mg HS nightly and he will follow up with any side effects/concerns. We discussed passive suicidal thoughts and he is aware of resources if thoughts were to be active.  - Adult Mental Health  Referral  - venlafaxine (EFFEXOR XR) 150 MG 24 hr capsule  Dispense: 180 capsule; Refill: 1  - buPROPion (WELLBUTRIN XL) 300 MG 24 hr tablet  Dispense: 90 tablet; Refill: 1  - buPROPion (WELLBUTRIN XL) 150 MG 24 hr tablet  Dispense: 90 tablet; Refill: 1  - QUEtiapine (SEROQUEL) 25 MG tablet  Dispense: 60 tablet; Refill: 0    Microalbuminuria due to type 2 diabetes mellitus (H) - follows with MTM, has upcoming appt in a few weeks. Refills of lisinopril, metformin provided.   - metFORMIN (GLUCOPHAGE) 1000 MG tablet  Dispense: 180 tablet; Refill: 1  - lisinopril (ZESTRIL) 5 MG tablet  Dispense: 90 tablet; Refill: 1    Hyperlipidemia, unspecified hyperlipidemia type - stable, refills provided  - atorvastatin (LIPITOR) 40 MG tablet  Dispense: 90 tablet; Refill: 1    Atrial fibrillation, unspecified type (H) - stable, no change to treatment plan    Morbid obesity (H) - knows lifestyle affects DM & HTN control, will continue to monitor    Heterozygous factor V Leiden mutation (H) - on long term anticoagulation, no change to treatment plan    Encounter for immunization  - HEPATITIS B, ADULT (ENGERIX-B/RECOMBIVAX HB)      Lary Brizuela PA-C  [facility]      Misael Merlos is a 52 year old, presenting for the following health issues:  Recheck Medication and  Follow Up      8/3/2023     1:01 PM   Additional Questions    Roomed by CHEL CLIFFORD     Past few months depression has been really bad without any precipitating events  To the point where he had to take JOHN from work for 6 weeks  On venlafaxine 300mg daily, bupropion 450mg daily, aripiprazole 5mg  He doesn't feel that the abilify is working but continues to take it    Other med trials:  -venlafaxine  -bupropion  -ritalin    History of Present Illness       Mental Health Follow-up:  Patient presents to follow-up on Depression.Patient's depression since last visit has been:  Bad  The patient is not having other symptoms associated with depression.      Any significant life events: No  Patient is not feeling anxious or having panic attacks.  Patient has no concerns about alcohol or drug use.    He eats 0-1 servings of fruits and vegetables daily.He consumes 0 sweetened beverage(s) daily.He exercises with enough effort to increase his heart rate 9 or less minutes per day.  He exercises with enough effort to increase his heart rate 3 or less days per week.   He is taking medications regularly.         Objective    /78 (BP Location: Right arm, Patient Position: Sitting, Cuff Size: Adult Large)   Pulse 99   Temp 97.6  F (36.4  C) (Temporal)   Resp 18   Wt 132.2 kg (291 lb 8 oz)   SpO2 95%   BMI 33.69 kg/m    Body mass index is 33.69 kg/m .  Physical Exam   GENERAL: healthy, alert and no distress  PSYCH: mentation appears normal, affect normal/bright

## 2023-08-26 NOTE — PROGRESS NOTES
Medication Therapy Management (MTM) Encounter    ASSESSMENT:                            Medication Adherence/Access: No issues identified      Type 2 Diabetes: Patient is  no longer meeting A1c goal of < 7%(8.8%). Self monitoring of blood glucose is NOT at goal of fasting  mg/dL and post prandial < 150 mg/dL. Patient is NOT meeting average glucose goal of <150mg/dL . Patient is NOT meeting goal of >70% time in target with continuous glucose monitoring (45%).  Patient would benefit from ideal SMBG: Check blood sugars pre-prandial, 2 hours post-prandial, and with symptoms of hypoglycemia, Metformin :  stay on the same dose.,  GLP-1 agonist (Bydureon) :  stay on the every other week injection due to cost , SGLT-2 :  Stay on same dose.   weight loss recommended-thru low carb lifestyle/exercise --see plan for details. Mental health improving -coming out of a depression- lets consider adding low dose Pioglitazone after next A1c if not making sufficient progress(<8%).   Microalbumin is  at goal < 30 mg/g. Taking an ACE-inhibitor not at max dose.         Hyperlipidemia: Stable.  Patient is on high intensity statin which is indicated based on 2019 ACC/AHA guidelines for lipid management.         Vitamin D3: is NOT at goal of 50-75ng/mL. Pt will continue 5000 unit otc pill daily .       Depression: Continue current medications --have consult with psychiatry 9-19-23.       DVT: stable INR.     Hypertension: Stable. Patient is meeting blood pressure goal of < 140/90mmHg. Patient would benefit from the following changes - continued blood pressure monitoring, watching diet, increasing exercise and weight loss and limiting/reducing sodium.    Insomnia:   Patient may benefit from quetiapine 50 mg at bedtime  as instructed and f/up with psychiatrist 9-19-23.        PLAN:                              1. A1c today = 8.8% -- up from 6.6% May-2023--stay on same diabetes meds for now -- lets concentrate on no snacking , low carb  dietary food plan , daily walking exercise, see psychiatrist 9-19-23--for mental health medication review.   If your blood sugar in next 6 weeks not improving --message me via [employer] --we can add a diabetes drug called Pioglitazone to reduce insulin resistance.     INR 2.9 today --within range, Blood Pressure 110/80mmhg --excellent.         Follow-up: Return in about 14 weeks (around 12/4/2023), or @ 9;30 AM, for Blood sugar meter review, A1c lab, Medication Therapy Management Visit.          SUBJECTIVE/OBJECTIVE:                          Gaurang Rivera is a 52 year old male coming in for a follow up visit (5-1-23). He was referred to me from Elvis Guzman  .       Reason for visit:   Depression and BS /A1c review.    Admits no walks, depression worsened --JOHN from work 6 weeks ago--first day back today --trying a new drug -- has psych appt., 9-19-23.        Allergies/ADRs: Reviewed in chart; no tylenol 2022--due to liver growth   Past Medical History: Reviewed in chart  Tobacco: He reports that he has never smoked. He has never used smokeless tobacco.  Alcohol: not currently using  Caffeine: diet coke - 4 cans/day or so   Social:   rowing at [facility]. Rowing club.   Personal Healthcare Goals:  DM at goal is #1 issue, food is his drug -addicted to it , sleep is poor -tired , craves caffeine /sugar . Wants to exercise more.   Activity: winter months - sedentary , summer months - more active walking [place] [employer] gym membership close to his house --infrequent use. 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      Medication Adherence/Access: no issues reported      Type 2 Diabetes:  Currently taking : Off all Glipizide , metformin 1 gm bid pc, bydureon 2mg.-one injection every 2 weeks now due to cost , Jardiance 10mg every day PO.   Patient is not experiencing side effects.  Blood sugar monitoring: [employer] CGM:                                 Symptoms of low blood sugar? None now.   Symptoms of high blood sugar? none  Eye  "exam: up to date  Foot exam: mild tingling in feet.   Diet/Exercise:   He stopped walking outside due to weather , lives [place] miles from [facility] --stopped going --lost motivation.      Sort of intermittent fasting but has fallen off it. avg 2 large meals/day . Lots of FF again --addicted to [employer].   Does not cook for himself , has known fatty liver disease, late night candy bars/cookies  lately --feels hungry- Patient stated that he has had less late night snacking (2/28/23).     Aspirin: Not taking due to warfarin   Statin: Yes: Atorvastatin 40   ACEi/ARB: Yes: Lisinopril 5mg.    Urine Albumin:   Lab Results   Component Value Date    UMALCR 10.22 05/03/2022      Lab Results   Component Value Date    A1C 8.8 08/28/2023    A1C 6.6 05/01/2023    A1C 7.8 01/30/2023    A1C 7.4 11/23/2022    A1C 7.1 09/06/2022    A1C 9.5 03/30/2021    A1C 7.2 10/10/2019    A1C 7.1 06/28/2019    A1C 6.5 09/26/2018           Hyperlipidemia: Current therapy includes : Atorvastatin 40mg daily.  Patient reports no significant myalgias or other side effects.  The 10-year ASCVD risk score (Marilyn PIKE, et al., 2019) is: 9.6%    Values used to calculate the score:      Age: 52 years      Sex: Male      Is Non- : No      Diabetic: Yes      Tobacco smoker: No      Systolic Blood Pressure: 110 mmHg      Is BP treated: Yes      HDL Cholesterol: 41 mg/dL      Total Cholesterol: 203 mg/dL  Recent Labs   Lab Test 05/01/23  0939 01/30/23  0922   CHOL 203* 200*   HDL 41 36*   LDL 93 89   TRIG 347* 420*         Depression:  Current medications include: Venlafaxine 9u447ii once daily, bupropion XL - 450 mg daily, and aripiprazole 5mg daily. He notes that he feels that the aripiprazole has allowed for him to concentrate more and \"get more stuff done\". Increased dose aripiprazole to 10mg and noted tremors and increased fatigue on 11/3/22.   Weather is nicer -helps mood as well.     Seeing weekly therapist --via zoom --has a new therapist " --ok --looking for a new therapist though.        11/21/2022     6:59 AM 6/8/2023     9:55 PM 8/3/2023    12:38 PM   PHQ-9 SCORE   PHQ-9 Total Score Siobhan 19 (Moderately severe depression) 19 (Moderately severe depression) 19 (Moderately severe depression)   PHQ-9 Total Score 19 19 19       DVT:   2 years ago had blood clot in his foot , has family hx blood clots.  Takes warfarin 7.5mg/day or as directed by INR clinic. Follows with anticoag clinic.  INR   Date Value Ref Range Status   08/28/2023 2.9 (H) 0.9 - 1.1 Final   04/11/2022 1.66 (H) 0.85 - 1.15 Final   06/25/2021 3.00 (H) 0.86 - 1.14 Final     Comment:     This test is intended for monitoring Coumadin therapy.  Results are not   accurate in patients with prolonged INR due to factor deficiency.          Vitamin D3: restarted 5000 unit/day pill.     Vitamin D Deficiency Screening Results:  Lab Results   Component Value Date    VITDT 34 05/01/2023    VITDT 53 05/03/2022    VITDT 27 01/27/2022    VITDT 34 03/30/2021         Hypertension: Current medications include: lisinopril 5mg./day .  Patient does not self-monitor blood pressure.  Patient reports no current medication side effects.  BP Readings from Last 3 Encounters:   08/28/23 110/80   08/03/23 118/78   05/01/23 112/88     Insomnia:   quetiapine 50 mg at bedtime   Patient reports trouble staying asleep, trouble falling asleep, can only sleep 5 solid hours but very erratic sleep pattern , and depression.       /80   Pulse 79   Wt 289 lb 14.4 oz (131.5 kg)   SpO2 98%   BMI 33.50 kg/m        I spent 30 minutes with this patient today. All changes were made via collaborative practice agreement with Elvis Guzman MD. A copy of the visit note was provided to the patient's provider(s).    The patient was given a summary of these recommendations after todays visit.      Adair Waite [facility].  Medication Therapy Management Provider  [phone]           Medication Therapy Recommendations  No  medication therapy recommendations to display

## 2023-08-28 ENCOUNTER — OFFICE VISIT (OUTPATIENT)
Dept: PHARMACY | Facility: CLINIC | Age: 53
End: 2023-08-28
Payer: COMMERCIAL

## 2023-08-28 ENCOUNTER — LAB (OUTPATIENT)
Dept: LAB | Facility: CLINIC | Age: 53
End: 2023-08-28
Payer: COMMERCIAL

## 2023-08-28 ENCOUNTER — ANTICOAGULATION THERAPY VISIT (OUTPATIENT)
Dept: ANTICOAGULATION | Facility: CLINIC | Age: 53
End: 2023-08-28

## 2023-08-28 VITALS
OXYGEN SATURATION: 98 % | DIASTOLIC BLOOD PRESSURE: 80 MMHG | BODY MASS INDEX: 33.5 KG/M2 | WEIGHT: 289.9 LBS | SYSTOLIC BLOOD PRESSURE: 110 MMHG | HEART RATE: 79 BPM

## 2023-08-28 DIAGNOSIS — I82.409 DEEP VEIN THROMBOSIS (DVT) (H): ICD-10-CM

## 2023-08-28 DIAGNOSIS — Z79.01 CURRENT USE OF LONG TERM ANTICOAGULATION: ICD-10-CM

## 2023-08-28 DIAGNOSIS — F99 INSOMNIA DUE TO OTHER MENTAL DISORDER: ICD-10-CM

## 2023-08-28 DIAGNOSIS — E78.5 HYPERLIPIDEMIA WITH TARGET LDL LESS THAN 160: ICD-10-CM

## 2023-08-28 DIAGNOSIS — E11.9 TYPE 2 DIABETES MELLITUS WITHOUT COMPLICATION, WITHOUT LONG-TERM CURRENT USE OF INSULIN (H): Primary | ICD-10-CM

## 2023-08-28 DIAGNOSIS — F51.05 INSOMNIA DUE TO OTHER MENTAL DISORDER: ICD-10-CM

## 2023-08-28 DIAGNOSIS — D68.51 HETEROZYGOUS FACTOR V LEIDEN MUTATION (H): Primary | ICD-10-CM

## 2023-08-28 DIAGNOSIS — D68.51 HETEROZYGOUS FACTOR V LEIDEN MUTATION (H): ICD-10-CM

## 2023-08-28 DIAGNOSIS — I82.4Z1 ACUTE DEEP VEIN THROMBOSIS (DVT) OF DISTAL END OF RIGHT LOWER EXTREMITY (H): ICD-10-CM

## 2023-08-28 DIAGNOSIS — I10 ESSENTIAL HYPERTENSION: ICD-10-CM

## 2023-08-28 DIAGNOSIS — I48.91 ATRIAL FIBRILLATION, UNSPECIFIED TYPE (H): ICD-10-CM

## 2023-08-28 DIAGNOSIS — E11.9 TYPE 2 DIABETES MELLITUS WITHOUT COMPLICATION, WITHOUT LONG-TERM CURRENT USE OF INSULIN (H): ICD-10-CM

## 2023-08-28 DIAGNOSIS — E55.9 VITAMIN D DEFICIENCY: ICD-10-CM

## 2023-08-28 DIAGNOSIS — F33.2 SEVERE EPISODE OF RECURRENT MAJOR DEPRESSIVE DISORDER, WITHOUT PSYCHOTIC FEATURES (H): ICD-10-CM

## 2023-08-28 DIAGNOSIS — Z86.718 H/O DEEP VENOUS THROMBOSIS: ICD-10-CM

## 2023-08-28 LAB
HBA1C MFR BLD: 8.8 % (ref 0–5.6)
INR BLD: 2.9 (ref 0.9–1.1)

## 2023-08-28 PROCEDURE — 36415 COLL VENOUS BLD VENIPUNCTURE: CPT

## 2023-08-28 PROCEDURE — 83036 HEMOGLOBIN GLYCOSYLATED A1C: CPT

## 2023-08-28 PROCEDURE — 85610 PROTHROMBIN TIME: CPT

## 2023-08-28 PROCEDURE — 99606 MTMS BY PHARM EST 15 MIN: CPT | Performed by: PHARMACIST

## 2023-08-28 NOTE — Clinical Note
Elvis--joy now coming out of a deep depression --A1c up quite a bit --he is ready to get back tom it -- will see psychiatry next month to tweak mental health meds.  Malcolm

## 2023-08-28 NOTE — PATIENT INSTRUCTIONS
"Recommendations from today's MTM visit:                                                         1. A1c today = 8.8% -- up from 6.6% May-2023--stay on same diabetes meds for now -- lets concentrate on no snacking , low carb dietary food plan , daily walking exercise, see psychiatrist 9-19-23--for mental health medication review.   If your blood sugar in next 6 weeks not improving --message me via [employer] --we can add a diabetes drug called Pioglitazone to reduce insulin resistance.     INR 2.9 today --within range, Blood Pressure 110/80mmhg --excellent.         Follow-up: Return in about 14 weeks (around 12/4/2023), or @ 9;30 AM, for Blood sugar meter review, A1c lab, Medication Therapy Management Visit.    It was great speaking with you today.  I value your experience and would be very thankful for your time in providing feedback in our clinic survey. In the next few days, you may receive an email or text message from [employer] with a link to a survey related to your  clinical pharmacist.\"     To schedule another MTM appointment, please call the clinic directly or you may call the MTM scheduling line at [phone] or toll-free at [phone].     My Clinical Pharmacist's contact information:                                                      Please feel free to contact me with any questions or concerns you have.      Adair Waite [facility].  Medication Therapy Management Provider  [phone]  8.8% --   "

## 2023-08-28 NOTE — PROGRESS NOTES
ANTICOAGULATION MANAGEMENT     Gaurang Rivera 52 year old male is on warfarin with therapeutic INR result. (Goal INR 2.0-3.0)    Recent labs: (last 7 days)     08/28/23  0917   INR 2.9*       ASSESSMENT     Source(s): Chart Review and Patient/Caregiver Call     Warfarin doses taken: Warfarin taken as instructed  Diet: No new diet changes identified  Medication/supplement changes:  Did take some Advil this AM for back injury.  Recommended he switch to Tylenol and he agreed to try  New illness, injury, or hospitalization: Yes: see ab holloway  Signs or symptoms of bleeding or clotting: No  Previous result: Therapeutic last 2(+) visits  Additional findings: None           Recommended plan for temporary change(s) affecting INR     Dosing Instructions: Continue your current warfarin dose with next INR in 2 weeks       Summary  As of 8/28/2023      Full warfarin instructions:  7.5 mg every day   Next INR check:  9/25/2023               Telephone call with Gaurang who verbalizes understanding and agrees to plan    Lab visit scheduled    Education provided:   Please call back if any changes to your diet, medications or how you've been taking warfarin    Plan made per [facility] anticoagulation protocol    Alanna Hahn RN  Anticoagulation Clinic  8/28/2023    _______________________________________________________________________     Anticoagulation Episode Summary       Current INR goal:  2.0-3.0   TTR:  94.7 % (1 y)   Target end date:  Indefinite   Send INR reminders to:  [facility]    Indications    Atrial fibrillation  unspecified type (H) (Resolved) [I48.91]  Heterozygous factor V Leiden mutation (H) [D68.51]  Current use of long term anticoagulation [Z79.01]  H/O deep venous thrombosis [Z86.718]             Comments:  MTV 30 mcg.             Anticoagulation Care Providers       Provider Role Specialty Phone number    Elvis Guzman MD Referring Family Medicine [phone]

## 2023-08-31 DIAGNOSIS — F33.2 SEVERE EPISODE OF RECURRENT MAJOR DEPRESSIVE DISORDER, WITHOUT PSYCHOTIC FEATURES (H): ICD-10-CM

## 2023-09-18 ENCOUNTER — MYC REFILL (OUTPATIENT)
Dept: FAMILY MEDICINE | Facility: CLINIC | Age: 53
End: 2023-09-18
Payer: COMMERCIAL

## 2023-09-18 ENCOUNTER — MYC REFILL (OUTPATIENT)
Dept: ANTICOAGULATION | Facility: CLINIC | Age: 53
End: 2023-09-18
Payer: COMMERCIAL

## 2023-09-18 ENCOUNTER — DOCUMENTATION ONLY (OUTPATIENT)
Dept: ANTICOAGULATION | Facility: CLINIC | Age: 53
End: 2023-09-18
Payer: COMMERCIAL

## 2023-09-18 DIAGNOSIS — I82.4Z1 ACUTE DEEP VEIN THROMBOSIS (DVT) OF DISTAL END OF RIGHT LOWER EXTREMITY (H): Primary | ICD-10-CM

## 2023-09-18 DIAGNOSIS — F33.2 SEVERE EPISODE OF RECURRENT MAJOR DEPRESSIVE DISORDER, WITHOUT PSYCHOTIC FEATURES (H): ICD-10-CM

## 2023-09-18 DIAGNOSIS — D68.51 HETEROZYGOUS FACTOR V LEIDEN MUTATION (H): ICD-10-CM

## 2023-09-18 DIAGNOSIS — I82.4Y9 DEEP VEIN THROMBOSIS (DVT) OF PROXIMAL LOWER EXTREMITY, UNSPECIFIED CHRONICITY, UNSPECIFIED LATERALITY (H): ICD-10-CM

## 2023-09-18 DIAGNOSIS — Z79.01 CURRENT USE OF LONG TERM ANTICOAGULATION: ICD-10-CM

## 2023-09-18 DIAGNOSIS — I82.4Z1 ACUTE DEEP VEIN THROMBOSIS (DVT) OF DISTAL END OF RIGHT LOWER EXTREMITY (H): ICD-10-CM

## 2023-09-18 RX ORDER — WARFARIN SODIUM 5 MG/1
TABLET ORAL
Qty: 137 TABLET | Refills: 0 | Status: SHIPPED | OUTPATIENT
Start: 2023-09-18 | End: 2023-10-04

## 2023-09-18 RX ORDER — QUETIAPINE FUMARATE 25 MG/1
25-50 TABLET, FILM COATED ORAL AT BEDTIME
Qty: 60 TABLET | Refills: 0 | Status: CANCELLED | OUTPATIENT
Start: 2023-09-18

## 2023-09-18 ASSESSMENT — PATIENT HEALTH QUESTIONNAIRE - PHQ9
SUM OF ALL RESPONSES TO PHQ QUESTIONS 1-9: 20
SUM OF ALL RESPONSES TO PHQ QUESTIONS 1-9: 20
10. IF YOU CHECKED OFF ANY PROBLEMS, HOW DIFFICULT HAVE THESE PROBLEMS MADE IT FOR YOU TO DO YOUR WORK, TAKE CARE OF THINGS AT HOME, OR GET ALONG WITH OTHER PEOPLE: EXTREMELY DIFFICULT
10. IF YOU CHECKED OFF ANY PROBLEMS, HOW DIFFICULT HAVE THESE PROBLEMS MADE IT FOR YOU TO DO YOUR WORK, TAKE CARE OF THINGS AT HOME, OR GET ALONG WITH OTHER PEOPLE: EXTREMELY DIFFICULT
SUM OF ALL RESPONSES TO PHQ QUESTIONS 1-9: 20
SUM OF ALL RESPONSES TO PHQ QUESTIONS 1-9: 20

## 2023-09-18 NOTE — TELEPHONE ENCOUNTER
Writer responded via [employer].  UMA CarverN, RN-BC  [facility]

## 2023-09-18 NOTE — TELEPHONE ENCOUNTER
ANTICOAGULATION MANAGEMENT:  Medication Refill    Anticoagulation Summary  As of 8/28/2023      Warfarin maintenance plan:  7.5 mg (5 mg x 1.5) every day   Next INR check:  9/25/2023   Target end date:  Indefinite    Indications    Atrial fibrillation  unspecified type (H) (Resolved) [I48.91]  Heterozygous factor V Leiden mutation (H) [D68.51]  Current use of long term anticoagulation [Z79.01]  H/O deep venous thrombosis [Z86.718]                 Anticoagulation Care Providers       Provider Role Specialty Phone number    Elvis Guzman MD Referring Family Medicine [phone]            Refill Criteria    Visit with referring provider/group: Meets criteria: office visit within referring provider group in the last 1 year on 11/23/22    [facility] referral signed last signed: 09/12/2022; within last year: No - new referral sent to PCP today    Lab monitoring not exceeding 2 weeks overdue: Yes    Peter does NOT meet all criteria for refill: [facility] referral needs updating. 90 day rula fill approved; patient notified to schedule with referring provider per [facility] protocol    Cyn Womack RN  Anticoagulation Clinic

## 2023-09-18 NOTE — PROGRESS NOTES
ANTICOAGULATION CLINIC REFERRAL RENEWAL REQUEST       An annual renewal order is required for all patients referred to [facility].?  Please review and sign the pended referral order for Gaurang Rivera.       ANTICOAGULATION SUMMARY      Warfarin indication(s)   DVT and Heterozygous Factor V Leiden    Mechanical heart valve present?  NO       Current goal range   INR: 2.0-3.0     Goal appropriate for indication? Goal INR 2-3, standard for indication(s) above     Time in Therapeutic Range (TTR)  (Goal > 60%) 94.7%       Office visit with referring provider's group within last year yes on 11/23/22       Cyn Womack RN  [facility]

## 2023-09-19 ENCOUNTER — VIRTUAL VISIT (OUTPATIENT)
Dept: PSYCHIATRY | Facility: CLINIC | Age: 53
End: 2023-09-19
Attending: PHYSICIAN ASSISTANT
Payer: COMMERCIAL

## 2023-09-19 ENCOUNTER — VIRTUAL VISIT (OUTPATIENT)
Dept: BEHAVIORAL HEALTH | Facility: CLINIC | Age: 53
End: 2023-09-19
Payer: COMMERCIAL

## 2023-09-19 DIAGNOSIS — F33.1 DEPRESSION, MAJOR, RECURRENT, MODERATE (H): Primary | ICD-10-CM

## 2023-09-19 DIAGNOSIS — F33.2 SEVERE EPISODE OF RECURRENT MAJOR DEPRESSIVE DISORDER, WITHOUT PSYCHOTIC FEATURES (H): ICD-10-CM

## 2023-09-19 PROCEDURE — 90791 PSYCH DIAGNOSTIC EVALUATION: CPT | Mod: 95 | Performed by: SOCIAL WORKER

## 2023-09-19 PROCEDURE — 99205 OFFICE O/P NEW HI 60 MIN: CPT | Mod: 95 | Performed by: NURSE PRACTITIONER

## 2023-09-19 RX ORDER — LURASIDONE HYDROCHLORIDE 40 MG/1
TABLET, FILM COATED ORAL
Qty: 30 TABLET | Refills: 1 | Status: SHIPPED | OUTPATIENT
Start: 2023-09-19 | End: 2024-01-12 | Stop reason: DRUGHIGH

## 2023-09-19 ASSESSMENT — COLUMBIA-SUICIDE SEVERITY RATING SCALE - C-SSRS
1. HAVE YOU WISHED YOU WERE DEAD OR WISHED YOU COULD GO TO SLEEP AND NOT WAKE UP?: YES
6. HAVE YOU EVER DONE ANYTHING, STARTED TO DO ANYTHING, OR PREPARED TO DO ANYTHING TO END YOUR LIFE?: NO
TOTAL  NUMBER OF INTERRUPTED ATTEMPTS LIFETIME: NO
REASONS FOR IDEATION PAST MONTH: MOSTLY TO END OR STOP THE PAIN (YOU COULDN'T GO ON LIVING WITH THE PAIN OR HOW YOU WERE FEELING)
1. IN THE PAST MONTH, HAVE YOU WISHED YOU WERE DEAD OR WISHED YOU COULD GO TO SLEEP AND NOT WAKE UP?: YES
ATTEMPT LIFETIME: NO
REASONS FOR IDEATION LIFETIME: MOSTLY TO END OR STOP THE PAIN (YOU COULDN'T GO ON LIVING WITH THE PAIN OR HOW YOU WERE FEELING)
2. HAVE YOU ACTUALLY HAD ANY THOUGHTS OF KILLING YOURSELF?: NO
TOTAL  NUMBER OF ABORTED OR SELF INTERRUPTED ATTEMPTS LIFETIME: NO

## 2023-09-19 NOTE — ASSESSMENT & PLAN NOTE
Depressive symptoms are causing functional impairment in multiple domains.  Abilify has not had any benefit.  Will discontinue the Abilify and quetiapine and start Latuda 40 mg.  Concerned about the high dose of bupropion and Venlafaxine due to the following:   Serious - Use Alternative  venlafaxine + bupropion  venlafaxine increases toxicity of bupropion by unspecified interaction mechanism. Avoid or Use Alternate Drug. May lower seizure threshold; keep bupropion dose as low as possible.    Monitor Closely  bupropion + venlafaxine  bupropion will increase the level or effect of venlafaxine by affecting hepatic enzyme CYP2D6 metabolism. Use Caution/Monitor.    Will reassess these two medications once Latuda is stabilized.  Continue therapy.  If not getting better than consider Partial Hospitalization Program or Intensive Outpnt Program.  Will follow-up 4 weeks   The following was sent to patient via [employer]:   TMS resources:  [email or web address]    [facility]  [email or web address]    [facility]  [email or web address]    [facility]  [email or web address]    Dr. Prado  [email or web address]

## 2023-09-19 NOTE — PROGRESS NOTES
"Virtual Visit Details    Type of service:  Video Visit   Video Start Time:  2:32 pm  Video End Time: 3:18 pm    Originating Location (pt. Location): Home    Distant Location (provider location):  Off-site  Platform used for Video Visit: [facility]        PSYCHIATRIC DIAGNOSTIC ASSESSMENT      Name:  Gaurang Rivera  : 1970    Referred by: Lary Brizuela PA-C  Patient Care Team:  Elvis Guzman MD as PCP - General (Family Practice)  Elvis Guzman MD as Assigned PCP  Vaishnavi Rock PA as Physician Assistant (Urology)  Angeles Wise MD as MD (Dermatology)  Adair Waite RPH as Pharmacist (Pharmacist)  Lluvia Cárdenas PA-C as Physician Assistant (Gastroenterology)  Adair Waite RPH as Assigned MTM Pharmacist  Angeles Wise MD as Assigned Surgical Provider  Therapist: in therapy currently    History was provided by patient  who were  good historian(s).    Patient attended the session alone    RECORDS AVAILABLE FOR REVIEW: EHR records through [employer] .  In addition, reviewed the assessment today completed by Peyton Mathews [facility], Behavioral Health Consultant                                                 CHIEF COMPLAINT   Patient is a 52 year old,  White Choose not to answer male  who presents for initial psychiatric evaluation. Referred by   their Primary Care Provider to the [facility] ([facility]) for evaluation of depression.  Our psychiatry providers act as a specialty service for Primary Care Providers in the [facility] who seek to optimize medications for unstable patients.  Once medications have been optimized, our providers discharge the patient back to the referring Primary Care Provider for ongoing medication management.  This type of system allows our providers to serve a high volume of patients.      Note by PCP day of referral:  \"acutely worse MDD, passive suicidal ideations w/ safety plan, needed to take JOHN from work due " "to depression\"    HISTORY OF PRESENT ILLNESS   Per [facility], Peyton Mathews, during today's team-based visit:  \"The reason for seeking services at this time is: \"Depression\".  The problem(s) began  .\"most of my adult life\". Reports that his symptoms worsened 2 months ago so he took an JOHN from work for 6 weeks but is now back.. [facility] reviewed PHQ9. States that he thinks about suicide a lot \"don't have much to live for\" but denies intent, plan or safety concerns. No hx of attempts.  Patient has attempted to resolve these concerns in the past through medication, counseling in the past. Pt is currently seeing Shellie Murphy at [facility] for 9 months, weekly  Most important: Abilify isn't doing much, just started quetiapine, bupropion and venlafaxine have worked well in the past.\"    Reports past diagnosis of depression. Reports he first sought treatment when he was in late 20's for depression.  Has been on medications essentially since then.      Mood Disorder Questionnaire (MDQ)  Adapted from the \"Mental Health Queri,\" Quality Enhancement Research Initiative    Has there ever been a period of time when you were not your usual self and    -you felt so good or so hyper that other people thought you were not your normal self or you were so hyper that you got into trouble?   NO  -you were so irritable that you shouted at people or started fights or arguments? NO  -you felt much more self-confident than usual? NO                                                       -you got much less sleep than usual and found that you didn't really miss it?  NO  -you were much more talkative and spoke much faster than usual?  NO  -thoughts raced through your head or you couldn't slow your mind down?  ENDORSES RUMINATION          -you were so easily distracted by things around you that you had trouble concentrating or staying on track? NO  -you had much more energy than usual? NO  -you were much more active or did many more " things than usual?  NO                             -you were much more social or outgoing than usual, for example, you telephoned friends in the middle of the night?  NO                                         -you did things that were unusual for you or other people might have thought were excessive, foolish or risky? NO  -spending money got you or your family in trouble?     NO                                  If you checked YES to more than one of the above, have several of these ever happened during the same period of time?  NO    How much of a problem did any of these cause you - like being unable to work; having family, money, or legal troubles; getting into arguments or fights?  NO    Have any of your blood relatives (i.e. children, siblings, parents, grandparents, aunts, uncles) had manic-depressive illness or bipolar disorder?  NO    Has a health professional ever told you that you have manic- depressive illness or bipolar disorder?  NO    [place] Bipolarity Index utilized to further assess for an underlying bipolar disorder. This explores presence of hypomania or jermain, age of onset of first mood symptoms, illness course and other features generally only visible over time, response to medications (antidepressants and mood stabilizers), and family history of mood and substance use.   Patient endorses the following:    History of many failed antidepressant trials  Yes      Age of first depression onset: YOUNG ADULT  History of greater than or equal to five total life time depressive episodes: Yes   Depressive episode with concurrent hypomanic symptoms: No   Questionable activation from antidepressant: No   Family history of mood disorder: No     PSYCHIATRIC HISTORY:   Previous psychiatry: none in a long time  Previous therapist: historically  History of Psychiatric Hospitalizations:   - Inpatient:  denies   - IOP/PHP/Day treatment:  positive   History of Suicidal Ideation: chronic suicidal idation,  longstanding, no intent or plan.  Mostly thoughts  History of Suicide Attempts:  denies     History of Self-injurious Behavior: denies   History of Violence/Aggression: denies    History of Commitment?  Denies    Electroconvulsive Therapy (ECT) or Transcranial Magnetic Stimulation (TMS): denies    PharmacogenomicTesting (such as GeneSight): denies      PSYCHIATRIC REVIEW OF SYSTEMS:   Sleep: initial insomnia and middle insomnia         Depression: Lack of interest, Feelings of hopelessness, Low self-worth, Ruminations, Feeling sad, down, or depressed, and trouble falling and staying asleep (work schedule is erratic), tends to overeat, low energy, low motivation +SI, no attempts  Lulu:  No Symptoms  Psychosis: No Symptoms  Anxiety: Sleep disturbance and Ruminations  Panic:  No symptoms  Post Traumatic Stress Disorder:  Experienced traumatic event physical injury 20 years from rowing    Eating Disorder: No Symptoms  ADD / ADHD:  No symptoms  Conduct Disorder: No symptoms  Autism Spectrum Disorder: No symptoms  Obsessive Compulsive Disorder: No Symptoms         Patient reports the following compulsive behaviors and treatment history:  none .         ASSESSMENT SCALES:      6/8/2023     9:55 PM 8/3/2023    12:38 PM 9/18/2023     3:07 PM   PHQ-9 SCORE   PHQ-9 Total Score [facility] 19 (Moderately severe depression) 19 (Moderately severe depression) 20 (Severe depression)   PHQ-9 Total Score 19 19 20    20           9/18/2023     3:07 PM   Last PHQ-9   1.  Little interest or pleasure in doing things 3    3   2.  Feeling down, depressed, or hopeless 3    3   3.  Trouble falling or staying asleep, or sleeping too much 3    3   4.  Feeling tired or having little energy 3    3   5.  Poor appetite or overeating 3    3   6.  Feeling bad about yourself 3    3   7.  Trouble concentrating 0    0   8.  Moving slowly or restless 0    0   Q9: Thoughts of better off dead/self-harm past 2 weeks 2    2   PHQ-9 Total Score 20    20   In  the past two weeks have you had thoughts of suicide or self harm? Yes    Yes   Do you have concerns about your personal safety or the safety of others? No    No   In the past 2 weeks have you thought about a plan or had intention to harm yourself? No    No   In the past 2 weeks have you acted on these thoughts in any way? No    No             1/8/2020    10:55 AM 3/30/2021     2:18 PM 8/3/2023     1:00 PM   SOL-7 SCORE   Total Score 1 (minimal anxiety)  5 (mild anxiety)   Total Score 1 0 5         3/19/2019    10:07 AM 6/28/2019     7:30 AM 7/16/2019    10:42 AM 10/10/2019     8:39 AM 1/8/2020    10:55 AM 3/30/2021     2:18 PM 8/3/2023     1:00 PM   SOL-7   [employer], 2002; Used with Permission)   1. Feeling nervous, anxious, or on edge Not at all  Not at all Not at all Not at all  Not at all   2. Not being able to stop or control worrying Several days  Not at all Not at all Not at all  More than half the days   3. Worrying too much about different things Several days  Several days Several days Several days  More than half the days   4. Trouble relaxing Not at all  Not at all Not at all Not at all  Not at all   5. Being so restless that it is hard to sit still Not at all  Not at all Not at all Not at all  Not at all   6. Becoming easily annoyed or irritable Several days  Not at all Not at all Not at all  Not at all   7. Feeling afraid, as if something awful might happen Several days  Not at all Not at all Not at all  Several days   SOL 7 TOTAL SCORE 4 (minimal anxiety)  1 (minimal anxiety) 1 (minimal anxiety) 1 (minimal anxiety)  5 (mild anxiety)   1. Feeling nervous, anxious, or on edge 0 0 0 0 0 0 0   2. Not being able to stop or control worrying 1 0 0 0 0 0 2   3. Worrying too much about different things 1 1 1 1 1 0 2   4. Trouble relaxing 0 0 0 0 0 0 0   5. Being so restless that it is hard to sit still 0 0 0 0 0 0 0   6. Becoming easily annoyed or irritable 1 0 0 0 0 0 0   7. Feeling afraid, as if something  awful might happen 1 0 0 0 0 0 1   SOL-7 Total Score 4 1 1 1 1 0 5   If you checked any problems, how difficult have they made it for you to do your work, take care of things at home, or get along with other people?  Somewhat difficult    Not difficult at all Very difficult     GAD7 score is Not completed today        FAMILY, MEDICAL, SURGICAL HISTORY REVIEWED.  MEDICATION HAVE BEEN REVIEWED AND ARE CURRENT TO THE BEST OF MY KNOWLEDGE AND ABILITY.   rowing    MEDICATIONS                                                                                                Current Outpatient Medications   Medication Sig    ARIPiprazole (ABILIFY) 5 MG tablet TAKE 1 TABLET(5 MG) BY MOUTH DAILY    atorvastatin (LIPITOR) 40 MG tablet Take 1 tablet (40 mg) by mouth daily    buPROPion (WELLBUTRIN XL) 150 MG 24 hr tablet Take 1 tablet (150 mg) by mouth every morning Take with 300mg for total daily dose of 450mg.    buPROPion (WELLBUTRIN XL) 300 MG 24 hr tablet Take 1 tablet (300 mg) by mouth every morning Take with 150mg for total daily dose of 450mg.    cholecalciferol (VITAMIN D3) 125 mcg (5000 units) capsule Take 1 capsule (125 mcg) by mouth daily    Continuous Blood Gluc Sensor (FREESTYLE JAVI 14 DAY SENSOR) MISC AS DIRECTED AND CHANGE EVERY 14 DAYS,    exenatide ER (BYDUREON BCISE) 2 MG/0.85ML auto-injector INJECT 2MG SUBCUTANEOUS EVERY 7 DAYS    ibuprofen (ADVIL/MOTRIN) 200 MG capsule Take 600-800 mg by mouth every 8 hours as needed for fever    JARDIANCE 10 MG TABS tablet TAKE 1 TABLET(10 MG) BY MOUTH DAILY    lisinopril (ZESTRIL) 5 MG tablet Take 1 tablet (5 mg) by mouth daily    metFORMIN (GLUCOPHAGE) 1000 MG tablet Take 1 tablet (1,000 mg) by mouth 2 times daily (with meals)    multivitamin w/minerals (THERA-VIT-M) tablet Take 1 tablet by mouth daily    omeprazole (PRILOSEC OTC) 20 MG EC tablet Take 20 mg by mouth daily as needed    QUEtiapine (SEROQUEL) 25 MG tablet Take 1-2 tablets (25-50 mg) by mouth At Bedtime  "   venlafaxine (EFFEXOR XR) 150 MG 24 hr capsule Take 2 capsules (300 mg) by mouth daily    warfarin ANTICOAGULANT (COUMADIN) 5 MG tablet TAKE 1 AND 1/2 TABLETS (7.5 MG) DAILY OR AS DIRECTED BY INR CLINIC.     No current facility-administered medications for this visit.       [place] Prescription Monitoring Program evaluating controlled substances in the last year in [place]:  none noted         NOTES ABOUT CURRENT PSYCHOTROPIC MEDICATIONS:   Abilify 5 mg, unclear benefit.  Has been on this over 9 months  Bupropion  mg, years  Venlafaxine  mg many years  Quetiapine 25-50 mg at bedtime, about a month, no change    SUPPLEMENTS: denies   SIDE EFFECTS:   denies    COMPLIANCE:   states Adherent to medication regimen      PAST PSYCHOTROPIC MEDICATIONS:  Fluoxetine years ago  Sertraline       VITALS   There were no vitals taken for this visit.     BP Readings from Last 1 Encounters:   08/28/23 110/80     Pulse Readings from Last 1 Encounters:   08/28/23 79     Wt Readings from Last 1 Encounters:   08/28/23 131.5 kg (289 lb 14.4 oz)     Ht Readings from Last 1 Encounters:   11/23/22 1.981 m (6' 6\")     Estimated body mass index is 33.5 kg/m  as calculated from the following:    Height as of 11/23/22: 1.981 m (6' 6\").    Weight as of 8/28/23: 131.5 kg (289 lb 14.4 oz).      PERTINENT HISTORY     Patient Active Problem List   Diagnosis    Passive suicidal ideations    Diabetes mellitus, type 2 (H)    Hyperlipidemia, unspecified hyperlipidemia type    Microalbuminuria due to type 2 diabetes mellitus (H)    MDD (major depressive disorder), recurrent episode, severe (H)    Erectile dysfunction    Atrial fibrillation (H)    Heterozygous factor V Leiden mutation (H)    Current use of long term anticoagulation    H/O deep venous thrombosis    Morbid obesity (H)        Seizures or Head Injury: Denies history of head injury. Denies history of seizures.  Me     Past Surgical History:   Procedure Laterality Date    " "APPENDECTOMY      ARTHROSCOPY SHOULDER, OPEN BICEP TENODESIS REPAIR, COMBINED Right 6/2/2020    Procedure: Right shoulder diagnostic arthroscopy, capsular release,  open biceps tenodesis;  Surgeon: Dulce Maria Burdick MD;  Location: [facility] OR    AS DRAIN PILONIDAL CYST SIMPLE      BACK SURGERY  1997    spinal fusion    CHOLECYSTECTOMY  2012    CYSTECTOMY PILONIDAL N/A 2/21/2020    Procedure: EXCISION, PILONIDAL CYST, AILEEN II Procedure;  Surgeon: Artemio Ahumada MD;  Location: [facility] OR    THUMB SURGERY   1995        SOCIAL HISTORY  Patient reported they grew up in [place]  .  They were raised by biological parents  .  Parents were always together. Mother is in a care center and declining.  He sees his father and sister often. Pt has 2 younger sisters.  Patient reported that their childhood was \"ideallyic, happy child\".     Relationship status: single   Children: denies   Highest education level was graduate school.    Service: denies   Employment status: full time     Trauma history: Denies  ACES (Adverse Childhood Experiences):  None.  Grew up in an intact home with all basic needs being met       LEGAL:  Denies      SUBSTANCE USE HISTORY  Social History     Tobacco Use    Smoking status: Never    Smokeless tobacco: Never   Substance Use Topics    Alcohol use: No     Comment: None       Caffeine:  unremarkable   Alcohol:  denies   Other substance use: denies   Past use alcohol/substance use: denies      Chemical dependency history: Patient has not received chemical dependency treatment in the past       Family History   Problem Relation Age of Onset    Diabetes Mother     Skin Cancer Father     Depression Father     Thrombosis Father         HE HAS HAD 3 CLOTS - PER PATIENT THEY WERE UNPROVOKED ON COUMADIN     Alcoholism Sister     Depression Sister     Lupus Sister     Anesthesia Reaction No family hx of     Cardiovascular No family hx of     Melanoma No family hx of             PERTINENT " FAMILY PSYCHIATRIC HISTORY NOTES  +family hx of depression and one sister is an alcoholic  M   Based on the clinical interview, there  are not indications of drug or alcohol abuse.   LABS & IMAGING                                                                                                                   Recent Labs   Lab Test 04/11/22  1314 06/03/20  0706 05/28/20  1135   WBC 7.2   < > 6.8   HGB 17.1   < > 16.1   HCT 51.4   < > 47.8   MCV 88   < > 87      < > 192   ANEU  --   --  3.3    < > = values in this interval not displayed.     Recent Labs   Lab Test 05/01/23  0939 11/23/22  0946 05/03/22  0942 06/03/20  0706 06/02/20  1417      < > 139   < > 138   POTASSIUM 4.0   < > 3.7   < > 4.3   CHLORIDE 102   < > 107   < > 105   CO2 21*   < > 24   < > 23   *   < > 109*   < > 184*   SAVI 9.9   < > 9.1   < > 8.7   MAG  --   --   --   --  2.0   BUN 15.0   < > 15   < > 17   CR 1.16   < > 0.99   < > 0.88   GFRESTIMATED 75   < > >90   < > >90   ALBUMIN  --   --  4.1   < >  --    PROTTOTAL  --   --  7.6   < >  --    AST  --   --  37   < >  --    ALT  --   --  63   < >  --    ALKPHOS  --   --  72   < >  --    BILITOTAL  --   --  0.8   < >  --     < > = values in this interval not displayed.     Recent Labs   Lab Test 08/28/23  0917 05/01/23  0939   CHOL  --  203*   LDL  --  93   HDL  --  41   TRIG  --  347*   A1C 8.8* 6.6*     Recent Labs   Lab Test 05/01/23  0939   TSH 1.73     ALLERGY & IMMUNIZATIONS     No Known Allergies        MEDICAL REVIEW OF SYSTEMS:   Ten system review was completed with pertinent positives noted above    MENTAL STATUS EXAM:   General/Constitutional:  Appearance:   awake, alert, adequately groomed, appeared stated age and no apparent distress  Attitude:    cooperative   Eye Contact:  good  Musculoskeletal:  Psychomotor Behavior:  no evidence of tardive dyskinesia, dystonia, or tics from the head up  Psychiatric:  Speech:  clear, coherent, regular rate, rhythm, and volume,   No  pressure speech noted.  Associations:  no loose associations  Thought Process:   logical, linear and goal oriented  Thought Content:  chronic suicidal idation, longstanding, no intent or plan.  No homicidal ideation, no evidence of psychotic thought, no auditory hallucinations present and no visual hallucinations present  Mood:  depressed  Affect:  full range/stable (normal variation of emotions during exam) and was congruent to speech content.  Insight:  good  Judgment:  intact, adequate for safety  Impulse Control:  intact  Neurological:  Oriented to:  person, place, time, and situation  Attention Span and Concentration:  Able to attend to the interview       Language: intact      Recent and Remote Memory:  Intact to interview. Not formally assessed. No amnesia.    Fund of Knowledge: appropriate       SAFETY   Feels safe in home: YES     Suicidal ideation: chronic, no intent or plan  History of suicide attempts:  No   Hx of impulsivity: No   Hope for the future: present    Hx of Command hallucinations or current psychosis: None endorsed    History of Self-injurious behaviors: denies    Family member  by suicide:  not discussed      SAFETY ASSESSMENT:   Based on all available evidence including the factors cited above, overall Risk for harm is low and is appropriate for outpatient level of care.   Recommended that patient call 911 or go to the local ED should there be a change in any of these risk factors.       LANGUAGE OR COMMUNICATION BARRIERS   Are there language or communication issues or need for modification in treatment? NO     Are there ethnic, cultural or Methodist factors that may be relevant for therapy? NO      Client identified their preferred language to be fluent English in conversational context  Does the client need the assistance of an  or other support involved in therapy? NO       DSM 5 DIAGNOSIS:   296.32 (F33.1) Major Depressive Disorder, Recurrent Episode, Moderate _    MEETS  CRITERIA PER DSM 5 AS FOLLOWS:   Meets criteria for Major Depressive Disorder per DSM5 as follows:   A. Five (or more) of the following symptoms have been present during the same 2-week period and represent a change from previous functioning: at least one of the symptoms is either (1) depressed mood or (2) loss of interest or pleasure.     *Depressed mood most of the day, nearly every day, as indicated by either subjective report (e.g., feels sad, empty, hopeless) or observation made by others (e.g., appears tearful).   *Markedly diminished interest or pleasure in all, or almost all, activities most of the day, nearly every day (as indicated by either subjective account or observation).    *Significant weight loss when not dieting or weight gain (e.g., a change of more than 5% of body weight in a month), or decrease or increase in appetite nearly every day.     *Insomnia or hypersomnia nearly every day. Fatigue or loss of energy nearly every day.   *Feelings of worthlessness or excessive or inappropriate guilt which may be delusional) nearly every day (not merely self-reproach or guilt about being sick).   *Diminished ability to think or concentrate, or indecisiveness, nearly every day (either by subjective account or as observed by others)  *Recurrent thoughts of death (not just fear of dying), recurrent suicidal ideation without a specific plan, or a suicide attempt or a specific plan for committing suicide.     B. The symptoms cause clinically significant distress or impairment in social, occupational, or other important areas of functioning.      MEDICAL COMORBIDITY IMPACTING CLINICAL PICTURE:  none.      ASSESSMENT AND PLAN    Gaurang Rivera is a 52 year old White Choose not to answer male presenting for psychiatric evaluation and medication management through the [facility].  Information is obtained from patient and available records.  Reports history of longstanding depression.    Denies prior psychiatric hospitalizations. Hx of suicidal ideation, no suicide attempts. No history of self-injurious behaviors. Genetically loaded for  anxiety and substance use. Grew up in an intact home with all basic needs being met.      Problem List Items Addressed This Visit          Behavioral     Depressive symptoms are causing functional impairment in multiple domains.  Abilify has not had any benefit.  Will discontinue the Abilify and quetiapine and start Latuda 40 mg.  Concerned about the high dose of bupropion and Venlafaxine due to the following:   Serious - Use Alternative  venlafaxine + bupropion  venlafaxine increases toxicity of bupropion by unspecified interaction mechanism. Avoid or Use Alternate Drug. May lower seizure threshold; keep bupropion dose as low as possible.    Monitor Closely  bupropion + venlafaxine  bupropion will increase the level or effect of venlafaxine by affecting hepatic enzyme CYP2D6 metabolism. Use Caution/Monitor.    Will reassess these two medications once Latuda is stabilized.  Continue therapy.  If not getting better than consider Partial Hospitalization Program or Intensive Outpnt Program.  Will follow-up 4 weeks   The following was sent to patient via [employer]:   TMS resources:  [email or web address]-[email or web address]    [facility]  [email or web address]    [facility]  [email or web address]-health-[email or web address]-[email or web address]-[email or web address]    [facility]  [email or web address]    [facility] Prado  [email or web address]           MDD (major depressive disorder), recurrent episode, severe (H)    Relevant Medications    lurasidone (LATUDA) 40 MG TABS tablet          CONSULTS/REFERRALS:   Continue therapy   Coordinate care with therapist as needed      MEDICAL:   None at this time  Coordinate care with PCP (Elvis Guzman) as needed  Follow  up with primary care provider as planned or for acute medical concerns.    PSYCHOEDUCATION:  Medication side effects and alternatives reviewed. Health promotion activities recommended and reviewed today. All questions addressed. Education and counseling completed regarding risks and benefits of medications and psychotherapy options.  Consent provided by patient/guardian  Call the psychiatric nurse line with medication questions or concerns at [phone].  [employer] Pharmaceuticalshart may be used to communicate with your provider, but this is not intended to be used for emergencies.  Tabber.gov is information for patients.  It is run by the [employer] and it contains information about all disorders, diseases and all medications.      COMMUNITY RESOURCES:    CRISIS NUMBERS: Provided in AVS 9/19/2023  National Suicide Prevention Lifeline: 0-480-648-TALK (949-996-8434)  Vaprema/resources for a list of additional resources (SOS)            [facility] - [phone]   Urgent Care Adult Mental Kxrhhk-[phone] mobile unit/ 24/7 crisis line  [facility] -[phone]   COPE 24/7 [place] Mobile Team -[phone] (adults)/ [phone] (child)  Poison Control Center - 1-431.736.1418    OR  go to nearest ER  Crisis Text Line for any crisis 24/7 send this-   To: 844857   [facility] ([facility]  [phone]  National Suicide Prevention Lifeline: 215.822.7526 (TTY: 191.454.8879). Call anytime for help.  (www.suicidepreventionlifeline.org)  National [place] on Mental Illness (www.dianne.org): 216-481-4773 or 023-412-7795.   Mental Health Association (www.mentalhealth.org): 707.886.1316 or 242-858-3466.  [place] Crisis Text Line: Text [place] to 262028  Suicide LifeLine Chat: [facility].org/chat    ADMINISTRATIVE BILLING:   Level of Medical Decision Making:   - At least 1 chronic problem that is not stable  - Engaged in prescription drug management  during visit (discussed any medication benefits, side effects, alternatives, etc.)           Patient Status:  Our psychiatry providers act as a specialty service for Primary Care Providers in the [facility] that seek to optimize medications for unstable patients.  Once medications have been optimized, our providers discharge the patient back to the referring Primary Care Provider for ongoing medication management.  This type of system allows our providers to serve a high volume of patients. At this time  Patient will continue to be seen for ongoing consultation and stabilization.    Signed:   Meagan Hernandez, DION, APRN, FMSHIRAP-[facility] Collaborative Care Psychiatry Service (CCPS)   Chart documentation done in part with Dragon Voice Recognition software.  Although reviewed after completion, some word and grammatical errors may remain.

## 2023-09-19 NOTE — NURSING NOTE
Is the patient currently in the state of [place]? YES    Visit mode:VIDEO    If the visit is dropped, the patient can be reconnected by: VIDEO VISIT: Text to cell phone:   Telephone Information:   Mobile [phone]       Will anyone else be joining the visit? NO  (If patient encounters technical issues they should call [phone])    How would you like to obtain your AVS? MyChart    Are changes needed to the allergy or medication list? No    Reason for visit: Consult    Enriqueta MURCIA

## 2023-09-20 PROBLEM — I82.4Y9 DEEP VEIN THROMBOSIS (DVT) OF PROXIMAL LOWER EXTREMITY, UNSPECIFIED CHRONICITY, UNSPECIFIED LATERALITY (H): Status: ACTIVE | Noted: 2023-09-20

## 2023-09-28 NOTE — PATIENT INSTRUCTIONS
**For crisis resources, please see the information at the end of this document**     Thank you for coming to the [facility] [place] [facility].    TREATMENT PLAN:    MEDICATIONS:   - replace the Abilify and quetiapine with Latuda titrated to 40 mg.  Continue remaining medications at current dosages     -PSYCHOEDUCATION: Risks of antipsychotic medications:  metabolic disorder and movement disorder, and the need for blood monitoring of sugar and lipids/cholesterol while taking the medication.       CONSULTS/REFERRALS:   None at this time    LABS/PROCEDURES:    Please call your [place] clinic and ask for a lab only appointment at your earliest convenience.  If your results are reassuring or normal they will be mailed to you or sent through [facility] within 7 days. If the lab tests need quick action we will call you with the results. The phone number we will call with results is # [phone].      FOLLOW UP: Schedule an appointment with me in six weeks  or sooner as needed.  The intake team should be calling you to schedule.  If you dont hear from them, or they were unable to reach you, please call [phone] to schedule.  Follow up with primary care provider as planned or for acute medical concerns.  Call the psychiatric nurse line with medication questions or concerns at [phone].      Medication Refills:  If you need any refills please call your pharmacy and they will contact us. Our fax number for refills is [phone]. Please allow three business for refill processing. If you need to  your refill at a new pharmacy, please contact the new pharmacy directly. The new pharmacy will help you get your medications transferred.     [facility] [phone]  Financial Assistance [phone]  [facility] Billing [phone]  Central Billing Office, [facility]: [phone]  [place] Billing [phone]  Medical Records [phone]  [place] Patient Bill of Rights  [email or web address]       MENTAL HEALTH CRISIS RESOURCES:  For a emergency help, please call 911 or go to the nearest Emergency Department.     Emergency Walk-In Options:   EmPATH Unit @ [facility] ([place]): [phone] - Specialized mental health emergency area designed to be calming  [facility] ([place]): [phone]  [facility] ([place]): [phone]  [facility]): [phone]    National Crisis Information: Call 988 Suicide and Crisis Lifeline  Crisis Text Line (free 24/7):  call **CRISIS (**069744) Crisis or use the texting option by texting 597992.   National Suicide Prevention Lifeline: Call 988  Poison Control Center: 0-409-879-7021  Trans Lifeline: 7-917-567-8473 - Hotline for transgender people of all ages  The [employer]: 3-273-909-9524 - Hotline for LGBT youth   List of all [facility] resources:   https://[place].gov/dhs/people-we-serve/adults/health-care/mental-health/resources/crisis-contacts.jsp    For Non-Emergency Support:   Fast Tracker: Mental Health & Substance Use Disorder Resources -   https://www.fasttrackermn.org/       Again thank you for choosing [facility] HEALTH [facility]DICTION [facility] and please let us know how we can best partner with you to improve you and your family's health.    You may be receiving a survey regarding this appointment. We would love to have your feedback, both positive and negative. The survey is done by an external company, so your answers are anonymous.  Patient Education   Collaborative Care Psychiatry Service  What to Expect  Here's what to expect from your Collaborative Care Psychiatry Service (CCPS).   About CCPS  CCPS means 2 people work together to help you get better. You'll meet with a behavioral health clinician and a psychiatric doctor. A behavioral health clinician helps people with mental  "health problems by talking with them. A psychiatric doctor helps people by giving them medicine.  How it works  At every visit, you'll see the behavioral health clinician (BHC) first. They'll talk with you about how you're doing and teach you how to feel better.   Then you'll see the psychiatric doctor. This doctor can help you deal with troubling thoughts and feelings by giving you medicine. They'll make sure you know the plan for your care.   CCPS usually takes 3 to 6 visits. If you need more visits, we may have you start seeing a different psychiatric doctor for ongoing care.  If you have any questions or concerns, we'll be glad to talk with you.  About visits  Be open  At your visits, please talk openly about your problems. It may feel hard, but it's the best way for us to help you.  Cancelling visits  If you can't come to your visit, please call us right away at [phone]. If you don't cancel at least 24 hours (1 full day) before your visit, that's \"late cancellation.\"  Being late to visits  Being very late is the same as not showing up. You will be a \"no show\" if:  Your appointment starts with a BHC, and you're more than 15 minutes late for a 30-minute (half hour) visit. This will also cancel your appointment with the psychiatric doctor.  Your appointment is with a psychiatric doctor only, and you're more than 15 minutes late for a 30-minute (half hour) visit.  Your appointment is with a psychiatric doctor only, and you're more than 30 minutes late for a 60-minute (full hour) visit.  If you cancel late or don't show up 2 times within 6 months, we may end your care.   Getting help between visits  If you need help between visits, you can call us Monday to Friday from 8 a.m. to 4:30 p.m. at [phone].  Emergency care  Call 911 or go to the nearest emergency department if your life or someone else's life is in danger.  Call 988 anytime to reach the national Suicide and Crisis hotline.  Medicine " refills  To refill your medicine, call your pharmacy. You can also call [facility]'s Behavioral Access at [phone], Monday to Friday, 8 a.m. to 4:30 p.m. It can take 1 to 3 business days to get a refill.   Forms, letters, and tests  You may have papers to fill out, like FMLA, short-term disability, and workability. We can help you with these forms at your visits, but you must have an appointment. You may need more than 1 visit for this, to be in an intensive therapy program, or both.  Before we can give you medicine for ADHD, we may refer you to get tested for it or confirm it another way.  We may not be able to give you an emotional support animal letter.  We don't do mental health checks ordered by the court.   We don't do mental health testing, but we can refer you to get tested.   Thank you for choosing us for your care.  For informational purposes only. Not to replace the advice of your health care provider. Copyright   2022 [facility]. All rights reserved. Identia [id number] - 12/22.

## 2023-10-05 ENCOUNTER — LAB (OUTPATIENT)
Dept: LAB | Facility: CLINIC | Age: 53
End: 2023-10-05
Payer: COMMERCIAL

## 2023-10-05 ENCOUNTER — ANTICOAGULATION THERAPY VISIT (OUTPATIENT)
Dept: ANTICOAGULATION | Facility: CLINIC | Age: 53
End: 2023-10-05

## 2023-10-05 DIAGNOSIS — I82.4Y9 DEEP VEIN THROMBOSIS (DVT) OF PROXIMAL LOWER EXTREMITY, UNSPECIFIED CHRONICITY, UNSPECIFIED LATERALITY (H): ICD-10-CM

## 2023-10-05 DIAGNOSIS — Z86.718 H/O DEEP VENOUS THROMBOSIS: ICD-10-CM

## 2023-10-05 DIAGNOSIS — D68.51 HETEROZYGOUS FACTOR V LEIDEN MUTATION (H): ICD-10-CM

## 2023-10-05 DIAGNOSIS — D68.51 HETEROZYGOUS FACTOR V LEIDEN MUTATION (H): Primary | ICD-10-CM

## 2023-10-05 DIAGNOSIS — Z79.01 CURRENT USE OF LONG TERM ANTICOAGULATION: ICD-10-CM

## 2023-10-05 LAB — INR BLD: 1 (ref 0.9–1.1)

## 2023-10-05 PROCEDURE — 85610 PROTHROMBIN TIME: CPT

## 2023-10-05 PROCEDURE — 36416 COLLJ CAPILLARY BLOOD SPEC: CPT

## 2023-10-05 NOTE — PROGRESS NOTES
ANTICOAGULATION MANAGEMENT     Gaurang Rivera 53 year old male is on warfarin with subtherapeutic INR result. (Goal INR 2.0-3.0)    Recent labs: (last 7 days)     10/05/23  1547   INR 1.0       ASSESSMENT     Source(s): Chart Review  Previous INR was Therapeutic last 2(+) visits  Medication, diet, health changes since last INR chart reviewed; none identified    I do not see any documentation about intentional warfarin holds or planned procedures     PLAN     Unable to reach Gaurang today.    Left message to take a booster dose of warfarin,  15 mg tonight. Request call back for assessment.    Follow up required to discuss out of range result     Harshil Salamanca RN  Anticoagulation Clinic  10/5/2023

## 2023-10-06 NOTE — PROGRESS NOTES
ANTICOAGULATION MANAGEMENT     Gaurang Rivera 53 year old male is on warfarin with subtherapeutic INR result. (Goal INR 2.0-3.0)    Recent labs: (last 7 days)     10/05/23  1547   INR 1.0       ASSESSMENT     Source(s): Chart Review     Medication/supplement changes:  Yes   On 9/19/23 Ability and Quetiapine replaced with Latuda titrated to 40mg daily, which could be affecting INR to decrease today.  New illness, injury, or hospitalization: None noted.  Previous result: Therapeutic last 7 INR visits  Additional findings: None       PLAN     Recommended plan for ongoing change(s) affecting INR     Dosing Instructions: Increase your warfarin dose (14.3% change) with next INR in 5-7 days       Summary  As of 10/5/2023      Full warfarin instructions:  10/5: 15 mg; Otherwise 10 mg every Sun, Wed, Fri; 7.5 mg all other days   Next INR check:  10/13/2023               Detailed voice message left for Gaurang with dosing instructions and follow up date.     Lab visit scheduled    Education provided:   Taking warfarin: Importance of taking warfarin as instructed  Goal range and lab monitoring: goal range and significance of current result  Interaction IS anticipated between warfarin and Quetiapine    Plan made with [facility] Pharmacist Ranjana August, RN  Anticoagulation Clinic  10/6/2023    _______________________________________________________________________     Anticoagulation Episode Summary       Current INR goal:  2.0-3.0   TTR:  89.0% (12 mo)   Target end date:  Indefinite   Send INR reminders to:  [facility]    Indications    Atrial fibrillation  unspecified type (H) (Resolved) [I48.91]  Heterozygous factor V Leiden mutation (H24) [D68.51]  H/O deep venous thrombosis [Z86.718]             Comments:  MTV 30 mcg.             Anticoagulation Care Providers       Provider Role Specialty Phone number    Elvis Guzman MD Referring Family Medicine [phone]

## 2023-10-17 DIAGNOSIS — F33.2 SEVERE EPISODE OF RECURRENT MAJOR DEPRESSIVE DISORDER, WITHOUT PSYCHOTIC FEATURES (H): ICD-10-CM

## 2023-10-20 RX ORDER — QUETIAPINE FUMARATE 25 MG/1
TABLET, FILM COATED ORAL
Qty: 60 TABLET | Refills: 0 | Status: SHIPPED | OUTPATIENT
Start: 2023-10-20 | End: 2023-11-22

## 2023-10-23 ENCOUNTER — TELEPHONE (OUTPATIENT)
Dept: ANTICOAGULATION | Facility: CLINIC | Age: 53
End: 2023-10-23
Payer: COMMERCIAL

## 2023-10-23 NOTE — TELEPHONE ENCOUNTER
ANTICOAGULATION     Gaurang Rivera is overdue for an INR check.      Left message for patient to call and schedule lab appointment as soon as possible. If returning call, please schedule.     Harshil Salamanca RN

## 2023-10-24 ENCOUNTER — PATIENT OUTREACH (OUTPATIENT)
Dept: CARE COORDINATION | Facility: CLINIC | Age: 53
End: 2023-10-24
Payer: COMMERCIAL

## 2023-11-01 ENCOUNTER — TELEPHONE (OUTPATIENT)
Dept: ANTICOAGULATION | Facility: CLINIC | Age: 53
End: 2023-11-01
Payer: COMMERCIAL

## 2023-11-01 NOTE — LETTER
[facility] ANTICOAGULATION CLINIC  [address]  [facility] [address]  Phone: [phone]  Fax: [phone]   November 1, 2023        Gaurang Rivera  [address]   [facility] [address]            Dear Gaurang,    You are currently under the care of [facility] for your warfarin (Coumadin , Jantoven ) therapy.  We are contacting you because our records show you were due for an INR on 10/13/23.    There are potentially serious risks when taking warfarin without careful monitoring and we want to make sure you are safely managed.  Routine lab monitoring is required for warfarin refills.     Please call [phone]  as soon as possible to schedule a lab appointment. If it is difficult for you to get to lab, please call us to discuss options. . If there has been a change in your care or other concerns, please let us know so we can help and/or update our records.         Sincerely,       [facility]

## 2023-11-01 NOTE — TELEPHONE ENCOUNTER
ANTICOAGULATION     Gaurang Rivera is overdue for an INR check.      Reminder letter sent    Harshil Salamanca RN

## 2023-11-07 ENCOUNTER — PATIENT OUTREACH (OUTPATIENT)
Dept: CARE COORDINATION | Facility: CLINIC | Age: 53
End: 2023-11-07
Payer: COMMERCIAL

## 2023-11-15 ENCOUNTER — TELEPHONE (OUTPATIENT)
Dept: ANTICOAGULATION | Facility: CLINIC | Age: 53
End: 2023-11-15
Payer: COMMERCIAL

## 2023-11-15 NOTE — LETTER
[facility] ANTICOAGULATION [facility]INIC  [address]  [facility] [address]  Phone: [phone]  Fax: [phone]   November 15, 2023        Gaurang Rivera  [address]   [place] [place] [address]            Dear Gaurang,    You are currently under the care of [facility] for your warfarin (Coumadin , Jantoven ) therapy.  We are contacting you because our records show you were due for an INR on 10/13/23.    There are potentially serious risks when taking warfarin without careful monitoring and we want to make sure you are safely managed.  Routine lab monitoring is required for warfarin refills.     Please call [phone]  as soon as possible to schedule a lab appointment. If it is difficult for you to get to lab, please call us to discuss options.  If there has been a change in your care or other concerns, please let us know so we can help and/or update our records.         Sincerely,       [facility]ticoagulation [facility]

## 2023-11-15 NOTE — TELEPHONE ENCOUNTER
Anticoagulation Clinic Notification    Gaurang, is past due for an INR. Their last result was 1.0 on 10/5/23 and was due to come back on 10/13/23.    he received phone calls and letters over the last several weeks in attempt to arrange follow up labs. Gaurang Rivera will be contacted again today.     Please contact patient directly to discuss compliance with monitoring or schedule visit to review ongoing anticoagulation therapy.    Thank you,     [facility] Anticoagulation Clinic

## 2023-11-22 ENCOUNTER — MYC REFILL (OUTPATIENT)
Dept: FAMILY MEDICINE | Facility: CLINIC | Age: 53
End: 2023-11-22
Payer: COMMERCIAL

## 2023-11-22 ENCOUNTER — VIRTUAL VISIT (OUTPATIENT)
Dept: PSYCHIATRY | Facility: CLINIC | Age: 53
End: 2023-11-22
Payer: COMMERCIAL

## 2023-11-22 ENCOUNTER — VIRTUAL VISIT (OUTPATIENT)
Dept: BEHAVIORAL HEALTH | Facility: CLINIC | Age: 53
End: 2023-11-22
Payer: COMMERCIAL

## 2023-11-22 DIAGNOSIS — F33.1 DEPRESSION, MAJOR, RECURRENT, MODERATE (H): Primary | ICD-10-CM

## 2023-11-22 DIAGNOSIS — F33.2 SEVERE EPISODE OF RECURRENT MAJOR DEPRESSIVE DISORDER, WITHOUT PSYCHOTIC FEATURES (H): ICD-10-CM

## 2023-11-22 PROCEDURE — 99214 OFFICE O/P EST MOD 30 MIN: CPT | Mod: 95 | Performed by: NURSE PRACTITIONER

## 2023-11-22 PROCEDURE — 90832 PSYTX W PT 30 MINUTES: CPT | Mod: 95 | Performed by: SOCIAL WORKER

## 2023-11-22 RX ORDER — VENLAFAXINE HYDROCHLORIDE 75 MG/1
75 CAPSULE, EXTENDED RELEASE ORAL DAILY
Qty: 14 CAPSULE | Refills: 0 | Status: SHIPPED | OUTPATIENT
Start: 2023-11-22 | End: 2024-01-12 | Stop reason: DRUGHIGH

## 2023-11-22 RX ORDER — LURASIDONE HYDROCHLORIDE 80 MG/1
80 TABLET, FILM COATED ORAL
Qty: 30 TABLET | Refills: 1 | Status: SHIPPED | OUTPATIENT
Start: 2023-11-22 | End: 2024-01-15

## 2023-11-22 RX ORDER — QUETIAPINE FUMARATE 25 MG/1
50 TABLET, FILM COATED ORAL
Qty: 60 TABLET | Refills: 1 | Status: SHIPPED | OUTPATIENT
Start: 2023-11-22 | End: 2024-01-19

## 2023-11-22 RX ORDER — BUPROPION HYDROCHLORIDE 300 MG/1
300 TABLET ORAL EVERY MORNING
Qty: 90 TABLET | Refills: 1 | OUTPATIENT
Start: 2023-11-22

## 2023-11-22 ASSESSMENT — PATIENT HEALTH QUESTIONNAIRE - PHQ9
10. IF YOU CHECKED OFF ANY PROBLEMS, HOW DIFFICULT HAVE THESE PROBLEMS MADE IT FOR YOU TO DO YOUR WORK, TAKE CARE OF THINGS AT HOME, OR GET ALONG WITH OTHER PEOPLE: VERY DIFFICULT
SUM OF ALL RESPONSES TO PHQ QUESTIONS 1-9: 19
SUM OF ALL RESPONSES TO PHQ QUESTIONS 1-9: 19

## 2023-11-22 ASSESSMENT — PAIN SCALES - GENERAL: PAINLEVEL: NO PAIN (0)

## 2023-11-22 NOTE — PROGRESS NOTES
Virtual Visit Details    Type of service:  Video Visit   Video Start Time: 2:22 PM  Video End Time: 2:48 pm    Originating Location (pt. Location): Home    Distant Location (provider location):  Off-site  Platform used for Video Visit: NoteVault        PSYCHIATRIC MEDICATION FOLLOW UP APPT     Name:  Gaurang Rivera  : 1970    Referred by: Lary Brizuela PA-C  Patient Care Team:  Elvis Guzman MD as PCP - General (Family Practice)  Elvis Guzman MD as Assigned PCP  Vaishnavi Rock PA as Physician Assistant (Urology)  Angeles Wise MD as MD (Dermatology)  Adair Waite RPH as Pharmacist (Pharmacist)  Lluvia Cárdenas PA-C as Physician Assistant (Gastroenterology)  Adair Waite RPH as Assigned MTM Pharmacist  Angeles Wise MD as Assigned Surgical Provider  Therapist: in therapy currently    Patient attended the phone/video session alone.    Last seen for outpatient psychiatry Consultation on 2023.      FOLLOWING PLAN PUT INTO PLACE:   Depressive symptoms are causing functional impairment in multiple domains.  Abilify has not had any benefit.  Will discontinue the Abilify and quetiapine and start Latuda 40 mg.  Concerned about the high dose of bupropion and Venlafaxine due to the following:   Serious - Use Alternative  venlafaxine + bupropion  venlafaxine increases toxicity of bupropion by unspecified interaction mechanism. Avoid or Use Alternate Drug. May lower seizure threshold; keep bupropion dose as low as possible.     Monitor Closely  bupropion + venlafaxine  bupropion will increase the level or effect of venlafaxine by affecting hepatic enzyme CYP2D6 metabolism. Use Caution/Monitor.     Will reassess these two medications once Latuda is stabilized.  Continue therapy.  If not getting better than consider Partial Hospitalization Program or Intensive Outpnt Program.  Will follow-up 4 weeks   The following was sent to patient via Medrio:   TMS resources:   "[email or web address]://www.[email or web address]/tests-[email or web address]-xwoqxcppjcp/[email or web address]     [facility]  [email or web address]/[email or web address]     [facility]  http://[email or web address]-health-services-jlmubexbazhq-rtthmeds-llufeckxlwj     [facility]  https://www.[employer]/     Dr. Prado  [email or web address]          INTERIM HISTORY     COMMUNICATIONS FROM PATIENT VIA:  none     RECORDS AVAILABLE FOR REVIEW: EHR records through [employer]  and previous psychiatric progress note.  In addition, reviewed the assessment completed by Peyton Mathews [facility], dated today        HISTORY OF PRESENT ILLNESS   [facility]PS referral for psychiatric medication consult in September 2023.   Reports history of longstanding depression.  Reports he first sought treatment when he was in late 20's.  Has been on medications essentially since then.   Denies prior psychiatric hospitalizations. Hx of suicidal ideation, no suicide attempts. No history of self-injurious behaviors. Genetically loaded for  anxiety and substance use. Grew up in an intact home with all basic needs being met.     Thorough assessment of a potential underlying bipolar trajectory on initial assessment as follows:      Mood Disorder Questionnaire (MDQ)  Adapted from the \"Mental Health Queri,\" Quality Enhancement Research Initiative     Has there ever been a period of time when you were not your usual self and     -you felt so good or so hyper that other people thought you were not your normal self or you were so hyper that you got into trouble?   NO  -you were so irritable that you shouted at people or started fights or arguments? NO  -you felt much more self-confident than usual? NO                                                             -you got much less sleep than usual and found that you didn't really miss it?  NO  -you were much more talkative and spoke much faster than usual?  NO  -thoughts raced " through your head or you couldn't slow your mind down?  ENDORSES RUMINATION          -you were so easily distracted by things around you that you had trouble concentrating or staying on track? NO  -you had much more energy than usual? NO  -you were much more active or did many more things than usual?  NO                             -you were much more social or outgoing than usual, for example, you telephoned friends in the middle of the night?  NO                                         -you did things that were unusual for you or other people might have thought were excessive, foolish or risky? NO  -spending money got you or your family in trouble?     NO                                   If you checked YES to more than one of the above, have several of these ever happened during the same period of time?  NO     How much of a problem did any of these cause you - like being unable to work; having family, money, or legal troubles; getting into arguments or fights?  NO     Have any of your blood relatives (i.e. children, siblings, parents, grandparents, aunts, uncles) had manic-depressive illness or bipolar disorder?  NO     Has a health professional ever told you that you have manic- depressive illness or bipolar disorder?  NO     [place]side Bipolarity Index utilized to further assess for an underlying bipolar disorder. This explores presence of hypomania or jermain, age of onset of first mood symptoms, illness course and other features generally only visible over time, response to medications (antidepressants and mood stabilizers), and family history of mood and substance use.   Patient endorses the following:     History of many failed antidepressant trials  Yes      Age of first depression onset: YOUNG ADULT  History of greater than or equal to five total life time depressive episodes: Yes   Depressive episode with concurrent hypomanic symptoms: No   Questionable activation from antidepressant: No   Family history of  mood disorder: No     FAMILY, MEDICAL, SURGICAL HISTORY REVIEWED.  MEDICATION HAVE BEEN REVIEWED AND ARE CURRENT TO THE BEST OF MY KNOWLEDGE AND ABILITY.   rowing    MEDICATIONS                                                                                                Current Outpatient Medications   Medication Sig    ARIPiprazole (ABILIFY) 5 MG tablet TAKE 1 TABLET(5 MG) BY MOUTH DAILY    atorvastatin (LIPITOR) 40 MG tablet Take 1 tablet (40 mg) by mouth daily    buPROPion (WELLBUTRIN XL) 150 MG 24 hr tablet Take 1 tablet (150 mg) by mouth every morning Take with 300mg for total daily dose of 450mg.    buPROPion (WELLBUTRIN XL) 300 MG 24 hr tablet Take 1 tablet (300 mg) by mouth every morning Take with 150mg for total daily dose of 450mg.    cholecalciferol (VITAMIN D3) 125 mcg (5000 units) capsule Take 1 capsule (125 mcg) by mouth daily    Continuous Blood Gluc Sensor (CoreTraceSTYLE JAVI 14 DAY SENSOR) MISC AS DIRECTED AND CHANGE EVERY 14 DAYS,    exenatide ER (BYDUREON BCISE) 2 MG/0.85ML auto-injector INJECT 2MG SUBCUTANEOUS EVERY 7 DAYS    ibuprofen (ADVIL/MOTRIN) 200 MG capsule Take 600-800 mg by mouth every 8 hours as needed for fever    JARDIANCE 10 MG TABS tablet TAKE 1 TABLET(10 MG) BY MOUTH DAILY    lisinopril (ZESTRIL) 5 MG tablet Take 1 tablet (5 mg) by mouth daily    lurasidone (LATUDA) 40 MG TABS tablet 1/2 tab daily for one week then increase to 1 tablet thereafter.    metFORMIN (GLUCOPHAGE) 1000 MG tablet Take 1 tablet (1,000 mg) by mouth 2 times daily (with meals)    multivitamin w/minerals (THERA-VIT-M) tablet Take 1 tablet by mouth daily    omeprazole (PRILOSEC OTC) 20 MG EC tablet Take 20 mg by mouth daily as needed    QUEtiapine (SEROQUEL) 25 MG tablet TAKE 1 TO 2 TABLETS(25 TO 50 MG) BY MOUTH AT BEDTIME    venlafaxine (EFFEXOR XR) 150 MG 24 hr capsule Take 2 capsules (300 mg) by mouth daily    warfarin ANTICOAGULANT (COUMADIN) 5 MG tablet TAKE 1 AND 1/2 TABLETS (7.5 MG) DAILY OR AS  "DIRECTED BY [facility].     No current facility-administered medications for this visit.      NOTES ABOUT CURRENT PSYCHOTROPIC MEDICATIONS:   Latuda 40 mg  Bupropion  mg, years  Venlafaxine  mg many years  Quetiapine 25-50 mg at bedtime, about a month, no change     SUPPLEMENTS: denies   SIDE EFFECTS:   denies    COMPLIANCE:   states Adherent to medication regimen       PAST PSYCHOTROPIC MEDICATIONS:  Fluoxetine years ago  Sertraline   Abilify 5 mg, unclear benefit.  Has been on this over 9 months        TODAY PATIENT REPORTS THE FOLLOWING PSYCHIATRIC ROS:   Per [facility], Peyton Mathews, during today's team-based visit:  \"Pt shares that he isn't doing very well and hasn't felt an improvement in mood symptoms.  Taking the latuda daily and not noticing anything positive or negative.  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  Last few weeks have been mixed a bit with staying up late and sleeping in.  One of his part time rowing jobs was eliminated and will need to find something to supplement that which is adding some increased stress and worry.   Passive suicidal ideations continue and haven't changed.  Denies any planning or intention to act.    Would like to go to the dog park and exercise but doesn't have the interest or energy to do so.    Has chronic back pain that limits him a bit.    Not many good friends though is close with sister and parents.  Is getting together with family tomorrow for Thanksgiving.  Continues to see therapist at [facility] therapy.  Has thought about looking for a new therapist.  [facility] will put in an order for individual therapy and provided pt with link for Psychology today.    Pt has thought about TMS.  Pt hasn't made any calls or looked into options but shares that he would like to do so.\"     EXERCISE: Adequate and Minimal exercise: due to mental health symptoms   SIDE EFFECTS:   tolerating medications without reported side effects  COMPLIANCE: "   states Adherent to medication regimen  REPORTS THE FOLLOWING NEW MEDICAL ISSUES:   none    PROBLEM: DEPRESSION: No change. Endorses anhedonia, feeling down or depressed, sleep impairment, anergia, appetite changes, low self-esteem, trouble concentrating, psychomotor reslessness or slowing, hopelessness, helplessness, worthlessness, ruminations, irritability.        11/22/2023     1:53 PM   Last PHQ-9   1.  Little interest or pleasure in doing things 3   2.  Feeling down, depressed, or hopeless 3   3.  Trouble falling or staying asleep, or sleeping too much 3   4.  Feeling tired or having little energy 3   5.  Poor appetite or overeating 2   6.  Feeling bad about yourself 3   7.  Trouble concentrating 0   8.  Moving slowly or restless 0   Q9: Thoughts of better off dead/self-harm past 2 weeks 2   PHQ-9 Total Score 19   In the past two weeks have you had thoughts of suicide or self harm? Yes   Do you have concerns about your personal safety or the safety of others? No   In the past 2 weeks have you thought about a plan or had intention to harm yourself? No   In the past 2 weeks have you acted on these thoughts in any way? No         8/3/2023    12:38 PM 9/18/2023     3:07 PM 11/22/2023     1:53 PM   PHQ-9 SCORE   PHQ-9 Total Score MyChart 19 (Moderately severe depression) 20 (Severe depression) 19 (Moderately severe depression)   PHQ-9 Total Score 19 20    20 19     PHQ9 score is 19 indicating moderately severe depression.  Suicidal ideation:  No     PROBLEM: ANXIETY: No change.    GAD7 score is Not completed today      1/8/2020    10:55 AM 3/30/2021     2:18 PM 8/3/2023     1:00 PM   SOL-7 SCORE   Total Score 1 (minimal anxiety)  5 (mild anxiety)   Total Score 1 0 5       PERTINENT PAST MEDICAL AND SURGICAL HISTORY     Past Medical History:   Diagnosis Date    Antiplatelet or antithrombotic long-term use     Closed displaced fracture of neck of right scapula, sequela 10/21/2019    Added automatically from request  "for surgery [id number]    Depressive disorder 2001    Diabetes (H)     DVT (deep venous thrombosis) (H) 07/05/2019    Elevated blood pressure reading without diagnosis of hypertension 05/13/2018    Hypercholesteremia 2012    Personal history of other medical treatment     blood clot foot july 2019    Pilonidal cyst 05/13/2018       VITALS     BP Readings from Last 1 Encounters:   08/28/23 110/80     Pulse Readings from Last 1 Encounters:   08/28/23 79     Wt Readings from Last 1 Encounters:   08/28/23 131.5 kg (289 lb 14.4 oz)     Ht Readings from Last 1 Encounters:   11/23/22 1.981 m (6' 6\")     Estimated body mass index is 33.5 kg/m  as calculated from the following:    Height as of 11/23/22: 1.981 m (6' 6\").    Weight as of 8/28/23: 131.5 kg (289 lb 14.4 oz).    LABS & IMAGING                                                                                                                   Recent Labs   Lab Test 04/11/22  1314 06/03/20  0706 05/28/20  1135   WBC 7.2   < > 6.8   HGB 17.1   < > 16.1   HCT 51.4   < > 47.8   MCV 88   < > 87      < > 192   ANEU  --   --  3.3    < > = values in this interval not displayed.     Recent Labs   Lab Test 05/01/23  0939 11/23/22  0946 05/03/22  0942 06/03/20  0706 06/02/20  1417      < > 139   < > 138   POTASSIUM 4.0   < > 3.7   < > 4.3   CHLORIDE 102   < > 107   < > 105   CO2 21*   < > 24   < > 23   *   < > 109*   < > 184*   SAVI 9.9   < > 9.1   < > 8.7   MAG  --   --   --   --  2.0   BUN 15.0   < > 15   < > 17   CR 1.16   < > 0.99   < > 0.88   GFRESTIMATED 75   < > >90   < > >90   ALBUMIN  --   --  4.1   < >  --    PROTTOTAL  --   --  7.6   < >  --    AST  --   --  37   < >  --    ALT  --   --  63   < >  --    ALKPHOS  --   --  72   < >  --    BILITOTAL  --   --  0.8   < >  --     < > = values in this interval not displayed.     Recent Labs   Lab Test 08/28/23  0917 05/01/23  0939   CHOL  --  203*   LDL  --  93   HDL  --  41   TRIG  --  347*   A1C 8.8* 6.6* " "    Recent Labs   Lab Test 23  0939   TSH 1.73     No results found for: \"OXV767\", \"WZVO543\", \"EGXG28BFCSS\", \"VITD3\", \"D2VIT\", \"D3VIT\", \"DTOT\", \"IL23251276\", \"[id number]\", \"[id number]\", \"[id number]\", \"[id number]\", \"[id number]\"     ALLERGY & IMMUNIZATIONS     No Known Allergies    MEDICAL REVIEW OF SYSTEMS:   Ten system review was completed with pertinent positives noted     MENTAL STATUS EXAM:      General/Constitutional:  Appearance:   awake, alert, adequately groomed, appeared stated age and no apparent distress  Attitude:    cooperative   Eye Contact:  good  Musculoskeletal:  Psychomotor Behavior:  no evidence of tardive dyskinesia, dystonia, or tics from the head up  Psychiatric:  Speech:  clear, coherent, regular rate, rhythm, and volume,   No pressure speech noted.  Associations:  no loose associations  Thought Process:   logical, linear and goal oriented  Thought Content:  chronic suicidal idation, longstanding, no intent or plan.  No homicidal ideation, no evidence of psychotic thought, no auditory hallucinations present and no visual hallucinations present  Mood:  depressed  Affect:  full range/stable (normal variation of emotions during exam) and was congruent to speech content.  Insight:  good  Judgment:  intact, adequate for safety  Impulse Control:  intact  Neurological:  Oriented to:  person, place, time, and situation  Attention Span and Concentration:  Able to attend to the interview        Language: intact       Recent and Remote Memory:  Intact to interview. Not formally assessed. No amnesia.    Fund of Knowledge: appropriate        SAFETY   Feels safe in home: YES     Suicidal ideation: chronic, no intent or plan  History of suicide attempts:  No   Hx of impulsivity: No   Hope for the future: present    Hx of Command hallucinations or current psychosis: None endorsed    History of Self-injurious behaviors: denies    Family member  by suicide:  not discussed       SAFETY ASSESSMENT: "   Based on all available evidence including the factors cited above, overall Risk for harm is low and is appropriate for outpatient level of care.   Recommended that patient call 911 or go to the local ED should there be a change in any of these risk factors.     DSM 5 DIAGNOSIS:   296.32 (F33.1) Major Depressive Disorder, Recurrent Episode, Moderate _       MEDICAL COMORBIDITY IMPACTING CLINICAL PICTURE: None noted.      ASSESSMENT AND PLAN      Problem List Items Addressed This Visit          Behavioral     Tolerating Latuda at 40 mg with unclear benefit.  No side effects. Will increase to 80 mg  Slow taper down on the Venlafaxine XR from 300 mg to 150 mg over two weeks   Continue bupropion at current dose along with the quetiapine.  Eventually would like to find another agent for sleep as currently on two Second Generation Antipsychotic   Follow-up 6-8 weeks    Continue to purse and research TMS         MDD (major depressive disorder), recurrent episode, severe (H)    Relevant Medications    venlafaxine (EFFEXOR XR) 75 MG 24 hr capsule    lurasidone (LATUDA) 80 MG TABS tablet    QUEtiapine (SEROQUEL) 25 MG tablet           CONSULTS/REFERRALS:   Continue therapy   Coordinate care with therapist as needed       MEDICAL:   None at this time  Coordinate care with PCP (Elvis Guzman) as needed  Follow up with primary care provider as planned or for acute medical concerns.     PSYCHOEDUCATION:  Medication side effects and alternatives reviewed. Health promotion activities recommended and reviewed today. All questions addressed. Education and counseling completed regarding risks and benefits of medications and psychotherapy options.  Consent provided by patient/guardian  Call the psychiatric nurse line with medication questions or concerns at [phone].  Outbox Systemshart may be used to communicate with your provider, but this is not intended to be used for emergencies.  EidoSearch.gov is information for patients.  It is  run by the [employer] and it contains information about all disorders, diseases and all medications.       COMMUNITY RESOURCES:    CRISIS NUMBERS: Provided in AVS 9/19/2023  National Suicide Prevention Lifeline: 5-238-717-TALK (214-248-7726)  [employer]/resources for a list of additional resources (SOS)            [facility] - [phone]   Urgent Care Adult Mental Dcvkmo-[phone] mobile unit/ 24/7 crisis line  [facility] -[phone]   COPE 24/7 [place] Mobile Team -[phone] (adults)/ [phone] (child)  Poison Control Center - [phone]    OR  go to nearest ER  Crisis Text Line for any crisis 24/7 send this-   To: 905769   [facility]) [facility]  [phone]  National Suicide Prevention Lifeline: 430.395.7924 (TTY: 367.100.8115). Call anytime for help.  (www.suicidepreventionlifeline.org)  National [place] [employer] Mental Illness (www.dianne.org): 787-708-5745 or 908-922-0770.   Mental Health Association (www.mentalhealth.org): 563.247.5751 or 739-931-2530.  [place] Crisis Text Line: Text [place] to 300617  Suicide LifeLine Chat: suicideRetail Info.org/chat    ADMINISTRATIVE BILLING:   Level of Medical Decision Making:   - At least 1 chronic problem that is not stable  - Engaged in prescription drug management during visit (discussed any medication benefits, side effects, alternatives, etc.)       Patient Status:  Patient will continue to be seen for ongoing consultation and stabilization.    Signed:   Meagan Hernandez, MSN, APRN, FMHNP-[facility] ([facility])   Chart documentation done in part with Dragon Voice Recognition software.  Although reviewed after completion, some word and grammatical errors may remain.

## 2023-11-22 NOTE — NURSING NOTE
Is the patient currently in the state of [place]? YES    Visit mode:VIDEO    If the visit is dropped, the patient can be reconnected by: VIDEO VISIT: Text to cell phone:   Telephone Information:   Mobile [phone]       Will anyone else be joining the visit? NO  (If patient encounters technical issues they should call [phone])    How would you like to obtain your AVS? MyChart    Are changes needed to the allergy or medication list? Pt stated no changes to allergies and Pt stated no med changes    Reason for visit: DESMOND MURCIA

## 2023-11-22 NOTE — PROGRESS NOTES
[facility] Psychiatry Services [facility]  2023      Behavioral Health Clinician Progress Note    Patient Name: Gaurang Rivera           Service Type:  Individual      Service Location:   MyChart / Email (patient reached)     Session Start Time: 2:00p  Session End Time: 2:18p      Session Length: 16 - 37      Attendees: Client     Service Modality:  Video Visit:      Provider verified identity through the following two step process.  Patient provided:  Patient photo and Patient     Telemedicine Visit: The patient's condition can be safely assessed and treated via synchronous audio and visual telemedicine encounter.      Reason for Telemedicine Visit: Services only offered telehealth    Originating Site (Patient Location): Patient's home    Distant Site (Provider Location): Provider Remote Setting- Home Office    Consent:  The patient/guardian has verbally consented to: the potential risks and benefits of telemedicine (video visit) versus in person care; bill my insurance or make self-payment for services provided; and responsibility for payment of non-covered services.     Patient would like the video invitation sent by:  My Chart    Mode of Communication:  Video Conference via [facility]    Distant Location (Provider):  On-site    As the provider I attest to compliance with applicable laws and regulations related to telemedicine.    Visit Activities (Refresh list every visit): [facility] Only    Diagnostic Assessment Date: 23- QUIQUE Russo  Treatment Plan Review Date: 24  See Flowsheets for today's PHQ-9 and SOL-7 results  Previous PHQ-9:       2023     9:55 PM 8/3/2023    12:38 PM 2023     3:07 PM   PHQ-9 SCORE   PHQ-9 Total Score Yajairat 19 (Moderately severe depression) 19 (Moderately severe depression) 20 (Severe depression)   PHQ-9 Total Score 19 19 20    20     Previous SOL-7:       2020    10:55 AM 3/30/2021     2:18 PM 8/3/2023     1:00 PM    SOL-7 SCORE   Total Score 1 (minimal anxiety)  5 (mild anxiety)   Total Score 1 0 5       LEONEL LEVEL:       No data to display                DATA  Extended Session (60+ minutes): No  Interactive Complexity: No  Crisis: No  [facility] Patient: No    Treatment Objective(s) Addressed in This Session:  Target Behavior(s): disease management/lifestyle changes depression symptoms    Depressed Mood: Increase interest, engagement, and pleasure in doing things  Decrease frequency and intensity of feeling down, depressed, hopeless  Improve quantity and quality of night time sleep / decrease daytime naps  Feel less tired and more energy during the day   Identify negative self-talk and behaviors: challenge core beliefs, myths, and actions  Decrease thoughts that you'd be better off dead or of suicide / self-harm    Current Stressors / Issues:  Pt shares that he isn't doing very well and hasn't felt an improvement in mood symptoms.  Taking the latuda daily and not noticing anything positive or negative.  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  Last few weeks have been mixed a bit with staying up late and sleeping in.  One of his part time rowing jobs was eliminated and will need to find something to supplement that which is adding some increased stress and worry.   Passive suicidal ideations continue and haven't changed.  Denies any planning or intention to act.    Would like to go to the dog park and exercise but doesn't have the interest or energy to do so.    Has chronic back pain that limits him a bit.    Not many good friends though is close with sister and parents.  Is getting together with family tomorrow for Thanksgiving.  Continues to see therapist at [facility][1] therapy.  Has thought about looking for a new therapist.  [facility] will put in an order for individual therapy and provided pt with link for Psychology today.    Pt has thought about TMS.  Pt hasn't made any calls or looked into options  but shares that he would like to do so.      Progress on Treatment Objective(s) / Homework:  No improvement - PREPARATION (Decided to change - considering how); Intervened by negotiating a change plan and determining options / strategies for behavior change, identifying triggers, exploring social supports, and working towards setting a date to begin behavior change    Motivational Interviewing    MI Intervention: Expressed Empathy/Understanding, Supported Autonomy, Collaboration, Evocation, Permission to raise concern or advise, Open-ended questions, and Reflections: simple and complex     Change Talk Expressed by the Patient: Desire to change    Provider Response to Change Talk: E - Evoked more info from patient about behavior change, A - Affirmed patient's thoughts, decisions, or attempts at behavior change, R - Reflected patient's change talk, and S - Summarized patient's change talk statements    Also provided psychoeducation about behavioral health condition, symptoms, and treatment options    Cognitive-Behavioral Therapy and Acceptance and Commitment therapy.    Assessments completed prior to visit:  The following assessments were completed by patient for this visit:  PHQ9:       9/6/2022     9:22 AM 10/4/2022     9:33 AM 11/21/2022     6:59 AM 6/8/2023     9:55 PM 8/3/2023    12:38 PM 9/18/2023     3:07 PM 11/22/2023     1:53 PM   PHQ-9 SCORE   PHQ-9 Total Score MyChart   19 (Moderately severe depression) 19 (Moderately severe depression) 19 (Moderately severe depression) 20 (Severe depression) 19 (Moderately severe depression)   PHQ-9 Total Score 19 18 19 19 19 20    20 19     GAD7:       3/19/2019    10:07 AM 6/28/2019     7:30 AM 7/16/2019    10:42 AM 10/10/2019     8:39 AM 1/8/2020    10:55 AM 3/30/2021     2:18 PM 8/3/2023     1:00 PM   SOL-7 SCORE   Total Score 4 (minimal anxiety)  1 (minimal anxiety) 1 (minimal anxiety) 1 (minimal anxiety)  5 (mild anxiety)   Total Score 4 1 1 1 1 0 5     CAGE-AID:        9/18/2023     3:18 PM   CAGE-AID Total Score   Total Score 0    0   Total Score MyChart 0 (A total score of 2 or greater is considered clinically significant)     PROMIS 10-Global Health (only subscores and total score):       9/18/2023     3:17 PM   PROMIS-10 Scores Only   Global Mental Health Score 5    5   Global Physical Health Score 9    9   PROMIS TOTAL - SUBSCORES 14    14     [place] Suicide Severity Rating Scale (Lifetime/Recent)      9/19/2023     3:58 PM   [place] Suicide Severity Rating (Lifetime/Recent)   Q1 Wish to be Dead (Lifetime) Y   Wish to be Dead Description (Lifetime) Pt reports chronic passive thoughts of death   1. Wish to be Dead (Past 1 Month) Y   Wish to be Dead Description (Past 1 Month) Pt reports chronic passive thoughts of death. Denies active SI, intent, plan or safety concerns   Q2 Non-Specific Active Suicidal Thoughts (Lifetime) N   Most Severe Ideation Rating (Lifetime) 2   Most Severe Ideation Rating (Past 1 Month) 2   Frequency (Lifetime) 2   Frequency (Past 1 Month) 2   Duration (Lifetime) 1   Duration (Past 1 Month) 1   Controllability (Lifetime) 1   Controllability (Past 1 Month) 1   Deterrents (Lifetime) 1   Deterrents (Past 1 Month) 1   Reasons for Ideation (Lifetime) 4   Reasons for Ideation (Past 1 Month) 4   Actual Attempt (Lifetime) N   Has subject engaged in non-suicidal self-injurious behavior? (Lifetime) N   Interrupted Attempts (Lifetime) N   Aborted or Self-Interrupted Attempt (Lifetime) N   Preparatory Acts or Behavior (Lifetime) N   Calculated C-SSRS Risk Score (Lifetime/Recent) Low Risk       Care Plan review completed: Yes    Medication Review:  Changes to psychiatric medications, see updated Medication List in EPIC. - changes in 9/23    Medication Compliance:  Yes    Changes in Health Issues:   None reported- chronic back pain    Chemical Use Review:   Substance Use: Chemical use reviewed, no active concerns identified      Tobacco Use: No current  tobacco use.      Assessment: Current Emotional / Mental Status (status of significant symptoms):  Risk status (Self / Other harm or suicidal ideation)  Patient has had a history of suicidal ideation: passive SI, denies any planning or intent.  Patient denies current fears or concerns for personal safety.  Patient reports the following current or recent suicidal ideation or behaviors: passive SI.  Patient denies current or recent homicidal ideation or behaviors.  Patient denies current or recent self injurious behavior or ideation.  Patient denies other safety concerns.  A safety and risk management plan has not been developed at this time, however patient was encouraged to call [place] [phone] should there be a change in any of these risk factors.    Appearance:   Appropriate   Eye Contact:   Good   Psychomotor Behavior: Normal   Attitude:   Cooperative   Orientation:   All  Speech   Rate / Production: Normal/ Responsive Normal    Volume:  Normal   Mood:    Depressed   Affect:    Flat   Thought Content:  Clear   Thought Form:  Coherent  Logical   Insight:    Good     Diagnoses:  1. Depression, major, recurrent, moderate (H)        Collateral Reports Completed:  Communicated with: CCPS provider    Plan: (Homework, other):  Patient was given information about behavioral services and encouraged to schedule a follow up appointment with the clinic [facility] as needed.  He was also given information about mental health symptoms and treatment options .  Pt has a therapist he is seeing.  Is interested in looking into alternative therapy options. CD Recommendations: No indications of CD issues.       BRYANNA Portillo    ______________________________________________________________________    Integrated Primary Care Behavioral Health Treatment Plan    Patient's Name: Gaurang Rivera  YOB: 1970    Date of Creation: 11/22/23  Date Treatment Plan Last Reviewed/Revised: 11/22/23    DSM5 Diagnoses: 296.32  (F33.1) Major Depressive Disorder, Recurrent Episode, Moderate _ and With melancholic features  Psychosocial / Contextual Factors: work on hold due to season, lost 1 of 2 part time jobs  PROMIS (reviewed every 90 days):   PROMIS-10 Scores        9/18/2023     3:17 PM   PROMIS-10 Total Score w/o Sub Scores   PROMIS TOTAL - SUBSCORES 14    14         Referral / Collaboration:  The following referral(s) will be initiated: referral for new long term therapist .    Anticipated number of session for this episode of care: 3-6  Anticipation frequency of session: Every 1-2 months  Anticipated Duration of each session: 16-37 minutes  Treatment plan will be reviewed in 90 days or when goals have been changed.       MeasurableTreatment Goal(s) related to diagnosis / functional impairment(s)  Goal 1: Patient will experience a reduction in depressive symptoms along with a corresponding increase in positive emotions and life satisfaction.    I will know I've met my goal when depressive symptoms lessen and have more interest and energy to do things enjoy doing.      Objective #A (Patient Action)    Patient will Increase interest, engagement, and pleasure in doing things  Feel less tired and more energy during the day .  Status: New - Date: 11/22/23      Intervention(s)  Therapist will utilize CBT and ACT topics aimed to help reduce depression symptoms.        Patient has reviewed and agreed to the above plan.      Bhavya Mejía, BRYANNA, LICSW, [facility]  November 22, 2023

## 2023-11-22 NOTE — ASSESSMENT & PLAN NOTE
Tolerating Latuda at 40 mg with unclear benefit.  No side effects. Will increase to 80 mg  Slow taper down on the Venlafaxine XR from 300 mg to 150 mg over two weeks   Continue bupropion at current dose along with the quetiapine.  Eventually would like to find another agent for sleep as currently on two Second Generation Antipsychotic   Follow-up 6-8 weeks    Continue to purse and research TMS

## 2023-11-29 RX ORDER — QUETIAPINE FUMARATE 25 MG/1
TABLET, FILM COATED ORAL
Qty: 60 TABLET | Refills: 0 | OUTPATIENT
Start: 2023-11-29

## 2023-11-29 NOTE — PATIENT INSTRUCTIONS
**For crisis resources, please see the information at the end of this document**     Thank you for coming to the [facility] MENTAL HEALTH & ADDICTION [place] [facility].    TREATMENT PLAN:    MEDICATIONS:   - increase Latuda to 80 mg  Slow taper down on the Venlafaxine XR from 300 mg to 150 mg over two weeks   Continue bupropion at current dose along with the quetiapine.  Eventually would like to find another agent for sleep as currently on two Second Generation Antipsychotic     -PSYCHOEDUCATION: Risks of antipsychotic medications:  metabolic disorder and movement disorder, and the need for blood monitoring of sugar and lipids/cholesterol while taking the medication.       CONSULTS/REFERRALS:   Continue therapy     LABS/PROCEDURES:    Please call your [place] clinic and ask for a lab only appointment at your earliest convenience.  If your results are reassuring or normal they will be mailed to you or sent through ServiceNow within 7 days. If the lab tests need quick action we will call you with the results. The phone number we will call with results is # [phone].      FOLLOW UP: Schedule an appointment with me in six weeks  or sooner as needed.  The intake team should be calling you to schedule.  If you dont hear from them, or they were unable to reach you, please call [phone] to schedule.  Follow up with primary care provider as planned or for acute medical concerns.  Call the psychiatric nurse line with medication questions or concerns at [phone].      Medication Refills:  If you need any refills please call your pharmacy and they will contact us. Our fax number for refills is [phone]. Please allow three business for refill processing. If you need to  your refill at a new pharmacy, please contact the new pharmacy directly. The new pharmacy will help you get your medications transferred.     ServiceNow Assistance [phone]  Financial Assistance [phone]  [facility] Billing  [phone]  Central Billing Office, [facility]: [phone]  [facility] Billing [phone]  Medical Records [phone]  [facility] Patient Bill of Rights [email or web address]       MENTAL HEALTH CRISIS RESOURCES:  For a emergency help, please call 911 or go to the nearest Emergency Department.     Emergency Walk-In Options:   EmPATH Unit @ [place] ([place]): [phone] - Specialized mental health emergency area designed to be calming  [facility] ([place]): [phone]  [facility] ([place]): [phone]  [facility] ([place]): [phone]    National Crisis Information: Call 988 Suicide and Crisis Lifeline  Crisis Text Line (free 24/7):  call **CRISIS (**419467) Crisis or use the texting option by texting 357208.   National Suicide Prevention Lifeline: Call 988  Poison Control Center: 4-023-513-7552  Trans Lifeline: 0-175-649-5870 - Hotline for transgender people of all ages  The [place]ol[employer] Project: 9-387-820-4302 - Hotline for LGBT youth   List of all [facility] resources:   https://[place].gov/dhs/people-we-serve/adults/health-care/mental-health/resources/crisis-contacts.jsp    For Non-Emergency Support:   Fast Tracker: Mental Health & Substance Use Disorder Resources -   https://www.fasttrackermn.org/       Again thank you for choosing [facility] MENTAL HEALTH & ADDICTION [place] [facility] and please let us know how we can best partner with you to improve you and your family's health.    You may be receiving a survey regarding this appointment. We would love to have your feedback, both positive and negative. The survey is done by an external company, so your answers are anonymous.  Patient Education   Collaborative Care Psychiatry Service  What to Expect  Here's what to expect from your Collaborative Care Psychiatry Service (CCPS).   About CCPS  CCPS means 2 people work together to  "help you get better. You'll meet with a behavioral health clinician and a psychiatric doctor. A behavioral health clinician helps people with mental health problems by talking with them. A psychiatric doctor helps people by giving them medicine.  How it works  At every visit, you'll see the behavioral health clinician (BHC) first. They'll talk with you about how you're doing and teach you how to feel better.   Then you'll see the psychiatric doctor. This doctor can help you deal with troubling thoughts and feelings by giving you medicine. They'll make sure you know the plan for your care.   CCPS usually takes 3 to 6 visits. If you need more visits, we may have you start seeing a different psychiatric doctor for ongoing care.  If you have any questions or concerns, we'll be glad to talk with you.  About visits  Be open  At your visits, please talk openly about your problems. It may feel hard, but it's the best way for us to help you.  Cancelling visits  If you can't come to your visit, please call us right away at [phone]. If you don't cancel at least 24 hours (1 full day) before your visit, that's \"late cancellation.\"  Being late to visits  Being very late is the same as not showing up. You will be a \"no show\" if:  Your appointment starts with a BHC, and you're more than 15 minutes late for a 30-minute (half hour) visit. This will also cancel your appointment with the psychiatric doctor.  Your appointment is with a psychiatric doctor only, and you're more than 15 minutes late for a 30-minute (half hour) visit.  Your appointment is with a psychiatric doctor only, and you're more than 30 minutes late for a 60-minute (full hour) visit.  If you cancel late or don't show up 2 times within 6 months, we may end your care.   Getting help between visits  If you need help between visits, you can call us Monday to Friday from 8 a.m. to 4:30 p.m. at [phone].  Emergency care  Call 911 or go to the nearest emergency " department if your life or someone else's life is in danger.  Call 988 anytime to reach the national Suicide and Crisis hotline.  Medicine refills  To refill your medicine, call your pharmacy. You can also call [facility]'s Behavioral Access at [phone], Monday to Friday, 8 a.m. to 4:30 p.m. It can take 1 to 3 business days to get a refill.   Forms, letters, and tests  You may have papers to fill out, like FMLA, short-term disability, and workability. We can help you with these forms at your visits, but you must have an appointment. You may need more than 1 visit for this, to be in an intensive therapy program, or both.  Before we can give you medicine for ADHD, we may refer you to get tested for it or confirm it another way.  We may not be able to give you an emotional support animal letter.  We don't do mental health checks ordered by the court.   We don't do mental health testing, but we can refer you to get tested.   Thank you for choosing us for your care.  For informational purposes only. Not to replace the advice of your health care provider. Copyright   2022 [facility]. All rights reserved. [place]ef[place] [id number] - 12/22.

## 2023-12-01 ENCOUNTER — ANTICOAGULATION THERAPY VISIT (OUTPATIENT)
Dept: ANTICOAGULATION | Facility: CLINIC | Age: 53
End: 2023-12-01

## 2023-12-01 ENCOUNTER — LAB (OUTPATIENT)
Dept: LAB | Facility: CLINIC | Age: 53
End: 2023-12-01
Payer: COMMERCIAL

## 2023-12-01 DIAGNOSIS — Z86.718 H/O DEEP VENOUS THROMBOSIS: ICD-10-CM

## 2023-12-01 DIAGNOSIS — D68.51 HETEROZYGOUS FACTOR V LEIDEN MUTATION (H): Primary | ICD-10-CM

## 2023-12-01 DIAGNOSIS — D68.51 HETEROZYGOUS FACTOR V LEIDEN MUTATION (H): ICD-10-CM

## 2023-12-01 DIAGNOSIS — I82.4Y9 DEEP VEIN THROMBOSIS (DVT) OF PROXIMAL LOWER EXTREMITY, UNSPECIFIED CHRONICITY, UNSPECIFIED LATERALITY (H): ICD-10-CM

## 2023-12-01 DIAGNOSIS — Z79.01 CURRENT USE OF LONG TERM ANTICOAGULATION: ICD-10-CM

## 2023-12-01 LAB — INR BLD: 1.8 (ref 0.9–1.1)

## 2023-12-01 PROCEDURE — 36415 COLL VENOUS BLD VENIPUNCTURE: CPT

## 2023-12-01 PROCEDURE — 85610 PROTHROMBIN TIME: CPT

## 2023-12-01 NOTE — PROGRESS NOTES
ANTICOAGULATION MANAGEMENT     Gaurang Rivera 53 year old male is on warfarin with subtherapeutic INR result. (Goal INR 2.0-3.0)    Recent labs: (last 7 days)     12/01/23  1526   INR 1.8*       ASSESSMENT     Source(s): Chart Review and Patient/Caregiver Call     Warfarin doses taken: Less warfarin taken than planned which may be affecting INR - patient misunderstood previous dosing plan and resumed 52.5 mg MD  Diet: No new diet changes identified  Medication/supplement changes:  Latuda started on 9/19/23 No interaction anticipated - dose increased to 80 mg daily since 11/24/23  New illness, injury, or hospitalization: Minor congestion/stuffy nose reported today  Signs or symptoms of bleeding or clotting: No  Previous result: Subtherapeutic  Additional findings: Prior to last INR patient had ran out of rx for 5-7 days, which related to low level of 1.0       PLAN     Recommended plan for temporary change(s) and ongoing change(s) affecting INR     Dosing Instructions: Increase your warfarin dose (5% change) with next INR in 2 weeks       Summary  As of 12/1/2023      Full warfarin instructions:  10 mg every Fri; 7.5 mg all other days   Next INR check:  12/15/2023               Telephone call with Gaurang who verbalizes understanding and agrees to plan    Lab visit scheduled    Education provided:   Please call back if any changes to your diet, medications or how you've been taking warfarin  Symptom monitoring: monitoring for bleeding signs and symptoms and monitoring for clotting signs and symptoms    Plan made per [facility] anticoagulation protocol    Cyn Womack RN  Anticoagulation Clinic  12/1/2023    _______________________________________________________________________     Anticoagulation Episode Summary       Current INR goal:  2.0-3.0   TTR:  78.9% (1 y)   Target end date:  Indefinite   Send INR reminders to:  [facility]    Indications    Atrial fibrillation  unspecified type (H) (Resolved)  [I48.91]  Heterozygous factor V Leiden mutation (H24) [D68.51]  H/O deep venous thrombosis [Z86.718]             Comments:  MTV 30 mcg.             Anticoagulation Care Providers       Provider Role Specialty Phone number    Elvis Guzman MD Referring Family Medicine [phone]

## 2023-12-14 ENCOUNTER — ANTICOAGULATION THERAPY VISIT (OUTPATIENT)
Dept: ANTICOAGULATION | Facility: CLINIC | Age: 53
End: 2023-12-14

## 2023-12-14 ENCOUNTER — LAB (OUTPATIENT)
Dept: LAB | Facility: CLINIC | Age: 53
End: 2023-12-14
Payer: COMMERCIAL

## 2023-12-14 DIAGNOSIS — Z86.718 H/O DEEP VENOUS THROMBOSIS: ICD-10-CM

## 2023-12-14 DIAGNOSIS — D68.51 HETEROZYGOUS FACTOR V LEIDEN MUTATION (H): Primary | ICD-10-CM

## 2023-12-14 DIAGNOSIS — I82.4Y9 DEEP VEIN THROMBOSIS (DVT) OF PROXIMAL LOWER EXTREMITY, UNSPECIFIED CHRONICITY, UNSPECIFIED LATERALITY (H): ICD-10-CM

## 2023-12-14 DIAGNOSIS — D68.51 HETEROZYGOUS FACTOR V LEIDEN MUTATION (H): ICD-10-CM

## 2023-12-14 DIAGNOSIS — Z79.01 CURRENT USE OF LONG TERM ANTICOAGULATION: ICD-10-CM

## 2023-12-14 LAB — INR BLD: 2.8 (ref 0.9–1.1)

## 2023-12-14 PROCEDURE — 36416 COLLJ CAPILLARY BLOOD SPEC: CPT

## 2023-12-14 PROCEDURE — 85610 PROTHROMBIN TIME: CPT

## 2023-12-14 NOTE — PROGRESS NOTES
ANTICOAGULATION MANAGEMENT     Gaurang Rivera 53 year old male is on warfarin with therapeutic INR result. (Goal INR 2.0-3.0)    Recent labs: (last 7 days)     12/14/23  1521   INR 2.8*       ASSESSMENT     Source(s): Chart Review and Patient/Caregiver Call     Warfarin doses taken: Warfarin taken as instructed  Diet: No new diet changes identified  Medication/supplement changes: None noted  New illness, injury, or hospitalization: No  Signs or symptoms of bleeding or clotting: No  Previous result: Subtherapeutic  Additional findings: None       PLAN     Recommended plan for no diet, medication or health factor changes affecting INR     Dosing Instructions: Continue your current warfarin dose with next INR in 3 weeks       Summary  As of 12/14/2023      Full warfarin instructions:  10 mg every Fri; 7.5 mg all other days   Next INR check:  1/4/2024               Telephone call with Gaurang who verbalizes understanding and agrees to plan    Lab visit scheduled    Education provided:   Please call back if any changes to your diet, medications or how you've been taking warfarin  Symptom monitoring: monitoring for bleeding signs and symptoms and monitoring for clotting signs and symptoms    Plan made per [facility] anticoagulation protocol    Cyn Womack RN  Anticoagulation Clinic  12/14/2023    _______________________________________________________________________     Anticoagulation Episode Summary       Current INR goal:  2.0-3.0   TTR:  78.2% (1 y)   Target end date:  Indefinite   Send INR reminders to:  [facility]    Indications    Atrial fibrillation  unspecified type (H) (Resolved) [I48.91]  Heterozygous factor V Leiden mutation (H24) [D68.51]  H/O deep venous thrombosis [Z86.718]             Comments:  MTV 30 mcg.             Anticoagulation Care Providers       Provider Role Specialty Phone number    Elvis Guzman MD Referring Family Medicine [phone]

## 2024-01-10 NOTE — TELEPHONE ENCOUNTER
"PAIGE received faxed refill request for venlafaxine (EFFEXOR XR) 75 MG 24 hr capsule     Per chart review patient is taking 150 mg of this medication and there is a rx for them     \"Slow taper down on the Venlafaxine XR from 300 mg to 150 mg over two weeks \"      Velia Cash CMA January 10, 2024   "

## 2024-01-11 ASSESSMENT — PATIENT HEALTH QUESTIONNAIRE - PHQ9
10. IF YOU CHECKED OFF ANY PROBLEMS, HOW DIFFICULT HAVE THESE PROBLEMS MADE IT FOR YOU TO DO YOUR WORK, TAKE CARE OF THINGS AT HOME, OR GET ALONG WITH OTHER PEOPLE: VERY DIFFICULT
SUM OF ALL RESPONSES TO PHQ QUESTIONS 1-9: 17
SUM OF ALL RESPONSES TO PHQ QUESTIONS 1-9: 17

## 2024-01-11 NOTE — PROGRESS NOTES
[facility] Psychiatry Services [facility]  2024      Behavioral Health Clinician Progress Note    Patient Name: Gaurang Rivera           Service Type:  Individual      Service Location:   [place] / Email (patient reached)     Session Start Time: 355 pm  Session End Time: 412 pm      Session Length: 16 - 37      Attendees: Client     Service Modality:  Video Visit:      Provider verified identity through the following two step process.  Patient provided:  Patient photo and Patient     Telemedicine Visit: The patient's condition can be safely assessed and treated via synchronous audio and visual telemedicine encounter.      Reason for Telemedicine Visit: Patient convenience (e.g. access to timely appointments / distance to available provider)    Originating Site (Patient Location): Patient's home    Distant Site (Provider Location): Provider Remote Setting- Home Office    Consent:  The patient/guardian has verbally consented to: the potential risks and benefits of telemedicine (video visit) versus in person care; bill my insurance or make self-payment for services provided; and responsibility for payment of non-covered services.     Patient would like the video invitation sent by:  My Chart    Mode of Communication:  Video Conference via Amwell    Distant Location (Provider):  Off-site    As the provider I attest to compliance with applicable laws and regulations related to telemedicine.    Visit Activities (Refresh list every visit): [facility] Only    Diagnostic Assessment Date: 23  Treatment Plan Review Date: 24  See Flowsheets for today's PHQ-9 and SOL-7 results  Previous PHQ-9:       2023     3:07 PM 2023     1:53 PM 2024    10:22 PM   PHQ-9 SCORE   PHQ-9 Total Score MyChart 20 (Severe depression) 19 (Moderately severe depression) 17 (Moderately severe depression)   PHQ-9 Total Score 20    20 19 17     Previous SOL-7:       2020    10:55 AM 3/30/2021      "2:18 PM 8/3/2023     1:00 PM   SOL-7 SCORE   Total Score 1 (minimal anxiety)  5 (mild anxiety)   Total Score 1 0 5       LEONEL LEVEL:       No data to display                DATA  Extended Session (60+ minutes): No  Interactive Complexity: No  Crisis: No  [facility] Patient: No    Treatment Objective(s) Addressed in This Session:  Target Behavior(s): disease management/lifestyle changes depression symptoms    Depressed Mood: Increase interest, engagement, and pleasure in doing things  Decrease frequency and intensity of feeling down, depressed, hopeless  Improve quantity and quality of night time sleep / decrease daytime naps  Feel less tired and more energy during the day   Identify negative self-talk and behaviors: challenge core beliefs, myths, and actions  Decrease thoughts that you'd be better off dead or of suicide / self-harm    Current Stressors / Issues:  MH update: Pt report very low motivation, low energy, everything seems overwhelming. He reports that this has been going for about 2 months. Pt deines this is seasonal nor any added stress in his life. [facility] assessed SI. \"Always there, been like that for long time\". He denies intent, plan or safety concerns. 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 He would consider taking a job in a non-profit or [facility] [employer] [facility]. Pt doesn't feel he and his current therapist are making progress. [facility] inquired about his discussing this with her but he didn't feel comfortable. [facility] offered to make referral, pt agreed.   Stresses: none  Appetite: unremarkable  Sleep: hypersomnia  Therapy: [facility]ie Murphy at [facility] therapy weekly, pt would like referral  KHANH:No  Preg: NA  Interventions: [facility] and pt created a safety plan, [facility] sent pt a copy of safety plan  Most important: medication update        Progress on Treatment Objective(s) / Homework:  No improvement - PREPARATION (Decided to change - considering how); Intervened by " negotiating a change plan and determining options / strategies for behavior change, identifying triggers, exploring social supports, and working towards setting a date to begin behavior change    Motivational Interviewing    MI Intervention: Expressed Empathy/Understanding, Supported Autonomy, Collaboration, Evocation, Permission to raise concern or advise, Open-ended questions, and Reflections: simple and complex     Change Talk Expressed by the Patient: Desire to change    Provider Response to Change Talk: E - Evoked more info from patient about behavior change, A - Affirmed patient's thoughts, decisions, or attempts at behavior change, R - Reflected patient's change talk, and S - Summarized patient's change talk statements    Also provided psychoeducation about behavioral health condition, symptoms, and treatment options    Cognitive-Behavioral Therapy and Acceptance and Commitment therapy.    Assessments completed prior to visit:  The following assessments were completed by patient for this visit:  PHQ9:       10/4/2022     9:33 AM 11/21/2022     6:59 AM 6/8/2023     9:55 PM 8/3/2023    12:38 PM 9/18/2023     3:07 PM 11/22/2023     1:53 PM 1/11/2024    10:22 PM   PHQ-9 SCORE   PHQ-9 Total Score MyChart  19 (Moderately severe depression) 19 (Moderately severe depression) 19 (Moderately severe depression) 20 (Severe depression) 19 (Moderately severe depression) 17 (Moderately severe depression)   PHQ-9 Total Score 18 19 19 19 20    20 19 17     GAD7:       3/19/2019    10:07 AM 6/28/2019     7:30 AM 7/16/2019    10:42 AM 10/10/2019     8:39 AM 1/8/2020    10:55 AM 3/30/2021     2:18 PM 8/3/2023     1:00 PM   SOL-7 SCORE   Total Score 4 (minimal anxiety)  1 (minimal anxiety) 1 (minimal anxiety) 1 (minimal anxiety)  5 (mild anxiety)   Total Score 4 1 1 1 1 0 5     CAGE-AID:       9/18/2023     3:18 PM   CAGE-AID Total Score   Total Score 0    0   Total Score MyChart 0 (A total score of 2 or greater is considered  clinically significant)     PROMIS 10-Global Health (only subscores and total score):       9/18/2023     3:17 PM 1/11/2024    10:23 PM   PROMIS-10 Scores Only   Global Mental Health Score 5    5 5    5   Global Physical Health Score 9    9 10    10   PROMIS TOTAL - SUBSCORES 14    14 15    15     [place] Suicide Severity Rating Scale (Lifetime/Recent)      9/19/2023     3:58 PM   [place] Suicide Severity Rating (Lifetime/Recent)   Q1 Wish to be Dead (Lifetime) Y   Wish to be Dead Description (Lifetime) Pt reports chronic passive thoughts of death   1. Wish to be Dead (Past 1 Month) Y   Wish to be Dead Description (Past 1 Month) Pt reports chronic passive thoughts of death. Denies active SI, intent, plan or safety concerns   Q2 Non-Specific Active Suicidal Thoughts (Lifetime) N   Most Severe Ideation Rating (Lifetime) 2   Most Severe Ideation Rating (Past 1 Month) 2   Frequency (Lifetime) 2   Frequency (Past 1 Month) 2   Duration (Lifetime) 1   Duration (Past 1 Month) 1   Controllability (Lifetime) 1   Controllability (Past 1 Month) 1   Deterrents (Lifetime) 1   Deterrents (Past 1 Month) 1   Reasons for Ideation (Lifetime) 4   Reasons for Ideation (Past 1 Month) 4   Actual Attempt (Lifetime) N   Has subject engaged in non-suicidal self-injurious behavior? (Lifetime) N   Interrupted Attempts (Lifetime) N   Aborted or Self-Interrupted Attempt (Lifetime) N   Preparatory Acts or Behavior (Lifetime) N   Calculated C-SSRS Risk Score (Lifetime/Recent) Low Risk       Care Plan review completed: Yes    Medication Review:  Changes to psychiatric medications, see updated Medication List in EPIC. - changes in 9/23    Medication Compliance:  Yes    Changes in Health Issues:   None reported- chronic back pain    Chemical Use Review:   Substance Use: Chemical use reviewed, no active concerns identified      Tobacco Use: No current tobacco use.      Assessment: Current Emotional / Mental Status (status of significant  symptoms):  Risk status (Self / Other harm or suicidal ideation)  Patient has had a history of suicidal ideation: passive SI, denies any planning or intent.  Patient denies current fears or concerns for personal safety.  Patient reports the following current or recent suicidal ideation or behaviors: passive SI.  Patient denies current or recent homicidal ideation or behaviors.  Patient denies current or recent self injurious behavior or ideation.  Patient denies other safety concerns.  A safety and risk management plan has not been developed at this time, however patient was encouraged to call [place] [phone] should there be a change in any of these risk factors.    Appearance:   Appropriate   Eye Contact:   Good   Psychomotor Behavior: Normal   Attitude:   Cooperative   Orientation:   All  Speech   Rate / Production: Normal/ Responsive Normal    Volume:  Normal   Mood:    Depressed   Affect:    Flat   Thought Content:  Clear   Thought Form:  Coherent  Logical   Insight:    Good     Diagnoses:  1. Severe episode of recurrent major depressive disorder, without psychotic features (H)          Collateral Reports Completed:  Communicated with: CCPS provider    Plan: (Homework, other):  Patient was given information about behavioral services and encouraged to schedule a follow up appointment with the clinic [facility] as needed.  He was also given information about mental health symptoms and treatment options .  Pt has a therapist he is seeing.  Is interested in looking into alternative therapy options. CD Recommendations: No indications of CD issues.       BRYANNA Portillo    ______________________________________________________________________    Integrated Primary Care Behavioral Health Treatment Plan    Patient's Name: Gaurang Rivera  YOB: 1970    Date of Creation: 11/22/23  Date Treatment Plan Last Reviewed/Revised: 11/22/23    DSM5 Diagnoses: 296.32 (F33.1) Major Depressive Disorder,  Recurrent Episode, Moderate _ and With melancholic features  Psychosocial / Contextual Factors: work on hold due to season, lost 1 of 2 part time jobs  PROMIS (reviewed every 90 days):   PROMIS-10 Scores        9/18/2023     3:17 PM 1/11/2024    10:23 PM   PROMIS-10 Total Score w/o Sub Scores   PROMIS TOTAL - SUBSCORES 14    14 15    15         Referral / Collaboration:  The following referral(s) will be initiated: referral for new long term therapist .    Anticipated number of session for this episode of care: 3-6  Anticipation frequency of session: Every 1-2 months  Anticipated Duration of each session: 16-37 minutes  Treatment plan will be reviewed in 90 days or when goals have been changed.       MeasurableTreatment Goal(s) related to diagnosis / functional impairment(s)  Goal 1: Patient will experience a reduction in depressive symptoms along with a corresponding increase in positive emotions and life satisfaction.    I will know I've met my goal when depressive symptoms lessen and have more interest and energy to do things enjoy doing.      Objective #A (Patient Action)    Patient will Increase interest, engagement, and pleasure in doing things  Feel less tired and more energy during the day .  Status: New - Date: 11/22/23      Intervention(s)  Therapist will utilize CBT and ACT topics aimed to help reduce depression symptoms.        Patient has reviewed and agreed to the above plan.      Peyton Mathews, LICSW, LICSW, [facility], [place]  January 12 2024

## 2024-01-12 ENCOUNTER — VIRTUAL VISIT (OUTPATIENT)
Dept: BEHAVIORAL HEALTH | Facility: CLINIC | Age: 54
End: 2024-01-12
Payer: COMMERCIAL

## 2024-01-12 ENCOUNTER — VIRTUAL VISIT (OUTPATIENT)
Dept: PSYCHIATRY | Facility: CLINIC | Age: 54
End: 2024-01-12
Payer: COMMERCIAL

## 2024-01-12 DIAGNOSIS — F33.2 SEVERE EPISODE OF RECURRENT MAJOR DEPRESSIVE DISORDER, WITHOUT PSYCHOTIC FEATURES (H): Primary | ICD-10-CM

## 2024-01-12 DIAGNOSIS — F33.1 DEPRESSION, MAJOR, RECURRENT, MODERATE (H): ICD-10-CM

## 2024-01-12 PROCEDURE — 99214 OFFICE O/P EST MOD 30 MIN: CPT | Mod: 95 | Performed by: NURSE PRACTITIONER

## 2024-01-12 PROCEDURE — 90832 PSYTX W PT 30 MINUTES: CPT | Mod: 95 | Performed by: SOCIAL WORKER

## 2024-01-12 RX ORDER — BUPROPION HYDROCHLORIDE 300 MG/1
300 TABLET ORAL EVERY MORNING
Qty: 90 TABLET | Refills: 0 | Status: SHIPPED | OUTPATIENT
Start: 2024-01-12 | End: 2024-04-12

## 2024-01-12 RX ORDER — LURASIDONE HYDROCHLORIDE 120 MG/1
120 TABLET, FILM COATED ORAL
Qty: 30 TABLET | Refills: 1 | Status: SHIPPED | OUTPATIENT
Start: 2024-01-12 | End: 2024-08-29

## 2024-01-12 RX ORDER — BUPROPION HYDROCHLORIDE 150 MG/1
150 TABLET ORAL EVERY MORNING
Qty: 90 TABLET | Refills: 0 | Status: SHIPPED | OUTPATIENT
Start: 2024-01-12 | End: 2024-04-12

## 2024-01-12 RX ORDER — VENLAFAXINE HYDROCHLORIDE 75 MG/1
75 CAPSULE, EXTENDED RELEASE ORAL DAILY
Qty: 30 CAPSULE | Refills: 1 | Status: SHIPPED | OUTPATIENT
Start: 2024-01-12 | End: 2024-05-10

## 2024-01-12 RX ORDER — TRAZODONE HYDROCHLORIDE 50 MG/1
50-100 TABLET, FILM COATED ORAL
Qty: 60 TABLET | Refills: 1 | Status: SHIPPED | OUTPATIENT
Start: 2024-01-12 | End: 2024-09-03

## 2024-01-12 NOTE — PATIENT INSTRUCTIONS
**For crisis resources, please see the information at the end of this document**     Thank you for coming to the [facility] MENTAL HEALTH & [facility] [place] [facility].    TREATMENT PLAN:    MEDICATIONS:  will further increase the Latuda to 120 mg (max dose).  Continue remaining medications at current dosing.      -PSYCHOEDUCATION: Risks of antipsychotic medications:  metabolic disorder and movement disorder, and the need for blood monitoring of sugar and lipids/cholesterol while taking the medication.       CONSULTS/REFERRALS:   Continue therapy  Discussed referral to interventional psychiatry to look at treatment resistant options.  Referral made to evaluate Intensive Outpnt Program versus Partial Hospitalization Program for additional support.    LABS/PROCEDURES:    Please call your [place] clinic and ask for a lab only appointment at your earliest convenience.  If your results are reassuring or normal they will be mailed to you or sent through [facility]U within 7 days. If the lab tests need quick action we will call you with the results. The phone number we will call with results is # [phone].      FOLLOW UP: Schedule an appointment with me in six weeks  or sooner as needed.  The intake team should be calling you to schedule.  If you dont hear from them, or they were unable to reach you, please call [phone] to schedule.  Follow up with primary care provider as planned or for acute medical concerns.  Call the psychiatric nurse line with medication questions or concerns at [phone].      Medication Refills:  If you need any refills please call your pharmacy and they will contact us. Our fax number for refills is [phone]. Please allow three business for refill processing. If you need to  your refill at a new pharmacy, please contact the new pharmacy directly. The new pharmacy will help you get your medications transferred.     [facility] Assistance [phone]  Financial Assistance  [phone]  [facility] Billing [phone]  Central Billing Office, [facility]: [phone]  [facility] Billing [phone]  Medical Records [phone]  [facility] Patient Bill of Rights [email or web address]       MENTAL HEALTH CRISIS RESOURCES:  For a emergency help, please call 911 or go to the nearest Emergency Department.     Emergency Walk-In Options:   EmPATH Unit @ [facility] ([place]): [phone] - Specialized mental health emergency area designed to be calming  [facility] ([place]): [phone]  [facility] ([place]): [phone]  [facility]): [phone]    National Crisis Information: Call 988 Suicide and Crisis Lifeline  Crisis Text Line (free 24/7):  call **CRISIS (**141668) Crisis or use the texting option by texting 438258.   National Suicide Prevention Lifeline: Call 988  Poison Control Center: 1-305-331-3254  Trans Lifeline: 0-637-783-8097 - Hotline for transgender people of all ages  The [employer] Project: 2-782-607-9348 - Hotline for LGBT youth   List of all [facility] resources:   https://[place].gov/dhs/people-we-serve/adults/health-care/mental-health/resources/crisis-contacts.jsp    For Non-Emergency Support:   Fast Tracker: Mental Health & Substance Use Disorder Resources -   https://www.fasttrackermn.org/       Again thank you for choosing [facility] [place] [facility] and please let us know how we can best partner with you to improve you and your family's health.    You may be receiving a survey regarding this appointment. We would love to have your feedback, both positive and negative. The survey is done by an external company, so your answers are anonymous.    Patient Education   Collaborative Care Psychiatry Service  What to Expect  Here's what to expect from your Collaborative Care Psychiatry Service (CCPS).   About CCPS  CCPS  "means 2 people work together to help you get better. You'll meet with a behavioral health clinician and a psychiatric doctor. A behavioral health clinician helps people with mental health problems by talking with them. A psychiatric doctor helps people by giving them medicine.  How it works  At every visit, you'll see the behavioral health clinician (BHC) first. They'll talk with you about how you're doing and teach you how to feel better.   Then you'll see the psychiatric doctor. This doctor can help you deal with troubling thoughts and feelings by giving you medicine. They'll make sure you know the plan for your care.   CCPS usually takes 3 to 6 visits. If you need more visits, we may have you start seeing a different psychiatric doctor for ongoing care.  If you have any questions or concerns, we'll be glad to talk with you.  About visits  Be open  At your visits, please talk openly about your problems. It may feel hard, but it's the best way for us to help you.  Cancelling visits  If you can't come to your visit, please call us right away at [phone]. If you don't cancel at least 24 hours (1 full day) before your visit, that's \"late cancellation.\"  Being late to visits  Being very late is the same as not showing up. You will be a \"no show\" if:  Your appointment starts with a BHC, and you're more than 15 minutes late for a 30-minute (half hour) visit. This will also cancel your appointment with the psychiatric doctor.  Your appointment is with a psychiatric doctor only, and you're more than 15 minutes late for a 30-minute (half hour) visit.  Your appointment is with a psychiatric doctor only, and you're more than 30 minutes late for a 60-minute (full hour) visit.  If you cancel late or don't show up 2 times within 6 months, we may end your care.   Getting help between visits  If you need help between visits, you can call us Monday to Friday from 8 a.m. to 4:30 p.m. at [phone].  Emergency care  Call " 911 or go to the nearest emergency department if your life or someone else's life is in danger.  Call 988 anytime to reach the national Suicide and Crisis hotline.  Medicine refills  To refill your medicine, call your pharmacy. You can also call [facility]'s Behavioral Access at [phone], Monday to Friday, 8 a.m. to 4:30 p.m. It can take 1 to 3 business days to get a refill.   Forms, letters, and tests  You may have papers to fill out, like FMLA, short-term disability, and workability. We can help you with these forms at your visits, but you must have an appointment. You may need more than 1 visit for this, to be in an intensive therapy program, or both.  Before we can give you medicine for ADHD, we may refer you to get tested for it or confirm it another way.  We may not be able to give you an emotional support animal letter.  We don't do mental health checks ordered by the court.   We don't do mental health testing, but we can refer you to get tested.   Thank you for choosing us for your care.  For informational purposes only. Not to replace the advice of your health care provider. Copyright   2022 [facility]. All rights reserved. charity: water 251720 - 12/22.

## 2024-01-12 NOTE — PROGRESS NOTES
Virtual Visit Details    Type of service:  Video Visit   Video Start Time:  4:20 pm  Video End Time: 4:44 pm    Originating Location (pt. Location): Home    Distant Location (provider location):  Off-site  Platform used for Video Visit: ThinkHR        PSYCHIATRIC MEDICATION FOLLOW UP APPT     Name:  Gaurang Rivera  : 1970    Referred by: Lary Brizuela PA-C  Patient Care Team:  Elvis Guzman MD as PCP - General (Family Practice)  Elvis Guzman MD as Assigned PCP  Vaishnavi Rock PA as Physician Assistant (Urology)  Angeles Wise MD as MD (Dermatology)  Adair Waite RPH as Pharmacist (Pharmacist)  Lluvia Cárdenas PA-C as Physician Assistant (Gastroenterology)  Adair Waite RPH as Assigned MTM Pharmacist  Angeles Wise MD as Assigned Surgical Provider  Therapist: in therapy currently    Patient attended the phone/video session alone.    Last seen for outpatient psychiatry Return Visit on 2023.      FOLLOWING PLAN PUT INTO PLACE:   DTolerating Latuda at 40 mg with unclear benefit.  No side effects. Will increase to 80 mg  Slow taper down on the Venlafaxine XR from 300 mg to 150 mg over two weeks   Continue bupropion at current dose along with the quetiapine.  Eventually would like to find another agent for sleep as currently on two Second Generation Antipsychotic   Follow-up 6-8 weeks     Continue to purse and research TMS          INTERIM HISTORY     COMMUNICATIONS FROM PATIENT VIA:  none     RECORDS AVAILABLE FOR REVIEW: EHR records through [facility]  and previous psychiatric progress note.  In addition, reviewed the assessment completed by Peyton Mathews [facility], dated today        HISTORY OF PRESENT ILLNESS   [facility]PS referral for psychiatric medication consult in 2023.   Reports history of longstanding depression.  Reports he first sought treatment when he was in late 20's.  Has been on medications essentially since then.   Denies prior psychiatric  "hospitalizations. Hx of suicidal ideation, no suicide attempts. No history of self-injurious behaviors. Genetically loaded for  anxiety and substance use. Grew up in an intact home with all basic needs being met.     Thorough assessment of a potential underlying bipolar trajectory on initial assessment as follows:      Mood Disorder Questionnaire (MDQ)  Adapted from the \"Mental Health Queri,\" Quality Enhancement Research Initiative     Has there ever been a period of time when you were not your usual self and     -you felt so good or so hyper that other people thought you were not your normal self or you were so hyper that you got into trouble?   NO  -you were so irritable that you shouted at people or started fights or arguments? NO  -you felt much more self-confident than usual? NO                                                             -you got much less sleep than usual and found that you didn't really miss it?  NO  -you were much more talkative and spoke much faster than usual?  NO  -thoughts raced through your head or you couldn't slow your mind down?  ENDORSES RUMINATION          -you were so easily distracted by things around you that you had trouble concentrating or staying on track? NO  -you had much more energy than usual? NO  -you were much more active or did many more things than usual?  NO                             -you were much more social or outgoing than usual, for example, you telephoned friends in the middle of the night?  NO                                         -you did things that were unusual for you or other people might have thought were excessive, foolish or risky? NO  -spending money got you or your family in trouble?     NO                                   If you checked YES to more than one of the above, have several of these ever happened during the same period of time?  NO     How much of a problem did any of these cause you - like being unable to work; having family, money, " or legal troubles; getting into arguments or fights?  NO     Have any of your blood relatives (i.e. children, siblings, parents, grandparents, aunts, uncles) had manic-depressive illness or bipolar disorder?  NO     Has a health professional ever told you that you have manic- depressive illness or bipolar disorder?  NO     [place] Bipolarity Index utilized to further assess for an underlying bipolar disorder. This explores presence of hypomania or jermain, age of onset of first mood symptoms, illness course and other features generally only visible over time, response to medications (antidepressants and mood stabilizers), and family history of mood and substance use.   Patient endorses the following:     History of many failed antidepressant trials  Yes      Age of first depression onset: YOUNG ADULT  History of greater than or equal to five total life time depressive episodes: Yes   Depressive episode with concurrent hypomanic symptoms: No   Questionable activation from antidepressant: No   Family history of mood disorder: No     FAMILY, MEDICAL, SURGICAL HISTORY REVIEWED.  MEDICATION HAVE BEEN REVIEWED AND ARE CURRENT TO THE BEST OF MY KNOWLEDGE AND ABILITY.   rowing    MEDICATIONS                                                                                                Current Outpatient Medications   Medication Sig    atorvastatin (LIPITOR) 40 MG tablet Take 1 tablet (40 mg) by mouth daily    buPROPion (WELLBUTRIN XL) 150 MG 24 hr tablet Take 1 tablet (150 mg) by mouth every morning Take with 300mg for total daily dose of 450mg.    buPROPion (WELLBUTRIN XL) 300 MG 24 hr tablet Take 1 tablet (300 mg) by mouth every morning Take with 150mg for total daily dose of 450mg.    cholecalciferol (VITAMIN D3) 125 mcg (5000 units) capsule Take 1 capsule (125 mcg) by mouth daily    Continuous Blood Gluc Sensor ("[employer]"STYLE JAVI 14 DAY SENSOR) MISC AS DIRECTED AND CHANGE EVERY 14 DAYS,    exenatide ER (BYMAGGIE CAMPBELL)  2 MG/0.85ML auto-injector INJECT 2MG SUBCUTANEOUS EVERY 7 DAYS    ibuprofen (ADVIL/MOTRIN) 200 MG capsule Take 600-800 mg by mouth every 8 hours as needed for fever    JARDIANCE 10 MG TABS tablet TAKE 1 TABLET(10 MG) BY MOUTH DAILY    lisinopril (ZESTRIL) 5 MG tablet Take 1 tablet (5 mg) by mouth daily    lurasidone (LATUDA) 40 MG TABS tablet 1/2 tab daily for one week then increase to 1 tablet thereafter.    lurasidone (LATUDA) 80 MG TABS tablet Take 1 tablet (80 mg) by mouth daily with food    metFORMIN (GLUCOPHAGE) 1000 MG tablet Take 1 tablet (1,000 mg) by mouth 2 times daily (with meals)    multivitamin w/minerals (THERA-VIT-M) tablet Take 1 tablet by mouth daily    omeprazole (PRILOSEC OTC) 20 MG EC tablet Take 20 mg by mouth daily as needed    QUEtiapine (SEROQUEL) 25 MG tablet Take 2 tablets (50 mg) by mouth nightly as needed (insomnia)    venlafaxine (EFFEXOR XR) 150 MG 24 hr capsule Take 2 capsules (300 mg) by mouth daily    venlafaxine (EFFEXOR XR) 75 MG 24 hr capsule Take 1 capsule (75 mg) by mouth daily Take with 150 mg for total daily dose of 225 mg for two weeks then decrease to 150 mg thereafter    warfarin ANTICOAGULANT (COUMADIN) 5 MG tablet TAKE 1 AND 1/2 TABLETS (7.5 MG) DAILY OR AS DIRECTED BY INR CLINIC.     No current facility-administered medications for this visit.      NOTES ABOUT CURRENT PSYCHOTROPIC MEDICATIONS:   Latuda 80 mg  Bupropion  mg, down from 450 mg due to a denial for about a month  Venlafaxine  mg many years at much higher doses  Quetiapine 25-50 mg at bedtime, about a month, no change     SUPPLEMENTS: denies   SIDE EFFECTS:   denies    COMPLIANCE:   states Adherent to medication regimen       PAST PSYCHOTROPIC MEDICATIONS:  Fluoxetine years ago  Sertraline   Abilify 5 mg, unclear benefit.  Has been on this over 9 months      TODAY PATIENT REPORTS THE FOLLOWING PSYCHIATRIC ROS:   Per [facility], Peyton Mathews, during today's team-based visit:  MH update: Pt report  "very low motivation, low energy, everything seems overwhelming. He reports that this has been going for about 2 months. Pt deines this is seasonal nor any added stress in his life. [facility] assessed SI. \"Always there, been like that for long time\". He denies intent, plan or safety concerns. 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 He would consider taking a job in a non-profit or [employer] teaching. Pt doesn't feel he and his current therapist are making progress. [facility] inquired about his discussing this with her but he didn't feel comfortable. [facility] offered to make referral, pt agreed.   Stresses: none  Appetite: unremarkable  Sleep: hypersomnia  Therapy: Shellie Murphy at [facility] therapy weekly, pt would like referral  KHANH:No  Preg: NA  Interventions: [facility] and pt created a safety plan, [facility] sent pt a copy of safety plan  Most important: medication update\"      EXERCISE: Minimal exercise: due to mental health symptoms   SIDE EFFECTS:   tolerating medications without reported side effects  COMPLIANCE:   states Adherent to medication regimen  REPORTS THE FOLLOWING NEW MEDICAL ISSUES:   none    PROBLEM: DEPRESSION: No change. Vegetative symptoms of depression continue.  Endorses anhedonia, feeling down or depressed, sleep impairment, anergia, appetite changes, low self-esteem, trouble concentrating, psychomotor reslessness or slowing, hopelessness, helplessness, worthlessness, ruminations, irritability.        1/11/2024    10:22 PM   Last PHQ-9   1.  Little interest or pleasure in doing things 3   2.  Feeling down, depressed, or hopeless 2   3.  Trouble falling or staying asleep, or sleeping too much 3   4.  Feeling tired or having little energy 3   5.  Poor appetite or overeating 2   6.  Feeling bad about yourself 2   7.  Trouble concentrating 0   8.  Moving slowly or restless 0   Q9: Thoughts of better off dead/self-harm past 2 weeks 2   PHQ-9 Total Score 17 " "  In the past two weeks have you had thoughts of suicide or self harm? Yes   Do you have concerns about your personal safety or the safety of others? No   In the past 2 weeks have you thought about a plan or had intention to harm yourself? No   In the past 2 weeks have you acted on these thoughts in any way? No         9/18/2023     3:07 PM 11/22/2023     1:53 PM 1/11/2024    10:22 PM   PHQ-9 SCORE   PHQ-9 Total Score MyChart 20 (Severe depression) 19 (Moderately severe depression) 17 (Moderately severe depression)   PHQ-9 Total Score 20    20 19 17     PHQ9 score is 17 indicating moderately severe depression.  Suicidal ideation:  No     PROBLEM: ANXIETY: No change.    GAD7 score is Not completed today      1/8/2020    10:55 AM 3/30/2021     2:18 PM 8/3/2023     1:00 PM   SOL-7 SCORE   Total Score 1 (minimal anxiety)  5 (mild anxiety)   Total Score 1 0 5       PERTINENT PAST MEDICAL AND SURGICAL HISTORY     Past Medical History:   Diagnosis Date    Antiplatelet or antithrombotic long-term use     Closed displaced fracture of neck of right scapula, sequela 10/21/2019    Added automatically from request for surgery [id number]    Depressive disorder 2001    Diabetes (H)     DVT (deep venous thrombosis) (H) 07/05/2019    Elevated blood pressure reading without diagnosis of hypertension 05/13/2018    Hypercholesteremia 2012    Personal history of other medical treatment     blood clot foot july 2019    Pilonidal cyst 05/13/2018       VITALS     BP Readings from Last 1 Encounters:   08/28/23 110/80     Pulse Readings from Last 1 Encounters:   08/28/23 79     Wt Readings from Last 1 Encounters:   08/28/23 131.5 kg (289 lb 14.4 oz)     Ht Readings from Last 1 Encounters:   11/23/22 1.981 m (6' 6\")     Estimated body mass index is 33.5 kg/m  as calculated from the following:    Height as of 11/23/22: 1.981 m (6' 6\").    Weight as of 8/28/23: 131.5 kg (289 lb 14.4 oz).    LABS & IMAGING                                            "                                                                        Recent Labs   Lab Test 04/11/22  1314 06/03/20  0706 05/28/20  1135   WBC 7.2   < > 6.8   HGB 17.1   < > 16.1   HCT 51.4   < > 47.8   MCV 88   < > 87      < > 192   ANEU  --   --  3.3    < > = values in this interval not displayed.     Recent Labs   Lab Test 05/01/23  0939 11/23/22  0946 05/03/22  0942 06/03/20  0706 06/02/20  1417      < > 139   < > 138   POTASSIUM 4.0   < > 3.7   < > 4.3   CHLORIDE 102   < > 107   < > 105   CO2 21*   < > 24   < > 23   *   < > 109*   < > 184*   SAVI 9.9   < > 9.1   < > 8.7   MAG  --   --   --   --  2.0   BUN 15.0   < > 15   < > 17   CR 1.16   < > 0.99   < > 0.88   GFRESTIMATED 75   < > >90   < > >90   ALBUMIN  --   --  4.1   < >  --    PROTTOTAL  --   --  7.6   < >  --    AST  --   --  37   < >  --    ALT  --   --  63   < >  --    ALKPHOS  --   --  72   < >  --    BILITOTAL  --   --  0.8   < >  --     < > = values in this interval not displayed.     Recent Labs   Lab Test 08/28/23  0917 05/01/23  0939   CHOL  --  203*   LDL  --  93   HDL  --  41   TRIG  --  347*   A1C 8.8* 6.6*     Recent Labs   Lab Test 05/01/23  0939   TSH 1.73       ALLERGY & IMMUNIZATIONS     No Known Allergies    MEDICAL REVIEW OF SYSTEMS:   Ten system review was completed with pertinent positives noted     MENTAL STATUS EXAM:      General/Constitutional:  Appearance:   awake, alert, adequately groomed, appeared stated age and no apparent distress  Attitude:    cooperative   Eye Contact:  good  Musculoskeletal:  Psychomotor Behavior:  no evidence of tardive dyskinesia, dystonia, or tics from the head up  Psychiatric:  Speech:  clear, coherent, regular rate, rhythm, and volume,   No pressure speech noted.  Associations:  no loose associations  Thought Process:   logical, linear and goal oriented  Thought Content:  chronic suicidal idation, longstanding, no intent or plan.  No homicidal ideation, no evidence of psychotic  thought, no auditory hallucinations present and no visual hallucinations present  Mood:  depressed, no change  Affect:  full range/stable (normal variation of emotions during exam) and was congruent to speech content.  Insight:  good  Judgment:  intact, adequate for safety  Impulse Control:  intact  Neurological:  Oriented to:  person, place, time, and situation  Attention Span and Concentration:  Able to attend to the interview        Language: intact       Recent and Remote Memory:  Intact to interview. Not formally assessed. No amnesia.    Fund of Knowledge: appropriate        SAFETY   Feels safe in home: YES     Suicidal ideation: chronic, no intent or plan  History of suicide attempts:  No   Hx of impulsivity: No   Hope for the future: present    Hx of Command hallucinations or current psychosis: None endorsed    History of Self-injurious behaviors: denies    Family member  by suicide:  not discussed       SAFETY ASSESSMENT:   Based on all available evidence including the factors cited above, overall Risk for harm is low and is appropriate for outpatient level of care.   Recommended that patient call 911 or go to the local ED should there be a change in any of these risk factors.     DSM 5 DIAGNOSIS:   296.32 (F33.1) Major Depressive Disorder, Recurrent Episode, Moderate _    MEDICAL COMORBIDITY IMPACTING CLINICAL PICTURE: None noted.      ASSESSMENT AND PLAN       Problem List Items Addressed This Visit          Behavioral     Depressive symptoms continue causing functional impairment in multiple domains.   Discussed referral to interventional psychiatry to look at treatment resistant options.  Referral made to evaluate Intensive Outpnt Program versus Partial Hospitalization Program for additional support.  In the interim will further increase the Latuda to 120 mg (max dose).  Continue remaining medications at current dosing.  Follow-up in 4 weeks          MDD (major depressive disorder), recurrent  episode, severe (H) - Primary    Relevant Medications    traZODone (DESYREL) 50 MG tablet    venlafaxine (EFFEXOR XR) 75 MG 24 hr capsule    lurasidone (LATUDA) 120 MG TABS tablet    buPROPion (WELLBUTRIN XL) 300 MG 24 hr tablet    buPROPion (WELLBUTRIN XL) 150 MG 24 hr tablet    Other Relevant Orders    Adult Mental Health  Referral    Adult Mental Health  Referral      CONSULTS/REFERRALS:   Continue therapy   Coordinate care with therapist as needed       MEDICAL:   None at this time  Coordinate care with PCP (Elvis Guzman) as needed  Follow up with primary care provider as planned or for acute medical concerns.     PSYCHOEDUCATION:  Medication side effects and alternatives reviewed. Health promotion activities recommended and reviewed today. All questions addressed. Education and counseling completed regarding risks and benefits of medications and psychotherapy options.  Consent provided by patient/guardian  Call the psychiatric nurse line with medication questions or concerns at [phone].  [employer]s may be used to communicate with your provider, but this is not intended to be used for emergencies.  Medlineplus.gov is information for patients.  It is run by the [employer] and it contains information about all disorders, diseases and all medications.       COMMUNITY RESOURCES:    CRISIS NUMBERS: Provided in AVS 9/19/2023  National Suicide Prevention Lifeline: 1-800-273-talk (759.880.8516)  [employer]/resources for a list of additional resources (SOS)            [facility] - [phone]   Urgent Care Adult Mental Equdem-[phone] mobile unit/ 24/7 crisis line  [facility] -[phone]   COPE 24/7 [facility] Mobile Team -[phone] (adults)/ [phone] (child)  Poison Control Center - 1-941.985.9131    OR  go to nearest ER  Crisis Text Line for any crisis 24/7 send this-   To: 541055   [facility] ([facility]   [phone]  National Suicide Prevention Lifeline: 124.126.4560 (TTY: 658.720.4484). Call anytime for help.  (www.suicidepreventionlifeline.org)  National [place] [employer] Mental Illness (www.dianne.org): 421.380.3343 or 820-814-1858.   Mental Health Association (www.mentalhealth.org): 626.258.8833 or 570-428-6159.  [place] Crisis Text Line: Text [place] to 664816  Suicide LifeLine Chat: suicideWelcu.org/chat    ADMINISTRATIVE BILLING:   Level of Medical Decision Making:   - At least 1 chronic problem that is not stable  - Engaged in prescription drug management during visit (discussed any medication benefits, side effects, alternatives, etc.)       Patient Status:  Patient will continue to be seen for ongoing consultation and stabilization.    Signed:   Meagan Hernandez, MSN, APRN, FMHNP-[facility] ([facility])   Chart documentation done in part with Dragon Voice Recognition software.  Although reviewed after completion, some word and grammatical errors may remain.

## 2024-01-12 NOTE — NURSING NOTE
Is the patient currently in the state of [place]? YES    Visit mode:VIDEO    If the visit is dropped, the patient can be reconnected by: VIDEO VISIT: Text to cell phone:   Telephone Information:   Mobile [phone]       Will anyone else be joining the visit? NO  (If patient encounters technical issues they should call [phone])    How would you like to obtain your AVS? MyChart    Are changes needed to the allergy or medication list? Pt stated no changes to allergies and Pt stated no med changes    Reason for visit: DESMOND MURCIA

## 2024-01-14 ENCOUNTER — HEALTH MAINTENANCE LETTER (OUTPATIENT)
Age: 54
End: 2024-01-14

## 2024-01-14 NOTE — PROGRESS NOTES
Medication Therapy Management (MTM) Encounter    ASSESSMENT:                            Medication Adherence/Access: No issues identified      Type 2 Diabetes: Patient is  no longer meeting A1c goal of < 7%(8.5%). Self monitoring of blood glucose is NOT at goal of fasting  mg/dL and post prandial < 150 mg/dL. Patient is NOT meeting average glucose goal of <150mg/dL . Patient is NOT meeting goal of >70% time in target with continuous glucose monitoring (63%-December 2023), will restart new cgm sensor tomorrow 1-16-24.   Patient would benefit from ideal SMBG: Check blood sugars pre-prandial, 2 hours post-prandial, and with symptoms of hypoglycemia, Metformin :  stay on the same dose.,  GLP-1 agonist (Bydureon) : Restart  every other week injection due to cost , SGLT-2 :  Stay on same dose.   weight loss recommended-thru low carb lifestyle/exercise --see plan for details. Mental health improving -coming out of a depression- lets consider adding low dose Pioglitazone after next A1c if not making sufficient progress(<8%).   Microalbumin is  at goal < 30 mg/g. Taking an ACE-inhibitor not at max dose.         Hyperlipidemia: Stable.  Patient is on high intensity statin which is indicated based on 2019 ACC/AHA guidelines for lipid management.         Vitamin D3: is NOT at goal of 50-75ng/mL. Pt will continue 5000 unit otc pill daily .       Depression: Continue current medications --following psych. Who just adjusted his anti-depressant meds --continue with psychiatry. Weekly therapist as well.       DVT: stable INR.     Hypertension: Stable. Patient is meeting blood pressure goal of < 140/90mmHg. Patient would benefit from the following changes - continued blood pressure monitoring, watching diet, increasing exercise and weight loss and limiting/reducing sodium.    Insomnia:   Patient may benefit from staying on quetiapine 50 mg at bedtime  as instructed and f/up with psychiatrist -2024.         PLAN:                                 1. A1c today = 8.5% --lets restart the weekly Bydureon injection, also restart herve-3 cgm sensor tomorrow .  Stay on all other medications as is , watch mychart for other labs results/plan.        Follow-up: Return in about 15 weeks (around 4/29/2024), or @ 4:00 PM, for Blood sugar meter review, Medication Therapy Management Visit, A1c lab.    SUBJECTIVE/OBJECTIVE:                          Gaurang Rivera is a 53 year old male coming in for a follow up visit (8-28-23). He was referred to me from Elvis Guzman  .       Reason for visit:   A1c, non fasting lipids, other labs .     Depression very difficult --seeing psych and therapist now ...    Off bydureon --didn't refill no other reason ..        Allergies/ADRs: Reviewed in chart; no tylenol 2022--due to liver growth   Past Medical History: Reviewed in chart  Tobacco: He reports that he has never smoked. He has never used smokeless tobacco.  Alcohol: not currently using  Caffeine: diet coke - 4 cans/day or so   Social:   rowing at [facility]. Rowing club.   Personal Healthcare Goals:  DM at goal is #1 issue, food is his drug -addicted to it , sleep is poor -tired , craves caffeine /sugar . Wants to exercise more.   Activity: winter months - sedentary , summer months - more active walking to boat [employer] gym membership close to his house --infrequent use. 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      Medication Adherence/Access: no issues reported      Type 2 Diabetes:  Currently taking : Off all Glipizide , metformin 1 gm bid pc, bydureon 2mg.-one injection every 2 weeks now due to cost  but has not injected since Nov-2023!  , Jardiance 10mg every day PO.   Patient is not experiencing side effects.  Blood sugar monitoring: Herve CGM:                             Symptoms of low blood sugar? None now.   Symptoms of high blood sugar? none  Eye exam: up to date  Foot exam: mild tingling in feet.   Diet/Exercise:   He stopped walking outside due to weather ,  lives [place] miles from [facility] --stopped going --lost motivation.      Sort of intermittent fasting but has fallen off it. avg 2 large meals/day . Lots of FF again --addicted to mcdAPSXs.   Does not cook for himself , has known fatty liver disease, late night candy bars/cookies  lately --feels hungry- Patient stated that he has had less late night snacking (2/28/23).     Aspirin: Not taking due to warfarin   Statin: Yes: Atorvastatin 40   ACEi/ARB: Yes: Lisinopril 5mg.    Urine Albumin:   Lab Results   Component Value Date    UMALCR 10.22 05/03/2022      Lab Results   Component Value Date    A1C 8.5 01/15/2024    A1C 8.8 08/28/2023    A1C 6.6 05/01/2023    A1C 7.8 01/30/2023    A1C 7.4 11/23/2022    A1C 9.5 03/30/2021    A1C 7.2 10/10/2019    A1C 7.1 06/28/2019    A1C 6.5 09/26/2018                 Hyperlipidemia: Current therapy includes : Atorvastatin 40mg daily.  Patient reports no significant myalgias or other side effects.  The 10-year ASCVD risk score (Marilyn PIKE, et al., 2019) is: 8.6%    Values used to calculate the score:      Age: 53 years      Sex: Male      Is Non- : No      Diabetic: Yes      Tobacco smoker: No      Systolic Blood Pressure: 98 mmHg      Is BP treated: Yes      HDL Cholesterol: 41 mg/dL      Total Cholesterol: 203 mg/dL  Recent Labs   Lab Test 05/01/23  0939 01/30/23  0922   CHOL 203* 200*   HDL 41 36*   LDL 93 89   TRIG 347* 420*         Depression:  Current medications include: Venlafaxine 75mg./day now per psych y, bupropion XL - 450 mg daily, and Latuda 120mg ./day .      He is struggling --some of the above med doses were just adjusted this month with psychiatry --doesn't feel any different yet ....    Seeing weekly therapist --via zoom --has a new therapist --going ok --but looking for a new therapist though.  Has psychiatrist as well --changed meds recently.         9/18/2023     3:07 PM 11/22/2023     1:53 PM 1/11/2024    10:22 PM   PHQ-9 SCORE   PHQ-9 Total  Score MyChart 20 (Severe depression) 19 (Moderately severe depression) 17 (Moderately severe depression)   PHQ-9 Total Score 20    20 19 17       DVT:   2 years ago had blood clot in his foot , has family hx blood clots.  Takes warfarin 7.5mg/day or as directed by INR clinic. Follows with anticoag clinic.  INR   Date Value Ref Range Status   01/15/2024 2.4 (H) 0.9 - 1.1 Final   04/11/2022 1.66 (H) 0.85 - 1.15 Final   06/25/2021 3.00 (H) 0.86 - 1.14 Final     Comment:     This test is intended for monitoring Coumadin therapy.  Results are not   accurate in patients with prolonged INR due to factor deficiency.          Vitamin D3: restarted 5000 unit/day pill.     Vitamin D Deficiency Screening Results:  Lab Results   Component Value Date    VITDT 34 05/01/2023    VITDT 53 05/03/2022    VITDT 27 01/27/2022    VITDT 34 03/30/2021         Hypertension: Current medications include: lisinopril 5mg./day .  Patient does not self-monitor blood pressure.  Patient reports no current medication side effects.  BP Readings from Last 3 Encounters:   01/15/24 98/78   08/28/23 110/80   08/03/23 118/78     Insomnia:   quetiapine 50 mg at bedtime , psych has now added trazodone --will start in next 1-2 days.   Patient reports trouble staying asleep, trouble falling asleep, can only sleep 5 solid hours but very erratic sleep pattern , and depression.       BP 98/78   Pulse 84   Wt 286 lb 3.2 oz (129.8 kg)   SpO2 93%   BMI 33.07 kg/m        I spent 30 minutes with this patient today. All changes were made via collaborative practice agreement with Elvis Guzman MD. A copy of the visit note was provided to the patient's provider(s).    The patient was given a summary of these recommendations after todays visit.      Adair Waite [facility].  Medication Therapy Management Provider  [phone]           Medication Therapy Recommendations  Microalbuminuria due to type 2 diabetes mellitus (H)    Current Medication: Continuous Blood  Gluc Sensor (FREESTYLE JAVI 14 DAY SENSOR) [facility]   Rationale: Patient forgets to take - Adherence - Adherence   Recommendation: Provide Adherence Intervention   Status: Accepted per CPA          Current Medication: exenatide ER (BYDUREON BCISE) 2 MG/0.85ML auto-injector   Rationale: Cannot afford medication product - Cost - Adherence   Recommendation: Referral to Service  - patient feels he can try to afford and take every other week as he knows it really helps his blood sugar control.   Status: Accepted per CPA

## 2024-01-15 ENCOUNTER — LAB (OUTPATIENT)
Dept: LAB | Facility: CLINIC | Age: 54
End: 2024-01-15
Payer: COMMERCIAL

## 2024-01-15 ENCOUNTER — ANTICOAGULATION THERAPY VISIT (OUTPATIENT)
Dept: ANTICOAGULATION | Facility: CLINIC | Age: 54
End: 2024-01-15

## 2024-01-15 ENCOUNTER — OFFICE VISIT (OUTPATIENT)
Dept: PHARMACY | Facility: CLINIC | Age: 54
End: 2024-01-15
Payer: COMMERCIAL

## 2024-01-15 VITALS
OXYGEN SATURATION: 93 % | DIASTOLIC BLOOD PRESSURE: 78 MMHG | HEART RATE: 84 BPM | SYSTOLIC BLOOD PRESSURE: 98 MMHG | BODY MASS INDEX: 33.07 KG/M2 | WEIGHT: 286.2 LBS

## 2024-01-15 DIAGNOSIS — Z79.01 CURRENT USE OF LONG TERM ANTICOAGULATION: ICD-10-CM

## 2024-01-15 DIAGNOSIS — F51.05 INSOMNIA DUE TO OTHER MENTAL DISORDER: ICD-10-CM

## 2024-01-15 DIAGNOSIS — I10 ESSENTIAL HYPERTENSION: ICD-10-CM

## 2024-01-15 DIAGNOSIS — Z86.718 H/O DEEP VENOUS THROMBOSIS: ICD-10-CM

## 2024-01-15 DIAGNOSIS — I82.4Y9 DEEP VEIN THROMBOSIS (DVT) OF PROXIMAL LOWER EXTREMITY, UNSPECIFIED CHRONICITY, UNSPECIFIED LATERALITY (H): ICD-10-CM

## 2024-01-15 DIAGNOSIS — E78.5 HYPERLIPIDEMIA WITH TARGET LDL LESS THAN 160: ICD-10-CM

## 2024-01-15 DIAGNOSIS — E11.9 TYPE 2 DIABETES MELLITUS WITHOUT COMPLICATION, WITHOUT LONG-TERM CURRENT USE OF INSULIN (H): ICD-10-CM

## 2024-01-15 DIAGNOSIS — D68.51 HETEROZYGOUS FACTOR V LEIDEN MUTATION (H): ICD-10-CM

## 2024-01-15 DIAGNOSIS — F33.2 SEVERE EPISODE OF RECURRENT MAJOR DEPRESSIVE DISORDER, WITHOUT PSYCHOTIC FEATURES (H): ICD-10-CM

## 2024-01-15 DIAGNOSIS — E11.9 TYPE 2 DIABETES MELLITUS WITHOUT COMPLICATION, WITHOUT LONG-TERM CURRENT USE OF INSULIN (H): Primary | ICD-10-CM

## 2024-01-15 DIAGNOSIS — E55.9 VITAMIN D DEFICIENCY: ICD-10-CM

## 2024-01-15 DIAGNOSIS — F99 INSOMNIA DUE TO OTHER MENTAL DISORDER: ICD-10-CM

## 2024-01-15 DIAGNOSIS — D68.51 HETEROZYGOUS FACTOR V LEIDEN MUTATION (H): Primary | ICD-10-CM

## 2024-01-15 LAB
ANION GAP SERPL CALCULATED.3IONS-SCNC: 13 MMOL/L (ref 7–15)
BUN SERPL-MCNC: 15.2 MG/DL (ref 6–20)
CALCIUM SERPL-MCNC: 9.7 MG/DL (ref 8.6–10)
CHLORIDE SERPL-SCNC: 101 MMOL/L (ref 98–107)
CHOLEST SERPL-MCNC: 210 MG/DL
CREAT SERPL-MCNC: 1.13 MG/DL (ref 0.67–1.17)
CREAT UR-MCNC: 14.5 MG/DL
DEPRECATED HCO3 PLAS-SCNC: 26 MMOL/L (ref 22–29)
EGFRCR SERPLBLD CKD-EPI 2021: 77 ML/MIN/1.73M2
FASTING STATUS PATIENT QL REPORTED: ABNORMAL
GLUCOSE SERPL-MCNC: 229 MG/DL (ref 70–99)
HBA1C MFR BLD: 8.5 % (ref 0–5.6)
HDLC SERPL-MCNC: 39 MG/DL
INR BLD: 2.4 (ref 0.9–1.1)
LDLC SERPL CALC-MCNC: ABNORMAL MG/DL
LDLC SERPL DIRECT ASSAY-MCNC: 95 MG/DL
MICROALBUMIN UR-MCNC: <12 MG/L
MICROALBUMIN/CREAT UR: NORMAL MG/G{CREAT}
NONHDLC SERPL-MCNC: 171 MG/DL
POTASSIUM SERPL-SCNC: 4.1 MMOL/L (ref 3.4–5.3)
SODIUM SERPL-SCNC: 140 MMOL/L (ref 135–145)
TRIGL SERPL-MCNC: 505 MG/DL

## 2024-01-15 PROCEDURE — 83036 HEMOGLOBIN GLYCOSYLATED A1C: CPT

## 2024-01-15 PROCEDURE — 80061 LIPID PANEL: CPT

## 2024-01-15 PROCEDURE — 80048 BASIC METABOLIC PNL TOTAL CA: CPT

## 2024-01-15 PROCEDURE — 99605 MTMS BY PHARM NP 15 MIN: CPT | Performed by: PHARMACIST

## 2024-01-15 PROCEDURE — 36415 COLL VENOUS BLD VENIPUNCTURE: CPT

## 2024-01-15 PROCEDURE — 83721 ASSAY OF BLOOD LIPOPROTEIN: CPT | Mod: 59

## 2024-01-15 PROCEDURE — 82043 UR ALBUMIN QUANTITATIVE: CPT

## 2024-01-15 PROCEDURE — 82570 ASSAY OF URINE CREATININE: CPT

## 2024-01-15 PROCEDURE — 99607 MTMS BY PHARM ADDL 15 MIN: CPT | Performed by: PHARMACIST

## 2024-01-15 PROCEDURE — 85610 PROTHROMBIN TIME: CPT

## 2024-01-15 RX ORDER — EXENATIDE 2 MG/.85ML
INJECTION, SUSPENSION, EXTENDED RELEASE SUBCUTANEOUS
Qty: 3.4 ML | Refills: 5 | Status: SHIPPED | OUTPATIENT
Start: 2024-01-15 | End: 2024-09-03

## 2024-01-15 NOTE — PATIENT INSTRUCTIONS
"Recommendations from today's MTM visit:                                                         1. A1c today = 8.5% --lets restart the weekly Bydureon injection, also restart bebo-3 cgm sensor tomorrow .  Stay on all other medications as is , watch mychart for other labs results/plan.        Follow-up: Return in about 15 weeks (around 4/29/2024), or @ 4:00 PM, for Blood sugar meter review, Medication Therapy Management Visit, A1c lab.    It was great speaking with you today.  I value your experience and would be very thankful for your time in providing feedback in our clinic survey. In the next few days, you may receive an email or text message from [employer] with a link to a survey related to your  clinical pharmacist.\"     To schedule another MTM appointment, please call the clinic directly or you may call the MTM scheduling line at [phone] or toll-free at [phone].     My Clinical Pharmacist's contact information:                                                      Please feel free to contact me with any questions or concerns you have.      Adair Waite [facility].  Medication Therapy Management Provider  [phone]    "

## 2024-01-15 NOTE — Clinical Note
Elvis--jareki- A1c still 8.5% --depression related along with no bydureon --restarted that today --he plans to see you in next 30-60 days , then see me end of April for A1c recheck.  Malcolm

## 2024-01-15 NOTE — PROGRESS NOTES
ANTICOAGULATION MANAGEMENT     Gaurang Rivera 53 year old male is on warfarin with therapeutic INR result. (Goal INR 2.0-3.0)    Recent labs: (last 7 days)     01/15/24  1615   INR 2.4*       ASSESSMENT     Source(s): Chart Review  Previous INR was Therapeutic last visit; previously outside of goal range  Medication, diet, health changes since last INR     bydureon injections to be started every 2 weeks, per lexicom Summary Exenatide may enhance the anticoagulant effect of Warfarin. Severity Moderate Reliability Rating Fair     PLAN     Recommended plan for ongoing change(s) affecting INR     Dosing Instructions: Continue your current warfarin dose with next INR in 2 weeks       Summary  As of 1/15/2024      Full warfarin instructions:  10 mg every Fri; 7.5 mg all other days   Next INR check:  2/12/2024               Detailed voice message left for Gaurang with dosing instructions and follow up date.   Sent [employer] message with dosing and follow up instructions    Contact [phone]  to schedule and with any changes, questions or concerns.     Education provided:   Please call back if any changes to your diet, medications or how you've been taking warfarin  Interaction IS anticipated between warfarin and bydureon    Plan made per [facility] anticoagulation protocol    Shelby Brooke RN  Anticoagulation Clinic  1/15/2024    _______________________________________________________________________     Anticoagulation Episode Summary       Current INR goal:  2.0-3.0   TTR:  78.2% (1 y)   Target end date:  Indefinite   Send INR reminders to:  [facility]    Indications    Atrial fibrillation  unspecified type (H) (Resolved) [I48.91]  Heterozygous factor V Leiden mutation (H24) [D68.51]  H/O deep venous thrombosis [Z86.718]             Comments:  MTV 30 mcg.             Anticoagulation Care Providers       Provider Role Specialty Phone number    Elvis Guzman MD Referring Family Medicine [phone]

## 2024-01-18 NOTE — ASSESSMENT & PLAN NOTE
Depressive symptoms continue causing functional impairment in multiple domains.   Discussed referral to interventional psychiatry to look at treatment resistant options.  Referral made to evaluate Intensive Outpnt Program versus Partial Hospitalization Program for additional support.  In the interim will further increase the Latuda to 120 mg (max dose).  Continue remaining medications at current dosing.  Follow-up in 4 weeks

## 2024-01-19 DIAGNOSIS — F33.2 SEVERE EPISODE OF RECURRENT MAJOR DEPRESSIVE DISORDER, WITHOUT PSYCHOTIC FEATURES (H): ICD-10-CM

## 2024-01-19 RX ORDER — QUETIAPINE FUMARATE 25 MG/1
50 TABLET, FILM COATED ORAL
Qty: 60 TABLET | Refills: 1 | Status: SHIPPED | OUTPATIENT
Start: 2024-01-19 | End: 2024-09-03

## 2024-01-19 NOTE — TELEPHONE ENCOUNTER
Behavioral Access, please schedule this patient for a future visit with  Meagan Hernandez . Patient is requesting a refill.     Date of Last Office Visit: 01/24/2024  Date of Next Office Visit: None. Scheduling attempting to contact pt  No shows since last visit: 0  Cancellations since last visit: 0    Medication requested:   QUEtiapine (SEROQUEL) 25 MG tablet    Date last ordered: 11/22/2023 Qty: 60 Refills: 1     Review of [place] ?: N/A    Lapse in medication adherence greater than 5 days?: no  If yes, call patient and gather details: n/a  Medication refill request verified as identical to current order?: yes  Result of Last DAM, VPA, Li+ Level, CBC, or Carbamazepine Level (at or since last visit): N/A    Last visit treatment plan:   ASSESSMENT AND PLAN       Problem List Items Addressed This Visit                  Behavioral       Depressive symptoms continue causing functional impairment in multiple domains.   Discussed referral to interventional psychiatry to look at treatment resistant options.  Referral made to evaluate Intensive Outpnt Program versus Partial Hospitalization Program for additional support.  In the interim will further increase the Latuda to 120 mg (max dose).  Continue remaining medications at current dosing.  Follow-up in 4 weeks            MDD (major depressive disorder), recurrent episode, severe (H) - Primary     Relevant Medications     traZODone (DESYREL) 50 MG tablet     venlafaxine (EFFEXOR XR) 75 MG 24 hr capsule     lurasidone (LATUDA) 120 MG TABS tablet     buPROPion (WELLBUTRIN XL) 300 MG 24 hr tablet     buPROPion (WELLBUTRIN XL) 150 MG 24 hr tablet     Other Relevant Orders     Adult Mental Health  Referral     Adult Mental Health  Referral       CONSULTS/REFERRALS:   Continue therapy   Coordinate care with therapist as needed       MEDICAL:   None at this time  Coordinate care with PCP (Elvis Guzman) as needed  Follow up with primary care provider as planned  or for acute medical concerns.     PSYCHOEDUCATION:  Medication side effects and alternatives reviewed. Health promotion activities recommended and reviewed today. All questions addressed. Education and counseling completed regarding risks and benefits of medications and psychotherapy options.  Consent provided by patient/guardian  Call the psychiatric nurse line with medication questions or concerns at [phone].  [facility] may be used to communicate with your provider, but this is not intended to be used for emergencies.  Medlineplus.gov is information for patients.  It is run by the [employer] Library of Medicine and it contains information about all disorders, diseases and all medications.       COMMUNITY RESOURCES:    CRISIS NUMBERS: Provided in AVS 9/19/2023  National Suicide Prevention Lifeline: 4-901-576-TALK (640-276-4832)  [employer]/resources for a list of additional resources (SOS)            [facility] - [phone]   Urgent Care Adult Mental Ckvbbl-[phone] mobile unit/ 24/7 crisis line  [facility] -[phone]   COPE 24/7 [place] Mobile Team -[phone] (adults)/ [phone] (child)  Poison Control Center - 1-323.352.5340    OR  go to nearest ER  Crisis Text Line for any crisis 24/7 send this-   To: 242798   [facility] ([facility]  [phone]  National Suicide Prevention Lifeline: 674.820.6986 (TTY: 844.886.2355). Call anytime for help.  (www.suicidepreventionlifeline.org)  National [place] on Mental Illness (www.dianne.org): 176.725.3998 or 906-074-3344.   Mental Health Association (www.mentalhealth.org): 427.785.9118 or 526-594-4209.  [place] Crisis Text Line: Text [place] to 497538  Suicide LifeLine Chat: suicideSunfire.org/chat     ADMINISTRATIVE BILLING:   Level of Medical Decision Making:   - At least 1 chronic problem that is not stable  - Engaged in prescription drug management during visit (discussed any medication  benefits, side effects, alternatives, etc.)        Patient Status:  Patient will continue to be seen for ongoing consultation and stabilization.     Signed:   Meagan Hernandez, MSN, APRN, FMHNP-[facility] ([facility])   Chart documentation done in part with Dragon Voice Recognition software.  Although reviewed after completion, some word and grammatical errors may remain.             Other Notes    All notes  Instructions      Return in about 6 weeks (around 2/23/2024) for Follow up with me.  **For crisis resources, please see the information at the end of this document*          []Medication refilled per  Medication Refill in Ambulatory Care  policy.  [x]Medication unable to be refilled by RN due to criteria not met as indicated below:    []Eligibility - not seen in the last year   []Supervision - no future appointment   []Compliance - no shows, cancellations or lapse in therapy   [x]Verification - order discrepancy   []Controlled medication   []Medication not included in policy   []90-day supply request   [x]Other- MA processed    Maria C Perez MA on 1/19/2024 at 10:28 AM

## 2024-01-27 DIAGNOSIS — R80.9 MICROALBUMINURIA DUE TO TYPE 2 DIABETES MELLITUS (H): ICD-10-CM

## 2024-01-27 DIAGNOSIS — E11.29 MICROALBUMINURIA DUE TO TYPE 2 DIABETES MELLITUS (H): ICD-10-CM

## 2024-02-05 DIAGNOSIS — E11.29 MICROALBUMINURIA DUE TO TYPE 2 DIABETES MELLITUS (H): ICD-10-CM

## 2024-02-05 DIAGNOSIS — R80.9 MICROALBUMINURIA DUE TO TYPE 2 DIABETES MELLITUS (H): ICD-10-CM

## 2024-02-06 ENCOUNTER — TELEPHONE (OUTPATIENT)
Dept: ANTICOAGULATION | Facility: CLINIC | Age: 54
End: 2024-02-06
Payer: COMMERCIAL

## 2024-02-13 ENCOUNTER — TELEPHONE (OUTPATIENT)
Dept: ANTICOAGULATION | Facility: CLINIC | Age: 54
End: 2024-02-13
Payer: COMMERCIAL

## 2024-02-13 NOTE — LETTER
[facility] ANTICOAGULATION CLINIC  [address]  [place] [address]  Phone: [phone]  Fax: [phone]   February 13, 2024        Gaurang Rivera  [address]   [facility] [address]            Dear Gaurang,    You are currently under the care of [facility] for your warfarin (Coumadin , Jantoven ) therapy.  We are contacting you because our records show you were due for an INR on 1/29/24.    There are potentially serious risks when taking warfarin without careful monitoring and we want to make sure you are safely managed.  Routine lab monitoring is required for warfarin refills.     Please call [phone]  as soon as possible to schedule a lab appointment. If it is difficult for you to get to lab, please call us to discuss options.  If there has been a change in your care or other concerns, please let us know so we can help and/or update our records.         Sincerely,       [facility]

## 2024-02-27 ENCOUNTER — DOCUMENTATION ONLY (OUTPATIENT)
Dept: ANTICOAGULATION | Facility: CLINIC | Age: 54
End: 2024-02-27
Payer: COMMERCIAL

## 2024-02-27 NOTE — PROGRESS NOTES
Anticoagulation Clinic Notification    Gaurang, is past due for an INR. Their last result was 2.4 on 1/15/24 and was due to come back on 2/12/24.    he received phone calls and letters over the last several weeks in attempt to arrange follow up labs. Gaurang Rivera will be contacted again today.     Please contact patient directly to discuss compliance with monitoring or schedule visit to review ongoing anticoagulation therapy.    Thank you,     [facility]

## 2024-02-27 NOTE — LETTER
[facility] ANTICOAGULATION [facility]INIC  [address]  [facility] [address]  Phone: [phone]  Fax: [phone]     February 27, 2024        Gaurang Rivera  [address]   [facility] [address]            Dear Gaurang,    You are currently under the care of [facility] for your warfarin (Coumadin , Jantoven ) therapy.  We are contacting you because our records show you were due for an INR on 2/12/24.    There are potentially serious risks when taking warfarin without careful monitoring and we want to make sure you are safely managed.  Routine lab monitoring is required for warfarin refills.     Please call [phone]  as soon as possible to schedule a lab appointment. If it is difficult for you to get to lab, please call us to discuss options.  If there has been a change in your care or other concerns, please let us know so we can help and/or update our records.         Sincerely,       [facility]

## 2024-03-06 ENCOUNTER — TELEPHONE (OUTPATIENT)
Dept: FAMILY MEDICINE | Facility: CLINIC | Age: 54
End: 2024-03-06
Payer: COMMERCIAL

## 2024-03-06 DIAGNOSIS — I82.4Z1 ACUTE DEEP VEIN THROMBOSIS (DVT) OF DISTAL END OF RIGHT LOWER EXTREMITY (H): ICD-10-CM

## 2024-03-06 RX ORDER — WARFARIN SODIUM 5 MG/1
TABLET ORAL
Qty: 60 TABLET | Refills: 0 | Status: ON HOLD | OUTPATIENT
Start: 2024-03-06 | End: 2024-06-29

## 2024-03-06 NOTE — TELEPHONE ENCOUNTER
Medication Question or Refill    Contacts         Type Contact Phone/Fax    03/06/2024 12:49 PM CST Phone (Incoming) Gaurang Rivera SANGITA (Self) [phone] (H)            What medication are you calling about (include dose and sig)?:     warfarin ANTICOAGULANT (COUMADIN) 5 MG tablet       [facility] Pharm[facility]   [id number] - [address] [address]  Phone: [phone] Fax: [phone]      Controlled Substance Agreement on file:   CSA -- Patient Level:    CSA: None found at the patient level.       Who prescribed the medication?: Dr. Guzman    Do you need a refill? Yes    When did you use the medication last? Last night    Patient offered an appointment? Yes: 03-07-24    Do you have any questions or concerns?  Yes: Totally out of medication      Could we send this information to you in [facility] or would you prefer to receive a phone call?:   Patient would prefer a phone call   Okay to leave a detailed message?: Yes at Home number on file [phone] (home)

## 2024-03-06 NOTE — TELEPHONE ENCOUNTER
ANTICOAGULATION MANAGEMENT:  Medication Refill    Anticoagulation Summary  As of 1/15/2024      Warfarin maintenance plan:  10 mg (5 mg x 2) every Fri; 7.5 mg (5 mg x 1.5) all other days   Next INR check:  1/29/2024   Target end date:  Indefinite    Indications    Atrial fibrillation  unspecified type (H) (Resolved) [I48.91]  Heterozygous factor V Leiden mutation (H24) [D68.51]  H/O deep venous thrombosis [Z86.718]                 Anticoagulation Care Providers       Provider Role Specialty Phone number    Elvis Guzman MD Referring Family Medicine [phone]            Refill Criteria    Visit with referring provider/group: Overdue for referring provider visit, last visit on 11/23/23    ACC referral last signed: 09/20/2023; within last year: Yes    Lab monitoring not exceeding 2 weeks overdue: No    Peter does NOT meet all criteria for refill: Visit with referring provider's group was >= 1 year ago and > 2 weeks overdue for lab monitoring . 30 day rula fill approved; patient notified to schedule labs per [facility] protocol    Harshil Salamanca RN  Anticoagulation Clinic

## 2024-03-14 ENCOUNTER — LAB (OUTPATIENT)
Dept: LAB | Facility: CLINIC | Age: 54
End: 2024-03-14

## 2024-03-14 ENCOUNTER — ANTICOAGULATION THERAPY VISIT (OUTPATIENT)
Dept: ANTICOAGULATION | Facility: CLINIC | Age: 54
End: 2024-03-14

## 2024-03-14 DIAGNOSIS — I82.4Y9 DEEP VEIN THROMBOSIS (DVT) OF PROXIMAL LOWER EXTREMITY, UNSPECIFIED CHRONICITY, UNSPECIFIED LATERALITY (H): ICD-10-CM

## 2024-03-14 DIAGNOSIS — D68.51 HETEROZYGOUS FACTOR V LEIDEN MUTATION (H): Primary | ICD-10-CM

## 2024-03-14 DIAGNOSIS — D68.51 HETEROZYGOUS FACTOR V LEIDEN MUTATION (H): ICD-10-CM

## 2024-03-14 DIAGNOSIS — Z86.718 H/O DEEP VENOUS THROMBOSIS: ICD-10-CM

## 2024-03-14 DIAGNOSIS — Z79.01 CURRENT USE OF LONG TERM ANTICOAGULATION: ICD-10-CM

## 2024-03-14 LAB — INR BLD: 1.3 (ref 0.9–1.1)

## 2024-03-14 PROCEDURE — 85610 PROTHROMBIN TIME: CPT

## 2024-03-14 PROCEDURE — 36416 COLLJ CAPILLARY BLOOD SPEC: CPT

## 2024-03-14 NOTE — PROGRESS NOTES
ANTICOAGULATION MANAGEMENT     Gaurang Rivera 53 year old male is on warfarin with subtherapeutic INR result. (Goal INR 2.0-3.0)    Recent labs: (last 7 days)     03/14/24  1519   INR 1.3*       ASSESSMENT     Source(s): Chart Review  Previous INR was Therapeutic last 2(+) visits  Medication, diet, health changes since last INR chart reviewed - ACRN is suspicious patient ran out of warfarin rx or missed several doses - per chart review, 30 day rx was approved on 3/6/24         PLAN     Unable to reach Gaurang today.    Left message to take a booster dose of warfarin,  15 mg tonight. Request call back for assessment.    Follow up required to assess for changes  and discuss out of range result     Cyn Womack, RN  Anticoagulation Clinic  3/14/2024

## 2024-03-14 NOTE — PROGRESS NOTES
"   PLAN     Unable to reach Gaurang  today x 2 attempts.    Left message to take a booster dose of warfarin,  15 mg tonight. Request call back for assessment. Noted most recent rx refill (3/6/24) on VM as well. [employer] message sent.    Of note, SARA consulted with Ranjana Sims, [facility] who recommended (IF missed doses/ran out of rx): \"15 mg x 1 for 1-2 missed doses, can do 15 mg x 2 days for 3+ missed doses.\"    Follow up required to assess for changes  and discuss out of range result     Cyn Womack, RN  Anticoagulation Clinic  3/14/2024      "

## 2024-03-15 NOTE — PROGRESS NOTES
ANTICOAGULATION MANAGEMENT     Gaurang Rivera 53 year old male is on warfarin with subtherapeutic INR result. (Goal INR 2.0-3.0)    Recent labs: (last 7 days)     03/14/24  1519   INR 1.3*       ASSESSMENT            PLAN     Unable to reach Gaurang today. Called x2 and sent mychart, (currently unread)     Left message to continue usual dosing this weekend. Request call back for assessment.    Follow up required to confirm warfarin dose taken and assess for changes, confirm warfarin dose taken, discuss out of range result , and discuss dosing instructions and confirm understanding of instructions    Shelby Brooke, RN  Anticoagulation Clinic  3/15/2024

## 2024-03-18 NOTE — PROGRESS NOTES
ANTICOAGULATION MANAGEMENT     Gaurang Rivera 53 year old male is on warfarin with subtherapeutic INR result. (Goal INR 2.0-3.0)    Recent labs: (last 7 days)     03/14/24  1519   INR 1.3*       ASSESSMENT     Source(s): Chart Review - final attempt  Previous INR was Therapeutic last 2(+) visits  Medication, diet, health changes since last INR chart reviewed; none identified - suspect patient missed dose/s or ran out of rx (unable to confirm changes/culprit for subINR) + hx of noncompliance, INR was overdue since 2/12/24         PLAN     Recommended plan for temporary change(s) (??) affecting INR     Dosing Instructions: booster dose then continue your current warfarin dose with next INR in 5-7 days       Summary  As of 3/14/2024      Full warfarin instructions:  3/14: 15 mg; Otherwise 10 mg every Fri; 7.5 mg all other days   Next INR check:  3/19/2024               Detailed voice message left for Gaurang with dosing instructions and follow up date.   Sent [employer] message with dosing and follow up instructions    Contact [phone]  to schedule and with any changes, questions or concerns.     Education provided:   Please call back if any changes to your diet, medications or how you've been taking warfarin  Symptom monitoring: monitoring for bleeding signs and symptoms and monitoring for clotting signs and symptoms    Plan made with [facility] Pharmacist Ranjana Womack, RN  Anticoagulation Clinic  3/18/2024    _______________________________________________________________________     Anticoagulation Episode Summary       Current INR goal:  2.0-3.0   TTR:  67.6% (1 y)   Target end date:  Indefinite   Send INR reminders to:  [facility]    Indications    Atrial fibrillation  unspecified type (H) (Resolved) [I48.91]  Heterozygous factor V Leiden mutation (H24) [D68.51]  H/O deep venous thrombosis [Z86.718]             Comments:  MTV 30 mcg.             Anticoagulation Care Providers        Provider Role Specialty Phone number    Elvis Guzman MD Referring Family Medicine [phone]

## 2024-03-19 ENCOUNTER — TELEPHONE (OUTPATIENT)
Dept: ANTICOAGULATION | Facility: CLINIC | Age: 54
End: 2024-03-19

## 2024-03-26 ENCOUNTER — TELEPHONE (OUTPATIENT)
Dept: ANTICOAGULATION | Facility: CLINIC | Age: 54
End: 2024-03-26

## 2024-04-02 ENCOUNTER — TELEPHONE (OUTPATIENT)
Dept: ANTICOAGULATION | Facility: CLINIC | Age: 54
End: 2024-04-02

## 2024-04-02 NOTE — TELEPHONE ENCOUNTER
ANTICOAGULATION     Gaurang Rivera is overdue for an INR check.     Left message for patient to call and schedule lab appointment as soon as possible. If returning call, please schedule.  and Reminder letter sent    Velia Persaud RN

## 2024-04-02 NOTE — LETTER
[facility] ANTICOAGULATION CLINIC  [address]  [facility] [address]  Phone: [phone]  Fax: [phone]   April 2, 2024        Gaurang Rivera  [address]   [facility] [address]            Dear Gaurang,    You are currently under the care of [facility] Anticoagulation [facility] for your warfarin (Coumadin , Jantoven ) therapy.  We are contacting you because our records show you were due for an INR on 3/19/24.    There are potentially serious risks when taking warfarin without careful monitoring and we want to make sure you are safely managed.  Routine lab monitoring is required for warfarin refills.     Please call [phone]  as soon as possible to schedule a lab appointment. If it is difficult for you to get to lab, please call us to discuss options.  If there has been a change in your care or other concerns, please let us know so we can help and/or update our records.         Sincerely,       [facility] Anticoagulation Clinic

## 2024-04-12 DIAGNOSIS — F33.2 SEVERE EPISODE OF RECURRENT MAJOR DEPRESSIVE DISORDER, WITHOUT PSYCHOTIC FEATURES (H): ICD-10-CM

## 2024-04-12 RX ORDER — BUPROPION HYDROCHLORIDE 150 MG/1
150 TABLET ORAL EVERY MORNING
Qty: 90 TABLET | Refills: 0 | Status: SHIPPED | OUTPATIENT
Start: 2024-04-12 | End: 2024-08-29

## 2024-04-12 RX ORDER — BUPROPION HYDROCHLORIDE 300 MG/1
300 TABLET ORAL EVERY MORNING
Qty: 90 TABLET | Refills: 0 | Status: ON HOLD | OUTPATIENT
Start: 2024-04-12 | End: 2024-06-29

## 2024-04-12 NOTE — TELEPHONE ENCOUNTER
Behavioral Access, please schedule this patient for a future visit with  TIEN Hernandez . Return around 2/23/2024    Date of Last Office Visit: 1/12/24  Date of Next Office Visit: None. Scheduling attempting to contact pt   No shows since last visit: 0  Cancellations since last visit: 0    Medication requested: Both Date last ordered: 1/12/24 Qty: 90 Refills: 0       Lapse in medication adherence greater than 5 days?: No  If yes, call patient and gather details: NA  Medication refill request verified as identical to current order?: yes  Result of Last DAM, VPA, Li+ Level, CBC, or Carbamazepine Level (at or since last visit): N/A    Last visit treatment plan:     TREATMENT PLAN:     MEDICATIONS:  will further increase the Latuda to 120 mg (max dose).  Continue remaining medications at current dosing.      Return in about 6 weeks (around 2/23/2024) for Follow up with me.         MDD (major depressive disorder), recurrent episode, severe (H) - Primary     Relevant Medications     traZODone (DESYREL) 50 MG tablet     venlafaxine (EFFEXOR XR) 75 MG 24 hr capsule     lurasidone (LATUDA) 120 MG TABS tablet     buPROPion (WELLBUTRIN XL) 300 MG 24 hr tablet     buPROPion (WELLBUTRIN XL) 150 MG 24 hr tablet     Other Relevant Orders     Adult Mental Health  Referral     Adult Mental Health  Referral         []Medication refilled per  Medication Refill in Ambulatory Care  policy.  [x]Medication unable to be refilled by RN due to criteria not met as indicated below:    []Eligibility - not seen in the last year   [x]Supervision - no future appointment   []Compliance - no shows, cancellations or lapse in therapy   []Verification - order discrepancy   []Controlled medication   []Medication not included in policy   [x]90-day supply request   [x]Other: Overdue for follow up. LPN processed

## 2024-04-16 ENCOUNTER — DOCUMENTATION ONLY (OUTPATIENT)
Dept: ANTICOAGULATION | Facility: CLINIC | Age: 54
End: 2024-04-16

## 2024-04-16 NOTE — PROGRESS NOTES
Anticoagulation Clinic Notification    Gaurang, is past due for an INR. Their last result was 1.3 on 3/14/24 and was due to come back on 3/19/24.    he received phone calls and letters over the last several weeks in attempt to arrange follow up labs. Gaurang Rivera will be contacted again today.     Please contact patient directly to discuss compliance with monitoring or schedule visit to review ongoing anticoagulation therapy.    Thank you,     [facility] Anticoagulation Clinic

## 2024-04-16 NOTE — LETTER
[facility] ANTICOAGULATION CLINIC  [address]  [facility] [address]  Phone: [phone]  Fax: [phone]   April 16, 2024        Gaurang Rivrea  [address]   [facility] [address]            Dear Gaurang,    You are currently under the care of [facility] Anticoagulation [facility] for your warfarin (Coumadin , Jantoven ) therapy.  We are contacting you because our records show you were due for an INR on 3/19/24.    There are potentially serious risks when taking warfarin without careful monitoring and we want to make sure you are safely managed.  Routine lab monitoring is required for warfarin refills.     Please call [phone]  as soon as possible to schedule a lab appointment. If it is difficult for you to get to lab, please call us to discuss options.  If there has been a change in your care or other concerns, please let us know so we can help and/or update our records.         Sincerely,       [facility] Anticoagulation Clinic

## 2024-04-26 RX ORDER — LISINOPRIL 5 MG/1
5 TABLET ORAL DAILY
Qty: 90 TABLET | Refills: 1 | OUTPATIENT
Start: 2024-04-26

## 2024-05-10 DIAGNOSIS — F33.2 SEVERE EPISODE OF RECURRENT MAJOR DEPRESSIVE DISORDER, WITHOUT PSYCHOTIC FEATURES (H): ICD-10-CM

## 2024-05-10 RX ORDER — VENLAFAXINE HYDROCHLORIDE 75 MG/1
75 CAPSULE, EXTENDED RELEASE ORAL DAILY
Qty: 30 CAPSULE | Refills: 0 | Status: SHIPPED | OUTPATIENT
Start: 2024-05-10 | End: 2024-06-10

## 2024-05-10 NOTE — TELEPHONE ENCOUNTER
Date of Last Office Visit: 1/12/24  Date of Next Office Visit: None Scheduled.  RN notified A to call this patient and schedule a future appointment with Meagan Hernandez CNP.    No shows since last visit: no  Cancellations since last visit: no    Medication requested: venlafaxine (EFFEXOR XR) 75 MG 24 hr capsule  Date last ordered: 1/12/24 Qty: 30 Refills: 1     Lapse in medication adherence greater than 5 days?: Unknown But it appears the patient should have ran out in March.    If yes, call patient and gather details: RN attempted to phone patient . There was no answer.    Medication refill request verified as identical to current order?: yes  Result of Last DAM, VPA, Li+ Level, CBC, or Carbamazepine Level (at or since last visit): N/A    Last visit treatment plan:     FOLLOWING PLAN PUT INTO PLACE:   DTolerating Latuda at 40 mg with unclear benefit.  No side effects. Will increase to 80 mg  Slow taper down on the Venlafaxine XR from 300 mg to 150 mg over two weeks   Continue bupropion at current dose along with the quetiapine.  Eventually would like to find another agent for sleep as currently on two Second Generation Antipsychotic   Follow-up 6-8 weeks     Continue to purse and research TMS       []Medication refilled per  Medication Refill in Ambulatory Care  policy.  [x]Medication unable to be refilled by RN due to criteria not met as indicated below:    []Eligibility - not seen in the last year   [x]Supervision - no future appointment   []Compliance - no shows, cancellations or lapse in therapy   []Verification - order discrepancy   []Controlled medication   []Medication not included in policy   []90-day supply request   []Other

## 2024-05-10 NOTE — TELEPHONE ENCOUNTER
Provider Meagan Hernandez CNP indicated in this patients last visit note that a future/return appointment was indicated .    [facility] please call this patient to schedule a future appointment with Meagan oBoth RN on 5/10/2024 at 12:42 PM

## 2024-05-14 ENCOUNTER — TELEPHONE (OUTPATIENT)
Dept: ANTICOAGULATION | Facility: CLINIC | Age: 54
End: 2024-05-14

## 2024-05-14 NOTE — LETTER
[facility] ANTICOAGULATION CLINIC  [address]  [facility] [address]  Phone: [phone]  Fax: [phone]   May 14, 2024        Gaurang Rivera  [address]   [facility] [address]            Dear Gaurang,    You are currently under the care of [facility] Anticoagulation [facility] for your warfarin (Coumadin , Jantoven ) therapy.  We are contacting you because our records show you were due for an INR on 3/19/24.    There are potentially serious risks when taking warfarin without careful monitoring and we want to make sure you are safely managed.  Routine lab monitoring is required for warfarin refills.     Please call [phone]  as soon as possible to schedule a lab appointment. If it is difficult for you to get to lab, please call us to discuss options.  If there has been a change in your care or other concerns, please let us know so we can help and/or update our records.         Sincerely,       [facility] Anticoagulation Clinic

## 2024-05-14 NOTE — TELEPHONE ENCOUNTER
ANTICOAGULATION MANAGEMENT PROGRAM    Dr. Guzman,     Our records indicate that Gaurang Rivera remains past due to check an INR. Gaurang Rivera was contacted multiple times over at least the last 8 weeks to attempt to arrange a follow up appointment.    Gaurang Rivera last had an an INR checked on 3/14/24 and was due for follow up on 3/19/24     Called patient,Left message for patient to call and schedule lab appointment as soon as possible. If returning call, please schedule.  and Reminder letter sent     At this time Gaurang Rivera will be moved to noncompliant status within the program until their referral expires on 9/18/24. While in noncompliant status the patient would continue to be contacted every 6 weeks by the anticoagulation program to attempt to schedule patient. You will be notified of each contact attempt to make you aware of patient's ongoing noncompliance.        Thank you,     [facility] Anticoagulation Management Program

## 2024-06-10 DIAGNOSIS — F33.2 SEVERE EPISODE OF RECURRENT MAJOR DEPRESSIVE DISORDER, WITHOUT PSYCHOTIC FEATURES (H): ICD-10-CM

## 2024-06-10 RX ORDER — VENLAFAXINE HYDROCHLORIDE 75 MG/1
75 CAPSULE, EXTENDED RELEASE ORAL DAILY
Qty: 30 CAPSULE | Refills: 0 | Status: SHIPPED | OUTPATIENT
Start: 2024-06-10 | End: 2024-07-12

## 2024-06-10 NOTE — TELEPHONE ENCOUNTER
[facility] please call patient to schedule a follow up appointment with provider  Date of Last Office Visit: 01/12/2024  Date of Next Office Visit: None  No shows since last visit: 0  Cancellations since last visit: 0    Medication requested: venlafaxine (EFFEXOR XR) 75 MG 24 hr capsule  Date last ordered: 05/10/2024 Qty: 30 Refills: 0     Review of MN ?: N/A  Lapse in medication adherence greater than 5 days?: no  Medication refill request verified as identical to current order?: yes  Result of Last DAM, VPA, Li+ Level, CBC, or Carbamazepine Level (at or since last visit): N/A    Last visit treatment plan:   Return in about 6 weeks (around 2/23/2024) for Follow up with me.     traZODone (DESYREL) 50 MG tablet   venlafaxine (EFFEXOR XR) 75 MG 24 hr capsule   lurasidone (LATUDA) 120 MG TABS tablet   buPROPion (WELLBUTRIN XL) 300 MG 24 hr tablet   buPROPion (WELLBUTRIN XL) 150 MG 24 hr tablet       []Medication refilled per  Medication Refill in Ambulatory Care  policy.  []Medication unable to be refilled by RN due to criteria not met as indicated below:    []Eligibility - not seen in the last year   [x]Supervision - no future appointment   []Compliance - no shows, cancellations or lapse in therapy   []Verification - order discrepancy   []Controlled medication   [x]Medication not included in policy   []90-day supply request   [x]Other

## 2024-06-25 ENCOUNTER — TELEPHONE (OUTPATIENT)
Dept: ANTICOAGULATION | Facility: CLINIC | Age: 54
End: 2024-06-25
Payer: COMMERCIAL

## 2024-06-25 NOTE — TELEPHONE ENCOUNTER
Anticoagulation Management    Gaurang Rivera is being followed by the anticoagulation clinic while in noncompliant status. Gaurang Rivera is receiving every 6 week reminder calls.     Last INR checked on 3/14/24    Called and Left message for patient to call and schedule lab appointment as soon as possible. If returning call, please schedule.

## 2024-06-26 ENCOUNTER — HOSPITAL ENCOUNTER (INPATIENT)
Facility: CLINIC | Age: 54
LOS: 3 days | Discharge: HOME | End: 2024-06-29
Attending: EMERGENCY MEDICINE | Admitting: INTERNAL MEDICINE
Payer: COMMERCIAL

## 2024-06-26 ENCOUNTER — APPOINTMENT (OUTPATIENT)
Dept: ULTRASOUND IMAGING | Facility: CLINIC | Age: 54
End: 2024-06-26
Attending: PHYSICIAN ASSISTANT
Payer: COMMERCIAL

## 2024-06-26 ENCOUNTER — OFFICE VISIT (OUTPATIENT)
Dept: URGENT CARE | Facility: URGENT CARE | Age: 54
End: 2024-06-26
Payer: COMMERCIAL

## 2024-06-26 ENCOUNTER — APPOINTMENT (OUTPATIENT)
Dept: CT IMAGING | Facility: CLINIC | Age: 54
End: 2024-06-26
Attending: PHYSICIAN ASSISTANT
Payer: COMMERCIAL

## 2024-06-26 VITALS
OXYGEN SATURATION: 94 % | HEART RATE: 105 BPM | SYSTOLIC BLOOD PRESSURE: 114 MMHG | WEIGHT: 267 LBS | BODY MASS INDEX: 30.85 KG/M2 | DIASTOLIC BLOOD PRESSURE: 81 MMHG | TEMPERATURE: 97.2 F

## 2024-06-26 DIAGNOSIS — I82.451 ACUTE DEEP VEIN THROMBOSIS (DVT) OF RIGHT PERONEAL VEIN (H): ICD-10-CM

## 2024-06-26 DIAGNOSIS — Z86.718 PERSONAL HISTORY OF DVT (DEEP VEIN THROMBOSIS): Primary | ICD-10-CM

## 2024-06-26 DIAGNOSIS — M79.89 PAIN AND SWELLING OF LEFT LOWER EXTREMITY: ICD-10-CM

## 2024-06-26 DIAGNOSIS — Z79.01 LONG TERM (CURRENT) USE OF ANTICOAGULANTS: ICD-10-CM

## 2024-06-26 DIAGNOSIS — I82.412 ACUTE DEEP VEIN THROMBOSIS (DVT) OF FEMORAL VEIN OF LEFT LOWER EXTREMITY (H): ICD-10-CM

## 2024-06-26 DIAGNOSIS — Z51.81 INADEQUATE ANTICOAGULATION: ICD-10-CM

## 2024-06-26 DIAGNOSIS — I82.432 ACUTE DEEP VEIN THROMBOSIS (DVT) OF POPLITEAL VEIN OF LEFT LOWER EXTREMITY (H): ICD-10-CM

## 2024-06-26 DIAGNOSIS — I26.94 MULTIPLE SUBSEGMENTAL PULMONARY EMBOLI WITHOUT ACUTE COR PULMONALE (H): Primary | ICD-10-CM

## 2024-06-26 DIAGNOSIS — D68.51 FACTOR 5 LEIDEN MUTATION, HETEROZYGOUS (H): ICD-10-CM

## 2024-06-26 DIAGNOSIS — I82.422 ACUTE DEEP VEIN THROMBOSIS (DVT) OF ILIAC VEIN OF LEFT LOWER EXTREMITY (H): ICD-10-CM

## 2024-06-26 DIAGNOSIS — Z79.01 INADEQUATE ANTICOAGULATION: ICD-10-CM

## 2024-06-26 DIAGNOSIS — M79.605 PAIN AND SWELLING OF LEFT LOWER EXTREMITY: ICD-10-CM

## 2024-06-26 DIAGNOSIS — Q21.12 PFO (PATENT FORAMEN OVALE): ICD-10-CM

## 2024-06-26 LAB
ALBUMIN SERPL BCG-MCNC: 4.5 G/DL (ref 3.5–5.2)
ALP SERPL-CCNC: 109 U/L (ref 40–150)
ALT SERPL W P-5'-P-CCNC: 16 U/L (ref 0–70)
ANION GAP SERPL CALCULATED.3IONS-SCNC: 15 MMOL/L (ref 7–15)
AST SERPL W P-5'-P-CCNC: 20 U/L (ref 0–45)
BILIRUB SERPL-MCNC: 1.3 MG/DL
BUN SERPL-MCNC: 20.9 MG/DL (ref 6–20)
CALCIUM SERPL-MCNC: 9.7 MG/DL (ref 8.6–10)
CHLORIDE SERPL-SCNC: 93 MMOL/L (ref 98–107)
CREAT SERPL-MCNC: 1.13 MG/DL (ref 0.67–1.17)
DEPRECATED HCO3 PLAS-SCNC: 20 MMOL/L (ref 22–29)
EGFRCR SERPLBLD CKD-EPI 2021: 77 ML/MIN/1.73M2
ERYTHROCYTE [DISTWIDTH] IN BLOOD BY AUTOMATED COUNT: 12.4 % (ref 10–15)
GLUCOSE SERPL-MCNC: 538 MG/DL (ref 70–99)
HCT VFR BLD AUTO: 47.8 % (ref 40–53)
HGB BLD-MCNC: 16.9 G/DL (ref 13.3–17.7)
INR PPP: 1.17 (ref 0.85–1.15)
MCH RBC QN AUTO: 29.9 PG (ref 26.5–33)
MCHC RBC AUTO-ENTMCNC: 35.4 G/DL (ref 31.5–36.5)
MCV RBC AUTO: 85 FL (ref 78–100)
NT-PROBNP SERPL-MCNC: 243 PG/ML (ref 0–900)
PLATELET # BLD AUTO: 167 10E3/UL (ref 150–450)
POTASSIUM SERPL-SCNC: 4.1 MMOL/L (ref 3.4–5.3)
PROT SERPL-MCNC: 7.6 G/DL (ref 6.4–8.3)
RBC # BLD AUTO: 5.65 10E6/UL (ref 4.4–5.9)
SODIUM SERPL-SCNC: 128 MMOL/L (ref 135–145)
TROPONIN T SERPL HS-MCNC: 51 NG/L
WBC # BLD AUTO: 8.7 10E3/UL (ref 4–11)

## 2024-06-26 PROCEDURE — 93005 ELECTROCARDIOGRAM TRACING: CPT | Performed by: EMERGENCY MEDICINE

## 2024-06-26 PROCEDURE — 93970 EXTREMITY STUDY: CPT

## 2024-06-26 PROCEDURE — 99215 OFFICE O/P EST HI 40 MIN: CPT | Performed by: NURSE PRACTITIONER

## 2024-06-26 PROCEDURE — 120N000005 HC R&B MS OVERFLOW UMMC

## 2024-06-26 PROCEDURE — 83880 ASSAY OF NATRIURETIC PEPTIDE: CPT | Performed by: PHYSICIAN ASSISTANT

## 2024-06-26 PROCEDURE — 99223 1ST HOSP IP/OBS HIGH 75: CPT | Performed by: INTERNAL MEDICINE

## 2024-06-26 PROCEDURE — 96360 HYDRATION IV INFUSION INIT: CPT | Performed by: EMERGENCY MEDICINE

## 2024-06-26 PROCEDURE — 93010 ELECTROCARDIOGRAM REPORT: CPT | Performed by: PHYSICIAN ASSISTANT

## 2024-06-26 PROCEDURE — 80053 COMPREHEN METABOLIC PANEL: CPT | Performed by: PHYSICIAN ASSISTANT

## 2024-06-26 PROCEDURE — 99285 EMERGENCY DEPT VISIT HI MDM: CPT | Mod: 25 | Performed by: EMERGENCY MEDICINE

## 2024-06-26 PROCEDURE — 250N000011 HC RX IP 250 OP 636: Mod: JZ | Performed by: PHYSICIAN ASSISTANT

## 2024-06-26 PROCEDURE — 85014 HEMATOCRIT: CPT | Performed by: PHYSICIAN ASSISTANT

## 2024-06-26 PROCEDURE — 99285 EMERGENCY DEPT VISIT HI MDM: CPT | Mod: FS | Performed by: EMERGENCY MEDICINE

## 2024-06-26 PROCEDURE — 82962 GLUCOSE BLOOD TEST: CPT

## 2024-06-26 PROCEDURE — 250N000011 HC RX IP 250 OP 636: Mod: JZ | Performed by: INTERNAL MEDICINE

## 2024-06-26 PROCEDURE — 36415 COLL VENOUS BLD VENIPUNCTURE: CPT | Performed by: PHYSICIAN ASSISTANT

## 2024-06-26 PROCEDURE — 250N000009 HC RX 250: Performed by: INTERNAL MEDICINE

## 2024-06-26 PROCEDURE — 258N000003 HC RX IP 258 OP 636: Performed by: PHYSICIAN ASSISTANT

## 2024-06-26 PROCEDURE — 84484 ASSAY OF TROPONIN QUANT: CPT | Performed by: PHYSICIAN ASSISTANT

## 2024-06-26 PROCEDURE — 85610 PROTHROMBIN TIME: CPT | Performed by: PHYSICIAN ASSISTANT

## 2024-06-26 PROCEDURE — 71275 CT ANGIOGRAPHY CHEST: CPT

## 2024-06-26 RX ORDER — BUPROPION HYDROCHLORIDE 300 MG/1
300 TABLET ORAL EVERY MORNING
Status: DISCONTINUED | OUTPATIENT
Start: 2024-06-27 | End: 2024-06-27

## 2024-06-26 RX ORDER — ONDANSETRON 4 MG/1
4 TABLET, ORALLY DISINTEGRATING ORAL EVERY 6 HOURS PRN
Status: DISCONTINUED | OUTPATIENT
Start: 2024-06-26 | End: 2024-06-29 | Stop reason: HOSPADM

## 2024-06-26 RX ORDER — OMEPRAZOLE 20 MG/1
20 TABLET, DELAYED RELEASE ORAL EVERY MORNING
Status: DISCONTINUED | OUTPATIENT
Start: 2024-06-27 | End: 2024-06-27 | Stop reason: ALTCHOICE

## 2024-06-26 RX ORDER — IOPAMIDOL 755 MG/ML
100 INJECTION, SOLUTION INTRAVASCULAR ONCE
Status: COMPLETED | OUTPATIENT
Start: 2024-06-26 | End: 2024-06-26

## 2024-06-26 RX ORDER — CALCIUM CARBONATE 500 MG/1
1000 TABLET, CHEWABLE ORAL 4 TIMES DAILY PRN
Status: DISCONTINUED | OUTPATIENT
Start: 2024-06-26 | End: 2024-06-29 | Stop reason: HOSPADM

## 2024-06-26 RX ORDER — ONDANSETRON 2 MG/ML
4 INJECTION INTRAMUSCULAR; INTRAVENOUS EVERY 6 HOURS PRN
Status: DISCONTINUED | OUTPATIENT
Start: 2024-06-26 | End: 2024-06-29 | Stop reason: HOSPADM

## 2024-06-26 RX ORDER — AMOXICILLIN 250 MG
1 CAPSULE ORAL 2 TIMES DAILY PRN
Status: DISCONTINUED | OUTPATIENT
Start: 2024-06-26 | End: 2024-06-29 | Stop reason: HOSPADM

## 2024-06-26 RX ORDER — LIDOCAINE 40 MG/G
CREAM TOPICAL
Status: DISCONTINUED | OUTPATIENT
Start: 2024-06-26 | End: 2024-06-29 | Stop reason: HOSPADM

## 2024-06-26 RX ORDER — AMOXICILLIN 250 MG
2 CAPSULE ORAL 2 TIMES DAILY PRN
Status: DISCONTINUED | OUTPATIENT
Start: 2024-06-26 | End: 2024-06-29 | Stop reason: HOSPADM

## 2024-06-26 RX ORDER — DEXTROSE MONOHYDRATE 25 G/50ML
25-50 INJECTION, SOLUTION INTRAVENOUS
Status: DISCONTINUED | OUTPATIENT
Start: 2024-06-26 | End: 2024-06-27

## 2024-06-26 RX ORDER — QUETIAPINE FUMARATE 50 MG/1
50 TABLET, FILM COATED ORAL
Status: DISCONTINUED | OUTPATIENT
Start: 2024-06-26 | End: 2024-06-29 | Stop reason: HOSPADM

## 2024-06-26 RX ORDER — ATORVASTATIN CALCIUM 40 MG/1
40 TABLET, FILM COATED ORAL DAILY
Status: DISCONTINUED | OUTPATIENT
Start: 2024-06-27 | End: 2024-06-29 | Stop reason: HOSPADM

## 2024-06-26 RX ORDER — NICOTINE POLACRILEX 4 MG
15-30 LOZENGE BUCCAL
Status: DISCONTINUED | OUTPATIENT
Start: 2024-06-26 | End: 2024-06-27

## 2024-06-26 RX ORDER — VENLAFAXINE HYDROCHLORIDE 75 MG/1
75 CAPSULE, EXTENDED RELEASE ORAL DAILY
Status: DISCONTINUED | OUTPATIENT
Start: 2024-06-26 | End: 2024-06-29 | Stop reason: HOSPADM

## 2024-06-26 RX ORDER — HEPARIN SODIUM 10000 [USP'U]/100ML
0-5000 INJECTION, SOLUTION INTRAVENOUS CONTINUOUS
Status: DISPENSED | OUTPATIENT
Start: 2024-06-26 | End: 2024-06-28

## 2024-06-26 RX ORDER — DOCUSATE SODIUM 100 MG/1
100 CAPSULE, LIQUID FILLED ORAL 2 TIMES DAILY
Status: DISCONTINUED | OUTPATIENT
Start: 2024-06-26 | End: 2024-06-29 | Stop reason: HOSPADM

## 2024-06-26 RX ORDER — BUPROPION HYDROCHLORIDE 150 MG/1
150 TABLET ORAL EVERY MORNING
Status: DISCONTINUED | OUTPATIENT
Start: 2024-06-27 | End: 2024-06-29 | Stop reason: HOSPADM

## 2024-06-26 RX ORDER — MULTIPLE VITAMINS W/ MINERALS TAB 9MG-400MCG
1 TAB ORAL DAILY
Status: DISCONTINUED | OUTPATIENT
Start: 2024-06-27 | End: 2024-06-29 | Stop reason: HOSPADM

## 2024-06-26 RX ORDER — TRAZODONE HYDROCHLORIDE 50 MG/1
50-100 TABLET, FILM COATED ORAL
Status: DISCONTINUED | OUTPATIENT
Start: 2024-06-26 | End: 2024-06-29 | Stop reason: HOSPADM

## 2024-06-26 RX ADMIN — SODIUM CHLORIDE 1000 ML: 9 INJECTION, SOLUTION INTRAVENOUS at 21:23

## 2024-06-26 RX ADMIN — SODIUM CHLORIDE 90 ML: 9 INJECTION, SOLUTION INTRAVENOUS at 22:06

## 2024-06-26 RX ADMIN — IOPAMIDOL 74 ML: 755 INJECTION, SOLUTION INTRAVENOUS at 22:03

## 2024-06-26 RX ADMIN — HEPARIN SODIUM 1800 UNITS/HR: 10000 INJECTION, SOLUTION INTRAVENOUS at 22:17

## 2024-06-26 ASSESSMENT — ACTIVITIES OF DAILY LIVING (ADL)
ADLS_ACUITY_SCORE: 36

## 2024-06-26 ASSESSMENT — ENCOUNTER SYMPTOMS: LEG PAIN: 1

## 2024-06-26 ASSESSMENT — COLUMBIA-SUICIDE SEVERITY RATING SCALE - C-SSRS
2. HAVE YOU ACTUALLY HAD ANY THOUGHTS OF KILLING YOURSELF IN THE PAST MONTH?: NO
1. IN THE PAST MONTH, HAVE YOU WISHED YOU WERE DEAD OR WISHED YOU COULD GO TO SLEEP AND NOT WAKE UP?: NO
6. HAVE YOU EVER DONE ANYTHING, STARTED TO DO ANYTHING, OR PREPARED TO DO ANYTHING TO END YOUR LIFE?: NO

## 2024-06-26 NOTE — PROGRESS NOTES
Chief Complaint   Patient presents with    Urgent Care     PAIN ON LEFT CALF, PATIENT IS CONCERNED ABOUT A BLOOD CLOT.     SUBJECTIVE:  Gaurang Rivera is a 53 year old male presenting with possible blood clot. Patient has factor V Leiden heterozygous type history of dvt and afib, has been off of his warfarin for 5 months due to lack of insurance. His last INR was 1.33 3/14/2024. He has developed left lower extremity redness pitting edema bulging varicose vein cramping pain going up into his thigh worsening for 5 days. Did note some chest discomfort several days prior.  Declines any recent travel flights surgeries cough hemoptysis.    SpO2 Readings from Last 6 Encounters:   06/26/24 94%   01/15/24 93%   08/28/23 98%   08/03/23 95%   05/01/23 93%   02/28/23 93%     Pulse Readings from Last 6 Encounters:   06/26/24 105   01/15/24 84   08/28/23 79   08/03/23 99   05/01/23 104   02/28/23 91     Past Medical History:   Diagnosis Date    Antiplatelet or antithrombotic long-term use     Closed displaced fracture of neck of right scapula, sequela 10/21/2019    Added automatically from request for surgery [id number]    Depressive disorder 2001    Diabetes (H)     DVT (deep venous thrombosis) (H) 07/05/2019    Elevated blood pressure reading without diagnosis of hypertension 05/13/2018    Hypercholesteremia 2012    Personal history of other medical treatment     blood clot foot july 2019    Pilonidal cyst 05/13/2018     Current Outpatient Medications   Medication Sig Dispense Refill    atorvastatin (LIPITOR) 40 MG tablet Take 1 tablet (40 mg) by mouth daily 90 tablet 1    buPROPion (WELLBUTRIN XL) 150 MG 24 hr tablet Take 1 tablet (150 mg) by mouth every morning Take with 300mg for total daily dose of 450mg. 90 tablet 0    buPROPion (WELLBUTRIN XL) 300 MG 24 hr tablet Take 1 tablet (300 mg) by mouth every morning Take with 150mg for total daily dose of 450mg. 90 tablet 0    cholecalciferol (VITAMIN D3) 125 mcg (5000 units)  capsule Take 1 capsule (125 mcg) by mouth daily 100 capsule 3    Continuous Blood Gluc Sensor (FREESTYLE JAVI 14 DAY SENSOR) [facility] [place] AS DIRECTED AND CHANGE EVERY 14 DAYS, 6 each 3    exenatide ER (BYDUREON BCISE) 2 MG/0.85ML auto-injector INJECT 2MG SUBCUTANEOUS EVERY 7 DAYS 3.4 mL 5    ibuprofen (ADVIL/MOTRIN) 200 MG capsule Take 600-800 mg by mouth every 8 hours as needed for fever      JARDIANCE 10 MG TABS tablet TAKE 1 TABLET(10 MG) BY MOUTH DAILY 30 tablet 5    lisinopril (ZESTRIL) 5 MG tablet Take 1 tablet (5 mg) by mouth daily 90 tablet 1    lurasidone (LATUDA) 120 MG TABS tablet Take 1 tablet (120 mg) by mouth daily with food 30 tablet 1    metFORMIN (GLUCOPHAGE) 1000 MG tablet TAKE 1 TABLET(1000 MG) BY MOUTH TWICE DAILY WITH MEALS 180 tablet 0    multivitamin w/minerals (THERA-VIT-M) tablet Take 1 tablet by mouth daily 90 tablet 3    omeprazole (PRILOSEC OTC) 20 MG EC tablet Take 20 mg by mouth daily as needed      QUEtiapine (SEROQUEL) 25 MG tablet Take 2 tablets (50 mg) by mouth nightly as needed (insomnia) 60 tablet 1    traZODone (DESYREL) 50 MG tablet Take 1-2 tablets ( mg) by mouth nightly as needed for sleep 60 tablet 1    venlafaxine (EFFEXOR XR) 75 MG 24 hr capsule Take 1 capsule (75 mg) by mouth daily 30 capsule 0    warfarin ANTICOAGULANT (COUMADIN) 5 MG tablet 10 mg Fri; 7.5 mg all other days or as directed by INR clinic 60 tablet 0     No current facility-administered medications for this visit.     Social History     Tobacco Use    Smoking status: Never    Smokeless tobacco: Never   Substance Use Topics    Alcohol use: No     Comment: None     No Known Allergies    Review of Systems  All systems negative except for those listed above in HPI.    OBJECTIVE:   /81   Pulse 105   Temp 97.2  F (36.2  C) (Temporal)   Wt 121.1 kg (267 lb)   SpO2 94%   BMI 30.85 kg/m      Physical Exam  Vitals reviewed.   Constitutional:       General: He is in acute distress (nervous about blood clot).       Appearance: Normal appearance.   HENT:      Head: Normocephalic and atraumatic.      Nose: Nose normal.      Mouth/Throat:      Mouth: Mucous membranes are moist.      Pharynx: Oropharynx is clear.   Eyes:      Extraocular Movements: Extraocular movements intact.      Conjunctiva/sclera: Conjunctivae normal.      Pupils: Pupils are equal, round, and reactive to light.   Cardiovascular:      Rate and Rhythm: Tachycardia present.      Pulses: Normal pulses.   Pulmonary:      Effort: Pulmonary effort is normal. No respiratory distress.      Breath sounds: No stridor. No wheezing, rhonchi or rales.   Chest:      Chest wall: No tenderness.   Musculoskeletal:         General: Swelling and tenderness present. Normal range of motion.      Cervical back: Normal range of motion and neck supple.      Right lower leg: Right lower leg edema: 42cm.      Left lower leg: Edema (46cm) present.   Skin:     General: Skin is warm and dry.      Findings: Erythema present. No rash.   Neurological:      General: No focal deficit present.      Mental Status: He is alert and oriented to person, place, and time.   Psychiatric:         Mood and Affect: Mood normal.         Behavior: Behavior normal.       ASSESSMENT:    ICD-10-CM    1. Personal history of DVT (deep vein thrombosis)  Z86.718       2. Pain and swelling of left lower extremity  M79.605     M79.89       3. Inadequate anticoagulation  Z51.81     Z79.01       4. Factor 5 Leiden mutation, heterozygous (H24)  D68.51         PLAN:     Triaged to ER for higher level of care given high level of suspicion for DVT PE  Patient has factor V Leiden heterozygous type and has been off of his warfarin for 5 months due to lack of insurance  His last INR was 1.33 3/14/2024  He has developed left lower extremity redness pitting edema bulging varicose vein cramping pain going up into his thigh worsening for 5 days  Did note some chest discomfort several days prior  He has borderline hypoxia  and tachycardia in clinic  Our urgent care is limited without stat D-dimer ultrasound CT IV access  Discussed Wells risk and PERC score, do not feel patient is safe to wait for ADS tomorrow  Patient to present to ER now by car, call 911 if deteriorating en route    Follow up with primary care provider with any problems, questions or concerns or if symptoms worsen or fail to improve. Patient agreed to plan and verbalized understanding.    Fiorella Menjivar, VERITOP-BC  [facility] URGENT CARE [place]

## 2024-06-26 NOTE — PATIENT INSTRUCTIONS
Triaged to ER for higher level of care given high level of suspicion for DVT PE  Patient has factor V Leiden heterozygous type and has been off of his warfarin for 5 months due to lack of insurance  His last INR was 1.33 3/14/2024  He has developed left lower extremity redness pitting edema bulging varicose vein cramping pain going up into his thigh worsening for 5 days  Did note some chest discomfort several days prior  He has borderline hypoxia and tachycardia in clinic  Our urgent care is limited without stat D-dimer ultrasound CT IV access  Discussed Wells risk and PERC score, do not feel patient is safe to wait for ADS tomorrow  Patient to present to ER now by car, call 911 if deteriorating en route

## 2024-06-27 ENCOUNTER — APPOINTMENT (OUTPATIENT)
Dept: INTERVENTIONAL RADIOLOGY/VASCULAR | Facility: CLINIC | Age: 54
End: 2024-06-27
Attending: RADIOLOGY
Payer: COMMERCIAL

## 2024-06-27 ENCOUNTER — APPOINTMENT (OUTPATIENT)
Dept: INTERVENTIONAL RADIOLOGY/VASCULAR | Facility: CLINIC | Age: 54
End: 2024-06-27
Payer: COMMERCIAL

## 2024-06-27 ENCOUNTER — APPOINTMENT (OUTPATIENT)
Dept: CARDIOLOGY | Facility: CLINIC | Age: 54
End: 2024-06-27
Attending: PHYSICIAN ASSISTANT
Payer: COMMERCIAL

## 2024-06-27 LAB
ACT BLD: 186 SECONDS (ref 74–150)
ALBUMIN SERPL BCG-MCNC: 4.1 G/DL (ref 3.5–5.2)
ALP SERPL-CCNC: 101 U/L (ref 40–150)
ALT SERPL W P-5'-P-CCNC: 13 U/L (ref 0–70)
ANION GAP SERPL CALCULATED.3IONS-SCNC: 11 MMOL/L (ref 7–15)
AST SERPL W P-5'-P-CCNC: 19 U/L (ref 0–45)
ATRIAL RATE - MUSE: 89 BPM
BASOPHILS # BLD AUTO: 0 10E3/UL (ref 0–0.2)
BASOPHILS NFR BLD AUTO: 0 %
BILIRUB SERPL-MCNC: 1 MG/DL
BUN SERPL-MCNC: 15.4 MG/DL (ref 6–20)
CALCIUM SERPL-MCNC: 8.9 MG/DL (ref 8.6–10)
CHLORIDE SERPL-SCNC: 100 MMOL/L (ref 98–107)
CREAT SERPL-MCNC: 0.97 MG/DL (ref 0.67–1.17)
DEPRECATED HCO3 PLAS-SCNC: 25 MMOL/L (ref 22–29)
DIASTOLIC BLOOD PRESSURE - MUSE: NORMAL MMHG
EGFRCR SERPLBLD CKD-EPI 2021: >90 ML/MIN/1.73M2
EOSINOPHIL # BLD AUTO: 0.2 10E3/UL (ref 0–0.7)
EOSINOPHIL NFR BLD AUTO: 3 %
ERYTHROCYTE [DISTWIDTH] IN BLOOD BY AUTOMATED COUNT: 12.6 % (ref 10–15)
GLUCOSE BLDC GLUCOMTR-MCNC: 213 MG/DL (ref 70–99)
GLUCOSE BLDC GLUCOMTR-MCNC: 220 MG/DL (ref 70–99)
GLUCOSE BLDC GLUCOMTR-MCNC: 233 MG/DL (ref 70–99)
GLUCOSE BLDC GLUCOMTR-MCNC: 234 MG/DL (ref 70–99)
GLUCOSE BLDC GLUCOMTR-MCNC: 256 MG/DL (ref 70–99)
GLUCOSE BLDC GLUCOMTR-MCNC: 273 MG/DL (ref 70–99)
GLUCOSE BLDC GLUCOMTR-MCNC: 360 MG/DL (ref 70–99)
GLUCOSE SERPL-MCNC: 277 MG/DL (ref 70–99)
HBA1C MFR BLD: 11.7 %
HCT VFR BLD AUTO: 43.5 % (ref 40–53)
HGB BLD-MCNC: 15.2 G/DL (ref 13.3–17.7)
IMM GRANULOCYTES # BLD: 0 10E3/UL
IMM GRANULOCYTES NFR BLD: 0 %
INTERPRETATION ECG - MUSE: NORMAL
LVEF ECHO: NORMAL
LYMPHOCYTES # BLD AUTO: 1.9 10E3/UL (ref 0.8–5.3)
LYMPHOCYTES NFR BLD AUTO: 29 %
MCH RBC QN AUTO: 30.2 PG (ref 26.5–33)
MCHC RBC AUTO-ENTMCNC: 34.9 G/DL (ref 31.5–36.5)
MCV RBC AUTO: 86 FL (ref 78–100)
MONOCYTES # BLD AUTO: 0.7 10E3/UL (ref 0–1.3)
MONOCYTES NFR BLD AUTO: 11 %
NEUTROPHILS # BLD AUTO: 3.7 10E3/UL (ref 1.6–8.3)
NEUTROPHILS NFR BLD AUTO: 57 %
NRBC # BLD AUTO: 0 10E3/UL
NRBC BLD AUTO-RTO: 0 /100
P AXIS - MUSE: 52 DEGREES
PLATELET # BLD AUTO: 139 10E3/UL (ref 150–450)
POTASSIUM SERPL-SCNC: 3.7 MMOL/L (ref 3.4–5.3)
PR INTERVAL - MUSE: 170 MS
PROT SERPL-MCNC: 6.7 G/DL (ref 6.4–8.3)
QRS DURATION - MUSE: 94 MS
QT - MUSE: 378 MS
QTC - MUSE: 459 MS
R AXIS - MUSE: 72 DEGREES
RBC # BLD AUTO: 5.04 10E6/UL (ref 4.4–5.9)
SODIUM SERPL-SCNC: 136 MMOL/L (ref 135–145)
SYSTOLIC BLOOD PRESSURE - MUSE: NORMAL MMHG
T AXIS - MUSE: 53 DEGREES
UFH PPP CHRO-ACNC: 0.13 IU/ML
UFH PPP CHRO-ACNC: 0.56 IU/ML
UFH PPP CHRO-ACNC: >1.1 IU/ML
VENTRICULAR RATE- MUSE: 89 BPM
WBC # BLD AUTO: 6.5 10E3/UL (ref 4–11)

## 2024-06-27 PROCEDURE — 250N000011 HC RX IP 250 OP 636

## 2024-06-27 PROCEDURE — C1769 GUIDE WIRE: HCPCS

## 2024-06-27 PROCEDURE — 99153 MOD SED SAME PHYS/QHP EA: CPT

## 2024-06-27 PROCEDURE — 80053 COMPREHEN METABOLIC PANEL: CPT | Performed by: INTERNAL MEDICINE

## 2024-06-27 PROCEDURE — 250N000013 HC RX MED GY IP 250 OP 250 PS 637: Performed by: INTERNAL MEDICINE

## 2024-06-27 PROCEDURE — 99152 MOD SED SAME PHYS/QHP 5/>YRS: CPT | Mod: GC | Performed by: RADIOLOGY

## 2024-06-27 PROCEDURE — 36014 PLACE CATHETER IN ARTERY: CPT | Mod: 50

## 2024-06-27 PROCEDURE — 85520 HEPARIN ASSAY: CPT | Performed by: STUDENT IN AN ORGANIZED HEALTH CARE EDUCATION/TRAINING PROGRAM

## 2024-06-27 PROCEDURE — 85520 HEPARIN ASSAY: CPT | Performed by: PHYSICIAN ASSISTANT

## 2024-06-27 PROCEDURE — C1880 VENA CAVA FILTER: HCPCS

## 2024-06-27 PROCEDURE — 250N000011 HC RX IP 250 OP 636: Mod: JZ | Performed by: PHYSICIAN ASSISTANT

## 2024-06-27 PROCEDURE — 83036 HEMOGLOBIN GLYCOSYLATED A1C: CPT | Performed by: INTERNAL MEDICINE

## 2024-06-27 PROCEDURE — 255N000002 HC RX 255 OP 636: Performed by: STUDENT IN AN ORGANIZED HEALTH CARE EDUCATION/TRAINING PROGRAM

## 2024-06-27 PROCEDURE — 36415 COLL VENOUS BLD VENIPUNCTURE: CPT | Performed by: PHYSICIAN ASSISTANT

## 2024-06-27 PROCEDURE — 75743 ARTERY X-RAYS LUNGS: CPT | Mod: 26 | Performed by: RADIOLOGY

## 2024-06-27 PROCEDURE — C1757 CATH, THROMBECTOMY/EMBOLECT: HCPCS

## 2024-06-27 PROCEDURE — 255N000002 HC RX 255 OP 636: Performed by: RADIOLOGY

## 2024-06-27 PROCEDURE — 999N000208 ECHOCARDIOGRAM COMPLETE

## 2024-06-27 PROCEDURE — 99207 IR IVC FILTER PLACEMENT: CPT | Performed by: RADIOLOGY

## 2024-06-27 PROCEDURE — 76937 US GUIDE VASCULAR ACCESS: CPT | Mod: 26 | Performed by: RADIOLOGY

## 2024-06-27 PROCEDURE — 37184 PRIM ART M-THRMBC 1ST VSL: CPT | Mod: 50

## 2024-06-27 PROCEDURE — 120N000005 HC R&B MS OVERFLOW UMMC

## 2024-06-27 PROCEDURE — 37191 INS ENDOVAS VENA CAVA FILTR: CPT

## 2024-06-27 PROCEDURE — 93306 TTE W/DOPPLER COMPLETE: CPT | Mod: 26 | Performed by: INTERNAL MEDICINE

## 2024-06-27 PROCEDURE — 272N000566 HC SHEATH CR3

## 2024-06-27 PROCEDURE — 36415 COLL VENOUS BLD VENIPUNCTURE: CPT | Performed by: INTERNAL MEDICINE

## 2024-06-27 PROCEDURE — 99233 SBSQ HOSP IP/OBS HIGH 50: CPT | Performed by: STUDENT IN AN ORGANIZED HEALTH CARE EDUCATION/TRAINING PROGRAM

## 2024-06-27 PROCEDURE — 37191 INS ENDOVAS VENA CAVA FILTR: CPT | Mod: GC | Performed by: RADIOLOGY

## 2024-06-27 PROCEDURE — 272N000143 HC KIT CR3

## 2024-06-27 PROCEDURE — 85520 HEPARIN ASSAY: CPT | Performed by: INTERNAL MEDICINE

## 2024-06-27 PROCEDURE — 82962 GLUCOSE BLOOD TEST: CPT

## 2024-06-27 PROCEDURE — 85347 COAGULATION TIME ACTIVATED: CPT

## 2024-06-27 PROCEDURE — 36013 PLACE CATHETER IN ARTERY: CPT | Mod: GC | Performed by: RADIOLOGY

## 2024-06-27 PROCEDURE — 99152 MOD SED SAME PHYS/QHP 5/>YRS: CPT

## 2024-06-27 PROCEDURE — 272N000504 HC NEEDLE CR4

## 2024-06-27 PROCEDURE — 85025 COMPLETE CBC W/AUTO DIFF WBC: CPT | Performed by: INTERNAL MEDICINE

## 2024-06-27 PROCEDURE — 75825 VEIN X-RAY TRUNK: CPT | Mod: 26 | Performed by: RADIOLOGY

## 2024-06-27 PROCEDURE — 36415 COLL VENOUS BLD VENIPUNCTURE: CPT | Performed by: STUDENT IN AN ORGANIZED HEALTH CARE EDUCATION/TRAINING PROGRAM

## 2024-06-27 RX ORDER — NICOTINE POLACRILEX 4 MG
15-30 LOZENGE BUCCAL
Status: DISCONTINUED | OUTPATIENT
Start: 2024-06-27 | End: 2024-06-29 | Stop reason: HOSPADM

## 2024-06-27 RX ORDER — NALOXONE HYDROCHLORIDE 0.4 MG/ML
0.2 INJECTION, SOLUTION INTRAMUSCULAR; INTRAVENOUS; SUBCUTANEOUS
Status: DISCONTINUED | OUTPATIENT
Start: 2024-06-27 | End: 2024-06-29 | Stop reason: HOSPADM

## 2024-06-27 RX ORDER — NALOXONE HYDROCHLORIDE 0.4 MG/ML
0.4 INJECTION, SOLUTION INTRAMUSCULAR; INTRAVENOUS; SUBCUTANEOUS
Status: DISCONTINUED | OUTPATIENT
Start: 2024-06-27 | End: 2024-06-29 | Stop reason: HOSPADM

## 2024-06-27 RX ORDER — FENTANYL CITRATE 50 UG/ML
25-50 INJECTION, SOLUTION INTRAMUSCULAR; INTRAVENOUS EVERY 5 MIN PRN
Status: DISCONTINUED | OUTPATIENT
Start: 2024-06-27 | End: 2024-06-28

## 2024-06-27 RX ORDER — OXYCODONE HYDROCHLORIDE 5 MG/1
5 TABLET ORAL EVERY 4 HOURS PRN
Status: COMPLETED | OUTPATIENT
Start: 2024-06-27 | End: 2024-06-28

## 2024-06-27 RX ORDER — DEXTROSE MONOHYDRATE 25 G/50ML
25-50 INJECTION, SOLUTION INTRAVENOUS
Status: DISCONTINUED | OUTPATIENT
Start: 2024-06-27 | End: 2024-06-29 | Stop reason: HOSPADM

## 2024-06-27 RX ORDER — LURASIDONE HYDROCHLORIDE 20 MG/1
20 TABLET, FILM COATED ORAL DAILY
Status: DISCONTINUED | OUTPATIENT
Start: 2024-06-28 | End: 2024-06-29 | Stop reason: HOSPADM

## 2024-06-27 RX ORDER — FLUMAZENIL 0.1 MG/ML
0.2 INJECTION, SOLUTION INTRAVENOUS
Status: DISCONTINUED | OUTPATIENT
Start: 2024-06-27 | End: 2024-06-28

## 2024-06-27 RX ORDER — IODIXANOL 320 MG/ML
150 INJECTION, SOLUTION INTRAVASCULAR ONCE
Status: COMPLETED | OUTPATIENT
Start: 2024-06-27 | End: 2024-06-27

## 2024-06-27 RX ORDER — PANTOPRAZOLE SODIUM 40 MG/1
40 TABLET, DELAYED RELEASE ORAL
Status: DISCONTINUED | OUTPATIENT
Start: 2024-06-28 | End: 2024-06-29 | Stop reason: HOSPADM

## 2024-06-27 RX ADMIN — IODIXANOL 45 ML: 320 INJECTION, SOLUTION INTRAVASCULAR at 17:55

## 2024-06-27 RX ADMIN — VENLAFAXINE HYDROCHLORIDE 75 MG: 75 CAPSULE, EXTENDED RELEASE ORAL at 23:55

## 2024-06-27 RX ADMIN — FENTANYL CITRATE 50 MCG: 50 INJECTION, SOLUTION INTRAMUSCULAR; INTRAVENOUS at 17:25

## 2024-06-27 RX ADMIN — DOCUSATE SODIUM 100 MG: 100 CAPSULE, LIQUID FILLED ORAL at 08:39

## 2024-06-27 RX ADMIN — HEPARIN SODIUM 1500 UNITS/HR: 10000 INJECTION, SOLUTION INTRAVENOUS at 09:37

## 2024-06-27 RX ADMIN — MIDAZOLAM 1 MG: 1 INJECTION INTRAMUSCULAR; INTRAVENOUS at 17:24

## 2024-06-27 RX ADMIN — DOCUSATE SODIUM 100 MG: 100 CAPSULE, LIQUID FILLED ORAL at 20:19

## 2024-06-27 RX ADMIN — FENTANYL CITRATE 25 MCG: 50 INJECTION, SOLUTION INTRAMUSCULAR; INTRAVENOUS at 17:38

## 2024-06-27 RX ADMIN — INSULIN ASPART 2 UNITS: 100 INJECTION, SOLUTION INTRAVENOUS; SUBCUTANEOUS at 08:45

## 2024-06-27 RX ADMIN — FENTANYL CITRATE 25 MCG: 50 INJECTION, SOLUTION INTRAMUSCULAR; INTRAVENOUS at 17:48

## 2024-06-27 RX ADMIN — Medication 1 TABLET: at 08:39

## 2024-06-27 RX ADMIN — VENLAFAXINE HYDROCHLORIDE 75 MG: 75 CAPSULE, EXTENDED RELEASE ORAL at 00:36

## 2024-06-27 RX ADMIN — HUMAN ALBUMIN MICROSPHERES AND PERFLUTREN 6 ML: 10; .22 INJECTION, SOLUTION INTRAVENOUS at 13:47

## 2024-06-27 RX ADMIN — MIDAZOLAM 0.5 MG: 1 INJECTION INTRAMUSCULAR; INTRAVENOUS at 17:39

## 2024-06-27 RX ADMIN — MIDAZOLAM 0.5 MG: 1 INJECTION INTRAMUSCULAR; INTRAVENOUS at 17:48

## 2024-06-27 RX ADMIN — BUPROPION HYDROCHLORIDE 150 MG: 150 TABLET, EXTENDED RELEASE ORAL at 08:40

## 2024-06-27 RX ADMIN — FENTANYL CITRATE 25 MCG: 50 INJECTION, SOLUTION INTRAMUSCULAR; INTRAVENOUS at 17:56

## 2024-06-27 RX ADMIN — BUPROPION HYDROCHLORIDE 300 MG: 300 TABLET, EXTENDED RELEASE ORAL at 08:40

## 2024-06-27 RX ADMIN — MIDAZOLAM 0.5 MG: 1 INJECTION INTRAMUSCULAR; INTRAVENOUS at 17:56

## 2024-06-27 RX ADMIN — INSULIN ASPART 2 UNITS: 100 INJECTION, SOLUTION INTRAVENOUS; SUBCUTANEOUS at 12:31

## 2024-06-27 RX ADMIN — ATORVASTATIN CALCIUM 40 MG: 40 TABLET, FILM COATED ORAL at 12:43

## 2024-06-27 ASSESSMENT — ACTIVITIES OF DAILY LIVING (ADL)
ADLS_ACUITY_SCORE: 36
ADLS_ACUITY_SCORE: 20
ADLS_ACUITY_SCORE: 36
ADLS_ACUITY_SCORE: 20
ADLS_ACUITY_SCORE: 20
ADLS_ACUITY_SCORE: 36
ADLS_ACUITY_SCORE: 20
ADLS_ACUITY_SCORE: 36
ADLS_ACUITY_SCORE: 21
ADLS_ACUITY_SCORE: 36
ADLS_ACUITY_SCORE: 20
ADLS_ACUITY_SCORE: 36
ADLS_ACUITY_SCORE: 20
ADLS_ACUITY_SCORE: 36
ADLS_ACUITY_SCORE: 36

## 2024-06-27 NOTE — CONSULTS
"    Interventional Radiology  [facility] Consult Service Note  06/27/24   7:59 AM    Consult Requested: PE/DVT intervention    Recommendations/Plan:    PERT activated, plan for transfer to [place] IR department. [facility] IR dept. Aware.         Pertinent Imaging Reviewed: CT, 6/26/24; U/S, 6/26/24    Expected date of discharge:  TBD     Vitals:   BP (!) 128/92 (BP Location: Right arm)   Pulse 89   Temp 98  F (36.7  C) (Oral)   Resp 16   Ht 1.981 m (6' 6\")   Wt 121.1 kg (267 lb)   SpO2 95%   BMI 30.85 kg/m      Pertinent Labs:   Lab Results   Component Value Date    WBC 6.5 06/27/2024    WBC 8.7 06/26/2024    WBC 7.2 04/11/2022    WBC 6.8 03/30/2021    WBC 9.2 06/03/2020    WBC 6.8 05/28/2020     Lab Results   Component Value Date    HGB 15.2 06/27/2024    HGB 16.9 06/26/2024    HGB 17.1 04/11/2022    HGB 18.0 03/30/2021    HGB 14.4 06/03/2020    HGB 16.1 05/28/2020     Lab Results   Component Value Date     06/27/2024     06/26/2024     04/11/2022     03/30/2021     06/03/2020     05/28/2020     Lab Results   Component Value Date    INR 1.17 (H) 06/26/2024    INR 3.00 (H) 06/25/2021     Lab Results   Component Value Date    POTASSIUM 3.7 06/27/2024    POTASSIUM 3.7 05/03/2022    POTASSIUM 4.3 03/30/2021        COVID-19 Antibody Results, Testing for Immunity           No data to display              COVID-19 PCR Results           No data to display                Imer Velez PA-C  Interventional Radiology  Pager: [phone]    "

## 2024-06-27 NOTE — MEDICATION SCRIBE - ADMISSION MEDICATION HISTORY
Medication Scribe Admission Medication History    Admission medication history is complete. The information provided in this note is only as accurate as the sources available at the time of the update.    Information Source(s): Patient and CareEverywhere/SureScripts via in-person    Pertinent Information: Pt has not had insurance but had bad withdrawals from venlafaxine so paid out of pocket for it. States his insurance is sorted out and would like to get back on his scheduled medications. Pt confirmed that the list below are all the medications he should be on with no recent changes.     Changes made to PTA medication list:  Added: None  Deleted: None  Changed: None    Allergies reviewed with patient and updates made in EHR: yes    Medication History Completed By: Serene Lott 6/27/2024 11:44 AM    PTA Med List   Medication Sig Last Dose    atorvastatin (LIPITOR) 40 MG tablet Take 1 tablet (40 mg) by mouth daily More than a month    buPROPion (WELLBUTRIN XL) 150 MG 24 hr tablet Take 1 tablet (150 mg) by mouth every morning Take with 300mg for total daily dose of 450mg. More than a month    buPROPion (WELLBUTRIN XL) 300 MG 24 hr tablet Take 1 tablet (300 mg) by mouth every morning Take with 150mg for total daily dose of 450mg. More than a month    cholecalciferol (VITAMIN D3) 125 mcg (5000 units) capsule Take 1 capsule (125 mcg) by mouth daily More than a month    exenatide ER (BYDUREON BCISE) 2 MG/0.85ML auto-injector INJECT 2MG SUBCUTANEOUS EVERY 7 DAYS Past Week    ibuprofen (ADVIL/MOTRIN) 200 MG capsule Take 600-800 mg by mouth every 8 hours as needed for fever More than a month    JARDIANCE 10 MG TABS tablet TAKE 1 TABLET(10 MG) BY MOUTH DAILY More than a month    lisinopril (ZESTRIL) 5 MG tablet Take 1 tablet (5 mg) by mouth daily More than a month    lurasidone (LATUDA) 120 MG TABS tablet Take 1 tablet (120 mg) by mouth daily with food More than a month    metFORMIN (GLUCOPHAGE) 1000 MG tablet TAKE 1  TABLET(1000 MG) BY MOUTH TWICE DAILY WITH MEALS More than a month    multivitamin w/minerals (THERA-VIT-M) tablet Take 1 tablet by mouth daily More than a month    omeprazole (PRILOSEC OTC) 20 MG EC tablet Take 20 mg by mouth daily as needed More than a month    QUEtiapine (SEROQUEL) 25 MG tablet Take 2 tablets (50 mg) by mouth nightly as needed (insomnia) More than a month    traZODone (DESYREL) 50 MG tablet Take 1-2 tablets ( mg) by mouth nightly as needed for sleep More than a month    venlafaxine (EFFEXOR XR) 75 MG 24 hr capsule Take 1 capsule (75 mg) by mouth daily 6/25/2024 at pm    warfarin ANTICOAGULANT (COUMADIN) 5 MG tablet 10 mg Fri; 7.5 mg all other days or as directed by INR clinic More than a month

## 2024-06-27 NOTE — PROGRESS NOTES
Brief Medicine Cross-Cover Note:    Was notified by IR team regarding CT PE study unfortunately revealing right greater than left lobar and subsegmental PE and suspicious for mild R heart strain. Patient remains hemodynamically stable, not hypoxic. Trop 51. . PERT activated per IR recommendations and will plan to transfer patient to the [facility] (ideally direct transfer) with plan for IR intervention in the morning. Should concerning clinical changes develop (hypoxia hemodynamic instability, etc.) then will plan to emergently send patient to [facility] ED. Admitting team upon arrival to [facility] will be [facility] (patient already accepted to their service). On call echo tech notified of need for stat TTE to assess for R heart strain. Continue high intensity heparin drip.    For his BG, initially  down to 360 without intervention. No anion gap. Will give a dose of sliding scale insulin and then monitor BG Q4H since he will be NPO for procedure. If BG not well controlled, would have low threshold to start insulin drip pre-operatively.    Discussed with attending medicine physician Dr. Moncada who expressed agreement with above plan and will follow up BG, TTE and monitor patient condition closely.    I greatly appreciate assistance of all teams involved.    Miguel Ferro PA-C on 6/27/2024 at 12:15 AM

## 2024-06-27 NOTE — PROGRESS NOTES
Transfer  Transferred from: [facility] ED  Via: Stretcher  Reason for transfer: Pt appropriate for 6C  Family: Aware of transfer  Belongings: Sent with pt  Chart: Sent with pt  Medications: Meds from bin sent with pt  Report called from: Gita RN    2 RN skin assessment completed with Chanda MCKINLEY RN

## 2024-06-27 NOTE — PROCEDURES
[facility]    Procedure: IR Procedure Note    Date/Time: 6/27/2024 6:11 PM    Performed by: Alejo Barrett MD  Authorized by: Alejo Barrett MD  IR Fellow Physician: GLADYS Barrett MD  Other(s) attending procedure: TIEN Keating MD      UNIVERSAL PROTOCOL   Site Marked: NA  Prior Images Obtained and Reviewed:  Yes  Required items: Required blood products, implants, devices and special equipment available    Patient identity confirmed:  Verbally with patient, arm band, provided demographic data and hospital-assigned identification number  Patient was reevaluated immediately before administering moderate or deep sedation or anesthesia  Confirmation Checklist:  Patient's identity using two indicators, relevant allergies, procedure was appropriate and matched the consent or emergent situation and correct equipment/implants were available  Time out: Immediately prior to the procedure a time out was called    Universal Protocol: the Joint Commission Universal Protocol was followed    Preparation: Patient was prepped and draped in usual sterile fashion    ESBL (mL):  1     ANESTHESIA    Anesthesia:  Local infiltration  Local Anesthetic:  Lidocaine 1% without epinephrine  Anesthetic Total (mL):  8      SEDATION  Patient Sedated: Yes    Sedation Type:  Moderate (conscious) sedation  Sedation:  Fentanyl and midazolam  Vital signs: Vital signs monitored during sedation    See dictated procedure note for full details.  Findings: See dictation.    Specimens: none    Complications: None    Condition: Stable    Plan: - Return to PCU.  - Bedrest for 1 hour with right leg straight.  - IR will arrange follow up for removal of inferior vena cava filter retrieval.      PROCEDURE  Describe Procedure: 1.) During attempted catheterization of main pulmonary artery the pigtail catheter repeatedly crossed into the left heart indicating a large patent foramen ovale was present. Given the patient  was clinically doing well at the time of procedure (on room air), weighed against risk of dragging clot back against the PFO, we made the decision to abort the procedure.  - Recommend formal ECHO for further evaluation.  2.) Successful insertion of infrarenal IVC filter.   Patient Tolerance:  Patient tolerated the procedure well with no immediate complications  Length of time physician/provider present for 1:1 monitoring during sedation: 30

## 2024-06-27 NOTE — PROGRESS NOTES
Patient Name: Guarang Rivera  Medical Record Number: [id number]  Today's Date: 6/27/2024    Procedure: ***  Proceduralist: ***  Pathology present: ***    Procedure Start: ***  Procedure end: ***  Sedation medications administered: ***     Report given to: ***  : ***    Other Notes: Pt arrived to IR room *** from ***. Consent reviewed. Pt denies any questions or concerns regarding procedure. Pt positioned *** and monitored per protocol. Pt tolerated procedure without any noted complications. Pt transferred back to ***.

## 2024-06-27 NOTE — ED PROVIDER NOTES
ED Provider Note  [facility]      History     Chief Complaint   Patient presents with    Leg Pain     Worsening Left leg pain and swelling that started 5 days ago. Pt also reported that 2 days ago, he was having SOB and chest pain but not today.        Leg Pain    54yo M pmhx DM, HLD, factor V Leiden and DVT presents from urgent care for rule out DVT.  Patient has had 5 days of atraumatic LLE swelling, edema, calf pain.  He notably has been without his warfarin for many months 2/2 insurance issues.  No extremity paresthesia.  States that 2 days ago he had some CP and SOB, but that resolved.  No fever, cough, other complaints.    Past Medical History  Past Medical History:   Diagnosis Date    Antiplatelet or antithrombotic long-term use     Closed displaced fracture of neck of right scapula, sequela 10/21/2019    Added automatically from request for surgery [id number]    Depressive disorder 2001    Diabetes (H)     DVT (deep venous thrombosis) (H) 07/05/2019    Elevated blood pressure reading without diagnosis of hypertension 05/13/2018    Hypercholesteremia 2012    Personal history of other medical treatment     blood clot foot july 2019    Pilonidal cyst 05/13/2018     Past Surgical History:   Procedure Laterality Date    APPENDECTOMY      ARTHROSCOPY SHOULDER, OPEN BICEP TENODESIS REPAIR, COMBINED Right 6/2/2020    Procedure: Right shoulder diagnostic arthroscopy, capsular release,  open biceps tenodesis;  Surgeon: Dulce Maria Burdick MD;  Location: [facility] OR    AS DRAIN PILONIDAL CYST SIMPLE      BACK SURGERY  1997    spinal fusion    CHOLECYSTECTOMY  2012    CYSTECTOMY PILONIDAL N/A 2/21/2020    Procedure: EXCISION, PILONIDAL CYST, AILEEN II Procedure;  Surgeon: Artemio Ahumada MD;  Location: [facility] OR    THUMB SURGERY   1995     atorvastatin (LIPITOR) 40 MG tablet  buPROPion (WELLBUTRIN XL) 150 MG 24 hr tablet  buPROPion (WELLBUTRIN XL) 300 MG 24 hr tablet  cholecalciferol  "(VITAMIN D3) 125 mcg (5000 units) capsule  Continuous Blood Gluc Sensor (FREESTYLE JAVI 14 DAY SENSOR) MISC  exenatide ER (BYDUREON BCISE) 2 MG/0.85ML auto-injector  ibuprofen (ADVIL/MOTRIN) 200 MG capsule  JARDIANCE 10 MG TABS tablet  lisinopril (ZESTRIL) 5 MG tablet  lurasidone (LATUDA) 120 MG TABS tablet  metFORMIN (GLUCOPHAGE) 1000 MG tablet  multivitamin w/minerals (THERA-VIT-M) tablet  omeprazole (PRILOSEC OTC) 20 MG EC tablet  QUEtiapine (SEROQUEL) 25 MG tablet  traZODone (DESYREL) 50 MG tablet  venlafaxine (EFFEXOR XR) 75 MG 24 hr capsule  warfarin ANTICOAGULANT (COUMADIN) 5 MG tablet      No Known Allergies  Family History  Family History   Problem Relation Age of Onset    Diabetes Mother     Skin Cancer Father     Depression Father     Thrombosis Father         HE HAS HAD 3 CLOTS - PER PATIENT THEY WERE UNPROVOKED ON COUMADIN     Alcoholism Sister     Depression Sister     Lupus Sister     Anesthesia Reaction No family hx of     Cardiovascular No family hx of     Melanoma No family hx of      Social History   Social History     Tobacco Use    Smoking status: Never    Smokeless tobacco: Never   Vaping Use    Vaping status: Never Used   Substance Use Topics    Alcohol use: No     Comment: None    Drug use: No      A medically appropriate review of systems was performed with pertinent positives and negatives noted in the HPI, and all other systems negative.    Physical Exam   BP: 110/81  Pulse: 113  Temp: 97.8  F (36.6  C)  Resp: 18  Height: 198.1 cm (6' 6\")  Weight: 121.1 kg (267 lb)  SpO2: 92 %  Physical Exam  Constitutional:       General: He is not in acute distress.     Appearance: Normal appearance. He is not toxic-appearing.   HENT:      Head: Atraumatic.   Eyes:      Conjunctiva/sclera: Conjunctivae normal.   Cardiovascular:      Rate and Rhythm: Normal rate.      Heart sounds: Normal heart sounds.   Pulmonary:      Effort: Pulmonary effort is normal.   Musculoskeletal:      Cervical back: Neck " supple.      Comments: LLE notably swollen compared to RLE with trace pitting edema.  No overlying skin changes.  No calf TTP.  Distal pulses and sensation intact.   Skin:     General: Skin is warm.   Neurological:      Mental Status: He is alert.           ED Course, Procedures, & Data      Procedures         EKG Interpretation:      Interpreted by Pasha Gonzalez PA-C  Time reviewed: 2130  Symptoms at time of EKG: None   Rhythm: normal sinus   Rate: normal  Axis: normal  Ectopy: none  Conduction: normal  ST Segments/ T Waves: T wave flattening aVL  Q Waves: none  Comparison to prior: Unchanged    Clinical Impression: normal EKG           Results for orders placed or performed during the hospital encounter of 06/26/24   US Lower Extremity Venous Duplex Bilateral     Status: None    Narrative    EXAM: US LOWER EXTREMITY VENOUS DUPLEX BILATERAL  LOCATION: [facility]  DATE: 6/26/2024    INDICATION: hx factor V leiden. LLE pain, swelling, edema  COMPARISON: None.  TECHNIQUE: Venous Duplex ultrasound of bilateral lower extremities with and without compression, augmentation and duplex. Color flow and spectral Doppler with waveform analysis performed.    FINDINGS: Exam includes the common femoral, femoral, popliteal veins as well as segmentally visualized deep calf veins and greater saphenous vein.     RIGHT: Deep venous thrombosis within the right calf peroneal veins. No superficial thrombophlebitis. No popliteal cyst.    LEFT: Nonocclusive clot is seen within the left external iliac vein and common femoral vein. There is also deep venous thrombosis involving the left mid femoral vein extending into the distal femoral vein at the distal thigh and popliteal vein. This also   DVT within the left posterior tibial vein at the calf. No superficial thrombophlebitis. No popliteal cyst.      Impression    IMPRESSION:  1.  Nonocclusive DVT within the left external iliac and common  femoral vein. DVT involving the mid thigh femoral vein extending into the distal thigh femoral vein and popliteal vein. There is also clot within the left proximal calf posterior tibial vein.  2.  Deep venous thrombosis involving the right calf peroneal vein.    Results called to Lisa Bhatia 6/26/2024 2113 hours   CBC with platelets     Status: Normal   Result Value Ref Range    WBC Count 8.7 4.0 - 11.0 10e3/uL    RBC Count 5.65 4.40 - 5.90 10e6/uL    Hemoglobin 16.9 13.3 - 17.7 g/dL    Hematocrit 47.8 40.0 - 53.0 %    MCV 85 78 - 100 fL    MCH 29.9 26.5 - 33.0 pg    MCHC 35.4 31.5 - 36.5 g/dL    RDW 12.4 10.0 - 15.0 %    Platelet Count 167 150 - 450 10e3/uL   Comprehensive metabolic panel     Status: Abnormal   Result Value Ref Range    Sodium 128 (L) 135 - 145 mmol/L    Potassium 4.1 3.4 - 5.3 mmol/L    Carbon Dioxide (CO2) 20 (L) 22 - 29 mmol/L    Anion Gap 15 7 - 15 mmol/L    Urea Nitrogen 20.9 (H) 6.0 - 20.0 mg/dL    Creatinine 1.13 0.67 - 1.17 mg/dL    GFR Estimate 77 >60 mL/min/1.73m2    Calcium 9.7 8.6 - 10.0 mg/dL    Chloride 93 (L) 98 - 107 mmol/L    Glucose 538 (HH) 70 - 99 mg/dL    Alkaline Phosphatase 109 40 - 150 U/L    AST 20 0 - 45 U/L    ALT 16 0 - 70 U/L    Protein Total 7.6 6.4 - 8.3 g/dL    Albumin 4.5 3.5 - 5.2 g/dL    Bilirubin Total 1.3 (H) <=1.2 mg/dL   INR     Status: Abnormal   Result Value Ref Range    INR 1.17 (H) 0.85 - 1.15   Troponin T, High Sensitivity     Status: Abnormal   Result Value Ref Range    Troponin T, High Sensitivity 51 (H) <=22 ng/L   BNP     Status: Normal   Result Value Ref Range    N terminal Pro BNP Inpatient 243 0 - 900 pg/mL   EKG 12 lead     Status: None (Preliminary result)   Result Value Ref Range    Systolic Blood Pressure  mmHg    Diastolic Blood Pressure  mmHg    Ventricular Rate 89 BPM    Atrial Rate 89 BPM    RI Interval 170 ms    QRS Duration 94 ms     ms    QTc 459 ms    P Axis 52 degrees    R AXIS 72 degrees    T Axis 53 degrees     Interpretation ECG Sinus rhythm  Normal ECG        Medications   sodium chloride 0.9% BOLUS 1,000 mL (1,000 mLs Intravenous $New Bag 6/26/24 2126)   heparin ANTICOAGULANT loading dose for  HIGH INTENSITY TREATMENT* Give BEFORE starting heparin infusion (has no administration in time range)   heparin 25,000 units in 0.45% NaCl 250 mL ANTICOAGULANT infusion (has no administration in time range)   sodium chloride 0.9 % bag 100 mL for CT (has no administration in time range)   iopamidol (ISOVUE-370) solution 100 mL (has no administration in time range)     Labs Ordered and Resulted from Time of ED Arrival to Time of ED Departure   COMPREHENSIVE METABOLIC PANEL - Abnormal       Result Value    Sodium 128 (*)     Potassium 4.1      Carbon Dioxide (CO2) 20 (*)     Anion Gap 15      Urea Nitrogen 20.9 (*)     Creatinine 1.13      GFR Estimate 77      Calcium 9.7      Chloride 93 (*)     Glucose 538 (*)     Alkaline Phosphatase 109      AST 20      ALT 16      Protein Total 7.6      Albumin 4.5      Bilirubin Total 1.3 (*)    INR - Abnormal    INR 1.17 (*)    TROPONIN T, HIGH SENSITIVITY - Abnormal    Troponin T, High Sensitivity 51 (*)    CBC WITH PLATELETS - Normal    WBC Count 8.7      RBC Count 5.65      Hemoglobin 16.9      Hematocrit 47.8      MCV 85      MCH 29.9      MCHC 35.4      RDW 12.4      Platelet Count 167     NT PROBNP INPATIENT - Normal    N terminal Pro BNP Inpatient 243       US Lower Extremity Venous Duplex Bilateral   Final Result   IMPRESSION:   1.  Nonocclusive DVT within the left external iliac and common femoral vein. DVT involving the mid thigh femoral vein extending into the distal thigh femoral vein and popliteal vein. There is also clot within the left proximal calf posterior tibial vein.   2.  Deep venous thrombosis involving the right calf peroneal vein.      Results called to Lisa Bhatia 6/26/2024 2113 hours      CT Chest Pulmonary Embolism w Contrast    (Results Pending)   POC US  ECHO LIMITED    (Results Pending)          Critical care was not performed.     Medical Decision Making  The patient's presentation was of high complexity (a chronic illness severe exacerbation, progression, or side effect of treatment).    The patient's evaluation involved:  review of external note(s) from 3+ sources (clinical)  review of 3+ test result(s) ordered prior to this encounter (labs)  ordering and/or review of 3+ test(s) in this encounter (see separate area of note for details)  discussion of management or test interpretation with another health professional (IR, hospitalist)    The patient's management necessitated high risk (a decision regarding hospitalization).    Assessment & Plan    52yo M pmhx DM, HLD, factor V Leiden and DVT presents from urgent care for rule out DVT d/t 5 days of LLE swelling, pain, edema, and having been off warfarin for multiple months 2/2 insurance issues. No SOB or CP presently, but had 2 days ago and resolved.       In ED, patient was noted to be tachycardic (113) and mildly hypoxemic (92%), but is in no respiratory distress.  His LLE is notably swollen, with trace pitting edema, but no overlying skin changes, calf TTP; compartments are soft.  Neurovascularly intact.    Bilateral lower extremity duplex was obtained demonstrating extensive clot burden on left with nonocclusive DVT within  left external iliac and common femoral vein. DVT mid thigh femoral vein extending into the distal thigh femoral vein and popliteal vein, and clot left proximal calf posterior tibial vein. On right, deep venous thrombosis involving the right calf peroneal vein.    Given extensive clot burden, patient was discussed with IR re: possible thrombectomy.  As patient is neurovascularly intact, no emergent indication tonight, but they will see patient in morning.  Plan to start patient on heparin in the interim.  Given patient's hypoxemia and tachycardia, EKG obtained showing sinus rhythm.  Cardiac  POCUS without  obvious RV dilatation.  Troponin was elevated at 51.  BNP wnl.  Additionally elected to obtain CT PE study, with results pending at time of admission.    Remainder of patient's workup was notable for hyperglycemia of 538, but without gap. He is receiving IVF with plan for recheck.  Hyponatremic at 128, which corrects to normal when hyperglycemia accounted for.  CBC unremarkable.    Patient will be admitted to medicine for IR consultation in the a.m. for consideration of thrombectomy.     I have reviewed the nursing notes. I have reviewed the findings, diagnosis, plan and need for follow up with the patient.    New Prescriptions    No medications on file       Final diagnoses:   Acute deep vein thrombosis (DVT) of femoral vein of left lower extremity (H)   Acute deep vein thrombosis (DVT) of iliac vein of left lower extremity (H)   Acute deep vein thrombosis (DVT) of popliteal vein of left lower extremity (H)   Acute deep vein thrombosis (DVT) of right peroneal vein (H)         Pasha Gonzalez PA-C  [facility] EMERGENCY DEPARTMENT  6/26/2024     Pasha Gonzalez PA-C  06/26/24 [id number]    --    ED Attending Physician Attestation    I Simone Flood MD, cared for this patient with the Advanced Practice Provider ([facility]). I personally provided a substantive portion of the care for this patient, including approving the care plan for the number and complexity of problems addressed and taking responsibility related to the risk of complications and/or morbidity or mortality of patient management. Please see the NEDA's documentation for full details.    Saw and evaluated the patient.  I agree with documentation above.  Patient's lower extremity is neurovascularly intact.  Case was discussed with IR did not feel that emergent intervention was warranted tonight but they will evaluate tomorrow.  Patient was started on heparin.  He is mildly hypoxic, and mildly tachycardic upon arrival but is currently  chest pain or shortness of breath.  I have high suspicion there is PE involved as well.  Patient be admitted to the medicine service on heparin.  They can follow-up on PE results and will  consult IR in the morning.      Simone Flood MD  Emergency Medicine          Remigio, Simone Ramirez MD  06/27/24 0458

## 2024-06-27 NOTE — PRE-PROCEDURE
GENERAL PRE-PROCEDURE:   Procedure:  Bilateral pulmonary artery thrombectomy and infereior vena cava filter insertion  Date/Time:  6/27/2024 5:11 PM    Verbal consent obtained?: Yes    Written consent obtained?: Yes    Risks and benefits: Risks, benefits and alternatives were discussed    Consent given by:  Patient  Patient states understanding of procedure being performed: Yes    Patient's understanding of procedure matches consent: Yes    Procedure consent matches procedure scheduled: Yes    Expected level of sedation:  Moderate  Appropriately NPO:  Yes  ASA Class:  2  Mallampati  :  Grade 2- soft palate, base of uvula, tonsillar pillars, and portion of posterior pharyngeal wall visible  Lungs:  Lungs clear with good breath sounds bilaterally  Heart:  Normal heart sounds and rate  History & Physical reviewed:  History and physical reviewed and no updates needed  Statement of review:  I have reviewed the lab findings, diagnostic data, medications, and the plan for sedation

## 2024-06-27 NOTE — PROGRESS NOTES
Patient Name: Gaurang Rivera  Medical Record Number: [id number]  Today's Date: 6/27/2024    Procedure: Inferior vena cava filter placement and attempted pulmonary angiogram and thrombectomy.   PE thrombectomy aborted.   Proceduralist: Dr. Barrett, Dr. Keating  Pathology present: NA    Procedure Start: 1728  Procedure end: 1803  Sedation medications administered: Versed 2.5 mg and Fentanyl 125 mcg     Report given to: CHEL Callaway  : SALVADOR    Other Notes: Pt arrived to IR room 1 from . Consent reviewed. Pt denies any questions or concerns regarding procedure. Pt positioned supine and monitored per protocol. Pt tolerated procedure without any noted complications. Pt transferred back to .

## 2024-06-27 NOTE — PROGRESS NOTES
[facility]    Medicine Progress Note - Hospitalist Service, GOLD TEAM 22    Date of Admission:  6/26/2024    Assessment & Plan     This is a very kind 53-year-old  male presented to the [facility] emergency department on 6/26/2024 with bilateral lower extremity swelling and pain for approximately 5 days duration.  Of note, patient has been without his warfarin for many months secondary to insurance inconsistencies.  Patient has a pertinent chronic past medical history positive for factor V Leiden with history of DVT, depression, type 2 diabetes mellitus, hypertension, hypercholesterolemia.  Upon evaluation in the emergency department, venous Doppler ultrasound of the bilateral lower extremities demonstrated extensive clot burden.  CT PE was done which showed  right greater than left lobar and subsegmental PE  and concern for right heart strain so PERT team activated. Patient is supposed to be transferred to [place] for IR thrombectomy however due to bed availability. continues to be on [facility].     # Acute bilateral lower extremity deep vein thromboses  # Factor V Leiden  # History DVT   right greater than left lobar and subsegmental PE   Presented with bilateral lower extremity swelling and pain for approximately 5 days duration. Found to have bilateral LE DVTs. CT PE was done which showed  right greater than left lobar and subsegmental PE  and concern for right heart strain so PERT team activated. Patient is supposed to be transferred to [place] for IR thrombectomy however due to bed availability. continues to be on [facility].  - Continue heparin drip  - IR consulted and PERT activated, he is HDS and awaiting transfer to  for IR procedure    - per IR will do thrombectomy and place Filter to prevent further clotting     # Hyponatremia, resolved     # Hypertension  Blood pressure is borderline  - Hold PTA lisinopril  "for now    # Type 2 diabetes mellitus  - Hold PTA hypoglycemic regimen  - High-dose correction scale NovoLog  - Consistent carbohydrate diet  - Check hemoglobin A1c     # Hypercholesterolemia  - Continue PTA statin therapy     # Extensive psychiatric history  Has not filled meds in a while   - continue wellbutrin  - Continue latuda at reduced dose of 20 mg daily   - continue venlafaxine 75 mg daily          Diet: NPO for Medical/Clinical Reasons Except for: Meds, Ice Chips    DVT Prophylaxis: on heparin drip  [place] Catheter: Not present  Lines: None     Cardiac Monitoring: ACTIVE order. Indication: Concern for pulmonary embolism  Code Status: Full Code      Clinically Significant Risk Factors Present on Admission         # Hyponatremia: Lowest Na = 128 mmol/L in last 2 days, will monitor as appropriate       # Drug Induced Coagulation Defect: home medication list includes an anticoagulant medication    # Hypertension: Home medication list includes antihypertensive(s)            # DMII: A1C = 11.7 % (Ref range: <5.7 %) within past 6 months    # Obesity: Estimated body mass index is 30.85 kg/m  as calculated from the following:    Height as of this encounter: 1.981 m (6' 6\").    Weight as of this encounter: 121.1 kg (267 lb).              Disposition Plan     Medically Ready for Discharge: Anticipated in 2-4 Days             Rao Kimble DO  Hospitalist Service, GOLD TEAM 22  [facility]  Securely message with [employer] (more info)  Text page via [employer] Paging/Directory   See signed in provider for up to date coverage information  ______________________________________________________________________    Interval History     Patient admitted overnight  for DVT and PE. PERT activated and he is supposed to go to  for procedure however no beds so still waiting in ED. Overall patient feels ok. No dyspnea, no chest pain, still with LE edema.    Physical Exam   Vital Signs: Temp: " 98.2  F (36.8  C) Temp src: Oral BP: 133/86 Pulse: 88   Resp: 16 SpO2: 95 % O2 Device: None (Room air)    Weight: 267 lbs 0 oz    General Appearance: In bed no distress   Respiratory: breathing comfortably on room air   Cardiovascular: RRR  GI: Non distended   Skin: No rashes or lesions   Other: L>R LE edema      Medical Decision Making       55 MINUTES SPENT BY ME on the date of service doing chart review, history, exam, documentation & further activities per the note.      Data     I have personally reviewed the following data over the past 24 hrs:    6.5  \   15.2   / 139 (L)     136 100 15.4 /  220 (H)   3.7 25 0.97 \     ALT: 13 AST: 19 AP: 101 TBILI: 1.0   ALB: 4.1 TOT PROTEIN: 6.7 LIPASE: N/A     Trop: 51 (H) BNP: 243     TSH: N/A T4: N/A A1C: 11.7 (H)     INR:  1.17 (H) PTT:  N/A   D-dimer:  N/A Fibrinogen:  N/A       Imaging results reviewed over the past 24 hrs:   Recent Results (from the past 24 hour(s))   US Lower Extremity Venous Duplex Bilateral    Narrative    EXAM: US LOWER EXTREMITY VENOUS DUPLEX BILATERAL  LOCATION: [facility]  DATE: 6/26/2024    INDICATION: hx factor V leiden. LLE pain, swelling, edema  COMPARISON: None.  TECHNIQUE: Venous Duplex ultrasound of bilateral lower extremities with and without compression, augmentation and duplex. Color flow and spectral Doppler with waveform analysis performed.    FINDINGS: Exam includes the common femoral, femoral, popliteal veins as well as segmentally visualized deep calf veins and greater saphenous vein.     RIGHT: Deep venous thrombosis within the right calf peroneal veins. No superficial thrombophlebitis. No popliteal cyst.    LEFT: Nonocclusive clot is seen within the left external iliac vein and common femoral vein. There is also deep venous thrombosis involving the left mid femoral vein extending into the distal femoral vein at the distal thigh and popliteal vein. This also   DVT within the left  posterior tibial vein at the calf. No superficial thrombophlebitis. No popliteal cyst.      Impression    IMPRESSION:  1.  Nonocclusive DVT within the left external iliac and common femoral vein. DVT involving the mid thigh femoral vein extending into the distal thigh femoral vein and popliteal vein. There is also clot within the left proximal calf posterior tibial vein.  2.  Deep venous thrombosis involving the right calf peroneal vein.    Results called to Lisa Bhatia 6/26/2024 2113 hours   POC US ECHO LIMITED    Impression    Limited Bedside Cardiac Ultrasound, performed and interpreted by me.   Indication: Assess RV dilitation.  Parasternal long axis and subcostal views were acquired.   Technically difficult studies with poor windows.   No significant RV dilatation appreciated.    Findings:    Chambers do not appear dilated.    IMPRESSION: No significant RV dilatation.       CT Chest Pulmonary Embolism w Contrast    Narrative    EXAM: CT CHEST PULMONARY EMBOLISM W CONTRAST  LOCATION: [facility]  DATE: 6/26/2024    INDICATION: hx Factor V leiden. extensive b l LE DVTs assess PE  COMPARISON: None.  TECHNIQUE: CT chest pulmonary angiogram during arterial phase injection of IV contrast. Multiplanar reformats and MIP reconstructions were performed. Dose reduction techniques were used.   CONTRAST: 74mL Isovue 370    FINDINGS:  ANGIOGRAM CHEST: Exam is positive for extensive bilateral pulmonary emboli, right worse than left, with lobar and segmental embolic disease present. There is no central saddle embolism. There is likely at least mild right heart strain with RV/LV ratio   greater than 1.   Thoracic aorta is negative for dissection.     LUNGS AND PLEURA: Normal. No parenchymal hemorrhage or pleural effusion evident.    MEDIASTINUM/AXILLAE: Normal.    CORONARY ARTERY CALCIFICATION: Mild.    UPPER ABDOMEN: Normal.    MUSCULOSKELETAL: Normal.      Impression     IMPRESSION:  1.  Prominent bilateral pulmonary emboli, lobar and segmental embolic disease bilaterally.  2.  RV/LV ratio greater than 1.  3.  No lung infarct or pleural effusion evident.    Findings discussed with Dr. Parnell at 2307 hours.

## 2024-06-27 NOTE — H&P
[facility]    History and Physical - Hospitalist Service, GOLD TEAM        Date of Admission:  6/26/2024    Assessment & Plan      This is a very kind 53-year-old  male presented to the [facility] emergency department on 6/26/2024 with bilateral lower extremity swelling and pain for approximately 5 days duration.  Of note, patient has been without his warfarin for many months secondary to insurance inconsistencies.  Patient has a pertinent chronic past medical history positive for factor V Leiden with history of DVT, depression, type 2 diabetes mellitus, hypertension, hypercholesterolemia.  Upon evaluation in the emergency department, venous Doppler ultrasound of the bilateral lower extremities demonstrated extensive clot burden.  CT pulmonary embolism protocol is pending.  Patient was appropriately initiated on heparin drip by emergency department NEDA.  Patient will be admitted to the internal medicine hospitalist service for further evaluation management.    # Acute bilateral lower extremity deep vein thromboses  # Factor V Leiden  # History DVT  - Continue heparin drip  - Interventional radiology consultation for possible mechanical thrombectomy    # Hyponatremia  - Will defer IV fluids pending CT chest and echocardiogram  - Will likely gently hydrate with normal saline pending results    # Hypertension  - Blood pressure is borderline  - Hold PTA lisinopril for now    # Type 2 diabetes mellitus  - Hold PTA hypoglycemic regimen  - High-dose correction scale NovoLog  - Consistent carbohydrate diet  - Check hemoglobin A1c    # Hypercholesterolemia  - Continue PTA statin therapy    # Extensive psychiatric history  - Continue PTA antipsychotic regimen          Diet:  Consistent carbohydrate diet  DVT Prophylaxis: Heparin gtt  [place] Catheter: Not present  Lines: None     Cardiac Monitoring: None  Code Status:  Full  "resuscitation status    Clinically Significant Risk Factors Present on Admission         # Hyponatremia: Lowest Na = 128 mmol/L in last 2 days, will monitor as appropriate       # Drug Induced Coagulation Defect: home medication list includes an anticoagulant medication    # Hypertension: Home medication list includes antihypertensive(s)            # DMII: A1C = N/A within past 6 months    # Obesity: Estimated body mass index is 30.85 kg/m  as calculated from the following:    Height as of this encounter: 1.981 m (6' 6\").    Weight as of this encounter: 121.1 kg (267 lb).              Disposition Plan     Medically Ready for Discharge: Anticipated in 2-4 Days           Watson Parnell DO, MHS  Hospitalist Service, [facility]  Securely message with [employer] (more info)  Text page via [facility] Paging/Directory   See signed in provider for up to date coverage information    ______________________________________________________________________    Chief Complaint   Extensive bilateral lower extremity deep vein thromboses.    History is obtained from the patient & electronic medical record.    History of Present Illness   This is a very kind 53-year-old  male presented to the [facility] emergency department on 6/26/2024 with bilateral lower extremity swelling and pain for approximately 5 days duration.  Of note, patient has been without his warfarin for many months secondary to insurance inconsistencies.  Patient has a pertinent chronic past medical history positive for factor V Leiden with history of DVT, depression, type 2 diabetes mellitus, hypertension, hypercholesterolemia.  Upon evaluation in the emergency department, venous Doppler ultrasound of the bilateral lower extremities demonstrated extensive clot burden.  CT pulmonary embolism protocol is pending.  Patient was appropriately initiated on heparin drip by emergency " department NEDA.  Patient will be admitted to the internal medicine hospitalist service for further evaluation management.    Past Medical History    Past Medical History:   Diagnosis Date    Antiplatelet or antithrombotic long-term use     Closed displaced fracture of neck of right scapula, sequela 10/21/2019    Added automatically from request for surgery [id number]    Depressive disorder 2001    Diabetes (H)     DVT (deep venous thrombosis) (H) 07/05/2019    Elevated blood pressure reading without diagnosis of hypertension 05/13/2018    Hypercholesteremia 2012    Personal history of other medical treatment     blood clot foot july 2019    Pilonidal cyst 05/13/2018       Past Surgical History   Past Surgical History:   Procedure Laterality Date    APPENDECTOMY      ARTHROSCOPY SHOULDER, OPEN BICEP TENODESIS REPAIR, COMBINED Right 6/2/2020    Procedure: Right shoulder diagnostic arthroscopy, capsular release,  open biceps tenodesis;  Surgeon: Dulce Maria Burdick MD;  Location: [facility] OR    AS DRAIN PILONIDAL CYST SIMPLE      BACK SURGERY  1997    spinal fusion    CHOLECYSTECTOMY  2012    CYSTECTOMY PILONIDAL N/A 2/21/2020    Procedure: EXCISION, PILONIDAL CYST, AILEEN II Procedure;  Surgeon: Artemio Ahumada MD;  Location: [facility] OR    THUMB SURGERY   1995       Prior to Admission Medications   Prior to Admission Medications   Prescriptions Last Dose Informant Patient Reported? Taking?   Continuous Blood Gluc Sensor (FREESTYLE JAVI 14 DAY SENSOR) MISC   No No   Sig: AS DIRECTED AND CHANGE EVERY 14 DAYS,   JARDIANCE 10 MG TABS tablet   No No   Sig: TAKE 1 TABLET(10 MG) BY MOUTH DAILY   QUEtiapine (SEROQUEL) 25 MG tablet   No No   Sig: Take 2 tablets (50 mg) by mouth nightly as needed (insomnia)   atorvastatin (LIPITOR) 40 MG tablet   No No   Sig: Take 1 tablet (40 mg) by mouth daily   buPROPion (WELLBUTRIN XL) 150 MG 24 hr tablet   No No   Sig: Take 1 tablet (150 mg) by mouth every morning Take with 300mg for  total daily dose of 450mg.   buPROPion (WELLBUTRIN XL) 300 MG 24 hr tablet   No No   Sig: Take 1 tablet (300 mg) by mouth every morning Take with 150mg for total daily dose of 450mg.   cholecalciferol (VITAMIN D3) 125 mcg (5000 units) capsule   No No   Sig: Take 1 capsule (125 mcg) by mouth daily   exenatide ER (BYDUREON BCISE) 2 MG/0.85ML auto-injector   No No   Sig: INJECT 2MG SUBCUTANEOUS EVERY 7 DAYS   ibuprofen (ADVIL/MOTRIN) 200 MG capsule   Yes No   Sig: Take 600-800 mg by mouth every 8 hours as needed for fever   lisinopril (ZESTRIL) 5 MG tablet   No No   Sig: Take 1 tablet (5 mg) by mouth daily   lurasidone (LATUDA) 120 MG TABS tablet   No No   Sig: Take 1 tablet (120 mg) by mouth daily with food   metFORMIN (GLUCOPHAGE) 1000 MG tablet   No No   Sig: TAKE 1 TABLET(1000 MG) BY MOUTH TWICE DAILY WITH MEALS   multivitamin w/minerals (THERA-VIT-M) tablet   No No   Sig: Take 1 tablet by mouth daily   omeprazole (PRILOSEC OTC) 20 MG EC tablet   Yes No   Sig: Take 20 mg by mouth daily as needed   traZODone (DESYREL) 50 MG tablet   No No   Sig: Take 1-2 tablets ( mg) by mouth nightly as needed for sleep   venlafaxine (EFFEXOR XR) 75 MG 24 hr capsule   No No   Sig: Take 1 capsule (75 mg) by mouth daily   warfarin ANTICOAGULANT (COUMADIN) 5 MG tablet   No No   Sig: 10 mg Fri; 7.5 mg all other days or as directed by INR clinic      Facility-Administered Medications: None        Physical Exam   Vital Signs: Temp: 97.8  F (36.6  C) Temp src: Oral BP: 110/81 Pulse: 113   Resp: 18 SpO2: 92 % O2 Device: None (Room air)    Weight: 267 lbs 0 oz    GENERAL: Alert and oriented x 3; no acute distress; well-nourished.  HEENT: Normocephalic; atraumatic; PERRLA; MMM.  CV: RRR; normal S1, S2; no rubs, murmurs, or gallops.  RESP: Lung fields clear to aucultation B/L; no wheezing or crepitations.  GI: Abdomen is soft, nontender, nondistended; no organomegaly; normal bowel sounds.  : Deferred genital examination.   MSK:  Bilateral lower extremity pain and swelling.  DERM: Skin is intact; no rash, lesions, or skin breakdown.  NEURO: No focal deficits appreciated; strength & sensorium are grossly intact.  PSYCH: No active hallucinations; affect, insight appear within normal limits.    Medical Decision Making       65 MINUTES SPENT BY ME on the date of service doing chart review, history, exam, documentation & further activities per the note.      Data     I have personally reviewed the following data over the past 24 hrs:    8.7  \   16.9   / 167     128 (L) 93 (L) 20.9 (H) /  538 (HH)   4.1 20 (L) 1.13 \     ALT: 16 AST: 20 AP: 109 TBILI: 1.3 (H)   ALB: 4.5 TOT PROTEIN: 7.6 LIPASE: N/A     Trop: 51 (H) BNP: 243     INR:  1.17 (H) PTT:  N/A   D-dimer:  N/A Fibrinogen:  N/A       Imaging results reviewed over the past 24 hrs:   Recent Results (from the past 24 hour(s))   US Lower Extremity Venous Duplex Bilateral    Narrative    EXAM: US LOWER EXTREMITY VENOUS DUPLEX BILATERAL  LOCATION: [facility]  DATE: 6/26/2024    INDICATION: hx factor V leiden. LLE pain, swelling, edema  COMPARISON: None.  TECHNIQUE: Venous Duplex ultrasound of bilateral lower extremities with and without compression, augmentation and duplex. Color flow and spectral Doppler with waveform analysis performed.    FINDINGS: Exam includes the common femoral, femoral, popliteal veins as well as segmentally visualized deep calf veins and greater saphenous vein.     RIGHT: Deep venous thrombosis within the right calf peroneal veins. No superficial thrombophlebitis. No popliteal cyst.    LEFT: Nonocclusive clot is seen within the left external iliac vein and common femoral vein. There is also deep venous thrombosis involving the left mid femoral vein extending into the distal femoral vein at the distal thigh and popliteal vein. This also   DVT within the left posterior tibial vein at the calf. No superficial thrombophlebitis.  No popliteal cyst.      Impression    IMPRESSION:  1.  Nonocclusive DVT within the left external iliac and common femoral vein. DVT involving the mid thigh femoral vein extending into the distal thigh femoral vein and popliteal vein. There is also clot within the left proximal calf posterior tibial vein.  2.  Deep venous thrombosis involving the right calf peroneal vein.    Results called to Lisa Bhatia 6/26/2024 2113 hours   POC US ECHO LIMITED    Impression    Limited Bedside Cardiac Ultrasound, performed and interpreted by me.   Indication: Assess RV dilitation.  Parasternal long axis and subcostal views were acquired.   Technically difficult studies with poor windows.   No significant RV dilatation appreciated.    Findings:    Chambers do not appear dilated.    IMPRESSION: No significant RV dilatation.

## 2024-06-27 NOTE — PROGRESS NOTES
PULMONARY EMBOLISM RESPONSE TEAM TELEPHONE CONSULT     Called by [facility] Emergency Medicine, regarding patient Gaurang Rivera for consideration of therapy for bilateral PE.       Gaurang Rivera is a 53 year old gentleman w/ h/o Factor V Leiden and DVT brought to the ED for leg swelling and found to have DVT and PE. He is currently asymptomatic. CT PE revealed right greater than left lobar and subsegmental PE.  There was right heart strain noted on CT as well based on R:L ventricular size ratio.        1. Risk stratification:   SPESI Score: 0  RV:LV ratio by CT: 1:1  RV enlargement and dysfunction by echo: TBD  LE Doppler findings: Bilateral lower extremity DVT  Biomarker data: Tpn 51, NT-proBNP 243  Vital signs: /81,   Classification of PE: high-intermediate submassive     2. Treatment Recommendations:   Based on high intermediate risk submassive categorization, we recommend systemic anticoagulation if risk of bleed is permissible with plan to transfer to [facility] for interventional radiology procedure in AM.     PE Response Team Colleagues present for the discussion included Interventional Radiology, Critical Care, and Cardiology.     At the time of our discussion over the phone I was unable to see and personally evaluate the patient.  I was able to access the patient s medical records/chart, and I reviewed the chart related to the specific question.      My conversation with the [facility] ED team is not meant to replace or supersede the clinical judgement of the in-person treating provider.     This patient was discussed with Dr Arnoldo Ruiz, attending cardiologist, who agrees with the assessment and plan unless otherwise indicated.    Simone Hines MD MPP, PGY-5  Fellow, Cardiovascular Disease

## 2024-06-28 LAB
ALBUMIN SERPL BCG-MCNC: 4 G/DL (ref 3.5–5.2)
ALP SERPL-CCNC: 90 U/L (ref 40–150)
ALT SERPL W P-5'-P-CCNC: 13 U/L (ref 0–70)
ANION GAP SERPL CALCULATED.3IONS-SCNC: 12 MMOL/L (ref 7–15)
AST SERPL W P-5'-P-CCNC: 21 U/L (ref 0–45)
BASOPHILS # BLD AUTO: 0 10E3/UL (ref 0–0.2)
BASOPHILS NFR BLD AUTO: 1 %
BILIRUB SERPL-MCNC: 0.9 MG/DL
BUN SERPL-MCNC: 13.5 MG/DL (ref 6–20)
CALCIUM SERPL-MCNC: 9 MG/DL (ref 8.6–10)
CHLORIDE SERPL-SCNC: 103 MMOL/L (ref 98–107)
CREAT SERPL-MCNC: 0.85 MG/DL (ref 0.67–1.17)
DEPRECATED HCO3 PLAS-SCNC: 23 MMOL/L (ref 22–29)
EGFRCR SERPLBLD CKD-EPI 2021: >90 ML/MIN/1.73M2
EOSINOPHIL # BLD AUTO: 0.2 10E3/UL (ref 0–0.7)
EOSINOPHIL NFR BLD AUTO: 4 %
ERYTHROCYTE [DISTWIDTH] IN BLOOD BY AUTOMATED COUNT: 13 % (ref 10–15)
GLUCOSE BLDC GLUCOMTR-MCNC: 199 MG/DL (ref 70–99)
GLUCOSE BLDC GLUCOMTR-MCNC: 222 MG/DL (ref 70–99)
GLUCOSE BLDC GLUCOMTR-MCNC: 265 MG/DL (ref 70–99)
GLUCOSE BLDC GLUCOMTR-MCNC: 283 MG/DL (ref 70–99)
GLUCOSE SERPL-MCNC: 240 MG/DL (ref 70–99)
HCT VFR BLD AUTO: 44.3 % (ref 40–53)
HGB BLD-MCNC: 15 G/DL (ref 13.3–17.7)
IMM GRANULOCYTES # BLD: 0 10E3/UL
IMM GRANULOCYTES NFR BLD: 0 %
INR PPP: 1.32 (ref 0.85–1.15)
LYMPHOCYTES # BLD AUTO: 2 10E3/UL (ref 0.8–5.3)
LYMPHOCYTES NFR BLD AUTO: 31 %
MAGNESIUM SERPL-MCNC: 2 MG/DL (ref 1.7–2.3)
MCH RBC QN AUTO: 29.7 PG (ref 26.5–33)
MCHC RBC AUTO-ENTMCNC: 33.9 G/DL (ref 31.5–36.5)
MCV RBC AUTO: 88 FL (ref 78–100)
MONOCYTES # BLD AUTO: 0.6 10E3/UL (ref 0–1.3)
MONOCYTES NFR BLD AUTO: 9 %
NEUTROPHILS # BLD AUTO: 3.5 10E3/UL (ref 1.6–8.3)
NEUTROPHILS NFR BLD AUTO: 55 %
NRBC # BLD AUTO: 0 10E3/UL
NRBC BLD AUTO-RTO: 0 /100
PLATELET # BLD AUTO: 151 10E3/UL (ref 150–450)
POTASSIUM SERPL-SCNC: 3.9 MMOL/L (ref 3.4–5.3)
PROT SERPL-MCNC: 6.8 G/DL (ref 6.4–8.3)
RBC # BLD AUTO: 5.05 10E6/UL (ref 4.4–5.9)
SODIUM SERPL-SCNC: 138 MMOL/L (ref 135–145)
UFH PPP CHRO-ACNC: 0.53 IU/ML
WBC # BLD AUTO: 6.4 10E3/UL (ref 4–11)

## 2024-06-28 PROCEDURE — 85520 HEPARIN ASSAY: CPT | Performed by: STUDENT IN AN ORGANIZED HEALTH CARE EDUCATION/TRAINING PROGRAM

## 2024-06-28 PROCEDURE — B31T1ZZ FLUOROSCOPY OF LEFT PULMONARY ARTERY USING LOW OSMOLAR CONTRAST: ICD-10-PCS | Performed by: RADIOLOGY

## 2024-06-28 PROCEDURE — 80053 COMPREHEN METABOLIC PANEL: CPT | Performed by: INTERNAL MEDICINE

## 2024-06-28 PROCEDURE — 36415 COLL VENOUS BLD VENIPUNCTURE: CPT | Performed by: INTERNAL MEDICINE

## 2024-06-28 PROCEDURE — 83735 ASSAY OF MAGNESIUM: CPT | Performed by: STUDENT IN AN ORGANIZED HEALTH CARE EDUCATION/TRAINING PROGRAM

## 2024-06-28 PROCEDURE — 250N000013 HC RX MED GY IP 250 OP 250 PS 637: Performed by: HOSPITALIST

## 2024-06-28 PROCEDURE — 85025 COMPLETE CBC W/AUTO DIFF WBC: CPT | Performed by: INTERNAL MEDICINE

## 2024-06-28 PROCEDURE — 06H03DZ INSERTION OF INTRALUMINAL DEVICE INTO INFERIOR VENA CAVA, PERCUTANEOUS APPROACH: ICD-10-PCS | Performed by: RADIOLOGY

## 2024-06-28 PROCEDURE — 250N000012 HC RX MED GY IP 250 OP 636 PS 637: Performed by: PHYSICIAN ASSISTANT

## 2024-06-28 PROCEDURE — 250N000013 HC RX MED GY IP 250 OP 250 PS 637: Performed by: STUDENT IN AN ORGANIZED HEALTH CARE EDUCATION/TRAINING PROGRAM

## 2024-06-28 PROCEDURE — 250N000011 HC RX IP 250 OP 636: Performed by: PHYSICIAN ASSISTANT

## 2024-06-28 PROCEDURE — 99233 SBSQ HOSP IP/OBS HIGH 50: CPT | Performed by: PEDIATRICS

## 2024-06-28 PROCEDURE — 250N000013 HC RX MED GY IP 250 OP 250 PS 637: Performed by: PEDIATRICS

## 2024-06-28 PROCEDURE — 120N000005 HC R&B MS OVERFLOW UMMC

## 2024-06-28 PROCEDURE — 85610 PROTHROMBIN TIME: CPT | Performed by: PEDIATRICS

## 2024-06-28 PROCEDURE — 250N000013 HC RX MED GY IP 250 OP 250 PS 637: Performed by: INTERNAL MEDICINE

## 2024-06-28 PROCEDURE — 99207 PR NO CHARGE LOS: CPT

## 2024-06-28 PROCEDURE — 250N000012 HC RX MED GY IP 250 OP 636 PS 637: Performed by: PEDIATRICS

## 2024-06-28 PROCEDURE — 250N000011 HC RX IP 250 OP 636: Performed by: PEDIATRICS

## 2024-06-28 RX ORDER — ACETAMINOPHEN 325 MG/1
650 TABLET ORAL EVERY 4 HOURS PRN
Status: DISCONTINUED | OUTPATIENT
Start: 2024-06-28 | End: 2024-06-29 | Stop reason: HOSPADM

## 2024-06-28 RX ORDER — WARFARIN SODIUM 7.5 MG/1
7.5 TABLET ORAL
Status: COMPLETED | OUTPATIENT
Start: 2024-06-28 | End: 2024-06-28

## 2024-06-28 RX ORDER — ENOXAPARIN SODIUM 150 MG/ML
120 INJECTION SUBCUTANEOUS EVERY 12 HOURS
Status: DISCONTINUED | OUTPATIENT
Start: 2024-06-28 | End: 2024-06-29 | Stop reason: HOSPADM

## 2024-06-28 RX ADMIN — OXYCODONE HYDROCHLORIDE 5 MG: 5 TABLET ORAL at 20:17

## 2024-06-28 RX ADMIN — LURASIDONE HYDROCHLORIDE 20 MG: 20 TABLET, COATED ORAL at 13:05

## 2024-06-28 RX ADMIN — ACETAMINOPHEN 650 MG: 325 TABLET, FILM COATED ORAL at 18:31

## 2024-06-28 RX ADMIN — OXYCODONE HYDROCHLORIDE 5 MG: 5 TABLET ORAL at 05:32

## 2024-06-28 RX ADMIN — WARFARIN SODIUM 7.5 MG: 7.5 TABLET ORAL at 18:31

## 2024-06-28 RX ADMIN — OXYCODONE HYDROCHLORIDE 5 MG: 5 TABLET ORAL at 15:17

## 2024-06-28 RX ADMIN — BUPROPION HYDROCHLORIDE 150 MG: 150 TABLET, EXTENDED RELEASE ORAL at 08:09

## 2024-06-28 RX ADMIN — HEPARIN SODIUM 1800 UNITS/HR: 10000 INJECTION, SOLUTION INTRAVENOUS at 00:00

## 2024-06-28 RX ADMIN — INSULIN ASPART 2 UNITS: 100 INJECTION, SOLUTION INTRAVENOUS; SUBCUTANEOUS at 08:55

## 2024-06-28 RX ADMIN — PANTOPRAZOLE SODIUM 40 MG: 40 TABLET, DELAYED RELEASE ORAL at 08:09

## 2024-06-28 RX ADMIN — INSULIN ASPART 2 UNITS: 100 INJECTION, SOLUTION INTRAVENOUS; SUBCUTANEOUS at 13:02

## 2024-06-28 RX ADMIN — HEPARIN SODIUM 1800 UNITS/HR: 10000 INJECTION, SOLUTION INTRAVENOUS at 14:37

## 2024-06-28 RX ADMIN — OXYCODONE HYDROCHLORIDE 5 MG: 5 TABLET ORAL at 00:22

## 2024-06-28 RX ADMIN — INSULIN GLARGINE 2 UNITS: 100 INJECTION, SOLUTION SUBCUTANEOUS at 23:01

## 2024-06-28 RX ADMIN — Medication 1 TABLET: at 08:09

## 2024-06-28 RX ADMIN — VENLAFAXINE HYDROCHLORIDE 75 MG: 75 CAPSULE, EXTENDED RELEASE ORAL at 23:02

## 2024-06-28 RX ADMIN — ATORVASTATIN CALCIUM 40 MG: 40 TABLET, FILM COATED ORAL at 08:09

## 2024-06-28 RX ADMIN — ENOXAPARIN SODIUM 120 MG: 120 INJECTION SUBCUTANEOUS at 18:31

## 2024-06-28 RX ADMIN — DOCUSATE SODIUM 100 MG: 100 CAPSULE, LIQUID FILLED ORAL at 08:09

## 2024-06-28 ASSESSMENT — ACTIVITIES OF DAILY LIVING (ADL)
DEPENDENT_IADLS:: INDEPENDENT
ADLS_ACUITY_SCORE: 20

## 2024-06-28 NOTE — PLAN OF CARE
Goal Outcome Evaluation:           Overall Patient Progress: improvingOverall Patient Progress: improving    Outcome Evaluation: Discharge likely 6/29. Pt has INR clinic appt 7/2.

## 2024-06-28 NOTE — PROGRESS NOTES
"CLINICAL NUTRITION SERVICES    Reason for Assessment:  Received nutrition education consult for \"Warfarin vitamin K teaching.\"    Diet History:  Pt was resting in bed during visit. Visit was short. No prior history of receiving nutrition education on consistent vitamin K intake with coumadin.    Nutrition Diagnosis:  Food- and nutrition-related knowledge deficit r/t knowledge deficits surrounding consistent vitamin K intake with coumadin AEB pt report of no previous nutrition education on consistent vitamin K intake with coumadin.    Interventions:  Nutrition education: Provided brief verbal instruction on the importance of consistent vitamin K intake with coumadin. Provided handouts from the Nutrition Care Manual regarding Vitamin K content of foods and Vitamin K and Medications.    Goals:    -Pt will verbalize importance of consistent vitamin K intake on coumadin.  -Pt will list at least two high vitamin K foods.    Follow-up:   Patient to ask any further nutrition-related questions before discharge.  In addition, pt may request outpatient RD appointment.    Glendy Culver, MS, RD, LD, CNSC      No longer available via pager  6C (beds [id number] and [phone]): Vocera \"6C Clinical Dietitian\"   Weekend/Holiday: Vocera \"Weekend Clinical Dietitian\"      "

## 2024-06-28 NOTE — PROGRESS NOTES
[facility]    Medicine Progress Note - Hospitalist Service, GOLD TEAM 10    Date of Admission:  6/26/2024    Assessment & Plan     This is a very kind 53-year-old  [facility] with bilateral lower extremity swelling and pain for approximately 5 days duration.  Of note, patient has been without his warfarin for many months secondary to insurance inconsistencies.  Patient has a pertinent chronic past medical history positive for factor V Leiden with history of DVT, depression, type 2 diabetes mellitus, hypertension, hypercholesterolemia.  Upon evaluation in the emergency department, venous Doppler ultrasound of the bilateral lower extremities demonstrated extensive clot burden.  CT PE was done which showed  right greater than left lobar and subsegmental PE  and concern for right heart strain so PERT team activated. IR took him for thrombectomy, but he was found to have a PFO. Thrombectomy was aborted, and an IVC filter was placed. He will lovenox bridge to warfarin.    Changes today:  - Currently on heparin drip. Will transition to lovenox bridge to warfarin this evening.   - DOACs are cost prohibitive  - Echo with bubble study ordered  - Will need referral to structural cards for workup for PFO closure after discharge     # Acute bilateral lower extremity deep vein thromboses  # Factor V Leiden  # History DVT  # right greater than left lobar and subsegmental PE   Presented with bilateral lower extremity swelling and pain for approximately 5 days duration. Found to have bilateral LE DVTs. CT PE was done which showed  right greater than left lobar and subsegmental PE  and concern for right heart strain so PERT team activated. Patient is supposed to be transferred to [place] for IR thrombectomy however due to bed availability. continues to be on [facility].  - Continue  "heparin drip (will end after starting lovenox); transition to lovenox 120 mg BID and warfarin (pharmacy to dose) tonight  - PERT call yesterday. IR consulted, appreciate recs   - Unable to safely perform thrombectomy due to PFO. Only minimal RH strain   - IVC filter placed  - Echo with bubble study ordered for PFO  - Will need follow up with structural cards after discharge for workup for PFO closure     # Hyponatremia, resolved     # Hypertension  Blood pressure is borderline  - Hold PTA lisinopril for now    # Type 2 diabetes mellitus  - Hold PTA hypoglycemic regimen  - Start lantus 2 units qhs  - High-dose correction scale NovoLog  - Consistent carbohydrate diet  - Check hemoglobin A1c     # Hypercholesterolemia  - Continue PTA statin therapy     # Extensive psychiatric history  Has not filled meds in a while   - continue wellbutrin  - Continue latuda at reduced dose of 20 mg daily   - continue venlafaxine 75 mg daily          Diet: Consistent Carbohydrate Diet Moderate Consistent Carb (60 g CHO per Meal) Diet    DVT Prophylaxis: on heparin drip  [place] Catheter: Not present  Lines: None     Cardiac Monitoring: ACTIVE order. Indication: Tachyarrhythmias, acute (48 hours)  Code Status: Full Code      Clinically Significant Risk Factors         # Hyponatremia: Lowest Na = 128 mmol/L in last 2 days, will monitor as appropriate         # Coagulation Defect: INR = 1.32 (Ref range: 0.85 - 1.15) and/or PTT = N/A, will monitor for bleeding                  # DMII: A1C = 11.7 % (Ref range: <5.7 %) within past 6 months, PRESENT ON ADMISSION  # Obesity: Estimated body mass index is 30.27 kg/m  as calculated from the following:    Height as of this encounter: 1.981 m (6' 6\").    Weight as of this encounter: 118.8 kg (261 lb 14.4 oz)., PRESENT ON ADMISSION     # Financial/Environmental Concerns: none         Disposition Plan     Medically Ready for Discharge: Anticipated Tomorrow         JENNIFER MONROY MD  Hospitalist " Service, GOLD TEAM 10  [facility]  Securely message with adflyer (more info)  Text page via Smart[employer] Paging/Directory   See signed in provider for up to date coverage information  ______________________________________________________________________    Interval History   No acute events overnight. He says that he is not having any difficulty breathing this morning. He is still having LLE pain with movement of his L foot and with walking.     Physical Exam   Vital Signs: Temp: 98.2  F (36.8  C) Temp src: Oral BP: 117/75 Pulse: 81   Resp: 18 SpO2: 96 % O2 Device: None (Room air) Oxygen Delivery: 4 LPM  Weight: 261 lbs 14.4 oz    General Appearance: Resting comfortably in bed in no acute distress   Respiratory: breathing comfortably on room air, lungs clear to auscultation bilaterally    Cardiovascular: Regular rate and rhythm. Normal S1/S2.  GI: Soft, non-tender, non-distended  Skin: No rashes or lesions   Other: L>R LE edema  and mild erythema    Medical Decision Making       60 MINUTES SPENT BY ME on the date of service doing chart review, history, exam, documentation & further activities per the note.      Data     I have personally reviewed the following data over the past 24 hrs:    6.4  \   15.0   / 151     138 103 13.5 /  199 (H)   3.9 23 0.85 \     ALT: 13 AST: 21 AP: 90 TBILI: 0.9   ALB: 4.0 TOT PROTEIN: 6.8 LIPASE: N/A     INR:  1.32 (H) PTT:  N/A   D-dimer:  N/A Fibrinogen:  N/A       Imaging results reviewed over the past 24 hrs:   No results found for this or any previous visit (from the past 24 hour(s)).

## 2024-06-28 NOTE — PROGRESS NOTES
"IR BRIEF PROGRESS NOTE  Gaurang Rivera is a 53-year-old male patient with a past medical history of Factor V Leiden and DVT who presented to the [facility] hospital emergency department on 6/26/2024 with bilateral lower extremity swelling and pain for approximately 5 days. He had been without his warfarin for many months due to insurance inconsistencies. Upon evaluation in the emergency department, a venous Doppler ultrasound of the bilateral lower extremities demonstrated an extensive clot burden for which the patient was initiated on a heparin drip. A subsequent CT pulmonary embolism protocol was positive for extensive bilateral pulmonary emboli and at least mild right heart strain. The Pulmonary Embolism Response Team was activated, and the patient was transferred to the Interventional Radiology department at the [facility] for mechanical thrombectomy intervention.      Vitals:  /84   Pulse 88   Temp 98.5  F (36.9  C) (Oral)   Resp 16   Ht 1.981 m (6' 6\")   Wt 117.3 kg (258 lb 11.2 oz)   SpO2 93%   BMI 29.90 kg/m       Labs:  Lab Results   Component Value Date    HGB 15.2 06/27/2024     Lab Results   Component Value Date    INR 1.17 (H) 06/26/2024     Lab Results   Component Value Date     (L) 06/27/2024      Imaging:   EXAM: CT CHEST PULMONARY EMBOLISM W CONTRAST  LOCATION: [facility]  DATE: 6/26/2024  IMPRESSION:  1.  Prominent bilateral pulmonary emboli, lobar and segmental embolic disease bilaterally.  2.  RV/LV ratio greater than 1.  3.  No lung infarct or pleural effusion evident.    A/P:   Gaurang Rivera is a 53-year-old male patient with a past medical history of Factor V Leiden and DVT who presented to the General acute hospital emergency department on 6/26/2024 with 5-day bilateral lower extremity swelling and pain for found to have extensive clot burden and extensive bilateral " pulmonary emboli and at least mild right heart strain.    The Pulmonary Embolism Response Team was activated, and the patient was transferred to the [facility] department for mechanical thrombectomy intervention. During attempted catheterization of the main pulmonary artery, the pigtail catheter repeatedly crossed into the left heart, indicating a large patent foramen ovale  was present. Given that the patient was clinically stable at the time of the procedure (on room air) and weighing against the risk of dragging the clot back against the PFO, the decision was made to abort the procedure. An IVC filter was placed given the extensive bilateral lower extremity clot burden.    Plan  - Recommend formal ECHO for further evaluation.  - Successful insertion of infrarenal IVC filter.  - Bedrest for 1 hour.  - Patient can resume heparin drip.  - Patient can resume diet.  - IR will arrange outpatient follow-up with the patient.    Joe Child MD, PGY-3  Interventional Radiology  [phone]

## 2024-06-28 NOTE — CONSULTS
Care Management Initial Consult    General Information  Assessment completed with: Elvira Gaurang  Type of CM/SW Visit: Initial Assessment    Primary Care Provider verified and updated as needed: Yes   Readmission within the last 30 days: no previous admission in last 30 days      Reason for Consult: discharge planning  Advance Care Planning: Advance Care Planning Reviewed: no concerns identified          Communication Assessment  Patient's communication style: spoken language (English or Bilingual)    Hearing Difficulty or Deaf: no   Wear Glasses or Blind: yes    Cognitive  Cognitive/Neuro/Behavioral: unchanged from my previous assessment                      Living Environment:   People in home: alone     Current living Arrangements: apartment      Able to return to prior arrangements: yes       Family/Social Support:  Care provided by: self  Provides care for: no one  Marital Status: Single  Parent(s), Sibling(s)          Description of Support System: Supportive    Support Assessment: Adequate social supports, Adequate family and caregiver support    Current Resources:   Patient receiving home care services: No     Community Resources: None  Equipment currently used at home: none  Supplies currently used at home: None    Employment/Financial:  Employment Status: unemployed        Financial Concerns: none           Does the patient's insurance plan have a 3 day qualifying hospital stay waiver?  No    Lifestyle & Psychosocial Needs:  Social Determinants of Health     Food Insecurity: Not on File (2021)    Received from [facility]     Food Insecurity   Depression: At risk (2024)    PHQ-2     PHQ-2 Score: 5   Housing Stability: Not on File (2021)    Received from [facility]     Housing Stability     Housin   Tobacco Use: Low Risk  (2024)    Patient History     Smoking Tobacco Use: Never     Smokeless Tobacco Use: Never     Passive Exposure: Not on file   Financial Resource Strain: Not on File  "(8/9/2021)    Received from [facility]     Financial Resource Strain   Alcohol Use: Not on file   Transportation Needs: Not on File (8/9/2021)    Received from [facility]     Transportation Needs   Physical Activity: Not on File (8/9/2021)    Received from [facility] , [facility]     Physical Activity     Physical Activity: 0   Interpersonal Safety: Not on file   Stress: Not on File (8/9/2021)    Received from [facility]     Stress   Social Connections: Not on File (8/9/2021)    Received from [facility]     Social Connections   Health Literacy: Not on file       Functional Status:  Prior to admission patient needed assistance:   Dependent ADLs:: Independent  Dependent IADLs:: Independent  Assesssment of Functional Status: Not at baseline with mobility    Mental Health Status:  Mental Health Status: Current Concern  Mental Health Management: Individual Therapy, Medication    Chemical Dependency Status:  Chemical Dependency Status: No Current Concerns             Values/Beliefs:  Spiritual, Cultural Beliefs, Taoism Practices, Values that affect care: no               Additional Information:  Pt is \"a very kind 53-year-old  male presented to the [facility] emergency department on 6/26/2024 with bilateral lower extremity swelling and pain for approximately 5 days duration. Of note, patient has been without his warfarin for many months secondary to insurance inconsistencies.\" - per H&P.     Met with patient to introduce self/role and complete initial assessment.     Pt reports that he lives alone in an apartment with his dog. He is independent in all ADL's and IADL's. There are 8 steps to enter his apartment, which he reports is a challenge currently due to the DVT, but that he is confident he will be able to manage.     Pt is currently unemployed. He reports having lost insurance coverage in January, that he was trying to renew and accidentally sent paperwork to the wrong address. He has since " resolved the issue and has insurance again but was without for approximately 6 months. During this time, he was not able to get his prescriptions for anticoagulation or depression. He reports his current unemployment is due to depression. Since regaining insurance he has re-started his anti-depressants and therapy and feels the depression is improving.     Provider discussed discharge medication OOP costs for warfarin and lovenox (to bridge warfarin) and pt reports he will be able to afford these costs. Writer provided pt with additional resources for reduced cost prescriptions and coupons. Writer also provided pt with resources for [facility] emergency assistance and financial resources for his current hospitalization. Pt was appreciative and declined further resources (food, mental health).     CHW scheduled INR appt for pt 7/2. Pt updated and agreeable to this plan.     Pt had no other discharge needs or concerns. Care management will continue to follow.     Leia Figueroa RNCC  Covering for 6C ([id number] & [phone])  Phone [phone]  Pager [phone]      RN Care Coordinator     Social Work and Care Management Department      SEARCHABLE in [facility] - search CARE COORDINATOR       [place] ([phone]) Saturday & Sunday; ([phone]) [facility] Recognized Holidays     Units: 5A Onc Vocera & 5C Vocera Pager: [phone]    Units: 6B Vocera & 6C Vocera  Pager: [phone]    Units: 7A SOT RNCC Vocera, 7B Med Surg Vocera, & 7C Med Surg Vocera  Pager: [phone]    Units: 6A Vocera & 4A CVICU Vocera, 4C MICU Vocera, and 4E SICU Vocera   Pager: [phone]    Units: 5 Ortho Vocera & 5 Med Surg Vocera  Pager: [phone]    Units: 6 Med Surg Vocera & 8 Med Surg Vocera  Pager [phone]

## 2024-06-28 NOTE — PHARMACY-ANTICOAGULATION SERVICE
Clinical Pharmacy - Warfarin Dosing Consult     Pharmacy has been consulted to manage this patient s warfarin therapy.  Indication: DVT/ PE Treatment (+ Heterozygous Factor V Leiden)  Therapy Goal: INR 2-3  Warfarin Prior to Admission: Yes  Warfarin PTA Regimen: 10 mg Fri; 7.5 mg all other days  Significant drug interactions: heparin gtt  Recent documented change in oral intake/nutrition: No  Dose Comments: Insurance lapsed, has been off warfarin for ~5 months (per patient)    INR   Date Value Ref Range Status   06/28/2024 1.32 (H) 0.85 - 1.15 Final   06/26/2024 1.17 (H) 0.85 - 1.15 Final       Recommend warfarin 7.5 mg today.  Pharmacy will monitor aGurang Rivera daily and order warfarin doses to achieve specified goal.      Please contact pharmacy as soon as possible if the warfarin needs to be held for a procedure or if the warfarin goals change.

## 2024-06-28 NOTE — PLAN OF CARE
NEURO: A&O x4, calls appropriately   RESPIRATORY: LS clear and equal bilaterally. On RA, denies SOB.   CARDIAC: SR w/ 1st degree AV block. HR in 70s. Denies cardiac chest pain.   GI/: Voiding spontaneously. +BS, LBM today  DIET: Regular diet, tolerating well   PAIN/NAUSEA: C/o LLE pain, managed with Oxycodone. Denies nausea.   SKIN/INCISION/DRAINS: LLE redness/swelling   IV ACCESS: R/L PIV SL   ACTIVITY: Up ad ant   LAB: Electrolytes WNL. INR 1.32   PLAN: Lovenox teaching tonight/tomorrow and bridge to Warfarin. Probable discharge tomorrow.

## 2024-06-28 NOTE — PROGRESS NOTES
[id number]  Neuro: A&Ox4. Able to make needs known properly.   Cardiac: SR. VSS. Denies cardiac chest pain.   Respiratory: Sating >92% on RA. Denies SOB.  GI/: Adequate urine output. No BM overnight.   Diet/appetite: Tolerating consistent carb diet. Eating well.  Activity:  Up independently in room.   Pain: PRN 5 mg oxycodone given x2 for 7-8/10 left leg pain.    Skin: No new deficits noted.  LDA's: PIV to left arm with heparin gtt at 1800 unit(s)/h. Right PIV SL. Anti Xa therapeutic recheck 6/29 AM.  Plan: Continue with POC. Notify primary team with changes.

## 2024-06-28 NOTE — PROGRESS NOTES
Brief Cardiology Progress Note    Gaurang Rivera is a very kind 53-year-old  male presented to the [facility] emergency department on 6/26/2024 with bilateral lower extremity swelling and pain for approximately 5 days duration. Of note, patient has been without his warfarin for many months secondary to insurance inconsistencies. Patient has a pertinent chronic past medical history positive for factor V Leiden with history of DVT, depression, type 2 diabetes mellitus, hypertension, hypercholesterolemia. Upon evaluation in the emergency department, venous Doppler ultrasound of the bilateral lower extremities demonstrated extensive clot burden.     CT pulmonary embolism showed:   1.  Prominent bilateral pulmonary emboli, lobar and segmental embolic disease bilaterally.  2.  RV/LV ratio greater than 1.  3.  No lung infarct or pleural effusion evident.    Patient was transferred to [place] where a thrombectomy with IR was attempted and unsuccessful due to a newly found PFO. Per IR, no intervention required as long as patient is compliant with his anticoagulation regimen.     Plan:  - Ordered pharmacy liaison to assess cost of DOAC, patient has $25 copay, if cost-prohibitive, consider warfarin  - Recommend OP follow up with Structural Heart team for PFO closure workup      Mandy Guido PA-C

## 2024-06-28 NOTE — CONSULTS
Discharge Pharmacy Test Claim    Eliquis and xarelto are covered with monthly copays of $25 through patient's [facility] prepaid medical assistance plan.    Test Claim Copay   eliquis 25.00   xarelto 25.00     Shauna Jeffers  [facility] Pharmacy Liaison, JOSE L  Ph: [phone]  Fax: [phone]  Available on [employer] (learn more here)

## 2024-06-28 NOTE — PROGRESS NOTES
CHW scheduled PCP appt and INR lab Tuesday 7/2 at 11:20am with Dr. Pavon at [facility].     CHW spoke to pt at bedside- he said his primary clinic is [facility] and primary doctor is Dr. Guzman. CHW added PCP to chart.    [address], [address]  [place], [place], [address]  # [phone]  CHW spoke to Brittny, transferred to [facility] with care team- scheduled provider appt with INR lab 11:20am with Dr. Pavon.       Archana Cline   6A/B/C Community Health Worker   Phone: [phone]

## 2024-06-29 VITALS
SYSTOLIC BLOOD PRESSURE: 121 MMHG | RESPIRATION RATE: 18 BRPM | WEIGHT: 261.3 LBS | TEMPERATURE: 98.3 F | HEART RATE: 82 BPM | OXYGEN SATURATION: 99 % | HEIGHT: 78 IN | BODY MASS INDEX: 30.23 KG/M2 | DIASTOLIC BLOOD PRESSURE: 85 MMHG

## 2024-06-29 PROBLEM — I26.94 MULTIPLE SUBSEGMENTAL PULMONARY EMBOLI WITHOUT ACUTE COR PULMONALE (H): Status: ACTIVE | Noted: 2024-06-29

## 2024-06-29 PROBLEM — Z79.01 LONG TERM (CURRENT) USE OF ANTICOAGULANTS: Status: ACTIVE | Noted: 2024-06-29

## 2024-06-29 LAB
ALBUMIN SERPL BCG-MCNC: 3.8 G/DL (ref 3.5–5.2)
ALP SERPL-CCNC: 86 U/L (ref 40–150)
ALT SERPL W P-5'-P-CCNC: 13 U/L (ref 0–70)
ANION GAP SERPL CALCULATED.3IONS-SCNC: 11 MMOL/L (ref 7–15)
AST SERPL W P-5'-P-CCNC: 20 U/L (ref 0–45)
BASOPHILS # BLD AUTO: 0 10E3/UL (ref 0–0.2)
BASOPHILS NFR BLD AUTO: 1 %
BILIRUB SERPL-MCNC: 0.7 MG/DL
BUN SERPL-MCNC: 10.9 MG/DL (ref 6–20)
CALCIUM SERPL-MCNC: 9 MG/DL (ref 8.6–10)
CHLORIDE SERPL-SCNC: 103 MMOL/L (ref 98–107)
CREAT SERPL-MCNC: 0.86 MG/DL (ref 0.67–1.17)
DEPRECATED HCO3 PLAS-SCNC: 24 MMOL/L (ref 22–29)
EGFRCR SERPLBLD CKD-EPI 2021: >90 ML/MIN/1.73M2
EOSINOPHIL # BLD AUTO: 0.3 10E3/UL (ref 0–0.7)
EOSINOPHIL NFR BLD AUTO: 5 %
ERYTHROCYTE [DISTWIDTH] IN BLOOD BY AUTOMATED COUNT: 13 % (ref 10–15)
GLUCOSE BLDC GLUCOMTR-MCNC: 210 MG/DL (ref 70–99)
GLUCOSE SERPL-MCNC: 246 MG/DL (ref 70–99)
HCT VFR BLD AUTO: 43.6 % (ref 40–53)
HGB BLD-MCNC: 15.2 G/DL (ref 13.3–17.7)
IMM GRANULOCYTES # BLD: 0 10E3/UL
IMM GRANULOCYTES NFR BLD: 0 %
INR PPP: 1.16 (ref 0.85–1.15)
LYMPHOCYTES # BLD AUTO: 1.3 10E3/UL (ref 0.8–5.3)
LYMPHOCYTES NFR BLD AUTO: 26 %
MAGNESIUM SERPL-MCNC: 1.9 MG/DL (ref 1.7–2.3)
MCH RBC QN AUTO: 30.2 PG (ref 26.5–33)
MCHC RBC AUTO-ENTMCNC: 34.9 G/DL (ref 31.5–36.5)
MCV RBC AUTO: 87 FL (ref 78–100)
MONOCYTES # BLD AUTO: 0.5 10E3/UL (ref 0–1.3)
MONOCYTES NFR BLD AUTO: 11 %
NEUTROPHILS # BLD AUTO: 2.9 10E3/UL (ref 1.6–8.3)
NEUTROPHILS NFR BLD AUTO: 57 %
NRBC # BLD AUTO: 0 10E3/UL
NRBC BLD AUTO-RTO: 0 /100
PLATELET # BLD AUTO: 159 10E3/UL (ref 150–450)
POTASSIUM SERPL-SCNC: 3.9 MMOL/L (ref 3.4–5.3)
PROT SERPL-MCNC: 6.5 G/DL (ref 6.4–8.3)
RBC # BLD AUTO: 5.04 10E6/UL (ref 4.4–5.9)
SODIUM SERPL-SCNC: 138 MMOL/L (ref 135–145)
WBC # BLD AUTO: 5 10E3/UL (ref 4–11)

## 2024-06-29 PROCEDURE — 85610 PROTHROMBIN TIME: CPT | Performed by: PEDIATRICS

## 2024-06-29 PROCEDURE — 99239 HOSP IP/OBS DSCHRG MGMT >30: CPT | Performed by: PEDIATRICS

## 2024-06-29 PROCEDURE — 250N000013 HC RX MED GY IP 250 OP 250 PS 637: Performed by: STUDENT IN AN ORGANIZED HEALTH CARE EDUCATION/TRAINING PROGRAM

## 2024-06-29 PROCEDURE — 80053 COMPREHEN METABOLIC PANEL: CPT | Performed by: INTERNAL MEDICINE

## 2024-06-29 PROCEDURE — 250N000013 HC RX MED GY IP 250 OP 250 PS 637: Performed by: INTERNAL MEDICINE

## 2024-06-29 PROCEDURE — 83735 ASSAY OF MAGNESIUM: CPT | Performed by: PEDIATRICS

## 2024-06-29 PROCEDURE — 85025 COMPLETE CBC W/AUTO DIFF WBC: CPT | Performed by: INTERNAL MEDICINE

## 2024-06-29 PROCEDURE — 36415 COLL VENOUS BLD VENIPUNCTURE: CPT | Performed by: PEDIATRICS

## 2024-06-29 PROCEDURE — 250N000011 HC RX IP 250 OP 636: Performed by: PEDIATRICS

## 2024-06-29 RX ORDER — OXYCODONE HYDROCHLORIDE 5 MG/1
5 TABLET ORAL EVERY 6 HOURS PRN
Qty: 10 TABLET | Refills: 0 | Status: SHIPPED | OUTPATIENT
Start: 2024-06-29 | End: 2024-07-02

## 2024-06-29 RX ORDER — ACETAMINOPHEN 325 MG/1
650 TABLET ORAL EVERY 4 HOURS PRN
Qty: 60 TABLET | Refills: 0 | Status: SHIPPED | OUTPATIENT
Start: 2024-06-29 | End: 2024-09-03

## 2024-06-29 RX ORDER — WARFARIN SODIUM 5 MG/1
TABLET ORAL
Qty: 46 TABLET | Refills: 0 | Status: ACTIVE | OUTPATIENT
Start: 2024-06-29 | End: 2024-07-29

## 2024-06-29 RX ORDER — ENOXAPARIN SODIUM 150 MG/ML
120 INJECTION SUBCUTANEOUS EVERY 12 HOURS
Qty: 48 ML | Refills: 0 | Status: SHIPPED | OUTPATIENT
Start: 2024-06-29 | End: 2024-07-29

## 2024-06-29 RX ADMIN — PANTOPRAZOLE SODIUM 40 MG: 40 TABLET, DELAYED RELEASE ORAL at 09:11

## 2024-06-29 RX ADMIN — Medication 1 TABLET: at 09:11

## 2024-06-29 RX ADMIN — INSULIN ASPART 3 UNITS: 100 INJECTION, SOLUTION INTRAVENOUS; SUBCUTANEOUS at 09:12

## 2024-06-29 RX ADMIN — DOCUSATE SODIUM 100 MG: 100 CAPSULE, LIQUID FILLED ORAL at 09:11

## 2024-06-29 RX ADMIN — LURASIDONE HYDROCHLORIDE 20 MG: 20 TABLET, COATED ORAL at 09:12

## 2024-06-29 RX ADMIN — ATORVASTATIN CALCIUM 40 MG: 40 TABLET, FILM COATED ORAL at 09:11

## 2024-06-29 RX ADMIN — ENOXAPARIN SODIUM 120 MG: 120 INJECTION SUBCUTANEOUS at 05:51

## 2024-06-29 RX ADMIN — INSULIN ASPART 2 UNITS: 100 INJECTION, SOLUTION INTRAVENOUS; SUBCUTANEOUS at 13:04

## 2024-06-29 RX ADMIN — BUPROPION HYDROCHLORIDE 150 MG: 150 TABLET, EXTENDED RELEASE ORAL at 09:11

## 2024-06-29 ASSESSMENT — ACTIVITIES OF DAILY LIVING (ADL)
ADLS_ACUITY_SCORE: 20

## 2024-06-29 NOTE — DISCHARGE SUMMARY
DISCHARGE   Discharged to: Home  Via: Automobile  Accompanied by: Family  Discharge Instructions: diet, activity, medications, follow up appointments, when to call the MD, and what to watchout for (i.e. s/s of infection, increasing SOB, palpitations, chest pain,)  Prescriptions: To be filled by   discharge    pharmacy per pt's request; medication list reviewed & sent with pt  Follow Up Appointments: arranged; information given  Belongings: All sent with pt  IV: out  Telemetry: off  Pt exhibits understanding of above discharge instructions; all questions answered.  Discharge Paperwork: faxed

## 2024-06-29 NOTE — DISCHARGE SUMMARY
"[facility]  Hospitalist Discharge Summary      Date of Admission:  6/26/2024  Date of Discharge:  6/29/2024  3:13 PM  Discharging Provider: JENNIFER MONROY MD  Discharge Service: Hospitalist Service, GOLD TEAM 10    Discharge Diagnoses   Acute bilateral lower extremity DVTs  Factor V Leiden  R and L lobar and subsegmental PE  History of prior DVTs  Hyponatremia  Hypertension  Type 2 diabetes  Hypercholesterolemia  MDD      Clinically Significant Risk Factors     # DMII: A1C = 11.7 % (Ref range: <5.7 %) within past 6 months    # Obesity: Estimated body mass index is 30.2 kg/m  as calculated from the following:    Height as of this encounter: 1.981 m (6' 6\").    Weight as of this encounter: 118.5 kg (261 lb 4.8 oz).       Follow-ups Needed After Discharge   Follow-up Appointments     Adult [facility] Follow-up and recommended labs and tests      Follow up with primary care provider, Elvis Guzman, within 7 days   for hospital follow- up.  No follow up labs or test are needed.    Please follow up with the structural cardiology team. They will call you   to schedule this appointment.       Appointments on [place] and/or [facility] (with [facility] or [facility]   provider or service). Call [phone] if you haven't heard regarding   these appointments within 7 days of discharge.          [ ] Referred to warfarin clinic    Unresulted Labs Ordered in the Past 30 Days of this Admission       No orders found from 5/27/2024 to 6/27/2024.        These results will be followed up by N/A    Discharge Disposition   Discharged to home  Condition at discharge: Stable    Hospital Course   Gaurang Rivera is a 53yoM with a history of Factor V Leiden and prior DVT admitted for bilateral DVTs with bilateral PEs.     # Acute bilateral lower extremity DVTs  # R and L lobar and subsegmental pulmonary emboli  # Factor V Leiden  # History of prior DVTs  Presented with bilateral lower " extremity swelling and pain, and found to have bilateral DVTs. CTPE showed R greater than L lobar and subsegmental PEs with concern for R heart strain. He was started on a heparin gtt. A PERT was called, and IR planned for mechanical thrombectomy. The procedure was aborted due to concern for PFO, and an IVC filter was placed instead. He was started on warfarin due to DOACs being cost prohibitive. He will bridge to warfarin with lovenox and was connected to his prior warfarin clinic. An echo with bubble study was ordered for after discharge, and he was referred to structural heart clinic for workup for potential PFO closure.      # Hyponatremia  Initially presented with hyponatremia, which resolved during hospitalization.     # Hypertension  Blood pressure was initially low but later returned to normotension. Lisinopril was held during hospitalization but can be restarted after discharge.     # Type 2 diabetes  His home oral medications were held during hospitalizations, and he was on sliding scale insulin during hospitalizations. His home medications were restarted at discharge.     # Hypercholesterolemia  Continued his home statin.     # Depression  Continued wellbutrin, latuda, and venlafaxine.     Consultations This Hospital Stay   PHARMACY IP CONSULT  INTERVENTIONAL RADIOLOGY ADULT/PEDS IP CONSULT  PHARMACY LIAISON FOR MEDICATION COVERAGE CONSULT  PHARMACY TO DOSE WARFARIN  NUTRITION SERVICES ADULT IP CONSULT  CARE MANAGEMENT / SOCIAL WORK IP CONSULT    Code Status   Full Code    Time Spent on this Encounter   I, JENNIFER MONROY MD, personally saw the patient today and spent greater than 30 minutes discharging this patient.       JENNIFER MONROY MD  [facility] [address] [address]  Phone: [phone]  ______________________________________________________________________    Physical Exam   Vital Signs: Temp: 98.3  F (36.8  C) Temp src: Oral BP: 121/85 Pulse: 96    Resp: 18 SpO2: 99 % O2 Device: None (Room air)    Weight: 261 lbs 4.8 oz  Constitutional: awake, alert, cooperative, no apparent distress, and appears stated age  Eyes: extra-ocular muscles intact and sclera clear  Respiratory: No increased work of breathing, good air exchange, clear to auscultation bilaterally, no crackles or wheezing  Cardiovascular: regular rate and rhythm, normal S1 and S2, and no murmur noted  GI: normal bowel sounds, soft, non-distended, and non-tender  Skin: Erythema of L calf and shin similar to prior  Musculoskeletal: LLE edema similar to prior  Neurologic: Awake, alert, moving all extremities, no focal deficits       Primary Care Physician   Elvis Guzman    Discharge Orders      Adult Cardiology Eval  Referral      ANTICOAGULATION CLINIC REFERRAL      Reason for your hospital stay    You were hospitalized for a blood clot in your legs that traveled to your lungs (pulmonary embolus). You were started on enoxaparin (lovenox) and warfarin to prevent further clots from forming. You also had a filter placed to prevent other blood clots from going to your heart and lungs. Please take your warfarin as directed by your warfarin clinic. Please take the lovenox as directed until your warfarin clinic tells you to stop.    You were referred to the structural heart clinic to evaluate a potential opening between two of the chambers of your heart. The clinic will contact you to schedule this appointment.     Activity    Your activity upon discharge: activity as tolerated     Adult [facility] Follow-up and recommended labs and tests    Follow up with primary care provider, Elvis Guzman, within 7 days for hospital follow- up.  No follow up labs or test are needed.    Please follow up with the structural cardiology team. They will call you to schedule this appointment.       Appointments on [place] and/or [facility] (with [facility] or [facility] provider or service). Call [phone] if  you haven't heard regarding these appointments within 7 days of discharge.     Echocardiogram Limited with Bubble Study    Suspected PFO on IR procedure. Please evaluate for PFO.     Administration of IV contrast will be tailored to this examination per the appropriate written protocol listed in the Echocardiography department Protocol Book, or by the supervising Cardiologist. This may result in an order change.    Use of contrast is at the discretion of the supervising Cardiologist.     Diet    Follow this diet upon discharge: Regular diet. Make sure to have consistent vitamin K intake while on warfarin, as this affects the medication levels.       Significant Results and Procedures   Most Recent 3 CBC's:  Recent Labs   Lab Test 06/29/24  0503 06/28/24  0517 06/27/24  0519   WBC 5.0 6.4 6.5   HGB 15.2 15.0 15.2   MCV 87 88 86    151 139*     Most Recent INR's and Anticoagulation Dosing History:  Anticoagulation Dose History  More data exists         Latest Ref Rng & Units 12/1/2023 12/14/2023 1/15/2024 3/14/2024 6/26/2024 6/28/2024 6/29/2024   Recent Dosing and Labs   warfarin ANTICOAGULANT (COUMADIN) 7.5 MG tablet - - - - - - 7.5 mg, $Given -   INR 0.85 - 1.15 1.8  2.8  2.4  1.3  1.17  1.32  1.16       Details               ,   Results for orders placed or performed during the hospital encounter of 06/26/24   US Lower Extremity Venous Duplex Bilateral    Narrative    EXAM: US LOWER EXTREMITY VENOUS DUPLEX BILATERAL  LOCATION: [facility]  DATE: 6/26/2024    INDICATION: hx factor V leiden. LLE pain, swelling, edema  COMPARISON: None.  TECHNIQUE: Venous Duplex ultrasound of bilateral lower extremities with and without compression, augmentation and duplex. Color flow and spectral Doppler with waveform analysis performed.    FINDINGS: Exam includes the common femoral, femoral, popliteal veins as well as segmentally visualized deep calf veins and greater saphenous  vein.     RIGHT: Deep venous thrombosis within the right calf peroneal veins. No superficial thrombophlebitis. No popliteal cyst.    LEFT: Nonocclusive clot is seen within the left external iliac vein and common femoral vein. There is also deep venous thrombosis involving the left mid femoral vein extending into the distal femoral vein at the distal thigh and popliteal vein. This also   DVT within the left posterior tibial vein at the calf. No superficial thrombophlebitis. No popliteal cyst.      Impression    IMPRESSION:  1.  Nonocclusive DVT within the left external iliac and common femoral vein. DVT involving the mid thigh femoral vein extending into the distal thigh femoral vein and popliteal vein. There is also clot within the left proximal calf posterior tibial vein.  2.  Deep venous thrombosis involving the right calf peroneal vein.    Results called to Lisa Bhatia 6/26/2024 2113 hours   CT Chest Pulmonary Embolism w Contrast    Narrative    EXAM: CT CHEST PULMONARY EMBOLISM W CONTRAST  LOCATION: [facility]  DATE: 6/26/2024    INDICATION: hx Factor V leiden. extensive b l LE DVTs assess PE  COMPARISON: None.  TECHNIQUE: CT chest pulmonary angiogram during arterial phase injection of IV contrast. Multiplanar reformats and MIP reconstructions were performed. Dose reduction techniques were used.   CONTRAST: 74mL Isovue 370    FINDINGS:  ANGIOGRAM CHEST: Exam is positive for extensive bilateral pulmonary emboli, right worse than left, with lobar and segmental embolic disease present. There is no central saddle embolism. There is likely at least mild right heart strain with RV/LV ratio   greater than 1.   Thoracic aorta is negative for dissection.     LUNGS AND PLEURA: Normal. No parenchymal hemorrhage or pleural effusion evident.    MEDIASTINUM/AXILLAE: Normal.    CORONARY ARTERY CALCIFICATION: Mild.    UPPER ABDOMEN: Normal.    MUSCULOSKELETAL: Normal.       Impression    IMPRESSION:  1.  Prominent bilateral pulmonary emboli, lobar and segmental embolic disease bilaterally.  2.  RV/LV ratio greater than 1.  3.  No lung infarct or pleural effusion evident.    Findings discussed with Dr. Parnell at 2307 hours.   POC US ECHO LIMITED    Impression    Limited Bedside Cardiac Ultrasound, performed and interpreted by me.   Indication: Assess RV dilitation.  Parasternal long axis and subcostal views were acquired.   Technically difficult studies with poor windows.   No significant RV dilatation appreciated.    Findings:    Chambers do not appear dilated.    IMPRESSION: No significant RV dilatation.       IR Pulmonary Angiogram Bilateral    Narrative    Procedure 6/28/24:  1. Ultrasound guidance for vascular access.  2. IVC venogram.   3. Diagnostic bilateral pulmonary angiogram.  4. Selective catheterization of the right pulmonary artery with  suction pulmonary thrombectomy.   5. Selective catheterization of the left pulmonary artery with suction  pulmonary thrombectomy.   6. Bilateral completion pulmonary arteriograms.   7. IVC filter placement    History: Intermediate high risk PE. Large clot burden in longs and  extensive LLE DVT.    Comparison: CT 6/26/24    Staff: TIEN Keating MD    Fellow: GLADYS Barrett MD    Monitoring/Medications: Patient received moderate sedation and was  monitored by the nursing staff under the supervision of the higher  attending. Vital signs remained stable throughout the procedure.    Face to face sedation time: 35 minutes    Fluoro time:    Findings/procedure:    Prior to the procedure, both verbal and written informed consent  obtained from the patient. Patient placed supine. The right groin was  prepped in usual sterile fashion. Preprocedure ultrasound demonstrated  a patent right common femoral vein.    1% lidocaine for local anesthesia. Under ultrasound guidance a  micropuncture needle was advanced into the right internal jugular  vein. Image  documenting the needle within the vein  was archived.  Microwire advanced through the needle and needle exchanged for  micropuncture sheath. Micropuncture exchanged for a Bentson wire which  was advanced. Microsheath exchanged over the wire for a 6 Fr vascular  sheath.    An angled pigtail catheter was advanced over a Bentson guidewire into  the IVC. This catheter was used to attempt to cross the right heart  and catheterize the pulmonary artery. The pigtail easily passed across  the heart and into a pulmonary vessel. Digital subtraction angiogram  was performed which demonstrated the pigtail positioned within a left  upper lobe pulmonary vein and with drainage into the left atrium. The  catheter was retracted back into the right atrium. We attempted to  catheterize the pulmonary artery a few more times but the catheter  repeatedly crossed through a patent foramen ovale into the left heart.    At this point, given the extensive clot in the leg and the risk of  dislodging clot during aspiration thrombectomy, potentially mobilizing  into the patent foramen ovale, we made the decision to abort the  procedure as although the patient technically met criteria for  intermediate high risk, he was doing well with anticoagulation alone  in breathing on room air.    The decision was made, however, to place an inferior vena cava filter.  Catheter was removed. Sheath removed over the wire and exchanged for a  Bard Gentry vascular sheath. Inferior venacavogram was performed to  demarcate the level of the lowest renal vein. A duodenal filter was  then loaded into the sheath, positioned at the appropriate level just  below the right renal vein, and deployed by retracting the sheath over  the filter. Repeat inferior venacavogram demonstrated filter in good  position.    Sheath removed and hemostasis obtained using manual pressure.      Impression    IMPRESSION:   1. During attempts to catheterize the pulmonary artery a  curved  pigtail catheter repeatedly cross into a pulmonary vein with disease  indicating the presence of a large patent foramen ovale. Due to the  risk of disrupting clot and potentially mobilized into the patent  foramen ovale, the decision was made to not proceed further with  thrombectomy.  2. Insertion of inferior vena cava filter.    PLAN:   Follow-up venacavogram and possible IVC filter removal in 3 months.   IR IVC Filter Placement    Narrative    Procedure 6/28/24:  1. Ultrasound guidance for vascular access.  2. IVC venogram.   3. Diagnostic bilateral pulmonary angiogram.  4. Selective catheterization of the right pulmonary artery with  suction pulmonary thrombectomy.   5. Selective catheterization of the left pulmonary artery with suction  pulmonary thrombectomy.   6. Bilateral completion pulmonary arteriograms.   7. IVC filter placement    History: Intermediate high risk PE. Large clot burden in longs and  extensive LLE DVT.    Comparison: CT 6/26/24    Staff: TIEN Keating MD    Fellow: GLADYS Barrett MD    Monitoring/Medications: Patient received moderate sedation and was  monitored by the nursing staff under the supervision of the higher  attending. Vital signs remained stable throughout the procedure.    Face to face sedation time: 35 minutes    Fluoro time:    Findings/procedure:    Prior to the procedure, both verbal and written informed consent  obtained from the patient. Patient placed supine. The right groin was  prepped in usual sterile fashion. Preprocedure ultrasound demonstrated  a patent right common femoral vein.    1% lidocaine for local anesthesia. Under ultrasound guidance a  micropuncture needle was advanced into the right internal jugular  vein. Image documenting the needle within the vein  was archived.  Microwire advanced through the needle and needle exchanged for  micropuncture sheath. Micropuncture exchanged for a Bentson wire which  was advanced. Microsheath exchanged over the wire  for a 6 Fr vascular  sheath.    An angled pigtail catheter was advanced over a [employer] guidewire into  the IVC. This catheter was used to attempt to cross the right heart  and catheterize the pulmonary artery. The pigtail easily passed across  the heart and into a pulmonary vessel. Digital subtraction angiogram  was performed which demonstrated the pigtail positioned within a left  upper lobe pulmonary vein and with drainage into the left atrium. The  catheter was retracted back into the right atrium. We attempted to  catheterize the pulmonary artery a few more times but the catheter  repeatedly crossed through a patent foramen ovale into the left heart.    At this point, given the extensive clot in the leg and the risk of  dislodging clot during aspiration thrombectomy, potentially mobilizing  into the patent foramen ovale, we made the decision to abort the  procedure as although the patient technically met criteria for  intermediate high risk, he was doing well with anticoagulation alone  in breathing on room air.    The decision was made, however, to place an inferior vena cava filter.  Catheter was removed. Sheath removed over the wire and exchanged for a  Bard Clare vascular sheath. Inferior venacavogram was performed to  demarcate the level of the lowest renal vein. A duodenal filter was  then loaded into the sheath, positioned at the appropriate level just  below the right renal vein, and deployed by retracting the sheath over  the filter. Repeat inferior venacavogram demonstrated filter in good  position.    Sheath removed and hemostasis obtained using manual pressure.      Impression    IMPRESSION:   1. During attempts to catheterize the pulmonary artery a curved  pigtail catheter repeatedly cross into a pulmonary vein with disease  indicating the presence of a large patent foramen ovale. Due to the  risk of disrupting clot and potentially mobilized into the patent  foramen ovale, the decision was made to  not proceed further with  thrombectomy.  2. Insertion of inferior vena cava filter.    PLAN:   Follow-up venacavogram and possible IVC filter removal in 3 months.   Echocardiogram Complete     Value    LVEF  55-60%    [facility]    [id number]  [facility]  [id number]  [id number]^GENEVIEVE^CARIE^JESSICA     [facility]  Echocardiography Laboratory  [address] [place] [address]     Name: ISAURA DARNELL  MRN: [id number]  : 1970  Study Date: 2024 01:21 PM  Age: 53 yrs  Gender: Male  Patient Location: [facility]  Reason For Study: Pulmonary Emboli  Ordering Physician: CARIE VALLEJO  Performed By: Juliet Prater     BSA: 2.6 m2  Height: 78 in  Weight: 267 lb  HR: 96  BP: 110/81 mmHg  ______________________________________________________________________________  Procedure  Complete Portable Echo Adult. Contrast Optison. Technically difficult  study.Extremely poor acoustic windows. Technically difficult study.Extremely  difficult acoustic windows despite the use of contrast for endcardial border  definition. Optison (NDC #4412-5309-71) given intravenously. Patient was given  6 ml mixture of 3 ml Optison and 6 ml saline. 3 ml wasted.  ______________________________________________________________________________  Interpretation Summary  Left ventricular size, wall motion and function are normal. The ejection  fraction is 55-60%.  The right ventricle is not well visualized. On parasternal views, the right  ventricle appears mildly dilated.  No significant valvular abnormalities present.  Pulmonary artery systolic pressure cannot be assessed.  The inferior vena cava cannot be assessed. Ascending aorta 4.1 cm. No  pericardial effusion is present.     This study was compared with the study from 6/3/2020. No significant changes  noted.  ______________________________________________________________________________  Left Ventricle  Left ventricular size, wall motion and function are  normal. The ejection  fraction is 55-60%. Left ventricular wall thickness is normal.     Right Ventricle  On parasternal views, the right ventricle appears mildly dilated. The right  ventricle is not well visualized.     Atria  Both atria appear normal.     Mitral Valve  Trace mitral insufficiency is present.     Aortic Valve  The aortic valve is tricuspid. On Doppler interrogation, there is no  significant stenosis or regurgitation.     Tricuspid Valve  The valve leaflets are not well visualized. On Doppler interrogation, there is  no significant stenosis or regurgitation. Pulmonary artery systolic pressure  cannot be assessed.     Pulmonic Valve  The valve leaflets are not well visualized. On Doppler interrogation, there is  no significant stenosis or regurgitation.     Vessels  Aortic root and ascending aorta are normal in diameter when indexed to body  surface area. The inferior vena cava cannot be assessed. Ascending aorta 4.1  cm.     Pericardium  No pericardial effusion is present.     Miscellaneous  No significant valvular abnormalities present.     Compared to Previous Study  This study was compared with the study from 6/3/2020 . No significant changes  noted.  ______________________________________________________________________________  MMode/2D Measurements & Calculations  Ao root diam: 4.1 cm  asc Aorta Diam: 4.0 cm  LVOT diam: 2.9 cm  LVOT area: 6.6 cm2  Ao root diam index Ht(cm/m): 2.1  Ao root diam index BSA (cm/m2): 1.6  Asc Ao diam index BSA (cm/m2): 1.6  Asc Ao diam index Ht(cm/m): 2.0  EF Biplane: 59.3 %     Doppler Measurements & Calculations  MV E max frantz: 54.0 cm/sec  MV A max frantz: 85.3 cm/sec  MV E/A: 0.63  MV dec slope: 457.0 cm/sec2  MV dec time: 0.12 sec  PA acc time: 0.09 sec     E/E' av.1  Lateral E/e': 7.9  Medial E/e': 8.3     ______________________________________________________________________________  Report approved by: Lynnette DANIELS 2024 04:34 PM              Discharge Medications   Current Discharge Medication List        START taking these medications    Details   acetaminophen (TYLENOL) 325 MG tablet Take 2 tablets (650 mg) by mouth every 4 hours as needed for mild pain or fever  Qty: 60 tablet, Refills: 0    Associated Diagnoses: Multiple subsegmental pulmonary emboli without acute cor pulmonale (H)      enoxaparin ANTICOAGULANT (LOVENOX) 120 MG/0.8ML syringe Inject 0.8 mLs (120 mg) Subcutaneous every 12 hours for 30 days  Qty: 48 mL, Refills: 0    Associated Diagnoses: Multiple subsegmental pulmonary emboli without acute cor pulmonale (H)      oxyCODONE (ROXICODONE) 5 MG tablet Take 1 tablet (5 mg) by mouth every 6 hours as needed for pain  Qty: 10 tablet, Refills: 0    Associated Diagnoses: Multiple subsegmental pulmonary emboli without acute cor pulmonale (H)           CONTINUE these medications which have CHANGED    Details   warfarin ANTICOAGULANT (COUMADIN) 5 MG tablet Take 2 tablets (10 mg) by mouth daily for 1 day, THEN 1.5 tablets (7.5 mg) daily for 29 days.  Qty: 46 tablet, Refills: 0    Associated Diagnoses: Multiple subsegmental pulmonary emboli without acute cor pulmonale (H)           CONTINUE these medications which have NOT CHANGED    Details   atorvastatin (LIPITOR) 40 MG tablet Take 1 tablet (40 mg) by mouth daily  Qty: 90 tablet, Refills: 1    Associated Diagnoses: Hyperlipidemia, unspecified hyperlipidemia type      buPROPion (WELLBUTRIN XL) 150 MG 24 hr tablet Take 1 tablet (150 mg) by mouth every morning Take with 300mg for total daily dose of 450mg.  Qty: 90 tablet, Refills: 0    Associated Diagnoses: Severe episode of recurrent major depressive disorder, without psychotic features (H)      cholecalciferol (VITAMIN D3) 125 mcg (5000 units) capsule Take 1 capsule (125 mcg) by mouth daily  Qty: 100 capsule, Refills: 3    Associated Diagnoses: Vitamin D deficiency      exenatide ER (BYDUREON BCISE) 2 MG/0.85ML auto-injector INJECT 2MG  SUBCUTANEOUS EVERY 7 DAYS  Qty: 3.4 mL, Refills: 5    Associated Diagnoses: Type 2 diabetes mellitus without complication, without long-term current use of insulin (H)      JARDIANCE 10 MG TABS tablet TAKE 1 TABLET(10 MG) BY MOUTH DAILY  Qty: 30 tablet, Refills: 5    Comments: ZERO refills remain on this prescription. Your patient is requesting advance approval of refills for this medication to PREVENT ANY MISSED DOSES  Associated Diagnoses: Type 2 diabetes mellitus without complication, without long-term current use of insulin (H)      lisinopril (ZESTRIL) 5 MG tablet Take 1 tablet (5 mg) by mouth daily  Qty: 90 tablet, Refills: 1    Associated Diagnoses: Microalbuminuria due to type 2 diabetes mellitus (H)      lurasidone (LATUDA) 120 MG TABS tablet Take 1 tablet (120 mg) by mouth daily with food  Qty: 30 tablet, Refills: 1    Associated Diagnoses: Severe episode of recurrent major depressive disorder, without psychotic features (H)      metFORMIN (GLUCOPHAGE) 1000 MG tablet TAKE 1 TABLET(1000 MG) BY MOUTH TWICE DAILY WITH MEALS  Qty: 180 tablet, Refills: 0    Comments: Please tell patient this is a rula refill. Patient is due for diabetes visit with [facility] before further refills.  Associated Diagnoses: Microalbuminuria due to type 2 diabetes mellitus (H)      multivitamin w/minerals (THERA-VIT-M) tablet Take 1 tablet by mouth daily  Qty: 90 tablet, Refills: 3    Associated Diagnoses: Hyperlipidemia, unspecified hyperlipidemia type      omeprazole (PRILOSEC OTC) 20 MG EC tablet Take 20 mg by mouth daily as needed      QUEtiapine (SEROQUEL) 25 MG tablet Take 2 tablets (50 mg) by mouth nightly as needed (insomnia)  Qty: 60 tablet, Refills: 1    Associated Diagnoses: Severe episode of recurrent major depressive disorder, without psychotic features (H)      traZODone (DESYREL) 50 MG tablet Take 1-2 tablets ( mg) by mouth nightly as needed for sleep  Qty: 60 tablet, Refills: 1    Associated Diagnoses: Severe  episode of recurrent major depressive disorder, without psychotic features (H)      venlafaxine (EFFEXOR XR) 75 MG 24 hr capsule Take 1 capsule (75 mg) by mouth daily  Qty: 30 capsule, Refills: 0    Associated Diagnoses: Severe episode of recurrent major depressive disorder, without psychotic features (H)      Continuous Blood Gluc Sensor (FREESTYLE JAVI 14 DAY SENSOR) MISC AS DIRECTED AND CHANGE EVERY 14 DAYS,  Qty: 6 each, Refills: 3    Associated Diagnoses: Type 2 diabetes mellitus without complication, without long-term current use of insulin (H)           STOP taking these medications       ibuprofen (ADVIL/MOTRIN) 200 MG capsule Comments:   Reason for Stopping:             Allergies   No Known Allergies

## 2024-06-29 NOTE — PROGRESS NOTES
Team: [id number]     Admitted: 6/26   Neuro: A/Ox4. Withdrawn   Cardiac/Tele:  SR   Respiratory: Room air.   GI/: Continent.   Diet/Appetite: consistent carb diet   Skin: No new deficits noted.   LDAs: LLE redness/swelling   Activity: Independent   Changes During Shift: no significant changes.        Time of care 19:00-07:00

## 2024-07-01 ENCOUNTER — PATIENT OUTREACH (OUTPATIENT)
Dept: FAMILY MEDICINE | Facility: CLINIC | Age: 54
End: 2024-07-01
Payer: MEDICAID

## 2024-07-01 ENCOUNTER — TELEPHONE (OUTPATIENT)
Dept: ANTICOAGULATION | Facility: CLINIC | Age: 54
End: 2024-07-01
Payer: MEDICAID

## 2024-07-01 DIAGNOSIS — D68.51 HETEROZYGOUS FACTOR V LEIDEN MUTATION (H): Primary | ICD-10-CM

## 2024-07-01 DIAGNOSIS — Z79.01 LONG TERM (CURRENT) USE OF ANTICOAGULANTS: ICD-10-CM

## 2024-07-01 DIAGNOSIS — Z86.718 H/O DEEP VENOUS THROMBOSIS: ICD-10-CM

## 2024-07-01 DIAGNOSIS — I26.94 MULTIPLE SUBSEGMENTAL PULMONARY EMBOLI WITHOUT ACUTE COR PULMONALE (H): ICD-10-CM

## 2024-07-01 ASSESSMENT — PATIENT HEALTH QUESTIONNAIRE - PHQ9
SUM OF ALL RESPONSES TO PHQ QUESTIONS 1-9: 13
10. IF YOU CHECKED OFF ANY PROBLEMS, HOW DIFFICULT HAVE THESE PROBLEMS MADE IT FOR YOU TO DO YOUR WORK, TAKE CARE OF THINGS AT HOME, OR GET ALONG WITH OTHER PEOPLE: SOMEWHAT DIFFICULT
SUM OF ALL RESPONSES TO PHQ QUESTIONS 1-9: 13

## 2024-07-01 NOTE — TELEPHONE ENCOUNTER
ED / Discharge Outreach Protocol    Patient Contact    Attempt # 1    Was call answered?  No.  Left message on voicemail with information to call triage back.    UMA CarverN, RN-BC  [facility]

## 2024-07-01 NOTE — TELEPHONE ENCOUNTER
ANTICOAGULATION  MANAGEMENT: Discharge Review    Gaurang Rivera chart reviewed for anticoagulation continuity of care    Hospital Admission on 6/26-6/29 for acute bilateral LE DVTs.    Discharge disposition: Home    Results:    Recent labs: (last 7 days)     06/26/24  1948 06/27/24  0415 06/27/24  1429 06/27/24  2228 06/28/24  0517 06/29/24  0503   INR 1.17*  --   --   --  1.32* 1.16*   AAUFH  --  >1.10* 0.13 0.56 0.53  --      Anticoagulation inpatient management:     warfarin resumed on 6/28/24. Was bridged with heparin gtt     Anticoagulation discharge instructions:     Warfarin dosing:  Take 10 mg x1, then 7.5 mg daily   Bridging: bridging with enoxaparin (Lovenox)   INR goal change: No      Medication changes affecting anticoagulation: No    Additional factors affecting anticoagulation: Yes: - per inpatient notes, gaurang had been off of his Warfarin for about 5 months due to insurance issues.    IR planned for mechanical thrombectomy, however, procedure was aborted due to concern for PFO, and an IVC filter was placed instead. Referral to structural heart clinic at discharge for possible PFO closure     PLAN     Agree with discharge plan for follow up on 7/2    Left a detailed message for Gaurang.  Recommended follow up is scheduled    Anticoagulation Calendar updated    Iqra Back RN

## 2024-07-02 ENCOUNTER — ANTICOAGULATION THERAPY VISIT (OUTPATIENT)
Dept: ANTICOAGULATION | Facility: CLINIC | Age: 54
End: 2024-07-02

## 2024-07-02 ENCOUNTER — OFFICE VISIT (OUTPATIENT)
Dept: FAMILY MEDICINE | Facility: CLINIC | Age: 54
End: 2024-07-02

## 2024-07-02 VITALS
TEMPERATURE: 97.5 F | SYSTOLIC BLOOD PRESSURE: 132 MMHG | HEART RATE: 90 BPM | RESPIRATION RATE: 18 BRPM | BODY MASS INDEX: 30.43 KG/M2 | WEIGHT: 263 LBS | OXYGEN SATURATION: 96 % | HEIGHT: 78 IN | DIASTOLIC BLOOD PRESSURE: 82 MMHG

## 2024-07-02 DIAGNOSIS — Z79.01 LONG TERM (CURRENT) USE OF ANTICOAGULANTS: ICD-10-CM

## 2024-07-02 DIAGNOSIS — I48.91 ATRIAL FIBRILLATION, UNSPECIFIED TYPE (H): ICD-10-CM

## 2024-07-02 DIAGNOSIS — I26.94 MULTIPLE SUBSEGMENTAL PULMONARY EMBOLI WITHOUT ACUTE COR PULMONALE (H): Primary | ICD-10-CM

## 2024-07-02 DIAGNOSIS — D68.51 HETEROZYGOUS FACTOR V LEIDEN MUTATION (H): ICD-10-CM

## 2024-07-02 DIAGNOSIS — I82.4Z1 ACUTE DEEP VEIN THROMBOSIS (DVT) OF DISTAL END OF RIGHT LOWER EXTREMITY (H): ICD-10-CM

## 2024-07-02 DIAGNOSIS — D68.51 HETEROZYGOUS FACTOR V LEIDEN MUTATION (H): Primary | ICD-10-CM

## 2024-07-02 DIAGNOSIS — Z86.718 H/O DEEP VENOUS THROMBOSIS: ICD-10-CM

## 2024-07-02 DIAGNOSIS — F33.2 SEVERE EPISODE OF RECURRENT MAJOR DEPRESSIVE DISORDER, WITHOUT PSYCHOTIC FEATURES (H): ICD-10-CM

## 2024-07-02 DIAGNOSIS — I26.94 MULTIPLE SUBSEGMENTAL PULMONARY EMBOLI WITHOUT ACUTE COR PULMONALE (H): ICD-10-CM

## 2024-07-02 LAB — INR BLD: 1.9 (ref 0.9–1.1)

## 2024-07-02 PROCEDURE — 85610 PROTHROMBIN TIME: CPT | Performed by: PHYSICIAN ASSISTANT

## 2024-07-02 PROCEDURE — 96127 BRIEF EMOTIONAL/BEHAV ASSMT: CPT | Performed by: PHYSICIAN ASSISTANT

## 2024-07-02 PROCEDURE — 99214 OFFICE O/P EST MOD 30 MIN: CPT | Performed by: PHYSICIAN ASSISTANT

## 2024-07-02 PROCEDURE — 36416 COLLJ CAPILLARY BLOOD SPEC: CPT | Performed by: PHYSICIAN ASSISTANT

## 2024-07-02 RX ORDER — OXYCODONE HYDROCHLORIDE 5 MG/1
10 TABLET ORAL EVERY 6 HOURS PRN
Qty: 24 TABLET | Refills: 0 | Status: SHIPPED | OUTPATIENT
Start: 2024-07-02 | End: 2024-07-02

## 2024-07-02 RX ORDER — OXYCODONE HYDROCHLORIDE 5 MG/1
10 TABLET ORAL EVERY 6 HOURS PRN
Qty: 24 TABLET | Refills: 0 | Status: SHIPPED | OUTPATIENT
Start: 2024-07-02 | End: 2024-07-05

## 2024-07-02 NOTE — PROGRESS NOTES
"  Assessment & Plan     (I26.94) Multiple subsegmental pulmonary emboli without acute cor pulmonale (H)  (primary encounter diagnosis)  (I82.4Z1) Acute deep vein thrombosis (DVT) of distal end of right lower extremity (H)  (I48.91) Atrial fibrillation, unspecified type (H)  (D68.51) Heterozygous factor V Leiden mutation (H24)  Comment:   Plan: INR point of care (finger stick), oxyCODONE (ROXICODONE) 5 MG tablet          Patient with continued pain in his left lower extremity due to DVT, refill of oxycodone sent today and an increased dose.  Patient advised to contact cardiology and anticoagulation clinic to set up future appointments to address PFO as well as continue monitoring INR.  If INR is greater than 2 patient can discontinue Lovenox today.  Continue with all cardiac medications as prescribed on discharge, follow-up with specialty as listed above, advised follow-up with primary care clinic in approximately 2 months      (F33.2) Severe episode of recurrent major depressive disorder, without psychotic features (H)  Comment:   Plan: stable per pt, some frustration with medication costs and resuming anticoagulation        MED REC REQUIRED  Post Medication Reconciliation Status:     BMI  Estimated body mass index is 28.39 kg/m  as calculated from the following:    Height as of this encounter: 2.05 m (6' 8.71\").    Weight as of this encounter: 119.3 kg (263 lb).       Depression Screening Follow Up            Follow Up Actions Taken  Crisis resource information provided in the After Visit Summary  Referred patient back to mental health provider    Discussed the following ways the patient can remain in a safe environment:  be around others    Misael Merlos is a 53 year old, presenting for the following health issues:  Hospital F/U    [facility]       Hospital Follow-up Visit:    Hospital/Nursing Home/IP Rehab Facility: [facility]  Date of Admission: 6/26/24  Date of " Discharge: 6/29/24  Reason(s) for Admission: Acute bilateral lower extremity DVTs   Was the patient in the ICU or did the patient experience delirium during hospitalization?  No  Do you have any other stressors you would like to discuss with your provider? No    Problems taking medications regularly:  None  Medication changes since discharge: None  Problems adhering to non-medication therapy:  None    Summary of hospitalization:  [facility] discharge summary reviewed  Diagnostic Tests/Treatments reviewed.  Follow up needed: INR  Other Healthcare Providers Involved in Patient s Care:         Specialist appointment - cardiology and anticoagulation clinic, patient has not yet scheduled  Update since discharge: stable.     Plan of care communicated with patient     Hospital Course  Gaurang Rivera is a 53yoM with a history of Factor V Leiden and prior DVT admitted for bilateral DVTs with bilateral PEs.      # Acute bilateral lower extremity DVTs  # R and L lobar and subsegmental pulmonary emboli  # Factor V Leiden  # History of prior DVTs  Presented with bilateral lower extremity swelling and pain, and found to have bilateral DVTs. CTPE showed R greater than L lobar and subsegmental PEs with concern for R heart strain. He was started on a heparin gtt. A PERT was called, and IR planned for mechanical thrombectomy. The procedure was aborted due to concern for PFO, and an IVC filter was placed instead. He was started on warfarin due to DOACs being cost prohibitive. He will bridge to warfarin with lovenox and was connected to his prior warfarin clinic. An echo with bubble study was ordered for after discharge, and he was referred to structural heart clinic for workup for potential PFO closure.       # Hyponatremia  Initially presented with hyponatremia, which resolved during hospitalization.      # Hypertension  Blood pressure was initially low but later returned to normotension. Lisinopril was held during  "hospitalization but can be restarted after discharge.      # Type 2 diabetes  His home oral medications were held during hospitalizations, and he was on sliding scale insulin during hospitalizations. His home medications were restarted at discharge.      # Hypercholesterolemia  Continued his home statin.      # Depression  Continued wellbutrin, latuda, and venlafaxine.         Today, Gaurang noting continued LLE pain, no worsening swelling or redness. Using oxycodone with minimal relief QID.    BP WNL at home    BS readings elevated since discharge, some 200s            Objective    /82 (BP Location: Right arm, Patient Position: Sitting, Cuff Size: Adult Large)   Pulse 90   Temp 97.5  F (36.4  C) (Temporal)   Resp 18   Ht 2.05 m (6' 8.71\")   Wt 119.3 kg (263 lb)   SpO2 96%   BMI 28.39 kg/m    Body mass index is 28.39 kg/m .  Physical Exam   GENERAL: healthy, alert and no distress, favoring LLE with walking  EYES: Eyes grossly normal to inspection, EOM intact and conjunctivae normal  RESP: breathing comfortably on room air  PSYCH: mentation appears normal, affect normal/bright  LLE; slight edema of LLE and knee, no warmth            Signed Electronically by: Alejo Pavon PA-C    "

## 2024-07-02 NOTE — TELEPHONE ENCOUNTER
Patient was seen today for hospital follow up visit, closing outreach encounter.    UMA KirbyN, RN (she/her)  [facility] RN

## 2024-07-02 NOTE — PROGRESS NOTES
ANTICOAGULATION MANAGEMENT     Gaurang Rivera 53 year old male is on warfarin with subtherapeutic INR result. (Goal INR 2.0-3.0)    Recent labs: (last 7 days)     07/02/24  1154   INR 1.9*       ASSESSMENT     Source(s): Chart Review     Warfarin doses taken: Reviewed in chart  Diet:  unable to assess  Medication/supplement changes:  oxycodone PRN for pain with DVTs  New illness, injury, or hospitalization: Yes: New bilateral DVTs and IVC filter placement after patient stopped warfarin use x5 months. Possible PFO   Signs or symptoms of bleeding or clotting: Yes: bilateral DVT after patient stopped anticoagulation therapy d/t insurance concerns  Previous result: Subtherapeutic  Additional findings: None and Bridging with Enoxaparin until INR >= 2.3 or INR >= 2.0 x 2 ([facility] protocol goal INR 2-3 new start)  Per Tamara Guerrier, [facility]: let's confirm that $25/month for a DOAC is unaffordable. that was the price quoted for each drug and it is noted that is cost prohibitive.        PLAN     Unable to reach Gaurang today.    Left message to continue current dose of warfarin 7.5 mg tonight. Request call back for assessment.    Follow up required to confirm warfarin dose taken and assess for changes, confirm enoxaparin (Lovenox) doses taken, discuss out of range result , and provide education since he is technically a new start, confirm that DOACs are cost prohibitive, schedule next couple weeks of INR labs.    Velia Persaud, RN  Anticoagulation Clinic  7/2/2024

## 2024-07-03 ENCOUNTER — MYC MEDICAL ADVICE (OUTPATIENT)
Dept: ANTICOAGULATION | Facility: CLINIC | Age: 54
End: 2024-07-03
Payer: MEDICAID

## 2024-07-03 NOTE — PROGRESS NOTES
ANTICOAGULATION MANAGEMENT     Gaurang Rivera 53 year old male is on warfarin with subtherapeutic INR result. (Goal INR 2.0-3.0)    Recent labs: (last 7 days)     07/02/24  1154   INR 1.9*       ASSESSMENT     See notes below per [facility] RN documentation yesterday 7/2/24.       PLAN     Unable to reach Gaurang today.    No instructions provided. Unable to leave voicemail.    Follow up required to assess for changes  and discuss out of range result     Cyn Womack, RN  Anticoagulation Clinic  7/3/2024

## 2024-07-03 NOTE — PROGRESS NOTES
ANTICOAGULATION MANAGEMENT     Gaurang Rivera 53 year old male is on warfarin with subtherapeutic INR result. (Goal INR 2.0-3.0)    Recent labs: (last 7 days)     07/02/24  1154   INR 1.9*       ASSESSMENT     Source(s): Chart Review - several attempts made to reach patient w/o callback    Warfarin doses taken: Unable to assess + previous MD was 55 mg - [facility] will consider changing dose at next check  Diet: Unable to assess  Medication/supplement changes: Oxycodone prn  New illness, injury, or hospitalization: Yes: patient was hospitalized from 6/26/24-6/29/24 for new bilateral DVTs with bilateral PEs + IVC filter placement after patient stopped warfarin use x 5 months + possible PFO   Signs or symptoms of bleeding or clotting: New DVTs as noted  Previous result: Subtherapeutic  Additional findings: Bridging with Enoxaparin until INR >= 2.3 or INR >= 2.0 x 2 ([facility] protocol goal INR 2-3 new start)  Per Tamara Guerrier, [facility]: let's confirm that $25/month for a DOAC is unaffordable. that was the price quoted for each drug and it is noted that is cost prohibitive.        PLAN     Recommended plan for temporary change(s) affecting INR     Dosing Instructions: Continue your current warfarin dose with next INR in 3 days       Summary  As of 7/2/2024      Full warfarin instructions:  7.5 mg every day   Next INR check:  7/5/2024               Detailed voice message left for Gaurang with dosing instructions and follow up date.   Sent [employer] message with dosing and follow up instructions    Contact [phone]  to schedule and with any changes, questions or concerns.     Education provided: Please call back if any changes to your diet, medications or how you've been taking warfarin  Taking warfarin: Importance of taking warfarin as instructed  Goal range and lab monitoring: goal range and significance of current result and Importance of following up at instructed interval  Lovenox/Heparin education provided: role of  enoxaparin/heparin in setting of new blood clot and prescribed dose and frequency     Plan made per [facility] anticoagulation protocol + sent to PCP d/t inability to reach patient    Cyn Womack RN  Anticoagulation Clinic  7/3/2024    _______________________________________________________________________     Anticoagulation Episode Summary       Current INR goal:  2.0-3.0   TTR:  54.0% (8.5 mo)   Target end date:  Indefinite   Send INR reminders to:  [facility]    Indications    Atrial fibrillation  unspecified type (H) (Resolved) [I48.91]  Heterozygous factor V Leiden mutation (H24) [D68.51]  H/O deep venous thrombosis [Z86.718]  Long term (current) use of anticoagulants [Z79.01]  Multiple subsegmental pulmonary emboli without acute cor pulmonale (H) [I26.94]             Comments:  MTV 30 mcg.             Anticoagulation Care Providers       Provider Role Specialty Phone number    Elvis Guzman MD Referring Family Medicine [phone]    Maria Antonia Santos MD Referring HOSPITALIST [phone]

## 2024-07-05 ENCOUNTER — MYC MEDICAL ADVICE (OUTPATIENT)
Dept: FAMILY MEDICINE | Facility: CLINIC | Age: 54
End: 2024-07-05

## 2024-07-05 ENCOUNTER — LAB (OUTPATIENT)
Dept: LAB | Facility: CLINIC | Age: 54
End: 2024-07-05
Payer: COMMERCIAL

## 2024-07-05 ENCOUNTER — MYC REFILL (OUTPATIENT)
Dept: FAMILY MEDICINE | Facility: CLINIC | Age: 54
End: 2024-07-05

## 2024-07-05 ENCOUNTER — TELEPHONE (OUTPATIENT)
Dept: ANTICOAGULATION | Facility: CLINIC | Age: 54
End: 2024-07-05
Payer: MEDICAID

## 2024-07-05 ENCOUNTER — MYC MEDICAL ADVICE (OUTPATIENT)
Dept: ANTICOAGULATION | Facility: CLINIC | Age: 54
End: 2024-07-05

## 2024-07-05 ENCOUNTER — ANTICOAGULATION THERAPY VISIT (OUTPATIENT)
Dept: ANTICOAGULATION | Facility: CLINIC | Age: 54
End: 2024-07-05

## 2024-07-05 DIAGNOSIS — Z79.01 LONG TERM (CURRENT) USE OF ANTICOAGULANTS: ICD-10-CM

## 2024-07-05 DIAGNOSIS — I26.94 MULTIPLE SUBSEGMENTAL PULMONARY EMBOLI WITHOUT ACUTE COR PULMONALE (H): ICD-10-CM

## 2024-07-05 DIAGNOSIS — Z79.01 CURRENT USE OF LONG TERM ANTICOAGULATION: ICD-10-CM

## 2024-07-05 DIAGNOSIS — I82.4Z1 ACUTE DEEP VEIN THROMBOSIS (DVT) OF DISTAL END OF RIGHT LOWER EXTREMITY (H): ICD-10-CM

## 2024-07-05 DIAGNOSIS — I82.4Y9 DEEP VEIN THROMBOSIS (DVT) OF PROXIMAL LOWER EXTREMITY, UNSPECIFIED CHRONICITY, UNSPECIFIED LATERALITY (H): ICD-10-CM

## 2024-07-05 DIAGNOSIS — Z86.718 H/O DEEP VENOUS THROMBOSIS: ICD-10-CM

## 2024-07-05 DIAGNOSIS — D68.51 HETEROZYGOUS FACTOR V LEIDEN MUTATION (H): ICD-10-CM

## 2024-07-05 DIAGNOSIS — D68.51 HETEROZYGOUS FACTOR V LEIDEN MUTATION (H): Primary | ICD-10-CM

## 2024-07-05 LAB — INR BLD: 2.7 (ref 0.9–1.1)

## 2024-07-05 PROCEDURE — 36416 COLLJ CAPILLARY BLOOD SPEC: CPT

## 2024-07-05 PROCEDURE — 85610 PROTHROMBIN TIME: CPT

## 2024-07-05 NOTE — PROGRESS NOTES
ANTICOAGULATION MANAGEMENT     Gaurang Rivera 53 year old male is on warfarin with therapeutic INR result. (Goal INR 2.0-3.0)    Recent labs: (last 7 days)     07/05/24  1612   INR 2.7*       ASSESSMENT     Warfarin Lab Questionnaire    Warfarin Doses Last 7 Days      7/5/2024     1:55 PM   Dose in Tablet or Mg   TAB or MG? milligram (mg)     Pt Rptd Dose SUNDAY MONDAY TUESDAY WED THURS 7/5/2024   1:55 PM 7.5 7.5 7.5 7.5 7.5         7/5/2024   Warfarin Lab Questionnaire   Missed doses within past 14 days? No   Changes in diet or alcohol within past 14 days? No   Medication changes since last result? No   Injuries or illness since last result? No   New shortness of breath, severe headaches or sudden changes in vision since last result? No   Abnormal bleeding since last result? No   Upcoming surgery, procedure? No        Previous result: Subtherapeutic  Additional findings: Bridging with Enoxaparin until INR >= 2.3 or INR >= 2.0 x 2 ([facility] protocol goal INR 2-3 new start) + reinstating previous MD of 55 mg d/t stable INR level today       PLAN     Unable to reach Gaurang today.    Left message to STOP Lovenox bridging now and continue 10 mg every Friday and 7.5 mg Sat/Sun this weekend. Request call back for assessment.    Follow up required to assess for changes  and discuss out of range result     Cyn Womack RN  Anticoagulation Clinic  7/5/2024

## 2024-07-05 NOTE — TELEPHONE ENCOUNTER
ANTICOAGULATION     Gaurang Rivera is overdue for an INR check.     Left message for patient to call and schedule lab appointment as soon as possible. If returning call, please schedule TODAY ASAP.     Cyn Womack RN

## 2024-07-08 RX ORDER — OXYCODONE HYDROCHLORIDE 5 MG/1
10 TABLET ORAL EVERY 6 HOURS PRN
Qty: 24 TABLET | Refills: 0 | Status: SHIPPED | OUTPATIENT
Start: 2024-07-08 | End: 2024-07-09

## 2024-07-08 NOTE — PROGRESS NOTES
ANTICOAGULATION MANAGEMENT     Gaurang Rivrea 53 year old male is on warfarin with therapeutic INR result. (Goal INR 2.0-3.0)    Recent labs: (last 7 days)     07/05/24  1612   INR 2.7*       ASSESSMENT     Warfarin Lab Questionnaire    Warfarin Doses Last 7 Days      7/5/2024     1:55 PM   Dose in Tablet or Mg   TAB or MG? milligram (mg)     Pt Rptd Dose SUNDAY MONDAY TUESDAY WED THURS 7/5/2024   1:55 PM 7.5 7.5 7.5 7.5 7.5         7/5/2024   Warfarin Lab Questionnaire   Missed doses within past 14 days? No   Changes in diet or alcohol within past 14 days? No   Medication changes since last result? No   Injuries or illness since last result? No   New shortness of breath, severe headaches or sudden changes in vision since last result? No   Abnormal bleeding since last result? No   Upcoming surgery, procedure? No        Previous result: Subtherapeutic  Additional findings: Bridging with Enoxaparin until INR >= 2.3 or INR >= 2.0 x 2 ([facility] protocol goal INR 2-3 new start) - should have stopped on Friday 7/5/24 as directed      PLAN     Recommended plan for no diet, medication or health factor changes affecting INR     Dosing Instructions: Continue your current warfarin dose with next INR in 4-7 days       Summary  As of 7/5/2024      Full warfarin instructions:  10 mg every Fri; 7.5 mg all other days   Next INR check:  7/10/2024               Detailed voice message left for Gaurang with dosing instructions and follow up date.   Sent [employer] message with dosing and follow up instructions    Contact [phone]  to schedule and with any changes, questions or concerns.     Education provided: Please call back if any changes to your diet, medications or how you've been taking warfarin  Contact [phone] with any changes, questions or concerns.     Plan made per [facility] anticoagulation protocol    Cyn Womack RN  Anticoagulation  Clinic  7/8/2024    _______________________________________________________________________     Anticoagulation Episode Summary       Current INR goal:  2.0-3.0   TTR:  53.5% (8.4 mo)   Target end date:  Indefinite   Send INR reminders to:  [facility]    Indications    Atrial fibrillation  unspecified type (H) (Resolved) [I48.91]  Heterozygous factor V Leiden mutation (H24) [D68.51]  H/O deep venous thrombosis [Z86.718]  Long term (current) use of anticoagulants [Z79.01]  Multiple subsegmental pulmonary emboli without acute cor pulmonale (H) [I26.94]             Comments:  MTV 30 mcg.             Anticoagulation Care Providers       Provider Role Specialty Phone number    Elvis Guzman MD Referring Family Medicine [phone]    Maria Antonia Santos MD Referring HOSPITALIST [phone]

## 2024-07-08 NOTE — PROGRESS NOTES
ANTICOAGULATION MANAGEMENT     Gaurang Rivera 53 year old male is on warfarin with therapeutic INR result. (Goal INR 2.0-3.0)    Recent labs: (last 7 days)     07/05/24  1612   INR 2.7*       ASSESSMENT     Warfarin Lab Questionnaire    Warfarin Doses Last 7 Days      7/5/2024     1:55 PM   Dose in Tablet or Mg   TAB or MG? milligram (mg)     Pt Rptd Dose SUNDAY MONDAY TUESDAY WED THURS 7/5/2024   1:55 PM 7.5 7.5 7.5 7.5 7.5         7/5/2024   Warfarin Lab Questionnaire   Missed doses within past 14 days? No   Changes in diet or alcohol within past 14 days? No   Medication changes since last result? No   Injuries or illness since last result? No   New shortness of breath, severe headaches or sudden changes in vision since last result? No   Abnormal bleeding since last result? No   Upcoming surgery, procedure? No        Previous result: Subtherapeutic  Additional findings: None        PLAN     Unable to reach Gaurang today.    Attempted to reach patient again today to ensure warfarin/Lovenox plan was received from 7/5/24. Per [employer] - patient did read message. Writer [facility] encouraging a callback or to write back via [employer] noting plan received. Recheck INR by Wednesday 7/10/24.    Follow up required to confirm warfarin dose taken and assess for changes    Cyn Womack RN  Anticoagulation Clinic  7/8/2024

## 2024-07-09 RX ORDER — OXYCODONE HYDROCHLORIDE 5 MG/1
10 TABLET ORAL EVERY 6 HOURS PRN
Qty: 24 TABLET | Refills: 0 | Status: SHIPPED | OUTPATIENT
Start: 2024-07-09 | End: 2024-07-23

## 2024-07-10 ENCOUNTER — TELEPHONE (OUTPATIENT)
Dept: ANTICOAGULATION | Facility: CLINIC | Age: 54
End: 2024-07-10
Payer: MEDICAID

## 2024-07-10 NOTE — TELEPHONE ENCOUNTER
ANTICOAGULATION     Gaurang Rivera is overdue for an INR check.     Left message for patient to call and schedule lab appointment as soon as possible. If returning call, please schedule.     Velia Persaud RN

## 2024-07-11 ENCOUNTER — ANTICOAGULATION THERAPY VISIT (OUTPATIENT)
Dept: ANTICOAGULATION | Facility: CLINIC | Age: 54
End: 2024-07-11

## 2024-07-11 ENCOUNTER — LAB (OUTPATIENT)
Dept: LAB | Facility: CLINIC | Age: 54
End: 2024-07-11
Payer: COMMERCIAL

## 2024-07-11 DIAGNOSIS — I82.4Y9 DEEP VEIN THROMBOSIS (DVT) OF PROXIMAL LOWER EXTREMITY, UNSPECIFIED CHRONICITY, UNSPECIFIED LATERALITY (H): ICD-10-CM

## 2024-07-11 DIAGNOSIS — Z86.718 H/O DEEP VENOUS THROMBOSIS: ICD-10-CM

## 2024-07-11 DIAGNOSIS — Z79.01 CURRENT USE OF LONG TERM ANTICOAGULATION: ICD-10-CM

## 2024-07-11 DIAGNOSIS — Z79.01 LONG TERM (CURRENT) USE OF ANTICOAGULANTS: ICD-10-CM

## 2024-07-11 DIAGNOSIS — D68.51 HETEROZYGOUS FACTOR V LEIDEN MUTATION (H): Primary | ICD-10-CM

## 2024-07-11 DIAGNOSIS — D68.51 HETEROZYGOUS FACTOR V LEIDEN MUTATION (H): ICD-10-CM

## 2024-07-11 DIAGNOSIS — I26.94 MULTIPLE SUBSEGMENTAL PULMONARY EMBOLI WITHOUT ACUTE COR PULMONALE (H): ICD-10-CM

## 2024-07-11 LAB — INR BLD: 4.3 (ref 0.9–1.1)

## 2024-07-11 PROCEDURE — 36415 COLL VENOUS BLD VENIPUNCTURE: CPT

## 2024-07-11 PROCEDURE — 85610 PROTHROMBIN TIME: CPT

## 2024-07-11 NOTE — PROGRESS NOTES
ANTICOAGULATION MANAGEMENT     Gaurang Rivera 53 year old male is on warfarin with supratherapeutic INR result. (Goal INR 2.0-3.0)    Recent labs: (last 7 days)     07/11/24  1625   INR 4.3*       ASSESSMENT     Warfarin Lab Questionnaire    Warfarin Doses Last 7 Days      7/11/2024     3:32 PM   Dose in Tablet or Mg   TAB or MG? milligram (mg)     Pt Rptd Dose SUNDAY MONDAY TUESDAY WED THURS FRIDAY SATURDAY 7/11/2024   3:32 PM 7.5 7.5 7.5 7.5 7.5 10 7.5         7/11/2024   Warfarin Lab Questionnaire   Missed doses within past 14 days? No   Changes in diet or alcohol within past 14 days? No   Medication changes since last result? No   Injuries or illness since last result? No   New shortness of breath, severe headaches or sudden changes in vision since last result? No   Abnormal bleeding since last result? No   Upcoming surgery, procedure? No        Previous result: Therapeutic last visit; previously outside of goal range  Additional findings: None       PLAN     Recommended plan for ongoing change(s) affecting INR     Dosing Instructions: hold dose then decrease your warfarin dose (13.6% change) with next INR in 1 week       Summary  As of 7/11/2024      Full warfarin instructions:  7/11: Hold; Otherwise 5 mg every Mon, Fri; 7.5 mg all other days   Next INR check:  7/18/2024               Telephone call with Gaurang who verbalizes understanding and agrees to plan    Lab visit scheduled    Education provided: Please call back if any changes to your diet, medications or how you've been taking warfarin    Plan made per [facility] anticoagulation protocol    Harshil Salamanca RN  Anticoagulation Clinic  7/11/2024    _______________________________________________________________________     Anticoagulation Episode Summary       Current INR goal:  2.0-3.0   TTR:  52.1% (8.5 mo)   Target end date:  Indefinite   Send INR reminders to:  [facility]    Indications    Atrial fibrillation  unspecified type (H)  (Resolved) [I48.91]  Heterozygous factor V Leiden mutation (H24) [D68.51]  H/O deep venous thrombosis [Z86.718]  Long term (current) use of anticoagulants [Z79.01]  Multiple subsegmental pulmonary emboli without acute cor pulmonale (H) [I26.94]             Comments:  MTV 30 mcg.             Anticoagulation Care Providers       Provider Role Specialty Phone number    Elvis Guzman MD Referring Family Medicine [phone]

## 2024-07-12 DIAGNOSIS — F33.2 SEVERE EPISODE OF RECURRENT MAJOR DEPRESSIVE DISORDER, WITHOUT PSYCHOTIC FEATURES (H): ICD-10-CM

## 2024-07-12 NOTE — TELEPHONE ENCOUNTER
Date of Last Office Visit: 01/01/2024  Date of Next Office Visit: None; routing for A to assist pt with scheduling.    No shows since last visit: No  More than one patient-initiated cancellation (with reschedule) since last seen in clinic? No    []Medication refilled per  Medication Refill in Ambulatory Care  policy.  [x]Medication unable to be refilled by RN due to criteria not met as indicated below:    []Eligibility: has not had a provider visit within last 6 months   [x]Supervision: no future appointment; < 7 days before next appointment   []Compliance: no shows; cancellations; lapse in therapy   []Verification: order discrepancy; may need modification...   []90-day supply request   []Advanced refill request: > 7 days before refill date   []Controlled medication   []Medication not included in policy   []Review: new med; med adjusted ? 30 days; safety alert; requires lab monitoring...   [x]Scope of Practice: refill request processed by LPN/MA   []Other:      Medication(s) requested    venlafaxine (EFFEXOR XR) 75 MG 24 hr capsule   Date last ordered: 06/10/2024  Qty: 30 capsule  Refills: 0  Appropriate for refill? Yes    Any Controlled Substance(s)? [place]   N/a was last sold on n/a for quantity of n/a.  Other [place] substance on [place] ?: [place]    Requested medication(s) verified as identical to current order? Yes    Any lapse in adherence to medication(s) greater than 5 days? No      Additional action taken? routed to provider for review.    Last visit treatment plan:   Return in about 6 weeks (around 2/23/2024) for Follow up with me.      traZODone (DESYREL) 50 MG tablet   venlafaxine (EFFEXOR XR) 75 MG 24 hr capsule   lurasidone (LATUDA) 120 MG TABS tablet   buPROPion (WELLBUTRIN XL) 300 MG 24 hr tablet   buPROPion (WELLBUTRIN XL) 150 MG 24 hr tablet     Any medication(s) require lab monitoring? No

## 2024-07-16 RX ORDER — VENLAFAXINE HYDROCHLORIDE 75 MG/1
75 CAPSULE, EXTENDED RELEASE ORAL DAILY
Qty: 30 CAPSULE | Refills: 0 | Status: SHIPPED | OUTPATIENT
Start: 2024-07-16 | End: 2024-08-15

## 2024-07-18 ENCOUNTER — TELEPHONE (OUTPATIENT)
Dept: CARDIOLOGY | Facility: CLINIC | Age: 54
End: 2024-07-18

## 2024-07-18 NOTE — TELEPHONE ENCOUNTER
Left Voicemail (my 1st ATTEMPT today 7/18/24- Grace Mcguireindu has called him twice before when I was on vacation as well. ) for the patient to call back and schedule the following:     Appointment type: PFO closure consult  Provider: Dr. Grimm   Return date: TBD  Specialty phone number: [phone]   Additional appointment(s) needed: n/a  Additonal Notes:  Called and left VM to call me back to help schedule the consultation.     no

## 2024-07-19 ENCOUNTER — LAB (OUTPATIENT)
Dept: LAB | Facility: CLINIC | Age: 54
End: 2024-07-19
Payer: MEDICAID

## 2024-07-19 ENCOUNTER — ANTICOAGULATION THERAPY VISIT (OUTPATIENT)
Dept: ANTICOAGULATION | Facility: CLINIC | Age: 54
End: 2024-07-19

## 2024-07-19 DIAGNOSIS — Z79.01 CURRENT USE OF LONG TERM ANTICOAGULATION: ICD-10-CM

## 2024-07-19 DIAGNOSIS — I26.94 MULTIPLE SUBSEGMENTAL PULMONARY EMBOLI WITHOUT ACUTE COR PULMONALE (H): ICD-10-CM

## 2024-07-19 DIAGNOSIS — D68.51 HETEROZYGOUS FACTOR V LEIDEN MUTATION (H): Primary | ICD-10-CM

## 2024-07-19 DIAGNOSIS — I82.4Y9 DEEP VEIN THROMBOSIS (DVT) OF PROXIMAL LOWER EXTREMITY, UNSPECIFIED CHRONICITY, UNSPECIFIED LATERALITY (H): ICD-10-CM

## 2024-07-19 DIAGNOSIS — Z79.01 LONG TERM (CURRENT) USE OF ANTICOAGULANTS: ICD-10-CM

## 2024-07-19 DIAGNOSIS — Z86.718 H/O DEEP VENOUS THROMBOSIS: ICD-10-CM

## 2024-07-19 DIAGNOSIS — D68.51 HETEROZYGOUS FACTOR V LEIDEN MUTATION (H): ICD-10-CM

## 2024-07-19 LAB — INR BLD: 4.5 (ref 0.9–1.1)

## 2024-07-19 PROCEDURE — 36416 COLLJ CAPILLARY BLOOD SPEC: CPT

## 2024-07-19 PROCEDURE — 85610 PROTHROMBIN TIME: CPT

## 2024-07-19 NOTE — PROGRESS NOTES
ANTICOAGULATION MANAGEMENT     Gaurang Rivera 53 year old male is on warfarin with supratherapeutic INR result. (Goal INR 2.0-3.0)    Recent labs: (last 7 days)     07/19/24  1618   INR 4.5*       ASSESSMENT     Warfarin Lab Questionnaire    Warfarin Doses Last 7 Days      7/19/2024     9:31 AM   Dose in Tablet or Mg   TAB or MG? milligram (mg)     Pt Rptd Dose SUNDAY MONDAY TUESDAY WED THURS 7/19/2024   9:31 AM 7.5 5 7.5 7.5 7.5         7/19/2024   Warfarin Lab Questionnaire   Missed doses within past 14 days? No   Changes in diet or alcohol within past 14 days? No   Medication changes since last result? No   Injuries or illness since last result? No   New shortness of breath, severe headaches or sudden changes in vision since last result? No   Abnormal bleeding since last result? No   Upcoming surgery, procedure? No        Previous result: Supratherapeutic last week and he was advised to hold x1 and reduce weekly warfarin dose 13%.  Additional findings: None. Verified he is using the peach 5 mg tablet size. Double checked that no changes to all questions above.       PLAN     Recommended plan for ongoing supratherapeutic INR and no diet, medication or health factor changes affecting INR     Dosing Instructions: hold dose then decrease your warfarin dose (15% change) with next INR in 7-10 days       Summary  As of 7/19/2024      Full warfarin instructions:  7/19: Hold; Otherwise 7.5 mg every Sun, Thu; 5 mg all other days   Next INR check:  7/29/2024               Telephone call with Gaurang who agrees to plan and repeated back plan correctly    Lab visit scheduled    Education provided: Please call back if any changes to your diet, medications or how you've been taking warfarin  Symptom monitoring: monitoring for bleeding signs and symptoms and when to seek medical attention/emergency care  Importance of notifying anticoagulation clinic for: changes in medications; a sooner lab recheck maybe needed and diarrhea,  nausea/vomiting, reduced intake, cold/flu, and/or infections; a sooner lab recheck maybe needed  Contact [phone] with any changes, questions or concerns.     Plan made per [facility] anticoagulation protocol    Velia Persaud RN  Anticoagulation Clinic  7/19/2024    _______________________________________________________________________     Anticoagulation Episode Summary       Current INR goal:  2.0-3.0   TTR:  49.0% (8.5 mo)   Target end date:  Indefinite   Send INR reminders to:  [facility]    Indications    Atrial fibrillation  unspecified type (H) (Resolved) [I48.91]  Heterozygous factor V Leiden mutation (H24) [D68.51]  H/O deep venous thrombosis [Z86.718]  Long term (current) use of anticoagulants [Z79.01]  Multiple subsegmental pulmonary emboli without acute cor pulmonale (H) [I26.94]             Comments:  MTV 30 mcg.             Anticoagulation Care Providers       Provider Role Specialty Phone number    Elvis Guzman MD Referring Family Medicine [phone]

## 2024-07-23 ENCOUNTER — MYC REFILL (OUTPATIENT)
Dept: FAMILY MEDICINE | Facility: CLINIC | Age: 54
End: 2024-07-23
Payer: MEDICAID

## 2024-07-23 DIAGNOSIS — I26.94 MULTIPLE SUBSEGMENTAL PULMONARY EMBOLI WITHOUT ACUTE COR PULMONALE (H): ICD-10-CM

## 2024-07-23 DIAGNOSIS — I82.4Z1 ACUTE DEEP VEIN THROMBOSIS (DVT) OF DISTAL END OF RIGHT LOWER EXTREMITY (H): ICD-10-CM

## 2024-07-25 RX ORDER — OXYCODONE HYDROCHLORIDE 5 MG/1
10 TABLET ORAL EVERY 6 HOURS PRN
Qty: 24 TABLET | Refills: 0 | Status: SHIPPED | OUTPATIENT
Start: 2024-07-25

## 2024-07-26 ENCOUNTER — TELEPHONE (OUTPATIENT)
Dept: CARDIOLOGY | Facility: CLINIC | Age: 54
End: 2024-07-26
Payer: MEDICAID

## 2024-07-26 NOTE — TELEPHONE ENCOUNTER
Left Voicemail (2nd ATTEMPT) for the patient to call back and schedule the following:      Appointment type: PFO closure consult  Provider: Dr. Grimm   Return date: TBD  Specialty phone number: [phone]   Additional appointment(s) needed: n/a  Additonal Notes:  Called and left VM to call me back

## 2024-08-01 ENCOUNTER — LAB (OUTPATIENT)
Dept: LAB | Facility: CLINIC | Age: 54
End: 2024-08-01
Payer: COMMERCIAL

## 2024-08-01 ENCOUNTER — ANTICOAGULATION THERAPY VISIT (OUTPATIENT)
Dept: ANTICOAGULATION | Facility: CLINIC | Age: 54
End: 2024-08-01

## 2024-08-01 DIAGNOSIS — D68.51 HETEROZYGOUS FACTOR V LEIDEN MUTATION (H): ICD-10-CM

## 2024-08-01 DIAGNOSIS — D68.51 HETEROZYGOUS FACTOR V LEIDEN MUTATION (H): Primary | ICD-10-CM

## 2024-08-01 DIAGNOSIS — I26.94 MULTIPLE SUBSEGMENTAL PULMONARY EMBOLI WITHOUT ACUTE COR PULMONALE (H): ICD-10-CM

## 2024-08-01 DIAGNOSIS — Z79.01 CURRENT USE OF LONG TERM ANTICOAGULATION: ICD-10-CM

## 2024-08-01 DIAGNOSIS — I82.4Y9 DEEP VEIN THROMBOSIS (DVT) OF PROXIMAL LOWER EXTREMITY, UNSPECIFIED CHRONICITY, UNSPECIFIED LATERALITY (H): ICD-10-CM

## 2024-08-01 DIAGNOSIS — Z86.718 H/O DEEP VENOUS THROMBOSIS: ICD-10-CM

## 2024-08-01 DIAGNOSIS — Z79.01 LONG TERM (CURRENT) USE OF ANTICOAGULANTS: ICD-10-CM

## 2024-08-01 LAB — INR BLD: 3.2 (ref 0.9–1.1)

## 2024-08-01 PROCEDURE — 85610 PROTHROMBIN TIME: CPT

## 2024-08-01 PROCEDURE — 36416 COLLJ CAPILLARY BLOOD SPEC: CPT

## 2024-08-01 RX ORDER — WARFARIN SODIUM 5 MG/1
TABLET ORAL
Qty: 95 TABLET | Refills: 0 | Status: SHIPPED | OUTPATIENT
Start: 2024-08-01 | End: 2024-08-06

## 2024-08-01 NOTE — PROGRESS NOTES
ANTICOAGULATION MANAGEMENT     Gaurang Rivera 53 year old male is on warfarin with supratherapeutic INR result. (Goal INR 2.0-3.0)    Recent labs: (last 7 days)     08/01/24  1619   INR 3.2*       ASSESSMENT     Warfarin Lab Questionnaire    Warfarin Doses Last 7 Days    Pt Rptd Dose SUNDAY MONDAY TUESDAY WED THURS FRIDAY SATURDAY 8/1/2024  11:18 AM 7.5 5 5 5 7.5 5 5         8/1/2024   Warfarin Lab Questionnaire   Missed doses within past 14 days? No   Changes in diet or alcohol within past 14 days? No   Medication changes since last result? No   Injuries or illness since last result? No   New shortness of breath, severe headaches or sudden changes in vision since last result? No   Abnormal bleeding since last result? No   Upcoming surgery, procedure? No        Previous result: Supratherapeutic  Additional findings: Refill needed today. Gaurang meets all criteria for refill (current [facility] referral, visit with referring provider/group in last 15 months unless directed to return in 2 years in last referring provider visit note, lab monitoring up to date or not exceeding 2 weeks overdue). Rx instructions and quantity supplied updated to match patient's current dosing plan.  90 day supply with 0 refills granted per [facility] protocol        PLAN     Recommended plan for no diet, medication or health factor changes affecting INR     Dosing Instructions: decrease your warfarin dose (6.2% change) with next INR in 10 days       Summary  As of 8/1/2024      Full warfarin instructions:  7.5 mg every Sun; 5 mg all other days   Next INR check:  8/12/2024               Telephone call with Gaurang who verbalizes understanding and agrees to plan    Lab visit scheduled    Education provided: Please call back if any changes to your diet, medications or how you've been taking warfarin  Symptom monitoring: monitoring for bleeding signs and symptoms and monitoring for clotting signs and symptoms    Plan made per [facility] anticoagulation  protocol    Cyn Womack RN  Anticoagulation Clinic  8/1/2024    _______________________________________________________________________     Anticoagulation Episode Summary       Current INR goal:  2.0-3.0   TTR:  43.9% (8.5 mo)   Target end date:  Indefinite   Send INR reminders to:  [facility]    Indications    Atrial fibrillation  unspecified type (H) (Resolved) [I48.91]  Heterozygous factor V Leiden mutation (H24) [D68.51]  H/O deep venous thrombosis [Z86.718]  Long term (current) use of anticoagulants [Z79.01]  Multiple subsegmental pulmonary emboli without acute cor pulmonale (H) [I26.94]             Comments:  MTV 30 mcg.             Anticoagulation Care Providers       Provider Role Specialty Phone number    Elvis Guzman MD Referring Family Medicine [phone]

## 2024-08-02 ENCOUNTER — OFFICE VISIT (OUTPATIENT)
Dept: FAMILY MEDICINE | Facility: CLINIC | Age: 54
End: 2024-08-02
Payer: COMMERCIAL

## 2024-08-02 VITALS
DIASTOLIC BLOOD PRESSURE: 74 MMHG | TEMPERATURE: 97.3 F | HEART RATE: 96 BPM | OXYGEN SATURATION: 99 % | BODY MASS INDEX: 30.82 KG/M2 | RESPIRATION RATE: 18 BRPM | WEIGHT: 266.4 LBS | SYSTOLIC BLOOD PRESSURE: 107 MMHG | HEIGHT: 78 IN

## 2024-08-02 DIAGNOSIS — Z86.718 H/O DEEP VENOUS THROMBOSIS: ICD-10-CM

## 2024-08-02 DIAGNOSIS — I26.94 MULTIPLE SUBSEGMENTAL PULMONARY EMBOLI WITHOUT ACUTE COR PULMONALE (H): ICD-10-CM

## 2024-08-02 DIAGNOSIS — Z79.01 LONG TERM (CURRENT) USE OF ANTICOAGULANTS: ICD-10-CM

## 2024-08-02 DIAGNOSIS — D68.51 HETEROZYGOUS FACTOR V LEIDEN MUTATION (H): ICD-10-CM

## 2024-08-02 PROCEDURE — 99214 OFFICE O/P EST MOD 30 MIN: CPT | Performed by: FAMILY MEDICINE

## 2024-08-02 PROCEDURE — G2211 COMPLEX E/M VISIT ADD ON: HCPCS | Performed by: FAMILY MEDICINE

## 2024-08-02 RX ORDER — OXYCODONE HYDROCHLORIDE 5 MG/1
10 TABLET ORAL 2 TIMES DAILY PRN
Qty: 28 TABLET | Refills: 0 | Status: SHIPPED | OUTPATIENT
Start: 2024-08-02 | End: 2024-08-15

## 2024-08-02 ASSESSMENT — PAIN SCALES - GENERAL: PAINLEVEL: SEVERE PAIN (7)

## 2024-08-02 ASSESSMENT — PATIENT HEALTH QUESTIONNAIRE - PHQ9: SUM OF ALL RESPONSES TO PHQ QUESTIONS 1-9: 21

## 2024-08-02 NOTE — PROGRESS NOTES
Assessment & Plan     Multiple subsegmental pulmonary emboli without acute cor pulmonale (H)  Heterozygous factor V Leiden mutation (H24)  H/O deep venous thrombosis  Long term (current) use of anticoagulants  - He continues to have leg pain and edema. As symptoms have not improved with anticoagulation we discussed going to [facility] for a repeat US. Gaurang declined and opted to treat symptoms and follow up next week. If symptoms have not improved then he will get an US. I have increased his Oxycodone dose to BID and advised him to wear compression stockings. He is scheduled for follow up on 8/5/24.   - oxyCODONE (ROXICODONE) 5 MG tablet; Take 2 tablets (10 mg) by mouth 2 times daily as needed for pain  - Adult Oncology/Hematology  Referral; Future  - Compression Sleeve/Stocking Order for DME - ONLY FOR DME  - oxyCODONE (ROXICODONE) 5 MG tablet; Take 2 tablets (10 mg) by mouth 2 times daily as needed for pain  - Adult Oncology/Hematology  Referral; Future  - Compression Sleeve/Stocking Order for DME - ONLY FOR DME      The longitudinal plan of care for the diagnosis(es)/condition(s) as documented were addressed during this visit. Due to the added complexity in care, I will continue to support Gaurang in the subsequent management and with ongoing continuity of care.     Subjective   Gaurang is a 53 year old, presenting for the following health issues:  Hospital F/U        8/2/2024     6:54 AM   Additional Questions   Roomed by Maria A GRAHAM   Accompanied by self     HPI       Post-hospitalization - Left leg is very swollen and very painful. He feels that symptoms have not improved since hospitalization. No shortness of breath or chest pain. He has been taking Oxycodone 10 mg once daily after activity since discharge which haven't helped.                 Objective    /74 (BP Location: Right arm, Patient Position: Sitting, Cuff Size: Adult Regular)   Pulse 96   Temp 97.3  F (36.3  C) (Temporal)   Resp 18   " Ht 1.995 m (6' 6.54\")   Wt 120.8 kg (266 lb 6.4 oz)   SpO2 99%   BMI 30.36 kg/m    Body mass index is 30.36 kg/m .  Physical Exam               Signed Electronically by: Elvis Guzman MD    "

## 2024-08-02 NOTE — PATIENT INSTRUCTIONS
- ok to use Tylenol 1, 000 mg three times daily as needed for pain   - oxyCODONE (ROXICODONE) 5 MG tablet; Take 2 tablets (10 mg) by mouth 2 times daily as needed for breakthrough pain; please do not drive after taking medication or take it with alcohol   - please start wearing compression stockings daily, ok to take off at night   - follow up on 8/5/24 with Alejo Pavon PA-C, if symptoms do not improve then will recommend going to [facility] for a repeat US  - follow up in the emergency room over the weekend if you notice any changes including chest pain or shortness of breath   - please schedule with hematology

## 2024-08-05 ENCOUNTER — OFFICE VISIT (OUTPATIENT)
Dept: FAMILY MEDICINE | Facility: CLINIC | Age: 54
End: 2024-08-05
Payer: COMMERCIAL

## 2024-08-05 VITALS
HEART RATE: 80 BPM | BODY MASS INDEX: 30.74 KG/M2 | RESPIRATION RATE: 20 BRPM | TEMPERATURE: 97.1 F | HEIGHT: 78 IN | SYSTOLIC BLOOD PRESSURE: 132 MMHG | WEIGHT: 265.7 LBS | OXYGEN SATURATION: 96 % | DIASTOLIC BLOOD PRESSURE: 76 MMHG

## 2024-08-05 DIAGNOSIS — D68.51 HETEROZYGOUS FACTOR V LEIDEN MUTATION (H): Primary | ICD-10-CM

## 2024-08-05 DIAGNOSIS — I82.4Z1 ACUTE DEEP VEIN THROMBOSIS (DVT) OF DISTAL END OF RIGHT LOWER EXTREMITY (H): ICD-10-CM

## 2024-08-05 PROCEDURE — 99213 OFFICE O/P EST LOW 20 MIN: CPT | Performed by: PHYSICIAN ASSISTANT

## 2024-08-05 PROCEDURE — G2211 COMPLEX E/M VISIT ADD ON: HCPCS | Performed by: PHYSICIAN ASSISTANT

## 2024-08-05 ASSESSMENT — PAIN SCALES - GENERAL: PAINLEVEL: NO PAIN (0)

## 2024-08-05 NOTE — PROGRESS NOTES
"  Assessment & Plan     (D68.51) Heterozygous factor V Leiden mutation (H24)  (primary encounter diagnosis)  Comment:   Plan: Again stressed the importance of the patient have follow-up with hematology, also advised to make appointment with cardiology for follow-up of PFO    (I82.4Z1) Acute deep vein thrombosis (DVT) of distal end of right lower extremity (H)  Comment:   Plan: US Lower Extremity Venous Duplex Left, Vascular        Surgery Referral        Based on continued swelling now greater than 4 weeks from his hospitalization, referral to vascular surgery sent, patient has pain meds waiting at the pharmacy for him.  Patient given Ace bandage today if he is unable to use compression stockings, advised to wear as long as he is up and about.  Continue to monitor and follow-up based on imaging and specialty consult.    The longitudinal plan of care for the diagnosis(es)/condition(s) as documented were addressed during this visit. Due to the added complexity in care, I will continue to support Gaurang in the subsequent management and with ongoing continuity of care.    BMI  Estimated body mass index is 31.1 kg/m  as calculated from the following:    Height as of this encounter: 1.969 m (6' 5.5\").    Weight as of this encounter: 120.5 kg (265 lb 11.2 oz).             Subjective   Gaurang is a 53 year old, presenting for the following health issues:  Follow Up (Blood clot follow up. )      8/5/2024     2:05 PM   Additional Questions   Roomed by SARAH Mcgraw   Accompanied by self         8/5/2024     2:05 PM   Patient Reported Additional Medications   Patient reports taking the following new medications none     History of Present Illness       Vascular Disease:  He presents for follow up of vascular disease.     He never takes nitroglycerin. He is not taking daily aspirin.    He eats 0-1 servings of fruits and vegetables daily.He consumes 0 sweetened beverage(s) daily. He exercises with enough effort to increase his heart " "rate 3 or less days per week.   He is taking medications regularly.     Post-hospitalization 6/26/24 - Left leg is still  very swollen and very painful. He feels that symptoms have not improved since hospitalization. No shortness of breath or chest pain. He has been taking Oxycodone 10 mg once daily after activity since discharge which haven't helped.     Started compression stocking on Friday, he has only been able to wear once based on the degree of swelling in his LLE                  Objective    /76 (BP Location: Right arm, Patient Position: Sitting, Cuff Size: Adult Regular)   Pulse 80   Temp 97.1  F (36.2  C) (Temporal)   Resp 20   Ht 1.969 m (6' 5.5\")   Wt 120.5 kg (265 lb 11.2 oz)   SpO2 96%   BMI 31.10 kg/m    Body mass index is 31.1 kg/m .  Physical Exam   GENERAL: healthy, alert and no distress, favoring LLE with walking  EYES: Eyes grossly normal to inspection, EOM intact and conjunctivae normal  RESP: breathing comfortably on room air  PSYCH: mentation appears normal, affect normal/bright  LLE; worsening edema of LLE to knee, no warmth, mild erythema            Signed Electronically by: Alejo Pavon PA-C    "

## 2024-08-06 ENCOUNTER — TELEPHONE (OUTPATIENT)
Dept: VASCULAR SURGERY | Facility: CLINIC | Age: 54
End: 2024-08-06
Payer: COMMERCIAL

## 2024-08-06 DIAGNOSIS — Z86.718 H/O DEEP VENOUS THROMBOSIS: Primary | ICD-10-CM

## 2024-08-06 DIAGNOSIS — I26.94 MULTIPLE SUBSEGMENTAL PULMONARY EMBOLI WITHOUT ACUTE COR PULMONALE (H): ICD-10-CM

## 2024-08-06 DIAGNOSIS — Z79.01 LONG TERM (CURRENT) USE OF ANTICOAGULANTS: ICD-10-CM

## 2024-08-06 DIAGNOSIS — D68.51 HETEROZYGOUS FACTOR V LEIDEN MUTATION (H): ICD-10-CM

## 2024-08-06 DIAGNOSIS — Z86.718 H/O DEEP VENOUS THROMBOSIS: ICD-10-CM

## 2024-08-06 RX ORDER — WARFARIN SODIUM 5 MG/1
TABLET ORAL
Qty: 145 TABLET | Refills: 1 | Status: SHIPPED | OUTPATIENT
Start: 2024-08-06

## 2024-08-06 NOTE — TELEPHONE ENCOUNTER
ANTICOAGULATION MANAGEMENT:  Medication Refill    Anticoagulation Summary  As of 8/1/2024      Warfarin maintenance plan:  7.5 mg (5 mg x 1.5) every Sun; 5 mg (5 mg x 1) all other days   Next INR check:  8/12/2024   Target end date:  Indefinite    Indications    Atrial fibrillation  unspecified type (H) (Resolved) [I48.91]  Heterozygous factor V Leiden mutation (H24) [D68.51]  H/O deep venous thrombosis [Z86.718]  Long term (current) use of anticoagulants [Z79.01]  Multiple subsegmental pulmonary emboli without acute cor pulmonale (H) [I26.94]                 Anticoagulation Care Providers       Provider Role Specialty Phone number    Elvis Guzman MD Referring Family Medicine [phone]            Refill Criteria    Visit with referring provider/group: Meets criteria: visit within referring provider group in the last 15 months on 8/5/24    [facility] referral last signed: 06/29/2024; within last year: Yes    Lab monitoring not exceeding 2 weeks overdue: No    Peter meets all criteria for refill. Rx instructions and quantity supplied updated to match patient's current dosing plan. Warfarin 90 day supply with 1 refill granted per [facility] protocol     Hermelinda Dela Cruz RN  Anticoagulation Clinic

## 2024-08-06 NOTE — TELEPHONE ENCOUNTER
Left Voicemail (1st Attempt) and Sent Mychart (1st Attempt) for the patient to call back and schedule the following:Acute deep vein thrombosis    Location: [facility]  Provider:   Appointment type: New Vascular Patient    Appointment mode In person visit    Return date: Next Available       Specialty phone number: [phone] or  [phone]      Is Imaging Needed: no  Imaging Phone Number to provide to patient: no    Additional Notes: SALVADOR Harkins on 8/6/2024 at 2:40 PM

## 2024-08-08 NOTE — TELEPHONE ENCOUNTER
The pt called back to schedule the following:Acute deep vein thrombosis    Location: [facility]  Provider:   Appointment type: New Vascular Patient    Appointment mode In person visit    Return date: Next Available       Specialty phone number: [phone] or  [phone]      Is Imaging Needed: no  Imaging Phone Number to provide to patient: no    Additional Notes: The pt would like to go to [facility] for possible sooner appt the pt was given the phone number and the referral was transferred to [facility].  Glenn Harkins on 8/8/2024 at 11:37 AM

## 2024-08-08 NOTE — TELEPHONE ENCOUNTER
"Referral received via [facility] on 08/05/24.  (This was transferred from  as the patient is unable to see VM sooner than they wanted.)    Pt referred to [facility] by Alejo Pavon PA-C for \"continued LLE edema with DVT.\"    Routing to scheduling to coordinate the following:  D-dimer - ASAP  NEW VASCULAR PATIENT consult with Vascular Medicine  Please schedule this at next available; please schedule D-dimer ASAP even if they do not see VM for a couple of weeks.    Appt note:  Pt referred to [facility] by Alejo Pavon PA-C for \"continued LLE edema with DVT.\"  "

## 2024-08-09 ENCOUNTER — ANCILLARY PROCEDURE (OUTPATIENT)
Dept: ULTRASOUND IMAGING | Facility: CLINIC | Age: 54
End: 2024-08-09
Attending: PHYSICIAN ASSISTANT
Payer: COMMERCIAL

## 2024-08-09 DIAGNOSIS — I82.4Z1 ACUTE DEEP VEIN THROMBOSIS (DVT) OF DISTAL END OF RIGHT LOWER EXTREMITY (H): ICD-10-CM

## 2024-08-09 PROCEDURE — 93971 EXTREMITY STUDY: CPT | Mod: LT | Performed by: RADIOLOGY

## 2024-08-14 ENCOUNTER — LAB (OUTPATIENT)
Dept: LAB | Facility: CLINIC | Age: 54
End: 2024-08-14
Payer: COMMERCIAL

## 2024-08-14 ENCOUNTER — ANTICOAGULATION THERAPY VISIT (OUTPATIENT)
Dept: ANTICOAGULATION | Facility: CLINIC | Age: 54
End: 2024-08-14

## 2024-08-14 DIAGNOSIS — Z79.01 CURRENT USE OF LONG TERM ANTICOAGULATION: ICD-10-CM

## 2024-08-14 DIAGNOSIS — I82.4Y9 DEEP VEIN THROMBOSIS (DVT) OF PROXIMAL LOWER EXTREMITY, UNSPECIFIED CHRONICITY, UNSPECIFIED LATERALITY (H): ICD-10-CM

## 2024-08-14 DIAGNOSIS — D68.51 HETEROZYGOUS FACTOR V LEIDEN MUTATION (H): ICD-10-CM

## 2024-08-14 DIAGNOSIS — D68.51 HETEROZYGOUS FACTOR V LEIDEN MUTATION (H): Primary | ICD-10-CM

## 2024-08-14 DIAGNOSIS — Z86.718 H/O DEEP VENOUS THROMBOSIS: ICD-10-CM

## 2024-08-14 DIAGNOSIS — I26.94 MULTIPLE SUBSEGMENTAL PULMONARY EMBOLI WITHOUT ACUTE COR PULMONALE (H): ICD-10-CM

## 2024-08-14 DIAGNOSIS — Z79.01 LONG TERM (CURRENT) USE OF ANTICOAGULANTS: ICD-10-CM

## 2024-08-14 LAB
D DIMER PPP FEU-MCNC: 1.39 UG/ML FEU (ref 0–0.5)
INR BLD: 2.3 (ref 0.9–1.1)

## 2024-08-14 PROCEDURE — 36415 COLL VENOUS BLD VENIPUNCTURE: CPT

## 2024-08-14 PROCEDURE — 85610 PROTHROMBIN TIME: CPT

## 2024-08-14 PROCEDURE — 85379 FIBRIN DEGRADATION QUANT: CPT

## 2024-08-14 NOTE — PROGRESS NOTES
ANTICOAGULATION MANAGEMENT     Gaurang Rivera 53 year old male is on warfarin with therapeutic INR result. (Goal INR 2.0-3.0)    Recent labs: (last 7 days)     08/14/24  1646   INR 2.3*         ASSESSMENT     Source(s): Chart Review and Patient/Caregiver Call     Warfarin doses taken: Warfarin taken as instructed  Diet: No new diet changes identified  Medication/supplement changes: None noted  New illness, injury, or hospitalization: No  Signs or symptoms of bleeding or clotting: No  Previous result: Supratherapeutic  Additional findings: Warfarin maintenance dose was decreased 6% at last visit       PLAN     Recommended plan for no diet, medication or health factor changes affecting INR     Dosing Instructions: Continue your current warfarin dose with next INR in 3 weeks       Summary  As of 8/14/2024      Full warfarin instructions:  7.5 mg every Sun; 5 mg all other days   Next INR check:  9/4/2024               Telephone call with Gaurang who verbalizes understanding and agrees to plan    Lab visit scheduled    Education provided: Taking warfarin: Importance of taking warfarin as instructed    Plan made per [facility] anticoagulation protocol    Parul Lopes, RN  Anticoagulation Clinic  8/14/2024    _______________________________________________________________________     Anticoagulation Episode Summary       Current INR goal:  2.0-3.0   TTR:  42.8% (8.5 mo)   Target end date:  Indefinite   Send INR reminders to:  [facility]    Indications    Atrial fibrillation  unspecified type (H) (Resolved) [I48.91]  Heterozygous factor V Leiden mutation (H24) [D68.51]  H/O deep venous thrombosis [Z86.718]  Long term (current) use of anticoagulants [Z79.01]  Multiple subsegmental pulmonary emboli without acute cor pulmonale (H) [I26.94]             Comments:  MTV 30 mcg.             Anticoagulation Care Providers       Provider Role Specialty Phone number    Elvis Guzman MD Referring Family Medicine [phone]

## 2024-08-15 ENCOUNTER — MYC REFILL (OUTPATIENT)
Dept: PSYCHIATRY | Facility: CLINIC | Age: 54
End: 2024-08-15
Payer: COMMERCIAL

## 2024-08-15 DIAGNOSIS — F33.2 SEVERE EPISODE OF RECURRENT MAJOR DEPRESSIVE DISORDER, WITHOUT PSYCHOTIC FEATURES (H): ICD-10-CM

## 2024-08-16 RX ORDER — VENLAFAXINE HYDROCHLORIDE 75 MG/1
75 CAPSULE, EXTENDED RELEASE ORAL DAILY
Qty: 30 CAPSULE | Refills: 0 | Status: SHIPPED | OUTPATIENT
Start: 2024-08-16 | End: 2024-08-29

## 2024-08-16 NOTE — TELEPHONE ENCOUNTER
Date of Last Office Visit: 1/12/2024  Date of Next Office Visit: None; routing for A to assist pt with scheduling.    No shows since last visit: No  More than one patient-initiated cancellation (with reschedule) since last seen in clinic? No    []Medication refilled per  Medication Refill in Ambulatory Care  policy.  [x]Medication unable to be refilled by RN due to criteria not met as indicated below:    []Eligibility: has not had a provider visit within last 6 months   [x]Supervision: no future appointment; < 7 days before next appointment   [x]Compliance: no shows; cancellations; lapse in therapy   []Verification: order discrepancy; may need modification...   [] > 30-day supply request   []Advanced refill request: > 7 days before refill date   []Controlled medication   []Medication not included in policy   []Review: new med; med adjusted ? 30 days; safety alert; requires lab monitoring...   []Scope of Practice: refill request processed by LPN/MA   []Other:      Medication(s) requested:   -   venlafaxine (EFFEXOR XR) 75 MG 24 hr capsule   Date last ordered: 7/16/2024  Qty: 30  Refills: 0  Take 1 capsule (75 mg) by mouth daily     Appropriate for refill? Provider to review.          Any Controlled Substance(s)? No      Requested medication(s) verified as identical to current order? Yes    Any lapse in adherence to medication(s) greater than 5 days? No      Additional action taken? routed encounter to provider for review.      Last visit treatment plan:   ASSESSMENT AND PLAN       Problem List Items Addressed This Visit                  Behavioral       Depressive symptoms continue causing functional impairment in multiple domains.   Discussed referral to interventional psychiatry to look at treatment resistant options.  Referral made to evaluate Intensive Outpnt Program versus Partial Hospitalization Program for additional support.  In the interim will further increase the Latuda to 120 mg (max dose).  Continue  remaining medications at current dosing.  Follow-up in 4 weeks            MDD (major depressive disorder), recurrent episode, severe (H) - Primary     Relevant Medications     traZODone (DESYREL) 50 MG tablet     venlafaxine (EFFEXOR XR) 75 MG 24 hr capsule     lurasidone (LATUDA) 120 MG TABS tablet     buPROPion (WELLBUTRIN XL) 300 MG 24 hr tablet     buPROPion (WELLBUTRIN XL) 150 MG 24 hr tablet     Other Relevant Orders     Adult Mental Health  Referral     Adult Mental Health  Referral        Any medication(s) require lab monitoring? No                 98.2

## 2024-08-28 NOTE — PROGRESS NOTES
[facility]  Aug 29, 2024      Behavioral Health Clinician Progress Note    Patient Name: Gaurang Rivera           Service Type:  Individual      Service Location:   [place] / Email (patient reached)     Session Start Time: 330 pm  Session End Time: 346 pm      Session Length: 16 - 37      Attendees: Client     Service Modality:  Video Visit:      Provider verified identity through the following two step process.  Patient provided:  Patient photo and Patient     Telemedicine Visit: The patient's condition can be safely assessed and treated via synchronous audio and visual telemedicine encounter.      Reason for Telemedicine Visit: Patient convenience (e.g. access to timely appointments / distance to available provider)    Originating Site (Patient Location): Patient's home    Distant Site (Provider Location): Provider Remote Setting- Home Office    Consent:  The patient/guardian has verbally consented to: the potential risks and benefits of telemedicine (video visit) versus in person care; bill my insurance or make self-payment for services provided; and responsibility for payment of non-covered services.     Patient would like the video invitation sent by:  My Chart    Mode of Communication:  Video Conference via [facility]    Distant Location (Provider):  Off-site    As the provider I attest to compliance with applicable laws and regulations related to telemedicine.    Visit Activities (Refresh list every visit): [facility] Only    Diagnostic Assessment Date: 23  Treatment Plan Review Date: 24  See Flowsheets for today's PHQ-9 and SOL-7 results  Previous PHQ-9:       2024    11:05 PM 2024     7:01 AM 2024     3:19 PM   PHQ-9 SCORE   PHQ-9 Total Score Siobhan 13 (Moderate depression)     PHQ-9 Total Score 13 21 20     Previous SOL-7:       2020    10:55 AM 3/30/2021     2:18 PM 8/3/2023     1:00 PM   SOL-7 SCORE   Total Score 1 (minimal  anxiety)  5 (mild anxiety)   Total Score 1 0 5       LEONEL LEVEL:       No data to display                DATA  Extended Session (60+ minutes): No  Interactive Complexity: No  Crisis: No  BH Patient: No    Treatment Objective(s) Addressed in This Session:  Target Behavior(s): disease management/lifestyle changes depression symptoms    Depressed Mood: Increase interest, engagement, and pleasure in doing things  Decrease frequency and intensity of feeling down, depressed, hopeless  Improve quantity and quality of night time sleep / decrease daytime naps  Feel less tired and more energy during the day   Identify negative self-talk and behaviors: challenge core beliefs, myths, and actions  Decrease thoughts that you'd be better off dead or of suicide / self-harm    Current Stressors / Issues:   update: Pt reports that he lost his insurance but he's got it back. He had several blood clots in his leg in July and had to be hospitalized. He will be seeing a vascular specialist because they haven't fully resolved. He may need surgery. He reports increased depression. [facility] reviewed strategies or skills he's learned to manage sx. Support system: F and sister. He hasn't been able to see them much due to pain associated with his blood clots. Pt and [facility] problem solved ways to maintain those relationships. He's hesitant to do this because he hasn't been able to maintain his apartment as well he'd like. He had to leave his job for health issues. His F has been helping to support him and can do so through the end of this year. He may have to use his longterm.    Stresses: physical health issues  Appetite: unremarkable  Sleep: unremarkable  Therapy: Pt would like a referral, no gender preference, virtual  KHANH: No  Preg: NA  Interventions: solution focused to manage sx  Most important: medication update        Progress on Treatment Objective(s) / Homework:  No improvement - PREPARATION (Decided to change - considering how);  Intervened by negotiating a change plan and determining options / strategies for behavior change, identifying triggers, exploring social supports, and working towards setting a date to begin behavior change    Motivational Interviewing    MI Intervention: Expressed Empathy/Understanding, Supported Autonomy, Collaboration, Evocation, Permission to raise concern or advise, Open-ended questions, and Reflections: simple and complex     Change Talk Expressed by the Patient: Desire to change    Provider Response to Change Talk: E - Evoked more info from patient about behavior change, A - Affirmed patient's thoughts, decisions, or attempts at behavior change, R - Reflected patient's change talk, and S - Summarized patient's change talk statements    Also provided psychoeducation about behavioral health condition, symptoms, and treatment options    Cognitive-Behavioral Therapy and Acceptance and Commitment therapy.    Assessments completed prior to visit:  The following assessments were completed by patient for this visit:  PHQ9:       8/3/2023    12:38 PM 9/18/2023     3:07 PM 11/22/2023     1:53 PM 1/11/2024    10:22 PM 7/1/2024    11:05 PM 8/2/2024     7:01 AM 8/29/2024     3:19 PM   PHQ-9 SCORE   PHQ-9 Total Score MyChart 19 (Moderately severe depression) 20 (Severe depression) 19 (Moderately severe depression) 17 (Moderately severe depression) 13 (Moderate depression)     PHQ-9 Total Score 19 20    20 19 17 13 21 20     GAD7:       3/19/2019    10:07 AM 6/28/2019     7:30 AM 7/16/2019    10:42 AM 10/10/2019     8:39 AM 1/8/2020    10:55 AM 3/30/2021     2:18 PM 8/3/2023     1:00 PM   SOL-7 SCORE   Total Score 4 (minimal anxiety)  1 (minimal anxiety) 1 (minimal anxiety) 1 (minimal anxiety)  5 (mild anxiety)   Total Score 4 1 1 1 1 0 5     CAGE-AID:       9/18/2023     3:18 PM   CAGE-AID Total Score   Total Score 0    0   Total Score MyChart 0 (A total score of 2 or greater is considered clinically significant)      PROMIS 10-Global Health (only subscores and total score):       9/18/2023     3:17 PM 1/11/2024    10:23 PM 8/28/2024    11:01 PM   PROMIS-10 Scores Only   Global Mental Health Score 5    5 5    5 5    5   Global Physical Health Score 9    9 10    10 8    8   PROMIS TOTAL - SUBSCORES 14    14 15    15 13    13     [place] Suicide Severity Rating Scale (Lifetime/Recent)      9/19/2023     3:58 PM 6/26/2024     7:21 PM   [place] Suicide Severity Rating (Lifetime/Recent)   Q1 Wished to be Dead (Past Month)  0-->no   Q2 Suicidal Thoughts (Past Month)  0-->no   Q6 Suicide Behavior (Lifetime)  0-->no   Level of Risk per Screen  no risks indicated   Q1 Wish to be Dead (Lifetime) Y    Wish to be Dead Description (Lifetime) Pt reports chronic passive thoughts of death    1. Wish to be Dead (Past 1 Month) Y    Wish to be Dead Description (Past 1 Month) Pt reports chronic passive thoughts of death. Denies active SI, intent, plan or safety concerns    Q2 Non-Specific Active Suicidal Thoughts (Lifetime) N    Most Severe Ideation Rating (Lifetime) 2    Most Severe Ideation Rating (Past 1 Month) 2    Frequency (Lifetime) 2    Frequency (Past 1 Month) 2    Duration (Lifetime) 1    Duration (Past 1 Month) 1    Controllability (Lifetime) 1    Controllability (Past 1 Month) 1    Deterrents (Lifetime) 1    Deterrents (Past 1 Month) 1    Reasons for Ideation (Lifetime) 4    Reasons for Ideation (Past 1 Month) 4    Actual Attempt (Lifetime) N    Has subject engaged in non-suicidal self-injurious behavior? (Lifetime) N    Interrupted Attempts (Lifetime) N    Aborted or Self-Interrupted Attempt (Lifetime) N    Preparatory Acts or Behavior (Lifetime) N    Calculated C-SSRS Risk Score (Lifetime/Recent) Low Risk        Care Plan review completed: Yes    Medication Review:  Changes to psychiatric medications, see updated Medication List in EPIC. - changes in 9/23    Medication Compliance:  Yes    Changes in Health Issues:   Yes: blood  clots - chronic back pain    Chemical Use Review:   Substance Use: Chemical use reviewed, no active concerns identified      Tobacco Use: No current tobacco use.      Assessment: Current Emotional / Mental Status (status of significant symptoms):  Risk status (Self / Other harm or suicidal ideation)  Patient has had a history of suicidal ideation: passive SI, denies any planning or intent.  Patient denies current fears or concerns for personal safety.  Patient reports the following current or recent suicidal ideation or behaviors: passive SI.  Patient denies current or recent homicidal ideation or behaviors.  Patient denies current or recent self injurious behavior or ideation.  Patient denies other safety concerns.  A safety and risk management plan has not been developed at this time, however patient was encouraged to call [place] [phone] should there be a change in any of these risk factors.    Appearance:   Appropriate   Eye Contact:   Good   Psychomotor Behavior: Normal   Attitude:   Cooperative   Orientation:   All  Speech   Rate / Production: Normal/ Responsive Normal    Volume:  Normal   Mood:    Depressed   Affect:    Flat   Thought Content:  Clear   Thought Form:  Coherent  Logical   Insight:    Good     Diagnoses:  1. Severe episode of recurrent major depressive disorder, without psychotic features (H)            Collateral Reports Completed:  Communicated with: [facility] provider    Plan: (Homework, other):  Patient was given information about behavioral services and encouraged to schedule a follow up appointment with the clinic [facility] as needed.  He was also given information about mental health symptoms and treatment options .  Pt has a therapist he is seeing.  Is interested in looking into alternative therapy options. CD Recommendations: No indications of CD issues.       BRYANNA Portillo    ______________________________________________________________________    Integrated Primary Care  Behavioral Health Treatment Plan    Patient's Name: Gaurang Rivera  YOB: 1970    Date of Creation: 11/22/23  Date Treatment Plan Last Reviewed/Revised: 08/22/24    DSM5 Diagnoses: 296.32 (F33.1) Major Depressive Disorder, Recurrent Episode, Moderate _ and With melancholic features  Psychosocial / Contextual Factors: work on hold due to season, lost 1 of 2 part time jobs  PROMIS (reviewed every 90 days):   PROMIS-10 Scores        9/18/2023     3:17 PM 1/11/2024    10:23 PM 8/28/2024    11:01 PM   PROMIS-10 Total Score w/o Sub Scores   PROMIS TOTAL - SUBSCORES 14    14 15    15 13    13         Referral / Collaboration:  The following referral(s) will be initiated: referral for new long term therapist .    Anticipated number of session for this episode of care: 3-6  Anticipation frequency of session: Every 1-2 months  Anticipated Duration of each session: 16-37 minutes  Treatment plan will be reviewed in 90 days or when goals have been changed.       MeasurableTreatment Goal(s) related to diagnosis / functional impairment(s)  Goal 1: Patient will experience a reduction in depressive symptoms along with a corresponding increase in positive emotions and life satisfaction.    I will know I've met my goal when depressive symptoms lessen and have more interest and energy to do things enjoy doing.      Objective #A (Patient Action)    Patient will Increase interest, engagement, and pleasure in doing things  Feel less tired and more energy during the day .  Status: Continued - Date(s):11/22/2024     Intervention(s)  [facility] provided psycho-education for symptom management including the following:     https://www.youtube.com/watch?v=Ae9koDwMeK2  https://www.youtube.com/watch?v=7-0W7NbJlgN  [email or web address]-community/mindfulness-programs/mindfulness-based-stress-reduction       Slow breathing. When you're anxious, your breathing becomes faster and shallower. Try deliberately slowing down your breathing. Count  to three as you breathe in slowly - then count to three as you breathe out slowly.    Progressive muscle relaxation. Find a quiet location. Close your eyes and slowly tense and then relax each of your muscle groups from your toes to your head. Hold the tension for three seconds and then release quickly. This can help reduce the feelings of muscle tension that often comes with anxiety.    Stay in the present moment. Anxiety can make your thoughts live in a terrible future that hasn't happened yet. Try to bring yourself back to where you are. Practising meditation can help.    Healthy lifestyle. Keeping active, eating well, going out into nature, spending time with family and friends, reducing stress and doing the activities you enjoy are all effective in reducing anxiety and improving your wellbeing.       Take small acts of bravery. Avoiding what makes you anxious provides some relief in the short term, but can make you more anxious in the long term. Try approaching something that makes you anxious - even in a small way. The way through anxiety is by learning that what you fear isn't likely to happen - and if it does, you'll be able to cope with it.    Challenge your self-talk. How you think affects how you feel. Anxiety can make you overestimate the danger in a situation and underestimate your ability to handle it. Try to think of different interpretations to a situation that's making you anxious, rather than jumping to the worst-case scenario. Look at the facts for and against your thought being true.    Plan worry time. It's hard to stop worrying entirely so set aside some time to indulge your worries. Even 10 minutes each evening to write them down or go over them in your head can help stop your worries from taking over at other times.    Get to know your anxiety. Keep a diary of when it's at it's best - and worst. Find the patterns and plan your week - or day - to proactively manage your anxiety.    Learn from  others. Talking with others who also experience anxiety - or are going through something similar - can help you feel less alone. Visit our Online Forums to connect with others.    Be kind to yourself. Remember that you are not your anxiety. You are not weak. You are not inferior. You have a mental health condition. It's called anxiety.     Body based skills: Change body temperature by holding ice cube, running hands under cold water, drinking cold water. Eat an altoid mint or other strongly flavored mint candy or sour candy like Warheads or lemon drops. The sour taste will quickly distract from the anxiety and the sweet flavor at the end is rewarding to the brain.     Box Breathing:  Step 1: Breathe in, counting to four slowly. Feel the air enter your lungs.  Step 2: Hold your breath for 4 seconds. Try to avoid inhaling or exhaling for 4 seconds.  Step 3: Slowly exhale through your mouth for 4 seconds.  Step 4: Repeat steps 1 to 3 until you feel re-centered.    5 Things exercise: find 5 things in the environment that are the same color, 4 that are a same shape, 3 that are the same size, 2 smells, 1 sound      [facility] provided psycho-education on Acceptance and Commitment skills including:  -values clarification  -behavioral activation planning including overcoming barriers  -mindfulness  -cognitive diffusion skill  -facilitate self acceptance    [facility] provided psycho-education on sleep skills including:  -sleep hygiene  -tracking sleep schedule  -creating sleep routine    [facility] provided psycho-education on DBT skills  -mindfulness  -mood regulation techniques    -5 things exercise    -somatic regulation skills (taste of something sweet or sour, notice smells, touch/feel of things in the environment, physical activity such as dancing, walking)    [facility] provided psycho-education of CBT skills including the following:  -challenging destructive thinking  -re-framing  -behavioral activation  -journaling  -mood/behavioral  tracking  -exposure response  -mood regulation (deep breathing, somatic skills)    Patient has reviewed and agreed to the above plan.      Peyton Mathews, LICSW, LICSW, [facility]  Aug 29 2024

## 2024-08-29 ENCOUNTER — VIRTUAL VISIT (OUTPATIENT)
Dept: BEHAVIORAL HEALTH | Facility: CLINIC | Age: 54
End: 2024-08-29
Payer: COMMERCIAL

## 2024-08-29 ENCOUNTER — VIRTUAL VISIT (OUTPATIENT)
Dept: PSYCHIATRY | Facility: CLINIC | Age: 54
End: 2024-08-29
Payer: COMMERCIAL

## 2024-08-29 DIAGNOSIS — G47.00 PERSISTENT INSOMNIA: Primary | ICD-10-CM

## 2024-08-29 DIAGNOSIS — F33.2 SEVERE EPISODE OF RECURRENT MAJOR DEPRESSIVE DISORDER, WITHOUT PSYCHOTIC FEATURES (H): ICD-10-CM

## 2024-08-29 DIAGNOSIS — F33.2 SEVERE EPISODE OF RECURRENT MAJOR DEPRESSIVE DISORDER, WITHOUT PSYCHOTIC FEATURES (H): Primary | ICD-10-CM

## 2024-08-29 PROCEDURE — G2211 COMPLEX E/M VISIT ADD ON: HCPCS | Mod: 95 | Performed by: NURSE PRACTITIONER

## 2024-08-29 PROCEDURE — 90832 PSYTX W PT 30 MINUTES: CPT | Mod: 95 | Performed by: SOCIAL WORKER

## 2024-08-29 PROCEDURE — 99214 OFFICE O/P EST MOD 30 MIN: CPT | Mod: 95 | Performed by: NURSE PRACTITIONER

## 2024-08-29 RX ORDER — BUPROPION HYDROCHLORIDE 150 MG/1
150 TABLET ORAL EVERY MORNING
Qty: 90 TABLET | Refills: 0 | Status: SHIPPED | OUTPATIENT
Start: 2024-08-29

## 2024-08-29 RX ORDER — LURASIDONE HYDROCHLORIDE 120 MG/1
120 TABLET, FILM COATED ORAL
Qty: 30 TABLET | Refills: 1 | Status: SHIPPED | OUTPATIENT
Start: 2024-08-29

## 2024-08-29 RX ORDER — VENLAFAXINE HYDROCHLORIDE 75 MG/1
75 CAPSULE, EXTENDED RELEASE ORAL DAILY
Qty: 30 CAPSULE | Refills: 1 | Status: SHIPPED | OUTPATIENT
Start: 2024-08-29

## 2024-08-29 RX ORDER — LURASIDONE HYDROCHLORIDE 80 MG/1
TABLET, FILM COATED ORAL
Qty: 14 TABLET | Refills: 0 | Status: SHIPPED | OUTPATIENT
Start: 2024-08-29

## 2024-08-29 ASSESSMENT — PAIN SCALES - GENERAL: PAINLEVEL: SEVERE PAIN (7)

## 2024-08-29 ASSESSMENT — PATIENT HEALTH QUESTIONNAIRE - PHQ9: SUM OF ALL RESPONSES TO PHQ QUESTIONS 1-9: 20

## 2024-08-29 NOTE — NURSING NOTE
Is the patient currently in the state of [place]? YES    Current patient location: [address] [place] [address] [place]   [facility] [id number]    Visit mode:VIDEO    If the visit is dropped, the patient can be reconnected by: VIDEO VISIT: Text to cell phone:   Telephone Information:   Mobile [phone]       Will anyone else be joining the visit? No  (If patient encounters technical issues they should call [phone])    How would you like to obtain your AVS? MyChart    Are changes needed to the allergy or medication list? Yes Wants to get back on medications    Are refills needed on medications prescribed by this physician? YES    Rooming Documentation: Questionnaire(s) completed.    Reason for visit: RECHECK     [employer]

## 2024-08-29 NOTE — PROGRESS NOTES
Virtual Visit Details    Type of service:  Video Visit   Video Start Time:  1359  Video End Time: 1425    Originating Location (pt. Location): Home    Distant Location (provider location):  Off-site   Platform used for Video Visit: [employer]        PSYCHIATRIC MEDICATION FOLLOW UP APPT     Name:  Gaurang Rivera  : 1970    Referred by: Lary Brizuela PA-C  Patient Care Team:  Elvis Guzman MD as PCP - General (Family Practice)  Elvis Guzman MD as Assigned PCP  Vaishnavi Rock PA as Physician Assistant (Urology)  Angeles Wise MD as MD (Dermatology)  Adair Waite RPH as Pharmacist (Pharmacist)  Lluvia Cárdenas PA-C as Physician Assistant (Gastroenterology)  Adair Waite RPH as Assigned MTM Pharmacist  Angeles Wise MD as Assigned Surgical Provider  Therapist: in therapy currently    Patient attended the phone/video session alone.    Last seen for outpatient psychiatry Return Visit on 2024.      FOLLOWING PLAN PUT INTO PLACE:      Depressive symptoms continue causing functional impairment in multiple domains.   Discussed referral to interventional psychiatry to look at treatment resistant options.  Referral made to evaluate Intensive Outpnt Program versus Partial Hospitalization Program for additional support.  In the interim will further increase the Latuda to 120 mg (max dose).  Continue remaining medications at current dosing.  Follow-up in 4 weeks              INTERIM HISTORY     COMMUNICATIONS FROM PATIENT VIA:  none     RECORDS AVAILABLE FOR REVIEW: EHR records through [facility]  and previous psychiatric progress note.  In addition, reviewed the assessment completed by Peyton Mathews [facility], dated today        HISTORY OF PRESENT ILLNESS   [facility]PS referral for psychiatric medication consult in 2023.   Reports history of longstanding depression.  Reports he first sought treatment when he was in late 20's.  Has been on medications essentially since then.   " Denies prior psychiatric hospitalizations. Hx of suicidal ideation, no suicide attempts. No history of self-injurious behaviors. Genetically loaded for  anxiety and substance use. Grew up in an intact home with all basic needs being met.     Thorough assessment of a potential underlying bipolar trajectory on initial assessment as follows:      Mood Disorder Questionnaire (MDQ)  Adapted from the \"Mental Health Queri,\" Quality Enhancement Research Initiative     Has there ever been a period of time when you were not your usual self and     -you felt so good or so hyper that other people thought you were not your normal self or you were so hyper that you got into trouble?   NO  -you were so irritable that you shouted at people or started fights or arguments? NO  -you felt much more self-confident than usual? NO                                                             -you got much less sleep than usual and found that you didn't really miss it?  NO  -you were much more talkative and spoke much faster than usual?  NO  -thoughts raced through your head or you couldn't slow your mind down?  ENDORSES RUMINATION          -you were so easily distracted by things around you that you had trouble concentrating or staying on track? NO  -you had much more energy than usual? NO  -you were much more active or did many more things than usual?  NO                             -you were much more social or outgoing than usual, for example, you telephoned friends in the middle of the night?  NO                                         -you did things that were unusual for you or other people might have thought were excessive, foolish or risky? NO  -spending money got you or your family in trouble?     NO                                   If you checked YES to more than one of the above, have several of these ever happened during the same period of time?  NO     How much of a problem did any of these cause you - like being unable to " work; having family, money, or legal troubles; getting into arguments or fights?  NO     Have any of your blood relatives (i.e. children, siblings, parents, grandparents, aunts, uncles) had manic-depressive illness or bipolar disorder?  NO     Has a health professional ever told you that you have manic- depressive illness or bipolar disorder?  NO     [place] Bipolarity Index utilized to further assess for an underlying bipolar disorder. This explores presence of hypomania or jermain, age of onset of first mood symptoms, illness course and other features generally only visible over time, response to medications (antidepressants and mood stabilizers), and family history of mood and substance use.   Patient endorses the following:     History of many failed antidepressant trials  Yes      Age of first depression onset: YOUNG ADULT  History of greater than or equal to five total life time depressive episodes: Yes   Depressive episode with concurrent hypomanic symptoms: No   Questionable activation from antidepressant: No   Family history of mood disorder: No     FAMILY, MEDICAL, SURGICAL HISTORY REVIEWED.  MEDICATION HAVE BEEN REVIEWED AND ARE CURRENT TO THE BEST OF MY KNOWLEDGE AND ABILITY.   dennis, no longer working due to mental health symptoms     MEDICATIONS                                                                                                Current Outpatient Medications   Medication Sig Dispense Refill    oxyCODONE (ROXICODONE) 5 MG tablet Take 2 tablets (10 mg) by mouth every 6 hours as needed for pain 24 tablet 0    venlafaxine (EFFEXOR XR) 75 MG 24 hr capsule Take 1 capsule (75 mg) by mouth daily 30 capsule 0    warfarin ANTICOAGULANT (COUMADIN) 5 MG tablet Take 7.5 mg every Sun; 5 mg all other days or As directed by Anticoagulation clinic 145 tablet 1    acetaminophen (TYLENOL) 325 MG tablet Take 2 tablets (650 mg) by mouth every 4 hours as needed for mild pain or fever (Patient not taking:  Reported on 8/29/2024) 60 tablet 0    atorvastatin (LIPITOR) 40 MG tablet Take 1 tablet (40 mg) by mouth daily (Patient not taking: Reported on 8/29/2024) 90 tablet 1    buPROPion (WELLBUTRIN XL) 150 MG 24 hr tablet Take 1 tablet (150 mg) by mouth every morning Take with 300mg for total daily dose of 450mg. (Patient not taking: Reported on 8/29/2024) 90 tablet 0    cholecalciferol (VITAMIN D3) 125 mcg (5000 units) capsule Take 1 capsule (125 mcg) by mouth daily (Patient not taking: Reported on 8/29/2024) 100 capsule 3    Continuous Blood Gluc Sensor (FREESTYLE JAVI 14 DAY SENSOR) MISC AS DIRECTED AND CHANGE EVERY 14 DAYS, (Patient not taking: Reported on 8/29/2024) 6 each 3    exenatide ER (BYDUREON BCISE) 2 MG/0.85ML auto-injector INJECT 2MG SUBCUTANEOUS EVERY 7 DAYS (Patient not taking: Reported on 8/29/2024) 3.4 mL 5    JARDIANCE 10 MG TABS tablet TAKE 1 TABLET(10 MG) BY MOUTH DAILY (Patient not taking: Reported on 8/29/2024) 30 tablet 5    lisinopril (ZESTRIL) 5 MG tablet Take 1 tablet (5 mg) by mouth daily (Patient not taking: Reported on 8/29/2024) 90 tablet 1    lurasidone (LATUDA) 120 MG TABS tablet Take 1 tablet (120 mg) by mouth daily with food (Patient not taking: Reported on 8/29/2024) 30 tablet 1    metFORMIN (GLUCOPHAGE) 1000 MG tablet TAKE 1 TABLET(1000 MG) BY MOUTH TWICE DAILY WITH MEALS (Patient not taking: Reported on 8/29/2024) 180 tablet 0    multivitamin w/minerals (THERA-VIT-M) tablet Take 1 tablet by mouth daily (Patient not taking: Reported on 8/29/2024) 90 tablet 3    omeprazole (PRILOSEC OTC) 20 MG EC tablet Take 20 mg by mouth daily as needed. (Patient not taking: Reported on 8/29/2024)      oxyCODONE (ROXICODONE) 5 MG tablet Take 2 tablets (10 mg) by mouth 2 times daily as needed for pain (Patient not taking: Reported on 8/29/2024) 28 tablet 0    QUEtiapine (SEROQUEL) 25 MG tablet Take 2 tablets (50 mg) by mouth nightly as needed (insomnia) (Patient not taking: Reported on 8/29/2024) 60  "tablet 1    traZODone (DESYREL) 50 MG tablet Take 1-2 tablets ( mg) by mouth nightly as needed for sleep (Patient not taking: Reported on 8/29/2024) 60 tablet 1     No current facility-administered medications for this visit.      NOTES ABOUT CURRENT PSYCHOTROPIC MEDICATIONS:    Latuda 120 mg, off due to cost and now insurance  Bupropion  mg, off since February   Venlafaxine XR 75 mg many years at much higher doses.  Continued this as it was not a financial hardship       SUPPLEMENTS: denies   SIDE EFFECTS:   denies    COMPLIANCE:   states Adherent to medication regimen       PAST PSYCHOTROPIC MEDICATIONS:  Fluoxetine years ago  Sertraline   Abilify 5 mg, unclear benefit.  Has been on this over 9 months  Quetiapine 25-50 mg at bedtime, about a month,       TODAY PATIENT REPORTS THE FOLLOWING PSYCHIATRIC ROS:   Per [facility], Peyton Mathews, during today's team-based visit:  \"MH update: Pt reports that he lost his insurance but he's got it back. He had several blood clots in his leg in July and had to be hospitalized. He will be seeing a vascular specialist because they haven't fully resolved. He may need surgery. He reports increased depression. [facility] reviewed strategies or skills he's learned to manage sx. Support system: F and sister. He hasn't been able to see them much due to pain associated with his blood clots. Pt and [facility] problem solved ways to maintain those relationships. He's hesitant to do this because he hasn't been able to maintain his apartment as well he'd like. He had to leave his job for health issues. His F has been helping to support him and can do so through the end of this year. He may have to use his senior care.    Stresses: physical health issues  Appetite: unremarkable  Sleep: unremarkable  Therapy: Pt would like a referral, no gender preference, virtual  KHANH: No  Preg: NA  Interventions: solution focused to manage sx  Most important: medication update\"      EXERCISE: Minimal exercise: " due to mental health symptoms   SIDE EFFECTS:   tolerating medications without reported side effects  COMPLIANCE:   states Adherent to medication regimen  REPORTS THE FOLLOWING NEW MEDICAL ISSUES:   none    PROBLEM: DEPRESSION: No change. Vegetative symptoms of depression continue.  Endorses anhedonia, feeling down or depressed, sleep impairment, anergia, appetite changes, low self-esteem, trouble concentrating, psychomotor reslessness or slowing, hopelessness, helplessness, worthlessness, ruminations, irritability.        8/29/2024     3:19 PM   Last PHQ-9   1.  Little interest or pleasure in doing things 3   2.  Feeling down, depressed, or hopeless 3   3.  Trouble falling or staying asleep, or sleeping too much 3   4.  Feeling tired or having little energy 3   5.  Poor appetite or overeating 2   6.  Feeling bad about yourself 3   7.  Trouble concentrating 0   8.  Moving slowly or restless 0   Q9: Thoughts of better off dead/self-harm past 2 weeks 3   PHQ-9 Total Score 20   Difficulty at work, home, or with people Extremely dIfficult         7/1/2024    11:05 PM 8/2/2024     7:01 AM 8/29/2024     3:19 PM   PHQ-9 SCORE   PHQ-9 Total Score MyChart 13 (Moderate depression)     PHQ-9 Total Score 13 21 20     PHQ9 score is 17 indicating moderately severe depression.  Suicidal ideation:  No     PROBLEM: ANXIETY: No change.    GAD7 score is Not completed today      1/8/2020    10:55 AM 3/30/2021     2:18 PM 8/3/2023     1:00 PM   SOL-7 SCORE   Total Score 1 (minimal anxiety)  5 (mild anxiety)   Total Score 1 0 5       PERTINENT PAST MEDICAL AND SURGICAL HISTORY     Past Medical History:   Diagnosis Date    Antiplatelet or antithrombotic long-term use     Closed displaced fracture of neck of right scapula, sequela 10/21/2019    Added automatically from request for surgery [id number]    Depressive disorder 2001    Diabetes (H)     DVT (deep venous thrombosis) (H) 07/05/2019    Elevated blood pressure reading without diagnosis  "of hypertension 05/13/2018    Hypercholesteremia 2012    Personal history of other medical treatment     blood clot foot july 2019    Pilonidal cyst 05/13/2018       VITALS     BP Readings from Last 1 Encounters:   08/05/24 132/76     Pulse Readings from Last 1 Encounters:   08/05/24 80     Wt Readings from Last 1 Encounters:   08/05/24 120.5 kg (265 lb 11.2 oz)     Ht Readings from Last 1 Encounters:   08/05/24 1.969 m (6' 5.5\")     Estimated body mass index is 31.1 kg/m  as calculated from the following:    Height as of 8/5/24: 1.969 m (6' 5.5\").    Weight as of 8/5/24: 120.5 kg (265 lb 11.2 oz).    LABS & IMAGING                                                                                                                   Recent Labs   Lab Test 06/29/24  0503 06/03/20  0706 05/28/20  1135   WBC 5.0   < > 6.8   HGB 15.2   < > 16.1   HCT 43.6   < > 47.8   MCV 87   < > 87      < > 192   ANEU  --   --  3.3    < > = values in this interval not displayed.     Recent Labs   Lab Test 06/29/24  1301 06/29/24  0503   NA  --  138   POTASSIUM  --  3.9   CHLORIDE  --  103   CO2  --  24   * 246*   SAVI  --  9.0   MAG  --  1.9   BUN  --  10.9   CR  --  0.86   GFRESTIMATED  --  >90   ALBUMIN  --  3.8   PROTTOTAL  --  6.5   AST  --  20   ALT  --  13   ALKPHOS  --  86   BILITOTAL  --  0.7     Recent Labs   Lab Test 06/27/24  0519 01/15/24  1615   CHOL  --  210*   LDL  --  95   HDL  --  39*   TRIG  --  505*   A1C 11.7* 8.5*     Recent Labs   Lab Test 05/01/23  0939   TSH 1.73       ALLERGY & IMMUNIZATIONS     No Known Allergies    MEDICAL REVIEW OF SYSTEMS:   Ten system review was completed with pertinent positives noted     MENTAL STATUS EXAM:      General/Constitutional:  Appearance:   awake, alert, adequately groomed, appeared stated age and no apparent distress  Attitude:    cooperative   Eye Contact:  good  Musculoskeletal:  Psychomotor Behavior:  no evidence of tardive dyskinesia, dystonia, or tics from the " head up  Psychiatric:  Speech:  clear, coherent, regular rate, rhythm, and volume,   No pressure speech noted.  Associations:  no loose associations  Thought Process:   logical, linear and goal oriented  Thought Content:  chronic suicidal idation, longstanding, no intent or plan.  No homicidal ideation, no evidence of psychotic thought, no auditory hallucinations present and no visual hallucinations present  Mood:  depressed, no change  Affect:  full range/stable (normal variation of emotions during exam) and was congruent to speech content.  Insight:  good  Judgment:  intact, adequate for safety  Impulse Control:  intact  Neurological:  Oriented to:  person, place, time, and situation  Attention Span and Concentration:  Able to attend to the interview        Language: intact       Recent and Remote Memory:  Intact to interview. Not formally assessed. No amnesia.    Fund of Knowledge: appropriate        SAFETY   Feels safe in home: YES     Suicidal ideation: chronic, no intent or plan  History of suicide attempts:  No   Hx of impulsivity: No   Hope for the future: present    Hx of Command hallucinations or current psychosis: None endorsed    History of Self-injurious behaviors: denies    Family member  by suicide:  not discussed       SAFETY ASSESSMENT:   Based on all available evidence including the factors cited above, overall Risk for harm is low and is appropriate for outpatient level of care.   Recommended that patient call 911 or go to the local ED should there be a change in any of these risk factors.     DSM 5 DIAGNOSIS:   296.32 (F33.1) Major Depressive Disorder, Recurrent Episode, Moderate _    MEDICAL COMORBIDITY IMPACTING CLINICAL PICTURE: None noted.      ASSESSMENT AND PLAN       Problem List Items Addressed This Visit          Behavioral     Was without insurance for over six months and not taking medications with the exception of Venlafaxine XR 75 mg.  Will restart and titrate the Latuda to 120  mg over two weeks.  Start bupropion  mg and continue the Venlafaxine XR 75 mg.  Sleep study referral placed  Referral for Partial Hospitalization Program placed  Follow-up in six weeks          MDD (major depressive disorder), recurrent episode, severe (H)    Relevant Medications    lurasidone (LATUDA) 80 MG TABS tablet    lurasidone (LATUDA) 120 MG TABS tablet    buPROPion (WELLBUTRIN XL) 150 MG 24 hr tablet    venlafaxine (EFFEXOR XR) 75 MG 24 hr capsule    Other Relevant Orders    Adult Mental Health  Referral     Other Visit Diagnoses       Persistent insomnia    -  Primary    Relevant Orders    Adult Sleep Eval & Management  Referral              CONSULTS/REFERRALS:   Continue therapy   Coordinate care with therapist as needed       MEDICAL:   None at this time  Coordinate care with PCP (Elvis Guzman) as needed  Follow up with primary care provider as planned or for acute medical concerns.     PSYCHOEDUCATION:  Medication side effects and alternatives reviewed. Health promotion activities recommended and reviewed today. All questions addressed. Education and counseling completed regarding risks and benefits of medications and psychotherapy options.  Consent provided by patient/guardian  Call the psychiatric nurse line with medication questions or concerns at [phone].  [employer] (Offers.com) may be used to communicate with your provider, but this is not intended to be used for emergencies.  Medlineplus.gov is information for patients.  It is run by the National Library of Medicine and it contains information about all disorders, diseases and all medications.       COMMUNITY RESOURCES:    CRISIS NUMBERS: Provided in AVS 9/19/2023  National Suicide Prevention Lifeline: 3-920-786-TALK (949-003-4174)  [employer]/resources for a list of additional resources (SOS)            [facility] - [phone]   Urgent Care Adult Mental [facility]-[phone] mobile unit/ 24/7 crisis  line  [facility] -[phone]   COPE 24/7 [place] Mobile Team -[phone] (adults)/ [phone] (child)  Poison Control Center - 1-961.480.2725    OR  go to nearest ER  Crisis Text Line for any crisis 24/7 send this-   To: 228664   [facility] ([facility]) [facility] ER  [phone]  National Suicide Prevention Lifeline: 326.182.6874 (TTY: 466.801.3418). Call anytime for help.  (www.suicidepreventionlifeline.org)  National [place] on Mental Illness (www.dianne.org): 836.251.4469 or 428-245-7500.   Mental Health Association (www.mentalhealth.org): 833.719.4475 or 629-725-5555.  [place] Crisis Text Line: Text [place] to 397475  Suicide LifeLine Chat: suicideAvance Pay.org/chat    ADMINISTRATIVE BILLING:   Level of Medical Decision Making:   - At least 1 chronic problem that is not stable  - Engaged in prescription drug management during visit (discussed any medication benefits, side effects, alternatives, etc.)     The longitudinal plan of care for the diagnosis(es)/condition(s) as documented were addressed during this visit. Due to the added complexity in care, I will continue to support Peter in the subsequent management and with ongoing continuity of care.  Patient Status:  Patient will continue to be seen for ongoing consultation and stabilization.     Signed:   Meagan Hernandez, MSN, APRN, FMHNP-[facility] ([facility])   Chart documentation done in part with Dragon Voice Recognition software.  Although reviewed after completion, some word and grammatical errors may remain.

## 2024-08-29 NOTE — ASSESSMENT & PLAN NOTE
Was without insurance for over six months and not taking medications with the exception of Venlafaxine XR 75 mg.  Will restart and titrate the Latuda to 120 mg over two weeks.  Start bupropion  mg and continue the Venlafaxine XR 75 mg.  Sleep study referral placed  Referral for Partial Hospitalization Program placed  Follow-up in six weeks

## 2024-08-30 ENCOUNTER — TELEPHONE (OUTPATIENT)
Dept: BEHAVIORAL HEALTH | Facility: CLINIC | Age: 54
End: 2024-08-30
Payer: COMMERCIAL

## 2024-09-03 ENCOUNTER — OFFICE VISIT (OUTPATIENT)
Dept: OTHER | Facility: CLINIC | Age: 54
End: 2024-09-03
Attending: INTERNAL MEDICINE
Payer: COMMERCIAL

## 2024-09-03 VITALS
HEART RATE: 110 BPM | HEIGHT: 78 IN | DIASTOLIC BLOOD PRESSURE: 82 MMHG | WEIGHT: 260.4 LBS | SYSTOLIC BLOOD PRESSURE: 113 MMHG | BODY MASS INDEX: 30.13 KG/M2 | OXYGEN SATURATION: 95 %

## 2024-09-03 DIAGNOSIS — Z91.148 NONCOMPLIANCE WITH MEDICATION REGIMEN: ICD-10-CM

## 2024-09-03 DIAGNOSIS — E78.5 HYPERLIPIDEMIA LDL GOAL <70: ICD-10-CM

## 2024-09-03 DIAGNOSIS — I71.21 ANEURYSM OF ASCENDING AORTA WITHOUT RUPTURE (H): ICD-10-CM

## 2024-09-03 DIAGNOSIS — E11.9 TYPE 2 DIABETES, HBA1C GOAL < 7% (H): ICD-10-CM

## 2024-09-03 DIAGNOSIS — I82.409 ACUTE DEEP VEIN THROMBOSIS (DVT) OF OTHER VEIN OF LOWER EXTREMITY, UNSPECIFIED LATERALITY (H): Primary | ICD-10-CM

## 2024-09-03 DIAGNOSIS — R09.89 ABNORMAL PULSE: ICD-10-CM

## 2024-09-03 DIAGNOSIS — Z95.828 S/P INSERTION OF IVC (INFERIOR VENA CAVAL) FILTER: ICD-10-CM

## 2024-09-03 DIAGNOSIS — Q21.12 PFO (PATENT FORAMEN OVALE): ICD-10-CM

## 2024-09-03 PROCEDURE — G2211 COMPLEX E/M VISIT ADD ON: HCPCS | Performed by: INTERNAL MEDICINE

## 2024-09-03 PROCEDURE — G0463 HOSPITAL OUTPT CLINIC VISIT: HCPCS | Performed by: INTERNAL MEDICINE

## 2024-09-03 PROCEDURE — 99417 PROLNG OP E/M EACH 15 MIN: CPT | Performed by: INTERNAL MEDICINE

## 2024-09-03 PROCEDURE — 99205 OFFICE O/P NEW HI 60 MIN: CPT | Performed by: INTERNAL MEDICINE

## 2024-09-03 RX ORDER — TRAZODONE HYDROCHLORIDE 50 MG/1
50-100 TABLET, FILM COATED ORAL
COMMUNITY
Start: 2024-09-03

## 2024-09-03 RX ORDER — ATORVASTATIN CALCIUM 40 MG/1
40 TABLET, FILM COATED ORAL DAILY
COMMUNITY
Start: 2024-09-03

## 2024-09-03 RX ORDER — LISINOPRIL 5 MG/1
5 TABLET ORAL DAILY
COMMUNITY
Start: 2024-09-03

## 2024-09-03 RX ORDER — MULTIPLE VITAMINS W/ MINERALS TAB 9MG-400MCG
1 TAB ORAL DAILY
COMMUNITY
Start: 2024-09-03

## 2024-09-03 RX ORDER — ACETAMINOPHEN 325 MG/1
650 TABLET ORAL EVERY 4 HOURS PRN
COMMUNITY
Start: 2024-09-03

## 2024-09-03 RX ORDER — QUETIAPINE FUMARATE 25 MG/1
50 TABLET, FILM COATED ORAL
COMMUNITY
Start: 2024-09-03

## 2024-09-03 RX ORDER — FLASH GLUCOSE SENSOR
KIT MISCELLANEOUS
COMMUNITY
Start: 2024-09-03

## 2024-09-03 RX ORDER — OXYCODONE HYDROCHLORIDE 5 MG/1
10 TABLET ORAL 2 TIMES DAILY PRN
COMMUNITY
Start: 2024-09-03 | End: 2024-09-08

## 2024-09-03 RX ORDER — EXENATIDE 2 MG/.85ML
INJECTION, SUSPENSION, EXTENDED RELEASE SUBCUTANEOUS
Qty: 3.4 ML | Refills: 5 | Status: SHIPPED | OUTPATIENT
Start: 2024-09-03

## 2024-09-03 RX ORDER — OMEPRAZOLE 20 MG/1
20 TABLET, DELAYED RELEASE ORAL DAILY PRN
COMMUNITY
Start: 2024-09-03

## 2024-09-03 NOTE — PROGRESS NOTES
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 He knows he must remain on lifelong AC. His leg pain isude to venous insuff as part of the post phlebitic syndrome. He must use skin moisturizer daily and wear compression hosiery Rx given. Indefinite AC is a mandate. Check the below. RTC two weeks alter.     Plan: Compression Sleeve/Stocking Order for DME -         ONLY FOR DME, US Aorta/Ivc/Iliac Duplex         Complete, Cardiolipin Joelle IgG and IgM, Lupus         Anticoagulant Panel, Beta 2 Glycoprotein         Antibodies IGG IGM                (Z95.828) S/P insertion of IVC (inferior vena caval) filter    Comment: If he has no caval or iliac thrombus, order filter retieval at next visit.     Plan: US Aorta/Ivc/Iliac Duplex Complete              (I71.21) Aneurysm of ascending aorta without rupture (H24)    Comment: not yet operable.    Plan: check annual TTE. Ule out AAA.      (R09.89) Abnormal pulse    Comment: Check for concurrent fem pop aneurysmal ds    Plan: US Lower Extremity Arterial Duplex Bilateral              (Z91.148) Noncompliance with medication regimen    Comment: He stopped but has now resumed all his usual and customary meds. This was due to loss odf insurance.     Plan: He has now refilled all meds as his insurance has been reinstituted.    (Q21.12) PFO (patent foramen ovale)    Comment: He has an absolute indication for lifelong AC, so I would rather not close his PFO.    Plan: No need to proceed to TTE as results will not alter our recommendations given the fact " that the patient will be on lifelong AC, thereby negating the need to close the PFO.    (E11.9) Type 2 diabetes, HbA1c goal < 7% (H)    Comment: He stopped but has now resumed all his usual and customary meds. Check the below, RTC two weeks later.     Plan: Comprehensive metabolic panel, Hemoglobin A1c,         Albumin Random Urine Quantitative with Creat         Ratio            (E78.5) Hyperlipidemia LDL goal <70    Comment: He stopped but has now resumed all his usual and customary meds. Check the below, RTC two weeks later.     Plan: C-Reactive Protein, High Sensitivity, LipoFit         by NMR, Lipoprotein (a)            The longitudinal care of plan for Gaurang was addressed during this visit. Due to added complexity of care, we will continue to support Gaurang Rivera  and the subsequent management of these conditions and with ongoing continuity of care for these conditions.     104 minutes total medical care on today's date.    HPI:       Gaurang Rivera is a 53 year old male with a h/o unprovoked DVT 2019 (occlusive thrombus of the posterior tibial veins of the mid to distal right calf, also found to have low protein C, low protein S, and to be a Leiden heterozygote; was recommended to be on lifelong AC, but was noncompliant and stopped warfarin in 03/2024), depression on multiple meds, post op AF w/o recurrence since 2020,  htn, HLD, DM2. On 06/26/2024 he was admitted to [facility] with his second lifetime VTE event of a RLE EIV to distal DVT with bilateral R greater than L lobar and subsegmental PE with right heart strain. He was started on a heparin gtt. IR planned for mechanical thrombectomy but the procedure was aborted due to concern for PFO, and an IVC filter was placed instead. He was started on warfarin due to DOACs being cost prohibitive. He was connected to his prior warfarin clinic. He has had a high time in therapeutic range. An Rx for compression hosiery was given but he is not wearing them as they are  hard to get on. An echo with bubble study was ordered for after discharge, and he was referred to structural heart clinic for workup for potential PFO closure. Neither has been scheduled despite this having been ordered on 06/28/2024, and despite cardiology making repeated attempts to contact him.     He was referred to us due to continued RLE swelling despite compliance in using warfarin.     Review Of Systems  Skin: negative  Eyes: negative  Ears/Nose/Throat: negative  Respiratory: No shortness of breath, dyspnea on exertion, cough, or hemoptysis  Cardiovascular: negative  Gastrointestinal: negative  Genitourinary: negative  Musculoskeletal: as above  Neurologic: negative  Psychiatric: negative  Hematologic/Lymphatic/Immunologic: negative  Endocrine: negative      PAST MEDICAL HISTORY:                  Past Medical History:   Diagnosis Date    Antiplatelet or antithrombotic long-term use     Closed displaced fracture of neck of right scapula, sequela 10/21/2019    Added automatically from request for surgery [id number]    Depressive disorder 2001    Diabetes (H)     DVT (deep venous thrombosis) (H) 07/05/2019    Elevated blood pressure reading without diagnosis of hypertension 05/13/2018    Hypercholesteremia 2012    Personal history of other medical treatment     blood clot foot july 2019    Pilonidal cyst 05/13/2018       PAST SURGICAL HISTORY:                  Past Surgical History:   Procedure Laterality Date    APPENDECTOMY      ARTHROSCOPY SHOULDER, OPEN BICEP TENODESIS REPAIR, COMBINED Right 6/2/2020    Procedure: Right shoulder diagnostic arthroscopy, capsular release,  open biceps tenodesis;  Surgeon: Dulce Maria Burdick MD;  Location: [facility] OR    AS DRAIN PILONIDAL CYST SIMPLE      BACK SURGERY  1997    spinal fusion    CHOLECYSTECTOMY  2012    CYSTECTOMY PILONIDAL N/A 2/21/2020    Procedure: EXCISION, PILONIDAL CYST, AILEEN II Procedure;  Surgeon: Artemio Ahumada MD;  Location: [facility] OR    IR IVC  FILTER PLACEMENT  6/27/2024    IR PULMONARY ANGIOGRAM BILATERAL  6/27/2024    THUMB SURGERY   1995       CURRENT MEDICATIONS:                  Current Outpatient Medications   Medication Sig Dispense Refill    acetaminophen (TYLENOL) 325 MG tablet Take 2 tablets (650 mg) by mouth every 4 hours as needed for mild pain or fever (Patient not taking: Reported on 8/29/2024) 60 tablet 0    atorvastatin (LIPITOR) 40 MG tablet Take 1 tablet (40 mg) by mouth daily (Patient not taking: Reported on 8/29/2024) 90 tablet 1    buPROPion (WELLBUTRIN XL) 150 MG 24 hr tablet Take 1 tablet (150 mg) by mouth every morning. 90 tablet 0    cholecalciferol (VITAMIN D3) 125 mcg (5000 units) capsule Take 1 capsule (125 mcg) by mouth daily (Patient not taking: Reported on 8/29/2024) 100 capsule 3    Continuous Blood Gluc Sensor (TaltopiaSTYLE JAVI 14 DAY SENSOR) MISC AS DIRECTED AND CHANGE EVERY 14 DAYS, (Patient not taking: Reported on 8/29/2024) 6 each 3    exenatide ER (BYDUREON BCISE) 2 MG/0.85ML auto-injector INJECT 2MG SUBCUTANEOUS EVERY 7 DAYS (Patient not taking: Reported on 8/29/2024) 3.4 mL 5    JARDIANCE 10 MG TABS tablet TAKE 1 TABLET(10 MG) BY MOUTH DAILY (Patient not taking: Reported on 8/29/2024) 30 tablet 5    lisinopril (ZESTRIL) 5 MG tablet Take 1 tablet (5 mg) by mouth daily (Patient not taking: Reported on 8/29/2024) 90 tablet 1    lurasidone (LATUDA) 120 MG TABS tablet Take 1 tablet (120 mg) by mouth daily with food. Start after 40 mg for one week then 80 mg for one week then 120 mg thereafter 30 tablet 1    lurasidone (LATUDA) 80 MG TABS tablet 1/2 tab for one week then increase to 1 tab for one week then increase to 120 (new script) thereafter 14 tablet 0    metFORMIN (GLUCOPHAGE) 1000 MG tablet TAKE 1 TABLET(1000 MG) BY MOUTH TWICE DAILY WITH MEALS (Patient not taking: Reported on 8/29/2024) 180 tablet 0    multivitamin w/minerals (THERA-VIT-M) tablet Take 1 tablet by mouth daily (Patient not taking: Reported on  8/29/2024) 90 tablet 3    omeprazole (PRILOSEC OTC) 20 MG EC tablet Take 20 mg by mouth daily as needed. (Patient not taking: Reported on 8/29/2024)      oxyCODONE (ROXICODONE) 5 MG tablet Take 2 tablets (10 mg) by mouth 2 times daily as needed for pain (Patient not taking: Reported on 8/29/2024) 28 tablet 0    oxyCODONE (ROXICODONE) 5 MG tablet Take 2 tablets (10 mg) by mouth every 6 hours as needed for pain 24 tablet 0    QUEtiapine (SEROQUEL) 25 MG tablet Take 2 tablets (50 mg) by mouth nightly as needed (insomnia) (Patient not taking: Reported on 8/29/2024) 60 tablet 1    traZODone (DESYREL) 50 MG tablet Take 1-2 tablets ( mg) by mouth nightly as needed for sleep (Patient not taking: Reported on 8/29/2024) 60 tablet 1    venlafaxine (EFFEXOR XR) 75 MG 24 hr capsule Take 1 capsule (75 mg) by mouth daily. 30 capsule 1    warfarin ANTICOAGULANT (COUMADIN) 5 MG tablet Take 7.5 mg every Sun; 5 mg all other days or As directed by Anticoagulation clinic 145 tablet 1       ALLERGIES:                No Known Allergies    SOCIAL HISTORY:                  Social History     Socioeconomic History    Marital status: Single     Spouse name: Not on file    Number of children: 0    Years of education: Not on file    Highest education level: Not on file   Occupational History    Occupation:       Comment: ROWING    Tobacco Use    Smoking status: Never    Smokeless tobacco: Never   Vaping Use    Vaping status: Never Used   Substance and Sexual Activity    Alcohol use: No     Comment: None    Drug use: No    Sexual activity: Not Currently     Partners: Female   Other Topics Concern    Parent/sibling w/ CABG, MI or angioplasty before 65F 55M? Not Asked   Social History Narrative    Not on file     Social Determinants of Health     Financial Resource Strain: Not on File (8/9/2021)    Received from [facility]    Financial Resource Strain   Food Insecurity: Not on File (8/9/2021)    Received from [facility]    Food Insecurity    Transportation Needs: Not on File (2021)    Received from [facility]    Transportation Needs   Physical Activity: Not on File (2021)    Received from [facility]    Physical Activity     Physical Activity: 0   Stress: Not on File (2021)    Received from [facility]    Stress   Social Connections: Not on File (2021)    Received from [facility]    Social Connections   Interpersonal Safety: Low Risk  (2024)    Interpersonal Safety     Do you feel physically and emotionally safe where you currently live?: Yes     Within the past 12 months, have you been hit, slapped, kicked or otherwise physically hurt by someone?: No     Within the past 12 months, have you been humiliated or emotionally abused in other ways by your partner or ex-partner?: No   Housing Stability: Not on File (2021)    Received from [facility]    Housing Stability     Housin       FAMILY HISTORY:                   Family History   Problem Relation Age of Onset    Diabetes Mother     Skin Cancer Father     Depression Father     Thrombosis Father         HE HAS HAD 3 CLOTS - PER PATIENT THEY WERE UNPROVOKED ON COUMADIN     Alcoholism Sister     Depression Sister     Lupus Sister     Anesthesia Reaction No family hx of     Cardiovascular No family hx of     Melanoma No family hx of          Physical exam Reveals:    O/P: WNL  HEENT: WNL  NECK: No JVD, thyromegaly, or lymphadenopathy  HEART: RRR, no murmurs, gallops, or rubs  LUNGS: CTA bilaterally without rales, wheezes, or rhonchi  GI: NABS, nondistended, nontender, soft  EXT:without cyanosis, clubbing, or edema  NEURO: nonfocal  : no flank tenderness    460 mm left calf circumference, CEAP C3 venous insuff.  395 mm right calf circumference, CEAP C3 venous insuff.    Left calf with cracking , flaking skin.      Component      Latest Ref Rng 2024  5:17 AM 2024  5:03 AM 2024  11:54 AM 2024  4:12 PM 2024  4:25 PM 2024  4:18 PM   INR      0.85 - 1.15  1.32 (H)   1.16 (H)        INR Point of Care      0.9 - 1.1    1.9 (H)  2.7 (H)  4.3 (H)  4.5 (H)      Component      Latest Ref Rng 8/1/2024  4:19 PM 8/14/2024  4:46 PM   INR      0.85 - 1.15      INR Point of Care      0.9 - 1.1  3.2 (H)  2.3 (H)       Legend:  (H) High      Component      Latest Ref Rng 6/26/2024  7:48 PM 6/27/2024  5:19 AM 8/14/2024  4:46 PM   Troponin T, High Sensitivity      <=22 ng/L 51 (H)      N-Terminal Pro BNP Inpatient      0 - 900 pg/mL 243      Hemoglobin A1C      <5.7 %  11.7 (H)     D-Dimer Quantitative      0.00 - 0.50 ug/mL FEU   1.39 (H)       Legend:  (H) High    EXAM: US LOWER EXTREMITY VENOUS DUPLEX BILATERAL  LOCATION: [facility]  DATE: 6/26/2024     INDICATION: hx factor V leiden. LLE pain, swelling, edema  COMPARISON: None.  TECHNIQUE: Venous Duplex ultrasound of bilateral lower extremities with and without compression, augmentation and duplex. Color flow and spectral Doppler with waveform analysis performed.     FINDINGS: Exam includes the common femoral, femoral, popliteal veins as well as segmentally visualized deep calf veins and greater saphenous vein.      RIGHT: Deep venous thrombosis within the right calf peroneal veins. No superficial thrombophlebitis. No popliteal cyst.     LEFT: Nonocclusive clot is seen within the left external iliac vein and common femoral vein. There is also deep venous thrombosis involving the left mid femoral vein extending into the distal femoral vein at the distal thigh and popliteal vein. This also   DVT within the left posterior tibial vein at the calf. No superficial thrombophlebitis. No popliteal cyst.                                                                      IMPRESSION:  1.  Nonocclusive DVT within the left external iliac and common femoral vein. DVT involving the mid thigh femoral vein extending into the distal thigh femoral vein and popliteal vein. There is also clot within the left  proximal calf posterior tibial vein.  2.  Deep venous thrombosis involving the right calf peroneal vein.     Results called to Lisa Bhatia 2024 2113 hours      Narrative & Impression   EXAM: CT CHEST PULMONARY EMBOLISM W CONTRAST  LOCATION: [facility]  DATE: 2024     INDICATION: hx Factor V leiden. extensive b l LE DVTs assess PE  COMPARISON: None.  TECHNIQUE: CT chest pulmonary angiogram during arterial phase injection of IV contrast. Multiplanar reformats and MIP reconstructions were performed. Dose reduction techniques were used.   CONTRAST: 74mL Isovue 370     FINDINGS:  ANGIOGRAM CHEST: Exam is positive for extensive bilateral pulmonary emboli, right worse than left, with lobar and segmental embolic disease present. There is no central saddle embolism. There is likely at least mild right heart strain with RV/LV ratio   greater than 1.   Thoracic aorta is negative for dissection.      LUNGS AND PLEURA: Normal. No parenchymal hemorrhage or pleural effusion evident.     MEDIASTINUM/AXILLAE: Normal.     CORONARY ARTERY CALCIFICATION: Mild.     UPPER ABDOMEN: Normal.     MUSCULOSKELETAL: Normal.                                                                      IMPRESSION:  1.  Prominent bilateral pulmonary emboli, lobar and segmental embolic disease bilaterally.  2.  RV/LV ratio greater than 1.  3.  No lung infarct or pleural effusion evident.     Findings discussed with Dr. Parnell at 2307 hours.         [facility]  Echocardiography Laboratory  [address] [place] [id number]     Name: ISAURA DARNELL  MRN: [id number]  : 1970  Study Date: 2024 01:21 PM  Age: 53 yrs  Gender: Male  Patient Location: [facility]  Reason For Study: Pulmonary Emboli  Ordering Physician: CARIE VALLEJO  Performed By: Juliet Prater     BSA: 2.6 m2  Height: 78 in  Weight: 267 lb  HR: 96  BP: 110/81  mmHg  ______________________________________________________________________________  Procedure  Complete Portable Echo Adult. Contrast Optison. Technically difficult  study.Extremely poor acoustic windows. Technically difficult study.Extremely  difficult acoustic windows despite the use of contrast for endcardial border  definition. Optison (NDC #1312-1508-27) given intravenously. Patient was given  6 ml mixture of 3 ml Optison and 6 ml saline. 3 ml wasted.  ______________________________________________________________________________  Interpretation Summary  Left ventricular size, wall motion and function are normal. The ejection  fraction is 55-60%.  The right ventricle is not well visualized. On parasternal views, the right  ventricle appears mildly dilated.  No significant valvular abnormalities present.  Pulmonary artery systolic pressure cannot be assessed.  The inferior vena cava cannot be assessed. Ascending aorta 4.1 cm. No  pericardial effusion is present.     This study was compared with the study from 6/3/2020. No significant changes  noted.  ______________________________________________________________________________  Left Ventricle  Left ventricular size, wall motion and function are normal. The ejection  fraction is 55-60%. Left ventricular wall thickness is normal.     Right Ventricle  On parasternal views, the right ventricle appears mildly dilated. The right  ventricle is not well visualized.     Atria  Both atria appear normal.     Mitral Valve  Trace mitral insufficiency is present.     Aortic Valve  The aortic valve is tricuspid. On Doppler interrogation, there is no  significant stenosis or regurgitation.     Tricuspid Valve  The valve leaflets are not well visualized. On Doppler interrogation, there is  no significant stenosis or regurgitation. Pulmonary artery systolic pressure  cannot be assessed.     Pulmonic Valve  The valve leaflets are not well visualized. On Doppler interrogation,  there is  no significant stenosis or regurgitation.     Vessels  Aortic root and ascending aorta are normal in diameter when indexed to body  surface area. The inferior vena cava cannot be assessed. Ascending aorta 4.1  cm.     Pericardium  No pericardial effusion is present.     Miscellaneous  No significant valvular abnormalities present.     Compared to Previous Study  This study was compared with the study from 6/3/2020 . No significant changes  noted.  ______________________________________________________________________________  MMode/2D Measurements & Calculations  Ao root diam: 4.1 cm  asc Aorta Diam: 4.0 cm  LVOT diam: 2.9 cm  LVOT area: 6.6 cm2  Ao root diam index Ht(cm/m): 2.1  Ao root diam index BSA (cm/m2): 1.6  Asc Ao diam index BSA (cm/m2): 1.6  Asc Ao diam index Ht(cm/m): 2.0  EF Biplane: 59.3 %     Doppler Measurements & Calculations  MV E max frantz: 54.0 cm/sec  MV A max frantz: 85.3 cm/sec  MV E/A: 0.63  MV dec slope: 457.0 cm/sec2  MV dec time: 0.12 sec  PA acc time: 0.09 sec     E/E' av.1  Lateral E/e': 7.9  Medial E/e': 8.3     ______________________________________________________________________________  Report approved by: Lynnette DANIELS 2024 04:34 PM       Procedure 24:  1. Ultrasound guidance for vascular access.  2. IVC venogram.   3. Left cardiac angiogram via the patent foramen ovale. Attempted  right heart catheterization and pulmonary angiography.  4. IVC retrievable filter placement     History: Intermediate high risk PE. Large clot burden in lungs and  extensive LLE DVT.     Comparison: CT 24     Staff: TIEN Keating MD     Fellow: GLADYS Barrett MD     Monitoring/Medications: Patient received moderate sedation and was  monitored by the nursing staff under the supervision of the higher  attending. Vital signs remained stable throughout the procedure.     Face to face sedation time: 35 minutes     Fluoro time:     Findings/procedure:     Prior to the procedure, both  verbal and written informed consent  obtained from the patient. Patient placed supine. The right groin was  prepped in usual sterile fashion. Preprocedure ultrasound demonstrated  a patent right common femoral vein.     1% lidocaine for local anesthesia. Under ultrasound guidance a  micropuncture needle was advanced into the right internal jugular  vein. Image documenting the needle within the vein  was archived.  Microwire advanced through the needle and needle exchanged for  micropuncture sheath. Micropuncture exchanged for a Bentson wire which  was advanced. Microsheath exchanged over the wire for a 6 Fr vascular  sheath.     An angled pigtail catheter was advanced over a Bentson guidewire into  the IVC. This catheter was used to attempt to cross the right heart  and catheterize the pulmonary artery. The pigtail easily passed across  the heart and into a pulmonary vessel. Digital subtraction angiogram  was performed which demonstrated the pigtail positioned within a left  upper lobe pulmonary vein and with drainage into the left atrium. The  catheter was retracted back into the right atrium. We attempted to  catheterize the pulmonary artery a few more times but the catheter  repeatedly crossed through a patent foramen ovale into the left heart.     At this point, given the extensive clot in the leg and the risk of  dislodging clot during aspiration thrombectomy, potentially mobilizing  into the patent foramen ovale, we made the decision to abort the  procedure as although the patient technically met criteria for  intermediate high risk, he was doing well with anticoagulation alone  in breathing on room air.     The decision was made, however, to place an inferior vena cava filter.  Catheter was removed. Sheath removed over the wire and exchanged for a  [place] vascular sheath. Inferior venacavogram was performed to  demarcate the level of the lowest renal vein. A duodenal filter was  then loaded into the  sheath, positioned at the appropriate level just  below the right renal vein, and deployed by retracting the sheath over  the filter. Repeat inferior venacavogram demonstrated filter in good  position.     Sheath removed and hemostasis obtained using manual pressure.                                                                      IMPRESSION:   1. During attempts to catheterize the pulmonary artery a curved  pigtail catheter repeatedly cross into a pulmonary vein with disease  indicating the presence of a large patent foramen ovale. Due to the  risk of disrupting clot and potentially mobilized into the patent  foramen ovale, the decision was made to not proceed further with  thrombectomy.  2. Insertion of inferior vena cava filter.     PLAN:   Follow-up venacavogram and possible IVC filter removal in 3 months.  Defer further workup of the patent foramen ovale to cardiology  service. Particularly significant is the clot burden in the left lower  extremity, which remains a concern for paradoxical stroke.     I, BUD RAMOS MD, attest that I was present for all critical  portions of the procedure and was immediately available to provide  guidance and assistance during the remainder of the procedure.     I have personally reviewed the examination and initial interpretation  and I agree with the findings.     BUD RAMOS MD           Exam: Ultrasound of the deep venous system of left leg dated 8/9/2024  6:53 AM     Clinical information: Acute deep venous thrombosis (DVT) of the distal  end right lower extremity (H)     Comparison: Ultrasound 6/26/2024     Ordering provider: PORSCHE LOYD     Technique: Gray-scale evaluation with compression and Doppler  assessment of deep venous system for spontaneous and phasic flow, as  well as the presence of distal augmentation. Color flow images  obtained as needed. Gray-scale images with compression of the great  saphenous vein obtained as needed.     Findings:     Left  "leg:  External iliac vein not evaluated.  CFV: Thrombus: Yes, Phasic: No, Noncompressible  CFV at SFJ: Thrombus: Yes; Phasic: No, Noncompressible. Absence of  color doppler and waveform doppler detection with echogenic grayscale  luminal debris.  Femoral vein/PFV junction: Thrombus: Yes, Phasic: No. Partially  compressible PFV at the origin and noncompressible femoral vein  proximally.   Femoral vein, mid: Thrombus: Yes, Phasic: No, Noncompressible. Lack of  color doppler and waveform doppler detection.   Femoral vein, distal: Thrombus: Yes, Phasic: No, Noncompressible. Lack  of color doppler and waveform doppler detection.   Popliteal vein: Thrombus: Thrombus: Yes, Phasic: No, Noncompressible.  Lack of color doppler and waveform doppler detection.   PTV: Thrombus: Thrombus: Yes, Phasic: No, Noncompressible to mid calf,  partially compressible at distal calf.   Peroneal vein: Thrombus: Yes, One of the paired veins is  noncompressible, the other is partially compressible in the distal  calf     Right leg:  CFV: Thrombus: No, Phasic: Yes                                                                      Impression:   Evaluation of left common femoral vein to the peroneal veins:     1. Redemonstration of occlusive DVT involving the common femoral vein  to the peroneal veins, with one of the paired posterior tibial and  peroneal veins containing occlusive thrombus and the other paired  veins containing nonocclusive thrombus. Previously CFV DVT was  nonocclusive.   2. Nonocclusive thrombus at the profundal vein origin.  3. Left external iliac vein not evaluated, previously noted to contain  nonocclusive thrombus.     Reference: \"Duplex Ultrasound in the Diagnosis of Lower-Extremity Deep  Venous Thrombosis\"- Velia Ramirez MD, S; Christiano Vizcarra MD  (Circulation. 2014;129:917-921. http://circ.ahajournals.org )     I have personally reviewed the examination and initial interpretation  and I agree with the " findings.     AMBROSE ROJAS MD          Exam: US LOWER EXTREMITY VENOUS DUPLEX RIGHT, 10/8/2019 11:40 AM     Indication: Chronic deep vein thrombosis (DVT) of tibial vein of right  lower extremity (H)     Comparison: 7/5/2019     Findings:   Left common femoral vein was assessed for comparison purposes and is  patent. The right common femoral, greater saphenous, femoral,  popliteal, and veins of the lower leg are patent with full  compression.                                                                      Impression: Resolution of previously noted thrombus. No DVT within the  right lower extremity.     LEEANN GREER MD          RIGHT LOWER EXTREMITY VENOUS DOPPLER ULTRASOUND  7/5/2019 12:41 PM     HISTORY: Right lower extremity pain and swelling. The concern is for  deep venous thrombosis.     COMPARISON: None.     FINDINGS:  Color flow and Doppler spectral waveform analysis was  performed of the common femoral, femoral, popliteal, posterior tibial,  and greater saphenous veins of the right lower extremity. There is  occlusive thrombus within the posterior tibial veins from the mid calf  to the ankle. The remaining deep veins of the right lower extremity  are widely patent.                                                                      IMPRESSION: Occlusive thrombus of the posterior tibial veins of the  mid to distal right calf.     FRITZ ALFARO MD          Component      Latest Ref Rng 10/10/2019  9:59 AM   Cardiolipin Antibody IgG      0.0 - 19.9 GPL-U/mL <1.6    Cardiolipin Antibody IgM      0.0 - 19.9 MPL-U/mL 1.5    HIV Antigen Antibody Combo      NR^Nonreactive        D-Dimer      0.0 - 0.50 ug/ml FEU 0.3    Protein S Free      70 - 148 % 57 (L)    Prot C Chromogenic      70 - 170 % 52 (L)    Lupus Result      NEG^Negative  Negative    Beta 2 Glycoprotein 1 Antibody IgM      <7 U/mL <2.9    Beta 2 Glycoprotein 1 Antibody IgG      <7 U/mL 0.7    Antithrombin III Chromogenic      85 - 135 %  105       Legend:  (L) Low

## 2024-09-04 NOTE — PATIENT INSTRUCTIONS
**For crisis resources, please see the information at the end of this document**     Thank you for coming to the [facility].    TREATMENT PLAN:    MEDICATIONS:   - Was without insurance for over six months and not taking medications with the exception of Venlafaxine XR 75 mg.  Will restart and titrate the Latuda to 120 mg over two weeks.  Start bupropion  mg and continue the Venlafaxine XR 75 mg.  Sleep study referral placed  Referral for Partial Hospitalization Program placed  Follow-up in six weeks     -PSYCHOEDUCATION:      CONSULTS/REFERRALS:   See above     LABS/PROCEDURES:    Please call your [place] clinic and ask for a lab only appointment at your earliest convenience.  If your results are reassuring or normal they will be mailed to you or sent through [employer] within 7 days. If the lab tests need quick action we will call you with the results. The phone number we will call with results is # [phone].      FOLLOW UP: Schedule an appointment with me in six weeks  or sooner as needed.  The intake team should be calling you to schedule.  If you dont hear from them, or they were unable to reach you, please call [phone] to schedule.  Follow up with primary care provider as planned or for acute medical concerns.  Call the psychiatric nurse line with medication questions or concerns at [phone].      Medication Refills:  If you need any refills please call your pharmacy and they will contact us. Our fax number for refills is [phone]. Please allow three business for refill processing. If you need to  your refill at a new pharmacy, please contact the new pharmacy directly. The new pharmacy will help you get your medications transferred.     [employer] Assistance [phone]  Financial Assistance [phone]  [place] [place] [employer] [place] Billing [phone]  Central Billing Office, [place] [employer] [place]: [phone]  [place] Billing [phone]  Medical Records  [phone]  [place] Patient Bill of Rights [email or web address]       MENTAL HEALTH CRISIS RESOURCES:  For a emergency help, please call 911 or go to the nearest Emergency Department.     Emergency Walk-In Options:   EmPATH Unit @ [facility] ([place]): [phone] - Specialized mental health emergency area designed to be calming  [facility] ([place]): [phone]  [facility] ([place]): [phone]  [facility] ([place]): [phone]    National Crisis Information: Call 988 Suicide and Crisis Lifeline  Crisis Text Line (free 24/7):  call **CRISIS (**723219) Crisis or use the texting option by texting 362713.   National Suicide Prevention Lifeline: Call 988  Poison Control Center: 3-264-399-5477  Trans Lifeline: 7-177-319-8461 - Hotline for transgender people of all ages  The [employer] Project: 8-843-266-5312 - Hotline for LGBT youth   List of all [facility] resources:   https://[place].gov/dhs/people-we-serve/adults/health-care/mental-health/resources/crisis-contacts.jsp    For Non-Emergency Support:   Fast Tracker: Mental Health & Substance Use Disorder Resources -   https://www.fasttrackermn.org/       Again thank you for choosing [facility] and please let us know how we can best partner with you to improve you and your family's health.    You may be receiving a survey regarding this appointment. We would love to have your feedback, both positive and negative. The survey is done by an external company, so your answers are anonymous.    Patient Education   Collaborative Care Psychiatry Service  What to Expect  Here's what to expect from your Collaborative Care Psychiatry Service (CCPS).   About CCPS  CCPS means 2 people work together to help you get better. You'll meet with a behavioral health clinician and a psychiatric doctor. A behavioral  "health clinician helps people with mental health problems by talking with them. A psychiatric doctor helps people by giving them medicine.  How it works  At every visit, you'll see the behavioral health clinician (BHC) first. They'll talk with you about how you're doing and teach you how to feel better.   Then you'll see the psychiatric doctor. This doctor can help you deal with troubling thoughts and feelings by giving you medicine. They'll make sure you know the plan for your care.   CCPS usually takes 3 to 6 visits. If you need more visits, we may have you start seeing a different psychiatric doctor for ongoing care.  If you have any questions or concerns, we'll be glad to talk with you.  About visits  Be open  At your visits, please talk openly about your problems. It may feel hard, but it's the best way for us to help you.  Cancelling visits  If you can't come to your visit, please call us right away at [phone]. If you don't cancel at least 24 hours (1 full day) before your visit, that's \"late cancellation.\"  Being late to visits  Being very late is the same as not showing up. You will be a \"no show\" if:  Your appointment starts with a BHC, and you're more than 15 minutes late for a 30-minute (half hour) visit. This will also cancel your appointment with the psychiatric doctor.  Your appointment is with a psychiatric doctor only, and you're more than 15 minutes late for a 30-minute (half hour) visit.  Your appointment is with a psychiatric doctor only, and you're more than 30 minutes late for a 60-minute (full hour) visit.  If you cancel late or don't show up 2 times within 6 months, we may end your care.   Getting help between visits  If you need help between visits, you can call us Monday to Friday from 8 a.m. to 4:30 p.m. at [phone].  Emergency care  Call 911 or go to the nearest emergency department if your life or someone else's life is in danger.  Call 538 anytime to reach the national " Suicide and Crisis hotline.  Medicine refills  To refill your medicine, call your pharmacy. You can also call [facility]'s Behavioral Access at [phone], Monday to Friday, 8 a.m. to 4:30 p.m. It can take 1 to 3 business days to get a refill.   Forms, letters, and tests  You may have papers to fill out, like FMLA, short-term disability, and workability. We can help you with these forms at your visits, but you must have an appointment. You may need more than 1 visit for this, to be in an intensive therapy program, or both.  Before we can give you medicine for ADHD, we may refer you to get tested for it or confirm it another way.  We may not be able to give you an emotional support animal letter.  We don't do mental health checks ordered by the court.   We don't do mental health testing, but we can refer you to get tested.   Thank you for choosing us for your care.  For informational purposes only. Not to replace the advice of your health care provider. Copyright   2022 [facility]. All rights reserved. [employer] [id number] - 12/22.

## 2024-09-08 ENCOUNTER — MYC REFILL (OUTPATIENT)
Dept: FAMILY MEDICINE | Facility: CLINIC | Age: 54
End: 2024-09-08
Payer: COMMERCIAL

## 2024-09-08 DIAGNOSIS — Z79.01 LONG TERM (CURRENT) USE OF ANTICOAGULANTS: ICD-10-CM

## 2024-09-08 DIAGNOSIS — D68.51 HETEROZYGOUS FACTOR V LEIDEN MUTATION (H): ICD-10-CM

## 2024-09-08 DIAGNOSIS — I26.94 MULTIPLE SUBSEGMENTAL PULMONARY EMBOLI WITHOUT ACUTE COR PULMONALE (H): ICD-10-CM

## 2024-09-08 DIAGNOSIS — Z86.718 H/O DEEP VENOUS THROMBOSIS: ICD-10-CM

## 2024-09-10 RX ORDER — OXYCODONE HYDROCHLORIDE 5 MG/1
10 TABLET ORAL 2 TIMES DAILY PRN
Qty: 28 TABLET | Refills: 0 | Status: SHIPPED | OUTPATIENT
Start: 2024-09-10

## 2024-09-13 ENCOUNTER — ANTICOAGULATION THERAPY VISIT (OUTPATIENT)
Dept: ANTICOAGULATION | Facility: CLINIC | Age: 54
End: 2024-09-13

## 2024-09-13 ENCOUNTER — LAB (OUTPATIENT)
Dept: LAB | Facility: CLINIC | Age: 54
End: 2024-09-13
Payer: COMMERCIAL

## 2024-09-13 DIAGNOSIS — E11.9 TYPE 2 DIABETES, HBA1C GOAL < 7% (H): ICD-10-CM

## 2024-09-13 DIAGNOSIS — I82.4Y9 DEEP VEIN THROMBOSIS (DVT) OF PROXIMAL LOWER EXTREMITY, UNSPECIFIED CHRONICITY, UNSPECIFIED LATERALITY (H): ICD-10-CM

## 2024-09-13 DIAGNOSIS — Z86.718 H/O DEEP VENOUS THROMBOSIS: ICD-10-CM

## 2024-09-13 DIAGNOSIS — E78.5 HYPERLIPIDEMIA LDL GOAL <70: ICD-10-CM

## 2024-09-13 DIAGNOSIS — D68.51 HETEROZYGOUS FACTOR V LEIDEN MUTATION (H): Primary | ICD-10-CM

## 2024-09-13 DIAGNOSIS — D68.51 HETEROZYGOUS FACTOR V LEIDEN MUTATION (H): ICD-10-CM

## 2024-09-13 DIAGNOSIS — Z79.01 CURRENT USE OF LONG TERM ANTICOAGULATION: ICD-10-CM

## 2024-09-13 DIAGNOSIS — I82.409 ACUTE DEEP VEIN THROMBOSIS (DVT) OF OTHER VEIN OF LOWER EXTREMITY, UNSPECIFIED LATERALITY (H): ICD-10-CM

## 2024-09-13 DIAGNOSIS — I26.94 MULTIPLE SUBSEGMENTAL PULMONARY EMBOLI WITHOUT ACUTE COR PULMONALE (H): ICD-10-CM

## 2024-09-13 DIAGNOSIS — Z79.01 LONG TERM (CURRENT) USE OF ANTICOAGULANTS: ICD-10-CM

## 2024-09-13 LAB
ALBUMIN SERPL BCG-MCNC: 4.6 G/DL (ref 3.5–5.2)
ALP SERPL-CCNC: 81 U/L (ref 40–150)
ALT SERPL W P-5'-P-CCNC: 24 U/L (ref 0–70)
ANION GAP SERPL CALCULATED.3IONS-SCNC: 20 MMOL/L (ref 7–15)
APO A-I SERPL-MCNC: <6 MG/DL
AST SERPL W P-5'-P-CCNC: 24 U/L (ref 0–45)
BILIRUB SERPL-MCNC: 0.5 MG/DL
BUN SERPL-MCNC: 11.2 MG/DL (ref 6–20)
CALCIUM SERPL-MCNC: 9.4 MG/DL (ref 8.8–10.4)
CHLORIDE SERPL-SCNC: 98 MMOL/L (ref 98–107)
CREAT SERPL-MCNC: 0.86 MG/DL (ref 0.67–1.17)
CREAT UR-MCNC: 60 MG/DL
CRP SERPL HS-MCNC: 2.7 MG/L
EGFRCR SERPLBLD CKD-EPI 2021: >90 ML/MIN/1.73M2
GLUCOSE SERPL-MCNC: 403 MG/DL (ref 70–99)
HBA1C MFR BLD: 11.2 % (ref 0–5.6)
HCO3 SERPL-SCNC: 17 MMOL/L (ref 22–29)
INR BLD: 1.7 (ref 0.9–1.1)
MICROALBUMIN UR-MCNC: <12 MG/L
MICROALBUMIN/CREAT UR: NORMAL MG/G{CREAT}
POTASSIUM SERPL-SCNC: 3.6 MMOL/L (ref 3.4–5.3)
PROT SERPL-MCNC: 7.6 G/DL (ref 6.4–8.3)
SODIUM SERPL-SCNC: 135 MMOL/L (ref 135–145)

## 2024-09-13 PROCEDURE — 82043 UR ALBUMIN QUANTITATIVE: CPT

## 2024-09-13 PROCEDURE — 86147 CARDIOLIPIN ANTIBODY EA IG: CPT

## 2024-09-13 PROCEDURE — 36415 COLL VENOUS BLD VENIPUNCTURE: CPT

## 2024-09-13 PROCEDURE — 85613 RUSSELL VIPER VENOM DILUTED: CPT | Mod: 59

## 2024-09-13 PROCEDURE — 80053 COMPREHEN METABOLIC PANEL: CPT

## 2024-09-13 PROCEDURE — 99000 SPECIMEN HANDLING OFFICE-LAB: CPT

## 2024-09-13 PROCEDURE — 83704 LIPOPROTEIN BLD QUAN PART: CPT | Mod: 90

## 2024-09-13 PROCEDURE — 83036 HEMOGLOBIN GLYCOSYLATED A1C: CPT

## 2024-09-13 PROCEDURE — 86141 C-REACTIVE PROTEIN HS: CPT

## 2024-09-13 PROCEDURE — 83695 ASSAY OF LIPOPROTEIN(A): CPT

## 2024-09-13 PROCEDURE — 85730 THROMBOPLASTIN TIME PARTIAL: CPT

## 2024-09-13 PROCEDURE — 86146 BETA-2 GLYCOPROTEIN ANTIBODY: CPT

## 2024-09-13 PROCEDURE — 85610 PROTHROMBIN TIME: CPT

## 2024-09-13 PROCEDURE — 80061 LIPID PANEL: CPT | Mod: 90

## 2024-09-13 PROCEDURE — 82570 ASSAY OF URINE CREATININE: CPT

## 2024-09-13 PROCEDURE — 85390 FIBRINOLYSINS SCREEN I&R: CPT | Performed by: PATHOLOGY

## 2024-09-13 PROCEDURE — 85597 PHOSPHOLIPID PLTLT NEUTRALIZ: CPT

## 2024-09-13 NOTE — PROGRESS NOTES
ANTICOAGULATION MANAGEMENT     Gaurang Rivera 53 year old male is on warfarin with subtherapeutic INR result. (Goal INR 2.0-3.0)    Recent labs: (last 7 days)     09/13/24  0930   INR 1.7*       ASSESSMENT     Warfarin Lab Questionnaire    Warfarin Doses Last 7 Days    Pt Rptd Dose SUNDAY MONDAY TUESDAY WED THURS FRIDAY SATURDAY 9/12/2024   7:38 PM 7.5 5 5 5 5 5 5         9/12/2024   Warfarin Lab Questionnaire   Missed doses within past 14 days? No   Changes in diet or alcohol within past 14 days? No   Medication changes since last result? No   Injuries or illness since last result? No   New shortness of breath, severe headaches or sudden changes in vision since last result? No   Abnormal bleeding since last result? No   Upcoming surgery, procedure? No        Previous result: Therapeutic last visit; previously outside of goal range  Additional findings: None       PLAN     Recommended plan for no diet, medication or health factor changes affecting INR     Dosing Instructions: Increase your warfarin dose (6.7% change) with next INR in 2 weeks       Summary  As of 9/13/2024      Full warfarin instructions:  7.5 mg every Mon, Fri; 5 mg all other days   Next INR check:  9/27/2024               Detailed voice message left for Gaurang with dosing instructions and follow up date.   Sent [employer] message with dosing and follow up instructions    Contact [phone] to schedule and with any changes, questions or concerns.     Education provided: Please call back if any changes to your diet, medications or how you've been taking warfarin  Goal range and lab monitoring: goal range and significance of current result, Importance of therapeutic range, and Importance of following up at instructed interval    Plan made per [facility] anticoagulation protocol    Shelby Brooke RN  9/13/2024  Anticoagulation Clinic  Snapt for routing messages: [facility]  [facility] patient phone line:  [phone]        _______________________________________________________________________     Anticoagulation Episode Summary       Current INR goal:  2.0-3.0   TTR:  36.9% (8.5 mo)   Target end date:  Indefinite   Send INR reminders to:  [facility]    Indications    Atrial fibrillation  unspecified type (H) (Resolved) [I48.91]  Heterozygous factor V Leiden mutation (H24) [D68.51]  H/O deep venous thrombosis [Z86.718]  Long term (current) use of anticoagulants [Z79.01]  Multiple subsegmental pulmonary emboli without acute cor pulmonale (H) [I26.94]             Comments:  MTV 30 mcg.             Anticoagulation Care Providers       Provider Role Specialty Phone number    Elvis Guzman MD Referring Family Medicine [phone]

## 2024-09-16 ENCOUNTER — TELEPHONE (OUTPATIENT)
Dept: OTHER | Facility: CLINIC | Age: 54
End: 2024-09-16
Payer: COMMERCIAL

## 2024-09-16 LAB
DRVVT CONFIRM NORMALIZED RATIO: 1.19
DRVVT SCREEN MIX RATIO: 1.03
DRVVT SCREEN RATIO: 1.45
HEPZYMED PTT RATIO: 1.49
HEPZYMED PTT-LA: 52 SECONDS (ref 31–45)
HEPZYMED THROMBIN TIME: 25.7 SECONDS (ref 15.7–21.7)
INR PPP: 1.91 (ref 0.85–1.15)
LA PPP-IMP: POSITIVE
LUPUS INTERPRETATION: ABNORMAL
PATIENT PTT-LA: 49 SECONDS (ref 31–45)
PLATELET NEUTRALIZATION: 4 SECONDS
THROMBIN TIME: 21.2 SECONDS (ref 13–19)

## 2024-09-16 NOTE — TELEPHONE ENCOUNTER
Prior Authorization Retail Medication Request    Medication/Dose: Bydureon   Diagnosis and ICD code (if different than what is on RX):  Type 2 diabetes mellitus without complication, without long-term current use of insulin (H) [E11.9]    New/renewal/insurance change PA/secondary ins. PA:  Previously Tried and Failed:  Metformin, Lantus, Novolog, Jardiance, Glipizide,   Rationale:  Type 2 diabetes mellitus without complication    Insurance   Primary: [facility]   Insurance ID:  [id number]    Faby OAKES, RN    [facility]  Office: [phone]  Fax: [phone]

## 2024-09-17 LAB
B2 GLYCOPROT1 IGG SERPL IA-ACNC: <0.8 U/ML
B2 GLYCOPROT1 IGM SERPL IA-ACNC: <2.4 U/ML
CARDIOLIPIN IGG SER IA-ACNC: <2 GPL-U/ML
CARDIOLIPIN IGG SER IA-ACNC: NEGATIVE
CARDIOLIPIN IGM SER IA-ACNC: <2 MPL-U/ML
CARDIOLIPIN IGM SER IA-ACNC: NEGATIVE

## 2024-09-18 ENCOUNTER — TELEPHONE (OUTPATIENT)
Dept: OTHER | Facility: CLINIC | Age: 54
End: 2024-09-18

## 2024-09-18 ENCOUNTER — ANTICOAGULATION THERAPY VISIT (OUTPATIENT)
Dept: ANTICOAGULATION | Facility: CLINIC | Age: 54
End: 2024-09-18

## 2024-09-18 ENCOUNTER — OFFICE VISIT (OUTPATIENT)
Dept: OTHER | Facility: CLINIC | Age: 54
End: 2024-09-18
Attending: INTERNAL MEDICINE
Payer: COMMERCIAL

## 2024-09-18 ENCOUNTER — HOSPITAL ENCOUNTER (OUTPATIENT)
Dept: ULTRASOUND IMAGING | Facility: CLINIC | Age: 54
Discharge: HOME | End: 2024-09-18
Attending: INTERNAL MEDICINE
Payer: COMMERCIAL

## 2024-09-18 VITALS
RESPIRATION RATE: 16 BRPM | BODY MASS INDEX: 30.66 KG/M2 | DIASTOLIC BLOOD PRESSURE: 67 MMHG | HEIGHT: 78 IN | HEART RATE: 83 BPM | WEIGHT: 265 LBS | OXYGEN SATURATION: 100 % | SYSTOLIC BLOOD PRESSURE: 105 MMHG

## 2024-09-18 DIAGNOSIS — I26.94 MULTIPLE SUBSEGMENTAL PULMONARY EMBOLI WITHOUT ACUTE COR PULMONALE (H): ICD-10-CM

## 2024-09-18 DIAGNOSIS — Q21.12 PFO (PATENT FORAMEN OVALE): ICD-10-CM

## 2024-09-18 DIAGNOSIS — Z79.01 LONG TERM (CURRENT) USE OF ANTICOAGULANTS: ICD-10-CM

## 2024-09-18 DIAGNOSIS — Z86.718 H/O DEEP VENOUS THROMBOSIS: Primary | ICD-10-CM

## 2024-09-18 DIAGNOSIS — I82.409 ACUTE DEEP VEIN THROMBOSIS (DVT) OF OTHER VEIN OF LOWER EXTREMITY, UNSPECIFIED LATERALITY (H): ICD-10-CM

## 2024-09-18 DIAGNOSIS — Z95.828 S/P INSERTION OF IVC (INFERIOR VENA CAVAL) FILTER: ICD-10-CM

## 2024-09-18 DIAGNOSIS — I82.409 ACUTE DEEP VEIN THROMBOSIS (DVT) OF OTHER VEIN OF LOWER EXTREMITY, UNSPECIFIED LATERALITY (H): Primary | ICD-10-CM

## 2024-09-18 DIAGNOSIS — E11.9 TYPE 2 DIABETES, HBA1C GOAL < 7% (H): ICD-10-CM

## 2024-09-18 DIAGNOSIS — F33.2 SEVERE EPISODE OF RECURRENT MAJOR DEPRESSIVE DISORDER, WITHOUT PSYCHOTIC FEATURES (H): ICD-10-CM

## 2024-09-18 DIAGNOSIS — I71.21 ANEURYSM OF ASCENDING AORTA WITHOUT RUPTURE (H): ICD-10-CM

## 2024-09-18 DIAGNOSIS — Z91.148 NONCOMPLIANCE WITH MEDICATION REGIMEN: ICD-10-CM

## 2024-09-18 DIAGNOSIS — R09.89 ABNORMAL PULSE: ICD-10-CM

## 2024-09-18 DIAGNOSIS — D68.51 HETEROZYGOUS FACTOR V LEIDEN MUTATION (H): ICD-10-CM

## 2024-09-18 DIAGNOSIS — E78.5 HYPERLIPIDEMIA LDL GOAL <70: ICD-10-CM

## 2024-09-18 PROCEDURE — 93978 VASCULAR STUDY: CPT

## 2024-09-18 PROCEDURE — G2211 COMPLEX E/M VISIT ADD ON: HCPCS | Performed by: INTERNAL MEDICINE

## 2024-09-18 PROCEDURE — 99215 OFFICE O/P EST HI 40 MIN: CPT | Performed by: INTERNAL MEDICINE

## 2024-09-18 PROCEDURE — 93925 LOWER EXTREMITY STUDY: CPT

## 2024-09-18 PROCEDURE — G0463 HOSPITAL OUTPT CLINIC VISIT: HCPCS | Performed by: INTERNAL MEDICINE

## 2024-09-18 NOTE — TELEPHONE ENCOUNTER
Patient was seen in US today and was found to have occlusive thrombus in left external iliac vein which was previously non-occlusive. Patient has IVC filter in place. Patient is scheduled for follow up on 10/1/24    Faby OAKES RN    [facility]  Office: [phone]  Fax: [phone]

## 2024-09-18 NOTE — PROGRESS NOTES
New updated ACC goal for CFX. Left detailed message for Gaurang that now we will be doing venous labs for Chromogenic factor 10 results and results typically take 24 hours to result to ACC. Would like to get CFX done this week, or early next week. Also reviewed new dosing from last week on VM. Orders placed for CFX labs

## 2024-09-18 NOTE — PROGRESS NOTES
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 He knows he must remain on lifelong AC. He is on warfarin but his LA is now known to be positive. He was not on Lovenox so this is likely a true positive. His AC has been monitored by INR rather than CFX as it was not known that he has a LA.  His leg pain was felt by myself when seen on 09/03/2024 to be due to venous insuff as part of the post phlebitic syndrome. We did check an iliac duplex to rule out residual IVC thrombus which would contraindicate filter retrieval. It revealed his Lt EIV was now occluded whereas it was previously nonocclusive. We do not know the acuity with which this finding has occurred. As he has not gotten his TTE with bubble study undertaken , we can not safely do mechanical thrombectomy without knowing that he he definitively does not have a PFO. I previously stated he should not get one done as he simply needs to remain on AC lifelong so the PFO should not be closed. However, with progression of thrombosis, he would like to proceed to mechanical thrombectomy if he does nto have a PFO.     He also has a LA that we now know of.     He must use skin moisturizer daily and wear compression hosiery Rx given. Indefinite AC is a mandate. Check the below. RTC two weeks alter.      Plan: Get bubble study on an expedited basis. Monitor AC via CFX levels.                    (Z95.828) S/P insertion of IVC (inferior vena caval) filter     Comment: If he has no caval or iliac thrombus, can order filter retieval at " next visit after TTE with bubble study has actually been completed.       Plan: As above                (I71.21) Aneurysm of ascending aorta without rupture (H24)     Comment: This is actually normal when indexed to his BSA. He has no AAA. He has a slightly ectatic Lt KIMBERLY.      Plan: check annual iliac artery duplex and TTE.         (R09.89) Abnormal pulse     Comment: No concurrent fem pop aneurysmal ds     Plan: No f/u required.                (Z91.148) Noncompliance with medication regimen     Comment: He stopped but has now resumed all his usual and customary meds. This was due to loss odf insurance.      Plan: He has now refilled all meds as his insurance has been reinstituted.     (Q21.12) PFO (patent foramen ovale)     Comment: He has an absolute indication for lifelong AC, so I would rather not close his PFO if it is real. The supposition that he has a PFO is due to the findings on his pulmonary angiogram. . However, with progression of DVT, he would like to consider mechanical thrombectomy.      Plan: As above.     (E11.9) Type 2 diabetes, HbA1c goal < 7% (H)     Comment: He stopped but has now resumed all his usual and customary meds. His A1C returned at 11.2. Bydureon and insulin are a suboptimal combination which I would rather not use.      Plan: Start Jardiance 25 mg daily. Warned of side effects of UTIs and pelvic floor infections. He was instructed to stop this at any sxs of dysuria. Check A1C again in six weeks. Order at next visit.              (E78.5) Hyperlipidemia LDL goal <70     Comment: He stopped but has now resumed all his usual and customary meds. Check the below, RTC two weeks later. Labs are still pending.     Plan:RTC as previously scheduled on 10/01/2024.once all labs have returned             The longitudinal care of angeli for Gaurang was addressed during this visit. Due to added complexity of care, we will continue to support Gaurang Rivera  and the subsequent management of these  conditions and with ongoing continuity of care for these conditions.      50 minutes total medical care on today's date.     HPI:         Gaurang Rivera is a 53 year old male with a h/o unprovoked DVT 2019 (occlusive thrombus of the posterior tibial veins of the mid to distal right calf, also found to have low protein C, low protein S, and to be a Leiden heterozygote; was recommended to be on lifelong AC, but was noncompliant and stopped warfarin in 03/2024), depression on multiple meds, post op AF w/o recurrence since 2020,  htn, HLD, DM2. On 06/26/2024 he was admitted to [facility] with his second lifetime VTE event of a RLE EIV to distal DVT with bilateral R greater than L lobar and subsegmental PE with right heart strain. He was started on a heparin gtt. IR planned for mechanical thrombectomy but the procedure was aborted due to concern for PFO, and an IVC filter was placed instead. He was started on warfarin due to DOACs being cost prohibitive. He was connected to his prior warfarin clinic. He has had a high time in therapeutic range (but he is now known to have a LA). An Rx for compression hosiery was given but he was not wearing them when last seen as they are hard to get on. An echo with bubble study was ordered for after discharge, and he was referred to structural heart clinic for workup for potential PFO closure. Neither has been scheduled despite this having been ordered on 06/28/2024, and despite cardiology making repeated attempts to contact him. In fact it still has not been done despite my telling him on 09/03/24 to get this done.     He was referred to us due to continued RLE swelling despite compliance in using warfarin (even though we now know he has LA, and has not been monitored via CFX levels, thereby making his use of warfarin although compliant, still subtherapeutic).     I was called out of a room today ass his Lt EIV DVT is now occlusive.    His RLE sxs are reasonable with compression hosiery..       Review Of Systems  Skin: negative  Eyes: negative  Ears/Nose/Throat: negative  Respiratory: No shortness of breath, dyspnea on exertion, cough, or hemoptysis  Cardiovascular: negative  Gastrointestinal: negative  Genitourinary: negative  Musculoskeletal: as above  Neurologic: negative  Psychiatric: negative  Hematologic/Lymphatic/Immunologic: negative  Endocrine: negative        PAST MEDICAL HISTORY:                  Past Medical History        Past Medical History:   Diagnosis Date    Antiplatelet or antithrombotic long-term use      Closed displaced fracture of neck of right scapula, sequela 10/21/2019     Added automatically from request for surgery [id number]    Depressive disorder 2001    Diabetes (H)      DVT (deep venous thrombosis) (H) 07/05/2019    Elevated blood pressure reading without diagnosis of hypertension 05/13/2018    Hypercholesteremia 2012    Personal history of other medical treatment       blood clot foot july 2019    Pilonidal cyst 05/13/2018            PAST SURGICAL HISTORY:                  Past Surgical History         Past Surgical History:   Procedure Laterality Date    APPENDECTOMY        ARTHROSCOPY SHOULDER, OPEN BICEP TENODESIS REPAIR, COMBINED Right 6/2/2020     Procedure: Right shoulder diagnostic arthroscopy, capsular release,  open biceps tenodesis;  Surgeon: Dulce Maria Burdick MD;  Location: [facility] OR    AS DRAIN PILONIDAL CYST SIMPLE        BACK SURGERY   1997     spinal fusion    CHOLECYSTECTOMY   2012    CYSTECTOMY PILONIDAL N/A 2/21/2020     Procedure: EXCISION, PILONIDAL CYST, AILEEN II Procedure;  Surgeon: Artemio Ahumada MD;  Location: [facility] OR    IR IVC FILTER PLACEMENT   6/27/2024    IR PULMONARY ANGIOGRAM BILATERAL   6/27/2024    THUMB SURGERY    1995            CURRENT MEDICATIONS:                  Current Outpatient Prescriptions          Current Outpatient Medications   Medication Sig Dispense Refill    acetaminophen (TYLENOL) 325 MG tablet Take 2 tablets  (650 mg) by mouth every 4 hours as needed for mild pain or fever (Patient not taking: Reported on 8/29/2024) 60 tablet 0    atorvastatin (LIPITOR) 40 MG tablet Take 1 tablet (40 mg) by mouth daily (Patient not taking: Reported on 8/29/2024) 90 tablet 1    buPROPion (WELLBUTRIN XL) 150 MG 24 hr tablet Take 1 tablet (150 mg) by mouth every morning. 90 tablet 0    cholecalciferol (VITAMIN D3) 125 mcg (5000 units) capsule Take 1 capsule (125 mcg) by mouth daily (Patient not taking: Reported on 8/29/2024) 100 capsule 3    Continuous Blood Gluc Sensor (FREESTYLE JAVI 14 DAY SENSOR) MISC AS DIRECTED AND CHANGE EVERY 14 DAYS, (Patient not taking: Reported on 8/29/2024) 6 each 3    exenatide ER (BYDUREON BCISE) 2 MG/0.85ML auto-injector INJECT 2MG SUBCUTANEOUS EVERY 7 DAYS (Patient not taking: Reported on 8/29/2024) 3.4 mL 5    JARDIANCE 10 MG TABS tablet TAKE 1 TABLET(10 MG) BY MOUTH DAILY (Patient not taking: Reported on 8/29/2024) 30 tablet 5    lisinopril (ZESTRIL) 5 MG tablet Take 1 tablet (5 mg) by mouth daily (Patient not taking: Reported on 8/29/2024) 90 tablet 1    lurasidone (LATUDA) 120 MG TABS tablet Take 1 tablet (120 mg) by mouth daily with food. Start after 40 mg for one week then 80 mg for one week then 120 mg thereafter 30 tablet 1    lurasidone (LATUDA) 80 MG TABS tablet 1/2 tab for one week then increase to 1 tab for one week then increase to 120 (new script) thereafter 14 tablet 0    metFORMIN (GLUCOPHAGE) 1000 MG tablet TAKE 1 TABLET(1000 MG) BY MOUTH TWICE DAILY WITH MEALS (Patient not taking: Reported on 8/29/2024) 180 tablet 0    multivitamin w/minerals (THERA-VIT-M) tablet Take 1 tablet by mouth daily (Patient not taking: Reported on 8/29/2024) 90 tablet 3    omeprazole (PRILOSEC OTC) 20 MG EC tablet Take 20 mg by mouth daily as needed. (Patient not taking: Reported on 8/29/2024)        oxyCODONE (ROXICODONE) 5 MG tablet Take 2 tablets (10 mg) by mouth 2 times daily as needed for pain (Patient not  taking: Reported on 8/29/2024) 28 tablet 0    oxyCODONE (ROXICODONE) 5 MG tablet Take 2 tablets (10 mg) by mouth every 6 hours as needed for pain 24 tablet 0    QUEtiapine (SEROQUEL) 25 MG tablet Take 2 tablets (50 mg) by mouth nightly as needed (insomnia) (Patient not taking: Reported on 8/29/2024) 60 tablet 1    traZODone (DESYREL) 50 MG tablet Take 1-2 tablets ( mg) by mouth nightly as needed for sleep (Patient not taking: Reported on 8/29/2024) 60 tablet 1    venlafaxine (EFFEXOR XR) 75 MG 24 hr capsule Take 1 capsule (75 mg) by mouth daily. 30 capsule 1    warfarin ANTICOAGULANT (COUMADIN) 5 MG tablet Take 7.5 mg every Sun; 5 mg all other days or As directed by Anticoagulation clinic 145 tablet 1            ALLERGIES:                  Allergies   No Known Allergies        SOCIAL HISTORY:                  Social History   Social History            Socioeconomic History    Marital status: Single       Spouse name: Not on file    Number of children: 0    Years of education: Not on file    Highest education level: Not on file   Occupational History    Occupation:         Comment: ROWING    Tobacco Use    Smoking status: Never    Smokeless tobacco: Never   Vaping Use    Vaping status: Never Used   Substance and Sexual Activity    Alcohol use: No       Comment: None    Drug use: No    Sexual activity: Not Currently       Partners: Female   Other Topics Concern    Parent/sibling w/ CABG, MI or angioplasty before 65F 55M? Not Asked   Social History Narrative    Not on file      Social Determinants of Health           Financial Resource Strain: Not on File (8/9/2021)     Received from [facility]     Financial Resource Strain   Food Insecurity: Not on File (8/9/2021)     Received from [facility]     Food Insecurity   Transportation Needs: Not on File (8/9/2021)     Received from [facility]     Transportation Needs   Physical Activity: Not on File (8/9/2021)     Received from [facility]     Physical Activity      Physical  Activity: 0   Stress: Not on File (2021)     Received from [employer]     Stress   Social Connections: Not on File (2021)     Received from [employer]   Interpersonal Safety: Low Risk  (2024)     Interpersonal Safety      Do you feel physically and emotionally safe where you currently live?: Yes      Within the past 12 months, have you been hit, slapped, kicked or otherwise physically hurt by someone?: No      Within the past 12 months, have you been humiliated or emotionally abused in other ways by your partner or ex-partner?: No   Housing Stability: Not on File (2021)     Received from [employer]     Housing Stability      Housin            FAMILY HISTORY:                   Family History         Family History   Problem Relation Age of Onset    Diabetes Mother      Skin Cancer Father      Depression Father      Thrombosis Father           HE HAS HAD 3 CLOTS - PER PATIENT THEY WERE UNPROVOKED ON COUMADIN     Alcoholism Sister      Depression Sister      Lupus Sister      Anesthesia Reaction No family hx of      Cardiovascular No family hx of      Melanoma No family hx of                 Physical exam Reveals:     O/P: WNL  HEENT: WNL  NECK: No JVD, thyromegaly, or lymphadenopathy  HEART: RRR, no murmurs, gallops, or rubs  LUNGS: CTA bilaterally without rales, wheezes, or rhonchi  GI: NABS, nondistended, nontender, soft  EXT:without cyanosis, clubbing, or edema  NEURO: nonfocal  : no flank tenderness     460 mm left calf circumference, CEAP C3 venous insuff.  395 mm right calf circumference, CEAP C3 venous insuff.     Left calf with cracking , flaking skin.        All relevant labs and imaging reviewed by myself on today's date as delineated below.           Component      Latest Ref Rng 2024  5:17 AM 2024  5:03 AM 2024  11:54 AM 2024  4:12 PM 2024  4:25 PM 2024  4:18 PM   INR      0.85 - 1.15  1.32 (H)  1.16 (H)            INR Point of Care      0.9  - 1.1      1.9 (H)  2.7 (H)  4.3 (H)  4.5 (H)       Component      Latest Ref Rng 8/1/2024  4:19 PM 8/14/2024  4:46 PM   INR      0.85 - 1.15        INR Point of Care      0.9 - 1.1  3.2 (H)  2.3 (H)       Legend:  (H) High        Component      Latest Ref Rng 6/26/2024  7:48 PM 6/27/2024  5:19 AM 8/14/2024  4:46 PM   Troponin T, High Sensitivity      <=22 ng/L 51 (H)        N-Terminal Pro BNP Inpatient      0 - 900 pg/mL 243        Hemoglobin A1C      <5.7 %   11.7 (H)      D-Dimer Quantitative      0.00 - 0.50 ug/mL FEU     1.39 (H)       Legend:  (H) High     EXAM: US LOWER EXTREMITY VENOUS DUPLEX BILATERAL  LOCATION: [facility]  DATE: 6/26/2024     INDICATION: hx factor V leiden. LLE pain, swelling, edema  COMPARISON: None.  TECHNIQUE: Venous Duplex ultrasound of bilateral lower extremities with and without compression, augmentation and duplex. Color flow and spectral Doppler with waveform analysis performed.     FINDINGS: Exam includes the common femoral, femoral, popliteal veins as well as segmentally visualized deep calf veins and greater saphenous vein.      RIGHT: Deep venous thrombosis within the right calf peroneal veins. No superficial thrombophlebitis. No popliteal cyst.     LEFT: Nonocclusive clot is seen within the left external iliac vein and common femoral vein. There is also deep venous thrombosis involving the left mid femoral vein extending into the distal femoral vein at the distal thigh and popliteal vein. This also   DVT within the left posterior tibial vein at the calf. No superficial thrombophlebitis. No popliteal cyst.                                                                      IMPRESSION:  1.  Nonocclusive DVT within the left external iliac and common femoral vein. DVT involving the mid thigh femoral vein extending into the distal thigh femoral vein and popliteal vein. There is also clot within the left proximal calf posterior tibial  vein.  2.  Deep venous thrombosis involving the right calf peroneal vein.     Results called to Lisa Bhatia 2024 2113 hours        Narrative & Impression   EXAM: CT CHEST PULMONARY EMBOLISM W CONTRAST  LOCATION: [facility]  DATE: 2024     INDICATION: hx Factor V leiden. extensive b l LE DVTs assess PE  COMPARISON: None.  TECHNIQUE: CT chest pulmonary angiogram during arterial phase injection of IV contrast. Multiplanar reformats and MIP reconstructions were performed. Dose reduction techniques were used.   CONTRAST: 74mL Isovue 370     FINDINGS:  ANGIOGRAM CHEST: Exam is positive for extensive bilateral pulmonary emboli, right worse than left, with lobar and segmental embolic disease present. There is no central saddle embolism. There is likely at least mild right heart strain with RV/LV ratio   greater than 1.   Thoracic aorta is negative for dissection.      LUNGS AND PLEURA: Normal. No parenchymal hemorrhage or pleural effusion evident.     MEDIASTINUM/AXILLAE: Normal.     CORONARY ARTERY CALCIFICATION: Mild.     UPPER ABDOMEN: Normal.     MUSCULOSKELETAL: Normal.                                                                      IMPRESSION:  1.  Prominent bilateral pulmonary emboli, lobar and segmental embolic disease bilaterally.  2.  RV/LV ratio greater than 1.  3.  No lung infarct or pleural effusion evident.     Findings discussed with Dr. Parnell at 2307 hours.            [facility],[place]  Echocardiography Laboratory  [address] [place] [id number]     Name: ISAURA DARNELL  MRN: [id number]  : 1970  Study Date: 2024 01:21 PM  Age: 53 yrs  Gender: Male  Patient Location: [facility]  Reason For Study: Pulmonary Emboli  Ordering Physician: CARIE VALLEJO  Performed By: Juliet Prater     BSA: 2.6 m2  Height: 78 in  Weight: 267 lb  HR: 96  BP: 110/81  mmHg  ______________________________________________________________________________  Procedure  Complete Portable Echo Adult. Contrast Optison. Technically difficult  study.Extremely poor acoustic windows. Technically difficult study.Extremely  difficult acoustic windows despite the use of contrast for endcardial border  definition. Optison (NDC #5553-6725-24) given intravenously. Patient was given  6 ml mixture of 3 ml Optison and 6 ml saline. 3 ml wasted.  ______________________________________________________________________________  Interpretation Summary  Left ventricular size, wall motion and function are normal. The ejection  fraction is 55-60%.  The right ventricle is not well visualized. On parasternal views, the right  ventricle appears mildly dilated.  No significant valvular abnormalities present.  Pulmonary artery systolic pressure cannot be assessed.  The inferior vena cava cannot be assessed. Ascending aorta 4.1 cm. No  pericardial effusion is present.     This study was compared with the study from 6/3/2020. No significant changes  noted.  ______________________________________________________________________________  Left Ventricle  Left ventricular size, wall motion and function are normal. The ejection  fraction is 55-60%. Left ventricular wall thickness is normal.     Right Ventricle  On parasternal views, the right ventricle appears mildly dilated. The right  ventricle is not well visualized.     Atria  Both atria appear normal.     Mitral Valve  Trace mitral insufficiency is present.     Aortic Valve  The aortic valve is tricuspid. On Doppler interrogation, there is no  significant stenosis or regurgitation.     Tricuspid Valve  The valve leaflets are not well visualized. On Doppler interrogation, there is  no significant stenosis or regurgitation. Pulmonary artery systolic pressure  cannot be assessed.     Pulmonic Valve  The valve leaflets are not well visualized. On Doppler interrogation,  there is  no significant stenosis or regurgitation.     Vessels  Aortic root and ascending aorta are normal in diameter when indexed to body  surface area. The inferior vena cava cannot be assessed. Ascending aorta 4.1  cm.     Pericardium  No pericardial effusion is present.     Miscellaneous  No significant valvular abnormalities present.     Compared to Previous Study  This study was compared with the study from 6/3/2020 . No significant changes  noted.  ______________________________________________________________________________  MMode/2D Measurements & Calculations  Ao root diam: 4.1 cm  asc Aorta Diam: 4.0 cm  LVOT diam: 2.9 cm  LVOT area: 6.6 cm2  Ao root diam index Ht(cm/m): 2.1  Ao root diam index BSA (cm/m2): 1.6  Asc Ao diam index BSA (cm/m2): 1.6  Asc Ao diam index Ht(cm/m): 2.0  EF Biplane: 59.3 %     Doppler Measurements & Calculations  MV E max frantz: 54.0 cm/sec  MV A max frantz: 85.3 cm/sec  MV E/A: 0.63  MV dec slope: 457.0 cm/sec2  MV dec time: 0.12 sec  PA acc time: 0.09 sec     E/E' av.1  Lateral E/e': 7.9  Medial E/e': 8.3     ______________________________________________________________________________  Report approved by: Lynnette DANIELS 2024 04:34 PM        Procedure 24:  1. Ultrasound guidance for vascular access.  2. IVC venogram.   3. Left cardiac angiogram via the patent foramen ovale. Attempted  right heart catheterization and pulmonary angiography.  4. IVC retrievable filter placement     History: Intermediate high risk PE. Large clot burden in lungs and  extensive LLE DVT.     Comparison: CT 24     Staff: TIEN Keating MD     Fellow: GLADYS Barrett MD     Monitoring/Medications: Patient received moderate sedation and was  monitored by the nursing staff under the supervision of the higher  attending. Vital signs remained stable throughout the procedure.     Face to face sedation time: 35 minutes     Fluoro time:     Findings/procedure:     Prior to the procedure, both  verbal and written informed consent  obtained from the patient. Patient placed supine. The right groin was  prepped in usual sterile fashion. Preprocedure ultrasound demonstrated  a patent right common femoral vein.     1% lidocaine for local anesthesia. Under ultrasound guidance a  micropuncture needle was advanced into the right internal jugular  vein. Image documenting the needle within the vein  was archived.  Microwire advanced through the needle and needle exchanged for  micropuncture sheath. Micropuncture exchanged for a Bentson wire which  was advanced. Microsheath exchanged over the wire for a 6 Fr vascular  sheath.     An angled pigtail catheter was advanced over a Bentson guidewire into  the IVC. This catheter was used to attempt to cross the right heart  and catheterize the pulmonary artery. The pigtail easily passed across  the heart and into a pulmonary vessel. Digital subtraction angiogram  was performed which demonstrated the pigtail positioned within a left  upper lobe pulmonary vein and with drainage into the left atrium. The  catheter was retracted back into the right atrium. We attempted to  catheterize the pulmonary artery a few more times but the catheter  repeatedly crossed through a patent foramen ovale into the left heart.     At this point, given the extensive clot in the leg and the risk of  dislodging clot during aspiration thrombectomy, potentially mobilizing  into the patent foramen ovale, we made the decision to abort the  procedure as although the patient technically met criteria for  intermediate high risk, he was doing well with anticoagulation alone  in breathing on room air.     The decision was made, however, to place an inferior vena cava filter.  Catheter was removed. Sheath removed over the wire and exchanged for a  Bard Hendricks vascular sheath. Inferior venacavogram was performed to  demarcate the level of the lowest renal vein. A duodenal filter was  then loaded into the  sheath, positioned at the appropriate level just  below the right renal vein, and deployed by retracting the sheath over  the filter. Repeat inferior venacavogram demonstrated filter in good  position.     Sheath removed and hemostasis obtained using manual pressure.                                                                      IMPRESSION:   1. During attempts to catheterize the pulmonary artery a curved  pigtail catheter repeatedly cross into a pulmonary vein with disease  indicating the presence of a large patent foramen ovale. Due to the  risk of disrupting clot and potentially mobilized into the patent  foramen ovale, the decision was made to not proceed further with  thrombectomy.  2. Insertion of inferior vena cava filter.     PLAN:   Follow-up venacavogram and possible IVC filter removal in 3 months.  Defer further workup of the patent foramen ovale to cardiology  service. Particularly significant is the clot burden in the left lower  extremity, which remains a concern for paradoxical stroke.     I, BUD RAMOS MD, attest that I was present for all critical  portions of the procedure and was immediately available to provide  guidance and assistance during the remainder of the procedure.     I have personally reviewed the examination and initial interpretation  and I agree with the findings.     BUD RAMOS MD            Exam: Ultrasound of the deep venous system of left leg dated 8/9/2024  6:53 AM     Clinical information: Acute deep venous thrombosis (DVT) of the distal  end right lower extremity (H)     Comparison: Ultrasound 6/26/2024     Ordering provider: PORSCHE LOYD     Technique: Gray-scale evaluation with compression and Doppler  assessment of deep venous system for spontaneous and phasic flow, as  well as the presence of distal augmentation. Color flow images  obtained as needed. Gray-scale images with compression of the great  saphenous vein obtained as needed.     Findings:    "  Left leg:  External iliac vein not evaluated.  CFV: Thrombus: Yes, Phasic: No, Noncompressible  CFV at SFJ: Thrombus: Yes; Phasic: No, Noncompressible. Absence of  color doppler and waveform doppler detection with echogenic grayscale  luminal debris.  Femoral vein/PFV junction: Thrombus: Yes, Phasic: No. Partially  compressible PFV at the origin and noncompressible femoral vein  proximally.   Femoral vein, mid: Thrombus: Yes, Phasic: No, Noncompressible. Lack of  color doppler and waveform doppler detection.   Femoral vein, distal: Thrombus: Yes, Phasic: No, Noncompressible. Lack  of color doppler and waveform doppler detection.   Popliteal vein: Thrombus: Thrombus: Yes, Phasic: No, Noncompressible.  Lack of color doppler and waveform doppler detection.   PTV: Thrombus: Thrombus: Yes, Phasic: No, Noncompressible to mid calf,  partially compressible at distal calf.   Peroneal vein: Thrombus: Yes, One of the paired veins is  noncompressible, the other is partially compressible in the distal  calf     Right leg:  CFV: Thrombus: No, Phasic: Yes                                                                      Impression:   Evaluation of left common femoral vein to the peroneal veins:     1. Redemonstration of occlusive DVT involving the common femoral vein  to the peroneal veins, with one of the paired posterior tibial and  peroneal veins containing occlusive thrombus and the other paired  veins containing nonocclusive thrombus. Previously CFV DVT was  nonocclusive.   2. Nonocclusive thrombus at the profundal vein origin.  3. Left external iliac vein not evaluated, previously noted to contain  nonocclusive thrombus.     Reference: \"Duplex Ultrasound in the Diagnosis of Lower-Extremity Deep  Venous Thrombosis\"- Velia Ramirez MD, S; Christiano Vizcarra MD  (Circulation. 2014;129:917-921. http://circ.ahajournals.org )     I have personally reviewed the examination and initial interpretation  and I agree with the " findings.     AMBROSE ROJAS MD             Exam: US LOWER EXTREMITY VENOUS DUPLEX RIGHT, 10/8/2019 11:40 AM     Indication: Chronic deep vein thrombosis (DVT) of tibial vein of right  lower extremity (H)     Comparison: 7/5/2019     Findings:   Left common femoral vein was assessed for comparison purposes and is  patent. The right common femoral, greater saphenous, femoral,  popliteal, and veins of the lower leg are patent with full  compression.                                                                      Impression: Resolution of previously noted thrombus. No DVT within the  right lower extremity.     LEEANN GREER MD              RIGHT LOWER EXTREMITY VENOUS DOPPLER ULTRASOUND  7/5/2019 12:41 PM     HISTORY: Right lower extremity pain and swelling. The concern is for  deep venous thrombosis.     COMPARISON: None.     FINDINGS:  Color flow and Doppler spectral waveform analysis was  performed of the common femoral, femoral, popliteal, posterior tibial,  and greater saphenous veins of the right lower extremity. There is  occlusive thrombus within the posterior tibial veins from the mid calf  to the ankle. The remaining deep veins of the right lower extremity  are widely patent.                                                                      IMPRESSION: Occlusive thrombus of the posterior tibial veins of the  mid to distal right calf.     FRITZ ALFARO MD              Component      Latest Ref Rng 10/10/2019  9:59 AM   Cardiolipin Antibody IgG      0.0 - 19.9 GPL-U/mL <1.6    Cardiolipin Antibody IgM      0.0 - 19.9 MPL-U/mL 1.5    HIV Antigen Antibody Combo      NR^Nonreactive         D-Dimer      0.0 - 0.50 ug/ml FEU 0.3    Protein S Free      70 - 148 % 57 (L)    Prot C Chromogenic      70 - 170 % 52 (L)    Lupus Result      NEG^Negative  Negative    Beta 2 Glycoprotein 1 Antibody IgM      <7 U/mL <2.9    Beta 2 Glycoprotein 1 Antibody IgG      <7 U/mL 0.7    Antithrombin III Chromogenic       85 - 135 % 105       Legend:  (L) Low        Component      Latest Ref Rng 9/13/2024  9:30 AM   Sodium      135 - 145 mmol/L 135    Potassium      3.4 - 5.3 mmol/L 3.6    Carbon Dioxide (CO2)      22 - 29 mmol/L 17 (L)    Anion Gap      7 - 15 mmol/L 20 (H)    Urea Nitrogen      6.0 - 20.0 mg/dL 11.2    Creatinine      0.67 - 1.17 mg/dL 0.86    GFR Estimate      >60 mL/min/1.73m2 >90    Calcium      8.8 - 10.4 mg/dL 9.4    Chloride      98 - 107 mmol/L 98    Glucose      70 - 99 mg/dL 403 (H)    Alkaline Phosphatase      40 - 150 U/L 81    AST      0 - 45 U/L 24    ALT      0 - 70 U/L 24    Protein Total      6.4 - 8.3 g/dL 7.6    Albumin      3.5 - 5.2 g/dL 4.6    Bilirubin Total      <=1.2 mg/dL 0.5    INR      0.85 - 1.15  1.91 (H)    Thrombin Time      13.0 - 19.0 Seconds 21.2 (H)    Hepzymed Thrombin Time      15.7 - 21.7 Seconds 25.7 (H)    PTT-LA      31 - 45 Seconds 49 (H)    Hepzymed PTT-LA      31 - 45 Seconds 52 (H)    Hepzymed PTT Ratio      <1.30  1.49 (H)    Platelet Neutralization      <=0 Seconds 4 (H)    DRVVT Screen Ratio      <1.08  1.45 (H)    DRVVT Confirm Normalized Ratio      <1.21  1.19    DRVVT Screen Mix Ratio      <1.08  1.03    Lupus Result      Negative  Positive !    Lupus Interpretation Hepzyme treatment may result in false positive or negative lupus anticoagulant testing.  All samples treated recommended to repeat testing off anticoagulation.      Cardiolipin Joelle IgG Instrument Value      <10.0 GPL-U/mL <2.0    Cardiolipin IgG Joelle      Negative  Negative    Cardiolipin Joelle IgM Instrument Value      <10.0 MPL-U/mL <2.0    Cardiolipin IgM Joelle      Negative  Negative    Creatinine Urine      mg/dL    Albumin Urine mg/L      mg/L    Albumin Urine mg/g Cr    Beta 2 Glycoprotein 1 Antibody IgG      <7.0 U/mL <0.8    Beta 2 Glycoprotein 1 Antibody IgM      <7.0 U/mL <2.4    C-Reactive Protein High Sensitivity 2.70    Hemoglobin A1C      0.0 - 5.6 % 11.2 (H)    Lipoprotein (a)      <30 mg/dL  <6      Component      Latest Ref Rng 9/13/2024  9:32 AM   Sodium      135 - 145 mmol/L    Potassium      3.4 - 5.3 mmol/L    Carbon Dioxide (CO2)      22 - 29 mmol/L    Anion Gap      7 - 15 mmol/L    Urea Nitrogen      6.0 - 20.0 mg/dL    Creatinine      0.67 - 1.17 mg/dL    GFR Estimate      >60 mL/min/1.73m2    Calcium      8.8 - 10.4 mg/dL    Chloride      98 - 107 mmol/L    Glucose      70 - 99 mg/dL    Alkaline Phosphatase      40 - 150 U/L    AST      0 - 45 U/L    ALT      0 - 70 U/L    Protein Total      6.4 - 8.3 g/dL    Albumin      3.5 - 5.2 g/dL    Bilirubin Total      <=1.2 mg/dL    INR      0.85 - 1.15     Thrombin Time      13.0 - 19.0 Seconds    Hepzymed Thrombin Time      15.7 - 21.7 Seconds    PTT-LA      31 - 45 Seconds    Hepzymed PTT-LA      31 - 45 Seconds    Hepzymed PTT Ratio      <1.30     Platelet Neutralization      <=0 Seconds    DRVVT Screen Ratio      <1.08     DRVVT Confirm Normalized Ratio      <1.21     DRVVT Screen Mix Ratio      <1.08     Lupus Result      Negative     Lupus Interpretation    Cardiolipin Joelle IgG Instrument Value      <10.0 GPL-U/mL    Cardiolipin IgG Joelle      Negative     Cardiolipin Joelle IgM Instrument Value      <10.0 MPL-U/mL    Cardiolipin IgM Joelle      Negative     Creatinine Urine      mg/dL 60.0    Albumin Urine mg/L      mg/L <12.0    Albumin Urine mg/g Cr --    Beta 2 Glycoprotein 1 Antibody IgG      <7.0 U/mL    Beta 2 Glycoprotein 1 Antibody IgM      <7.0 U/mL    C-Reactive Protein High Sensitivity    Hemoglobin A1C      0.0 - 5.6 %    Lipoprotein (a)      <30 mg/dL       Legend:  (L) Low  (H) High  ! Abnormal

## 2024-09-19 LAB
CHOLEST SERPL-MCNC: 288 MG/DL
HDL SERPL QN: <8 NM
HDL SERPL-SCNC: 24.5 UMOL/L
HDLC SERPL-MCNC: 33 MG/DL
HLD.LARGE SERPL-SCNC: <2.8 UMOL/L
LDL SERPL QN: 19 NM
LDL SERPL-SCNC: >2500 NMOL/L
LDL SMALL SERPL-SCNC: >1085 NMOL/L
LDLC SERPL CALC-MCNC: ABNORMAL MG/DL
PATHOLOGY STUDY: ABNORMAL
TRIGL SERPL-MCNC: 666 MG/DL
VLDL LARGE SERPL-SCNC: >25 NMOL/L
VLDL SERPL QN: >72 NM

## 2024-09-19 NOTE — TELEPHONE ENCOUNTER
Retail Pharmacy Prior Authorization Team   Phone: [phone]    Prior Authorization Approval    Medication: BYDUREON BCISE 2 MG/0.85ML SC AUIJ  Authorization Effective Date: 9/19/2024  Authorization Expiration Date: 9/19/2025  Insurance Company: [employer] - Phone [phone] Fax [phone]  Which Pharmacy is filling the prescription: [id number] [address] AT [facility] [address] [facility]  Pharmacy Notified: YES  Patient Notified: YES **Instructed pharmacy to notify patient when script is ready to /ship.**

## 2024-09-19 NOTE — TELEPHONE ENCOUNTER
Retail Pharmacy Prior Authorization Team   Phone: [phone]    PA Initiation    Medication: BYDUREON BCISE 2 MG/0.85ML SC AUIJ  Insurance Company: [employer] - Phone [phone] Fax [phone]  Pharmacy Filling the Rx: [facility] #[id number] [place] [id number] [facility] AT [address]  Filling Pharmacy Phone: [phone]  Filling Pharmacy Fax:    Start Date: 9/19/2024

## 2024-09-23 ENCOUNTER — TELEPHONE (OUTPATIENT)
Dept: OTHER | Facility: CLINIC | Age: 54
End: 2024-09-23
Payer: COMMERCIAL

## 2024-09-23 DIAGNOSIS — I82.409 ACUTE DEEP VEIN THROMBOSIS (DVT) OF OTHER VEIN OF LOWER EXTREMITY, UNSPECIFIED LATERALITY (H): Primary | ICD-10-CM

## 2024-09-23 NOTE — TELEPHONE ENCOUNTER
Routing to scheduling to coordinate the following:  Echocardiogram with bubble study ASAP  Pt has an appt 10/1/24 already scheduled with Dr. Madera     Appt note: follow up to 9/18/24 appt and bubble study results    Thank you  Shahid Frias RN on 9/23/2024 at 2:37 PM

## 2024-09-24 ENCOUNTER — ANTICOAGULATION THERAPY VISIT (OUTPATIENT)
Dept: ANTICOAGULATION | Facility: CLINIC | Age: 54
End: 2024-09-24

## 2024-09-24 ENCOUNTER — LAB (OUTPATIENT)
Dept: LAB | Facility: CLINIC | Age: 54
End: 2024-09-24
Payer: COMMERCIAL

## 2024-09-24 DIAGNOSIS — I82.409 ACUTE DEEP VEIN THROMBOSIS (DVT) OF OTHER VEIN OF LOWER EXTREMITY, UNSPECIFIED LATERALITY (H): ICD-10-CM

## 2024-09-24 DIAGNOSIS — D68.51 HETEROZYGOUS FACTOR V LEIDEN MUTATION (H): Primary | ICD-10-CM

## 2024-09-24 DIAGNOSIS — Z79.01 LONG TERM (CURRENT) USE OF ANTICOAGULANTS: ICD-10-CM

## 2024-09-24 DIAGNOSIS — Z86.718 H/O DEEP VENOUS THROMBOSIS: ICD-10-CM

## 2024-09-24 DIAGNOSIS — Z79.01 CURRENT USE OF LONG TERM ANTICOAGULATION: ICD-10-CM

## 2024-09-24 DIAGNOSIS — I82.4Y9 DEEP VEIN THROMBOSIS (DVT) OF PROXIMAL LOWER EXTREMITY, UNSPECIFIED CHRONICITY, UNSPECIFIED LATERALITY (H): ICD-10-CM

## 2024-09-24 DIAGNOSIS — I26.94 MULTIPLE SUBSEGMENTAL PULMONARY EMBOLI WITHOUT ACUTE COR PULMONALE (H): ICD-10-CM

## 2024-09-24 DIAGNOSIS — D68.51 HETEROZYGOUS FACTOR V LEIDEN MUTATION (H): ICD-10-CM

## 2024-09-24 LAB — INR BLD: 1.7 (ref 0.9–1.1)

## 2024-09-24 PROCEDURE — 36415 COLL VENOUS BLD VENIPUNCTURE: CPT

## 2024-09-24 PROCEDURE — 85610 PROTHROMBIN TIME: CPT

## 2024-09-24 PROCEDURE — 85260 CLOT FACTOR X STUART-POWER: CPT

## 2024-09-25 LAB — FACT X ACT/NOR PPP CHRO: 36 % (ref 70–130)

## 2024-09-25 NOTE — PROGRESS NOTES
ANTICOAGULATION MANAGEMENT     Gaurang Rivera 54 year old male is on warfarin with therapeutic result. (Goal Chromogenic Factor X 40-20%)    Recent labs: (last 7 days)     09/24/24  1238 09/24/24  1239   INR 1.7*  --    [id number]  --  36*       ASSESSMENT     Warfarin Lab Questionnaire    Warfarin Doses Last 7 Days    Pt Rptd Dose SUNDAY MONDAY TUESDAY WED THURS FRIDAY SATURDAY 9/24/2024  10:32 AM 5 7.5 5 5 5 7.5 5         9/24/2024   Warfarin Lab Questionnaire   Missed doses within past 14 days? No   Changes in diet or alcohol within past 14 days? No   Medication changes since last result? No   Injuries or illness since last result? No   New shortness of breath, severe headaches or sudden changes in vision since last result? No   Abnormal bleeding since last result? No   Upcoming surgery, procedure? No        Previous result: NA - CFX monitoring started yesterday per Dr. Madera's recommendations on 9/18/24 d/t LA - Leiden heterozygote with low protein C and S levels   Additional findings: None     PLAN     Recommended plan for no diet, medication or health factor changes affecting result    Dosing Instructions: Continue your current warfarin dose 7.5 mg every Mon, Fri; 5 mg all other days  with next Chromogenic Factor X in 2 weeks       Telephone call with Gaurang who verbalizes understanding and agrees to plan    Lab visit scheduled    Education provided:   Please call back if any changes to your diet, medications or how you've been taking warfarin  Symptom monitoring: monitoring for bleeding signs and symptoms and monitoring for clotting signs and symptoms    Plan made per [facility] anticoagulation protocol    Cyn Womack RN  9/25/2024  Anticoagulation Clinic  [facility] for routing messages: sujey RITCHIE  [facility] patient phone line: [phone]

## 2024-09-25 NOTE — TELEPHONE ENCOUNTER
Patient is scheduled for follow up on October 1- please see above and schedule patient for fasting lab prior to this appointment. It appears to have been missed when other labs were drawn.    Faby OAKES, CHEL    [facility]  Office: [phone]  Fax: [phone]

## 2024-09-25 NOTE — TELEPHONE ENCOUNTER
LM for patient to call us back to schedule:    A fasting lab - It appears to have been missed when other labs were drawn.     Also:     Echocardiogram with bubble study ASAP  Pt has an appt 10/1/24 already scheduled with Dr. Madera (will probably have to be rescheduled)

## 2024-09-26 ENCOUNTER — HOSPITAL ENCOUNTER (OUTPATIENT)
Dept: CARDIOLOGY | Facility: CLINIC | Age: 54
Discharge: HOME | End: 2024-09-26
Attending: INTERNAL MEDICINE | Admitting: INTERNAL MEDICINE
Payer: COMMERCIAL

## 2024-09-26 DIAGNOSIS — Q21.12 PFO (PATENT FORAMEN OVALE): ICD-10-CM

## 2024-09-26 LAB — LVEF ECHO: NORMAL

## 2024-09-26 PROCEDURE — C8929 TTE W OR WO FOL WCON,DOPPLER: HCPCS

## 2024-09-26 PROCEDURE — 255N000002 HC RX 255 OP 636: Performed by: INTERNAL MEDICINE

## 2024-09-26 PROCEDURE — 93306 TTE W/DOPPLER COMPLETE: CPT | Mod: 26 | Performed by: INTERNAL MEDICINE

## 2024-09-26 RX ADMIN — HUMAN ALBUMIN MICROSPHERES AND PERFLUTREN 3 ML: 10; .22 INJECTION, SOLUTION INTRAVENOUS at 11:30

## 2024-09-27 ENCOUNTER — LAB (OUTPATIENT)
Dept: LAB | Facility: CLINIC | Age: 54
End: 2024-09-27
Payer: COMMERCIAL

## 2024-09-27 DIAGNOSIS — E78.5 HYPERLIPIDEMIA LDL GOAL <70: ICD-10-CM

## 2024-09-27 LAB — LDLC SERPL DIRECT ASSAY-MCNC: 132 MG/DL

## 2024-09-27 PROCEDURE — 83721 ASSAY OF BLOOD LIPOPROTEIN: CPT

## 2024-09-27 PROCEDURE — 36415 COLL VENOUS BLD VENIPUNCTURE: CPT

## 2024-10-01 ENCOUNTER — VIRTUAL VISIT (OUTPATIENT)
Dept: OTHER | Facility: CLINIC | Age: 54
End: 2024-10-01
Attending: INTERNAL MEDICINE
Payer: COMMERCIAL

## 2024-10-01 DIAGNOSIS — R09.89 ABNORMAL PULSE: ICD-10-CM

## 2024-10-01 DIAGNOSIS — I82.409 ACUTE DEEP VEIN THROMBOSIS (DVT) OF OTHER VEIN OF LOWER EXTREMITY, UNSPECIFIED LATERALITY (H): ICD-10-CM

## 2024-10-01 DIAGNOSIS — Q21.12 PFO (PATENT FORAMEN OVALE): ICD-10-CM

## 2024-10-01 DIAGNOSIS — I71.21 ANEURYSM OF ASCENDING AORTA WITHOUT RUPTURE (H): ICD-10-CM

## 2024-10-01 DIAGNOSIS — E78.5 HYPERLIPIDEMIA LDL GOAL <70: ICD-10-CM

## 2024-10-01 DIAGNOSIS — E11.9 TYPE 2 DIABETES, HBA1C GOAL < 7% (H): ICD-10-CM

## 2024-10-01 DIAGNOSIS — Z95.828 S/P INSERTION OF IVC (INFERIOR VENA CAVAL) FILTER: ICD-10-CM

## 2024-10-01 DIAGNOSIS — Z91.148 NONCOMPLIANCE WITH MEDICATION REGIMEN: ICD-10-CM

## 2024-10-01 PROCEDURE — 99443 PR PHYSICIAN TELEPHONE EVALUATION 21-30 MIN: CPT | Mod: 95 | Performed by: INTERNAL MEDICINE

## 2024-10-01 RX ORDER — EZETIMIBE 10 MG/1
10 TABLET ORAL DAILY
Qty: 90 TABLET | Refills: 3 | Status: SHIPPED | OUTPATIENT
Start: 2024-10-01

## 2024-10-01 NOTE — PROGRESS NOTES
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 He knows he must remain on lifelong AC. He is on warfarin but his LA is now known to be positive. He was not on Lovenox so this is likely a true positive. His AC has been monitored by INR rather than CFX as it was not known that he has a LA.  His leg pain was felt by myself when seen on 09/03/2024 to be due to venous insuff as part of the post phlebitic syndrome. We did check an iliac duplex to rule out residual IVC thrombus which would contraindicate filter retrieval. It revealed his Lt EIV was now occluded whereas it was previously nonocclusive. We do not know the acuity with which this finding has occurred. As he has now gotten his TTE with bubble study undertaken , we now know we can not safely do mechanical thrombectomy given that he he definitively does have a PFO.      He also has a LA that we now know of.      He must use skin moisturizer daily and wear compression hosiery Rx given. Indefinite AC is a mandate. Check the below. RTC two weeks alter.      Plan: Monitor AC via CFX levels.                    (Z95.828) S/P insertion of IVC (inferior vena caval) filter     Comment: If he has no caval or iliac thrombus, can order filter retieval at next visit after TTE with bubble study has actually been completed.       Plan: As above                (I71.21) Aneurysm of ascending aorta without rupture (H24)     Comment: This is actually normal when indexed to his BSA. He has no AAA. He has a slightly ectatic Lt KIMBERLY.      Plan: " check annual iliac artery duplex and TTE in 09/2025, order at next visit..         (R09.89) Abnormal pulse     Comment: No concurrent fem pop aneurysmal ds     Plan: No f/u required.                (Z91.148) Noncompliance with medication regimen     Comment: He stopped but has now resumed all his usual and customary meds. This was due to loss odf insurance.      Plan: He has now refilled all meds as his insurance has been reinstituted.     (Q21.12) PFO (patent foramen ovale)     Comment: He has an absolute indication for lifelong AC, so I would rather not close his PFO which was confirmed on 09/26/2024 bubble study TTE. However, with progression of DVT, he would like to consider mechanical thrombectomy.        Plan: As above.     (E11.9) Type 2 diabetes, HbA1c goal < 7% (H)     Comment: He stopped but has now resumed all his usual and customary meds. His A1C returned at 11.2. Bydureon and insulin are a suboptimal combination which I would rather not use.      Plan: Start Jardiance 25 mg daily. Warned of side effects of UTIs and pelvic floor infections. He was instructed to stop this at any sxs of dysuria. Check A1C again in 01/2025. RTC two weeks later.             (E78.5) Hyperlipidemia LDL goal <70     Comment: Not at goal on Lipitor 40 mg daily.  Plan:Add Zetia 10 mg daily to Lipitor 40 mg daily. Check advanced lipid testing in 01/2025, RTC two weeks later.           23 minutes total medical care on today's date.    MD location: office  Pt location: home    Phone call start: 16:11  Phone call end: 16:34       HPI:         Gaurang Rivera is a 53 year old male with a h/o unprovoked DVT 2019 (occlusive thrombus of the posterior tibial veins of the mid to distal right calf, also found to have low protein C, low protein S, and to be a Leiden heterozygote; was recommended to be on lifelong AC, but was noncompliant and stopped warfarin in 03/2024), depression on multiple meds, post op AF w/o recurrence since 2020,  htn,  HLD, DM2. On 06/26/2024 he was admitted to [facility] with his second lifetime VTE event of a RLE EIV to distal DVT with bilateral R greater than L lobar and subsegmental PE with right heart strain. He was started on a heparin gtt. IR planned for mechanical thrombectomy but the procedure was aborted due to concern for PFO, and an IVC filter was placed instead. He was started on warfarin due to DOACs being cost prohibitive. He was connected to his prior warfarin clinic. He has had a high time in therapeutic range (but he is now known to have a LA). An Rx for compression hosiery was given but he was not wearing them when last seen as they are hard to get on. An echo with bubble study was ordered for after discharge, and he was referred to structural heart clinic for workup for potential PFO closure. Neither has been scheduled despite this having been ordered on 06/28/2024, and despite cardiology making repeated attempts to contact him. In fact it still has not been done despite my telling him on 09/03/24 to get this done.     He was referred to us due to continued RLE swelling despite compliance in using warfarin (even though we now know he has LA, and has not been monitored via CFX levels, thereby making his use of warfarin although compliant, still subtherapeutic).      I was called out of a room today ass his Lt EIV DVT is now occlusive.     His RLE sxs are reasonable with compression hosiery..          Review Of Systems  Skin: negative  Eyes: negative  Ears/Nose/Throat: negative  Respiratory: No shortness of breath, dyspnea on exertion, cough, or hemoptysis  Cardiovascular: negative  Gastrointestinal: negative  Genitourinary: negative  Musculoskeletal: negative  Neurologic: negative  Psychiatric: negative  Hematologic/Lymphatic/Immunologic: negative  Endocrine: negative       PAST MEDICAL HISTORY:                  Past Medical History           Past Medical History:   Diagnosis Date    Antiplatelet or antithrombotic  long-term use      Closed displaced fracture of neck of right scapula, sequela 10/21/2019     Added automatically from request for surgery [id number]    Depressive disorder 2001    Diabetes (H)      DVT (deep venous thrombosis) (H) 07/05/2019    Elevated blood pressure reading without diagnosis of hypertension 05/13/2018    Hypercholesteremia 2012    Personal history of other medical treatment       blood clot foot july 2019    Pilonidal cyst 05/13/2018            PAST SURGICAL HISTORY:                  Past Surgical History             Past Surgical History:   Procedure Laterality Date    APPENDECTOMY        ARTHROSCOPY SHOULDER, OPEN BICEP TENODESIS REPAIR, COMBINED Right 6/2/2020     Procedure: Right shoulder diagnostic arthroscopy, capsular release,  open biceps tenodesis;  Surgeon: Dulce Maria Burdick MD;  Location: [facility] OR    AS DRAIN PILONIDAL CYST SIMPLE        BACK SURGERY   1997     spinal fusion    CHOLECYSTECTOMY   2012    CYSTECTOMY PILONIDAL N/A 2/21/2020     Procedure: EXCISION, PILONIDAL CYST, AILEEN II Procedure;  Surgeon: Artemio Ahumada MD;  Location: [facility] OR    IR IVC FILTER PLACEMENT   6/27/2024    IR PULMONARY ANGIOGRAM BILATERAL   6/27/2024    THUMB SURGERY    1995            CURRENT MEDICATIONS:                  Current Outpatient Prescriptions               Current Outpatient Medications   Medication Sig Dispense Refill    acetaminophen (TYLENOL) 325 MG tablet Take 2 tablets (650 mg) by mouth every 4 hours as needed for mild pain or fever (Patient not taking: Reported on 8/29/2024) 60 tablet 0    atorvastatin (LIPITOR) 40 MG tablet Take 1 tablet (40 mg) by mouth daily (Patient not taking: Reported on 8/29/2024) 90 tablet 1    buPROPion (WELLBUTRIN XL) 150 MG 24 hr tablet Take 1 tablet (150 mg) by mouth every morning. 90 tablet 0    cholecalciferol (VITAMIN D3) 125 mcg (5000 units) capsule Take 1 capsule (125 mcg) by mouth daily (Patient not taking: Reported on 8/29/2024) 100 capsule 3     Continuous Blood Gluc Sensor (FREESTYLE JAVI 14 DAY SENSOR) MISC AS DIRECTED AND CHANGE EVERY 14 DAYS, (Patient not taking: Reported on 8/29/2024) 6 each 3    exenatide ER (BYDUREON BCISE) 2 MG/0.85ML auto-injector INJECT 2MG SUBCUTANEOUS EVERY 7 DAYS (Patient not taking: Reported on 8/29/2024) 3.4 mL 5    JARDIANCE 10 MG TABS tablet TAKE 1 TABLET(10 MG) BY MOUTH DAILY (Patient not taking: Reported on 8/29/2024) 30 tablet 5    lisinopril (ZESTRIL) 5 MG tablet Take 1 tablet (5 mg) by mouth daily (Patient not taking: Reported on 8/29/2024) 90 tablet 1    lurasidone (LATUDA) 120 MG TABS tablet Take 1 tablet (120 mg) by mouth daily with food. Start after 40 mg for one week then 80 mg for one week then 120 mg thereafter 30 tablet 1    lurasidone (LATUDA) 80 MG TABS tablet 1/2 tab for one week then increase to 1 tab for one week then increase to 120 (new script) thereafter 14 tablet 0    metFORMIN (GLUCOPHAGE) 1000 MG tablet TAKE 1 TABLET(1000 MG) BY MOUTH TWICE DAILY WITH MEALS (Patient not taking: Reported on 8/29/2024) 180 tablet 0    multivitamin w/minerals (THERA-VIT-M) tablet Take 1 tablet by mouth daily (Patient not taking: Reported on 8/29/2024) 90 tablet 3    omeprazole (PRILOSEC OTC) 20 MG EC tablet Take 20 mg by mouth daily as needed. (Patient not taking: Reported on 8/29/2024)        oxyCODONE (ROXICODONE) 5 MG tablet Take 2 tablets (10 mg) by mouth 2 times daily as needed for pain (Patient not taking: Reported on 8/29/2024) 28 tablet 0    oxyCODONE (ROXICODONE) 5 MG tablet Take 2 tablets (10 mg) by mouth every 6 hours as needed for pain 24 tablet 0    QUEtiapine (SEROQUEL) 25 MG tablet Take 2 tablets (50 mg) by mouth nightly as needed (insomnia) (Patient not taking: Reported on 8/29/2024) 60 tablet 1    traZODone (DESYREL) 50 MG tablet Take 1-2 tablets ( mg) by mouth nightly as needed for sleep (Patient not taking: Reported on 8/29/2024) 60 tablet 1    venlafaxine (EFFEXOR XR) 75 MG 24 hr capsule  Take 1 capsule (75 mg) by mouth daily. 30 capsule 1    warfarin ANTICOAGULANT (COUMADIN) 5 MG tablet Take 7.5 mg every Sun; 5 mg all other days or As directed by Anticoagulation clinic 145 tablet 1            ALLERGIES:                  Allergies   No Known Allergies         SOCIAL HISTORY:                  Social History   Social History                Socioeconomic History    Marital status: Single       Spouse name: Not on file    Number of children: 0    Years of education: Not on file    Highest education level: Not on file   Occupational History    Occupation:         Comment: ROWING    Tobacco Use    Smoking status: Never    Smokeless tobacco: Never   Vaping Use    Vaping status: Never Used   Substance and Sexual Activity    Alcohol use: No       Comment: None    Drug use: No    Sexual activity: Not Currently       Partners: Female   Other Topics Concern    Parent/sibling w/ CABG, MI or angioplasty before 65F 55M? Not Asked   Social History Narrative    Not on file      Social Determinants of Health              Financial Resource Strain: Not on File (8/9/2021)     Received from [facility]     Financial Resource Strain   Food Insecurity: Not on File (8/9/2021)     Received from [facility]     Food Insecurity   Transportation Needs: Not on File (8/9/2021)     Received from [facility]     Transportation Needs   Physical Activity: Not on File (8/9/2021)     [facility]     Physical Activity      Physical Activity: 0   Stress: Not on File (8/9/2021)     Received from [facility]     Stress   Social Connections: Not on File (8/9/2021)     Received from [facility]     Social Connections   Interpersonal Safety: Low Risk  (7/2/2024)     Interpersonal Safety      Do you feel physically and emotionally safe where you currently live?: Yes      Within the past 12 months, have you been hit, slapped, kicked or otherwise physically hurt by someone?: No      Within the past 12 months, have you been humiliated or emotionally abused  in other ways by your partner or ex-partner?: No   Housing Stability: Not on File (2021)     Received from [facility]     Housing Stability      Housin            FAMILY HISTORY:                   Family History             Family History   Problem Relation Age of Onset    Diabetes Mother      Skin Cancer Father      Depression Father      Thrombosis Father           HE HAS HAD 3 CLOTS - PER PATIENT THEY WERE UNPROVOKED ON COUMADIN     Alcoholism Sister      Depression Sister      Lupus Sister      Anesthesia Reaction No family hx of      Cardiovascular No family hx of      Melanoma No family hx of                 Physical exam was not undertaken as this was a video visit.      All relevant labs and imaging reviewed by myself on today's date as delineated below.

## 2024-10-01 NOTE — PROGRESS NOTES
Gaurang is a 54 year old who is being evaluated via a billable video visit.    How would you like to obtain your AVS? MyChart  If the video visit is dropped, the invitation should be resent by: Text to cell phone: [phone]  Will anyone else be joining your video visit? No      Lulú Alfred MA

## 2024-10-14 ENCOUNTER — LAB (OUTPATIENT)
Dept: LAB | Facility: CLINIC | Age: 54
End: 2024-10-14
Payer: COMMERCIAL

## 2024-10-14 DIAGNOSIS — Z79.01 LONG TERM (CURRENT) USE OF ANTICOAGULANTS: ICD-10-CM

## 2024-10-14 DIAGNOSIS — D68.51 HETEROZYGOUS FACTOR V LEIDEN MUTATION (H): ICD-10-CM

## 2024-10-14 DIAGNOSIS — Z86.718 H/O DEEP VENOUS THROMBOSIS: ICD-10-CM

## 2024-10-14 DIAGNOSIS — I82.409 ACUTE DEEP VEIN THROMBOSIS (DVT) OF OTHER VEIN OF LOWER EXTREMITY, UNSPECIFIED LATERALITY (H): ICD-10-CM

## 2024-10-14 DIAGNOSIS — I26.94 MULTIPLE SUBSEGMENTAL PULMONARY EMBOLI WITHOUT ACUTE COR PULMONALE (H): ICD-10-CM

## 2024-10-14 PROCEDURE — 85260 CLOT FACTOR X STUART-POWER: CPT

## 2024-10-14 PROCEDURE — 36415 COLL VENOUS BLD VENIPUNCTURE: CPT

## 2024-10-16 ENCOUNTER — MYC MEDICAL ADVICE (OUTPATIENT)
Dept: ANTICOAGULATION | Facility: CLINIC | Age: 54
End: 2024-10-16
Payer: COMMERCIAL

## 2024-10-16 ENCOUNTER — ANTICOAGULATION THERAPY VISIT (OUTPATIENT)
Dept: ANTICOAGULATION | Facility: CLINIC | Age: 54
End: 2024-10-16
Payer: COMMERCIAL

## 2024-10-16 DIAGNOSIS — I26.94 MULTIPLE SUBSEGMENTAL PULMONARY EMBOLI WITHOUT ACUTE COR PULMONALE (H): ICD-10-CM

## 2024-10-16 DIAGNOSIS — I82.409 ACUTE DEEP VEIN THROMBOSIS (DVT) OF OTHER VEIN OF LOWER EXTREMITY, UNSPECIFIED LATERALITY (H): ICD-10-CM

## 2024-10-16 DIAGNOSIS — D68.51 HETEROZYGOUS FACTOR V LEIDEN MUTATION (H): Primary | ICD-10-CM

## 2024-10-16 DIAGNOSIS — Z86.718 H/O DEEP VENOUS THROMBOSIS: ICD-10-CM

## 2024-10-16 DIAGNOSIS — Z79.01 LONG TERM (CURRENT) USE OF ANTICOAGULANTS: ICD-10-CM

## 2024-10-16 LAB — FACT X ACT/NOR PPP CHRO: 28 % (ref 70–130)

## 2024-10-16 NOTE — PROGRESS NOTES
"ANTICOAGULATION MANAGEMENT     Gaurang Rivera 54 year old male is on warfarin with therapeutic result. (Goal Chromogenic Factor X 40-20%)    Recent labs: (last 7 days)     10/14/24  1551   [id number] 28*       ASSESSMENT     Warfarin Lab Questionnaire    Warfarin Doses Last 7 Days      10/14/2024     3:25 PM   Dose in Tablet or Mg   TAB or MG? milligram (mg)     Pt Rptd Dose JAVIER MONDAY TUESDAY WED THURS FRIDAY SATURDAY   10/14/2024   3:25 PM 5 7.5 5 5 5 7.5 5         10/14/2024   Warfarin Lab Questionnaire   Missed doses within past 14 days? No   Changes in diet or alcohol within past 14 days? No   Medication changes since last result? No   Injuries or illness since last result? No   New shortness of breath, severe headaches or sudden changes in vision since last result? No   Abnormal bleeding since last result? No   Upcoming surgery, procedure? No        Previous result: Therapeutic last visit; previously outside of goal range  Additional findings: Pt cancelled [facility] appointment 9/26/24, should reschedule. Visit with Vascular 10/1/24: stressed importance of lifelong anticoagulation and monitoring now with Factor 10. Started Gaurang on Jardiance and Zetia. No interaction between warfarin and Jardiance.  Ezetimibe may enhance the anticoagulant effect of Warfarin. However, the severity of the interaction is noted to be minor per Up-to-Date and \"no action required\".      PLAN     Unable to reach Gaurang today after two attempts    Left message to continue current dose of warfarin 5 mg tonight. Request call back for assessment. Also sent MyC message.    Follow up required to confirm warfarin dose taken and assess for changes    Velia Persaud RN  10/16/2024  Anticoagulation Clinic  [facility] for routing messages: sujey LEAHY [facility] patient phone line: [phone]    "

## 2024-10-16 NOTE — PROGRESS NOTES
"ANTICOAGULATION MANAGEMENT     Gaurang Rivera 54 year old male is on warfarin with therapeutic result. (Goal Chromogenic Factor X 40-20%)    Recent labs: (last 7 days)     10/14/24  1551   [id number] 28*       ASSESSMENT     Warfarin Lab Questionnaire    Warfarin Doses Last 7 Days      10/14/2024     3:25 PM   Dose in Tablet or Mg   TAB or MG? milligram (mg)     Pt Rptd Dose JAVIER MONDAY TUESDAY WED THURS FRIDAY SATURDAY   10/14/2024   3:25 PM 5 7.5 5 5 5 7.5 5         10/14/2024   Warfarin Lab Questionnaire   Missed doses within past 14 days? No   Changes in diet or alcohol within past 14 days? No   Medication changes since last result? No   Injuries or illness since last result? No   New shortness of breath, severe headaches or sudden changes in vision since last result? No   Abnormal bleeding since last result? No   Upcoming surgery, procedure? No        Previous result: Therapeutic last visit; previously outside of goal range  Additional findings:  Cancelled [facility] appointment 9/26/24, should reschedule. Visit with Vascular 10/1/24: stressed importance of lifelong anticoagulation and monitoring now with Factor 10. Started Gaurang on Jardiance and Zetia. No interaction between warfarin and Jardiance.  Ezetimibe may enhance the anticoagulant effect of Warfarin. However, the severity of the interaction is noted to be minor per Up-to-Date and \"no action required\".     PLAN     Unable to reach Gaurang this morning.    Left message to return call to [facility] RN    Follow up required to confirm warfarin dose taken and assess for changes. Discuss new medications/interactions. Schedule next CFX in 3-4 weeks    Velia Persaud RN  10/16/2024  Anticoagulation Clinic  [facility] for routing messages: sujey RITCHIE  [facility] patient phone line: [phone]    "

## 2024-10-17 NOTE — PROGRESS NOTES
ANTICOAGULATION MANAGEMENT     Gaurang Rivera 54 year old male is on warfarin with therapeutic result. (Goal Chromogenic Factor X 40-20%)    Recent labs: (last 7 days)     10/14/24  1551   [id number] 28*       ASSESSMENT     Warfarin Lab Questionnaire    Warfarin Doses Last 7 Days      10/14/2024     3:25 PM   Dose in Tablet or Mg   TAB or MG? milligram (mg)     Pt Rptd Dose JAVIER MONDAY TUESDAY WED THURS FRIDAY SATURDAY   10/14/2024   3:25 PM 5 7.5 5 5 5 7.5 5         10/14/2024   Warfarin Lab Questionnaire   Missed doses within past 14 days? No   Changes in diet or alcohol within past 14 days? No   Medication changes since last result? No   Injuries or illness since last result? No   New shortness of breath, severe headaches or sudden changes in vision since last result? No   Abnormal bleeding since last result? No   Upcoming surgery, procedure? No        Previous result: Therapeutic - newly managed with CFX  Additional findings:  See notes below regarding Zetia and Jardiance - both started on 10/1/24 - noted on today's vm the interaction potential between Warfarin and Zetia     PLAN     Recommended plan for ongoing change(s) affecting result    Dosing Instructions: Continue your current warfarin dose 7.5 mg every Mon, Fri; 5 mg all other days with next Chromogenic Factor X in 2 weeks (d/t climb in level and new medications)    Detailed voice message left for Gaurang with dosing instructions and follow up date. Betable message sent as well.     Contact [phone] to schedule and with any changes, questions or concerns.     Education provided:   Please call back if any changes to your diet, medications or how you've been taking warfarin  Interaction IS anticipated between warfarin and Zetia  Symptom monitoring: monitoring for bleeding signs and symptoms and monitoring for clotting signs and symptoms    Plan made per [facility] anticoagulation protocol    Cyn Womack RN  10/17/2024  Anticoagulation Clinic  [facility] for routing messages: sujey RITCHIE  [facility] patient phone line: [phone]

## 2024-10-19 DIAGNOSIS — F33.2 SEVERE EPISODE OF RECURRENT MAJOR DEPRESSIVE DISORDER, WITHOUT PSYCHOTIC FEATURES (H): ICD-10-CM

## 2024-10-21 NOTE — TELEPHONE ENCOUNTER
Date of Last Office Visit: 8/29/24  Date of Next Office Visit: None; routing for A to assist pt with scheduling.    No shows since last visit: No  More than one patient-initiated cancellation (with reschedule) since last seen in clinic? No    []Medication refilled per  Medication Refill in Ambulatory Care  policy.  [x]Medication unable to be refilled by RN due to criteria not met as indicated below:    []Eligibility: has not had a provider visit within last 6 months   [x]Supervision: no future appointment; < 7 days before next appointment   []Compliance: no shows; cancellations; lapse in therapy   []Verification: order discrepancy; may need modification...   [] > 30-day supply request   []Advanced refill request: > 7 days before refill date   []Controlled medication   []Medication not included in policy   []Review: new med; med adjusted ? 30 days; safety alert; requires lab monitoring...   []Scope of Practice: refill request processed by LPN/MA   []Other:      Medication(s) requested:     -  venlafaxine (EFFEXOR XR) 75 MG 24 hr capsule   Date last ordered: 8/29/24  Qty: 30  Refills: 1        Any Controlled Substance(s)? No      Requested medication(s) verified as identical to current order? Yes    Any lapse in adherence to medication(s) greater than 5 days? No      Additional action taken? routed encounter to provider for review.      Last visit treatment plan:         Behavioral        Was without insurance for over six months and not taking medications with the exception of Venlafaxine XR 75 mg.  Will restart and titrate the Latuda to 120 mg over two weeks.  Start bupropion  mg and continue the Venlafaxine XR 75 mg.  Sleep study referral placed  Referral for Partial Hospitalization Program placed  Follow-up in six weeks           Any medication(s) require lab monitoring? No

## 2024-10-22 RX ORDER — VENLAFAXINE HYDROCHLORIDE 75 MG/1
75 CAPSULE, EXTENDED RELEASE ORAL DAILY
Qty: 30 CAPSULE | Refills: 0 | Status: SHIPPED | OUTPATIENT
Start: 2024-10-22 | End: 2024-10-31

## 2024-10-30 NOTE — PROGRESS NOTES
"    [facility] [place]         PATIENT'S NAME: Gaurang Rivera  PREFERRED NAME: Gaurang  PRONOUNS:       MRN: [id number]  : 1970  ADDRESS: [address] [place] [id number]  ACCT. NUMBER:  [id number]  DATE OF SERVICE: 10/31/24  START TIME: 330 pm  END TIME: 344 pm  PREFERRED PHONE: [phone]  May we leave a program related message: Yes  EMERGENCY CONTACT: was obtained Betito Rivera [phone] and Hailey Rivera (S)  [phone].  SERVICE MODALITY:  Video Visit:      Provider verified identity through the following two step process.  Patient provided:  Patient photo and Patient is known previously to provider    Telemedicine Visit: The patient's condition can be safely assessed and treated via synchronous audio and visual telemedicine encounter.      Reason for Telemedicine Visit: Patient convenience (e.g. access to timely appointments / distance to available provider)    Originating Site (Patient Location): Patient's home    Distant Site (Provider Location): Provider Remote Setting- Home Office    Consent:  The patient/guardian has verbally consented to: the potential risks and benefits of telemedicine (video visit) versus in person care; bill my insurance or make self-payment for services provided; and responsibility for payment of non-covered services.     Patient would like the video invitation sent by:  My Chart    Mode of Communication:  Video Conference via Amwell    Distant Location (Provider):  Off-site    As the provider I attest to compliance with applicable laws and regulations related to telemedicine.    UNIVERSAL ADULT Mental Health DIAGNOSTIC ASSESSMENT    Identifying Information:  Patient is a 54 year old,  (Patient-Rptd)  individual.  Patient was referred for an assessment by (Patient-Rptd) self.  Patient attended the session alone.    Chief Complaint:   The reason for seeking services at this time is: \"(Patient-Rptd) " "Extreme depresssion\".  The problem(s) began (Patient-Rptd) 10/10/23.    Patient has attempted to resolve these concerns in the past through medication, counseling .      update: Pt reports that he had some blood clots that did eventually resolve. He sees the vascular doctor again in Dec. His pain is manageable. His mental health sx are unchanged (low energy, low motivation, low mood). His F is in very poor health but is  home. He is on oxygen 24 hours per day. F is supposed to be moving into an assisted living soon. He is looking forward to this.   Stresses: complex health hx  Appetite: unremarkable  Sleep: unremarkable  Therapy: has an intake for possible IOP with [facility]  KHANH: No  Preg: NA  Most important: minimal improvement with medication    Social/Family History:  Patient reported they grew up in [facility]  .  They were raised by biological parents  .  Parents were always together. Mother is in a care center and declining.  He sees his father and sister often. Pt has 2 younger sisters.  Patient reported that their childhood was \"ideallyic, happy child\".  Patient described their current relationships with family of origin as \"good\". .      The patient describes their cultural background as .  Cultural influences and impact on patient's life structure, values, norms, and healthcare: Typical Anglo-[place] childhood.  Contextual influences on patient's health include: none noted.    These factors will be addressed in the Preliminary Treatment plan. Patient identified their preferred language to be English. Patient reported they does not need the assistance of an  or other support involved in therapy.      Patient reported had no significant delays in developmental tasks.   Patient's highest education level was graduate school  .  Patient identified the following learning problems: none reported.  Modifications will not be used to assist communication in therapy.  Patient reports they are  able " to understand written materials.     Patient reported the following relationship history never .  Patient's current relationship status is single.   Patient identified their sexual orientation as heterosexual.  Patient reported having   no child(marivel). Patient identified parents as part of their support system.  Patient identified the quality of these relationships as good,  .       Patient's current living/housing situation involves staying in own home/apartment.  The immediate members of family and household include Leobardo Rivera, 76,Father  and they report that housing is stable.     Patient is currently employed part time.  Patient reports their finances are obtained through employment; parents. Patient does identify finances as a current stressor.       Patient reported that they have not been involved with the legal system.    . Patient does not report being under probation/ parole/ jurisdiction. They are not under any current court jurisdiction. .       Patient's Strengths and Limitations:  Patient identified the following strengths or resources that will help them succeed in treatment: friends / good social support, family support, insight, intelligence, and motivation. Things that may interfere with the patient's success in treatment include: physical health concerns.     Assessments:  The following assessments were completed by patient for this visit:  PHQ2:       8/29/2024     3:19 PM 11/21/2022     6:59 AM 1/27/2022    10:44 AM 1/26/2022    11:01 PM 3/30/2021     1:06 PM 1/8/2020    10:57 AM 4/17/2019     9:08 AM   PHQ-2 ( 1999 [employer])   Q1: Little interest or pleasure in doing things 3 3  3  3  1  2  3   Q2: Feeling down, depressed or hopeless 3 3  2  2  1  2  3   PHQ-2 Score 6 6 5 5 2 4 6   PHQ-2 Total Score (12-17 Years)- Positive if 3 or more points; Administer PHQ-A if positive     2 4 6   Q1: Little interest or pleasure in doing things  Nearly every day Nearly every day  Several days More than  half the days    Q2: Feeling down, depressed or hopeless  Nearly every day More than half the days  Several days More than half the days    PHQ-2 Score  6 5  2 4        Patient-reported     PHQ9:       9/18/2023     3:07 PM 11/22/2023     1:53 PM 1/11/2024    10:22 PM 7/1/2024    11:05 PM 8/2/2024     7:01 AM 8/29/2024     3:19 PM 10/10/2024     3:26 AM   PHQ-9 SCORE   PHQ-9 Total Score MyChart 20 (Severe depression) 19 (Moderately severe depression) 17 (Moderately severe depression) 13 (Moderate depression)   20 (Severe depression)   PHQ-9 Total Score 20    20 19 17 13 21 20 20     GAD2:       9/18/2023     3:15 PM 1/11/2024    10:22 PM 8/28/2024    11:00 PM   SOL-2   Feeling nervous, anxious, or on edge 0  0  0    Not being able to stop or control worrying 1  0  1    SOL-2 Total Score 1    1 0    0 1    1       Patient-reported    Multiple values from one day are sorted in reverse-chronological order     GAD7:       3/19/2019    10:07 AM 6/28/2019     7:30 AM 7/16/2019    10:42 AM 10/10/2019     8:39 AM 1/8/2020    10:55 AM 3/30/2021     2:18 PM 8/3/2023     1:00 PM   SOL-7 SCORE   Total Score 4 (minimal anxiety)  1 (minimal anxiety) 1 (minimal anxiety) 1 (minimal anxiety)  5 (mild anxiety)   Total Score 4 1 1 1 1 0 5     CAGE-AID:       9/18/2023     3:18 PM 10/31/2024     3:43 PM   CAGE-AID Total Score   Total Score 0    0 0   Total Score MyChart 0 (A total score of 2 or greater is considered clinically significant)      PROMIS 10-Global Health (all questions and answers displayed):       9/18/2023     3:17 PM 1/11/2024    10:23 PM 8/28/2024    11:01 PM   PROMIS 10   In general, would you say your health is: Poor Fair Poor   In general, would you say your quality of life is: Poor Poor Poor   In general, how would you rate your physical health? Poor Fair Poor   In general, how would you rate your mental health, including your mood and your ability to think? Poor Poor Poor   In general, how would you rate your  satisfaction with your social activities and relationships? Poor Poor Poor   In general, please rate how well you carry out your usual social activities and roles Fair Fair Fair   To what extent are you able to carry out your everyday physical activities such as walking, climbing stairs, carrying groceries, or moving a chair? Mostly Mostly A little   In the past 7 days, how often have you been bothered by emotional problems such as feeling anxious, depressed, or irritable? Often Often Often   In the past 7 days, how would you rate your fatigue on average? Very severe Very severe Severe   In the past 7 days, how would you rate your pain on average, where 0 means no pain, and 10 means worst imaginable pain? 5 4 6   In general, would you say your health is: 1  2  1    In general, would you say your quality of life is: 1  1  1    In general, how would you rate your physical health? 1  2  1    In general, how would you rate your mental health, including your mood and your ability to think? 1  1  1    In general, how would you rate your satisfaction with your social activities and relationships? 1  1  1    In general, please rate how well you carry out your usual social activities and roles. (This includes activities at home, at work and in your community, and responsibilities as a parent, child, spouse, employee, friend, etc.) 2  2  2    To what extent are you able to carry out your everyday physical activities such as walking, climbing stairs, carrying groceries, or moving a chair? 4  4  2    In the past 7 days, how often have you been bothered by emotional problems such as feeling anxious, depressed, or irritable? 4  4  4    In the past 7 days, how would you rate your fatigue on average? 5  5  4    In the past 7 days, how would you rate your pain on average, where 0 means no pain, and 10 means worst imaginable pain? 5  4  6    Global Mental Health Score 5    5 5    5 5    5   Global Physical Health Score 9    9 10     10 8    8   PROMIS TOTAL - SUBSCORES 14    14 15    15 13    13       Patient-reported    Multiple values from one day are sorted in reverse-chronological order     PROMIS 10-Global Health (only subscores and total score):       9/18/2023     3:17 PM 1/11/2024    10:23 PM 8/28/2024    11:01 PM   PROMIS-10 Scores Only   Global Mental Health Score 5    5 5    5 5    5   Global Physical Health Score 9    9 10    10 8    8   PROMIS TOTAL - SUBSCORES 14    14 15    15 13    13     [place] Suicide Severity Rating Scale (Lifetime/Recent)      9/19/2023     3:58 PM 6/26/2024     7:21 PM   [place] Suicide Severity Rating (Lifetime/Recent)   Q1 Wished to be Dead (Past Month)  0-->no   Q2 Suicidal Thoughts (Past Month)  0-->no   Q6 Suicide Behavior (Lifetime)  0-->no   Level of Risk per Screen  no risks indicated   Q1 Wish to be Dead (Lifetime) Y    Wish to be Dead Description (Lifetime) Pt reports chronic passive thoughts of death    1. Wish to be Dead (Past 1 Month) Y    Wish to be Dead Description (Past 1 Month) Pt reports chronic passive thoughts of death. Denies active SI, intent, plan or safety concerns    Q2 Non-Specific Active Suicidal Thoughts (Lifetime) N    Most Severe Ideation Rating (Lifetime) 2    Most Severe Ideation Rating (Past 1 Month) 2    Frequency (Lifetime) 2    Frequency (Past 1 Month) 2    Duration (Lifetime) 1    Duration (Past 1 Month) 1    Controllability (Lifetime) 1    Controllability (Past 1 Month) 1    Deterrents (Lifetime) 1    Deterrents (Past 1 Month) 1    Reasons for Ideation (Lifetime) 4    Reasons for Ideation (Past 1 Month) 4    Actual Attempt (Lifetime) N    Has subject engaged in non-suicidal self-injurious behavior? (Lifetime) N    Interrupted Attempts (Lifetime) N    Aborted or Self-Interrupted Attempt (Lifetime) N    Preparatory Acts or Behavior (Lifetime) N    Calculated C-SSRS Risk Score (Lifetime/Recent) Low Risk      [place] Suicide Severity Rating Scale (Short Version)       7/5/2019    11:16 AM 2/21/2020    10:08 AM 6/2/2020     9:57 AM 6/26/2024     7:21 PM 10/31/2024     3:42 PM   [place] Suicide Severity Rating (Short Version)   Over the past 2 weeks have you felt down, depressed, or hopeless? yes no no     Over the past 2 weeks have you had thoughts of killing yourself? no no no     Have you ever attempted to kill yourself? no no no     Q1 Wished to be Dead (Past Month)    0-->no    Q2 Suicidal Thoughts (Past Month)    0-->no    Q6 Suicide Behavior (Lifetime)    0-->no    Level of Risk per Screen    no risks indicated    1. Wish to be Dead (Since Last Contact)     N   2. Non-Specific Active Suicidal Thoughts (Since Last Contact)     N   Actual Attempt (Since Last Contact)     N   Has subject engaged in non-suicidal self-injurious behavior? (Since Last Contact)     N   Interrupted Attempts (Since Last Contact)     N   Aborted or Self-Interrupted Attempt (Since Last Contact)     N   Preparatory Acts or Behavior (Since Last Contact)     N   Suicide (Since Last Contact)     N   Calculated C-SSRS Risk Score (Since Last Contact)     No Risk Indicated       Personal and Family Medical History:  Patient does report a family history of mental health concerns.  Patient reports family history includes Alcoholism in his sister; Depression in his father and sister; Diabetes in his mother; Lupus in his sister; Skin Cancer in his father; Thrombosis in his father..      Patient does report Mental Health Diagnosis and/or Treatment.  Patient Patient reported the following previous diagnoses which include(s): depression .  Patient reported symptoms began early adulthood.  Patient has received mental health services in the past:  therapy; day treatment  .  Psychiatric Hospitalizations: none when   Patient denies a history of civil commitment.  Currently, patient psychotherapy  receiving other mental health services.  These include psychotherapy with Shellie Murphy .        Patient has had a physical  exam to rule out medical causes for current symptoms.  Date of last physical exam was within the past year. Client was encouraged to follow up with PCP if symptoms were to develop. The patient has a [facility] Primary Care Provider, who is named Elvis Guzman.  Patient reports the following current medical concerns: Afib, diabetes  .  Patient denies any issues with pain..   There are not significant appetite / nutritional concerns / weight changes.   Patient does not report a history of head injury / trauma / cognitive impairment.         Substance Use:   Patient did report a family history of substance use concerns; see medical history section for details.  Patient has not received chemical dependency treatment in the past.  Patient has not ever been to detox.      Patient is not currently receiving any chemical dependency treatment.           Substance History of use Age of first use Date of last use     Pattern and duration of use (include amounts and frequency)   Alcohol (Patient-Rptd) never used       REPORTS SUBSTANCE USE: N/A   Cannabis   (Patient-Rptd) never used     REPORTS SUBSTANCE USE: N/A     Amphetamines   (Patient-Rptd) never used     REPORTS SUBSTANCE USE: N/A   Cocaine/crack    (Patient-Rptd) never used       REPORTS SUBSTANCE USE: N/A   Hallucinogens (Patient-Rptd) never used         REPORTS SUBSTANCE USE: N/A   Inhalants (Patient-Rptd) never used         REPORTS SUBSTANCE USE: N/A   Heroin (Patient-Rptd) never used         REPORTS SUBSTANCE USE: N/A   Other Opiates (Patient-Rptd) never used     REPORTS SUBSTANCE USE: N/A   Benzodiazepine   (Patient-Rptd) never used     REPORTS SUBSTANCE USE: N/A   Barbiturates (Patient-Rptd) never used     REPORTS SUBSTANCE USE: N/A   Over the counter meds (Patient-Rptd) never used     REPORTS SUBSTANCE USE: N/A   Caffeine (Patient-Rptd) currently use (Patient-Rptd) 8   REPORTS SUBSTANCE USE: N/A   Nicotine  (Patient-Rptd) never used     REPORTS SUBSTANCE USE:  N/A   Other substances not listed above:  Identify:  (Patient-Rptd) never used     REPORTS SUBSTANCE USE: N/A     Patient reported the following problems as a result of their substance use: (Patient-Rptd) no problems, not applicable.    Substance Use: No symptoms    Based on the CAGE score of 0 and clinical interview there  are not indications of drug or alcohol abuse.    Significant Losses / Trauma / Abuse / Neglect Issues:   Patient (Patient-Rptd) did not  serve in the .  There are not indications or report of significant loss, trauma, abuse or neglect issues related to: .  Concerns for possible neglect are not present.     Safety Assessment:   Patient denies current homicidal ideation and behaviors.  Patient denies current self-injurious ideation and behaviors.    Patient denied risk behaviors associated with substance use.   Patient denies any high risk behaviors associated with mental health symptoms.  Patient reports the following current concerns for their personal safety: None.  Patient reports there (Patient-Rptd) are not firearms in the house.       .    History of Safety Concerns:  Patient denied a history of homicidal ideation.     Patient denied a history of personal safety concerns.    Patient denied a history of assaultive behaviors.    Patient denied a history of sexual assault behaviors.     Patient denied a history of risk behaviors associated with substance use.  Patient denies any history of high risk behaviors associated with mental health symptoms.  Patient reports the following protective factors:  (Patient-Rptd) dedication to family or friends; help seeking behaviors when distressed; healthy fear of risky behaviors or pain; access to a variety of clinical interventions and pets      Risk Plan:  See Recommendations for Safety and Risk Management Plan    Review of Symptoms per patient report:   Depression: Lack of interest or pleasure in doing things, Feeling sad, down, or depressed,  Feelings of hopelessness, Change in energy level, Change in sleep, Low self-worth, Difficulties concentrating, and Ruminations  Lulu:  No Symptoms  Psychosis: No Symptoms  Anxiety: Excessive worry, Nervousness, Ruminations, and Poor concentration  Panic:  No symptoms  Post Traumatic Stress Disorder:  Experienced traumatic event health events    Eating Disorder: No Symptoms  ADD / ADHD:  No symptoms  Conduct Disorder: No symptoms  Autism Spectrum Disorder: No symptoms  Obsessive Compulsive Disorder: No Symptoms    Patient reports the following compulsive behaviors and treatment history:  none .      Diagnostic Criteria:   Major Depressive Disorder  CRITERIA (A-C) REPRESENT A MAJOR DEPRESSIVE EPISODE - SELECT THESE CRITERIA  A) Recurrent episode(s) - symptoms have been present during the same 2-week period and represent a change from previous functioning 5 or more symptoms (required for diagnosis)   - Depressed mood. Note: In children and adolescents, can be irritable mood.     - Diminished interest or pleasure in all, or almost all, activities.    - variable sleep.    - Fatigue or loss of energy.    - Feelings of worthlessness or inappropriate and excessive guilt.    - Diminished ability to think or concentrate, or indecisiveness.   B) The symptoms cause clinically significant distress or impairment in social, occupational, or other important areas of functioning  C) The episode is not attributable to the physiological effects of a substance or to another medical condition  D) The occurence of major depressive episode is not better explained by other thought / psychotic disorders  E) There has never been a manic episode or hypomanic episode    Functional Status:  Patient reports the following functional impairments:  social interactions and work / vocational responsibilities.     Programmatic care:  Current LOCUS was assigned and patient needs the following level of care based on score 20  .      LOCUS Worksheet      Name: Gaurang Rivera MRN: [id number]    : 1970      Gender:  male    PMI:  unknown   Provider Name: Peyton LEI    Provider NPI:  [id number]    Actual level of Care Provided:  assessment    Service(s) receiving or referred to:  PHP    Reason for Variance: NA      Rating completed by: Peyton LEI      I. Risk of Harm:   2      Low Risk of Harm    II. Functional Status:   4      Serious Impairment    III. Co-Morbidity:   3      Significant Co-Morbidity    IV - A. Recovery Environment - Level of Stress:   2      Mildly Stressful Environment    IV - B. Recovery Environment - Level of Support:   3      Limited Support in Environment    V. Treatment and Recovery History:   4      Poor Response to Treatment and Recovery Management    VI. Engagement and Recovery Project:   2      Positive Engagement and Recovery       20 Composite Score    Level of Care Recommendation:   20 to 22       Medically Monitored Non-Residential Services     Clinical Summary:  1. Psychosocial, Cultural and Contextual Factors: complex health  .  2. Principal DSM5 Diagnoses  (Sustained by DSM5 Criteria Listed Above):   296.33 (F33.2) Major Depressive Disorder, Recurrent Episode, Severe With anxious distress.  3. Other Diagnoses that is relevant to services:   296.33 (F33.2) Major Depressive Disorder, Recurrent Episode, Severe With anxious distress.  4. Provisional Diagnosis:  296.33 (F33.2) Major Depressive Disorder, Recurrent Episode, Severe With anxious distress as evidenced by ROS, SOL, PHQ, chart review and the interview.  .  5. Prognosis: Relieve Acute Symptoms.  6. Likely consequences of symptoms if not treated: Worsening symptoms and diminishing ability to function.  .  7. Client strengths include:  caring, goal-focused, good listener, has a previous history of therapy, insightful, intelligent, and motivated .     Recommendations:     1. Plan for Safety and Risk Management:   Safety and Risk: Recommended  that patient call 911 or go to the local ED should there be a change in any of these risk factors.          Report to child / adult protection services was NA.     2. Patient's identified mental health concerns with a cultural influence will be addressed by at the request of the pt .     3. Initial Treatment will focus on:    Depressed Mood - severe .     4. Resources/Service Plan:    services are not indicated.   Modifications to assist communication are not indicated.   Additional disability accommodations are not indicated.      5. Collaboration:   Collaboration / coordination of treatment will be initiated with the following  support professionals: Collaborated with [facility] team .      6.  Referrals:   The following referral(s) will be initiated:  TBD .       A Release of Information has been obtained for the following:  none .     Clinical Substantiation/medical necessity for the above recommendations:  see assessment.    7. KHANH:    KHANH:   no problem use noted . Provider gave patient printed information about the  effects of chemical use on their health and well being. Recommendations:  none .     8. Records:   These were reviewed at time of assessment.   Information in this assessment was obtained from the medical record and  provided by patient who is a good historian.    Patient will have open access to their mental health medical record.    9.   Interactive Complexity: No    10. Safety Plan:       Provider Name/ Credentials:  Peyton Mathews [facility]  She/her  [facility]  Office hours: Tuesday-Friday 7:00 AM-5:00 PM   October 31, 2024    Crisis Resources    Refer to the resources below as needed.    Steps to care for yourself    If you are currently in counseling, call your counselor for an appointment  Call the local crisis resources below if needed.  Contact friends or family for support.  Get more exercise.  Do activities you enjoy.  Eat a well-balanced  diet and drink plenty of fluids.  Rest as needed.  Limit alcohol and recreational drugs. These can worsen depression.  Properly store or remove fire arms.     When to contact your primary care provider     You have thoughts of harming or killing yourself but have not made a plan to carry it out.  Your depression gets in the way of daily activities.  You are often unable to sleep.  You need help cutting back on alcohol or recreational drugs.    When to call 911 or go to the Emergency Room     Get emergency help right away if you have any of the following:  You are planning to harm or kill yourself and you have a way to carry out the plan.   You have injured yourself or others. Or, you think you will.  You feel confused or are having trouble thinking or remembering.  You are having delusions (false beliefs).  You are hearing voices or seeing things that aren t there.  You are feeling psychotic (paranoid, fearful, restless, agitated, nervous, racing thoughts or speech)    Crisis Resources   The [facility] is an adults only unit located at [facility] is a short term (generally less than 23 hour stay) designed for crisis intervention and stabilization. Pts have the opportunity to meet quickly with a behavioral health team for evaluation in a calm and peaceful therapuetic environment. To be evaluated for admission pts are triaged throught the [facility] ED.     The Behavioral Evaluation Center (BEC) is located at the [facility].The BEC is open to ages and provides a comprehensive behavioral health evaluation to those in crisis. Patients typically stay 24-36 hours.     The following hotlines are for both adults and children. The and are open 24 hours a day, 7 days a week unless noted otherwise.      Crisis Lines        Gambling Hotline  3.294.620.9100 [hope]        línea de crisis española  [phone]        [facility] Helpline  [phone]         National Hope Line  8.259.838.4412 [hope]        National Suicide Prevention Lifeline: text 988        The Nikolay Project (LGBTQ Youth Crisis Line)  6.694.426.0656  text START to 859-353        's Crisis Line  1.615.265.3251 (Press 1)  or text 540190        [facility] Crisis  [phone]      [facility] Mobile Crisis  [phone]      [facility] Crisis  [phone]      [facility] Mobile Crisis  [phone] (adults)  [phone] (children)      [facility] Mobile Crisis  [phone] (adults)  [phone] (children)      [facility] Mobile Crisis  [phone]      [facility] Mobile Crisis  [phone]    Community Resources      Fast Tracker  Linking people to mental health and substance use disorder resources  fasttrackermn.org        National [place] on Mental Illness (LEEANNE)  339.000.5733 or 1.888.LEEANNE.HELPS  https://namimn.org/        [facility] Urgent Care for Adult Mental Health  [facility] residents [address]  [phone]        [facility]  Free mental health counseling  https://walkin.org/  612.870.0565 X2    Mental Health Apps      Calm Harm  https://calmharm.co.uk/      My3  https://my3app.org/      [place] Safety Plan  https://www.mysafetyplan.org/

## 2024-10-31 ENCOUNTER — VIRTUAL VISIT (OUTPATIENT)
Dept: BEHAVIORAL HEALTH | Facility: CLINIC | Age: 54
End: 2024-10-31
Payer: COMMERCIAL

## 2024-10-31 ENCOUNTER — VIRTUAL VISIT (OUTPATIENT)
Dept: PSYCHIATRY | Facility: CLINIC | Age: 54
End: 2024-10-31
Payer: COMMERCIAL

## 2024-10-31 DIAGNOSIS — F33.2 SEVERE EPISODE OF RECURRENT MAJOR DEPRESSIVE DISORDER, WITHOUT PSYCHOTIC FEATURES (H): Primary | ICD-10-CM

## 2024-10-31 DIAGNOSIS — F33.2 SEVERE EPISODE OF RECURRENT MAJOR DEPRESSIVE DISORDER, WITHOUT PSYCHOTIC FEATURES (H): ICD-10-CM

## 2024-10-31 PROCEDURE — 99214 OFFICE O/P EST MOD 30 MIN: CPT | Mod: 95 | Performed by: NURSE PRACTITIONER

## 2024-10-31 PROCEDURE — G2211 COMPLEX E/M VISIT ADD ON: HCPCS | Mod: 95 | Performed by: NURSE PRACTITIONER

## 2024-10-31 PROCEDURE — 90791 PSYCH DIAGNOSTIC EVALUATION: CPT | Mod: 95 | Performed by: SOCIAL WORKER

## 2024-10-31 RX ORDER — BUPROPION HYDROCHLORIDE 300 MG/1
300 TABLET ORAL EVERY MORNING
Qty: 30 TABLET | Refills: 2 | Status: SHIPPED | OUTPATIENT
Start: 2024-10-31

## 2024-10-31 RX ORDER — VENLAFAXINE HYDROCHLORIDE 75 MG/1
75 CAPSULE, EXTENDED RELEASE ORAL DAILY
Qty: 90 CAPSULE | Refills: 0 | Status: SHIPPED | OUTPATIENT
Start: 2024-10-31

## 2024-10-31 RX ORDER — LURASIDONE HYDROCHLORIDE 120 MG/1
120 TABLET, FILM COATED ORAL
Qty: 90 TABLET | Refills: 0 | Status: SHIPPED | OUTPATIENT
Start: 2024-10-31

## 2024-10-31 RX ORDER — LURASIDONE HYDROCHLORIDE 120 MG/1
TABLET, FILM COATED ORAL
Qty: 30 TABLET | Refills: 1 | OUTPATIENT
Start: 2024-10-31

## 2024-10-31 ASSESSMENT — COLUMBIA-SUICIDE SEVERITY RATING SCALE - C-SSRS
6. HAVE YOU EVER DONE ANYTHING, STARTED TO DO ANYTHING, OR PREPARED TO DO ANYTHING TO END YOUR LIFE?: NO
1. SINCE LAST CONTACT, HAVE YOU WISHED YOU WERE DEAD OR WISHED YOU COULD GO TO SLEEP AND NOT WAKE UP?: NO
TOTAL  NUMBER OF ABORTED OR SELF INTERRUPTED ATTEMPTS SINCE LAST CONTACT: NO
SUICIDE, SINCE LAST CONTACT: NO
ATTEMPT SINCE LAST CONTACT: NO
2. HAVE YOU ACTUALLY HAD ANY THOUGHTS OF KILLING YOURSELF?: NO
TOTAL  NUMBER OF INTERRUPTED ATTEMPTS SINCE LAST CONTACT: NO

## 2024-10-31 NOTE — ASSESSMENT & PLAN NOTE
Feels getting back on the Latuda has been beneficial along with the combination of bupropion  mg and venlafaxine XR 75 mg, however partially effective.  Is hopeful of the benefit he may experience with completing an IOP or PHP.  His intake appointment is November 11.  Will increase the bupropion XL to 300 mg which was historic dose.  Follow-up in 2 months if a provider is not managing his medications well in the outpatient programming.  If they are managing his medications he will cancel and follow up when discharged.

## 2024-10-31 NOTE — NURSING NOTE
Current patient location: [facility] [address]   [facility] [place] [address]    Is the patient currently in the state of [place]? YES    Visit mode:VIDEO    If the visit is dropped, the patient can be reconnected by: VIDEO VISIT: Text to cell phone:   Telephone Information:   Mobile [phone]    and VIDEO VISIT: Send to e-mail at: [email or web address]    Will anyone else be joining the visit? NO  (If patient encounters technical issues they should call [phone])    Are changes needed to the allergy or medication list? No    Are refills needed on medications prescribed by this physician? NO    Rooming Documentation:  Questionnaire(s) completed    Reason for visit: RECHECK    Yael Moreno

## 2024-10-31 NOTE — PROGRESS NOTES
Virtual Visit Details    Type of service:  Video Visit   Video Start Time:  1550  Video End Time: 1612    Originating Location (pt. Location): Home    Distant Location (provider location):  Off-site   Platform used for Video Visit: "[employer]        PSYCHIATRIC MEDICATION FOLLOW UP APPT     Name:  Gaurang Rivera  : 1970    Referred by: Lary Brizuela PA-C  Patient Care Team:  Elvis Guzman MD as PCP - General (Family Practice)  Elvis Guzman MD as Assigned PCP  Vaishnavi Rock PA as Physician Assistant (Urology)  Angeles Wise MD as MD (Dermatology)  Adair Waite RPH as Pharmacist (Pharmacist)  Lluvia Cárdenas PA-C as Physician Assistant (Gastroenterology)  Adair Waite RPH as Assigned MTM Pharmacist  Angeles Wise MD as Assigned Surgical Provider  Therapist: in therapy currently    Patient attended the phone/video session alone.    Last seen for outpatient psychiatry Return Visit on 2024.      FOLLOWING PLAN PUT INTO PLACE:     Was without insurance for over six months and not taking medications with the exception of Venlafaxine XR 75 mg.  Will restart and titrate the Latuda to 120 mg over two weeks.  Start bupropion  mg and continue the Venlafaxine XR 75 mg.  Sleep study referral placed  Referral for Partial Hospitalization Program placed  Follow-up in six weeks             INTERIM HISTORY     COMMUNICATIONS FROM PATIENT VIA:  none     RECORDS AVAILABLE FOR REVIEW: EHR records through [employer]  and previous psychiatric progress note.  In addition, reviewed the assessment completed by Peyton Mathews [facility], dated today        HISTORY OF PRESENT ILLNESS   CCPS referral for psychiatric medication consult in 2023.   Reports history of longstanding depression.  Reports he first sought treatment when he was in late 20's.  Has been on medications essentially since then.   Denies prior psychiatric hospitalizations. Hx of suicidal ideation, no suicide  "attempts. No history of self-injurious behaviors. Genetically loaded for  anxiety and substance use. Grew up in an intact home with all basic needs being met.     Thorough assessment of a potential underlying bipolar trajectory on initial assessment as follows:      Mood Disorder Questionnaire (MDQ)  Adapted from the \"Mental Health Queri,\" Quality Enhancement Research Initiative     Has there ever been a period of time when you were not your usual self and     -you felt so good or so hyper that other people thought you were not your normal self or you were so hyper that you got into trouble?   NO  -you were so irritable that you shouted at people or started fights or arguments? NO  -you felt much more self-confident than usual? NO                                                             -you got much less sleep than usual and found that you didn't really miss it?  NO  -you were much more talkative and spoke much faster than usual?  NO  -thoughts raced through your head or you couldn't slow your mind down?  ENDORSES RUMINATION          -you were so easily distracted by things around you that you had trouble concentrating or staying on track? NO  -you had much more energy than usual? NO  -you were much more active or did many more things than usual?  NO                             -you were much more social or outgoing than usual, for example, you telephoned friends in the middle of the night?  NO                                         -you did things that were unusual for you or other people might have thought were excessive, foolish or risky? NO  -spending money got you or your family in trouble?     NO                                   If you checked YES to more than one of the above, have several of these ever happened during the same period of time?  NO     How much of a problem did any of these cause you - like being unable to work; having family, money, or legal troubles; getting into arguments or fights?  " NO     Have any of your blood relatives (i.e. children, siblings, parents, grandparents, aunts, uncles) had manic-depressive illness or bipolar disorder?  NO     Has a health professional ever told you that you have manic- depressive illness or bipolar disorder?  NO     [place] Bipolarity Index utilized to further assess for an underlying bipolar disorder. This explores presence of hypomania or jermain, age of onset of first mood symptoms, illness course and other features generally only visible over time, response to medications (antidepressants and mood stabilizers), and family history of mood and substance use.   Patient endorses the following:     History of many failed antidepressant trials  Yes      Age of first depression onset: YOUNG ADULT  History of greater than or equal to five total life time depressive episodes: Yes   Depressive episode with concurrent hypomanic symptoms: No   Questionable activation from antidepressant: No   Family history of mood disorder: No     FAMILY, MEDICAL, SURGICAL HISTORY REVIEWED.  MEDICATION HAVE BEEN REVIEWED AND ARE CURRENT TO THE BEST OF MY KNOWLEDGE AND ABILITY.   rowing, no longer working due to mental health symptoms     MEDICATIONS                                                                                                Current Outpatient Medications   Medication Sig Dispense Refill    acetaminophen (TYLENOL) 325 MG tablet Take 2 tablets (650 mg) by mouth every 4 hours as needed for mild pain or fever.      atorvastatin (LIPITOR) 40 MG tablet Take 1 tablet (40 mg) by mouth daily.      buPROPion (WELLBUTRIN XL) 150 MG 24 hr tablet Take 1 tablet (150 mg) by mouth every morning. 90 tablet 0    cholecalciferol (VITAMIN D3) 125 mcg (5000 units) capsule Take 1 capsule (125 mcg) by mouth daily.      Continuous Glucose Sensor (FREESTYLE JAVI 14 DAY SENSOR) MISC AS DIRECTED AND CHANGE EVERY 14 DAYS,      empagliflozin (JARDIANCE) 25 MG TABS tablet Take 1 tablet (25  mg) by mouth daily. 90 tablet 3    exenatide ER (BYDUREON BCISE) 2 MG/0.85ML auto-injector INJECT 2MG SUBCUTANEOUS EVERY 7 DAYS 3.4 mL 5    ezetimibe (ZETIA) 10 MG tablet Take 1 tablet (10 mg) by mouth daily. 90 tablet 3    lisinopril (ZESTRIL) 5 MG tablet Take 1 tablet (5 mg) by mouth daily.      lurasidone (LATUDA) 120 MG TABS tablet Take 1 tablet (120 mg) by mouth daily with food. Start after 40 mg for one week then 80 mg for one week then 120 mg thereafter 30 tablet 1    lurasidone (LATUDA) 80 MG TABS tablet 1/2 tab for one week then increase to 1 tab for one week then increase to 120 (new script) thereafter 14 tablet 0    metFORMIN (GLUCOPHAGE) 1000 MG tablet Take 1 tablet (1,000 mg) by mouth 2 times daily (with meals).      multivitamin w/minerals (THERA-VIT-M) tablet Take 1 tablet by mouth daily.      omeprazole (PRILOSEC OTC) 20 MG EC tablet Take 1 tablet (20 mg) by mouth daily as needed.      oxyCODONE (ROXICODONE) 5 MG tablet Take 2 tablets (10 mg) by mouth 2 times daily as needed for pain. 28 tablet 0    oxyCODONE (ROXICODONE) 5 MG tablet Take 2 tablets (10 mg) by mouth every 6 hours as needed for pain 24 tablet 0    QUEtiapine (SEROQUEL) 25 MG tablet Take 2 tablets (50 mg) by mouth nightly as needed (insomnia).      traZODone (DESYREL) 50 MG tablet Take 1-2 tablets ( mg) by mouth nightly as needed for sleep.      venlafaxine (EFFEXOR XR) 75 MG 24 hr capsule TAKE 1 CAPSULE(75 MG) BY MOUTH DAILY 30 capsule 0    warfarin ANTICOAGULANT (COUMADIN) 5 MG tablet Take 7.5 mg every Sun; 5 mg all other days or As directed by Anticoagulation clinic 145 tablet 1     No current facility-administered medications for this visit.      NOTES ABOUT CURRENT PSYCHOTROPIC MEDICATIONS:    Latuda 120 mg   Bupropion  mg  Venlafaxine XR 75 mg       SUPPLEMENTS: denies   SIDE EFFECTS:   denies    COMPLIANCE:   states Adherent to medication regimen       PAST PSYCHOTROPIC MEDICATIONS:  Fluoxetine years ago  Sertraline  "  Abilify 5 mg, unclear benefit.  Has been on this over 9 months  Quetiapine 25-50 mg at bedtime, about a month,   Trazodone not effective in targeting insomnia and am grogginess      TODAY PATIENT REPORTS THE FOLLOWING PSYCHIATRIC ROS:   Per [facility], Peyton Mathews, during today's team-based visit:  \" update: Pt reports that he had some blood clots that did eventually resolve. He sees the vascular doctor again in Dec. His pain is manageable. His mental health sx are unchanged (low energy, low motivation, low mood). His F is in very poor health but is  home. He is on oxygen 24 hours per day. F is supposed to be moving into an assisted living soon. He is looking forward to this.   Stresses: complex health hx  Appetite: unremarkable  Sleep: unremarkable  Therapy: has an intake for [facility]  KHANH: No  Preg: NA  Most important: minimal improvement with medication\"      EXERCISE: Minimal exercise: due to mental health symptoms   SIDE EFFECTS:   tolerating medications without reported side effects  COMPLIANCE:   states Adherent to medication regimen  REPORTS THE FOLLOWING NEW MEDICAL ISSUES:   none    PROBLEM: DEPRESSION: No change. Vegetative symptoms of depression continue.  Endorses anhedonia, feeling down or depressed, sleep impairment, anergia, appetite changes, low self-esteem, trouble concentrating, psychomotor reslessness or slowing, hopelessness, helplessness, worthlessness, ruminations, irritability.        10/10/2024     3:26 AM   Last PHQ-9   1.  Little interest or pleasure in doing things 3    2.  Feeling down, depressed, or hopeless 3    3.  Trouble falling or staying asleep, or sleeping too much 3    4.  Feeling tired or having little energy 3    5.  Poor appetite or overeating 2    6.  Feeling bad about yourself 3    7.  Trouble concentrating 0    8.  Moving slowly or restless 0    Q9: Thoughts of better off dead/self-harm past 2 weeks 3    PHQ-9 Total Score 20   In the past two weeks have " "you had thoughts of suicide or self harm? Yes    Do you have concerns about your personal safety or the safety of others? No    In the past 2 weeks have you thought about a plan or had intention to harm yourself? No    In the past 2 weeks have you acted on these thoughts in any way? No        Patient-reported         8/2/2024     7:01 AM 8/29/2024     3:19 PM 10/10/2024     3:26 AM   PHQ-9 SCORE   PHQ-9 Total Score MyChart   20 (Severe depression)   PHQ-9 Total Score 21 20 20     PHQ9 score is Not completed today  Suicidal ideation:  No     PROBLEM: ANXIETY: No change.    GAD7 score is Not completed today      1/8/2020    10:55 AM 3/30/2021     2:18 PM 8/3/2023     1:00 PM   SOL-7 SCORE   Total Score 1 (minimal anxiety)  5 (mild anxiety)   Total Score 1 0 5       PERTINENT PAST MEDICAL AND SURGICAL HISTORY     Past Medical History:   Diagnosis Date    Antiplatelet or antithrombotic long-term use     Closed displaced fracture of neck of right scapula, sequela 10/21/2019    Added automatically from request for surgery [id number]    Depressive disorder 2001    Diabetes (H)     DVT (deep venous thrombosis) (H) 07/05/2019    Elevated blood pressure reading without diagnosis of hypertension 05/13/2018    Hypercholesteremia 2012    Personal history of other medical treatment     blood clot foot july 2019    Pilonidal cyst 05/13/2018       VITALS     BP Readings from Last 1 Encounters:   09/18/24 105/67     Pulse Readings from Last 1 Encounters:   09/18/24 83     Wt Readings from Last 1 Encounters:   09/18/24 120.2 kg (265 lb)     Ht Readings from Last 1 Encounters:   09/18/24 1.981 m (6' 6\")     Estimated body mass index is 30.62 kg/m  as calculated from the following:    Height as of 9/18/24: 1.981 m (6' 6\").    Weight as of 9/18/24: 120.2 kg (265 lb).    LABS & IMAGING                                                                                                                   Recent Labs   Lab Test 06/29/24  0503 " 06/03/20  0706 05/28/20  1135   WBC 5.0   < > 6.8   HGB 15.2   < > 16.1   HCT 43.6   < > 47.8   MCV 87   < > 87      < > 192   ANEU  --   --  3.3    < > = values in this interval not displayed.     Recent Labs   Lab Test 09/13/24  0930 06/29/24  1301 06/29/24  0503     --  138   POTASSIUM 3.6  --  3.9   CHLORIDE 98  --  103   CO2 17*  --  24   *   < > 246*   SAVI 9.4  --  9.0   MAG  --   --  1.9   BUN 11.2  --  10.9   CR 0.86  --  0.86   GFRESTIMATED >90  --  >90   ALBUMIN 4.6  --  3.8   PROTTOTAL 7.6  --  6.5   AST 24  --  20   ALT 24  --  13   ALKPHOS 81  --  86   BILITOTAL 0.5  --  0.7    < > = values in this interval not displayed.     Recent Labs   Lab Test 09/27/24  0917 09/13/24  0930 06/27/24  0519 01/15/24  1615   CHOL  --   --   --  210*   *  --   --  95   HDL  --   --   --  39*   TRIG  --   --   --  505*   A1C  --  11.2*   < > 8.5*    < > = values in this interval not displayed.     Recent Labs   Lab Test 05/01/23  0939   TSH 1.73       ALLERGY & IMMUNIZATIONS     No Known Allergies    MEDICAL REVIEW OF SYSTEMS:   Ten system review was completed with pertinent positives noted     MENTAL STATUS EXAM:      General/Constitutional:  Appearance:   awake, alert, adequately groomed, appeared stated age and no apparent distress  Attitude:    cooperative   Eye Contact:  good  Musculoskeletal:  Psychomotor Behavior:  no evidence of tardive dyskinesia, dystonia, or tics from the head up  Psychiatric:  Speech:  clear, coherent, regular rate, rhythm, and volume,   No pressure speech noted.  Associations:  no loose associations  Thought Process:   logical, linear and goal oriented  Thought Content:  chronic suicidal idation, longstanding, no intent or plan.  No homicidal ideation, no evidence of psychotic thought, no auditory hallucinations present and no visual hallucinations present  Mood:  depressed continues and is with slight profile  Affect:  decreased emotional variability  was  congruent to speech content.  Insight:  good  Judgment:  intact, adequate for safety  Impulse Control:  intact  Neurological:  Oriented to:  person, place, time, and situation  Attention Span and Concentration:  Able to attend to the interview        Language: intact       Recent and Remote Memory:  Intact to interview. Not formally assessed. No amnesia.    Fund of Knowledge: appropriate        SAFETY   Feels safe in home: YES     Suicidal ideation: chronic, no intent or plan  History of suicide attempts:  No   Hx of impulsivity: No   Hope for the future: present    Hx of Command hallucinations or current psychosis: None endorsed    History of Self-injurious behaviors: denies    Family member  by suicide:  not discussed       SAFETY ASSESSMENT:   Based on all available evidence including the factors cited above, overall Risk for harm is low and is appropriate for outpatient level of care.   Recommended that patient call 911 or go to the local ED should there be a change in any of these risk factors.     DSM 5 DIAGNOSIS:   296.32 (F33.1) Major Depressive Disorder, Recurrent Episode, Moderate _    MEDICAL COMORBIDITY IMPACTING CLINICAL PICTURE: None noted.      ASSESSMENT AND PLAN       Problem List Items Addressed This Visit          Behavioral     Feels getting back on the Latuda has been beneficial along with the combination of bupropion  mg and venlafaxine XR 75 mg, however partially effective.  Is hopeful of the benefit he may experience with completing an IOP or PHP.  His intake appointment is .  Will increase the bupropion XL to 300 mg which was historic dose.  Follow-up in 2 months if a provider is not managing his medications well in the outpatient programming.  If they are managing his medications he will cancel and follow up when discharged.         MDD (major depressive disorder), recurrent episode, severe (H)    Relevant Medications    buPROPion (WELLBUTRIN XL) 300 MG 24 hr tablet     venlafaxine (EFFEXOR XR) 75 MG 24 hr capsule    lurasidone (LATUDA) 120 MG TABS tablet            CONSULTS/REFERRALS:   Continue therapy   Coordinate care with therapist as needed       MEDICAL:   None at this time  Coordinate care with PCP (Elvis Guzman) as needed  Follow up with primary care provider as planned or for acute medical concerns.     PSYCHOEDUCATION:  Medication side effects and alternatives reviewed. Health promotion activities recommended and reviewed today. All questions addressed. Education and counseling completed regarding risks and benefits of medications and psychotherapy options.  Consent provided by patient/guardian  Call the psychiatric nurse line with medication questions or concerns at [phone].  CapsoVision may be used to communicate with your provider, but this is not intended to be used for emergencies.  TutorialTab.gov is information for patients.  It is run by the [employer] and it contains information about all disorders, diseases and all medications.       COMMUNITY RESOURCES:    CRISIS NUMBERS: Provided in AVS 9/19/2023  National Suicide Prevention Lifeline: 3-692-175-TALK (249-993-1682)  Principle Power/resources for a list of additional resources (SOS)            [facility] - [phone]   Urgent Care Adult Mental Xuxpqe-[phone] mobile unit/ 24/7 crisis line  [facility] -[phone]   COPE 24/7 [place] Mobile Team -[phone] (adults)/ [phone] (child)  Poison Control Center - [phone]    OR  go to nearest ER  Crisis Text Line for any crisis 24/7 send this-   To: 541834   [facility] ([facility]  [phone]  National Suicide Prevention Lifeline: 687.238.8272 (TTY: 947.121.5970). Call anytime for help.  (www.suicidepreventionlifeline.org)  National [place] on Mental Illness (www.dianne.org): 181.730.6075 or 298-624-0501.   Mental Health Association (www.mentalhealth.org): 167.727.3265  or 808-705-4495.  [place] Crisis Text Line: Text [place] to 498234  Suicide LifeLine Chat: suicidepreventionTechFaithline.org/chat     ADMINISTRATIVE BILLING:   Level of Medical Decision Making:   - At least 1 chronic problem that is not stable  - Engaged in prescription drug management during visit (discussed any medication benefits, side effects, alternatives, etc.)     The longitudinal plan of care for the diagnosis(es)/condition(s) as documented were addressed during this visit. Due to the added complexity in care, I will continue to support Peter in the subsequent management and with ongoing continuity of care.  Patient Status:  Patient will continue to be seen for ongoing consultation and stabilization.      Signed:   Meagan Hernandez, MSN, APRN, FMHNP-[facility] Collaborative Care Psychiatry Service (CCPS)   Chart documentation done in part with Dragon Voice Recognition software.  Although reviewed after completion, some word and grammatical errors may remain.

## 2024-10-31 NOTE — PATIENT INSTRUCTIONS
**For crisis resources, please see the information at the end of this document**     Thank you for coming to the [facility] [place] [facility].    TREATMENT PLAN:    MEDICATIONS:   -Continue Latuda 120 mg, venlafaxine XR 75 mg and increase bupropion XL to 300 mg    -PSYCHOEDUCATION: Risks of antipsychotic medications:  metabolic disorder and movement disorder, and the need for blood monitoring of sugar and lipids/cholesterol while taking the medication.      CONSULTS/REFERRALS:   Was without insurance for over six months and not taking medications with the exception of Venlafaxine XR 75 mg.  Will restart and titrate the Latuda to 120 mg over two weeks.  Start bupropion  mg and continue the Venlafaxine XR 75 mg.  Sleep study referral placed  Referral for Partial Hospitalization Program placed  Follow-up in six weeks     LABS/PROCEDURES:    Please call your [facility] clinic and ask for a lab only appointment at your earliest convenience.  If your results are reassuring or normal they will be mailed to you or sent through [employer] within 7 days. If the lab tests need quick action we will call you with the results. The phone number we will call with results is # [phone].      FOLLOW UP: Schedule an appointment with me in two months or sooner as needed.  The intake team should be calling you to schedule.  If you dont hear from them, or they were unable to reach you, please call [phone] to schedule.  Follow up with primary care provider as planned or for acute medical concerns.  Call the psychiatric nurse line with medication questions or concerns at [phone].      Medication Refills:  If you need any refills please call your pharmacy and they will contact us. Our fax number for refills is [phone]. Please allow three business for refill processing. If you need to  your refill at a new pharmacy, please contact the new pharmacy directly. The new pharmacy will help you  get your medications transferred.     [facility] Assistance [phone]  Financial Assistance [phone]  [facility] Billing [phone]  Central Billing Office, [facility]: [phone]  [place] [facility] Billing [phone]  Medical Records [phone]  [place] [facility] Patient Bill of Rights [email or web address]       MENTAL HEALTH CRISIS RESOURCES:  For a emergency help, please call 911 or go to the nearest Emergency Department.     Emergency Walk-In Options:   EmPATH Unit @ [facility] ([place]): [phone] - Specialized mental health emergency area designed to be calming  [facility] ED [facility] ([place]): [phone]  [facility] ([place]): [phone]  [facility]): [phone]    National Crisis Information: Call 988 Suicide and Crisis Lifeline  Crisis Text Line (free 24/7):  call **CRISIS (**749417) Crisis or use the texting option by texting 340726.   National Suicide Prevention Lifeline: Call 988  Poison Control Center: 2-242-608-4168  Trans Lifeline: 1-341-601-1449 - Hotline for transgender people of all ages  The [employer] Project: 9-962-767-1442 - Hotline for LGBT youth   List of all [facility] resources:   https://[place].gov/dhs/people-we-serve/adults/health-care/mental-health/resources/crisis-contacts.jsp    For Non-Emergency Support:   Fast Tracker: Mental Health & Substance Use Disorder Resources -   https://www.fasttrackermn.org/       Again thank you for choosing [facility] [place] [facility] and please let us know how we can best partner with you to improve you and your family's health.    You may be receiving a survey regarding this appointment. We would love to have your feedback, both positive and negative. The survey is done by an external company, so your answers are anonymous.    Patient Education   Collaborative Care Psychiatry Service  What to  "Expect  Here's what to expect from your Collaborative Care Psychiatry Service (CCPS).   About CCPS  CCPS means 2 people work together to help you get better. You'll meet with a behavioral health clinician and a psychiatric doctor. A behavioral health clinician helps people with mental health problems by talking with them. A psychiatric doctor helps people by giving them medicine.  How it works  At every visit, you'll see the behavioral health clinician (BHC) first. They'll talk with you about how you're doing and teach you how to feel better.   Then you'll see the psychiatric doctor. This doctor can help you deal with troubling thoughts and feelings by giving you medicine. They'll make sure you know the plan for your care.   CCPS usually takes 3 to 6 visits. If you need more visits, we may have you start seeing a different psychiatric doctor for ongoing care.  If you have any questions or concerns, we'll be glad to talk with you.  About visits  Be open  At your visits, please talk openly about your problems. It may feel hard, but it's the best way for us to help you.  Cancelling visits  If you can't come to your visit, please call us right away at [phone]. If you don't cancel at least 24 hours (1 full day) before your visit, that's \"late cancellation.\"  Being late to visits  Being very late is the same as not showing up. You will be a \"no show\" if:  Your appointment starts with a BHC, and you're more than 15 minutes late for a 30-minute (half hour) visit. This will also cancel your appointment with the psychiatric doctor.  Your appointment is with a psychiatric doctor only, and you're more than 15 minutes late for a 30-minute (half hour) visit.  Your appointment is with a psychiatric doctor only, and you're more than 30 minutes late for a 60-minute (full hour) visit.  If you cancel late or don't show up 2 times within 6 months, we may end your care.   Getting help between visits  If you need help between " visits, you can call us Monday to Friday from 8 a.m. to 4:30 p.m. at [phone].  Emergency care  Call 911 or go to the nearest emergency department if your life or someone else's life is in danger.  Call 988 anytime to reach the national Suicide and Crisis hotline.  Medicine refills  To refill your medicine, call your pharmacy. You can also call [facility]'s Behavioral Access at [phone], Monday to Friday, 8 a.m. to 4:30 p.m. It can take 1 to 3 business days to get a refill.   Forms, letters, and tests  You may have papers to fill out, like FMLA, short-term disability, and workability. We can help you with these forms at your visits, but you must have an appointment. You may need more than 1 visit for this, to be in an intensive therapy program, or both.  Before we can give you medicine for ADHD, we may refer you to get tested for it or confirm it another way.  We may not be able to give you an emotional support animal letter.  We don't do mental health checks ordered by the court.   We don't do mental health testing, but we can refer you to get tested.   Thank you for choosing us for your care.  For informational purposes only. Not to replace the advice of your health care provider. Copyright   2022 [facility]. All rights reserved. [employer] [id number] - 12/22.

## 2024-11-04 ENCOUNTER — TELEPHONE (OUTPATIENT)
Dept: BEHAVIORAL HEALTH | Facility: CLINIC | Age: 54
End: 2024-11-04

## 2024-11-04 ENCOUNTER — LAB (OUTPATIENT)
Dept: LAB | Facility: CLINIC | Age: 54
End: 2024-11-04
Payer: COMMERCIAL

## 2024-11-04 DIAGNOSIS — I26.94 MULTIPLE SUBSEGMENTAL PULMONARY EMBOLI WITHOUT ACUTE COR PULMONALE (H): ICD-10-CM

## 2024-11-04 DIAGNOSIS — Z86.718 H/O DEEP VENOUS THROMBOSIS: ICD-10-CM

## 2024-11-04 DIAGNOSIS — D68.51 HETEROZYGOUS FACTOR V LEIDEN MUTATION (H): ICD-10-CM

## 2024-11-04 DIAGNOSIS — Z79.01 LONG TERM (CURRENT) USE OF ANTICOAGULANTS: ICD-10-CM

## 2024-11-04 DIAGNOSIS — I82.409 ACUTE DEEP VEIN THROMBOSIS (DVT) OF OTHER VEIN OF LOWER EXTREMITY, UNSPECIFIED LATERALITY (H): ICD-10-CM

## 2024-11-04 PROCEDURE — 85260 CLOT FACTOR X STUART-POWER: CPT

## 2024-11-04 PROCEDURE — 36415 COLL VENOUS BLD VENIPUNCTURE: CPT

## 2024-11-04 NOTE — TELEPHONE ENCOUNTER
Writer MAGGIEM informing patient that she received message regarding pt's preference for [facility] location. Writer informed pt of his waitlist status at [facility] and asked for a return call if he would prefer to start [place] immediately.

## 2024-11-04 NOTE — TELEPHONE ENCOUNTER
----- Message from Ralph KENDRICK sent at 11/1/2024  3:29 PM CDT -----  Regarding: Talib NAPOLES 11/6 Admit  Adult Mental Health Programmatic Care Schedule Request    Patient Name: Gaurang Rivera  Location of programming: [facility]  Start Date: 11/6/2024    Adult Program Group: Adult Program Group: Adult Program Group: Adult Program Group: Adult Program Group: [facility] PHP [[id number]]  Schedule:  M, T, W, TH, F  8:30AM - 2:00PM  25 hours per week for 3 weeks  Number of visits to be scheduled: 15 days    Attending Provider (MD):  Dr. Terry Blood ([place]); Dr. Vu Ahn ([place])  Visit Type:  In-Person    Accommodations Needed: N/A  Alerts Identified/Substantiation: N/A  Consulted with Supervisor: N/A

## 2024-11-05 ENCOUNTER — TELEPHONE (OUTPATIENT)
Dept: BEHAVIORAL HEALTH | Facility: CLINIC | Age: 54
End: 2024-11-05
Payer: COMMERCIAL

## 2024-11-05 NOTE — TELEPHONE ENCOUNTER
Writer contacted patient to discuss programming. Writer requested a call back to [phone].    Lorin Wallace [facility] on 11/5/2024 at 1:11 PM

## 2024-11-06 ENCOUNTER — ANTICOAGULATION THERAPY VISIT (OUTPATIENT)
Dept: ANTICOAGULATION | Facility: CLINIC | Age: 54
End: 2024-11-06
Payer: COMMERCIAL

## 2024-11-06 ENCOUNTER — TELEPHONE (OUTPATIENT)
Dept: BEHAVIORAL HEALTH | Facility: CLINIC | Age: 54
End: 2024-11-06
Payer: COMMERCIAL

## 2024-11-06 DIAGNOSIS — Z86.718 H/O DEEP VENOUS THROMBOSIS: ICD-10-CM

## 2024-11-06 DIAGNOSIS — D68.51 HETEROZYGOUS FACTOR V LEIDEN MUTATION (H): Primary | ICD-10-CM

## 2024-11-06 DIAGNOSIS — I82.409 ACUTE DEEP VEIN THROMBOSIS (DVT) OF OTHER VEIN OF LOWER EXTREMITY, UNSPECIFIED LATERALITY (H): ICD-10-CM

## 2024-11-06 DIAGNOSIS — I26.94 MULTIPLE SUBSEGMENTAL PULMONARY EMBOLI WITHOUT ACUTE COR PULMONALE (H): ICD-10-CM

## 2024-11-06 DIAGNOSIS — Z79.01 LONG TERM (CURRENT) USE OF ANTICOAGULANTS: ICD-10-CM

## 2024-11-06 LAB — FACT X ACT/NOR PPP CHRO: 28 % (ref 70–130)

## 2024-11-06 NOTE — PROGRESS NOTES
ANTICOAGULATION MANAGEMENT     Gaurang Rivera 54 year old male is on warfarin with therapeutic result. (Goal Chromogenic Factor X 40-20%)    Recent labs: (last 7 days)     11/04/24  1602   [id number]*       ASSESSMENT     Source(s): Chart Review  Previous result was Therapeutic last 2(+) visits  Medication, diet, health changes since last result: chart reviewed; none identified       PLAN     Recommended plan for no diet, medication or health factor changes affecting result    Dosing Instructions: Continue your current warfarin dose 7.5 mg Mon, Fri; 5 mg all other days  with next Chromogenic Factor X in 4 weeks       Detailed voice message left for Gaurang with dosing instructions and follow up date.     Contact [phone] to schedule and with any changes, questions or concerns.     Education provided:   Please call back if any changes to your diet, medications or how you've been taking warfarin    Plan made per [facility] anticoagulation protocol    Harshil Salamanca RN  11/6/2024  Anticoagulation Clinic  [facility] for routing messages: sujey RITCHIE  [facility] patient phone line: [phone]

## 2024-11-06 NOTE — TELEPHONE ENCOUNTER
----- Message from Helen Cortes sent at 11/6/2024  8:02 AM CST -----  Regarding: [facility] PHP Cancel Start  Good morning,     Pt will not be starting [place] PHP - he opted to wait for a [place] opening. I believe he cancelled all appointments but I may have forgot to send a cancellation in basket. Thank you!

## 2024-11-07 ENCOUNTER — TELEPHONE (OUTPATIENT)
Dept: BEHAVIORAL HEALTH | Facility: CLINIC | Age: 54
End: 2024-11-07
Payer: COMMERCIAL

## 2024-11-08 DIAGNOSIS — E78.5 HYPERLIPIDEMIA LDL GOAL <70: ICD-10-CM

## 2024-11-08 DIAGNOSIS — I82.409 ACUTE DEEP VEIN THROMBOSIS (DVT) OF OTHER VEIN OF LOWER EXTREMITY, UNSPECIFIED LATERALITY (H): Primary | ICD-10-CM

## 2024-11-08 DIAGNOSIS — E11.9 TYPE 2 DIABETES, HBA1C GOAL < 7% (H): ICD-10-CM

## 2024-11-08 DIAGNOSIS — F33.2 SEVERE EPISODE OF RECURRENT MAJOR DEPRESSIVE DISORDER, WITHOUT PSYCHOTIC FEATURES (H): ICD-10-CM

## 2024-11-11 ENCOUNTER — TELEPHONE (OUTPATIENT)
Dept: BEHAVIORAL HEALTH | Facility: CLINIC | Age: 54
End: 2024-11-11
Payer: COMMERCIAL

## 2024-11-11 NOTE — TELEPHONE ENCOUNTER
Attempted to contact patient to schedule start. Went straight to voicemail recording.    Lorin Wallace [facility] on 11/11/2024 at 2:54 PM

## 2024-11-12 ENCOUNTER — TELEPHONE (OUTPATIENT)
Dept: BEHAVIORAL HEALTH | Facility: CLINIC | Age: 54
End: 2024-11-12
Payer: COMMERCIAL

## 2024-11-12 RX ORDER — VENLAFAXINE HYDROCHLORIDE 75 MG/1
75 CAPSULE, EXTENDED RELEASE ORAL DAILY
Qty: 90 CAPSULE | Refills: 0 | OUTPATIENT
Start: 2024-11-12

## 2024-11-12 NOTE — TELEPHONE ENCOUNTER
----- Message from Kayy Ceballos sent at 11/12/2024  3:53 PM CST -----  Regarding: New Admit  Scheduling Request    Patient Name: Gaurang Rivera  Location of programming: [place]  Start Date: November / 14 / 2024  Group: [facility] on Monday, Tuesday, Wednesday, Thursday, and Friday at 09:00 AM to 03:00 PM  Attending Provider (MD):   Number of visits to be scheduled: 15 days    Visit Type: In-person or Treatment - 870    Additional notes:

## 2024-11-12 NOTE — TELEPHONE ENCOUNTER
RN received a refill request through [employer] basket from [facility]'s  Pharmacy [facility] for Effexor 75 mg capsule.  This refill request was DENIED for the following reasons:    1. Patient has requested this refill to soon.  This medication was just filled 10/31/24 for a 90 day supply.      Ramón Booth RN on 11/12/2024 at 10:34 AM

## 2024-11-12 NOTE — TELEPHONE ENCOUNTER
Ph call to pt regarding program start, no answer left msg asking to give us a call if he is still interested in programming..        QUIQUE Hernandez on 11/12/2024 at 1:16 PM

## 2024-11-12 NOTE — TELEPHONE ENCOUNTER
Phone call back from pt wants to start programming 11/14 welcome letter to be sent.    Kayy Ceballos [facility] on 11/12/2024 at 3:49 PM

## 2024-11-14 ENCOUNTER — HOSPITAL ENCOUNTER (OUTPATIENT)
Dept: BEHAVIORAL HEALTH | Facility: CLINIC | Age: 54
End: 2024-11-14
Attending: PSYCHIATRY & NEUROLOGY
Payer: COMMERCIAL

## 2024-11-14 VITALS
RESPIRATION RATE: 16 BRPM | SYSTOLIC BLOOD PRESSURE: 120 MMHG | HEART RATE: 91 BPM | DIASTOLIC BLOOD PRESSURE: 81 MMHG | TEMPERATURE: 97.7 F | OXYGEN SATURATION: 98 %

## 2024-11-14 DIAGNOSIS — F33.2 SEVERE EPISODE OF RECURRENT MAJOR DEPRESSIVE DISORDER, WITHOUT PSYCHOTIC FEATURES (H): Primary | ICD-10-CM

## 2024-11-14 PROCEDURE — H0035 MH PARTIAL HOSP TX UNDER 24H: HCPCS

## 2024-11-14 PROCEDURE — H0035 MH PARTIAL HOSP TX UNDER 24H: HCPCS | Performed by: COUNSELOR

## 2024-11-14 ASSESSMENT — PATIENT HEALTH QUESTIONNAIRE - PHQ9: 5. POOR APPETITE OR OVEREATING: SEVERAL DAYS

## 2024-11-14 ASSESSMENT — COLUMBIA-SUICIDE SEVERITY RATING SCALE - C-SSRS
REASONS FOR IDEATION SINCE LAST CONTACT: COMPLETELY TO END OR STOP THE PAIN (YOU COULDN'T GO ON LIVING WITH THE PAIN OR HOW YOU WERE FEELING)
2. HAVE YOU ACTUALLY HAD ANY THOUGHTS OF KILLING YOURSELF?: YES
2. HAVE YOU ACTUALLY HAD ANY THOUGHTS OF KILLING YOURSELF?: YES
TOTAL  NUMBER OF INTERRUPTED ATTEMPTS SINCE LAST CONTACT: NO
1. SINCE LAST CONTACT, HAVE YOU WISHED YOU WERE DEAD OR WISHED YOU COULD GO TO SLEEP AND NOT WAKE UP?: YES
TOTAL  NUMBER OF ABORTED OR SELF INTERRUPTED ATTEMPTS SINCE LAST CONTACT: NO
SUICIDE, SINCE LAST CONTACT: NO
1. SINCE LAST CONTACT, HAVE YOU WISHED YOU WERE DEAD OR WISHED YOU COULD GO TO SLEEP AND NOT WAKE UP?: YES
5. HAVE YOU STARTED TO WORK OUT OR WORKED OUT THE DETAILS OF HOW TO KILL YOURSELF? DO YOU INTEND TO CARRY OUT THIS PLAN?: NO
ATTEMPT SINCE LAST CONTACT: NO
6. HAVE YOU EVER DONE ANYTHING, STARTED TO DO ANYTHING, OR PREPARED TO DO ANYTHING TO END YOUR LIFE?: NO

## 2024-11-14 ASSESSMENT — ANXIETY QUESTIONNAIRES
GAD7 TOTAL SCORE: 3
7. FEELING AFRAID AS IF SOMETHING AWFUL MIGHT HAPPEN: NOT AT ALL
IF YOU CHECKED OFF ANY PROBLEMS ON THIS QUESTIONNAIRE, HOW DIFFICULT HAVE THESE PROBLEMS MADE IT FOR YOU TO DO YOUR WORK, TAKE CARE OF THINGS AT HOME, OR GET ALONG WITH OTHER PEOPLE: SOMEWHAT DIFFICULT
1. FEELING NERVOUS, ANXIOUS, OR ON EDGE: NOT AT ALL
3. WORRYING TOO MUCH ABOUT DIFFERENT THINGS: SEVERAL DAYS
6. BECOMING EASILY ANNOYED OR IRRITABLE: NOT AT ALL
2. NOT BEING ABLE TO STOP OR CONTROL WORRYING: SEVERAL DAYS
5. BEING SO RESTLESS THAT IT IS HARD TO SIT STILL: NOT AT ALL

## 2024-11-14 NOTE — H&P
"[facility]   Partial Hospitalization Program  Initial Psychiatric Evaluation    Patient: Gaurang Rivera  Preferred Name: Gaurang  MRN: [id number]  : 1970  Acct. No.: [id number]  Date of Service: 24  PHP Track:  1            Date of Diagnostic Assessment/Psychosocial Evaluation: 10/31/2024    Identifying Data:  Gaurang Rivera, a 54 year old-year-old with reported history of depression, presents for psychiatric evaluation as part of oversight of partial hospitalization services. Patient attended the session alone, uses he/him pronouns, and prefers to be called: \"Gaurang\"    Presenting Concern:  \"About a year ago it got really bad.\"   Per diagnostic assessment: \"Extreme depresssion\"    History of Present Illness:  Chart reviewed, history as documented reviewed with Gaurang. Discussed today:  \"Depression most of my adult life\"  \"Trouble doing anything\"  \"Like opening my mail,\" or, \"taking a shower\"  \"I don't have anywhere to go so I don't take care of myself that way\"  Earlier in the year insurance lapsed  Restarted late summer this year  Restarted with providers  Referred here  Notes, \"I only eat once a day\"  Will go to [email or web address] to \"eat comfort food,\" and read the newspaper  \"I'm very happy when I'm there,\" and it is typically the only time of day he is happy  Recognizes that the food and overall pattern is not good for his health  \"Never not depressed\"  States there have been times is is more or less \"functional\"  States historically work has been helpful with depression, but notes, \"I can't imagine working right now\"  \"I hurt my back very badly at age 22 and it's been a problem ever since\"  Back pain managed with ice, surgery  Difficult to control overall  Makes it more difficult to manage depression (and vice versa)  Has referral for individual therapy, has not started yet    Goals for Treatment (initial discussion):  \"Strategies to be more productive\"  \"Having to get up every day at a reasonable " "time\"  \"Sleeping regular hours, showering occasionally, cleaning up my place\"  \"Find a way back into the world\"    Review of Symptoms  Review of systems as recorded in diagnostic assessment reviewed with patient.  Today notes:  Sleep: \"it couldn't be worse\"  \"I know all the things to do and I do almost all of them wrong\"  Using phone in bed  Inconsistent hours  Disrupted sleep during the night  Appetite: \"I literally go to [employer] every day and I eat a very large meal and that's often all I eat\"  \"I eat too much sugar\"  \"I know I have to eat\"  Eats different times of day; no consistent time  Motivation \"huge issue\"  Low energy, feels it is connected to sleep, eating, lack of exercise  Concentration/focus \"pretty good... not an issue\"  Helplessness, worthlessness  Harder to enjoy things  \"I love to watch college football but not lately\"  Worse over last 6 months  Suicidal ideation: has passive suicidal thoughts described as \"the background noise of my life,\" has considered methods but has no plan or intent  C-SSRS updated today  Thoughts of non-suicidal self-injury: denied  Recent self-injurious behavior: denied  Homicidal ideation: denied  Other safety concerns: denied    Activities of Daily Living and Related Systems Impacted by Illness:  Hygiene: see above  Socialization: sees dad and sister occasionally  \"I have no friends\"  Activities of Daily Living: (cleaning, shopping, bills, etc.): difficult, help from father  Concerns related to work: not working; supported by family    Substance use:  Caffeine, \"actually a lot of that... diet coke\"  No nicotine  Denies alcohol   Denies THC, others    Current Medications:  Current Outpatient Medications   Medication Sig Dispense Refill    buPROPion (WELLBUTRIN XL) 300 MG 24 hr tablet Take 1 tablet (300 mg) by mouth every morning. 30 tablet 2    lurasidone (LATUDA) 120 MG TABS tablet Take 1 tablet (120 mg) by mouth daily with food. 90 tablet 0    venlafaxine (EFFEXOR XR) " 75 MG 24 hr capsule Take 1 capsule (75 mg) by mouth daily. 90 capsule 0    acetaminophen (TYLENOL) 325 MG tablet Take 2 tablets (650 mg) by mouth every 4 hours as needed for mild pain or fever.      atorvastatin (LIPITOR) 40 MG tablet Take 1 tablet (40 mg) by mouth daily.      cholecalciferol (VITAMIN D3) 125 mcg (5000 units) capsule Take 1 capsule (125 mcg) by mouth daily.      Continuous Glucose Sensor (FREESTYLE JAVI 14 DAY SENSOR) MISC AS DIRECTED AND CHANGE EVERY 14 DAYS,      empagliflozin (JARDIANCE) 25 MG TABS tablet Take 1 tablet (25 mg) by mouth daily. 90 tablet 3    exenatide ER (BYDUREON BCISE) 2 MG/0.85ML auto-injector INJECT 2MG SUBCUTANEOUS EVERY 7 DAYS 3.4 mL 5    ezetimibe (ZETIA) 10 MG tablet Take 1 tablet (10 mg) by mouth daily. 90 tablet 3    lisinopril (ZESTRIL) 5 MG tablet Take 1 tablet (5 mg) by mouth daily.      metFORMIN (GLUCOPHAGE) 1000 MG tablet Take 1 tablet (1,000 mg) by mouth 2 times daily (with meals).      multivitamin w/minerals (THERA-VIT-M) tablet Take 1 tablet by mouth daily.      omeprazole (PRILOSEC OTC) 20 MG EC tablet Take 1 tablet (20 mg) by mouth daily as needed.      oxyCODONE (ROXICODONE) 5 MG tablet Take 2 tablets (10 mg) by mouth 2 times daily as needed for pain. 28 tablet 0    oxyCODONE (ROXICODONE) 5 MG tablet Take 2 tablets (10 mg) by mouth every 6 hours as needed for pain 24 tablet 0    warfarin ANTICOAGULANT (COUMADIN) 5 MG tablet Take 7.5 mg every Sun; 5 mg all other days or As directed by Anticoagulation clinic 145 tablet 1       The above list was reviewed with patient today.     Patient is taking medications as prescribed and denies adverse effects     Medication History/Past Medication Trials:  No other trials of psychotropics  No trials of ECT/TMS/ketamine    Remainder of history, including psychiatric, substance, family, social as documented in diagnostic assessment/psychosocial evaluation and/or above    Medical Review of Systems:  Pertinent: chronic low  "back pain, diabetes, blood clots    Laboratory Results:  Most recent labs reviewed. Pertinent updates/findings: None.       Mental Status Examination:  Vital Signs: There were no vitals taken for this visit.   Appearance: appropriately groomed, appears stated age, and in no apparent distress.  Attitude: cooperative   Eye Contact: good   Muscle Strength and Tone: normal  Psychomotor Behavior: normal or unremarkable   Gait and Station: deferred  Speech: clear, coherent, decreased prosody, regular rate, regular rhythm, and fluent  Associations: No loosening of associations  Thought Process: coherent and goal directed  Thought Content: no evidence of psychotic thought, passive suicidal ideation present, no auditory hallucinations present, and no visual hallucinations present  Mood: \"depressed\"  Affect: mood congruent, intensity is normal, constricted mobility, restricted range, and reactive  Insight: good  Judgment: intact, adequate for safety  Impulse Control: intact  Oriented to: time, place, person, and situation  Attention Span and Concentration: Normal  Language: Intact  Recent and Remote Memory: Delayed & immediate recall intact  Fund of Knowledge/Assessment of Intelligence: Average  Capacity of Activities of Daily Living: Independent, able to participate in programmatic care services.    DSM5 Diagnosis/es:    ICD-10-CM    1. Severe episode of recurrent major depressive disorder, without psychotic features (H)  F33.2         Have not ruled out 300.4 (F34.1) Persistent Depressive Disorder, With intermittent major depressive episodes, with current episodes    Assessment/Plan:  Gaurang presents today for initial psychiatric evaluation as part of oversight of partial hospitalization services. Longstanding depression without remission is more in keeping with persistent depressive disorder, and more evaluation is warranted before making that diagnosis. Current medication seems to be holding symptoms steady but function is " impaired in most spheres. Agree that PHP is the most appropriate level of care for treatment given severity of impairment. Anticipate stepdown to IOP following PHP but that assessment is also ongoing. Continue PHP today.    Patient would prefer to not make changes to medication today and instead see what kind of change can be brought about by participation in the program. This is sensible and will be our plan. Can consider increase in bupropion and/or venlafaxine. Given chronic pain would also consider duloxetine in place of venlafaxine. I typically do not prescribe higher than 120 mg of lurasidone but we could explore alternatives. No changes to medication today.      Safety Assessment:  Gaurang reports suicidal ideation and/or non-suicidal self-injury or thoughts thereof as noted above  Gaurang is future-oriented and is engaged in treatment planning   I do not feel that Gaurang meets criteria for a 72-hour involuntary hold and remains appropriate for an outpatient level of care    Terry Blood MD on 11/14/2024 at 10:49 AM    Visit Details:  Type of service: In-person  Location (patient and provider): [facility] Adult Mental Health Outpatient Programmatic Care Offices    Level of Medical Decision Making:   - At least 1 chronic problem that is not stable  - Functional impairment that could lead to serious consequences  Discussion of management or test interpretation with external physician/other qualified healthcare professional/appropriate source - programmatic care multidisciplinary treatment team    Chart review as part of intake process includes diagnostic assessment/psychosocial evaluation and any recent updates, as well as recent ED and/or inpatient documentation, where applicable.The reader is referred to those sources for additional information.    50 min spent on the date of the encounter in chart review, patient visit, review of tests, documentation, care coordination, and/or discussion with other  providers about the issues documented above.

## 2024-11-14 NOTE — GROUP NOTE
Psychoeducation Group Note    PATIENT'S NAME: Gaurang Rivera  MRN:   [id number]  :   1970  ACCT. NUMBER: [id number]  DATE OF SERVICE: 24  START TIME: 10:00 AM  END TIME: 10:50 AM  FACILITATOR: Stella Chase RN  TOPIC:  Wellness Group: Health Maintenance  [facility] Adult Partial Hospitalization Program  TRACK: PHP initial treatment planning group    NUMBER OF PARTICIPANTS: 3    Summary of Group / Topics Discussed:  Health Maintenance: Goal Setting: Meaningful goals can bring a sense of direction and purpose in life.  They also highlight our most important values. Patients were assisted by instructor to identify short term goals to promote their mental health recovery and improve overall health and wellness. Patients were educated on SMART goal setting framework as a strategy to increase outcomes and promote success.    Patient Session Goals / Objectives:  Explained the key concepts of SMART goal setting framework  Identified three goals successfully using SMART goal setting framework  Reviewed concept of balance in wellness as it pertains to goal setting        Patient Participation / Response:  Fully participated with the group by sharing personal reflections / insights and openly received / provided feedback with other participants.    Identified / Expressed personal readiness to practice skills and Verbalized understanding of health maintenance topic    Treatment Plan:  Patient has an initial individualized treatment plan that was created as part of their diagnostic assessment / admission process.  A master individualized treatment plan is in the process of being developed with the patient and multi-disciplinary care team.    Stella Chase RN

## 2024-11-14 NOTE — GROUP NOTE
OT Clinic Group Note    PATIENT'S NAME: Gaurang Rivera  MRN:   [id number]  :   1970  ACCT. NUMBER: [id number]  DATE OF SERVICE: 24  START TIME:  1:00 PM  END TIME:  1:50 PM  FACILITATOR: Millie Vázquez OTR  TOPIC: [facility] OT Group: Occupational Therapy Clinic  [facility] Partial Hospitalization Program  TRACK: PHP 1    NUMBER OF PARTICIPANTS: 6    Summary of Group / Topics Discussed:  Occupational Therapy Clinic: Provided opportunity for patients to independently choose and engage in a therapeutic activity that supports progress towards their goals and psychiatric recovery. The Occupational Therapy Clinic provides a context for patients to monitor their symptoms, gain self-awareness, practice skills (self-regulation, mindfulness, self-talk, focus/concentration, social, productivity), build a sense of self efficacy and mastery, as well as receive validation, support, and resources. OT checks in, observes, assesses, and monitors performance skills and patterns in context with each group member. Patient was provided orientation to the OT clinic and overview of purpose of the OT clinic in support of meeting their goals.     Patient Session Goals / Objectives:  Independently identify a purposeful and meaningful therapeutic activity.  Identified which goal(s) they are intentionally working on during session.   Reflected on their performance and share insight about their progress.  Practiced and identified a way to generalize a skill to their everyday life      Patient Participation / Response:  Fully participated with the group by sharing personal reflections / insights and openly received / provided feedback with other participants. Demonstrated understanding of content through engaging in a task of choice (puzzle). Patient shared that he was so focused on the puzzle it helped with decreasing rumination.  Patient's affect was appropriate to situation and mood congruent.     Treatment  Plan:  Patient has a current master individualized treatment plan and today was our weekly review of the patient's progress.  See Epic treatment plan for progress / updates on goals and plan.    Millie Vázquez, OTR

## 2024-11-14 NOTE — GROUP NOTE
Process Group Note    PATIENT'S NAME: Gaurang Rivera  MRN:   [id number]  :   1970  ACCT. NUMBER: [id number]  DATE OF SERVICE: 24  START TIME:  9:00 AM  END TIME:  9:50 AM  FACILITATOR: Clinton Washington LMFT; Andry Cheng MD  TOPIC:  Process Group    Diagnoses:   296.33 (F33.2) Major Depressive Disorder, Recurrent Episode, Severe With anxious distress.   Other Diagnoses that is relevant to services:   296.33 (F33.2) Major Depressive Disorder, Recurrent Episode, Severe With anxious distress.   Provisional Diagnosis:  296.33 (F33.2) Major Depressive Disorder, Recurrent Episode, Severe With anxious distress as evidenced by ROS, SOL, PHQ, chart review and the interview.  [facility] Adult Partial Hospitalization Program  TRACK: PHP1    NUMBER OF PARTICIPANTS: 6        Data:    Session content: At the start of this group, patients were invited to check in by identifying themselves, describing their current emotional status, and identifying issues to address in this group.   Area(s) of treatment focus addressed in this session included Symptom Management, Personal Safety, and Community Resources/Discharge Planning.  Patient reported feeling OK today.  He feels like it's an accomplishment to make it to PHP.   Patient discussed working toward doing PHP on his first day.   For skills they will use to address their goal(s), patient identified learning skills.   A barrier to working toward their goal(s) and/or addressing mental health symptoms the patient identified was  I didn't sleep last night.    My sleep is terrible in general.   Patient reported SI continues, with no intent to act on the SI and he is committed to safety. Patient reported no substance use. Patient reported they are taking their medications as prescribed.   Patient reported feeling proud/grateful for making it to [facility] today.    Therapeutic Interventions/Treatment Strategies:  Psychotherapist offered support, feedback and  validation. Treatment modalities used include Cognitive Behavioral Therapy. Interventions include Coping Skills: Facilitated understanding of what factors may contribute to symptom relapse and skills plan to manage symptom relapse and Symptoms Management: Promoted understanding of their diagnoses and how it impacts their functioning.    Assessment:    Patient response:   Patient responded to session by accepting feedback, giving feedback, listening, focusing on goals, being attentive and accepting support    Possible barriers to participation / learning include: Severity of Symptoms    Health Issues:   None reported       Substance Use Review:   Substance Use: No active concerns identified.    Mental Status/Behavioral Observations  Appearance:   Appropriate   Eye Contact:   Fair  Psychomotor Behavior: Normal   Attitude:   Cooperative   Orientation:   All  Speech   Rate / Production: Normal    Volume:  Normal   Mood:    Anxious Depressed  Affect:    Subdued  Thought Content:   Clear and Safety reports  presence of suicidal ideation passive suicidal ideation  Thought Form:  Coherent  Logical     Insight:    Fair    Plan:   Safety Plan: Committed to safety.  Recommended that patient call 911 or go to the local ED should there be a change in any of these risk factors.  Barriers to treatment: None identified  Patient Contracts (see media tab):  None  Substance Use: Provided encouragement towards sobriety   Continue or Discharge: Patient will continue in Adult Partial Hospitalization Program (PHP)  as planned. Patient is likely to benefit from learning and using skills as they work toward the goals identified in their treatment plan.      Clinton Washington, SAJI  November 14, 2024

## 2024-11-14 NOTE — GROUP NOTE
Psychotherapy Group Note    PATIENT'S NAME: Gaurang Rivera  MRN:   [id number]  :   1970  ACCT. NUMBER: [id number]  DATE OF SERVICE: 24  START TIME:  2:00 PM  END TIME:  2:50 PM  FACILITATOR: Kristina Macario LICSW  TOPIC:  EBP Group: Specialty Awareness  [facility] Adult Partial Hospitalization Program  TRACK: PHP 1     NUMBER OF PARTICIPANTS: 7    Summary of Group / Topics Discussed:  Specialty Topics: Hope: The topic of hope was presented in order to help patients better understand the symptoms of hopelessness and how to become more hopeful. Patients discussed their current awareness of the topic and relevance to their functioning. Individual experiences with symptoms and treatment options were also discussed. Patients explored options for ongoing/future treatment and symptom management.      Patient Session Goals / Objectives:  Discussed definition of hopelessness  Discussed how hopelessness impacts functioning  Set a plan to utilize skills to reduce hopelessness      Patient Participation / Response:  Fully participated with the group by sharing personal reflections / insights and openly received / provided feedback with other participants.    Demonstrated understanding of topics discussed through group discussion and participation, Identified / Expressed readiness to act on skill suggestions discussed in topic, and Verbalized understanding of ways to proactively manage illness    Treatment Plan:  Patient has a current master individualized treatment plan.  See Epic treatment plan for more information.    QUIQUE Martin

## 2024-11-15 ENCOUNTER — HOSPITAL ENCOUNTER (OUTPATIENT)
Dept: BEHAVIORAL HEALTH | Facility: CLINIC | Age: 54
End: 2024-11-15
Attending: PSYCHIATRY & NEUROLOGY
Payer: COMMERCIAL

## 2024-11-15 PROCEDURE — H0035 MH PARTIAL HOSP TX UNDER 24H: HCPCS

## 2024-11-15 PROCEDURE — H0035 MH PARTIAL HOSP TX UNDER 24H: HCPCS | Performed by: COUNSELOR

## 2024-11-15 PROCEDURE — H0035 MH PARTIAL HOSP TX UNDER 24H: HCPCS | Performed by: SOCIAL WORKER

## 2024-11-15 ASSESSMENT — PATIENT HEALTH QUESTIONNAIRE - PHQ9: SUM OF ALL RESPONSES TO PHQ QUESTIONS 1-9: 21

## 2024-11-15 ASSESSMENT — ANXIETY QUESTIONNAIRES: GAD7 TOTAL SCORE: 3

## 2024-11-15 NOTE — GROUP NOTE
Process Group Note    PATIENT'S NAME: Gaurang Rivera  MRN:   [id number]  :   1970  ACCT. NUMBER: [id number]  DATE OF SERVICE: 11/15/24  START TIME:  9:00 AM  END TIME:  9:50 AM  FACILITATOR: Clinton Washington LMFT  TOPIC:  Process Group    Diagnoses:  1. Severe episode of recurrent major depressive disorder, without psychotic features (H)  F33.2            Have not ruled out 300.4 (F34.1) Persistent Depressive Disorder, With intermittent major depressive episodes, with current episodes       [facility] Adult Partial Hospitalization Program  TRACK: PHP1    NUMBER OF PARTICIPANTS: 7        Data:    Session content: At the start of this group, patients were invited to check in by identifying themselves, describing their current emotional status, and identifying issues to address in this group.   Area(s) of treatment focus addressed in this session included Symptom Management, Personal Safety, and Community Resources/Discharge Planning.  Patient reported feeling pretty tired, he didn't sleep well again last night.  He took a nap after group and then had trouble sleeping last night.   Patient discussed working toward self-compassion.   For skills they will use to address their goal(s), patient identified self-compassion.   A barrier to working toward their goal(s) and/or addressing mental health symptoms the patient identified was to not sleep all weekend.  Patient reported no safety concerns and/or self-injurious behaviors. He is committed to safety if any safety concerns come up. Patient reported no substance use. Patient reported they are taking their medications as prescribed.   Patient reported feeling proud/grateful for coming to group, and not calling in to not come into group.    Therapeutic Interventions/Treatment Strategies:  Psychotherapist offered support, feedback and validation. Treatment modalities used include Cognitive Behavioral Therapy. Interventions include Coping Skills: Facilitated  understanding of  what factors may contribute to symptom relapse and skills plan to manage symptom relapse  and Symptoms Management: Promoted understanding of their diagnoses and how it impacts their functioning.    Assessment:    Patient response:   Patient responded to session by accepting feedback, listening, focusing on goals, being attentive and accepting support    Possible barriers to participation / learning include: Severity of symptoms    Health Issues:   None reported       Substance Use Review:   Substance Use: No active concerns identified.    Mental Status/Behavioral Observations  Appearance:   Appropriate   Eye Contact:   Fair  Psychomotor Behavior: Shaky  Attitude:   Cooperative   Orientation:   All  Speech   Rate / Production: Normal    Volume:  Normal   Mood:    Anxious Depressed  Affect:    Subdued  Thought Content:   Clear and Safety denies any current safety concerns including suicidal ideation, self-harm, and homicidal ideation  Thought Form:  Coherent  Logical     Insight:    Fair    Plan:   Safety Plan: He is committed to safety if any safety concerns come up.  Recommended that patient call 911 or go to the local ED should there be a change in any of these risk factors.   Barriers to treatment: None identified  Patient Contracts (see media tab):  None  Substance Use: Provided encouragement towards sobriety   Continue or Discharge: Patient will continue in Adult Partial Hospitalization Program (PHP)  as planned. Patient is likely to benefit from learning and using skills as they work toward the goals identified in their treatment plan.      Clinton Washington, SAJI  November 15, 2024

## 2024-11-15 NOTE — GROUP NOTE
Psychotherapy Group Note    PATIENT'S NAME: Gaurang Rivera  MRN:   [id number]  :   1970  ACCT. NUMBER: [id number]  DATE OF SERVICE: 11/15/24  START TIME:  2:00 PM  END TIME:  2:50 PM  FACILITATOR: Marlene Mckenna LICSW  TOPIC: MH EBP Group: Enhanced Mindfulness  [facility] Adult Partial Hospitalization Program  TRACK: 1    NUMBER OF PARTICIPANTS: 6    Summary of Group / Topics Discussed:  Enhanced Mindfulness: Body and Mind Integration: Patients received an overview and education regarding the importance of including the body in the management of emotional health and self-care and as a direct route to mindfulness practice.  Patients discussed various ways in which the body can serve as an informant to their physical and emotional experiences/need. Patients discussed the body as a direct link to the present moment and to mindfulness practice.  Patients discussed current relationship with body, self-awareness, mindfulness practice and barriers to connection with body.  Patients were guided through breath work and movement to facilitate greater self-awareness, grounding, self-expression, and connection to other.  Patients discussed how the experiential could be applied to better manage mental health and develop andrews connection to self.    Patient Session Goals / Objectives:  Identify how movement awareness could be used for grounding, stress management, self-expression, connection to other and self-regulation  Improved awareness of breath and movement preferences  Identify how movement and the body is used in mindfulness practice  Reflect on use of these practices in everyday life.  Identify barriers to attending to body      Patient Participation / Response:  Fully participated with the group by sharing personal reflections / insights and openly received / provided feedback with other participants.    Demonstrated understanding of topics discussed through group discussion and participation and  Practiced skills in session    Treatment Plan:  Patient has a current master individualized treatment plan.  See Epic treatment plan for more information.    QUIQUE Youngblood

## 2024-11-15 NOTE — GROUP NOTE
Psychotherapy Group Note    PATIENT'S NAME: Gaurang Rivera  MRN:   [id number]  :   1970  ACCT. NUMBER: [id number]  DATE OF SERVICE: 11/15/24  START TIME: 10:00 AM  END TIME: 10:50 AM  FACILITATOR: Clinton Washington LMFT  TOPIC: MH EBP Group: Coping Skills  [facility] Adult Partial Hospitalization Program  TRACK: PHP1    NUMBER OF PARTICIPANTS: 6    Summary of Group / Topics Discussed:  Coping Skills: Grounding: Patients discussed and practiced strategies to increase attachment / presence to the current moment.  Patients identified situations in which using these strategies will help improve emotion regulation sense of calm in the body.  Reviewed the benefits of applying grounding strategies, as well as past / current practices of each member.  Patients identified situations in which using these strategies would reduce stress. They developed the ability to distinguish when these strategies can be useful in their lives for management and stress and psychological well-being.    Patient Session Goals / Objectives:  Understand the purpose of using grounding strategies to reduce stress.  Verbalize understanding of how and when to apply grounding strategies to reduce distress and increase presence in the moment.  Review patients current grounding practices and discuss a more formal way of practicing and accessing skills.  Practice using various calming strategies (e.g. 5-4-3-2-1; mental and body awareness).  Choose 1-2 grounding strategies to apply during times of distress.      Patient Participation / Response:  Fully participated with the group by sharing personal reflections / insights and openly received / provided feedback with other participants.    Demonstrated understanding of topics discussed through group discussion and participation, Expressed understanding of the relevance / importance of coping skills at distressing times in life, and Identified / Expressed personal readiness to practice new  coping skills    Treatment Plan:  Patient has a current master individualized treatment plan.  See Epic treatment plan for more information.    Clinton Washington LMFT

## 2024-11-15 NOTE — GROUP NOTE
Psychoeducation Group Note    PATIENT'S NAME: Gaurang Rivera  MRN:   [id number]  :   1970  ACCT. NUMBER: [id number]  DATE OF SERVICE: 11/15/24  START TIME:  1:00 PM  END TIME:  1:50 PM  FACILITATOR: Stella Chase RN  TOPIC:  Wellness Group: Mental Health Maintenance  [facility] Adult Partial Hospitalization Program  TRACK: [facility]    NUMBER OF PARTICIPANTS: 7    Summary of Group / Topics Discussed:  Mental Health Maintenance:  Relationship with diagnosis and Stigma: In this group, patients were given the space to explore the trajectory of their relationship with their diagnosis/es over time. The group discussed the way stigma impacts their own life, their access to treatment, and the relationship they have with their support network. The relationship between physical and mental health was also explored in the context of healthcare access, treatment, and support. Patients explored ways to combat stigma surrounding mental illness.      Patient Session Goals / Objectives:    Patients identified the importance healthy, safe expression of emotion    Patients discussed how receiving education on mental health diagnoses, symptoms, and treatments can help to keep personal stigma at bay and advocate for themselves       Patient Participation / Response:  Fully participated with the group by sharing personal reflections / insights and openly received / provided feedback with other participants.    Identified / Expressed personal readiness to practice skills and Verbalized understanding of mental health maintenance topic    Treatment Plan:  Patient has a current master individualized treatment plan.  See Epic treatment plan for more information.    Stella Chase RN

## 2024-11-15 NOTE — GROUP NOTE
Addended by: AUGUSTIN ROBERTS on: 8/28/2023 08:15 AM     Modules accepted: Orders     Psychoeducation Group Note    PATIENT'S NAME: Gaurang Rivera  MRN:   [id number]  :   1970  ACCT. NUMBER: [id number]  DATE OF SERVICE: 11/15/24  START TIME: 11:00 AM  END TIME: 11:50 AM  FACILITATOR: Millie Vázquez OTR  TOPIC: [facility] OT Group: Lifestyle Balance and Structure  [facility] Adult Partial Hospitalization Program  TRACK: PHP 1    NUMBER OF PARTICIPANTS: 6    Summary of Group / Topics Discussed:  Lifestyle Balance and Structure:  Weekend Planning: To support structure and routine during the weekend, the group members were led through a facilitated discussion on the importance of structure and routine for overall mental wellness and to promote leisure, productivity, and self care during unstructured time. Patients discussed the importance of life balance and identified at least 1 self care activity, leisure activity, and productivity activity that they want to do this weekend to improve structure/routine. Patients independently created a weekend plan and shared with the group their personal plans. Patients identified potential barriers to their plan and skills that will help overcome the barriers. Validation, support, and feedback was provided throughout group process.         Patient Session Goals / Objectives:      Reflected on and created a weekend plan      Discuss the importance of life balance, structure, and routine      Identified and planned 3 activities in the areas of leisure, productivity, and self care for the weekend.      Identified and problem solved barriers to achieving identified plans        Patient Participation / Response:  Fully participated with the group by sharing personal reflections / insights and openly received / provided feedback with other participants. Patient independently identified a self care activity (nap), productive activity (grocery shop), and leisure activity (watch TV) that they want to complete this weekend. Patient self reflected on potential  barriers to their weekend and problem solved skills they could use.  Patient's affect was appropriate to situation and mood congruent.     Treatment Plan:  Patient has a current master individualized treatment plan.  See Epic treatment plan for more information.    Millie Vázquez, OTR

## 2024-11-18 ENCOUNTER — TELEPHONE (OUTPATIENT)
Dept: BEHAVIORAL HEALTH | Facility: CLINIC | Age: 54
End: 2024-11-18
Payer: COMMERCIAL

## 2024-11-18 NOTE — TELEPHONE ENCOUNTER
"Pt called in for PHP programming today due to symptoms of illness. RN writer called pt to check in about symptoms and safety. Pt reports GI upset. Denies any COVID symptoms. Writer relayed to pt that our policy is 24 hours fever, V/D free before returning to programming. Pt reports he plans to rest today and will hopefully return to programming tomorrow, but will be in contact with symptoms persist. Pt reports, \"I am totally safe.\"   "

## 2024-11-19 ENCOUNTER — TELEPHONE (OUTPATIENT)
Dept: BEHAVIORAL HEALTH | Facility: CLINIC | Age: 54
End: 2024-11-19
Payer: COMMERCIAL

## 2024-11-19 NOTE — TELEPHONE ENCOUNTER
"Gaurang cannot come to group today due to ongoing \"stomach bug.\" When asked about safety, he denies safety concerns.    "

## 2024-11-20 ENCOUNTER — HOSPITAL ENCOUNTER (OUTPATIENT)
Dept: BEHAVIORAL HEALTH | Facility: CLINIC | Age: 54
End: 2024-11-20
Attending: PSYCHIATRY & NEUROLOGY
Payer: COMMERCIAL

## 2024-11-20 DIAGNOSIS — F33.2 SEVERE EPISODE OF RECURRENT MAJOR DEPRESSIVE DISORDER, WITHOUT PSYCHOTIC FEATURES (H): Primary | ICD-10-CM

## 2024-11-20 PROCEDURE — H0035 MH PARTIAL HOSP TX UNDER 24H: HCPCS

## 2024-11-20 PROCEDURE — H0035 MH PARTIAL HOSP TX UNDER 24H: HCPCS | Performed by: COUNSELOR

## 2024-11-20 NOTE — PROGRESS NOTES
"[facility]   Partial Hospitalization Program  Interval/Follow-up Psychiatric Evaluation    Patient: Gaurang Rivera  Preferred Name: Gaurang  MRN: [id number]  : 1970  Acct. No.: [id number]  Date of Service: 24  PHP Track:  1            Interval History:  \"I'm doing okay.\" Gaurang presents today for follow-up and ongoing program supervision. Discussed today:  Missed the first two days this week due to gastrointestinal illness, \"so this is really only my 3rd day\"  Feels program is helpful  Also feels it is tiring  Difficult on his low back, which is chronically painful  Very tiring -- \"when I go home sometimes all I can do is sleep\"  When asked, feels that this level of care might be \"too much\"  Discussed intensive outpatient programming, and patient feels that might be a better fit for him  Willing to continue PHP in the meantime      Review of Symptoms  Sleep: improving but still out-of-sync with conventional daytime routine  Appetite: no change  Suicidal ideation: denies current or recent suicidal ideation or behavior  Thoughts of non-suicidal self-injury: denied  Recent self-injurious behavior: denied  Homicidal ideation: denied  Other safety concerns: denied    Activities of Daily Living and Related Systems Impacted by Illness:  Hygiene: no change  Socialization: no change  Activities of Daily Living: (cleaning, shopping, bills, etc.): no change  Concerns related to work: n/a    Substance use:  None endorsed    Current Medications:  Current Outpatient Medications   Medication Sig Dispense Refill    acetaminophen (TYLENOL) 325 MG tablet Take 2 tablets (650 mg) by mouth every 4 hours as needed for mild pain or fever.      atorvastatin (LIPITOR) 40 MG tablet Take 1 tablet (40 mg) by mouth daily.      buPROPion (WELLBUTRIN XL) 300 MG 24 hr tablet Take 1 tablet (300 mg) by mouth every morning. 30 tablet 2    cholecalciferol (VITAMIN D3) 125 mcg (5000 units) capsule Take 1 capsule (125 mcg) by mouth " daily.      Continuous Glucose Sensor (FREESTYLE JAVI 14 DAY SENSOR) [facility] AS DIRECTED AND CHANGE EVERY 14 DAYS,      empagliflozin (JARDIANCE) 25 MG TABS tablet Take 1 tablet (25 mg) by mouth daily. 90 tablet 3    exenatide ER (BYDUREON BCISE) 2 MG/0.85ML auto-injector INJECT 2MG SUBCUTANEOUS EVERY 7 DAYS 3.4 mL 5    ezetimibe (ZETIA) 10 MG tablet Take 1 tablet (10 mg) by mouth daily. 90 tablet 3    lisinopril (ZESTRIL) 5 MG tablet Take 1 tablet (5 mg) by mouth daily.      lurasidone (LATUDA) 120 MG TABS tablet Take 1 tablet (120 mg) by mouth daily with food. 90 tablet 0    metFORMIN (GLUCOPHAGE) 1000 MG tablet Take 1 tablet (1,000 mg) by mouth 2 times daily (with meals).      multivitamin w/minerals (THERA-VIT-M) tablet Take 1 tablet by mouth daily.      omeprazole (PRILOSEC OTC) 20 MG EC tablet Take 1 tablet (20 mg) by mouth daily as needed.      oxyCODONE (ROXICODONE) 5 MG tablet Take 2 tablets (10 mg) by mouth 2 times daily as needed for pain. 28 tablet 0    oxyCODONE (ROXICODONE) 5 MG tablet Take 2 tablets (10 mg) by mouth every 6 hours as needed for pain 24 tablet 0    venlafaxine (EFFEXOR XR) 75 MG 24 hr capsule Take 1 capsule (75 mg) by mouth daily. 90 capsule 0    warfarin ANTICOAGULANT (COUMADIN) 5 MG tablet Take 7.5 mg every Sun; 5 mg all other days or As directed by Anticoagulation clinic 145 tablet 1       The above list was reviewed with patient today.     Patient is taking medications as prescribed and denies adverse effects    Laboratory Results:  Most recent labs reviewed. Pertinent updates/findings: None.       Mental Status Examination:  Vital Signs: There were no vitals taken for this visit.   Appearance: appropriately groomed, appears stated age, and in no apparent distress.  Attitude: cooperative   Eye Contact: fair   Muscle Strength and Tone: normal  Psychomotor Behavior: fidgety   Gait and Station: deferred  Speech: clear, coherent, decreased prosody, regular rate, regular rhythm, and  "fluent  Associations: No loosening of associations  Thought Process: coherent and goal directed  Thought Content: no evidence of suicidal ideation or homicidal ideation, no evidence of psychotic thought, no auditory hallucinations present, and no visual hallucinations present  Mood: \"depressed\"  Affect: mood congruent, intensity is blunted, constricted mobility, restricted range, and reactive  Insight: good  Judgment: intact, adequate for safety  Impulse Control: intact  Oriented to: time, place, person, and situation  Attention Span and Concentration: Normal  Language: Intact  Recent and Remote Memory: Delayed & immediate recall intact  Fund of Knowledge/Assessment of Intelligence: Average  Capacity of Activities of Daily Living: Independent, able to participate in programmatic care services.    Diagnosis/es:    ICD-10-CM    1. Severe episode of recurrent major depressive disorder, without psychotic features (H)  F33.2           Have not ruled out 300.4 (F34.1) Persistent Depressive Disorder, With intermittent major depressive episodes, with current episodes     Assessment/Plan:  Gaurang presents today for follow-up psychiatric evaluation and assessment of progress. Endorses exhaustion and physical discomfort being in group for this length of time. Brought a pillow to sit on, which has helped with pain but not feeling tired from the pace/intensity of group.    Severity of presentation and disability supports PHP level of care. Patient is struggling to tolerate it, however, so we will also look at stepping down to IOP soon to enable continued treatment. For now, we will plan to continue PHP.    Current medication is supporting recovery and we will defer changes until we have finalized level of care.    Safety Assessment:  Gaurang reports suicidal ideation and/or non-suicidal self-injury or thoughts thereof as noted above  Gaurang is future-oriented and is engaged in treatment planning   I do not feel that Gaurang meets " criteria for a 72-hour involuntary hold and remains appropriate for an outpatient level of care    Terry Blood MD on 11/20/2024 at 1:02 PM    Visit Details:  Type of service: In-person  Location (patient and provider): [facility] Adult [facility] Outpatient Programmatic Care Offices    Level of Medical Decision Making:   - At least 1 chronic problem that is not stable  - Engaged in prescription drug management during visit (discussed any medication benefits, side effects, alternatives, etc.)  Discussion of management or test interpretation with external physician/other qualified healthcare professional/appropriate source - programmatic care multidisciplinary treatment team

## 2024-11-20 NOTE — GROUP NOTE
Process Group Note    PATIENT'S NAME: Gaurang Rivera  MRN:   [id number]  :   1970  ACCT. NUMBER: [id number]  DATE OF SERVICE: 24  START TIME:  9:00 AM  END TIME: 10:50 AM  FACILITATOR: Clinton Washington LMFT  TOPIC:  Process Group    Diagnoses:  1. Severe episode of recurrent major depressive disorder, without psychotic features (H)  F33.2            Have not ruled out 300.4 (F34.1) Persistent Depressive Disorder, With intermittent major depressive episodes, with current episodes       [facility] Adult Partial Hospitalization Program  TRACK: PHP1    During the second process hour the group was given handouts to review for self-compassion and affirmations    NUMBER OF PARTICIPANTS: 7        Data:    Session content: At the start of this group, patients were invited to check in by identifying themselves, describing their current emotional status, and identifying issues to address in this group.   Area(s) of treatment focus addressed in this session included Symptom Management, Personal Safety, and Community Resources/Discharge Planning.  Patient reported feeling OK,  I've had one of the better night sleeps than I've had in a long time.   He brought a back pillow today.   Patient discussed working toward getting into a sleep routine.  He is working on not taking a nap in the afternoon.   For skills they will use to address their goal(s), patient identified self-compassion, and understanding.   A barrier to working toward their goal(s) and/or addressing mental health symptoms the patient identified was difficulty getting into a schedule.  Patient reported passive SI with no plan on intent to act on the SI and he is committed to safety with following his safety plan. Patient reported no substance use. Patient reported they are taking their medications as prescribed.     Therapeutic Interventions/Treatment Strategies:  Psychotherapist offered support, feedback and validation. Treatment modalities  used include Cognitive Behavioral Therapy. Interventions include Coping Skills: Facilitated understanding of what factors may contribute to symptom relapse and skills plan to manage symptom relapse and Symptoms Management: Promoted understanding of their diagnoses and how it impacts their functioning.    Assessment:    Patient response:   Patient responded to session by accepting feedback, giving feedback, listening, focusing on goals, being attentive and accepting support    Possible barriers to participation / learning include: Severity of Symptoms    Health Issues:   Back pain       Substance Use Review:   Substance Use: No active concerns identified.    Mental Status/Behavioral Observations  Appearance:   Appropriate   Eye Contact:   Fair  Psychomotor Behavior: Slowed  Attitude:   Cooperative   Orientation:   All  Speech   Rate / Production: Normal    Volume:  Normal   Mood:    Anxious Depressed  Affect:    Subdued  Thought Content:   Clear and Safety reports  presence of suicidal ideation passive suicidal ideation  Thought Form:  Coherent  Logical     Insight:    Fair    Plan:   Safety Plan: Committed to safety and agreed to follow previously developed safety coping plan.  Recommended that patient call 911 or go to the local ED should there be a change in any of these risk factors.  Barriers to treatment: None identified  Patient Contracts (see media tab):  None  Substance Use: Provided encouragement towards sobriety   Continue or Discharge: Patient will continue in Adult Partial Hospitalization Program (PHP)  as planned. Patient is likely to benefit from learning and using skills as they work toward the goals identified in their treatment plan.      Clinton Washington, SAJI  November 20, 2024

## 2024-11-20 NOTE — GROUP NOTE
"Psychoeducation Group Note    PATIENT'S NAME: Gaurang Rivera  MRN:   [id number]  :   1970  ACCT. NUMBER: [id number]  DATE OF SERVICE: 24  START TIME: 11:00 AM  END TIME: 11:50 AM  FACILITATOR: Millie Vázquez OTR  TOPIC: MH PHP OT Group: Self- Regulation Skills  [facility] Adult Partial Hospitalization Program  TRACK: PHP 1    NUMBER OF PARTICIPANTS: 5    Summary of Group / Topics Discussed:  Self-Regulation Skills: Coping Cards: Provided education on the benefits of developing a robust coping plan to help manage distress and regulate emotions.  Discussed the importance of having easy access to this plan in times of increased symptoms.  Brainstormed with group members to identify coping skills in the categories of sensory strategies, distraction techniques, cognitive strategies, and physical activity.  Instructed patients to select 1-2 skills from each category to include in their deck of coping cards, then provided time and materials for patients to create this deck.  Prompted patients to consider where they might keep this tool and when they might find it most helpful.      Patient Session Goals / Objectives:  Review the benefits of having a robust coping plan to help manage distress and regulate emotions  Develop a deck of personal coping cards to promote self-regulation and independent skill use  Established a plan for practice of these skills in their own environments       Patient Participation / Response:  Fully participated with the group by sharing personal reflections / insights and openly received / provided feedback with other participants.Demonstrated understanding of content through creating coping cards and self reflecting on coping strategies that are helpful for him. Patient reported that he is going to keep his coping cards \"on a hook\" for easy access in the future.  Patient's affect was appropriate to situation and mood congruent.     Treatment Plan:  Patient has a current " master individualized treatment plan.  See Epic treatment plan for more information.    Millie Vázquez, OTR

## 2024-11-20 NOTE — GROUP NOTE
Psychoeducation Group Note    PATIENT'S NAME: Gaurang Rivera  MRN:   [id number]  :   1970  ACCT. NUMBER: [id number]  DATE OF SERVICE: 24  START TIME:  2:00 PM  END TIME:  2:50 PM  FACILITATOR: Stella Chase RN  TOPIC:  Wellness Group: [facility] Adult Partial Hospitalization Program  TRACK: [facility]    NUMBER OF PARTICIPANTS: 7    Summary of Group / Topics Discussed:  Foundations of Health: Sleep: Pathophysiology of sleep disorders: The anatomy of sleep was reviewed including structures, stages, mechanisms, and the purpose that sleep has on the brain. Sleep disorders were discussed within the group with a focus on gaining knowledge about the etiology and pathophysiology of insomnia, sleep apnea, restless leg syndrome, narcolepsy, medications that can cause risk for sleeping disorders, and other symptoms/factors that may interfere with sleep. Risk factors for developing sleep disorders were discussed and treatments/pharmacological options were explored.     Patient Session Goals / Objectives:  Described the effect and purpose of sleep on the brain and identify the amount of sleep needed  Identified differences between sleep disorders and risks associated with sleep disorders  Described the connection between sleep disturbances and mental illness  Increased knowledge about treatments for sleep disorders      Patient Participation / Response:  Fully participated with the group by sharing personal reflections / insights and openly received / provided feedback with other participants.    Identified / Expressed personal readiness to practice skills and Verbalized understanding of foundations of health topic    Treatment Plan:  Patient has a current master individualized treatment plan.  See Epic treatment plan for more information.    Stella Chase RN

## 2024-11-20 NOTE — GROUP NOTE
Psychotherapy Group Note    PATIENT'S NAME: Gaurang Rivera  MRN:   [id number]  :   1970  ACCT. NUMBER: [id number]  DATE OF SERVICE: 24  START TIME:  1:00 PM  END TIME:  1:50 PM  FACILITATOR: John Sam LPC  TOPIC: MH EBP Group: Cognitive Restructuring  [facility] Adult Mental Health Outpatient Programs  TRACK: PHP MERGE  NUMBER OF PARTICIPANTS: 7    Summary of Group / Topics Discussed:  Cognitive Restructuring: Distortions: Patients received an overview of how to identify common cognitive distortions. Patients will explore alternatives to cognitive distortions and practice challenging their negative thought patterns. The goal is to help patients target modify ineffective thought patterns.     Patient Session Goals / Objectives:  Familiarized self with ineffective / unhealthy thoughts and how they develop.    Explored impact of ineffective thoughts / distortions on mood and activity  Formulated new neutral/positive alternatives to challenge less helpful / ineffective thoughts.  Practiced and plan to apply in daily life           Patient Participation / Response:  Fully participated with the group by sharing personal reflections / insights and openly received / provided feedback with other participants.    Demonstrated understanding of topics discussed through group discussion and participation, Expressed understanding of the relationship between behaviors, thoughts, and feelings, Demonstrated knowledge of personal thought patterns and how they impact their mood and behavior., Identified barriers to changing thought patterns, Identified / Expressed personal readiness to practice CBT, Verbalized understanding of patient's own thought patterns and how they impact their mood and behaviors, and Practiced skills in session    Treatment Plan:  Patient has a current master individualized treatment plan.  See Epic treatment plan for more information.    John Sam LPC

## 2024-11-21 ENCOUNTER — TELEPHONE (OUTPATIENT)
Dept: BEHAVIORAL HEALTH | Facility: CLINIC | Age: 54
End: 2024-11-21
Payer: COMMERCIAL

## 2024-11-21 NOTE — TELEPHONE ENCOUNTER
----- Message from Clinton Washington sent at 11/21/2024  1:53 PM CST -----  Regarding: Discharge from PHP to IOPDT3  Patient is discharging from PHP at the end of the day, 11/21/24.  Please remove from the PHP MARIA ESTHER going forward.    Adult Mental Health Programmatic Care Schedule Request    Patient Name: Gaurang Rivera  Location of programming: [facility]  Start Date: 11/22/24    Adult Program Group: Adult Program Group: IOP/DT 3  55+ Track [36583]  Schedule: M, W, Th, F 1pm-4pm  12 hours per week for 9 weeks  Number of visits to be scheduled: 36 days    Attending Provider (MD):  Dr. Terry Blood ([place]); Dr. Vu Ahn ([place])  Visit Type:  In-Person    Accommodations Needed: na  Alerts Identified/Substantiation: na  Consulted with Supervisor: na    Send to:   [[facility] (86335)] ##ONLY if Start Date is determined##  NG 14 OBC's (BEH BEHAVIORAL OUTPATIENT ASSESSMENTS [80782])   Honey Arango ([facility])  Lorin Wallace (PHP)  Helen Cortes/Rao ([place]: PHP & IOP)    Thank you,  Clinton

## 2024-11-21 NOTE — TELEPHONE ENCOUNTER
PHP therapist called patient and completed PHP discharge summary and instructions and clinical update to do IOP starting on 11/22/24.  C-SSRS since last contact was also completed.  Gaurang reviewed his safety plan and was sent an updated copy of his safety plan and [facility]    SAJI Candelaria on 11/21/2024 at 4:02 PM

## 2024-11-21 NOTE — TELEPHONE ENCOUNTER
Pt called in ill for PHP today. Writer called to assess symptoms with pt. Pt reports stomach upset, continuing from earlier this week. Writer inquired about how pt is feeling about participation in PHP programming. He did report that the days are long for him and difficult for him, stamina-wise. [facility] 4[facility]/week for 3 hours per day as discussed with treatment team in huddle this AM. Pt was appreciative and accepted the offer. He would like to start IOPDT3 tomorrow. He is available for discharge call today at 12:30 and PHP staff plan on calling him at that time to complete discharge from PHP.

## 2024-12-02 ENCOUNTER — TELEPHONE (OUTPATIENT)
Dept: BEHAVIORAL HEALTH | Facility: CLINIC | Age: 54
End: 2024-12-02

## 2024-12-02 NOTE — TELEPHONE ENCOUNTER
"Gaurang called the program line to report his absence from today's IOP/DT 3 55+ group.  Message from  staff: \"Gaurang out sick today. Is safe\".     Writer left a message requesting a call back discuss insurance concerns and attendance barriers.  Of note, phone went straight to voicemail.    QUIQUE Zamarripa on 12/2/2024 at 3:19 PM    "

## 2024-12-04 ENCOUNTER — TELEPHONE (OUTPATIENT)
Dept: BEHAVIORAL HEALTH | Facility: CLINIC | Age: 54
End: 2024-12-04

## 2024-12-04 NOTE — TELEPHONE ENCOUNTER
Ph call from pt explained program received information is policy has terminated. Pt reports he was not aware of that. Writer explained self pay agreement and the required deposit of $4,686.52 to continuing programming. Pt reports he is not able to pay for programming out of pocket and would call his insurance company for additional information and call back.      Kayy Ceballos, QUIQUE on 12/4/2024 at 1:59 PM

## 2024-12-05 ENCOUNTER — TELEPHONE (OUTPATIENT)
Dept: BEHAVIORAL HEALTH | Facility: CLINIC | Age: 54
End: 2024-12-05

## 2024-12-05 NOTE — TELEPHONE ENCOUNTER
----- Message from Dg GRAHAM sent at 12/5/2024 12:58 PM CST -----  Regarding: Please make appts for pt  Scheduling Request    Patient Name: Gaurang Rivera  Location of programming: [facility]  Start Date: December / 02 / 2025  Group: [id number] on Monday, Wednesday, Thursday, and Friday at 1:00 PM to 4:00 PM  Attending Provider (MD): Terry Blood  Duration of Appointment in minutes: 180  Visit Type: In-person or Treatment - 870    Additional notes: Please make appts for Mon, Wed, and today (12/2, 12/4, 12/5).  Pt is choosing to discharge until insurance is resolved.  Please add appt so staff can chart absences. Thank you!

## 2024-12-09 ENCOUNTER — TELEPHONE (OUTPATIENT)
Dept: ANTICOAGULATION | Facility: CLINIC | Age: 54
End: 2024-12-09

## 2024-12-09 NOTE — TELEPHONE ENCOUNTER
ANTICOAGULATION     Gaurang Rivera is overdue for a Chromogenic Factor X check.     Left message for patient to call and schedule lab appointment as soon as possible. If returning call, please schedule.     Cyn Womack RN  12/9/2024  Anticoagulation Clinic  [facility] for routing messages: sujey RITCHIE  ACC patient phone line: [phone]

## 2024-12-10 ENCOUNTER — TELEPHONE (OUTPATIENT)
Dept: BEHAVIORAL HEALTH | Facility: CLINIC | Age: 54
End: 2024-12-10

## 2024-12-10 NOTE — TELEPHONE ENCOUNTER
----- Message from Zuly Arthur sent at 12/10/2024  3:18 PM CST -----  Regarding: RE: Transition Clinic Referral  Hello,    My apologies for the delayed response.  If you could please follow up to schedule patient through [facility], that would be helpful.      All the best,    Zuly  ----- Message -----  From: Jaleesa Arzate  Sent: 12/6/2024   4:11 PM CST  To: QUIQUE Zamarripa  Subject: RE: Transition Clinic Referral                   Hello,    As the patient has termed insurance we will not be able to schedule them with the Transition Clinic. We are able to follow up and offer to schedule the patient through [facility]. Please let us know how you would like to proceed.    Thank you,  Jaleesa Arzate  Transition Clinic Coordinator  12/06/24 4:11 PM  ----- Message -----  From: Zuly Arthur LICSW  Sent: 12/6/2024  11:44 AM CST  To: Transition Clinic  Subject: Transition Clinic Referral                       Transition Clinic Referral   [place]       Please Check Type of Referral Requested:       __X__THERAPY: The Transition clinic is able to schedule patients without current medical insurance; these patient will be referred to our Social Work Care Coordinator for Medical Insurance              Assistance. We are open for referral for psychotherapy. Patient is referred from: [facility] (55+ IOP)    Referring Provider Contact Name: QUIQUE Zamarripa; Phone Number: [phone]    Reason for Transition Clinic Referral: Discharged from 55+ IOP due to termed insurance.  Is hopeful to sort this out soon.  Is interested in individual therapy services sooner than his 12/27 intake.    Next Level of Care Patient Will Be Transitioned To: Individual Therapy  Provider(s)Peyton Mathews LICSW  Location [facility] (Video)  Date/Time 12/27 @ 3:30pm    What Would Be Helpful from the Transition Clinic: Schedule 1 or 2 therapy appointments prior to his 12/27 intake appointment.  Of course making sure he has active  insurance.    Requesting to schedule in collaboration with patient (via Teams): No      Needs:NO    Does Patient Have Access to Technology: Yes    Patient E-mail Address: [email or web address]    Current Patient Phone Number: [phone]    Clinician Gender Preference (if applicable): NO    Patient location preference:In person or virtual    QUIUQE Zamarripa    (Master Form: Updated 11/28/2023)

## 2024-12-10 NOTE — TELEPHONE ENCOUNTER
First attempt to contact pt. Writer left a VM with TC contact info and encouraged a phone call back to schedule long term care connect appointment. Writer will postpone for tomorrow.    Mana Burgess  12/10/2024  336

## 2024-12-11 ENCOUNTER — TELEPHONE (OUTPATIENT)
Dept: BEHAVIORAL HEALTH | Facility: CLINIC | Age: 54
End: 2024-12-11

## 2024-12-11 NOTE — TELEPHONE ENCOUNTER
----- Message from Zuly Arthur sent at 12/10/2024  3:18 PM CST -----  Regarding: RE: Transition Clinic Referral  Hello,    My apologies for the delayed response.  If you could please follow up to schedule patient through [facility], that would be helpful.      All the best,    Zuly  ----- Message -----  From: Jaleesa Arzate  Sent: 12/6/2024   4:11 PM CST  To: QUIQUE Zamarripa  Subject: RE: Transition Clinic Referral                   Hello,    As the patient has termed insurance we will not be able to schedule them with the Transition Clinic. We are able to follow up and offer to schedule the patient through [facility]. Please let us know how you would like to proceed.    Thank you,  Jaleesa Arzate  Transition Clinic Coordinator  12/06/24 4:11 PM  ----- Message -----  From: Zuly Arthur LICSW  Sent: 12/6/2024  11:44 AM CST  To: Transition Clinic  Subject: Transition Clinic Referral                       Transition Clinic Ref[place]       Please Check Type of Referral Requested:       __X__THERAPY: The Transition clinic is able to schedule patients without current medical insurance; these patient will be referred to our Social Work Care Coordinator for Medical Insurance              Assistance. We are open for referral for psychotherapy. Patient is referred from: [facility] (55+ IOP)    Referring Provider Contact Name: QUIQUE Zamarripa; Phone Number: [phone]    Reason for Transition Clinic Referral: Discharged from 55+ IOP due to termed insurance.  Is hopeful to sort this out soon.  Is interested in individual therapy services sooner than his 12/27 intake.    Next Level of Care Patient Will Be Transitioned To: Individual Therapy  Provider(s)Peyton Mathews LICSW  Location [facility] (Video)  Date/Time 12/27 @ 3:30pm    What Would Be Helpful from the Transition Clinic: Schedule 1 or 2 therapy appointments prior to his 12/27 intake appointment.  Of course making sure he has active  insurance.    Requesting to schedule in collaboration with patient (via Teams): No      Needs:NO    Does Patient Have Access to Technology: Yes    Patient E-mail Address: [email or web address]    Current Patient Phone Number: [phone]    Clinician Gender Preference (if applicable): NO    Patient location preference:In person or virtual    QUIQUE Zamarripa    (Master Form: Updated 11/28/2023)

## 2024-12-11 NOTE — TELEPHONE ENCOUNTER
Second attempt to contact pt. Writer left a VM with TC contact info and encouraged a phone call back to schedule CC appointment. Coordinator will nancy referral as complete.    Mana Burgess  12/11/2024  701

## 2024-12-23 ENCOUNTER — DOCUMENTATION ONLY (OUTPATIENT)
Dept: ANTICOAGULATION | Facility: CLINIC | Age: 54
End: 2024-12-23

## 2024-12-23 NOTE — LETTER
"     [facility] ANTICOAGULATION CLINIC  [address]  [facility] [address]  Phone: [phone]  Fax: [phone]       December 23, 2024        Gaurang Rivera  [address]   [facility] [address]            Dear Gaurang,    You are currently under the care of [facility] for your warfarin (Coumadin , Jantoven ) therapy.  We are contacting you because our records show you were due for a Chromogenic Factor X on 12/2/24.    There are potentially serious risks when taking warfarin without careful monitoring and we want to make sure you are safely managed.  Routine lab monitoring is required for warfarin refills.     Please schedule a lab appointment as soon as possible either by calling [phone] or scheduling directly in [employer]. To schedule in [employer] open the \"schedule an appointment section\"  then select \"lab only\" as the reason you are coming in and \"INR\" as the lab test. If it is difficult for you to get to lab, please call us to discuss options.  If there has been a change in your care or other concerns, please let us know so we can help and/or update our records.         Sincerely,       [facility]   "

## 2024-12-23 NOTE — PROGRESS NOTES
ANTICOAGULATION     Gaurang Rivera is overdue for a Chromogenic Factor X check.     Reminder letter sent    Ashlee Polanco RN  12/23/2024  Anticoagulation Clinic  [facility] for routing messages: sujey RITCHIE  [facility] patient phone line: [phone]

## 2025-01-06 ENCOUNTER — TELEPHONE (OUTPATIENT)
Dept: ANTICOAGULATION | Facility: CLINIC | Age: 55
End: 2025-01-06

## 2025-01-06 NOTE — TELEPHONE ENCOUNTER
ANTICOAGULATION     Gaurang Rivera is overdue for a Chromogenic Factor X check.     Left message for patient to call and schedule lab appointment as soon as possible. If returning call, please schedule. Reminder letter sent as well.     Cyn Womack RN  1/6/2025  Anticoagulation Clinic  [facility] for routing messages: sujey RITCHIE  [facility] patient phone line: [phone]

## 2025-01-06 NOTE — LETTER
"     [facility] ANTICOAGULATION CLINIC  [address]  [facility] [address]  Phone: [phone]  Fax: [phone]     January 6, 2025        Gaurang Rivera  [address]   [facility] [address]            Dear Gaurang,    You are currently under the care of [facility] for your warfarin (Coumadin , Jantoven ) therapy.  We are contacting you because our records show you were due for a Chromogenic Factor X on 12/2/24.    There are potentially serious risks when taking warfarin without careful monitoring and we want to make sure you are safely managed.  Routine lab monitoring is required for warfarin refills.     Please schedule a lab appointment as soon as possible either by calling [phone] or scheduling directly in Sample[employer]. To schedule in Sample[employer] open the \"schedule an appointment section\"  then select \"lab only\" as the reason you are coming in and \"INR\" as the lab test. If it is difficult for you to get to lab, please call us to discuss options.  If there has been a change in your care or other concerns, please let us know so we can help and/or update our records.         Sincerely,       [facility]    "

## 2025-01-06 NOTE — TELEPHONE ENCOUNTER
Anticoagulation Clinic Notification    Gaurang, is past due for a Chromogenic Factor X. Their last result was 28% on 11/4/24 and was due to come back on 12/2/24.    he received phone calls and letters over the last several weeks in attempt to arrange follow up labs. Gaurang Rivera will be contacted again today.     Please contact patient directly to discuss compliance with monitoring or schedule visit to review ongoing anticoagulation therapy.    Thank you,     [facility]

## 2025-01-26 ENCOUNTER — HEALTH MAINTENANCE LETTER (OUTPATIENT)
Age: 55
End: 2025-01-26

## 2025-02-02 DIAGNOSIS — F33.2 SEVERE EPISODE OF RECURRENT MAJOR DEPRESSIVE DISORDER, WITHOUT PSYCHOTIC FEATURES (H): ICD-10-CM

## 2025-02-03 ENCOUNTER — TELEPHONE (OUTPATIENT)
Dept: ANTICOAGULATION | Facility: CLINIC | Age: 55
End: 2025-02-03

## 2025-02-03 NOTE — TELEPHONE ENCOUNTER
Date of Last Office Visit: 10/3124  Date of Next Office Visit: None; routing for A to assist pt with scheduling.    No shows since last visit: No  More than one patient-initiated cancellation (with reschedule) since last seen in clinic? No    []Medication refilled per  Medication Refill in Ambulatory Care  policy.  [x]Medication unable to be refilled by RN due to criteria not met as indicated below:    []Eligibility: has not had a provider visit within last 6 months   [x]Supervision: no future appointment; < 7 days before next appointment   []Compliance: no shows; cancellations; lapse in therapy   []Verification: order discrepancy; may need modification...   [x] > 30-day supply request   []Advanced refill request: > 7 days before refill date   []Controlled medication   []Medication not included in policy   []Review: new med; med adjusted <= 30 days; safety alert; requires lab monitoring...   []Scope of Practice: refill request processed by LPN/MA   []Other:      Medication(s) requested:     -  lurasidone (LATUDA) 120 MG TABS tablet   Date last ordered: 10/31/24  Qty: 90  Refills: 0      Any Controlled Substance(s)? No      Requested medication(s) verified as identical to current order? Yes    Any lapse in adherence to medication(s) greater than 5 days? No  See dispense report below:      Additional action taken? routed encounter to provider for review.      Last visit treatment plan:         Behavioral        Feels getting back on the Latuda has been beneficial along with the combination of bupropion  mg and venlafaxine XR 75 mg, however partially effective.  Is hopeful of the benefit he may experience with completing an IOP or PHP.  His intake appointment is November 11.  Will increase the bupropion XL to 300 mg which was historic dose.  Follow-up in 2 months if a provider is not managing his medications well in the outpatient programming.  If they are managing his medications he will cancel and follow up  when discharged.           MDD (major depressive disorder), recurrent episode, severe (H)     Relevant Medications     buPROPion (WELLBUTRIN XL) 300 MG 24 hr tablet     venlafaxine (EFFEXOR XR) 75 MG 24 hr capsule     lurasidone (LATUDA) 120 MG TABS tablet       Any medication(s) require lab monitoring? Yes   GENERAL ANTIPSYCHOTIC   Last Hemoglobin A1c:   Hemoglobin A1C   Date Value Ref Range Status   09/13/2024 11.2 (H) 0.0 - 5.6 % Final     Comment:     Normal <5.7%   Prediabetes 5.7-6.4%    Diabetes 6.5% or higher     Note: Adopted from ADA consensus guidelines.     Last Lipid Panel (fasting):   Cholesterol   Date Value Ref Range Status   01/15/2024 210 (H) <200 mg/dL Final     Triglycerides   Date Value Ref Range Status   01/15/2024 505 (H) <150 mg/dL Final     Direct Measure HDL   Date Value Ref Range Status   01/15/2024 39 (L) >=40 mg/dL Final     LDL Cholesterol Direct   Date Value Ref Range Status   09/27/2024 132 (H) <100 mg/dL Final     Comment:     Age 2-19 years:  Desirable: < 110 mg/dL   Borderline High: 110-129 mg/dL   High: >= 130 mg/dL    Age 20 years and older:  Desirable: < 100 mg/dL  Above Desirable: 100-129 mg/dL   Borderline High: 130-159 mg/dL   High: 160-189 mg/dL   Very High: >= 190 mg/dL     Non HDL Cholesterol   Date Value Ref Range Status   01/15/2024 171 (H) <130 mg/dL Final     Patient Fasting > 8hrs?   Date Value Ref Range Status   01/15/2024 Unknown  Final

## 2025-02-03 NOTE — TELEPHONE ENCOUNTER
ANTICOAGULATION MANAGEMENT PROGRAM    Dr. Madera,     Our records indicate that Gaurang Rivera remains past due to check a Chromogenic Factor X. Gaurang Rivera was contacted multiple times over at least the last 8 weeks to attempt to arrange a follow up appointment.    Gaurang Rivera last had an a Chromogenic Factor X checked on 11/4/24 and was due for follow up on 12/2/24     Last [facility] referral date: 09/18/2024    Called patient, Left message for patient to call and schedule lab appointment as soon as possible. If returning call, please schedule.      At this time Gaurang Rivera will be moved to noncompliant status within the program until their referral expires on 9/18/25. While in noncompliant status the patient would continue to be contacted periodically (~every 6 weeks) by the anticoagulation program to attempt to schedule patient. You will be notified of each contact attempt to make you aware of patient's ongoing noncompliance.         Thank you,     [facility] Anticoagulation Management Program

## 2025-02-05 RX ORDER — LURASIDONE HYDROCHLORIDE 120 MG/1
120 TABLET, FILM COATED ORAL DAILY
Qty: 30 TABLET | Refills: 0 | Status: SHIPPED | OUTPATIENT
Start: 2025-02-05

## 2025-02-12 ENCOUNTER — LAB (OUTPATIENT)
Dept: LAB | Facility: CLINIC | Age: 55
End: 2025-02-12

## 2025-02-12 DIAGNOSIS — I82.409 ACUTE DEEP VEIN THROMBOSIS (DVT) OF OTHER VEIN OF LOWER EXTREMITY, UNSPECIFIED LATERALITY (H): ICD-10-CM

## 2025-02-12 DIAGNOSIS — Z86.718 H/O DEEP VENOUS THROMBOSIS: ICD-10-CM

## 2025-02-12 DIAGNOSIS — D68.51 HETEROZYGOUS FACTOR V LEIDEN MUTATION: ICD-10-CM

## 2025-02-12 DIAGNOSIS — Z79.01 LONG TERM (CURRENT) USE OF ANTICOAGULANTS: ICD-10-CM

## 2025-02-12 DIAGNOSIS — I26.94 MULTIPLE SUBSEGMENTAL PULMONARY EMBOLI WITHOUT ACUTE COR PULMONALE (H): ICD-10-CM

## 2025-02-12 PROCEDURE — 85260 CLOT FACTOR X STUART-POWER: CPT

## 2025-02-12 PROCEDURE — 36415 COLL VENOUS BLD VENIPUNCTURE: CPT

## 2025-02-13 ENCOUNTER — ANTICOAGULATION THERAPY VISIT (OUTPATIENT)
Dept: ANTICOAGULATION | Facility: CLINIC | Age: 55
End: 2025-02-13

## 2025-02-13 DIAGNOSIS — I26.94 MULTIPLE SUBSEGMENTAL PULMONARY EMBOLI WITHOUT ACUTE COR PULMONALE (H): ICD-10-CM

## 2025-02-13 DIAGNOSIS — Z79.01 LONG TERM (CURRENT) USE OF ANTICOAGULANTS: ICD-10-CM

## 2025-02-13 DIAGNOSIS — Z86.718 H/O DEEP VENOUS THROMBOSIS: ICD-10-CM

## 2025-02-13 DIAGNOSIS — D68.51 HETEROZYGOUS FACTOR V LEIDEN MUTATION: Primary | ICD-10-CM

## 2025-02-13 LAB — FACT X ACT/NOR PPP CHRO: 25 % (ref 70–130)

## 2025-02-13 NOTE — PROGRESS NOTES
ANTICOAGULATION MANAGEMENT     Gaurang Rivera 54 year old male is on warfarin with therapeutic result. (Goal Chromogenic Factor X 40-20%)    Recent labs: (last 7 days)     02/12/25  1005   [id number] 25*       ASSESSMENT     Source(s): Chart Review  Previous result was Therapeutic last 2(+) visits  Medication, diet, health changes since last result: chart reviewed; none identified  Last CFX was due to be checked 12/2/24       PLAN     Recommended plan for no diet, medication or health factor changes affecting result    Dosing Instructions: Continue your current warfarin dose 7.5 mg Mon, Fri; 5 mg all other days  with next Chromogenic Factor X in 6 weeks       Detailed voice message left for Gaurang with dosing instructions and follow up date.     Contact [phone] to schedule and with any changes, questions or concerns.     Education provided:   Please call back if any changes to your diet, medications or how you've been taking warfarin    Plan made per [facility] anticoagulation protocol    Harshil Salamanca RN  2/13/2025  Anticoagulation Clinic  [facility] for routing messages: sujey RITCHIE  [facility] patient phone line: [phone]

## 2025-02-24 ENCOUNTER — MYC REFILL (OUTPATIENT)
Dept: PSYCHIATRY | Facility: CLINIC | Age: 55
End: 2025-02-24

## 2025-02-24 DIAGNOSIS — F33.2 SEVERE EPISODE OF RECURRENT MAJOR DEPRESSIVE DISORDER, WITHOUT PSYCHOTIC FEATURES (H): ICD-10-CM

## 2025-02-24 RX ORDER — VENLAFAXINE HYDROCHLORIDE 75 MG/1
75 CAPSULE, EXTENDED RELEASE ORAL DAILY
Qty: 30 CAPSULE | Refills: 0 | Status: SHIPPED | OUTPATIENT
Start: 2025-02-24

## 2025-02-24 NOTE — TELEPHONE ENCOUNTER
Date of Last Office Visit: 10/31/24  Date of Next Office Visit: None; routing for A to assist pt with scheduling.    No shows since last visit: No  More than one patient-initiated cancellation (with reschedule) since last seen in clinic? No    []Medication refilled per  Medication Refill in Ambulatory Care  policy.  [x]Medication unable to be refilled by RN due to criteria not met as indicated below:    []Eligibility: has not had a provider visit within last 6 months   [x]Supervision: no future appointment; < 7 days before next appointment   [x]Compliance: no shows; cancellations; lapse in therapy   []Verification: order discrepancy; may need modification...   [] > 30-day supply request   []Advanced refill request: > 7 days before refill date   []Controlled medication   []Medication not included in policy   []Review: new med; med adjusted <= 30 days; safety alert; requires lab monitoring...   []Scope of Practice: refill request processed by LPN/MA   []Other:      Medication(s) requested:           Any Controlled Substance(s)? No      Requested medication(s) verified as identical to current order? Yes    Any lapse in adherence to medication(s) greater than 5 days? Yes - MyChart messaged patient for more information    Additional action taken? routed encounter to provider for review.      Last visit treatment plan:     Behavioral        Feels getting back on the Latuda has been beneficial along with the combination of bupropion  mg and venlafaxine XR 75 mg, however partially effective.  Is hopeful of the benefit he may experience with completing an IOP or PHP.  His intake appointment is November 11.  Will increase the bupropion XL to 300 mg which was historic dose.  Follow-up in 2 months if a provider is not managing his medications well in the outpatient programming.  If they are managing his medications he will cancel and follow up when discharged.           MDD (major depressive disorder), recurrent  episode, severe (H)     Relevant Medications     buPROPion (WELLBUTRIN XL) 300 MG 24 hr tablet     venlafaxine (EFFEXOR XR) 75 MG 24 hr capsule     lurasidone (LATUDA) 120 MG TABS tablet       Any medication(s) require lab monitoring? No    LARON LIU RN on 2/24/2025 at 2:41 PM

## 2025-03-22 ENCOUNTER — HEALTH MAINTENANCE LETTER (OUTPATIENT)
Age: 55
End: 2025-03-22

## 2025-04-01 ENCOUNTER — MYC REFILL (OUTPATIENT)
Dept: FAMILY MEDICINE | Facility: CLINIC | Age: 55
End: 2025-04-01

## 2025-04-01 ENCOUNTER — MYC REFILL (OUTPATIENT)
Dept: PSYCHIATRY | Facility: CLINIC | Age: 55
End: 2025-04-01

## 2025-04-01 DIAGNOSIS — Z79.01 LONG TERM (CURRENT) USE OF ANTICOAGULANTS: ICD-10-CM

## 2025-04-01 DIAGNOSIS — F33.2 SEVERE EPISODE OF RECURRENT MAJOR DEPRESSIVE DISORDER, WITHOUT PSYCHOTIC FEATURES (H): ICD-10-CM

## 2025-04-01 DIAGNOSIS — I26.94 MULTIPLE SUBSEGMENTAL PULMONARY EMBOLI WITHOUT ACUTE COR PULMONALE (H): ICD-10-CM

## 2025-04-01 DIAGNOSIS — D68.51 HETEROZYGOUS FACTOR V LEIDEN MUTATION: ICD-10-CM

## 2025-04-01 DIAGNOSIS — Z86.718 H/O DEEP VENOUS THROMBOSIS: ICD-10-CM

## 2025-04-01 RX ORDER — WARFARIN SODIUM 5 MG/1
TABLET ORAL
Qty: 100 TABLET | Refills: 1 | Status: SHIPPED | OUTPATIENT
Start: 2025-04-01

## 2025-04-01 RX ORDER — VENLAFAXINE HYDROCHLORIDE 75 MG/1
75 CAPSULE, EXTENDED RELEASE ORAL DAILY
Qty: 30 CAPSULE | Refills: 0 | Status: SHIPPED | OUTPATIENT
Start: 2025-04-01

## 2025-04-01 NOTE — TELEPHONE ENCOUNTER
Date of Last Office Visit: 10/31/24  Date of Next Office Visit: None; routing for A to assist pt with scheduling.  RN also left voice message for patient with 800 number to call.  No shows since last visit: No  More than one patient-initiated cancellation (with reschedule) since last seen in clinic? No, but pt did cancel one in December due to insurance/financial issues.    []Medication refilled per  Medication Refill in Ambulatory Care  policy.  []Scope of Practice: refill request processed by LPN/MA  [x]Medication unable to be refilled by RN due to criteria not met as indicated below:    []Eligibility: has not had a provider visit within last 6 months   [x]Supervision: no future appointment; < 7 days before next appointment   []Compliance: no shows; cancellations; lapse in therapy   []Verification: order discrepancy; may need modification...   [] > 30-day supply request   []Advanced refill request: > 7 days before refill date   []Controlled medication   []Medication not included in policy   []Review: new med; med adjusted <= 30 days; safety alert; requires lab monitoring...   []Other:      Medication(s) requested:     -  venlafaxine (EFFEXOR XR) 75 MG 24 hr capsule   Date last ordered: 2/24/25  Qty: 30  Refills: 0  Appropriate for refill? Provider to review.        Any Controlled Substance(s)? No      Requested medication(s) verified as identical to current order? Yes    Any lapse in adherence to medication(s) greater than 5 days? Unknown     Additional action taken? contacted patient via phone at [phone] and left voice message to call and schedule an appointment  and routed encounter to provider for review.      Last visit treatment plan:   Patient Status:  Patient will continue to be seen for ongoing consultation and stabilization.   Behavioral        Feels getting back on the Latuda has been beneficial along with the combination of bupropion  mg and venlafaxine XR 75 mg, however partially  effective.  Is hopeful of the benefit he may experience with completing an IOP or PHP.  His intake appointment is November 11.  Will increase the bupropion XL to 300 mg which was historic dose.  Follow-up in 2 months if a provider is not managing his medications well in the outpatient programming.  If they are managing his medications he will cancel and follow up when discharged.           MDD (major depressive disorder), recurrent episode, severe (H)     Relevant Medications     buPROPion (WELLBUTRIN XL) 300 MG 24 hr tablet     venlafaxine (EFFEXOR XR) 75 MG 24 hr capsule     lurasidone (LATUDA) 120 MG TABS tablet       Any medication(s) require lab monitoring? No    Cleo Hillman RN on 4/1/2025 at 11:36 AM

## 2025-04-01 NOTE — TELEPHONE ENCOUNTER
ANTICOAGULATION MANAGEMENT:  Medication Refill    Anticoagulation Summary  As of 2/13/2025      Warfarin maintenance plan:  7.5 mg (5 mg x 1.5) every Mon, Fri; 5 mg (5 mg x 1) all other days   Next INR check:  3/26/2025   Target end date:  Indefinite    Indications    Atrial fibrillation  unspecified type (H) (Resolved) [I48.91]  Heterozygous factor V Leiden mutation [D68.51]  H/O deep venous thrombosis [Z86.718]  Long term (current) use of anticoagulants [Z79.01]  Multiple subsegmental pulmonary emboli without acute cor pulmonale (H) [I26.94]                 Anticoagulation Care Providers       Provider Role Specialty Phone number    Elvis Guzman MD Referring Family Medicine [phone]            Refill Criteria    Visit with referring provider/group: Meets criteria: visit within referring provider group in the last 15 months on 8/2/24    [facility] referral last signed: 09/18/2024; within last year:  Yes    Lab monitoring is up to date (not exceeding 2 weeks overdue): Yes    Gaurang meets all criteria for refill. Rx instructions and quantity supplied updated to match patient's current dosing plan. Warfarin 90 day supply with 1 refill granted per [facility] [employer] protocol     Harshil Salamanca RN  Anticoagulation Clinic

## 2025-04-16 ENCOUNTER — TELEPHONE (OUTPATIENT)
Dept: ANTICOAGULATION | Facility: CLINIC | Age: 55
End: 2025-04-16

## 2025-04-16 NOTE — LETTER
"     [facility] ANTICOAGULATION CLINIC  [address]  [facility] [address]  Phone: [phone]  Fax: [phone]   April 16, 2025        Gaurang Rivera  [address]   [place] [facility] [facility] [address]            Dear Gaurang,    You are currently under the care of [facility] for your warfarin (Coumadin , Jantoven ) therapy.  We are contacting you because our records show you were due for a Chromogenic Factor X on 3/26/25.    There are potentially serious risks when taking warfarin without careful monitoring and we want to make sure you are safely managed.  Routine lab monitoring is required for warfarin refills.     Please schedule a lab appointment as soon as possible either by calling [phone] or scheduling directly in [employer]. To schedule in [employer] open the \"schedule an appointment\" section then select \"lab only\" as the reason you are coming in and \"INR\" as the lab test. If it is difficult for you to get to lab, please call us to discuss options.  If there has been a change in your care or other concerns, please let us know so we can help and/or update our records.         Sincerely,       [facility]    "

## 2025-04-16 NOTE — TELEPHONE ENCOUNTER
ANTICOAGULATION     Gaurang Rivera is overdue for a Chromogenic Factor X check.     Reminder letter sent    Harshil Salamanca RN  4/16/2025  Anticoagulation Clinic  [facility] for routing messages: sujey RITCHIE  [facility] patient phone line: [phone]

## 2025-04-30 ENCOUNTER — TELEPHONE (OUTPATIENT)
Dept: ANTICOAGULATION | Facility: CLINIC | Age: 55
End: 2025-04-30

## 2025-04-30 NOTE — TELEPHONE ENCOUNTER
Anticoagulation Clinic Notification    Gaurang, is past due for a Chromogenic Factor X. Their last result was 25% on 2/13/25 and was due to come back on 3/26/25.    he received phone calls and letters over the last several weeks in attempt to arrange follow up labs. Gaurang Rivera will be contacted again today.     Please contact patient directly to discuss compliance with monitoring or schedule visit to review ongoing anticoagulation therapy.    Thank you,     [facility] Anticoagulation Clinic

## 2025-04-30 NOTE — TELEPHONE ENCOUNTER
Writer contacted pt. No answered. Left message informing pt that the anticoagulation clinic has been trying to reach him the last several weeks for an overdue lab. The anticoagulation clinic will try calling him again today. If he can call the lab at [phone] to schedule appointment for that overdue lab or respond to the messages the anticoagulation clinic has been leaving for him. As always, if there is anything he needs from primary care clinic, he can call us at [phone].     Herbie Martin, UMAN, PHN, RN-[facility]

## 2025-04-30 NOTE — LETTER
"     [facility] ANTICOAGULATION CLINIC  [address]  [facility] [address]  Phone: [phone]  Fax: [phone]   April 30, 2025        Gaurang Rivera  [address]   [facility] [address]            Dear Gaurang,    You are currently under the care of [facility] for your warfarin (Coumadin , Jantoven ) therapy.  We are contacting you because our records show you were due for a Chromogenic Factor X on 3/26/25.    There are potentially serious risks when taking warfarin without careful monitoring and we want to make sure you are safely managed.  Routine lab monitoring is required for warfarin refills.     Please schedule a lab appointment as soon as possible either by calling [phone] or scheduling directly in [employer]. To schedule in [employer] open the \"schedule an appointment\" section then select \"lab only\" as the reason you are coming in and \"INR\" as the lab test (please still select INR if you test Chromogenic Factor X and type in CFX in the comments when scheduling).  If it is difficult for you to get to lab, please call us to discuss options.  If there has been a change in your care or other concerns, please let us know so we can help and/or update our records.         Sincerely,       [facility] Anticoagulation Clinic    "

## 2025-05-01 DIAGNOSIS — F33.2 SEVERE EPISODE OF RECURRENT MAJOR DEPRESSIVE DISORDER, WITHOUT PSYCHOTIC FEATURES (H): ICD-10-CM

## 2025-05-01 RX ORDER — VENLAFAXINE HYDROCHLORIDE 75 MG/1
75 CAPSULE, EXTENDED RELEASE ORAL DAILY
Qty: 30 CAPSULE | Refills: 0 | Status: SHIPPED | OUTPATIENT
Start: 2025-05-01

## 2025-05-01 NOTE — TELEPHONE ENCOUNTER
Gaurang returned call--scheduled for lab appointment Monday 5/5.    Future Appointments   Date Time Provider Department Center   5/5/2025  3:15 PM [facility]     Priyanka Emmanuel RN BSN  [facility]

## 2025-05-01 NOTE — TELEPHONE ENCOUNTER
Attempt #2.   Writer called and left message on patient's voicemail to call back and speak with a triage nurse.    UMA LujanN RN  [facility]

## 2025-05-01 NOTE — TELEPHONE ENCOUNTER
Date of Last Office Visit: 10/31/24  Date of Next Office Visit: None; routing for A to assist pt with scheduling.    No shows since last visit: No  More than one patient-initiated cancellation (with reschedule) since last seen in clinic? Yes    []Medication refilled per  Medication Refill in Ambulatory Care  policy.  []Scope of Practice: refill request processed by LPN/MA  [x]Medication unable to be refilled by RN due to criteria not met as indicated below:    []Eligibility: has not had a provider visit within last 6 months   [x]Supervision: no future appointment; < 7 days before next appointment   [x]Compliance: no shows; cancellations; lapse in therapy   []Verification: order discrepancy; may need modification...   [] > 30-day supply request   []Advanced refill request: > 7 days before refill date   []Controlled medication   []Medication not included in policy   []Review: new med; med adjusted <= 30 days; safety alert; requires lab monitoring...   []Other:      Medication(s) requested:     -  venlafaxine (EFFEXOR XR) 75 MG 24 hr capsule   Date last ordered: 4/1/25  Qty: 30  Refills: 0  Appropriate for refill? Provider to review.          Any Controlled Substance(s)? No      Requested medication(s) verified as identical to current order? Yes    Any lapse in adherence to medication(s) greater than 5 days? No      Additional action taken? routed encounter to provider for review.      Last visit treatment plan:   ASSESSMENT AND PLAN       Problem List Items Addressed This Visit                  Behavioral       Feels getting back on the Latuda has been beneficial along with the combination of bupropion  mg and venlafaxine XR 75 mg, however partially effective.  Is hopeful of the benefit he may experience with completing an IOP or PHP.  His intake appointment is November 11.  Will increase the bupropion XL to 300 mg which was historic dose.  Follow-up in 2 months if a provider is not managing his medications  well in the outpatient programming.  If they are managing his medications he will cancel and follow up when discharged.           MDD (major depressive disorder), recurrent episode, severe (H)     Relevant Medications     buPROPion (WELLBUTRIN XL) 300 MG 24 hr tablet     venlafaxine (EFFEXOR XR) 75 MG 24 hr capsule     lurasidone (LATUDA) 120 MG TABS tablet       Any medication(s) require lab monitoring? No    Ramón Booth RN on 5/1/2025 at 10:38 AM

## 2025-05-29 DIAGNOSIS — F33.2 SEVERE EPISODE OF RECURRENT MAJOR DEPRESSIVE DISORDER, WITHOUT PSYCHOTIC FEATURES (H): ICD-10-CM

## 2025-05-30 NOTE — TELEPHONE ENCOUNTER
Date of Last Office Visit: 10/31/24  Date of Next Office Visit: 6/5/25  No shows since last visit: No  More than one patient-initiated cancellation (with reschedule) since last seen in clinic? No    []Medication refilled per  Medication Refill in Ambulatory Care  policy.  []Scope of Practice: refill request processed by LPN/MA  [x]Medication unable to be refilled by RN due to criteria not met as indicated below:    [x]Eligibility: has not had a provider visit within last 6 months   []Supervision: no future appointment; < 7 days before next appointment   []Compliance: no shows; cancellations; lapse in therapy   []Verification: order discrepancy; may need modification...   [] > 30-day supply request   []Advanced refill request: > 7 days before refill date   []Controlled medication   []Medication not included in policy   []Review: new med; med adjusted <= 30 days; safety alert; requires lab monitoring...   []Other:      Medication(s) requested:           Any Controlled Substance(s)? No      Requested medication(s) verified as identical to current order? Yes    Any lapse in adherence to medication(s) greater than 5 days? No      Additional action taken? routed encounter to provider for review.      Last visit treatment plan:       Behavioral        Feels getting back on the Latuda has been beneficial along with the combination of bupropion  mg and venlafaxine XR 75 mg, however partially effective.  Is hopeful of the benefit he may experience with completing an IOP or PHP.  His intake appointment is November 11.  Will increase the bupropion XL to 300 mg which was historic dose.  Follow-up in 2 months if a provider is not managing his medications well in the outpatient programming.  If they are managing his medications he will cancel and follow up when discharged.           MDD (major depressive disorder), recurrent episode, severe (H)     Relevant Medications     buPROPion (WELLBUTRIN XL) 300 MG 24 hr tablet      venlafaxine (EFFEXOR XR) 75 MG 24 hr capsule     lurasidone (LATUDA) 120 MG TABS tablet       Any medication(s) require lab monitoring? No    LARON LIU RN on 5/30/2025 at 12:53 PM

## 2025-05-30 NOTE — TELEPHONE ENCOUNTER
1. Patient Gaurang needs to schedule appointment with Meagan ESTRELLA CNP for refills.  2. RN made call and left Voice Mail with return appointment scheduling instructions.

## 2025-06-03 RX ORDER — VENLAFAXINE HYDROCHLORIDE 75 MG/1
CAPSULE, EXTENDED RELEASE ORAL
Qty: 30 CAPSULE | Refills: 0 | Status: SHIPPED | OUTPATIENT
Start: 2025-06-03 | End: 2025-06-05

## 2025-06-05 ENCOUNTER — VIRTUAL VISIT (OUTPATIENT)
Dept: PSYCHIATRY | Facility: CLINIC | Age: 55
End: 2025-06-05

## 2025-06-05 DIAGNOSIS — F33.2 SEVERE EPISODE OF RECURRENT MAJOR DEPRESSIVE DISORDER, WITHOUT PSYCHOTIC FEATURES (H): ICD-10-CM

## 2025-06-05 RX ORDER — VENLAFAXINE HYDROCHLORIDE 150 MG/1
150 CAPSULE, EXTENDED RELEASE ORAL DAILY
Qty: 90 CAPSULE | Refills: 1 | Status: SHIPPED | OUTPATIENT
Start: 2025-06-05

## 2025-06-05 RX ORDER — VENLAFAXINE HYDROCHLORIDE 75 MG/1
75 CAPSULE, EXTENDED RELEASE ORAL DAILY
Qty: 90 CAPSULE | Refills: 1 | Status: SHIPPED | OUTPATIENT
Start: 2025-06-05

## 2025-06-05 ASSESSMENT — PATIENT HEALTH QUESTIONNAIRE - PHQ9: SUM OF ALL RESPONSES TO PHQ QUESTIONS 1-9: 20

## 2025-06-05 ASSESSMENT — PAIN SCALES - GENERAL: PAINLEVEL_OUTOF10: SEVERE PAIN (7)

## 2025-06-05 NOTE — PATIENT INSTRUCTIONS
Based on our discussion, I have outlined the following instructions for you:      - Consider [facility], a healthcare center that can help you get your medical insurance back and check your knee pain and other health needs. They also offer services for mental health, medical, and dental care, and can help with insurance applications.    Federal Qualified Health Centers (FQHCs) are community-based health care providers that receive funds from the [facility] government to provide primary care services in underserved areas. They offer a range of services to patients regardless of their ability to pay or insurance status.    Services Offered:    Primary Care: Routine check-ups, preventive care, and treatment for common illnesses.  Pediatric Care: Health services for children, including immunizations and developmental screenings.  Women's Health: Prenatal care, family planning, and gynecological services.  Dental Services: Basic dental care, including cleanings, exams, and some procedures.  Behavioral Health: Mental health services, including counseling and substance abuse treatment.  Pharmacy Services: Access to prescription medications at reduced costs.  Benefits of FQHCs:    Affordable Care: FQHCs offer a sliding fee scale based on your income and family size, making health care more affordable.  Comprehensive Services: They provide a wide range of health services under one roof, which can include medical, dental, and mental health care.  Community Focused: FQHCs are located in medically underserved areas and are designed to meet the specific health needs of the community.  No Insurance Required: You can receive care at an FQHC even if you do not have health insurance.    Here is the contact information for [facility], an HC located in [place]:    [facility]    Address: [address] [place] [address]  Phone Number: [phone]  Website:  [facility] ([email or web address]    - Start taking venlafaxine by increasing your dose to 150 mg for one week. After that, increase to 225 mg by taking one 150 mg pill and one 75 mg pill together. This slow increase is to make sure your body adjusts well.    Next appointment(s): - Follow-up appointment in two months; cancel if insurance issues persist and consider [facility] for assistance    Thank you again for your visit, and we look forward to supporting you in your journey to better health.       **For crisis resources, please see the information at the end of this document**     Thank you for coming to the [facility] [place] [facility].    -PSYCHOEDUCATION:       CONSULTS/REFERRALS:   None at this time    LABS/PROCEDURES:    Please call your [place] clinic and ask for a lab only appointment at your earliest convenience.  If your results are reassuring or normal they will be mailed to you or sent through [employer] within 7 days. If the lab tests need quick action we will call you with the results. The phone number we will call with results is # [phone].      FOLLOW UP: Schedule an appointment with me in two months or sooner as needed.  The intake team should be calling you to schedule.  If you dont hear from them, or they were unable to reach you, please call [phone] to schedule.  Follow up with primary care provider as planned or for acute medical concerns.  Call the psychiatric nurse line with medication questions or concerns at [phone].      Medication Refills:  If you need any refills please call your pharmacy and they will contact us. Our fax number for refills is [phone]. Please allow three business for refill processing. If you need to  your refill at a new pharmacy, please contact the new pharmacy directly. The new pharmacy will help you get your medications transferred.     [employer] Assistance  [phone]  Financial Assistance [phone]  [facility] Billing [phone]  Central Billing Office, [facility]: [phone]  [facility] Billing [phone]  Medical Records [phone]  [facility] Patient Bill of Rights [email or web address]       MENTAL HEALTH CRISIS RESOURCES:  For a emergency help, please call 911 or go to the nearest Emergency Department.     Emergency Walk-In Options:   EmPATH Unit @ [facility] ([place]): [phone] - Specialized mental health emergency area designed to be calming  [facility] ([place]): [phone]  [facility] ([place]): [phone]  [facility]): [phone]    National Crisis Information: Call 988 Suicide and Crisis Lifeline  Crisis Text Line (free 24/7):  call **CRISIS (**686875) Crisis or use the texting option by texting 437046.   National Suicide Prevention Lifeline: Call 988  Poison Control Center: 1-784-498-9201  Trans Lifeline: 0-282-908-0875 - Hotline for transgender people of all ages  The [employer] Project: 0-654-262-1467 - Hotline for LGBT youth   List of all [place] county resources:   [place]://[place].gov/dhs/people-we-serve/adults/health-care/mental-health/resources/crisis-contacts.jsp    For Non-Emergency Support:   Fast Tracker: Mental Health & Substance Use Disorder Resources -   https://www.fasttrackermn.org/       Again thank you for choosing [facility] [place] [facility] and please let us know how we can best partner with you to improve you and your family's health.    You may be receiving a survey regarding this appointment. We would love to have your feedback, both positive and negative. The survey is done by an external company, so your answers are anonymous.    Patient Education   Collaborative Care Psychiatry Service  What to Expect  Here's what to expect from your Collaborative Care  "Psychiatry Service (CCPS).   About CCPS  CCPS means 2 people work together to help you get better. You'll meet with a behavioral health clinician and a psychiatric doctor. A behavioral health clinician helps people with mental health problems by talking with them. A psychiatric doctor helps people by giving them medicine.  How it works  At every visit, you'll see the behavioral health clinician (BHC) first. They'll talk with you about how you're doing and teach you how to feel better.   Then you'll see the psychiatric doctor. This doctor can help you deal with troubling thoughts and feelings by giving you medicine. They'll make sure you know the plan for your care.   CCPS usually takes 3 to 6 visits. If you need more visits, we may have you start seeing a different psychiatric doctor for ongoing care.  If you have any questions or concerns, we'll be glad to talk with you.  About visits  Be open  At your visits, please talk openly about your problems. It may feel hard, but it's the best way for us to help you.  Cancelling visits  If you can't come to your visit, please call us right away at [phone]. If you don't cancel at least 24 hours (1 full day) before your visit, that's \"late cancellation.\"  Being late to visits  Being very late is the same as not showing up. You will be a \"no show\" if:  Your appointment starts with a BHC, and you're more than 15 minutes late for a 30-minute (half hour) visit. This will also cancel your appointment with the psychiatric doctor.  Your appointment is with a psychiatric doctor only, and you're more than 15 minutes late for a 30-minute (half hour) visit.  Your appointment is with a psychiatric doctor only, and you're more than 30 minutes late for a 60-minute (full hour) visit.  If you cancel late or don't show up 2 times within 6 months, we may end your care.   Getting help between visits  If you need help between visits, you can call us Monday to Friday from 8 a.m. to 4:30 " p.m. at [phone].  Emergency care  Call 911 or go to the nearest emergency department if your life or someone else's life is in danger.  Call 788 anytime to reach the national Suicide and Crisis hotline.  Medicine refills  To refill your medicine, call your pharmacy. You can also call [facility]'s Behavioral Access at [phone], Monday to Friday, 8 a.m. to 4:30 p.m. It can take 1 to 3 business days to get a refill.   Forms, letters, and tests  You may have papers to fill out, like FMLA, short-term disability, and workability. We can help you with these forms at your visits, but you must have an appointment. You may need more than 1 visit for this, to be in an intensive therapy program, or both.  Before we can give you medicine for ADHD, we may refer you to get tested for it or confirm it another way.  We may not be able to give you an emotional support animal letter.  We don't do mental health checks ordered by the court.   We don't do mental health testing, but we can refer you to get tested.   Thank you for choosing us for your care.  For informational purposes only. Not to replace the advice of your health care provider. Copyright   2022 [facility]. All rights reserved. [employer] [id number] - Rev 11/24.

## 2025-06-05 NOTE — PROGRESS NOTES
Virtual Visit Details    Type of service:  Video Visit     Originating Location (pt. Location): Home    Distant Location (provider location):  Off-site  Platform used for Video Visit: Staaff        PSYCHIATRIC MEDICATION FOLLOW UP APPT     Name:  Gaurang Rviera  : 1970    Referred by: Lary Brizuela PA-C  Patient Care Team:  Elvis Guzman MD as PCP - General (Family Practice)  Elvis Guzman MD as Assigned PCP  Vaishnavi Rock PA as Physician Assistant (Urology)  Angeles Wise MD as MD (Dermatology)  Adair Waite RPH as Pharmacist (Pharmacist)  Lluvia Cárdenas PA-C as Physician Assistant (Gastroenterology)  Adair Waite RPH as Assigned MTM Pharmacist  Angeles Wise MD as Assigned Surgical Provider  Therapist: in therapy currently    Patient attended the phone/video session alone.    Last seen for outpatient psychiatry Return Visit on 10/31/2024.      FOLLOWING PLAN PUT INTO PLACE:   Feels getting back on the Latuda has been beneficial along with the combination of bupropion  mg and venlafaxine XR 75 mg, however partially effective.  Is hopeful of the benefit he may experience with completing an IOP or PHP.  His intake appointment is .  Will increase the bupropion XL to 300 mg which was historic dose.  Follow-up in 2 months if a provider is not managing his medications well in the outpatient programming.  If they are managing his medications he will cancel and follow up when discharged.          INTERIM HISTORY     COMMUNICATIONS FROM PATIENT VIA:  none     RECORDS AVAILABLE FOR REVIEW: EHR records through [employer]  and previous psychiatric progress note.  In addition, reviewed the assessment completed by Peyton Mathews [facility], dated today        HISTORY OF PRESENT ILLNESS   CCPS referral for psychiatric medication consult in 2023.   Reports history of longstanding depression.  Reports he first sought treatment when he was in late 20's.   "Has been on medications essentially since then.   Denies prior psychiatric hospitalizations. Hx of suicidal ideation, no suicide attempts. No history of self-injurious behaviors. Genetically loaded for  anxiety and substance use. Grew up in an intact home with all basic needs being met.     Thorough assessment of a potential underlying bipolar trajectory on initial assessment as follows:      Mood Disorder Questionnaire (MDQ)  Adapted from the \"Mental Health Queri,\" Quality Enhancement Research Initiative     Has there ever been a period of time when you were not your usual self and     -you felt so good or so hyper that other people thought you were not your normal self or you were so hyper that you got into trouble?   NO  -you were so irritable that you shouted at people or started fights or arguments? NO  -you felt much more self-confident than usual? NO                                                             -you got much less sleep than usual and found that you didn't really miss it?  NO  -you were much more talkative and spoke much faster than usual?  NO  -thoughts raced through your head or you couldn't slow your mind down?  ENDORSES RUMINATION          -you were so easily distracted by things around you that you had trouble concentrating or staying on track? NO  -you had much more energy than usual? NO  -you were much more active or did many more things than usual?  NO                             -you were much more social or outgoing than usual, for example, you telephoned friends in the middle of the night?  NO                                         -you did things that were unusual for you or other people might have thought were excessive, foolish or risky? NO  -spending money got you or your family in trouble?     NO                                   If you checked YES to more than one of the above, have several of these ever happened during the same period of time?  NO     How much of a problem did " any of these cause you - like being unable to work; having family, money, or legal troubles; getting into arguments or fights?  NO     Have any of your blood relatives (i.e. children, siblings, parents, grandparents, aunts, uncles) had manic-depressive illness or bipolar disorder?  NO     Has a health professional ever told you that you have manic- depressive illness or bipolar disorder?  NO     [place] Bipolarity Index utilized to further assess for an underlying bipolar disorder. This explores presence of hypomania or jermain, age of onset of first mood symptoms, illness course and other features generally only visible over time, response to medications (antidepressants and mood stabilizers), and family history of mood and substance use.   Patient endorses the following:     History of many failed antidepressant trials  Yes      Age of first depression onset: YOUNG ADULT  History of greater than or equal to five total life time depressive episodes: Yes   Depressive episode with concurrent hypomanic symptoms: No   Questionable activation from antidepressant: No   Family history of mood disorder: No     FAMILY, MEDICAL, SURGICAL HISTORY REVIEWED.  MEDICATION HAVE BEEN REVIEWED AND ARE CURRENT TO THE BEST OF MY KNOWLEDGE AND ABILITY.   dennis, no longer working due to mental health symptoms     MEDICATIONS                                                                                                Current Outpatient Medications   Medication Sig Dispense Refill    venlafaxine (EFFEXOR XR) 150 MG 24 hr capsule Take 1 capsule (150 mg) by mouth daily. Take with 75 mg for total daily dose of 225 mg 90 capsule 1    venlafaxine (EFFEXOR XR) 75 MG 24 hr capsule Take 1 capsule (75 mg) by mouth daily. Take with 150 mg for total daily dose of 225 mg 90 capsule 1    acetaminophen (TYLENOL) 325 MG tablet Take 2 tablets (650 mg) by mouth every 4 hours as needed for mild pain or fever.      atorvastatin (LIPITOR) 40 MG tablet  Take 1 tablet (40 mg) by mouth daily.      cholecalciferol (VITAMIN D3) 125 mcg (5000 units) capsule Take 1 capsule (125 mcg) by mouth daily.      Continuous Glucose Sensor (FREESTYLE JAVI 14 DAY SENSOR) [facility] AS DIRECTED AND CHANGE EVERY 14 DAYS,      empagliflozin (JARDIANCE) 25 MG TABS tablet Take 1 tablet (25 mg) by mouth daily. 90 tablet 3    exenatide ER (BYDUREON BCISE) 2 MG/0.85ML auto-injector INJECT 2MG SUBCUTANEOUS EVERY 7 DAYS 3.4 mL 5    ezetimibe (ZETIA) 10 MG tablet Take 1 tablet (10 mg) by mouth daily. 90 tablet 3    lisinopril (ZESTRIL) 5 MG tablet Take 1 tablet (5 mg) by mouth daily.      metFORMIN (GLUCOPHAGE) 1000 MG tablet Take 1 tablet (1,000 mg) by mouth 2 times daily (with meals).      multivitamin w/minerals (THERA-VIT-M) tablet Take 1 tablet by mouth daily.      omeprazole (PRILOSEC OTC) 20 MG EC tablet Take 1 tablet (20 mg) by mouth daily as needed.      oxyCODONE (ROXICODONE) 5 MG tablet Take 2 tablets (10 mg) by mouth 2 times daily as needed for pain. 28 tablet 0    oxyCODONE (ROXICODONE) 5 MG tablet Take 2 tablets (10 mg) by mouth every 6 hours as needed for pain 24 tablet 0    warfarin ANTICOAGULANT (COUMADIN) 5 MG tablet Take 7.5 mg every Mon, Fri; 5 mg all other days or As directed by Anticoagulation clinic 100 tablet 1     No current facility-administered medications for this visit.      NOTES ABOUT CURRENT PSYCHOTROPIC MEDICATIONS:     Latuda 120 mg, not on due to no insurance    Bupropion  mg, on due to insurance, when on unclear benefit.  Venlafaxine XR 75 mg       SUPPLEMENTS: denies   SIDE EFFECTS:   denies    COMPLIANCE:   states Adherent to medication regimen       PAST PSYCHOTROPIC MEDICATIONS:  Fluoxetine years ago  Sertraline   Abilify 5 mg, unclear benefit.  Trial of over 9 months  Quetiapine 25-50 mg at bedtime, about a month,   Trazodone not effective in targeting insomnia and am grogginess  Bupropion  mg not effective  Latuda 120 mg       TODAY PATIENT  "REPORTS THE FOLLOWING PSYCHIATRIC ROS:   MOOD: \"intense depression\"    CURRENT STRESSORS: mental health, financial, occupational  SLEEP: fractured, often up all night and sleeping during the day.    DIET: increased appetite, Keita's daily. Comfort food  EXERCISE: minimal due to mental health   SIDE EFFECTS: denies   SUBSTANCE USE: denies   COMPLIANCE: see above   REPORTS THE FOLLOWING NEW MEDICAL ISSUES: has diabetes, and now with neuropathy, knee pain     Reported depressive symptoms persist with a high PHQ-9 score indicating intense depression. Described difficulty completing daily tasks such as doing laundry and even routine personal care like brushing teeth. Mentioned that longstanding depressive episodes have led to procrastination on administrative tasks (e.g., delaying submission of a medical insurance renewal form for six months), and that depressive episodes contribute to erratic sleep patterns with nighttime wakefulness and prolonged bed rest in the morning. Described eating habits influenced by depression, such as consuming the same food daily (Keita s) which provides a temporary social uplift during an hour and a half visit.    Reported difficulty with medication adherence due to financial and insurance issues. Stated having taken only venlafaxine continuously to avoid withdrawal symptoms, while discontinuing other medications including Wellbutrin, lurasidone, and low-dose quetiapine due to perceived lack of benefit. Recalled benefiting from a higher dose of venlafaxine in the past, though currently feeling the medication is not adequately addressing depressive symptoms. Expressed passive suicidal thoughts along with statements of not caring about himself, although affirmed being in a safe place and denying any active suicidal plan.    Reported concerns with medical conditions contributing to overall distress. Mentioned having diabetes with current symptoms including soreness in the legs and feet, " which may indicate diabetic neuropathy, as well as a significant issue with knee pain. Reported adherence to warfarin for DVT-related risks but noted non-adherence to other diabetic medications due to issues with insurance coverage.     MEDICAL REVIEW OF SYMPTOMS: Ten system review was completed with pertinent positives noted     PROBLEM: DEPRESSION: No change. Vegetative symptoms of depression continue.  Endorses anhedonia, feeling down or depressed, sleep impairment, anergia, appetite changes, low self-esteem, trouble concentrating, psychomotor reslessness or slowing, hopelessness, helplessness, worthlessness, ruminations, irritability.         6/5/2025    10:25 AM   Last PHQ-9   1.  Little interest or pleasure in doing things 3   2.  Feeling down, depressed, or hopeless 3   3.  Trouble falling or staying asleep, or sleeping too much 3   4.  Feeling tired or having little energy 3   5.  Poor appetite or overeating 2   6.  Feeling bad about yourself 3   7.  Trouble concentrating 0   8.  Moving slowly or restless 0   Q9: Thoughts of better off dead/self-harm past 2 weeks 3   PHQ-9 Total Score 20   Difficulty at work, home, or with people Extremely dIfficult         11/14/2024    12:41 PM 11/21/2024    12:19 PM 6/5/2025    10:25 AM   PHQ-9 SCORE   PHQ-9 Total Score MyChart  18 (Moderately severe depression)    PHQ-9 Total Score 21 18  20       Patient-reported     PHQ9 score is 20 indicating severe depression.   Suicidal ideation:  Yes .  Endorses passive SI, no intent or plan.     PROBLEM: ANXIETY: No change.    GAD7 score is Not completed today      8/3/2023     1:00 PM 11/14/2024    12:41 PM 11/21/2024    12:19 PM   SOL-7 SCORE   Total Score 5 (mild anxiety)  3 (minimal anxiety)   Total Score 5 3 3        Patient-reported       PERTINENT PAST MEDICAL AND SURGICAL HISTORY     Past Medical History:   Diagnosis Date    Antiplatelet or antithrombotic long-term use     Closed displaced fracture of neck of right  "scapula, sequela 10/21/2019    Added automatically from request for surgery [id number]    Depressive disorder 2001    Diabetes (H)     DVT (deep venous thrombosis) (H) 07/05/2019    Elevated blood pressure reading without diagnosis of hypertension 05/13/2018    Hypercholesteremia 2012    Personal history of other medical treatment     blood clot foot july 2019    Pilonidal cyst 05/13/2018       VITALS     BP Readings from Last 1 Encounters:   11/14/24 120/81     Pulse Readings from Last 1 Encounters:   11/14/24 91     Wt Readings from Last 1 Encounters:   09/18/24 120.2 kg (265 lb)     Ht Readings from Last 1 Encounters:   09/18/24 1.981 m (6' 6\")     Estimated body mass index is 30.62 kg/m  as calculated from the following:    Height as of 9/18/24: 1.981 m (6' 6\").    Weight as of 9/18/24: 120.2 kg (265 lb).    LABS & IMAGING                                                                                                                   Recent Labs   Lab Test 06/29/24  0503   WBC 5.0   HGB 15.2   HCT 43.6   MCV 87      ANEU 2.9     Recent Labs   Lab Test 09/13/24  0930 06/29/24  1301 06/29/24  0503     --  138   POTASSIUM 3.6  --  3.9   CHLORIDE 98  --  103   CO2 17*  --  24   *   < > 246*   SAVI 9.4  --  9.0   MAG  --   --  1.9   BUN 11.2  --  10.9   CR 0.86  --  0.86   GFRESTIMATED >90  --  >90   ALBUMIN 4.6  --  3.8   PROTTOTAL 7.6  --  6.5   AST 24  --  20   ALT 24  --  13   ALKPHOS 81  --  86   BILITOTAL 0.5  --  0.7    < > = values in this interval not displayed.     Recent Labs   Lab Test 09/27/24  0917 09/13/24  0930 06/27/24  0519 01/15/24  1615   CHOL  --   --   --  210*   *  --   --  95   HDL  --   --   --  39*   TRIG  --   --   --  505*   A1C  --  11.2*   < > 8.5*    < > = values in this interval not displayed.     Recent Labs   Lab Test 05/01/23  0939   TSH 1.73       ALLERGY & IMMUNIZATIONS     No Known Allergies    MEDICAL REVIEW OF SYSTEMS:   Ten system review was completed " with pertinent positives noted     MENTAL STATUS EXAM:      General/Constitutional:  Appearance:   awake, alert, adequately groomed, appeared stated age and no apparent distress  Attitude:    cooperative   Eye Contact:  good  Musculoskeletal:  Psychomotor Behavior:  no evidence of tardive dyskinesia, dystonia, or tics from the head up  Psychiatric:  Speech:  clear, coherent, regular rate, rhythm, and volume,   No pressure speech noted.  Associations:  no loose associations  Thought Process:   logical, linear and goal oriented  Thought Content:  chronic suicidal idation, longstanding, no intent or plan.  No homicidal ideation, no evidence of psychotic thought, no auditory hallucinations present and no visual hallucinations present  Mood:  depressed  Affect:  decreased emotional variability  was congruent to speech content.  Insight:  good  Judgment:  intact, adequate for safety  Impulse Control:  intact  Neurological:  Oriented to:  person, place, time, and situation  Attention Span and Concentration:  Able to attend to the interview        Language: intact       Recent and Remote Memory:  Intact to interview. Not formally assessed. No amnesia.    Fund of Knowledge: appropriate        SAFETY   Feels safe in home: YES     Suicidal ideation: chronic, no intent or plan  History of suicide attempts:  No   Hx of impulsivity: No   Hope for the future: present    Hx of Command hallucinations or current psychosis: None endorsed    History of Self-injurious behaviors: denies    Family member  by suicide:  not discussed       SAFETY ASSESSMENT:   Based on all available evidence including the factors cited above, overall Risk for harm is low and is appropriate for outpatient level of care.   Recommended that patient call 911 or go to the local ED should there be a change in any of these risk factors.     DSM 5 DIAGNOSIS:   296.32 (F33.1) Major Depressive Disorder, Recurrent Episode, Moderate _    MEDICAL COMORBIDITY  IMPACTING CLINICAL PICTURE: None noted.      ASSESSMENT AND PLAN    Assessment  Major depressive disorder with persistent and intense symptoms, as indicated by a high PHQ-9 score. Depression is significantly impacting daily functioning, including motivation and self-care activities. No current benefit from Wellbutrin or lurasidone, and venlafaxine at a higher dose previously provided some relief. Passive suicidal thoughts present, but patient reports feeling in a safe place. Diabetes management is compromised due to lack of insurance coverage, leading to unmanaged symptoms.    Plan  - Recommend visiting [facility], a federally qualified healthcare center, to assist with regaining medical insurance and to receive evaluations for knee pain and other medical needs. [facility] offers behavioral health, psychiatry, medical, and dental services, and can help with insurance applications.  Information sent to patient via Snapchat message   - Venlafaxine XR: increase to 150 mg for one week, then increase to 225 mg by taking 150 mg and 75 mg together.      Appointments  - Follow-up appointment in two months; cancel if insurance issues persist and consider [facility] for assistance  Problem List Items Addressed This Visit          Behavioral    Severe episode of recurrent major depressive disorder, without psychotic features (H)    Relevant Medications    venlafaxine (EFFEXOR XR) 75 MG 24 hr capsule    venlafaxine (EFFEXOR XR) 150 MG 24 hr capsule            CONSULTS/REFERRALS:   Continue therapy   Coordinate care with therapist as needed       MEDICAL:   None at this time  Coordinate care with PCP (Elvis Guzman) as needed  Follow up with primary care provider as planned or for acute medical concerns.     PSYCHOEDUCATION:  Medication side effects and alternatives reviewed. Health promotion activities recommended and reviewed today. All questions addressed. Education and counseling completed regarding risks and benefits of  medications and psychotherapy options.  Consent provided by patient/guardian  Call the psychiatric nurse line with medication questions or concerns at [phone].  [employer] may be used to communicate with your provider, but this is not intended to be used for emergencies.  Medlineplus.gov is information for patients.  It is run by the [employer] of Medicine and it contains information about all disorders, diseases and all medications.       COMMUNITY RESOURCES:    CRISIS NUMBERS: Provided in AVS 9/19/2023  National Suicide Prevention Lifeline: 2-431-231-TALK (554-989-9018)  ClairMail/resources for a list of additional resources (SOS)            [facility] - [phone]   Urgent Care Adult Mental [place]-[phone] mobile unit/ 24/7 crisis line  [facility] -[phone]   COPE 24/7 [place] Mobile Team -[phone] (adults)/ [phone] (child)  Poison Control Center - [phone]    OR  go to nearest ER  Crisis Text Line for any crisis 24/7 send this-   To: 665664   [facility] ER  [phone]  National Suicide Prevention Lifeline: 361.838.5841 (TTY: 870.641.2875). Call anytime for help.  (www.suicidepreventionlifeline.org)  National [place] on Mental Illness (www.dianne.org): 636.637.8597 or 298-114-7188.   Mental Health Association (www.mentalhealth.org): 534.278.4511 or 293-242-0246.  [place] Crisis Text Line: Text [place] to 325402  Suicide LifeLine Chat: suicideMedpricer.com.org/chat     ADMINISTRATIVE BILLING:   Level of Medical Decision Making:    - At least 1 chronic problem that is not stable  - Engaged in prescription drug management during visit (discussed any medication benefits, side effects, alternatives, etc.)     The longitudinal plan of care for the diagnosis(es)/condition(s) as documented were addressed during this visit. Due to the added complexity in care, I will continue to support Gaurang in the subsequent  management and with ongoing continuity of care.  Patient Status:  Patient will continue to be seen for ongoing consultation and stabilization.       Signed:   Meagan Hernandez, MSN, APRN, FMHNP-[facility] ([facility])   Chart documentation done in part with Dragon Voice Recognition software.  Although reviewed after completion, some word and grammatical errors may remain.

## 2025-06-05 NOTE — NURSING NOTE
Is the patient currently in the state of [place]? YES    Current patient location: [facility] [address]   [facility] [address]    Visit mode:Video    If the visit is dropped, the patient can be reconnected by: VIDEO VISIT: Text to cell phone:   Telephone Information:   Mobile [phone]       Will anyone else be joining the visit? No  (If patient encounters technical issues they should call [phone])    Are changes needed to the allergy or medication list? Pt stated no changes to allergies and Pt stated no med changes    Are refills needed on medications prescribed by this physician? Yes, discuss with provider for more medication refills.    Rooming Documentation: Questionnaire(s) completed.    Reason for visit: RECHECK     Yesenia Woodard, KALEYF

## 2025-06-09 ENCOUNTER — RESULTS FOLLOW-UP (OUTPATIENT)
Dept: ANTICOAGULATION | Facility: CLINIC | Age: 55
End: 2025-06-09

## 2025-06-09 ENCOUNTER — ANTICOAGULATION THERAPY VISIT (OUTPATIENT)
Dept: ANTICOAGULATION | Facility: CLINIC | Age: 55
End: 2025-06-09

## 2025-06-09 DIAGNOSIS — Z86.718 H/O DEEP VENOUS THROMBOSIS: ICD-10-CM

## 2025-06-09 DIAGNOSIS — Z79.01 LONG TERM (CURRENT) USE OF ANTICOAGULANTS: ICD-10-CM

## 2025-06-09 DIAGNOSIS — I26.94 MULTIPLE SUBSEGMENTAL PULMONARY EMBOLI WITHOUT ACUTE COR PULMONALE (H): ICD-10-CM

## 2025-06-09 DIAGNOSIS — D68.51 HETEROZYGOUS FACTOR V LEIDEN MUTATION: Primary | ICD-10-CM

## 2025-06-09 NOTE — PROGRESS NOTES
ANTICOAGULATION MANAGEMENT     Gaurang Rivera 54 year old male is on warfarin with therapeutic result. (Goal Chromogenic Factor X 40-20%)    Recent labs: (last 7 days)     06/06/25  1540   [id number]*       ASSESSMENT     Warfarin Lab Questionnaire    Warfarin Doses Last 7 Days      6/6/2025     1:26 PM   Dose in Tablet or Mg   TAB or MG? milligram (mg)     Pt Rptd Dose SUNDAY MONDAY TUESDAY WED THURS FRIDAY SATURDAY 6/6/2025   1:26 PM 5 7.5 5 5 5 7.5 5         6/6/2025   Warfarin Lab Questionnaire   Missed doses within past 14 days? No   Changes in diet or alcohol within past 14 days? No   Medication changes since last result? No, Yes, bupropion dose increased from 150 to 300 mg daily on 6/5/25, no impact on INR per UptoDate   Injuries or illness since last result? No   New shortness of breath, severe headaches or sudden changes in vision since last result? No   Abnormal bleeding since last result? No   Upcoming surgery, procedure? No     Previous result: Therapeutic last 2(+) visits  Additional findings: None     PLAN     Recommended plan for no diet, medication or health factor changes affecting result    Dosing Instructions: Continue your current warfarin dose with next Chromogenic Factor X in 6 weeks=7/18/25          Summary  As of 6/9/2025      Full warfarin instructions:  7.5 mg every Mon, Fri; 5 mg all other days               Detailed voice message left for Gaurang with dosing instructions and follow up date.     Contact [phone] to schedule and with any changes, questions or concerns.     Education provided:   Please call back if any changes to your diet, medications or how you've been taking warfarin  Taking warfarin: Importance of taking warfarin as instructed    Plan made per [facility] anticoagulation protocol    Parul Lopes, RN  6/9/2025  Anticoagulation Clinic  [facility] for routing messages: sujey RITCHIE  [facility] patient phone line: [phone]

## (undated) DEVICE — SOL WATER IRRIG 1000ML BOTTLE 2F7114

## (undated) DEVICE — LINEN TOWEL PACK X5 5464

## (undated) DEVICE — SU LOOP #2 FIBERLOOP  AR-7234

## (undated) DEVICE — Device

## (undated) DEVICE — BRUSH SURGICAL SCRUB W/PCMX 4457A

## (undated) DEVICE — TUBING SYSTEM ARTHREX PUMP REDEUCE AR-6411

## (undated) DEVICE — PACK RECTAL KIT CUSTOM ASC

## (undated) DEVICE — SU VICRYL 2-0 CT-2 27" UND J269H

## (undated) DEVICE — SU MONOCRYL 3-0 PS-1 27" Y936H

## (undated) DEVICE — DRAIN JACKSON PRATT RESERVOIR 100ML SU130-1305

## (undated) DEVICE — ESU GROUND PAD ADULT W/CORD E7507

## (undated) DEVICE — DRAPE LAP W/ARMBOARD 29410

## (undated) DEVICE — NDL 18GA 1.5" 305196

## (undated) DEVICE — SYR 10ML FINGER CONTROL W/O NDL 309695

## (undated) DEVICE — SLING ARM XLG 79-99158

## (undated) DEVICE — IMM KIT SHOULDER STABILIZATION 7210573

## (undated) DEVICE — ESU ELEC VAPR PREMIER RF 90DEG 3.7MM 227204

## (undated) DEVICE — ESU ELEC NDL 1" E1552

## (undated) DEVICE — POSITIONER ARMBOARD FOAM 1PAIR LF FP-ARMB1

## (undated) DEVICE — SOL NACL 0.9% IRRIG 3000ML BAG 2B7477

## (undated) DEVICE — RESTRAINT LIMB HOLDER ANKLE/WRIST FOAM W/QUICK RELEASE 2533

## (undated) DEVICE — IMM KIT SHOULDER TMAX MASK FACE 7210559

## (undated) DEVICE — SOL BENZOIN 0.5OZ

## (undated) DEVICE — GLOVE PROTEXIS MICRO 7.5  2D73PM75

## (undated) DEVICE — TAPE MEDIPORE 2" 2962

## (undated) DEVICE — GLOVE PROTEXIS W/NEU-THERA 7.5  2D73TE75

## (undated) DEVICE — NDL SPINAL 18GA 3.5" 405184

## (undated) DEVICE — BLADE SHAVER ARTHRO 4.2MM GREAT WHITE 9299A

## (undated) DEVICE — GLOVE PROTEXIS BLUE W/NEU-THERA 8.0  2D73EB80

## (undated) DEVICE — GOWN XLG DISP 9545

## (undated) DEVICE — DRAPE POUCH INSTRUMENT 1018

## (undated) DEVICE — TUBING SUCTION MEDI-VAC 1/4"X20' N620A

## (undated) DEVICE — SOL HYDROGEN PEROXIDE 3% 4OZ BOTTLE F0010

## (undated) DEVICE — SU PDS II 4-0 P-2 18" CLR Z504G

## (undated) DEVICE — STRAP KNEE/BODY 31143004

## (undated) DEVICE — LIGHT HANDLE X1 31140133

## (undated) DEVICE — DRAPE U-POUCH 34X29" 1067

## (undated) DEVICE — TAPE DURAPORE 3" SILK 1538-3

## (undated) DEVICE — DRSG STERI STRIP 1/2X4" R1547

## (undated) DEVICE — GLOVE PROTEXIS POWDER FREE 7.5 ORTHOPEDIC 2D73ET75

## (undated) DEVICE — PREP DURAPREP 26ML APL 8630

## (undated) DEVICE — TUBING SYSTEM ARTHREX PATIENT REDEUCE AR-6421

## (undated) DEVICE — ESU PENCIL W/SMOKE EVAC NEPTUNE STRYKER 0703-046-000

## (undated) DEVICE — COVER CAMERA IN-LIGHT DISP LT-C02

## (undated) DEVICE — DRSG TEGADERM 4X4 3/4" 1626W

## (undated) DEVICE — DRAPE MAYO STAND 23X54 8337

## (undated) DEVICE — TUBING SUCTION 10'X3/16" N510

## (undated) DEVICE — TUBING SET ARTHREX DUALWAVE OUTFLOW AR-6430

## (undated) DEVICE — SUCTION MANIFOLD DORNOCH ULTRA CART UL-CL500

## (undated) DEVICE — DRAPE U-DRAPE 1015NSD NON-STERILE

## (undated) DEVICE — SU PDS II 0 CTX 36" Z370T

## (undated) DEVICE — LINEN ORTHO PACK 5446

## (undated) DEVICE — SYR BULB IRRIG 50ML LATEX FREE 0035280

## (undated) DEVICE — SU FIBERWIRE 2 38"  AR-7200

## (undated) DEVICE — DRAIN JACKSON PRATT CHANNEL 15FR ROUND HUBLESS SIL JP-2228

## (undated) DEVICE — REAMER ARTHREX PILOTED HEAD 6.5MM AR-1451

## (undated) RX ORDER — CEFOTETAN DISODIUM 2 G/20ML
INJECTION, POWDER, FOR SOLUTION INTRAMUSCULAR; INTRAVENOUS
Status: DISPENSED
Start: 2020-02-21

## (undated) RX ORDER — ACETAMINOPHEN 325 MG/1
TABLET ORAL
Status: DISPENSED
Start: 2020-02-21

## (undated) RX ORDER — LIDOCAINE HYDROCHLORIDE 20 MG/ML
INJECTION, SOLUTION EPIDURAL; INFILTRATION; INTRACAUDAL; PERINEURAL
Status: DISPENSED
Start: 2020-06-02

## (undated) RX ORDER — FENTANYL CITRATE 50 UG/ML
INJECTION, SOLUTION INTRAMUSCULAR; INTRAVENOUS
Status: DISPENSED
Start: 2024-06-27

## (undated) RX ORDER — PROPOFOL 10 MG/ML
INJECTION, EMULSION INTRAVENOUS
Status: DISPENSED
Start: 2020-02-21

## (undated) RX ORDER — DEXAMETHASONE SODIUM PHOSPHATE 4 MG/ML
INJECTION, SOLUTION INTRA-ARTICULAR; INTRALESIONAL; INTRAMUSCULAR; INTRAVENOUS; SOFT TISSUE
Status: DISPENSED
Start: 2020-06-02

## (undated) RX ORDER — FENTANYL CITRATE 50 UG/ML
INJECTION, SOLUTION INTRAMUSCULAR; INTRAVENOUS
Status: DISPENSED
Start: 2020-02-21

## (undated) RX ORDER — BUPIVACAINE HYDROCHLORIDE 2.5 MG/ML
INJECTION, SOLUTION EPIDURAL; INFILTRATION; INTRACAUDAL
Status: DISPENSED
Start: 2020-02-21

## (undated) RX ORDER — DEXAMETHASONE SODIUM PHOSPHATE 4 MG/ML
INJECTION, SOLUTION INTRA-ARTICULAR; INTRALESIONAL; INTRAMUSCULAR; INTRAVENOUS; SOFT TISSUE
Status: DISPENSED
Start: 2020-02-21

## (undated) RX ORDER — HEPARIN SODIUM 1000 [USP'U]/ML
INJECTION, SOLUTION INTRAVENOUS; SUBCUTANEOUS
Status: DISPENSED
Start: 2024-06-27

## (undated) RX ORDER — BUPIVACAINE HYDROCHLORIDE AND EPINEPHRINE 5; 5 MG/ML; UG/ML
INJECTION, SOLUTION PERINEURAL
Status: DISPENSED
Start: 2020-06-02

## (undated) RX ORDER — ONDANSETRON 2 MG/ML
INJECTION INTRAMUSCULAR; INTRAVENOUS
Status: DISPENSED
Start: 2020-02-21

## (undated) RX ORDER — LIDOCAINE HYDROCHLORIDE 10 MG/ML
INJECTION, SOLUTION EPIDURAL; INFILTRATION; INTRACAUDAL; PERINEURAL
Status: DISPENSED
Start: 2024-06-27

## (undated) RX ORDER — TRIAMCINOLONE ACETONIDE 40 MG/ML
INJECTION, SUSPENSION INTRA-ARTICULAR; INTRAMUSCULAR
Status: DISPENSED
Start: 2019-10-23

## (undated) RX ORDER — BACITRACIN 500 [USP'U]/G
OINTMENT OPHTHALMIC
Status: DISPENSED
Start: 2020-02-21

## (undated) RX ORDER — CEFAZOLIN SODIUM IN 0.9 % NACL 3 G/100 ML
INTRAVENOUS SOLUTION, PIGGYBACK (ML) INTRAVENOUS
Status: DISPENSED
Start: 2020-06-02

## (undated) RX ORDER — LIDOCAINE HYDROCHLORIDE 10 MG/ML
INJECTION, SOLUTION EPIDURAL; INFILTRATION; INTRACAUDAL; PERINEURAL
Status: DISPENSED
Start: 2019-10-23

## (undated) RX ORDER — GABAPENTIN 300 MG/1
CAPSULE ORAL
Status: DISPENSED
Start: 2020-02-21

## (undated) RX ORDER — OXYCODONE HYDROCHLORIDE 5 MG/1
TABLET ORAL
Status: DISPENSED
Start: 2020-02-21

## (undated) RX ORDER — PROPOFOL 10 MG/ML
INJECTION, EMULSION INTRAVENOUS
Status: DISPENSED
Start: 2020-06-02

## (undated) RX ORDER — FENTANYL CITRATE 50 UG/ML
INJECTION, SOLUTION INTRAMUSCULAR; INTRAVENOUS
Status: DISPENSED
Start: 2020-06-02

## (undated) RX ORDER — LIDOCAINE HYDROCHLORIDE 20 MG/ML
INJECTION, SOLUTION EPIDURAL; INFILTRATION; INTRACAUDAL; PERINEURAL
Status: DISPENSED
Start: 2020-02-21

## (undated) RX ORDER — ONDANSETRON 2 MG/ML
INJECTION INTRAMUSCULAR; INTRAVENOUS
Status: DISPENSED
Start: 2020-06-02